# Patient Record
Sex: MALE | Race: WHITE | NOT HISPANIC OR LATINO | Employment: OTHER | URBAN - METROPOLITAN AREA
[De-identification: names, ages, dates, MRNs, and addresses within clinical notes are randomized per-mention and may not be internally consistent; named-entity substitution may affect disease eponyms.]

---

## 2017-01-02 DIAGNOSIS — I10 BENIGN ESSENTIAL HYPERTENSION: ICD-10-CM

## 2017-01-03 RX ORDER — AMLODIPINE BESYLATE 5 MG/1
TABLET ORAL
Qty: 30 TABLET | Refills: 0 | Status: SHIPPED | OUTPATIENT
Start: 2017-01-03 | End: 2017-02-04 | Stop reason: SDUPTHER

## 2017-01-03 RX ORDER — BENAZEPRIL HYDROCHLORIDE 20 MG/1
TABLET ORAL
Qty: 30 TABLET | Refills: 0 | Status: SHIPPED | OUTPATIENT
Start: 2017-01-03 | End: 2017-02-04 | Stop reason: SDUPTHER

## 2017-02-04 DIAGNOSIS — I10 BENIGN ESSENTIAL HYPERTENSION: ICD-10-CM

## 2017-02-06 RX ORDER — BENAZEPRIL HYDROCHLORIDE 20 MG/1
TABLET ORAL
Qty: 30 TABLET | Refills: 0 | Status: SHIPPED | OUTPATIENT
Start: 2017-02-06 | End: 2017-02-08 | Stop reason: SDUPTHER

## 2017-02-06 RX ORDER — AMLODIPINE BESYLATE 5 MG/1
TABLET ORAL
Qty: 30 TABLET | Refills: 0 | Status: SHIPPED | OUTPATIENT
Start: 2017-02-06 | End: 2017-02-08 | Stop reason: SDUPTHER

## 2017-02-08 DIAGNOSIS — I10 BENIGN ESSENTIAL HYPERTENSION: ICD-10-CM

## 2017-02-08 RX ORDER — BENAZEPRIL HYDROCHLORIDE 20 MG/1
20 TABLET ORAL DAILY
Qty: 90 TABLET | Refills: 0 | Status: SHIPPED | OUTPATIENT
Start: 2017-02-08 | End: 2017-10-31 | Stop reason: SDUPTHER

## 2017-02-08 RX ORDER — AMLODIPINE BESYLATE 5 MG/1
5 TABLET ORAL DAILY
Qty: 90 TABLET | Refills: 0 | Status: SHIPPED | OUTPATIENT
Start: 2017-02-08 | End: 2017-12-08 | Stop reason: SDUPTHER

## 2017-03-03 ENCOUNTER — OFFICE VISIT (OUTPATIENT)
Dept: FAMILY MEDICINE CLINIC | Facility: CLINIC | Age: 62
End: 2017-03-03

## 2017-03-03 VITALS
HEIGHT: 71 IN | HEART RATE: 82 BPM | WEIGHT: 169 LBS | BODY MASS INDEX: 23.66 KG/M2 | SYSTOLIC BLOOD PRESSURE: 122 MMHG | DIASTOLIC BLOOD PRESSURE: 68 MMHG | OXYGEN SATURATION: 98 %

## 2017-03-03 DIAGNOSIS — I10 BENIGN ESSENTIAL HYPERTENSION: Primary | ICD-10-CM

## 2017-03-03 PROCEDURE — 99212 OFFICE O/P EST SF 10 MIN: CPT | Performed by: FAMILY MEDICINE

## 2017-03-03 NOTE — PROGRESS NOTES
Lakesha Potts is a 61 y.o. male.     Chief Complaint   Patient presents with   • Hypertension   • Med Refill     Social History   Substance Use Topics   • Smoking status: Never Smoker   • Smokeless tobacco: None   • Alcohol use Yes       History of Present Illness       Patient is here for follow-up of hypertension. He is exercising at work and is adherent to a low-salt diet. Patient does not check  his blood pressure.  Patient denies chest pain and dyspnea. Cardiovascular risk factors: hypertension and male gender. Use of agents associated with hypertension: none. History of target organ damage: none. He is compliant with meds.     The following portions of the patient's history were reviewed and updated as appropriate: allergies, current medications, past social history and problem list.     Review of Systems   Constitutional: Negative for activity change, appetite change, chills, fatigue, fever and unexpected weight change.   HENT: Negative for congestion, ear pain, hearing loss, mouth sores, nosebleeds, rhinorrhea and sore throat.    Eyes: Negative for pain and visual disturbance.   Respiratory: Positive for cough. Negative for shortness of breath and wheezing.    Cardiovascular: Negative for chest pain, palpitations and leg swelling.   Gastrointestinal: Negative for abdominal distention, abdominal pain, blood in stool, constipation, diarrhea, nausea and vomiting.   Endocrine: Negative for cold intolerance and heat intolerance.   Genitourinary: Negative for difficulty urinating, discharge, dysuria, frequency, hematuria and urgency.   Musculoskeletal: Negative for back pain and joint swelling.   Skin: Negative for rash and wound.   Neurological: Negative for dizziness, weakness, numbness and headaches.   Hematological: Does not bruise/bleed easily.   Psychiatric/Behavioral: Negative for confusion, dysphoric mood, sleep disturbance and suicidal ideas. The patient is not nervous/anxious.   "      Objective   Vitals:    03/03/17 0856   BP: 122/68   Pulse: 82   SpO2: 98%   Weight: 169 lb (76.7 kg)   Height: 71\" (180.3 cm)     Body mass index is 23.57 kg/(m^2).    Physical Exam   Constitutional: He is oriented to person, place, and time. Vital signs are normal. He appears well-developed and well-nourished. No distress.   HENT:   Head: Normocephalic.   Cardiovascular: Normal rate, regular rhythm and normal heart sounds.    Neurological: He is alert and oriented to person, place, and time. Gait normal.   Psychiatric: He has a normal mood and affect. His behavior is normal. Judgment and thought content normal.   Vitals reviewed.      Assessment/Plan   Problem List Items Addressed This Visit        Cardiovascular and Mediastinum    Benign essential hypertension - Primary    Overview     3/3/2017  BP is controlled well. He will recheck in six months. He will continue present meds.                    "

## 2017-03-11 DIAGNOSIS — I10 BENIGN ESSENTIAL HYPERTENSION: ICD-10-CM

## 2017-03-13 RX ORDER — AMLODIPINE BESYLATE 5 MG/1
TABLET ORAL
Qty: 90 TABLET | Refills: 1 | Status: SHIPPED | OUTPATIENT
Start: 2017-03-13 | End: 2017-09-19 | Stop reason: SDUPTHER

## 2017-03-13 RX ORDER — BENAZEPRIL HYDROCHLORIDE 20 MG/1
TABLET ORAL
Qty: 90 TABLET | Refills: 1 | Status: SHIPPED | OUTPATIENT
Start: 2017-03-13 | End: 2017-09-19 | Stop reason: SDUPTHER

## 2017-09-19 DIAGNOSIS — I10 BENIGN ESSENTIAL HYPERTENSION: ICD-10-CM

## 2017-09-19 RX ORDER — AMLODIPINE BESYLATE 5 MG/1
TABLET ORAL
Qty: 30 TABLET | Refills: 0 | Status: SHIPPED | OUTPATIENT
Start: 2017-09-19 | End: 2017-10-31 | Stop reason: SDUPTHER

## 2017-09-19 RX ORDER — BENAZEPRIL HYDROCHLORIDE 20 MG/1
TABLET ORAL
Qty: 30 TABLET | Refills: 0 | Status: SHIPPED | OUTPATIENT
Start: 2017-09-19 | End: 2017-12-08 | Stop reason: SDUPTHER

## 2017-10-31 DIAGNOSIS — I10 BENIGN ESSENTIAL HYPERTENSION: ICD-10-CM

## 2017-10-31 RX ORDER — BENAZEPRIL HYDROCHLORIDE 20 MG/1
20 TABLET ORAL DAILY
Qty: 30 TABLET | Refills: 0 | Status: SHIPPED | OUTPATIENT
Start: 2017-10-31 | End: 2017-12-08 | Stop reason: SDUPTHER

## 2017-10-31 RX ORDER — AMLODIPINE BESYLATE 5 MG/1
5 TABLET ORAL DAILY
Qty: 30 TABLET | Refills: 0 | Status: SHIPPED | OUTPATIENT
Start: 2017-10-31 | End: 2017-12-08 | Stop reason: SDUPTHER

## 2017-12-08 ENCOUNTER — OFFICE VISIT (OUTPATIENT)
Dept: FAMILY MEDICINE CLINIC | Facility: CLINIC | Age: 62
End: 2017-12-08

## 2017-12-08 VITALS
WEIGHT: 164 LBS | HEIGHT: 71 IN | HEART RATE: 85 BPM | OXYGEN SATURATION: 100 % | SYSTOLIC BLOOD PRESSURE: 132 MMHG | DIASTOLIC BLOOD PRESSURE: 68 MMHG | BODY MASS INDEX: 22.96 KG/M2

## 2017-12-08 DIAGNOSIS — I10 BENIGN ESSENTIAL HYPERTENSION: ICD-10-CM

## 2017-12-08 PROCEDURE — 99212 OFFICE O/P EST SF 10 MIN: CPT | Performed by: FAMILY MEDICINE

## 2017-12-08 RX ORDER — AMLODIPINE BESYLATE 5 MG/1
5 TABLET ORAL DAILY
Qty: 90 TABLET | Refills: 1 | Status: SHIPPED | OUTPATIENT
Start: 2017-12-08 | End: 2018-06-07 | Stop reason: SDUPTHER

## 2017-12-08 RX ORDER — BENAZEPRIL HYDROCHLORIDE 20 MG/1
20 TABLET ORAL DAILY
Qty: 90 TABLET | Refills: 1 | Status: SHIPPED | OUTPATIENT
Start: 2017-12-08 | End: 2018-06-07 | Stop reason: SDUPTHER

## 2017-12-08 NOTE — PATIENT INSTRUCTIONS
Snores enough to get his wife's attention. Mainly on his back. Rec. Putting tennis balls in a night shirt.

## 2017-12-08 NOTE — PROGRESS NOTES
Wei Potts is a 62 y.o. male.      Assessment/Plan   Problem List Items Addressed This Visit     None             No Follow-up on file.  There are no Patient Instructions on file for this visit.    Chief Complaint   Patient presents with   • Hypertension   • Med Refill     Social History   Substance Use Topics   • Smoking status: Never Smoker   • Smokeless tobacco: Never Used   • Alcohol use Yes       History of Present Illness     Patient is here for follow-up of hypertension. 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 Patient does not check BP at home.. Patient denies chest pain and dyspnea. Cardiovascular risk factors: advanced age (older than 55 for men, 65 for women), hypertension and male gender. Use of agents associated with hypertension: none. History of target organ damage: none. He is compliant with meds.     Snores enough to get his wife's attention. Mainly on his back. Rec. Putting tennis balls in a night shirt.     The following portions of the patient's history were reviewed and updated as appropriate:PMHroutine: Social history , Allergies, Current Medications, Active Problem List and Health Maintenance    Review of Systems   Constitutional: Negative for activity change, appetite change, chills, fatigue, fever and unexpected weight change.   HENT: Negative for congestion, ear pain, hearing loss, mouth sores, nosebleeds, rhinorrhea and sore throat.    Eyes: Negative for pain and visual disturbance.   Respiratory: Negative for cough, shortness of breath and wheezing.    Cardiovascular: Negative for chest pain, palpitations and leg swelling.   Gastrointestinal: Negative for abdominal distention, abdominal pain, blood in stool, constipation, diarrhea, nausea and vomiting.   Endocrine: Negative for cold intolerance and heat intolerance.   Genitourinary: Negative for difficulty urinating, discharge, dysuria, frequency, hematuria and urgency.   Musculoskeletal:  "Negative for back pain and joint swelling.   Skin: Negative for rash and wound.   Neurological: Negative for dizziness, weakness, numbness and headaches.   Hematological: Does not bruise/bleed easily.   Psychiatric/Behavioral: Negative for confusion, dysphoric mood, sleep disturbance and suicidal ideas. The patient is not nervous/anxious.        Objective   Vitals:    12/08/17 1033   BP: 132/68   Pulse: 85   SpO2: 100%   Weight: 74.4 kg (164 lb)   Height: 180.3 cm (70.98\")     Body mass index is 22.88 kg/(m^2).  Physical Exam   Constitutional: He is oriented to person, place, and time. Vital signs are normal. He appears well-developed and well-nourished. No distress.   HENT:   Head: Normocephalic.   Cardiovascular: Normal rate, regular rhythm and normal heart sounds.    Pulmonary/Chest: Effort normal and breath sounds normal.   Neurological: He is alert and oriented to person, place, and time. Gait normal.   Psychiatric: He has a normal mood and affect. His behavior is normal. Judgment and thought content normal.   Vitals reviewed.    Reviewed Data:        "

## 2018-06-07 DIAGNOSIS — I10 BENIGN ESSENTIAL HYPERTENSION: ICD-10-CM

## 2018-06-07 RX ORDER — AMLODIPINE BESYLATE 5 MG/1
TABLET ORAL
Qty: 15 TABLET | Refills: 0 | Status: SHIPPED | OUTPATIENT
Start: 2018-06-07 | End: 2018-06-27 | Stop reason: SDUPTHER

## 2018-06-07 RX ORDER — BENAZEPRIL HYDROCHLORIDE 20 MG/1
TABLET ORAL
Qty: 15 TABLET | Refills: 0 | Status: SHIPPED | OUTPATIENT
Start: 2018-06-07 | End: 2018-06-27 | Stop reason: SDUPTHER

## 2018-06-27 DIAGNOSIS — I10 BENIGN ESSENTIAL HYPERTENSION: ICD-10-CM

## 2018-06-27 RX ORDER — AMLODIPINE BESYLATE 5 MG/1
5 TABLET ORAL DAILY
Qty: 30 TABLET | Refills: 0 | Status: SHIPPED | OUTPATIENT
Start: 2018-06-27 | End: 2018-07-02 | Stop reason: SDUPTHER

## 2018-06-27 RX ORDER — BENAZEPRIL HYDROCHLORIDE 20 MG/1
20 TABLET ORAL DAILY
Qty: 30 TABLET | Refills: 0 | Status: SHIPPED | OUTPATIENT
Start: 2018-06-27 | End: 2018-07-02 | Stop reason: SDUPTHER

## 2018-07-02 ENCOUNTER — OFFICE VISIT (OUTPATIENT)
Dept: FAMILY MEDICINE CLINIC | Facility: CLINIC | Age: 63
End: 2018-07-02

## 2018-07-02 VITALS
OXYGEN SATURATION: 99 % | WEIGHT: 156 LBS | DIASTOLIC BLOOD PRESSURE: 74 MMHG | HEIGHT: 71 IN | RESPIRATION RATE: 16 BRPM | SYSTOLIC BLOOD PRESSURE: 122 MMHG | BODY MASS INDEX: 21.84 KG/M2 | HEART RATE: 71 BPM

## 2018-07-02 DIAGNOSIS — R79.89 ELEVATED LFTS: ICD-10-CM

## 2018-07-02 DIAGNOSIS — D69.9 BLEEDING DISORDER (HCC): Primary | ICD-10-CM

## 2018-07-02 DIAGNOSIS — I10 BENIGN ESSENTIAL HYPERTENSION: ICD-10-CM

## 2018-07-02 LAB
ALBUMIN SERPL-MCNC: 4 G/DL (ref 3.5–5.2)
ALBUMIN/GLOB SERPL: 1 G/DL
ALP SERPL-CCNC: 134 U/L (ref 39–117)
ALT SERPL-CCNC: 32 U/L (ref 1–41)
APTT PPP: 42.6 SECONDS (ref 22.7–35.4)
AST SERPL-CCNC: 66 U/L (ref 1–40)
BASOPHILS # BLD AUTO: 0.03 10*3/MM3 (ref 0–0.2)
BASOPHILS NFR BLD AUTO: 0.4 % (ref 0–1.5)
BILIRUB SERPL-MCNC: 0.8 MG/DL (ref 0.1–1.2)
BUN SERPL-MCNC: 7 MG/DL (ref 8–23)
BUN/CREAT SERPL: 9.6 (ref 7–25)
CALCIUM SERPL-MCNC: 9.5 MG/DL (ref 8.6–10.5)
CHLORIDE SERPL-SCNC: 105 MMOL/L (ref 98–107)
CO2 SERPL-SCNC: 25.6 MMOL/L (ref 22–29)
CREAT SERPL-MCNC: 0.73 MG/DL (ref 0.76–1.27)
EOSINOPHIL # BLD AUTO: 0.51 10*3/MM3 (ref 0–0.7)
EOSINOPHIL NFR BLD AUTO: 7.6 % (ref 0.3–6.2)
ERYTHROCYTE [DISTWIDTH] IN BLOOD BY AUTOMATED COUNT: 14.2 % (ref 11.5–14.5)
GLOBULIN SER CALC-MCNC: 4.1 GM/DL
GLUCOSE SERPL-MCNC: 95 MG/DL (ref 65–99)
HCT VFR BLD AUTO: 38 % (ref 40.4–52.2)
HGB BLD-MCNC: 11.9 G/DL (ref 13.7–17.6)
IMM GRANULOCYTES # BLD: 0 10*3/MM3 (ref 0–0.03)
IMM GRANULOCYTES NFR BLD: 0 % (ref 0–0.5)
INR PPP: 1.39 (ref 0.9–1.1)
LYMPHOCYTES # BLD AUTO: 1.22 10*3/MM3 (ref 0.9–4.8)
LYMPHOCYTES NFR BLD AUTO: 18.3 % (ref 19.6–45.3)
MCH RBC QN AUTO: 30.8 PG (ref 27–32.7)
MCHC RBC AUTO-ENTMCNC: 31.3 G/DL (ref 32.6–36.4)
MCV RBC AUTO: 98.4 FL (ref 79.8–96.2)
MONOCYTES # BLD AUTO: 0.72 10*3/MM3 (ref 0.2–1.2)
MONOCYTES NFR BLD AUTO: 10.8 % (ref 5–12)
NEUTROPHILS # BLD AUTO: 4.2 10*3/MM3 (ref 1.9–8.1)
NEUTROPHILS NFR BLD AUTO: 62.9 % (ref 42.7–76)
PLATELET # BLD AUTO: 121 10*3/MM3 (ref 140–500)
POTASSIUM SERPL-SCNC: 3.6 MMOL/L (ref 3.5–5.2)
PROT SERPL-MCNC: 8.1 G/DL (ref 6–8.5)
PROTHROMBIN TIME: 16.8 SECONDS (ref 11.7–14.2)
RBC # BLD AUTO: 3.86 10*6/MM3 (ref 4.6–6)
SODIUM SERPL-SCNC: 141 MMOL/L (ref 136–145)
WBC # BLD AUTO: 6.68 10*3/MM3 (ref 4.5–10.7)

## 2018-07-02 PROCEDURE — 99212 OFFICE O/P EST SF 10 MIN: CPT | Performed by: FAMILY MEDICINE

## 2018-07-02 RX ORDER — BENAZEPRIL HYDROCHLORIDE 20 MG/1
20 TABLET ORAL DAILY
Qty: 30 TABLET | Refills: 5 | Status: SHIPPED | OUTPATIENT
Start: 2018-07-02 | End: 2019-02-07 | Stop reason: SDUPTHER

## 2018-07-02 RX ORDER — AMLODIPINE BESYLATE 5 MG/1
5 TABLET ORAL DAILY
Qty: 30 TABLET | Refills: 5 | Status: SHIPPED | OUTPATIENT
Start: 2018-07-02 | End: 2019-02-07 | Stop reason: SDUPTHER

## 2019-02-07 DIAGNOSIS — I10 BENIGN ESSENTIAL HYPERTENSION: ICD-10-CM

## 2019-02-07 RX ORDER — BENAZEPRIL HYDROCHLORIDE 20 MG/1
TABLET ORAL
Qty: 30 TABLET | Refills: 0 | Status: SHIPPED | OUTPATIENT
Start: 2019-02-07 | End: 2019-02-25 | Stop reason: SDUPTHER

## 2019-02-07 RX ORDER — AMLODIPINE BESYLATE 5 MG/1
TABLET ORAL
Qty: 30 TABLET | Refills: 0 | Status: SHIPPED | OUTPATIENT
Start: 2019-02-07 | End: 2019-02-25 | Stop reason: SDUPTHER

## 2019-02-25 DIAGNOSIS — I10 BENIGN ESSENTIAL HYPERTENSION: ICD-10-CM

## 2019-02-25 RX ORDER — BENAZEPRIL HYDROCHLORIDE 20 MG/1
TABLET ORAL
Qty: 15 TABLET | Refills: 0 | Status: SHIPPED | OUTPATIENT
Start: 2019-02-25 | End: 2019-03-18 | Stop reason: SDUPTHER

## 2019-02-25 RX ORDER — AMLODIPINE BESYLATE 5 MG/1
TABLET ORAL
Qty: 15 TABLET | Refills: 0 | Status: SHIPPED | OUTPATIENT
Start: 2019-02-25 | End: 2019-03-18 | Stop reason: SDUPTHER

## 2019-03-18 ENCOUNTER — OFFICE VISIT (OUTPATIENT)
Dept: FAMILY MEDICINE CLINIC | Facility: CLINIC | Age: 64
End: 2019-03-18

## 2019-03-18 VITALS
HEIGHT: 71 IN | RESPIRATION RATE: 16 BRPM | BODY MASS INDEX: 23.8 KG/M2 | OXYGEN SATURATION: 99 % | WEIGHT: 170 LBS | SYSTOLIC BLOOD PRESSURE: 138 MMHG | HEART RATE: 83 BPM | DIASTOLIC BLOOD PRESSURE: 76 MMHG

## 2019-03-18 DIAGNOSIS — Z01.89 ROUTINE LAB DRAW: Primary | ICD-10-CM

## 2019-03-18 DIAGNOSIS — I10 BENIGN ESSENTIAL HYPERTENSION: ICD-10-CM

## 2019-03-18 PROCEDURE — 99212 OFFICE O/P EST SF 10 MIN: CPT | Performed by: FAMILY MEDICINE

## 2019-03-18 RX ORDER — BENAZEPRIL HYDROCHLORIDE 20 MG/1
20 TABLET ORAL DAILY
Qty: 90 TABLET | Refills: 1 | Status: SHIPPED | OUTPATIENT
Start: 2019-03-18 | End: 2019-09-17 | Stop reason: SDUPTHER

## 2019-03-18 RX ORDER — AMLODIPINE BESYLATE 5 MG/1
5 TABLET ORAL DAILY
Qty: 90 TABLET | Refills: 1 | Status: SHIPPED | OUTPATIENT
Start: 2019-03-18 | End: 2019-09-17 | Stop reason: SDUPTHER

## 2019-03-18 RX ORDER — SILDENAFIL 50 MG/1
50 TABLET, FILM COATED ORAL DAILY
Qty: 30 TABLET | Refills: 34 | Status: SHIPPED | OUTPATIENT
Start: 2019-03-18 | End: 2020-07-06

## 2019-03-18 NOTE — PROGRESS NOTES
Problem List Items Addressed This Visit        Cardiovascular and Mediastinum    Benign essential hypertension    Overview     7/2/2018  BP is controlled well. He will recheck in six months. He will continue present meds.           Relevant Medications    benazepril (LOTENSIN) 20 MG tablet    amLODIPine (NORVASC) 5 MG tablet      Other Visit Diagnoses     Routine lab draw    -  Primary         Return in about 6 months (around 9/18/2019).  Patient Instructions   Dr. Abe Rolon  [address] [facility], [address]  [place] [address]  [phone]      Wei Potts is a 64 y.o. male being seen in our office today for Hypertension and Shoulder Pain (roseann. )                 He  reports that  has never smoked. he has never used smokeless tobacco. He reports that he drinks alcohol. He reports that he does not use drugs.             HPI Patient is here for follow-up of hypertension. He is exercising with his job and is adherent to a low-salt diet. Patient does check BP occasionally.. Patient denies chest pain and dyspnea. Cardiovascular risk factors: advanced age (older than 55 for men, 65 for women), hypertension and male gender. Use of agents associated with hypertension: none. History of target organ damage: none. He is compliant with meds.              The following portions of the patient's history were reviewed and updated as appropriate:PMHroutine: Social history , Allergies, Current Medications, Active Problem List and Health Maintenance            Review of Systems   Constitutional: Negative for activity change, appetite change, chills, fatigue, fever and unexpected weight change.   HENT: Negative for congestion, ear pain, hearing loss, mouth sores, nosebleeds, rhinorrhea and sore throat.    Eyes: Negative for pain and visual disturbance.   Respiratory: Negative for cough, shortness of breath and wheezing.    Cardiovascular: Negative for chest pain, palpitations and leg swelling.   Gastrointestinal:  Negative for abdominal distention, abdominal pain, blood in stool, constipation, diarrhea, nausea and vomiting.   Endocrine: Negative for cold intolerance and heat intolerance.   Genitourinary: Negative for difficulty urinating, discharge, dysuria, frequency, hematuria and urgency.   Musculoskeletal: Positive for arthralgias (He is having some pain in the shoulders, right is worse than the left. ). Negative for back pain and joint swelling.   Skin: Negative for rash and wound.   Neurological: Negative for dizziness, weakness, numbness and headaches.   Hematological: Does not bruise/bleed easily.   Psychiatric/Behavioral: Negative for confusion, dysphoric mood, sleep disturbance and suicidal ideas. The patient is not nervous/anxious.                 BP Readings from Last 1 Encounters:   03/18/19 138/76     Wt Readings from Last 3 Encounters:   03/18/19 77.1 kg (170 lb)   07/02/18 70.8 kg (156 lb)   12/08/17 74.4 kg (164 lb)   Body mass index is 23.71 kg/m².             Physical Exam   Constitutional: He is oriented to person, place, and time. Vital signs are normal. He appears well-developed and well-nourished. No distress.   HENT:   Head: Normocephalic.   Cardiovascular: Normal rate, regular rhythm and normal heart sounds.   Pulmonary/Chest: Effort normal and breath sounds normal.   Neurological: He is alert and oriented to person, place, and time. Gait normal.   Psychiatric: He has a normal mood and affect. His behavior is normal. Judgment and thought content normal.   Vitals reviewed.        No visits with results within 1 Month(s) from this visit.   Latest known visit with results is:   Office Visit on 07/02/2018   Component Date Value Ref Range Status   • WBC 07/02/2018 6.68  4.50 - 10.70 10*3/mm3 Final   • RBC 07/02/2018 3.86* 4.60 - 6.00 10*6/mm3 Final   • Hemoglobin 07/02/2018 11.9* 13.7 - 17.6 g/dL Final   • Hematocrit 07/02/2018 38.0* 40.4 - 52.2 % Final   • MCV 07/02/2018 98.4* 79.8 - 96.2 fL Final   • MCH  07/02/2018 30.8  27.0 - 32.7 pg Final   • MCHC 07/02/2018 31.3* 32.6 - 36.4 g/dL Final   • RDW 07/02/2018 14.2  11.5 - 14.5 % Final   • Platelets 07/02/2018 121* 140 - 500 10*3/mm3 Final   • Neutrophil Rel % 07/02/2018 62.9  42.7 - 76.0 % Final   • Lymphocyte Rel % 07/02/2018 18.3* 19.6 - 45.3 % Final   • Monocyte Rel % 07/02/2018 10.8  5.0 - 12.0 % Final   • Eosinophil Rel % 07/02/2018 7.6* 0.3 - 6.2 % Final   • Basophil Rel % 07/02/2018 0.4  0.0 - 1.5 % Final   • Neutrophils Absolute 07/02/2018 4.20  1.90 - 8.10 10*3/mm3 Final   • Lymphocytes Absolute 07/02/2018 1.22  0.90 - 4.80 10*3/mm3 Final   • Monocytes Absolute 07/02/2018 0.72  0.20 - 1.20 10*3/mm3 Final   • Eosinophils Absolute 07/02/2018 0.51  0.00 - 0.70 10*3/mm3 Final   • Basophils Absolute 07/02/2018 0.03  0.00 - 0.20 10*3/mm3 Final   • Immature Granulocyte Rel % 07/02/2018 0.0  0.0 - 0.5 % Final   • Immature Grans Absolute 07/02/2018 0.00  0.00 - 0.03 10*3/mm3 Final   • Glucose 07/02/2018 95  65 - 99 mg/dL Final   • BUN 07/02/2018 7* 8 - 23 mg/dL Final   • Creatinine 07/02/2018 0.73* 0.76 - 1.27 mg/dL Final   • eGFR Non  Am 07/02/2018 109  >60 mL/min/1.73 Final   • eGFR African Am 07/02/2018 132  >60 mL/min/1.73 Final   • BUN/Creatinine Ratio 07/02/2018 9.6  7.0 - 25.0 Final   • Sodium 07/02/2018 141  136 - 145 mmol/L Final   • Potassium 07/02/2018 3.6  3.5 - 5.2 mmol/L Final   • Chloride 07/02/2018 105  98 - 107 mmol/L Final   • Total CO2 07/02/2018 25.6  22.0 - 29.0 mmol/L Final   • Calcium 07/02/2018 9.5  8.6 - 10.5 mg/dL Final   • Total Protein 07/02/2018 8.1  6.0 - 8.5 g/dL Final   • Albumin 07/02/2018 4.00  3.50 - 5.20 g/dL Final   • Globulin 07/02/2018 4.1  gm/dL Final   • A/G Ratio 07/02/2018 1.0  g/dL Final   • Total Bilirubin 07/02/2018 0.8  0.1 - 1.2 mg/dL Final   • Alkaline Phosphatase 07/02/2018 134* 39 - 117 U/L Final   • AST (SGOT) 07/02/2018 66* 1 - 40 U/L Final   • ALT (SGPT) 07/02/2018 32  1 - 41 U/L Final   • INR 07/02/2018  1.39* 0.90 - 1.10 Final   • Protime 07/02/2018 16.8* 11.7 - 14.2 Seconds Final   • aPTT 07/02/2018 42.6* 22.7 - 35.4 seconds Final

## 2019-09-17 DIAGNOSIS — I10 BENIGN ESSENTIAL HYPERTENSION: ICD-10-CM

## 2019-09-17 RX ORDER — AMLODIPINE BESYLATE 5 MG/1
TABLET ORAL
Qty: 90 TABLET | Refills: 1 | Status: SHIPPED | OUTPATIENT
Start: 2019-09-17 | End: 2020-05-05

## 2019-09-17 RX ORDER — BENAZEPRIL HYDROCHLORIDE 20 MG/1
TABLET ORAL
Qty: 90 TABLET | Refills: 1 | Status: SHIPPED | OUTPATIENT
Start: 2019-09-17 | End: 2020-05-05

## 2020-05-05 DIAGNOSIS — I10 BENIGN ESSENTIAL HYPERTENSION: ICD-10-CM

## 2020-05-05 RX ORDER — BENAZEPRIL HYDROCHLORIDE 20 MG/1
TABLET ORAL
Qty: 30 TABLET | Refills: 0 | Status: SHIPPED | OUTPATIENT
Start: 2020-05-05 | End: 2020-05-11 | Stop reason: ALTCHOICE

## 2020-05-05 RX ORDER — AMLODIPINE BESYLATE 5 MG/1
TABLET ORAL
Qty: 30 TABLET | Refills: 0 | Status: SHIPPED | OUTPATIENT
Start: 2020-05-05 | End: 2020-07-01

## 2020-05-05 NOTE — TELEPHONE ENCOUNTER
Left  for a return call.    He needs to be schedule for a video visit and signed up for [facility]

## 2020-05-11 ENCOUNTER — TELEMEDICINE (OUTPATIENT)
Dept: FAMILY MEDICINE CLINIC | Facility: CLINIC | Age: 65
End: 2020-05-11

## 2020-05-11 VITALS — HEART RATE: 99 BPM | SYSTOLIC BLOOD PRESSURE: 142 MMHG | DIASTOLIC BLOOD PRESSURE: 70 MMHG

## 2020-05-11 DIAGNOSIS — I10 BENIGN ESSENTIAL HYPERTENSION: Primary | ICD-10-CM

## 2020-05-11 PROCEDURE — 99212 OFFICE O/P EST SF 10 MIN: CPT | Performed by: FAMILY MEDICINE

## 2020-05-11 RX ORDER — BENAZEPRIL HYDROCHLORIDE AND HYDROCHLOROTHIAZIDE 20; 12.5 MG/1; MG/1
1 TABLET ORAL DAILY
Qty: 90 TABLET | Refills: 1 | Status: SHIPPED | OUTPATIENT
Start: 2020-05-11 | End: 2020-07-01 | Stop reason: ALTCHOICE

## 2020-05-11 NOTE — PROGRESS NOTES
Patient chose to receive care through a telehealth visit.  He consents to use a video/audio connection for his medical care today.     ASSESSMENT AND PLAN     Problem List Items Addressed This Visit        Cardiovascular and Mediastinum    Benign essential hypertension - Primary    Overview     5/11/2020  BP is not controlled well. SBP just a little high - elevated in the 140s.          Relevant Medications    benazepril-hydrochlorthiazide (Lotensin HCT) 20-12.5 MG per tablet        Return in about 6 months (around 11/11/2020) for lab with next visit.  Patient was given instructions and counseling regarding his condition or for health maintenance advice. Please see specific information pulled into the AVS if appropriate.        JOSE Potts is a 65 y.o. male being seen on video today via Video Options: Doximity for Hypertension               Social History  He  reports that he has never smoked. He has never used smokeless tobacco. He reports that he drinks alcohol. He reports that he does not use drugs.    History of the Present Illness   HPI He checks it every two -three days 135/145/70-85. He has not checked his weigh lately but he thinks his weight is about the same. Hair cut is getting long. Needs some lab work.   Significant Past History  The following portions of the patient's history were reviewed and updated as appropriate:PMHroutine: Social history , Allergies, Current Medications, Active Problem List and Health Maintenance    Review of Systems   Constitutional: Negative for activity change, fatigue and fever.   Respiratory: Negative for cough and shortness of breath.    Cardiovascular: Negative for chest pain.   Psychiatric/Behavioral: Negative for dysphoric mood and sleep disturbance. The patient is not nervous/anxious.        OBJECTIVE  Additional parties in room with patient during tele consult: none  Physical Exam   Constitutional: He appears well-developed and well-nourished.    Psychiatric: He has a normal mood and affect. His behavior is normal. Judgment and thought content normal.   Vitals reviewed.        I spent 10 minutes in total including but not limited to the 5 minutes spent in direct conversation with this patient.

## 2020-06-27 DIAGNOSIS — I10 BENIGN ESSENTIAL HYPERTENSION: ICD-10-CM

## 2020-07-01 RX ORDER — AMLODIPINE BESYLATE 5 MG/1
TABLET ORAL
Qty: 90 TABLET | Refills: 1 | Status: SHIPPED | OUTPATIENT
Start: 2020-07-01 | End: 2020-07-06

## 2020-07-01 RX ORDER — BENAZEPRIL HYDROCHLORIDE 20 MG/1
TABLET ORAL
Qty: 90 TABLET | Refills: 1 | Status: SHIPPED | OUTPATIENT
Start: 2020-07-01 | End: 2020-07-06

## 2020-07-06 ENCOUNTER — APPOINTMENT (OUTPATIENT)
Dept: CT IMAGING | Facility: HOSPITAL | Age: 65
End: 2020-07-06

## 2020-07-06 ENCOUNTER — HOSPITAL ENCOUNTER (INPATIENT)
Facility: HOSPITAL | Age: 65
LOS: 3 days | Discharge: HOME | End: 2020-07-09
Attending: EMERGENCY MEDICINE | Admitting: INTERNAL MEDICINE

## 2020-07-06 DIAGNOSIS — K70.31 ALCOHOLIC CIRRHOSIS OF LIVER WITH ASCITES (HCC): ICD-10-CM

## 2020-07-06 DIAGNOSIS — K70.9 ALCOHOLIC LIVER DISEASE (HCC): Primary | ICD-10-CM

## 2020-07-06 PROBLEM — D64.9 ANEMIA: Status: ACTIVE | Noted: 2020-07-06

## 2020-07-06 PROBLEM — R18.8 ASCITES: Status: ACTIVE | Noted: 2020-07-06

## 2020-07-06 PROBLEM — R17 JAUNDICE: Status: ACTIVE | Noted: 2020-07-06

## 2020-07-06 PROBLEM — E87.1 HYPONATREMIA: Status: ACTIVE | Noted: 2020-07-06

## 2020-07-06 LAB
ALBUMIN SERPL-MCNC: 2.6 G/DL (ref 3.5–5.2)
ALBUMIN/GLOB SERPL: 0.5 G/DL
ALP SERPL-CCNC: 145 U/L (ref 39–117)
ALT SERPL W P-5'-P-CCNC: 43 U/L (ref 1–41)
AMMONIA BLD-SCNC: 42 UMOL/L (ref 16–60)
ANION GAP SERPL CALCULATED.3IONS-SCNC: 7.4 MMOL/L (ref 5–15)
APTT PPP: 43.3 SECONDS (ref 22.7–35.4)
AST SERPL-CCNC: 169 U/L (ref 1–40)
BACTERIA UR QL AUTO: ABNORMAL /HPF
BILIRUB SERPL-MCNC: 6 MG/DL (ref 0–1.2)
BILIRUB UR QL STRIP: ABNORMAL
BUN SERPL-MCNC: 8 MG/DL (ref 8–23)
BUN/CREAT SERPL: 12.5 (ref 7–25)
CALCIUM SPEC-SCNC: 8.2 MG/DL (ref 8.6–10.5)
CHLORIDE SERPL-SCNC: 103 MMOL/L (ref 98–107)
CLARITY UR: CLEAR
CO2 SERPL-SCNC: 22.6 MMOL/L (ref 22–29)
COLOR UR: ABNORMAL
CREAT SERPL-MCNC: 0.64 MG/DL (ref 0.76–1.27)
DEPRECATED RDW RBC AUTO: 60.1 FL (ref 37–54)
ERYTHROCYTE [DISTWIDTH] IN BLOOD BY AUTOMATED COUNT: 22.1 % (ref 12.3–15.4)
GFR SERPL CREATININE-BSD FRML MDRD: 126 ML/MIN/1.73
GLOBULIN UR ELPH-MCNC: 5.2 GM/DL
GLUCOSE SERPL-MCNC: 102 MG/DL (ref 65–99)
GLUCOSE UR STRIP-MCNC: NEGATIVE MG/DL
HAV IGM SERPL QL IA: NORMAL
HBV CORE IGM SERPL QL IA: NORMAL
HBV SURFACE AG SERPL QL IA: NORMAL
HCT VFR BLD AUTO: 28.7 % (ref 37.5–51)
HCV AB SER DONR QL: NORMAL
HGB BLD-MCNC: 9.7 G/DL (ref 13–17.7)
HGB UR QL STRIP.AUTO: NEGATIVE
HYALINE CASTS UR QL AUTO: ABNORMAL /LPF
INR PPP: 2.36 (ref 0.9–1.1)
KETONES UR QL STRIP: ABNORMAL
LEUKOCYTE ESTERASE UR QL STRIP.AUTO: ABNORMAL
LIPASE SERPL-CCNC: 65 U/L (ref 13–60)
LYMPHOCYTES # BLD MANUAL: 0.47 10*3/MM3 (ref 0.7–3.1)
LYMPHOCYTES NFR BLD MANUAL: 10 % (ref 5–12)
LYMPHOCYTES NFR BLD MANUAL: 11 % (ref 19.6–45.3)
MCH RBC QN AUTO: 25.4 PG (ref 26.6–33)
MCHC RBC AUTO-ENTMCNC: 33.8 G/DL (ref 31.5–35.7)
MCV RBC AUTO: 75.1 FL (ref 79–97)
MONOCYTES # BLD AUTO: 0.43 10*3/MM3 (ref 0.1–0.9)
NEUTROPHILS # BLD AUTO: 3.36 10*3/MM3 (ref 1.7–7)
NEUTROPHILS NFR BLD MANUAL: 79 % (ref 42.7–76)
NITRITE UR QL STRIP: POSITIVE
PH UR STRIP.AUTO: 5.5 [PH] (ref 5–8)
PLAT MORPH BLD: NORMAL
PLATELET # BLD AUTO: 88 10*3/MM3 (ref 140–450)
PMV BLD AUTO: 9.4 FL (ref 6–12)
POTASSIUM SERPL-SCNC: 3.5 MMOL/L (ref 3.5–5.2)
PROT SERPL-MCNC: 7.8 G/DL (ref 6–8.5)
PROT UR QL STRIP: NEGATIVE
PROTHROMBIN TIME: 25.1 SECONDS (ref 11.7–14.2)
RBC # BLD AUTO: 3.82 10*6/MM3 (ref 4.14–5.8)
RBC # UR: ABNORMAL /HPF
RBC MORPH BLD: NORMAL
REF LAB TEST METHOD: ABNORMAL
RETICS # AUTO: 0.06 10*6/MM3 (ref 0.02–0.13)
RETICS/RBC NFR AUTO: 1.67 % (ref 0.7–1.9)
SODIUM SERPL-SCNC: 133 MMOL/L (ref 136–145)
SP GR UR STRIP: 1.03 (ref 1–1.03)
SQUAMOUS #/AREA URNS HPF: ABNORMAL /HPF
UROBILINOGEN UR QL STRIP: ABNORMAL
WBC # BLD AUTO: 4.25 10*3/MM3 (ref 3.4–10.8)
WBC MORPH BLD: NORMAL
WBC UR QL AUTO: ABNORMAL /HPF

## 2020-07-06 PROCEDURE — 74177 CT ABD & PELVIS W/CONTRAST: CPT

## 2020-07-06 PROCEDURE — 99284 EMERGENCY DEPT VISIT MOD MDM: CPT

## 2020-07-06 PROCEDURE — 83516 IMMUNOASSAY NONANTIBODY: CPT | Performed by: INTERNAL MEDICINE

## 2020-07-06 PROCEDURE — 25010000002 IOPAMIDOL 61 % SOLUTION: Performed by: EMERGENCY MEDICINE

## 2020-07-06 PROCEDURE — 86235 NUCLEAR ANTIGEN ANTIBODY: CPT | Performed by: INTERNAL MEDICINE

## 2020-07-06 PROCEDURE — 85045 AUTOMATED RETICULOCYTE COUNT: CPT | Performed by: INTERNAL MEDICINE

## 2020-07-06 PROCEDURE — 83690 ASSAY OF LIPASE: CPT | Performed by: EMERGENCY MEDICINE

## 2020-07-06 PROCEDURE — 25010000002 FUROSEMIDE PER 20 MG: Performed by: INTERNAL MEDICINE

## 2020-07-06 PROCEDURE — 85730 THROMBOPLASTIN TIME PARTIAL: CPT | Performed by: EMERGENCY MEDICINE

## 2020-07-06 PROCEDURE — 85025 COMPLETE CBC W/AUTO DIFF WBC: CPT | Performed by: EMERGENCY MEDICINE

## 2020-07-06 PROCEDURE — 36415 COLL VENOUS BLD VENIPUNCTURE: CPT | Performed by: NURSE PRACTITIONER

## 2020-07-06 PROCEDURE — 86038 ANTINUCLEAR ANTIBODIES: CPT | Performed by: INTERNAL MEDICINE

## 2020-07-06 PROCEDURE — 80053 COMPREHEN METABOLIC PANEL: CPT | Performed by: EMERGENCY MEDICINE

## 2020-07-06 PROCEDURE — 85610 PROTHROMBIN TIME: CPT | Performed by: EMERGENCY MEDICINE

## 2020-07-06 PROCEDURE — 81001 URINALYSIS AUTO W/SCOPE: CPT | Performed by: EMERGENCY MEDICINE

## 2020-07-06 PROCEDURE — 82140 ASSAY OF AMMONIA: CPT | Performed by: INTERNAL MEDICINE

## 2020-07-06 PROCEDURE — 85007 BL SMEAR W/DIFF WBC COUNT: CPT | Performed by: EMERGENCY MEDICINE

## 2020-07-06 PROCEDURE — 86225 DNA ANTIBODY NATIVE: CPT | Performed by: INTERNAL MEDICINE

## 2020-07-06 PROCEDURE — 82105 ALPHA-FETOPROTEIN SERUM: CPT | Performed by: INTERNAL MEDICINE

## 2020-07-06 PROCEDURE — 80074 ACUTE HEPATITIS PANEL: CPT | Performed by: EMERGENCY MEDICINE

## 2020-07-06 PROCEDURE — 82525 ASSAY OF COPPER: CPT | Performed by: INTERNAL MEDICINE

## 2020-07-06 RX ORDER — LORAZEPAM 1 MG/1
1 TABLET ORAL EVERY 6 HOURS
Status: DISCONTINUED | OUTPATIENT
Start: 2020-07-06 | End: 2020-07-06

## 2020-07-06 RX ORDER — MAGNESIUM SULFATE HEPTAHYDRATE 40 MG/ML
4 INJECTION, SOLUTION INTRAVENOUS AS NEEDED
Status: DISCONTINUED | OUTPATIENT
Start: 2020-07-06 | End: 2020-07-09 | Stop reason: HOSPADM

## 2020-07-06 RX ORDER — POTASSIUM CHLORIDE 750 MG/1
20 CAPSULE, EXTENDED RELEASE ORAL 2 TIMES DAILY WITH MEALS
Status: DISCONTINUED | OUTPATIENT
Start: 2020-07-06 | End: 2020-07-07

## 2020-07-06 RX ORDER — FOLIC ACID 1 MG/1
1 TABLET ORAL DAILY
Status: DISCONTINUED | OUTPATIENT
Start: 2020-07-07 | End: 2020-07-09 | Stop reason: HOSPADM

## 2020-07-06 RX ORDER — DIPHENOXYLATE HYDROCHLORIDE AND ATROPINE SULFATE 2.5; .025 MG/1; MG/1
1 TABLET ORAL DAILY
Status: DISCONTINUED | OUTPATIENT
Start: 2020-07-07 | End: 2020-07-09 | Stop reason: HOSPADM

## 2020-07-06 RX ORDER — THIAMINE MONONITRATE (VIT B1) 100 MG
100 TABLET ORAL DAILY
Status: DISCONTINUED | OUTPATIENT
Start: 2020-07-07 | End: 2020-07-09 | Stop reason: HOSPADM

## 2020-07-06 RX ORDER — SODIUM CHLORIDE 0.9 % (FLUSH) 0.9 %
10 SYRINGE (ML) INJECTION AS NEEDED
Status: DISCONTINUED | OUTPATIENT
Start: 2020-07-06 | End: 2020-07-09 | Stop reason: HOSPADM

## 2020-07-06 RX ORDER — LORAZEPAM 1 MG/1
1 TABLET ORAL EVERY 8 HOURS
Status: DISCONTINUED | OUTPATIENT
Start: 2020-07-07 | End: 2020-07-06

## 2020-07-06 RX ORDER — AMLODIPINE BESYLATE 5 MG/1
5 TABLET ORAL DAILY
Status: ON HOLD | COMMUNITY
End: 2020-07-06 | Stop reason: ALTCHOICE

## 2020-07-06 RX ORDER — POTASSIUM CHLORIDE 1.5 G/1.77G
40 POWDER, FOR SOLUTION ORAL AS NEEDED
Status: DISCONTINUED | OUTPATIENT
Start: 2020-07-06 | End: 2020-07-07 | Stop reason: SDUPTHER

## 2020-07-06 RX ORDER — MULTIVITAMIN
1 TABLET ORAL DAILY
Status: DISCONTINUED | OUTPATIENT
Start: 2020-07-07 | End: 2020-07-09 | Stop reason: HOSPADM

## 2020-07-06 RX ORDER — BENAZEPRIL HYDROCHLORIDE AND HYDROCHLOROTHIAZIDE 20; 12.5 MG/1; MG/1
0.5 TABLET ORAL NIGHTLY
COMMUNITY
End: 2020-07-09 | Stop reason: HOSPADM

## 2020-07-06 RX ORDER — SODIUM CHLORIDE 0.9 % (FLUSH) 0.9 %
10 SYRINGE (ML) INJECTION EVERY 12 HOURS SCHEDULED
Status: DISCONTINUED | OUTPATIENT
Start: 2020-07-06 | End: 2020-07-09 | Stop reason: HOSPADM

## 2020-07-06 RX ORDER — POTASSIUM CHLORIDE 750 MG/1
40 CAPSULE, EXTENDED RELEASE ORAL AS NEEDED
Status: DISCONTINUED | OUTPATIENT
Start: 2020-07-06 | End: 2020-07-07 | Stop reason: SDUPTHER

## 2020-07-06 RX ORDER — FUROSEMIDE 10 MG/ML
40 INJECTION INTRAMUSCULAR; INTRAVENOUS EVERY 12 HOURS
Status: DISCONTINUED | OUTPATIENT
Start: 2020-07-06 | End: 2020-07-07

## 2020-07-06 RX ORDER — MAGNESIUM SULFATE HEPTAHYDRATE 40 MG/ML
2 INJECTION, SOLUTION INTRAVENOUS AS NEEDED
Status: DISCONTINUED | OUTPATIENT
Start: 2020-07-06 | End: 2020-07-09 | Stop reason: HOSPADM

## 2020-07-06 RX ORDER — ONDANSETRON 2 MG/ML
4 INJECTION INTRAMUSCULAR; INTRAVENOUS EVERY 6 HOURS PRN
Status: DISCONTINUED | OUTPATIENT
Start: 2020-07-06 | End: 2020-07-09 | Stop reason: HOSPADM

## 2020-07-06 RX ORDER — PANTOPRAZOLE SODIUM 40 MG/10ML
40 INJECTION, POWDER, LYOPHILIZED, FOR SOLUTION INTRAVENOUS
Status: DISCONTINUED | OUTPATIENT
Start: 2020-07-06 | End: 2020-07-07

## 2020-07-06 RX ORDER — AMLODIPINE BESYLATE 5 MG/1
5 TABLET ORAL DAILY
Status: DISCONTINUED | OUTPATIENT
Start: 2020-07-07 | End: 2020-07-06

## 2020-07-06 RX ORDER — LORAZEPAM 1 MG/1
1 TABLET ORAL EVERY 6 HOURS PRN
Status: DISCONTINUED | OUTPATIENT
Start: 2020-07-06 | End: 2020-07-09 | Stop reason: HOSPADM

## 2020-07-06 RX ADMIN — POTASSIUM CHLORIDE 20 MEQ: 10 CAPSULE, COATED, EXTENDED RELEASE ORAL at 23:45

## 2020-07-06 RX ADMIN — IOPAMIDOL 85 ML: 612 INJECTION, SOLUTION INTRAVENOUS at 17:22

## 2020-07-06 RX ADMIN — METOPROLOL TARTRATE 25 MG: 25 TABLET, FILM COATED ORAL at 23:45

## 2020-07-06 RX ADMIN — SODIUM CHLORIDE, PRESERVATIVE FREE 10 ML: 5 INJECTION INTRAVENOUS at 23:47

## 2020-07-06 RX ADMIN — PANTOPRAZOLE SODIUM 40 MG: 40 INJECTION, POWDER, FOR SOLUTION INTRAVENOUS at 23:47

## 2020-07-06 RX ADMIN — FUROSEMIDE 40 MG: 10 INJECTION, SOLUTION INTRAMUSCULAR; INTRAVENOUS at 23:47

## 2020-07-06 NOTE — PROGRESS NOTES
Clinical Pharmacy Services: Medication History    Wei Potts is a 65 y.o. male presenting to [facility] for   Chief Complaint   Patient presents with   • Abdominal Pain   • Leg Swelling       He  has a past medical history of Hypertension.    Allergies as of 07/06/2020   • (No Known Allergies)       Medication information was obtained from: patient  Pharmacy and Phone Number:     Prior to Admission Medications     Prescriptions Last Dose Informant Patient Reported? Taking?    amLODIPine (NORVASC) 5 MG tablet 7/5/2020 Self Yes Yes    Take 5 mg by mouth Daily.    benazepril-hydrochlorthiazide (LOTENSIN HCT) 20-12.5 MG per tablet 7/5/2020 Self Yes Yes    Take 0.5 tablets by mouth Daily.    Multiple Vitamin (MULTIVITAMIN) tablet 7/5/2020 Self Yes Yes    Take 1 tablet by mouth daily.            Medication notes:     This medication list is complete to the best of my knowledge as of 7/6/2020    Please call if questions.    Anaid Lakhani [facility] [place]  Medication History Technician  [phone]    7/6/2020 19:36

## 2020-07-06 NOTE — H&P
Patient Name:  Wie Potts  YOB: 1955  MRN:  [id number]  Admit Date:  7/6/2020  Patient Care Team:  Bella Villalta MD as PCP - General  Lizbet Carroll MA (Inactive) as Medical Assistant      Subjective   History Present Illness     Chief Complaint   Patient presents with   • Abdominal Pain   • Leg Swelling     History of Present Illness   Mr. Potts is a very pleasant 65-year-old male with history of hypertension and alcohol use who presents to the emergency room with abdominal swelling and leg swelling.  States he is noticed some abdominal swelling over the past 5 to 6 weeks, that has gradually worsened, he has no associated nausea or vomiting.  He reports normal bowel movements at home, no blood in his stool.  This week he started to notice some bilateral leg swelling as well.  Patient does have a history of drinking hard liquor about 20 years ago for several years, then drink occasionally, recently he has been drinking wine instead of hard liquor, but he does admit to drinking 3 to 4 glasses daily, however he did stop about 2 weeks ago when he started having abdominal swelling.  He denies having any withdrawal type symptoms.  He is very calm and cooperative at this time, no symptoms of withdrawal.  He states he is never been told he has had liver problems in the past.  Is only on blood pressure medications at home, his blood pressure has been well controlled.  He is going to see his primary care doctor today, who sent him to urgent care, who eventually sent him here to the emergency room.  In the emergency room patient does appear to be jaundiced with scleral icterus.  Glucose 102, sodium 133, creatinine 0.64, BUN 8, alkaline phosphatase 145, ALT 43, , total bilirubin 6.0, albumin 2.6, lipase 65, INR 2.36, patient is not on any anticoagulation.  PTT is 43.3.  Hemoglobin is 9.7, he denies any blood in the stool or any recent blood loss, hematocrit 28.7.  Hepatitis panel done  emergency room was nonreactive for hepatitis A, hep B, and hep C.  Emergency room did talk to gastroenterology who will consult in the a.m.  On CT scan patient does show liver has cirrhotic morphology, with enlargement of spleen and splenic varices and associated with a large amount of ascites, no evidence of portal vein thrombus.  Very tiny low-density lesions in liver consistent with tiny cysts.  There are small bilateral pleural effusions, right greater than left with some adjacent dense pneumonia at the right base and some borderline atelectasis and pneumonia at the left base.  Patient has no shortness of breath, no cough, no fever, lung sounds are clear, will monitor respiratory status.    Review of Systems   Constitutional: Negative for appetite change and fever.   HENT: Negative for nosebleeds and trouble swallowing.    Eyes: Negative for photophobia, redness and visual disturbance.   Respiratory: Negative for cough, chest tightness, shortness of breath and wheezing.    Cardiovascular: Positive for leg swelling. Negative for chest pain and palpitations.   Gastrointestinal: Positive for abdominal distention. Negative for abdominal pain, blood in stool, constipation, nausea and vomiting.   Endocrine: Negative.    Genitourinary: Negative.    Musculoskeletal: Negative for gait problem and joint swelling.   Skin: Negative.    Neurological: Negative for dizziness, seizures, speech difficulty, light-headedness and headaches.   Hematological: Negative.    Psychiatric/Behavioral: Negative for behavioral problems and confusion.        Personal History     Past Medical History:   Diagnosis Date   • Hypertension      History reviewed. No pertinent surgical history.  Family History   Problem Relation Age of Onset   • Diabetes Mother    • Hypertension Father      Social History     Tobacco Use   • Smoking status: Never Smoker   • Smokeless tobacco: Never Used   Substance Use Topics   • Alcohol use: Yes     Comment: 1  bottle of wine per day, stopped drinking ETOH 2 weeks ago   • Drug use: No     No current facility-administered medications on file prior to encounter.      Current Outpatient Medications on File Prior to Encounter   Medication Sig Dispense Refill   • amLODIPine (NORVASC) 5 MG tablet Take 1 tablet by mouth once daily 90 tablet 1   • aspirin 325 MG tablet Take 325 mg by mouth daily.     • benazepril (LOTENSIN) 20 MG tablet Take 1 tablet by mouth once daily 90 tablet 1   • Multiple Vitamin (MULTIVITAMIN) tablet Take 1 tablet by mouth daily.     • sildenafil (VIAGRA) 50 MG tablet Take 1 tablet by mouth Daily. Take 1 tablet daily 1 hour before needed 30 tablet 34     No Known Allergies    Objective    Objective     Vital Signs  Temp:  [97.8 °F (36.6 °C)-98.9 °F (37.2 °C)] 97.8 °F (36.6 °C)  Heart Rate:  [] 96  Resp:  [16-18] 16  BP: (121-128)/(71-82) 128/79  SpO2:  [95 %-98 %] 95 %  on   ;      Body mass index is 23.01 kg/m².    Physical Exam   Constitutional: He is oriented to person, place, and time. He appears well-developed and well-nourished. No distress.   HENT:   Head: Normocephalic.   Eyes: Pupils are equal, round, and reactive to light. EOM are normal.   Scleral icterus   Neck: Normal range of motion. No JVD present.   Cardiovascular: Normal rate and regular rhythm.   Sinus rhythm on monitor with heart rate 72, no acute distress.   Pulmonary/Chest: Effort normal and breath sounds normal.   Lung sounds clear, sats 98% on room air, no shortness of breath or cough.   Abdominal: Soft. Bowel sounds are normal. He exhibits distension and ascites. There is no tenderness.   Patient has moderate amount of ascites, abdomen tympanic, however no tenderness.   Musculoskeletal: Normal range of motion.   Mild bilateral lower extremity edema, nonpitting.   Neurological: He is alert and oriented to person, place, and time. He has normal strength.   No focal neuro deficits   Skin: Skin is warm and dry. Capillary refill  takes less than 2 seconds.   Patient is slightly jaundiced, with scleral icterus, no rashes noted.   Psychiatric: His behavior is normal.   Nursing note and vitals reviewed.      Results Review:  I reviewed the patient's new clinical results.  I reviewed the patient's new imaging results and agree with the interpretation.  I reviewed the patient's other test results and agree with the interpretation  I personally viewed and interpreted the patient's EKG/Telemetry data  Discussed with ED provider.    Lab Results (last 24 hours)     Procedure Component Value Units Date/Time    CBC & Differential [[id number]] Collected:  07/06/20 1630    Specimen:  Blood Updated:  07/06/20 1727    Narrative:       The following orders were created for panel order CBC & Differential.  Procedure                               Abnormality         Status                     ---------                               -----------         ------                     CBC Auto Differential[[id number]]        Abnormal            Edited Result - FINAL        Please view results for these tests on the individual orders.    Comprehensive Metabolic Panel [[id number]]  (Abnormal) Collected:  07/06/20 1630    Specimen:  Blood Updated:  07/06/20 1710     Glucose 102 mg/dL      BUN 8 mg/dL      Creatinine 0.64 mg/dL      Sodium 133 mmol/L      Potassium 3.5 mmol/L      Chloride 103 mmol/L      CO2 22.6 mmol/L      Calcium 8.2 mg/dL      Total Protein 7.8 g/dL      Albumin 2.60 g/dL      ALT (SGPT) 43 U/L      AST (SGOT) 169 U/L      Alkaline Phosphatase 145 U/L      Total Bilirubin 6.0 mg/dL      eGFR Non African Amer 126 mL/min/1.73      Globulin 5.2 gm/dL      A/G Ratio 0.5 g/dL      BUN/Creatinine Ratio 12.5     Anion Gap 7.4 mmol/L     Narrative:       GFR Normal >60  Chronic Kidney Disease <60  Kidney Failure <15      Protime-INR [[id number]]  (Abnormal) Collected:  07/06/20 1630    Specimen:  Blood Updated:  07/06/20 1657     Protime 25.1 Seconds      INR  2.36    aPTT [[id number]]  (Abnormal) Collected:  07/06/20 1630    Specimen:  Blood Updated:  07/06/20 1657     PTT 43.3 seconds     Lipase [[id number]]  (Abnormal) Collected:  07/06/20 1630    Specimen:  Blood Updated:  07/06/20 1709     Lipase 65 U/L     Hepatitis Panel, Acute [[id number]] Collected:  07/06/20 1630    Specimen:  Blood Updated:  07/06/20 1719    Narrative:       The following orders were created for panel order Hepatitis Panel, Acute.  Procedure                               Abnormality         Status                     ---------                               -----------         ------                     Hepatitis Panel, Acute[[id number]]       Normal              Final result               Hep B Confirmation Tube[[id number]]                          In process                   Please view results for these tests on the individual orders.    CBC Auto Differential [[id number]]  (Abnormal) Collected:  07/06/20 1630    Specimen:  Blood Updated:  07/06/20 1727     WBC 4.25 10*3/mm3      RBC 3.82 10*6/mm3      Hemoglobin 9.7 g/dL      Hematocrit 28.7 %      MCV 75.1 fL      MCH 25.4 pg      MCHC 33.8 g/dL      RDW 22.1 %      RDW-SD 60.1 fl      MPV 9.4 fL      Platelets 88 10*3/mm3     Hepatitis Panel, Acute [[id number]]  (Normal) Collected:  07/06/20 1630    Specimen:  Blood Updated:  07/06/20 1719     Hepatitis B Surface Ag Non-Reactive     Hep A IgM Non-Reactive     Hep B C IgM Non-Reactive     Hepatitis C Ab Non-Reactive    Narrative:       Results may be falsely decreased if patient taking Biotin.     Hep B Confirmation Tube [[id number]] Collected:  07/06/20 1630    Specimen:  Blood Updated:  07/06/20 1642    Manual Differential [[id number]]  (Abnormal) Collected:  07/06/20 1630    Specimen:  Blood Updated:  07/06/20 1727     Neutrophil % 79.0 %      Lymphocyte % 11.0 %      Monocyte % 10.0 %      Neutrophils Absolute 3.36 10*3/mm3      Lymphocytes Absolute 0.47 10*3/mm3      Monocytes Absolute 0.43  10*3/mm3      RBC Morphology Normal     WBC Morphology Normal     Platelet Morphology Normal    Urinalysis With Microscopic If Indicated (No Culture) - Urine, Clean Catch [[id number]]  (Abnormal) Collected:  20    Specimen:  Urine, Clean Catch Updated:  20 170     Color, UA Dark Yellow     Appearance, UA Clear     pH, UA 5.5     Specific Gravity, UA 1.027     Glucose, UA Negative     Ketones, UA Trace     Bilirubin, UA Small (1+)     Blood, UA Negative     Protein, UA Negative     Leuk Esterase, UA Trace     Nitrite, UA Positive     Urobilinogen, UA 1.0 E.U./dL    Urinalysis, Microscopic Only - Urine, Clean Catch [[id number]]  (Abnormal) Collected:  20    Specimen:  Urine, Clean Catch Updated:  20 171     RBC, UA 3-5 /HPF      WBC, UA 0-2 /HPF      Bacteria, UA None Seen /HPF      Squamous Epithelial Cells, UA 0-2 /HPF      Hyaline Casts, UA 3-6 /LPF      Methodology Automated Microscopy          Imaging Results (Last 24 Hours)     Procedure Component Value Units Date/Time    CT Abdomen Pelvis With Contrast [[id number]] Collected:  20     Updated:  20 183    Narrative:       CT ABDOMEN AND PELVIS WITH INTRAVENOUS CONTRAST     HISTORY: Cirrhosis. Ascites.     TECHNIQUE/FINDINGS: The CT scan was performed as an emergency procedure  through the abdomen and pelvis with intravenous contrast and  demonstrates the followin. There are small bilateral pleural effusions, right larger than left,  with some adjacent dense pneumonia at the right base and some borderline  atelectasis and pneumonia at the left base posteriorly.  2. The liver has a cirrhotic morphology with nodular contour combined  with enlargement of the spleen and splenic varices and associated with a  large amount of ascites. There is no evidence of portal vein thrombosis.  3. There are 2 very tiny low-density lesions in the liver most  consistent with tiny cysts. The spleen measures 19.5 cm in length.  The  pancreas and both adrenal glands and both kidneys are unremarkable. The  gallbladder shows no gallstones. There is a slightly thickened  appearance of the gallbladder wall which is accentuated by the adjacent  ascites.  4. There is no aortic aneurysm or retroperitoneal lymphadenopathy. The  large and small bowel loops are normal in caliber. There is slight  enlargement of the prostate measuring 5.4 cm. The remainder of the  pelvis is unremarkable.                 Radiation dose reduction techniques were utilized, including automated  exposure control and exposure modulation based on body size.     This report was finalized on 7/6/2020 6:29 PM by Dr. Javier Eid M.D.                  No orders to display        Assessment/Plan     Active Hospital Problems    Diagnosis POA   • **Ascites [R18.8] Unknown   • Alcoholic liver disease (CMS/HCC) [K70.9] Yes   • Hyponatremia [E87.1] Unknown   • Anemia [D64.9] Unknown   • Jaundice [R17] Unknown   • Benign essential hypertension [I10] Yes     5/11/2020  BP is not controlled well. SBP just a little high - elevated in the 140s.        Mr. Potts is a very pleasant 65-year-old male with history of hypertension and alcohol use who presents to the emergency room with abdominal swelling and leg swelling.      Ascites/alcoholic liver disease/jaundice  -Consult gastroenterology, likely needs ascites drained  -Clear liquids until midnight then n.p.o.  -Zofran for nausea  -Monitor INR and liver function  -Check CMP in a.m.    Hyponatremia  -Recheck BMP in the a.m.  -We will hold off on IV fluids, given patient's significant ascites and bilateral lower extremity edema, the hyponatremia is likely chronic, and is mildly below normal. Will continue to monitor    Anemia  -Check iron profile in a.m.  -CBC in a.m.  -Stool for occult blood    Hypertension  -Continue home meds, blood pressure well controlled at this time continue to monitor    · I discussed the patient's findings and  my recommendations with patient and ED provider.    VTE Prophylaxis - SCDs.  Code Status - Full code.       MILAGROS Madden  [facility]  07/06/20  19:24

## 2020-07-06 NOTE — ED PROVIDER NOTES
EMERGENCY DEPARTMENT ENCOUNTER    Room Number:  24/24  Date of encounter:  7/6/2020  PCP: Bella Villalta MD  Historian: Patient      HPI:  Chief Complaint: Abdominal distention  A complete HPI/ROS/PMH/PSH/SH/FH are unobtainable due to: N/A    Context: Wei Potts is a 65 y.o. male who presents to the ED c/o abdominal distention and lower extremity edema.  He first noticed abdominal extension approximately 5 to 6 weeks ago, with lower extremity edema starting a few weeks ago.  He has no pain.  He has no nausea, vomiting or diarrhea.  He has had neither fevers nor chills.  He used to drink heavily approximately 20 years ago, but lately he states he drinks 3 to 4 glasses of wine a day (approximately 1 bottle).  He states he stopped drinking several weeks ago when he noticed abdominal swelling.  He did not suffer any withdrawal symptoms.  No recent ill contacts-no cough, congestion, trouble breathing.  No prior abdominal surgeries.  He was seen at an urgent care earlier today and advised to come to the emergency department.      The patient was placed in a mask in triage, hand hygiene was performed before and after my interaction with the patient.  I wore a mask and gloves during my entire interaction with the patient.    PAST MEDICAL HISTORY  Active Ambulatory Problems     Diagnosis Date Noted   • Benign essential hypertension 10/29/2015   • ED (erectile dysfunction) of organic origin 10/29/2015     Resolved Ambulatory Problems     Diagnosis Date Noted   • No Resolved Ambulatory Problems     Past Medical History:   Diagnosis Date   • Hypertension          PAST SURGICAL HISTORY  History reviewed. No pertinent surgical history.      FAMILY HISTORY  Family History   Problem Relation Age of Onset   • Diabetes Mother    • Hypertension Father          SOCIAL HISTORY  Social History     Socioeconomic History   • Marital status:      Spouse name: Not on file   • Number of children: Not on file   • Years of  education: Not on file   • Highest education level: Not on file   Tobacco Use   • Smoking status: Never Smoker   • Smokeless tobacco: Never Used   Substance and Sexual Activity   • Alcohol use: Yes     Comment: 1 bottle of wine per day, stopped drinking ETOH 2 weeks ago   • Drug use: No   • Sexual activity: Defer         ALLERGIES  Patient has no known allergies.        REVIEW OF SYSTEMS  Review of Systems   Constitutional: Negative for activity change, appetite change and fever.   HENT: Negative for congestion and sore throat.    Eyes: Negative.    Respiratory: Negative for cough and shortness of breath.    Cardiovascular: Negative for chest pain and leg swelling.   Gastrointestinal: Positive for abdominal distention. Negative for abdominal pain, diarrhea and vomiting.   Endocrine: Negative.    Genitourinary: Negative for decreased urine volume and dysuria.   Musculoskeletal: Negative for neck pain.   Skin: Negative for rash and wound.   Allergic/Immunologic: Negative.    Neurological: Negative for weakness, numbness and headaches.   Hematological: Negative.    Psychiatric/Behavioral: Negative.    All other systems reviewed and are negative.       All systems reviewed and negative except for those discussed in HPI.       PHYSICAL EXAM    I have reviewed the triage vital signs and nursing notes.    ED Triage Vitals   Temp Heart Rate Resp BP SpO2   07/06/20 1517 07/06/20 1517 07/06/20 1517 07/06/20 1617 07/06/20 1517   97.8 °F (36.6 °C) 100 16 126/75 96 %      Temp src Heart Rate Source Patient Position BP Location FiO2 (%)   07/06/20 1517 07/06/20 1517 -- -- --   Tympanic Monitor          Physical Exam   Constitutional: Pt. is oriented to person, place, and time and well-developed, well-nourished, and in no distress. No distress.   HENT: Normocephalic and atraumatic,  EOM are normal. Pupils are equal, round, and reactive to light. Oropharynx moist/nonerythematous.  Minimal scleral icterus  Neck: Normal range of  motion. Neck supple. No JVD present. No tracheal deviation present. No thyromegaly present.   Cardiovascular: Normal rate, regular rhythm and normal heart sounds. Exam reveals no gallop and no friction rub.   No murmur heard.  Pulmonary/Chest: Effort normal and breath sounds normal. No stridor. No respiratory distress. No wheezes, no rales.   Abdominal: Soft, ascites is present.  The abdomen is nontender.  There is no guarding or rebound.  No hepatosplenomegaly noted.  Musculoskeletal: Normal range of motion. No tenderness or deformity.  Moderate pedal edema to his tib-fib's bilaterally.  Neurological: Pt. is alert and oriented to person, place, and time. Pt. has normal sensation and normal strength. No cranial nerve deficit. GCS score is 15.   Skin: Skin is warm and dry. No rash noted. Pt. is not diaphoretic. No erythema.  He has a few spider angiomata on his chest.  Psychiatric: Mood, affect and judgment normal.   Nursing note and vitals reviewed.        LAB RESULTS  Recent Results (from the past 24 hour(s))   Comprehensive Metabolic Panel    Collection Time: 07/06/20  4:30 PM   Result Value Ref Range    Glucose 102 (H) 65 - 99 mg/dL    BUN 8 8 - 23 mg/dL    Creatinine 0.64 (L) 0.76 - 1.27 mg/dL    Sodium 133 (L) 136 - 145 mmol/L    Potassium 3.5 3.5 - 5.2 mmol/L    Chloride 103 98 - 107 mmol/L    CO2 22.6 22.0 - 29.0 mmol/L    Calcium 8.2 (L) 8.6 - 10.5 mg/dL    Total Protein 7.8 6.0 - 8.5 g/dL    Albumin 2.60 (L) 3.50 - 5.20 g/dL    ALT (SGPT) 43 (H) 1 - 41 U/L    AST (SGOT) 169 (H) 1 - 40 U/L    Alkaline Phosphatase 145 (H) 39 - 117 U/L    Total Bilirubin 6.0 (H) 0.0 - 1.2 mg/dL    eGFR Non African Amer 126 >60 mL/min/1.73    Globulin 5.2 gm/dL    A/G Ratio 0.5 g/dL    BUN/Creatinine Ratio 12.5 7.0 - 25.0    Anion Gap 7.4 5.0 - 15.0 mmol/L   Protime-INR    Collection Time: 07/06/20  4:30 PM   Result Value Ref Range    Protime 25.1 (H) 11.7 - 14.2 Seconds    INR 2.36 (H) 0.90 - 1.10   aPTT    Collection Time:  07/06/20  4:30 PM   Result Value Ref Range    PTT 43.3 (H) 22.7 - 35.4 seconds   Lipase    Collection Time: 07/06/20  4:30 PM   Result Value Ref Range    Lipase 65 (H) 13 - 60 U/L   CBC Auto Differential    Collection Time: 07/06/20  4:30 PM   Result Value Ref Range    WBC 4.25 3.40 - 10.80 10*3/mm3    RBC 3.82 (L) 4.14 - 5.80 10*6/mm3    Hemoglobin 9.7 (L) 13.0 - 17.7 g/dL    Hematocrit 28.7 (L) 37.5 - 51.0 %    MCV 75.1 (L) 79.0 - 97.0 fL    MCH 25.4 (L) 26.6 - 33.0 pg    MCHC 33.8 31.5 - 35.7 g/dL    RDW 22.1 (H) 12.3 - 15.4 %    RDW-SD 60.1 (H) 37.0 - 54.0 fl    MPV 9.4 6.0 - 12.0 fL    Platelets 88 (L) 140 - 450 10*3/mm3   Hepatitis Panel, Acute    Collection Time: 07/06/20  4:30 PM   Result Value Ref Range    Hepatitis B Surface Ag Non-Reactive Non-Reactive    Hep A IgM Non-Reactive Non-Reactive    Hep B C IgM Non-Reactive Non-Reactive    Hepatitis C Ab Non-Reactive Non-Reactive   Manual Differential    Collection Time: 07/06/20  4:30 PM   Result Value Ref Range    Neutrophil % 79.0 (H) 42.7 - 76.0 %    Lymphocyte % 11.0 (L) 19.6 - 45.3 %    Monocyte % 10.0 5.0 - 12.0 %    Neutrophils Absolute 3.36 1.70 - 7.00 10*3/mm3    Lymphocytes Absolute 0.47 (L) 0.70 - 3.10 10*3/mm3    Monocytes Absolute 0.43 0.10 - 0.90 10*3/mm3    RBC Morphology Normal Normal    WBC Morphology Normal Normal    Platelet Morphology Normal Normal   Urinalysis With Microscopic If Indicated (No Culture) - Urine, Clean Catch    Collection Time: 07/06/20  4:37 PM   Result Value Ref Range    Color, UA Dark Yellow (A) Yellow, Straw    Appearance, UA Clear Clear    pH, UA 5.5 5.0 - 8.0    Specific Gravity, UA 1.027 1.005 - 1.030    Glucose, UA Negative Negative    Ketones, UA Trace (A) Negative    Bilirubin, UA Small (1+) (A) Negative    Blood, UA Negative Negative    Protein, UA Negative Negative    Leuk Esterase, UA Trace (A) Negative    Nitrite, UA Positive (A) Negative    Urobilinogen, UA 1.0 E.U./dL 0.2 - 1.0 E.U./dL   Urinalysis,  Microscopic Only - Urine, Clean Catch    Collection Time: 07/06/20  4:37 PM   Result Value Ref Range    RBC, UA 3-5 (A) None Seen, 0-2 /HPF    WBC, UA 0-2 None Seen, 0-2 /HPF    Bacteria, UA None Seen None Seen /HPF    Squamous Epithelial Cells, UA 0-2 None Seen, 0-2 /HPF    Hyaline Casts, UA 3-6 None Seen /LPF    Methodology Automated Microscopy        Ordered the above labs and independently reviewed the results.        RADIOLOGY  No Radiology Exams Resulted Within Past 24 Hours    I ordered the above noted radiological studies. Reviewed by me and discussed with radiologist.  See dictation for official radiology interpretation.      PROCEDURES    Procedures      MEDICATIONS GIVEN IN ER    Medications   sodium chloride 0.9 % flush 10 mL (has no administration in time range)   iopamidol (ISOVUE-300) 61 % injection 100 mL (85 mL Intravenous Given by Other 7/6/20 1722)         PROGRESS, DATA ANALYSIS, CONSULTS, AND MEDICAL DECISION MAKING    Any/all labs have been independently reviewed by me.  Any/all radiology studies have been reviewed by me and discussed with radiologist dictating the report.   EKG's independently viewed and interpreted by me.  Discussion below represents my analysis of pertinent findings related to patient's condition, differential diagnosis, treatment plan and final disposition.      ED Course as of Jul 06 1752   Mon Jul 06, 2020 1731 Case discussed with Dr. Lynch (gastroenterology)-he recommends admission and will see the patient in consultation.    [WC]   1750 Case discussed with Dr. Sanchez ([facility])-accepts patient for admission.    [WC]   1750 CT of the abdomen and pelvis was independently viewed by me and discussed with Dr. Eid (radiology)-there is marked ascites with cirrhosis.  There is dense atelectasis versus pneumonia in the right lung base with an effusion as well.    [WC]      ED Course User Index  [WC] Beto Harper MD       AS OF 17:52 VITALS:    BP - 126/75  HR -  100  TEMP - 97.8 °F (36.6 °C) (Tympanic)  02 SATS - 96%        DIAGNOSIS  Final diagnoses:   Alcoholic liver disease (CMS/HCC)   Alcoholic cirrhosis of liver with ascites (CMS/HCC)         DISPOSITION  Admitted           Beto Harper MD  07/06/20 1679

## 2020-07-07 ENCOUNTER — APPOINTMENT (OUTPATIENT)
Dept: CARDIOLOGY | Facility: HOSPITAL | Age: 65
End: 2020-07-07

## 2020-07-07 ENCOUNTER — APPOINTMENT (OUTPATIENT)
Dept: ULTRASOUND IMAGING | Facility: HOSPITAL | Age: 65
End: 2020-07-07

## 2020-07-07 LAB
ALPHA-FETOPROTEIN: 3.05 NG/ML (ref 0–8.3)
ANION GAP SERPL CALCULATED.3IONS-SCNC: 5.7 MMOL/L (ref 5–15)
APTT PPP: 45.5 SECONDS (ref 22.7–35.4)
BASOPHILS # BLD AUTO: 0.04 10*3/MM3 (ref 0–0.2)
BASOPHILS NFR BLD AUTO: 0.9 % (ref 0–1.5)
BH CV XLRA MEAS LEFT DIST CCA EDV: -22.9 CM/SEC
BH CV XLRA MEAS LEFT DIST CCA PSV: -87.9 CM/SEC
BH CV XLRA MEAS LEFT DIST ICA EDV: -22.9 CM/SEC
BH CV XLRA MEAS LEFT DIST ICA PSV: -68.6 CM/SEC
BH CV XLRA MEAS LEFT ICA/CCA RATIO: 0.99
BH CV XLRA MEAS LEFT MID ICA EDV: -23.2 CM/SEC
BH CV XLRA MEAS LEFT MID ICA PSV: -65.2 CM/SEC
BH CV XLRA MEAS LEFT PROX CCA EDV: 16.5 CM/SEC
BH CV XLRA MEAS LEFT PROX CCA PSV: 116 CM/SEC
BH CV XLRA MEAS LEFT PROX ECA EDV: -14.7 CM/SEC
BH CV XLRA MEAS LEFT PROX ECA PSV: -104 CM/SEC
BH CV XLRA MEAS LEFT PROX ICA EDV: -13.5 CM/SEC
BH CV XLRA MEAS LEFT PROX ICA PSV: -87.4 CM/SEC
BH CV XLRA MEAS LEFT PROX SCLA PSV: 126 CM/SEC
BH CV XLRA MEAS LEFT VERTEBRAL A EDV: -17.3 CM/SEC
BH CV XLRA MEAS LEFT VERTEBRAL A PSV: -46.8 CM/SEC
BH CV XLRA MEAS RIGHT DIST CCA EDV: 18.2 CM/SEC
BH CV XLRA MEAS RIGHT DIST CCA PSV: 104 CM/SEC
BH CV XLRA MEAS RIGHT DIST ICA EDV: -23.2 CM/SEC
BH CV XLRA MEAS RIGHT DIST ICA PSV: -72.7 CM/SEC
BH CV XLRA MEAS RIGHT ICA/CCA RATIO: 1.05
BH CV XLRA MEAS RIGHT MID ICA EDV: -26.4 CM/SEC
BH CV XLRA MEAS RIGHT MID ICA PSV: -83.3 CM/SEC
BH CV XLRA MEAS RIGHT PROX CCA EDV: 13.5 CM/SEC
BH CV XLRA MEAS RIGHT PROX CCA PSV: 95.6 CM/SEC
BH CV XLRA MEAS RIGHT PROX ECA EDV: -16.5 CM/SEC
BH CV XLRA MEAS RIGHT PROX ECA PSV: -124 CM/SEC
BH CV XLRA MEAS RIGHT PROX ICA EDV: -14.9 CM/SEC
BH CV XLRA MEAS RIGHT PROX ICA PSV: -109 CM/SEC
BH CV XLRA MEAS RIGHT PROX SCLA PSV: 92.7 CM/SEC
BH CV XLRA MEAS RIGHT VERTEBRAL A EDV: 11 CM/SEC
BH CV XLRA MEAS RIGHT VERTEBRAL A PSV: 33.3 CM/SEC
BUN SERPL-MCNC: 7 MG/DL (ref 8–23)
BUN/CREAT SERPL: 14 (ref 7–25)
CALCIUM SPEC-SCNC: 7.7 MG/DL (ref 8.6–10.5)
CHLORIDE SERPL-SCNC: 105 MMOL/L (ref 98–107)
CO2 SERPL-SCNC: 23.3 MMOL/L (ref 22–29)
CREAT SERPL-MCNC: 0.5 MG/DL (ref 0.76–1.27)
DEPRECATED RDW RBC AUTO: 60.5 FL (ref 37–54)
EOSINOPHIL # BLD AUTO: 0.19 10*3/MM3 (ref 0–0.4)
EOSINOPHIL NFR BLD AUTO: 4.5 % (ref 0.3–6.2)
ERYTHROCYTE [DISTWIDTH] IN BLOOD BY AUTOMATED COUNT: 21.8 % (ref 12.3–15.4)
FERRITIN SERPL-MCNC: 56.7 NG/ML (ref 30–400)
FOLATE SERPL-MCNC: 11.5 NG/ML (ref 4.78–24.2)
GFR SERPL CREATININE-BSD FRML MDRD: >150 ML/MIN/1.73
GLUCOSE SERPL-MCNC: 89 MG/DL (ref 65–99)
HBV SURFACE AB SER RIA-ACNC: NORMAL
HCT VFR BLD AUTO: 26.3 % (ref 37.5–51)
HGB BLD-MCNC: 8.8 G/DL (ref 13–17.7)
HOLD SPECIMEN: NORMAL
HOLD SPECIMEN: NORMAL
HYPOCHROMIA BLD QL: NORMAL
INR PPP: 2.58 (ref 0.9–1.1)
IRON 24H UR-MRATE: 39 MCG/DL (ref 59–158)
IRON SATN MFR SERPL: 21 % (ref 20–50)
LDH SERPL-CCNC: 242 U/L (ref 135–225)
LEFT ARM BP: NORMAL MMHG
LIPASE SERPL-CCNC: 59 U/L (ref 13–60)
LYMPHOCYTES # BLD AUTO: 0.68 10*3/MM3 (ref 0.7–3.1)
LYMPHOCYTES NFR BLD AUTO: 16.1 % (ref 19.6–45.3)
MCH RBC QN AUTO: 25.4 PG (ref 26.6–33)
MCHC RBC AUTO-ENTMCNC: 33.5 G/DL (ref 31.5–35.7)
MCV RBC AUTO: 76 FL (ref 79–97)
MONOCYTES # BLD AUTO: 0.74 10*3/MM3 (ref 0.1–0.9)
MONOCYTES NFR BLD AUTO: 17.5 % (ref 5–12)
NEUTROPHILS NFR BLD AUTO: 2.56 10*3/MM3 (ref 1.7–7)
NEUTROPHILS NFR BLD AUTO: 60.8 % (ref 42.7–76)
PLAT MORPH BLD: NORMAL
PLATELET # BLD AUTO: 75 10*3/MM3 (ref 140–450)
PMV BLD AUTO: 9.9 FL (ref 6–12)
POTASSIUM SERPL-SCNC: 3.2 MMOL/L (ref 3.5–5.2)
POTASSIUM SERPL-SCNC: 3.6 MMOL/L (ref 3.5–5.2)
PROTHROMBIN TIME: 26.8 SECONDS (ref 11.7–14.2)
RBC # BLD AUTO: 3.46 10*6/MM3 (ref 4.14–5.8)
RIGHT ARM BP: NORMAL MMHG
SODIUM SERPL-SCNC: 134 MMOL/L (ref 136–145)
TARGETS BLD QL SMEAR: NORMAL
TIBC SERPL-MCNC: 185 MCG/DL (ref 298–536)
TRANSFERRIN SERPL-MCNC: 124 MG/DL (ref 200–360)
TSH SERPL DL<=0.05 MIU/L-ACNC: 3.94 UIU/ML (ref 0.27–4.2)
VIT B12 BLD-MCNC: 1790 PG/ML (ref 211–946)
WBC # BLD AUTO: 4.22 10*3/MM3 (ref 3.4–10.8)
WBC MORPH BLD: NORMAL

## 2020-07-07 PROCEDURE — 84443 ASSAY THYROID STIM HORMONE: CPT | Performed by: NURSE PRACTITIONER

## 2020-07-07 PROCEDURE — 83690 ASSAY OF LIPASE: CPT | Performed by: INTERNAL MEDICINE

## 2020-07-07 PROCEDURE — 76700 US EXAM ABDOM COMPLETE: CPT

## 2020-07-07 PROCEDURE — 86708 HEPATITIS A ANTIBODY: CPT | Performed by: INTERNAL MEDICINE

## 2020-07-07 PROCEDURE — 90791 PSYCH DIAGNOSTIC EVALUATION: CPT

## 2020-07-07 PROCEDURE — 93880 EXTRACRANIAL BILAT STUDY: CPT

## 2020-07-07 PROCEDURE — 85730 THROMBOPLASTIN TIME PARTIAL: CPT | Performed by: INTERNAL MEDICINE

## 2020-07-07 PROCEDURE — 99253 IP/OBS CNSLTJ NEW/EST LOW 45: CPT | Performed by: INTERNAL MEDICINE

## 2020-07-07 PROCEDURE — 85007 BL SMEAR W/DIFF WBC COUNT: CPT | Performed by: NURSE PRACTITIONER

## 2020-07-07 PROCEDURE — 84132 ASSAY OF SERUM POTASSIUM: CPT | Performed by: INTERNAL MEDICINE

## 2020-07-07 PROCEDURE — 83540 ASSAY OF IRON: CPT | Performed by: NURSE PRACTITIONER

## 2020-07-07 PROCEDURE — 82746 ASSAY OF FOLIC ACID SERUM: CPT | Performed by: INTERNAL MEDICINE

## 2020-07-07 PROCEDURE — 80048 BASIC METABOLIC PNL TOTAL CA: CPT | Performed by: NURSE PRACTITIONER

## 2020-07-07 PROCEDURE — 85610 PROTHROMBIN TIME: CPT | Performed by: NURSE PRACTITIONER

## 2020-07-07 PROCEDURE — 86706 HEP B SURFACE ANTIBODY: CPT | Performed by: INTERNAL MEDICINE

## 2020-07-07 PROCEDURE — 82607 VITAMIN B-12: CPT | Performed by: INTERNAL MEDICINE

## 2020-07-07 PROCEDURE — 25010000002 FUROSEMIDE PER 20 MG: Performed by: INTERNAL MEDICINE

## 2020-07-07 PROCEDURE — 85025 COMPLETE CBC W/AUTO DIFF WBC: CPT | Performed by: NURSE PRACTITIONER

## 2020-07-07 PROCEDURE — 84466 ASSAY OF TRANSFERRIN: CPT | Performed by: NURSE PRACTITIONER

## 2020-07-07 PROCEDURE — 83615 LACTATE (LD) (LDH) ENZYME: CPT | Performed by: INTERNAL MEDICINE

## 2020-07-07 PROCEDURE — 82728 ASSAY OF FERRITIN: CPT | Performed by: INTERNAL MEDICINE

## 2020-07-07 RX ORDER — PANTOPRAZOLE SODIUM 40 MG/1
40 TABLET, DELAYED RELEASE ORAL
Status: DISCONTINUED | OUTPATIENT
Start: 2020-07-08 | End: 2020-07-09 | Stop reason: HOSPADM

## 2020-07-07 RX ORDER — POTASSIUM CHLORIDE 750 MG/1
40 CAPSULE, EXTENDED RELEASE ORAL AS NEEDED
Status: DISCONTINUED | OUTPATIENT
Start: 2020-07-07 | End: 2020-07-09 | Stop reason: HOSPADM

## 2020-07-07 RX ORDER — POTASSIUM CHLORIDE 7.45 MG/ML
10 INJECTION INTRAVENOUS
Status: DISCONTINUED | OUTPATIENT
Start: 2020-07-07 | End: 2020-07-09 | Stop reason: HOSPADM

## 2020-07-07 RX ORDER — POTASSIUM CHLORIDE 1.5 G/1.77G
40 POWDER, FOR SOLUTION ORAL AS NEEDED
Status: DISCONTINUED | OUTPATIENT
Start: 2020-07-07 | End: 2020-07-09 | Stop reason: HOSPADM

## 2020-07-07 RX ORDER — PHYTONADIONE 5 MG/1
5 TABLET ORAL ONCE
Status: COMPLETED | OUTPATIENT
Start: 2020-07-07 | End: 2020-07-07

## 2020-07-07 RX ADMIN — FOLIC ACID 1 MG: 1 TABLET ORAL at 10:50

## 2020-07-07 RX ADMIN — METOPROLOL TARTRATE 25 MG: 25 TABLET, FILM COATED ORAL at 22:57

## 2020-07-07 RX ADMIN — POTASSIUM CHLORIDE 40 MEQ: 10 CAPSULE, COATED, EXTENDED RELEASE ORAL at 10:49

## 2020-07-07 RX ADMIN — PANTOPRAZOLE SODIUM 40 MG: 40 INJECTION, POWDER, FOR SOLUTION INTRAVENOUS at 06:13

## 2020-07-07 RX ADMIN — METOPROLOL TARTRATE 25 MG: 25 TABLET, FILM COATED ORAL at 10:49

## 2020-07-07 RX ADMIN — FUROSEMIDE 40 MG: 10 INJECTION, SOLUTION INTRAMUSCULAR; INTRAVENOUS at 10:49

## 2020-07-07 RX ADMIN — Medication 1 TABLET: at 10:49

## 2020-07-07 RX ADMIN — PHYTONADIONE 5 MG: 5 TABLET ORAL at 16:17

## 2020-07-07 RX ADMIN — POTASSIUM CHLORIDE 40 MEQ: 10 CAPSULE, COATED, EXTENDED RELEASE ORAL at 16:16

## 2020-07-07 RX ADMIN — Medication 100 MG: at 10:49

## 2020-07-07 RX ADMIN — SODIUM CHLORIDE, PRESERVATIVE FREE 10 ML: 5 INJECTION INTRAVENOUS at 10:50

## 2020-07-07 RX ADMIN — SODIUM CHLORIDE, PRESERVATIVE FREE 10 ML: 5 INJECTION INTRAVENOUS at 22:04

## 2020-07-07 NOTE — H&P (VIEW-ONLY)
" LOS: 1 day     Name: Wei Potts  Age: 65 y.o.  Sex: male  :  1955  MRN: [id number]         Primary Care Physician: Bella Villalta MD    Subjective   Subjective  Reports some softening of his abdomen but still distended.  Not quite as hard and firm as it was yesterday.  Denies any abdominal pain.  No nausea or vomiting.  Could not have paracentesis today due to elevated INR.  States that his last alcoholic drink was about 2 weeks ago and has done pretty well since that time but no real withdrawal.    Objective   Vital Signs  Temp:  [97.1 °F (36.2 °C)-98.1 °F (36.7 °C)] 97.2 °F (36.2 °C)  Heart Rate:  [] 72  Resp:  [16-18] 18  BP: ()/(52-82) 98/52  Body mass index is 24.24 kg/m².    Objective:  General Appearance:  Comfortable and in no acute distress.    Vital signs: (most recent): Blood pressure 98/52, pulse 72, temperature 97.2 °F (36.2 °C), temperature source Oral, resp. rate 18, height 180.3 cm (71\"), weight 78.8 kg (173 lb 12.8 oz), SpO2 92 %.    Lungs:  Normal effort and normal respiratory rate.    Heart: Normal rate.  Regular rhythm.    Abdomen: Abdomen is soft and distended.  There are signs of ascites. Bowel sounds are normal.   There is no abdominal tenderness.     Extremities: There is no dependent edema or local swelling.    Neurological: Patient is alert and oriented to person, place and time.    Skin:  Warm and dry.              Results Review:       I reviewed the patient's new clinical results.    Results from last 7 days   Lab Units 20  0633 20  1630   WBC 10*3/mm3 4.22 4.25   HEMOGLOBIN g/dL 8.8* 9.7*   PLATELETS 10*3/mm3 75* 88*     Results from last 7 days   Lab Units 20  0633 20  1630   SODIUM mmol/L 134* 133*   POTASSIUM mmol/L 3.2* 3.5   CHLORIDE mmol/L 105 103   CO2 mmol/L 23.3 22.6   BUN mg/dL 7* 8   CREATININE mg/dL 0.50* 0.64*   CALCIUM mg/dL 7.7* 8.2*   GLUCOSE mg/dL 89 102*     Results from last 7 days   Lab Units 20  0633 " 07/06/20  1630   INR  2.58* 2.36*       Scheduled Meds:     folic acid 1 mg Oral Daily   metoprolol tartrate 25 mg Oral Q12H   multivitamin 1 tablet Oral Daily   multivitamin 1 tablet Oral Daily   [START ON 7/8/2020] pantoprazole 40 mg Oral Q AM   phytonadione 5 mg Oral Once   potassium chloride 20 mEq Oral BID With Meals   sodium chloride 10 mL Intravenous Q12H   thiamine 100 mg Oral Daily     PRN Meds:   LORazepam  •  magnesium sulfate **OR** magnesium sulfate **OR** magnesium sulfate  •  ondansetron  •  potassium chloride **OR** potassium chloride **OR** potassium chloride  •  [COMPLETED] Insert peripheral IV **AND** sodium chloride  •  sodium chloride  Continuous Infusions:       Assessment/Plan   Active Hospital Problems    Diagnosis  POA   • **Ascites [R18.8]  Unknown   • Alcoholic liver disease (CMS/HCC) [K70.9]  Yes   • Hyponatremia [E87.1]  Unknown   • Anemia [D64.9]  Unknown   • Jaundice [R17]  Unknown   • Benign essential hypertension [I10]  Yes      Resolved Hospital Problems   No resolved problems to display.       Assessment & Plan    -Patient reports some improvement of his abdominal distention with IV diuretics given overnight last night.  Abdominal paracentesis has been ordered but could not be performed due to elevated INR this morning.  We will give him vitamin K and repeat INR in the morning.  Gastroenterology has been consulted and will await their evaluation  -Anemia, thrombocytopenia and hyponatremia all noted.  Secondary to his liver disease.  Relatively stable and will continue to trend labs.  -Replace potassium today and check magnesium  -Blood pressure is now on the low side currently.  We will hold off on any additional diuretics for the moment and see where this goes.  Ultimately will need to get on a regimen of Lasix and spironolactone  -He reports that he is 2 weeks out from his last alcoholic drink.  No signs of withdrawal at this time.  -Trend LFTs and INR  -Low-sodium diet    Abe  Zeeshan Alejandro MD  [facility]  07/07/20  2:47 PM

## 2020-07-07 NOTE — PROGRESS NOTES
Discharge Planning Assessment  [facility]     Patient Name: Wei Potts  MRN: [id number]  Today's Date: 7/7/2020    Admit Date: 7/6/2020    Discharge Needs Assessment     Row Name 07/07/20 1558       Living Environment    Lives With  spouse    Name(s) of Who Lives With Patient  Milagro Potts [phone]    Current Living Arrangements  home/apartment/condo    Primary Care Provided by  self    Provides Primary Care For  no one    Family Caregiver if Needed  spouse    Family Caregiver Names  Milagro Potts [phone]    Quality of Family Relationships  supportive;involved;helpful    Able to Return to Prior Arrangements  yes       Resource/Environmental Concerns    Resource/Environmental Concerns  none    Transportation Concerns  car, none       Transition Planning    Patient/Family Anticipates Transition to  home with family    Patient/Family Anticipated Services at Transition  none    Transportation Anticipated  car, drives self       Discharge Needs Assessment    Concerns to be Addressed  no discharge needs identified;denies needs/concerns at this time    Equipment Currently Used at Home  none        Discharge Plan     Row Name 07/07/20 1559       Plan    Plan  Home    Plan Comments  Spoke with pt for screening of dCP/needs.  Pt stated that he plans to return home upon D/C where he does live with his wife.  Pt stated that PTA he was totally indpendent and able to care for himself.   Pt reports being up independently in his room.  Pt stated that he mojica shave his car here at [facility] because he did  not  except to be admitted.  Pt confirmed facesheet information as correct and plans to use [facility] pharmacy upon D/C.  Pt voiced no other DC concerns.   Did offer [facility] treatment/group resources which pt did decline.                 Destination      Coordination has not been started for this encounter.      Durable Medical Equipment      Coordination has not been started for this encounter.      Dialysis/Infusion       Coordination has not been started for this encounter.      Home Medical Care      Coordination has not been started for this encounter.      Therapy      Coordination has not been started for this encounter.      Community Resources      Coordination has not been started for this encounter.          Demographic Summary     Row Name 07/07/20 1557       General Information    Admission Type  inpatient    Arrived From  home    Referral Source  admission list    Reason for Consult  discharge planning    Preferred Language  English     Used During This Interaction  no        Functional Status     Row Name 07/07/20 1558       Functional Status    Usual Activity Tolerance  good    Current Activity Tolerance  moderate       Functional Status, IADL    Medications  independent    Meal Preparation  independent    Housekeeping  independent    Laundry  independent    Shopping  independent       Mental Status    General Appearance WDL  WDL       Mental Status Summary    Recent Changes in Mental Status/Cognitive Functioning  no changes       Employment/    Current or Previous Occupation  construction        Psychosocial    No documentation.       Abuse/Neglect    No documentation.       Legal    No documentation.       Substance Abuse    No documentation.       Patient Forms    No documentation.           MELO Negron

## 2020-07-07 NOTE — CONSULTS
"[facility]  Initial Inpatient Consult Note    Referring Provider: KLEBER    Reason for Consultation: Cirrhosis, ascites    Subjective     History of present illness:    Thank you for asking my opinion regarding this patient    65 y.o. male, not previously known to our service, that we are asked to see for newly diagnosed cirrhosis with ascites.  Patient presented with abdominal distention which is symptomatic causing early satiety and shortness of breath.  He reports that this is developed over the past several weeks.  He is a fairly healthy gentleman and is never been previously hospitalized.  He does note that he is used alcohol regularly for approximately 20 years.  About 2 years ago he was told that his liver enzymes were high and he switched from hard liquor to wine.  He never drinks until he is drunk and he never passes out but he drinks most days 2-4 drinks.  He reports that he was told that there was an issue with his liver when he was on his honeymoon and had labs drawn to travel to [place].  He had never had an issue thereafter until his labs 2 years ago.    Patient also noted to have iron deficiency anemia.  He denies any blood in his stool or melena.  He has never had a screening colonoscopy.  No family history of colorectal cancer or liver disease.    Patient has been sober for 2 weeks.  He stopped drinking cold turkey.  He reports he had about 3 nights of difficulty sleeping but no tremors or other issues with alcohol withdrawal.  He intends to stay sober and reports that this is a \"wake-up call\".    Past Medical History:  Past Medical History:   Diagnosis Date   • Hypertension      Past Surgical History:  History reviewed. No pertinent surgical history.   Social History:   Social History     Tobacco Use   • Smoking status: Never Smoker   • Smokeless tobacco: Never Used   Substance Use Topics   • Alcohol use: Yes     Comment: 1 bottle of wine per day, stopped drinking ETOH 2 weeks " ago      Family History:  Family History   Problem Relation Age of Onset   • Diabetes Mother    • Hypertension Father        Home Meds:  Medications Prior to Admission   Medication Sig Dispense Refill Last Dose   • benazepril-hydrochlorthiazide (LOTENSIN HCT) 20-12.5 MG per tablet Take 0.5 tablets by mouth Every Night.   7/5/2020 at 2300   • Multiple Vitamin (MULTIVITAMIN) tablet Take 1 tablet by mouth daily.   7/5/2020 at 2300     Current Meds:     folic acid 1 mg Oral Daily   furosemide 40 mg Intravenous Q12H   metoprolol tartrate 25 mg Oral Q12H   multivitamin 1 tablet Oral Daily   multivitamin 1 tablet Oral Daily   pantoprazole 40 mg Intravenous Q AM   potassium chloride 20 mEq Oral BID With Meals   sodium chloride 10 mL Intravenous Q12H   thiamine 100 mg Oral Daily     Allergies:  No Known Allergies  Review of Systems  Pertinent items are noted in HPI, all other systems reviewed and negative     Objective     Vital Signs  Temp:  [97.1 °F (36.2 °C)-98.1 °F (36.7 °C)] 97.2 °F (36.2 °C)  Heart Rate:  [] 72  Resp:  [16-18] 18  BP: ()/(52-82) 98/52  Physical Exam:  General Appearance:    Alert, cooperative, in no acute distress   Head:    Normocephalic, without obvious abnormality, atraumatic   Eyes:          conjunctivae and sclerae normal, +   icterus   Throat:   no thrush, oral mucosa moist   Neck:   Supple, no adenopathy   Lungs:     Clear to auscultation bilaterally    Heart:    Regular rhythm and normal rate    Chest Wall:    No abnormalities observed   Abdomen:     Soft, distended, nontender; normal bowel sounds   Extremities:   no edema, no redness   Skin:   No bruising or rash   Psychiatric:  normal mood and insight     Results Review:   I reviewed the patient's new clinical results.    Results from last 7 days   Lab Units 07/07/20  0633 07/06/20  1630   WBC 10*3/mm3 4.22 4.25   HEMOGLOBIN g/dL 8.8* 9.7*   HEMATOCRIT % 26.3* 28.7*   PLATELETS 10*3/mm3 75* 88*     Results from last 7 days   Lab  Units 07/07/20  0633 07/06/20  1630   SODIUM mmol/L 134* 133*   POTASSIUM mmol/L 3.2* 3.5   CHLORIDE mmol/L 105 103   CO2 mmol/L 23.3 22.6   BUN mg/dL 7* 8   CREATININE mg/dL 0.50* 0.64*   CALCIUM mg/dL 7.7* 8.2*   BILIRUBIN mg/dL  --  6.0*   ALK PHOS U/L  --  145*   ALT (SGPT) U/L  --  43*   AST (SGOT) U/L  --  169*   GLUCOSE mg/dL 89 102*     Results from last 7 days   Lab Units 07/07/20  0633 07/06/20  1630   INR  2.58* 2.36*     Lab Results   Lab Value Date/Time    LIPASE 59 07/07/2020 0633    LIPASE 65 (H) 07/06/2020 1630       Radiology:  US Abdomen Complete   Final Result      CT Abdomen Pelvis With Contrast   Final Result      US Paracentesis    (Results Pending)       Assessment/Plan   Patient Active Problem List   Diagnosis   • Benign essential hypertension   • ED (erectile dysfunction) of organic origin   • Alcoholic liver disease (CMS/HCC)   • Hyponatremia   • Anemia   • Ascites   • Jaundice       Assessment:  1. 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 Ascites  3. Alcoholic hepatitis    Plan:  · Will try for paracentesis in am after vitamin K today.  Patient is a Judaism and does not accept blood products so FFP is not an option  · We will also need endoscopic evaluation prior to discharge once his INR has improved given his anemia and iron deficiency with no prior evaluation  · Continue diuresis  · Discussed low-sodium diet with patient  · Discussed need for continued sobriety and patient is on board with this.  He certainly has some component of alcoholic hepatitis so there is some hope that he will have some improvement with alcohol cessation however he seems to already have cirrhosis which is concerning.  · Agree with serologic work-up to look for concomitant causes of cirrhosis  · Will need to check hepatitis A and B status to see if he needs these vaccines      I discussed the patients findings and my recommendations with patient.    Ruby Mcgee MD

## 2020-07-07 NOTE — PLAN OF CARE
Problem: Patient Care Overview  Goal: Plan of Care Review  Outcome: Ongoing (interventions implemented as appropriate)  Flowsheets (Taken 7/7/2020 1619)  Progress: improving  Outcome Summary: VSS. Bl lower ex edema improving. INR up to 2.58 today. Vit K given PO x1. K replaced per protocol. Awaiting stool cx. Possible paracentesis tomorrow. Pt currently on reg low sodium diet. continue to monitor.

## 2020-07-07 NOTE — CONSULTS
"Pt is a 64 yo male seen on 3 park for alcohol use.  Pt is  and self employed as a .  He moved here from [place] years ago for work.  He has a strong support system of friends and family.  Upon approach, pt has an appropriate affect and mood.  He is cooperative with interview.   Pt reports he started drinking more that just socially about 19-20 years ago, when he and wife were having trouble with their son.  He drank hard liquor then, mostly bourbon and vodka but states he has never drank enough to pass out or get sick.  Pt reports he has had liver problems since he was a baby, and was told to stop drinking hard liquor so he switched to wine.  Up until 2 weeks ago, he was drinking a bottle of wine a night.  He reports noticing his belly starting to swell and asked a Dr. Friend what he should do.  That friend suggested he quit drinking all alcohol.  Pt quit 2 weeks ago cold turkey.  He states the only problems he had was that he couldn't sleep the first two nights, but since then he has \"been fine\".  Pt reports that the swelling in his abdomen got worse, though, so he came to ED.  Pt reports that he has \"no intentions\" of drinking alcohol again, and that this has been a huge wake up call.  Pt reports that his longest period of sobriety before this was for 3 weeks on a trip to [place].  Pt doesn't feel he needs any outside help, such as rehab or AA.  He feels that he will 'be okay\" and has a strong support system to help him.  He did accept a list of resources for chemical dependency options here in [place].  Pt reports no other needs at this time.  Access center will sign off.  "

## 2020-07-07 NOTE — PROGRESS NOTES
" LOS: 1 day     Name: Wei Potts  Age: 65 y.o.  Sex: male  :  1955  MRN: [id number]         Primary Care Physician: Bella Villalta MD    Subjective   Subjective  Reports some softening of his abdomen but still distended.  Not quite as hard and firm as it was yesterday.  Denies any abdominal pain.  No nausea or vomiting.  Could not have paracentesis today due to elevated INR.  States that his last alcoholic drink was about 2 weeks ago and has done pretty well since that time but no real withdrawal.    Objective   Vital Signs  Temp:  [97.1 °F (36.2 °C)-98.1 °F (36.7 °C)] 97.2 °F (36.2 °C)  Heart Rate:  [] 72  Resp:  [16-18] 18  BP: ()/(52-82) 98/52  Body mass index is 24.24 kg/m².    Objective:  General Appearance:  Comfortable and in no acute distress.    Vital signs: (most recent): Blood pressure 98/52, pulse 72, temperature 97.2 °F (36.2 °C), temperature source Oral, resp. rate 18, height 180.3 cm (71\"), weight 78.8 kg (173 lb 12.8 oz), SpO2 92 %.    Lungs:  Normal effort and normal respiratory rate.    Heart: Normal rate.  Regular rhythm.    Abdomen: Abdomen is soft and distended.  There are signs of ascites. Bowel sounds are normal.   There is no abdominal tenderness.     Extremities: There is no dependent edema or local swelling.    Neurological: Patient is alert and oriented to person, place and time.    Skin:  Warm and dry.              Results Review:       I reviewed the patient's new clinical results.    Results from last 7 days   Lab Units 20  0633 20  1630   WBC 10*3/mm3 4.22 4.25   HEMOGLOBIN g/dL 8.8* 9.7*   PLATELETS 10*3/mm3 75* 88*     Results from last 7 days   Lab Units 20  0633 20  1630   SODIUM mmol/L 134* 133*   POTASSIUM mmol/L 3.2* 3.5   CHLORIDE mmol/L 105 103   CO2 mmol/L 23.3 22.6   BUN mg/dL 7* 8   CREATININE mg/dL 0.50* 0.64*   CALCIUM mg/dL 7.7* 8.2*   GLUCOSE mg/dL 89 102*     Results from last 7 days   Lab Units 20  0633 " 07/06/20  1630   INR  2.58* 2.36*       Scheduled Meds:     folic acid 1 mg Oral Daily   metoprolol tartrate 25 mg Oral Q12H   multivitamin 1 tablet Oral Daily   multivitamin 1 tablet Oral Daily   [START ON 7/8/2020] pantoprazole 40 mg Oral Q AM   phytonadione 5 mg Oral Once   potassium chloride 20 mEq Oral BID With Meals   sodium chloride 10 mL Intravenous Q12H   thiamine 100 mg Oral Daily     PRN Meds:   LORazepam  •  magnesium sulfate **OR** magnesium sulfate **OR** magnesium sulfate  •  ondansetron  •  potassium chloride **OR** potassium chloride **OR** potassium chloride  •  [COMPLETED] Insert peripheral IV **AND** sodium chloride  •  sodium chloride  Continuous Infusions:       Assessment/Plan   Active Hospital Problems    Diagnosis  POA   • **Ascites [R18.8]  Unknown   • Alcoholic liver disease (CMS/HCC) [K70.9]  Yes   • Hyponatremia [E87.1]  Unknown   • Anemia [D64.9]  Unknown   • Jaundice [R17]  Unknown   • Benign essential hypertension [I10]  Yes      Resolved Hospital Problems   No resolved problems to display.       Assessment & Plan    -Patient reports some improvement of his abdominal distention with IV diuretics given overnight last night.  Abdominal paracentesis has been ordered but could not be performed due to elevated INR this morning.  We will give him vitamin K and repeat INR in the morning.  Gastroenterology has been consulted and will await their evaluation  -Anemia, thrombocytopenia and hyponatremia all noted.  Secondary to his liver disease.  Relatively stable and will continue to trend labs.  -Replace potassium today and check magnesium  -Blood pressure is now on the low side currently.  We will hold off on any additional diuretics for the moment and see where this goes.  Ultimately will need to get on a regimen of Lasix and spironolactone  -He reports that he is 2 weeks out from his last alcoholic drink.  No signs of withdrawal at this time.  -Trend LFTs and INR  -Low-sodium diet    Abe  Zeeshan Alejandro MD  [facility]  07/07/20  2:47 PM

## 2020-07-07 NOTE — PLAN OF CARE
Problem: Patient Care Overview  Goal: Plan of Care Review  Outcome: Ongoing (interventions implemented as appropriate)  Flowsheets (Taken 7/7/2020 8644)  Progress: no change  Plan of Care Reviewed With: patient  Outcome Summary: patient admitted with ETOH chirrosis/ascites; CIWA =0; patient is [employer] and requests No blood products; up ad oliver; IV lasix, PO potassium administered; NPO after MN for ultrasound paracentesis

## 2020-07-08 ENCOUNTER — APPOINTMENT (OUTPATIENT)
Dept: ULTRASOUND IMAGING | Facility: HOSPITAL | Age: 65
End: 2020-07-08

## 2020-07-08 ENCOUNTER — APPOINTMENT (OUTPATIENT)
Dept: GENERAL RADIOLOGY | Facility: HOSPITAL | Age: 65
End: 2020-07-08

## 2020-07-08 ENCOUNTER — APPOINTMENT (OUTPATIENT)
Dept: CARDIOLOGY | Facility: HOSPITAL | Age: 65
End: 2020-07-08

## 2020-07-08 PROBLEM — D68.9 COAGULOPATHY (HCC): Status: ACTIVE | Noted: 2020-07-08

## 2020-07-08 PROBLEM — E87.1 HYPONATREMIA: Status: RESOLVED | Noted: 2020-07-06 | Resolved: 2020-07-08

## 2020-07-08 PROBLEM — K70.31 ALCOHOLIC CIRRHOSIS OF LIVER WITH ASCITES: Status: ACTIVE | Noted: 2020-07-06

## 2020-07-08 PROBLEM — D69.59 SECONDARY THROMBOCYTOPENIA: Status: ACTIVE | Noted: 2020-07-08

## 2020-07-08 LAB
ALBUMIN FLD-MCNC: 0.4 G/DL
ALBUMIN SERPL-MCNC: 2 G/DL (ref 3.5–5.2)
ALBUMIN/GLOB SERPL: 0.4 G/DL
ALP SERPL-CCNC: 117 U/L (ref 39–117)
ALPHA1 GLOB MFR UR ELPH: 117 MG/DL (ref 90–200)
ALT SERPL W P-5'-P-CCNC: 38 U/L (ref 1–41)
AMYLASE FLD-CCNC: 11 U/L
ANION GAP SERPL CALCULATED.3IONS-SCNC: 4.4 MMOL/L (ref 5–15)
AORTIC DIMENSIONLESS INDEX: 0.9 (DI)
APPEARANCE FLD: ABNORMAL
AST SERPL-CCNC: 130 U/L (ref 1–40)
BH CV ECHO MEAS - ACS: 1.5 CM
BH CV ECHO MEAS - AO MAX PG: 10 MMHG
BH CV ECHO MEAS - AO MEAN PG (FULL): 3 MMHG
BH CV ECHO MEAS - AO MEAN PG: 6 MMHG
BH CV ECHO MEAS - AO V2 MAX: 160 CM/SEC
BH CV ECHO MEAS - AO V2 MEAN: 112 CM/SEC
BH CV ECHO MEAS - AO V2 VTI: 29 CM
BH CV ECHO MEAS - AVA(I,A): 2.9 CM^2
BH CV ECHO MEAS - AVA(I,D): 2.9 CM^2
BH CV ECHO MEAS - BSA(HAYCOCK): 2 M^2
BH CV ECHO MEAS - BSA: 2 M^2
BH CV ECHO MEAS - BZI_BMI: 24.1 KILOGRAMS/M^2
BH CV ECHO MEAS - BZI_METRIC_HEIGHT: 180.3 CM
BH CV ECHO MEAS - BZI_METRIC_WEIGHT: 78.5 KG
BH CV ECHO MEAS - EDV(CUBED): 117.6 ML
BH CV ECHO MEAS - EDV(MOD-SP2): 119 ML
BH CV ECHO MEAS - EDV(MOD-SP4): 137 ML
BH CV ECHO MEAS - EDV(TEICH): 112.8 ML
BH CV ECHO MEAS - EF(CUBED): 66.6 %
BH CV ECHO MEAS - EF(MOD-BP): 77 %
BH CV ECHO MEAS - EF(MOD-SP2): 78.2 %
BH CV ECHO MEAS - EF(MOD-SP4): 78.1 %
BH CV ECHO MEAS - EF(TEICH): 58 %
BH CV ECHO MEAS - ESV(CUBED): 39.3 ML
BH CV ECHO MEAS - ESV(MOD-SP2): 26 ML
BH CV ECHO MEAS - ESV(MOD-SP4): 30 ML
BH CV ECHO MEAS - ESV(TEICH): 47.4 ML
BH CV ECHO MEAS - FS: 30.6 %
BH CV ECHO MEAS - IVS/LVPW: 0.92
BH CV ECHO MEAS - IVSD: 1.1 CM
BH CV ECHO MEAS - LAT PEAK E' VEL: 11 CM/SEC
BH CV ECHO MEAS - LV DIASTOLIC VOL/BSA (35-75): 69.1 ML/M^2
BH CV ECHO MEAS - LV MASS(C)D: 213.3 GRAMS
BH CV ECHO MEAS - LV MASS(C)DI: 107.6 GRAMS/M^2
BH CV ECHO MEAS - LV MAX PG: 7 MMHG
BH CV ECHO MEAS - LV MEAN PG: 3 MMHG
BH CV ECHO MEAS - LV SYSTOLIC VOL/BSA (12-30): 15.1 ML/M^2
BH CV ECHO MEAS - LV V1 MAX: 130 CM/SEC
BH CV ECHO MEAS - LV V1 MEAN: 84.3 CM/SEC
BH CV ECHO MEAS - LV V1 VTI: 26.5 CM
BH CV ECHO MEAS - LVIDD: 4.9 CM
BH CV ECHO MEAS - LVIDS: 3.4 CM
BH CV ECHO MEAS - LVLD AP2: 7.3 CM
BH CV ECHO MEAS - LVLD AP4: 7.6 CM
BH CV ECHO MEAS - LVLS AP2: 5.3 CM
BH CV ECHO MEAS - LVLS AP4: 6.1 CM
BH CV ECHO MEAS - LVOT AREA (M): 3.1 CM^2
BH CV ECHO MEAS - LVOT AREA: 3.1 CM^2
BH CV ECHO MEAS - LVOT DIAM: 2 CM
BH CV ECHO MEAS - LVPWD: 1.2 CM
BH CV ECHO MEAS - MED PEAK E' VEL: 8 CM/SEC
BH CV ECHO MEAS - MR MAX PG: 57.5 MMHG
BH CV ECHO MEAS - MR MAX VEL: 379 CM/SEC
BH CV ECHO MEAS - MV A DUR: 0.1 SEC
BH CV ECHO MEAS - MV A MAX VEL: 68.6 CM/SEC
BH CV ECHO MEAS - MV DEC SLOPE: 497 CM/SEC^2
BH CV ECHO MEAS - MV DEC TIME: 180 SEC
BH CV ECHO MEAS - MV E MAX VEL: 108 CM/SEC
BH CV ECHO MEAS - MV E/A: 1.6
BH CV ECHO MEAS - MV MEAN PG: 2 MMHG
BH CV ECHO MEAS - MV P1/2T MAX VEL: 122 CM/SEC
BH CV ECHO MEAS - MV P1/2T: 71.9 MSEC
BH CV ECHO MEAS - MV V2 MEAN: 71.9 CM/SEC
BH CV ECHO MEAS - MV V2 VTI: 28.6 CM
BH CV ECHO MEAS - MVA P1/2T LCG: 1.8 CM^2
BH CV ECHO MEAS - MVA(P1/2T): 3.1 CM^2
BH CV ECHO MEAS - MVA(VTI): 2.9 CM^2
BH CV ECHO MEAS - PA ACC SLOPE: 1016 CM/SEC^2
BH CV ECHO MEAS - PA ACC TIME: 0.12 SEC
BH CV ECHO MEAS - PA MAX PG (FULL): 4.3 MMHG
BH CV ECHO MEAS - PA MAX PG: 5.5 MMHG
BH CV ECHO MEAS - PA PR(ACCEL): 26.8 MMHG
BH CV ECHO MEAS - PA V2 MAX: 117 CM/SEC
BH CV ECHO MEAS - PULM A REVS DUR: 0.12 SEC
BH CV ECHO MEAS - PULM A REVS VEL: 43.9 CM/SEC
BH CV ECHO MEAS - PULM DIAS VEL: 61.2 CM/SEC
BH CV ECHO MEAS - PULM S/D: 1
BH CV ECHO MEAS - PULM SYS VEL: 61.7 CM/SEC
BH CV ECHO MEAS - PVA(V,A): 1.9 CM^2
BH CV ECHO MEAS - PVA(V,D): 1.9 CM^2
BH CV ECHO MEAS - QP/QS: 0.39
BH CV ECHO MEAS - RAP SYSTOLE: 3 MMHG
BH CV ECHO MEAS - RV MAX PG: 1.2 MMHG
BH CV ECHO MEAS - RV MEAN PG: 1 MMHG
BH CV ECHO MEAS - RV V1 MAX: 54.9 CM/SEC
BH CV ECHO MEAS - RV V1 MEAN: 36.1 CM/SEC
BH CV ECHO MEAS - RV V1 VTI: 7.8 CM
BH CV ECHO MEAS - RVOT AREA: 4.2 CM^2
BH CV ECHO MEAS - RVOT DIAM: 2.3 CM
BH CV ECHO MEAS - RVSP: 27.2 MMHG
BH CV ECHO MEAS - SI(CUBED): 39.5 ML/M^2
BH CV ECHO MEAS - SI(LVOT): 42 ML/M^2
BH CV ECHO MEAS - SI(MOD-SP2): 46.9 ML/M^2
BH CV ECHO MEAS - SI(MOD-SP4): 54 ML/M^2
BH CV ECHO MEAS - SI(TEICH): 33 ML/M^2
BH CV ECHO MEAS - SV(CUBED): 78.3 ML
BH CV ECHO MEAS - SV(LVOT): 83.3 ML
BH CV ECHO MEAS - SV(MOD-SP2): 93 ML
BH CV ECHO MEAS - SV(MOD-SP4): 107 ML
BH CV ECHO MEAS - SV(RVOT): 32.2 ML
BH CV ECHO MEAS - SV(TEICH): 65.4 ML
BH CV ECHO MEAS - TAPSE (>1.6): 3 CM2
BH CV ECHO MEAS - TR MAX VEL: 246 CM/SEC
BH CV ECHO MEASUREMENTS AVERAGE E/E' RATIO: 11.37
BH CV VAS BP RIGHT ARM: NORMAL MMHG
BH CV XLRA - RV BASE: 3 CM
BH CV XLRA - RV LENGTH: 7.5 CM
BH CV XLRA - RV MID: 2 CM
BH CV XLRA - TDI S': 17 CM/SEC
BILIRUB SERPL-MCNC: 4.5 MG/DL (ref 0–1.2)
BUN SERPL-MCNC: 8 MG/DL (ref 8–23)
BUN/CREAT SERPL: 14 (ref 7–25)
CALCIUM SPEC-SCNC: 7.6 MG/DL (ref 8.6–10.5)
CERULOPLASMIN SERPL-MCNC: 16 MG/DL (ref 16–31)
CHLORIDE SERPL-SCNC: 107 MMOL/L (ref 98–107)
CO2 SERPL-SCNC: 24.6 MMOL/L (ref 22–29)
COLOR FLD: YELLOW
CREAT SERPL-MCNC: 0.57 MG/DL (ref 0.76–1.27)
DEPRECATED MITOCHONDRIA M2 IGG SER-ACNC: <20 UNITS (ref 0–20)
DEPRECATED RDW RBC AUTO: 61.4 FL (ref 37–54)
ERYTHROCYTE [DISTWIDTH] IN BLOOD BY AUTOMATED COUNT: 22.3 % (ref 12.3–15.4)
GFR SERPL CREATININE-BSD FRML MDRD: 144 ML/MIN/1.73
GLOBULIN UR ELPH-MCNC: 4.8 GM/DL
GLUCOSE FLD-MCNC: 125 MG/DL
GLUCOSE SERPL-MCNC: 87 MG/DL (ref 65–99)
HAV AB SER QL IA: POSITIVE
HCT VFR BLD AUTO: 27.2 % (ref 37.5–51)
HEMOCCULT STL QL: NEGATIVE
HGB BLD-MCNC: 9 G/DL (ref 13–17.7)
INR PPP: 2.6 (ref 0.9–1.1)
LDH FLD-CCNC: 47 U/L
LEFT ATRIUM VOLUME INDEX: 13 ML/M2
LYMPHOCYTES NFR FLD MANUAL: 25 %
MAXIMAL PREDICTED HEART RATE: 157 BPM
MCH RBC QN AUTO: 25.2 PG (ref 26.6–33)
MCHC RBC AUTO-ENTMCNC: 33.1 G/DL (ref 31.5–35.7)
MCV RBC AUTO: 76.2 FL (ref 79–97)
METHOD: ABNORMAL
MONOCYTES NFR FLD: 21 %
MONOS+MACROS NFR FLD: 54 %
NUC CELL # FLD: 157 /MM3
PLATELET # BLD AUTO: 86 10*3/MM3 (ref 140–450)
PMV BLD AUTO: 10.2 FL (ref 6–12)
POTASSIUM SERPL-SCNC: 3.6 MMOL/L (ref 3.5–5.2)
PROCALCITONIN SERPL-MCNC: 0.15 NG/ML (ref 0–0.25)
PROT FLD-MCNC: 1.3 G/DL
PROT SERPL-MCNC: 6.8 G/DL (ref 6–8.5)
PROTHROMBIN TIME: 27 SECONDS (ref 11.7–14.2)
RBC # BLD AUTO: 3.57 10*6/MM3 (ref 4.14–5.8)
RBC # FLD AUTO: 552 /MM3
SODIUM SERPL-SCNC: 136 MMOL/L (ref 136–145)
STRESS TARGET HR: 133 BPM
WBC # BLD AUTO: 4.47 10*3/MM3 (ref 3.4–10.8)

## 2020-07-08 PROCEDURE — 87075 CULTR BACTERIA EXCEPT BLOOD: CPT | Performed by: INTERNAL MEDICINE

## 2020-07-08 PROCEDURE — 93306 TTE W/DOPPLER COMPLETE: CPT

## 2020-07-08 PROCEDURE — 87015 SPECIMEN INFECT AGNT CONCNTJ: CPT | Performed by: INTERNAL MEDICINE

## 2020-07-08 PROCEDURE — 25010000003 LIDOCAINE 1 % SOLUTION: Performed by: RADIOLOGY

## 2020-07-08 PROCEDURE — 87205 SMEAR GRAM STAIN: CPT | Performed by: INTERNAL MEDICINE

## 2020-07-08 PROCEDURE — 84145 PROCALCITONIN (PCT): CPT | Performed by: INTERNAL MEDICINE

## 2020-07-08 PROCEDURE — 89051 BODY FLUID CELL COUNT: CPT | Performed by: INTERNAL MEDICINE

## 2020-07-08 PROCEDURE — 94799 UNLISTED PULMONARY SVC/PX: CPT

## 2020-07-08 PROCEDURE — 0W9G3ZZ DRAINAGE OF PERITONEAL CAVITY, PERCUTANEOUS APPROACH: ICD-10-PCS | Performed by: RADIOLOGY

## 2020-07-08 PROCEDURE — 83615 LACTATE (LD) (LDH) ENZYME: CPT | Performed by: INTERNAL MEDICINE

## 2020-07-08 PROCEDURE — 82272 OCCULT BLD FECES 1-3 TESTS: CPT | Performed by: NURSE PRACTITIONER

## 2020-07-08 PROCEDURE — 82103 ALPHA-1-ANTITRYPSIN TOTAL: CPT | Performed by: INTERNAL MEDICINE

## 2020-07-08 PROCEDURE — 88341 IMHCHEM/IMCYTCHM EA ADD ANTB: CPT | Performed by: INTERNAL MEDICINE

## 2020-07-08 PROCEDURE — 82150 ASSAY OF AMYLASE: CPT | Performed by: INTERNAL MEDICINE

## 2020-07-08 PROCEDURE — 82390 ASSAY OF CERULOPLASMIN: CPT | Performed by: INTERNAL MEDICINE

## 2020-07-08 PROCEDURE — 93306 TTE W/DOPPLER COMPLETE: CPT | Performed by: INTERNAL MEDICINE

## 2020-07-08 PROCEDURE — 84157 ASSAY OF PROTEIN OTHER: CPT | Performed by: INTERNAL MEDICINE

## 2020-07-08 PROCEDURE — 85610 PROTHROMBIN TIME: CPT | Performed by: INTERNAL MEDICINE

## 2020-07-08 PROCEDURE — 82945 GLUCOSE OTHER FLUID: CPT | Performed by: INTERNAL MEDICINE

## 2020-07-08 PROCEDURE — 82042 OTHER SOURCE ALBUMIN QUAN EA: CPT | Performed by: INTERNAL MEDICINE

## 2020-07-08 PROCEDURE — 88112 CYTOPATH CELL ENHANCE TECH: CPT | Performed by: INTERNAL MEDICINE

## 2020-07-08 PROCEDURE — 87070 CULTURE OTHR SPECIMN AEROBIC: CPT | Performed by: INTERNAL MEDICINE

## 2020-07-08 PROCEDURE — 94760 N-INVAS EAR/PLS OXIMETRY 1: CPT

## 2020-07-08 PROCEDURE — 88342 IMHCHEM/IMCYTCHM 1ST ANTB: CPT | Performed by: INTERNAL MEDICINE

## 2020-07-08 PROCEDURE — 76942 ECHO GUIDE FOR BIOPSY: CPT

## 2020-07-08 PROCEDURE — 99232 SBSQ HOSP IP/OBS MODERATE 35: CPT | Performed by: INTERNAL MEDICINE

## 2020-07-08 PROCEDURE — 85027 COMPLETE CBC AUTOMATED: CPT | Performed by: INTERNAL MEDICINE

## 2020-07-08 PROCEDURE — 88305 TISSUE EXAM BY PATHOLOGIST: CPT | Performed by: INTERNAL MEDICINE

## 2020-07-08 PROCEDURE — 82247 BILIRUBIN TOTAL: CPT | Performed by: INTERNAL MEDICINE

## 2020-07-08 PROCEDURE — 80053 COMPREHEN METABOLIC PANEL: CPT | Performed by: INTERNAL MEDICINE

## 2020-07-08 PROCEDURE — 71046 X-RAY EXAM CHEST 2 VIEWS: CPT

## 2020-07-08 RX ORDER — PHYTONADIONE 5 MG/1
10 TABLET ORAL ONCE
Status: COMPLETED | OUTPATIENT
Start: 2020-07-08 | End: 2020-07-08

## 2020-07-08 RX ORDER — LIDOCAINE HYDROCHLORIDE 10 MG/ML
20 INJECTION, SOLUTION INFILTRATION; PERINEURAL ONCE
Status: COMPLETED | OUTPATIENT
Start: 2020-07-08 | End: 2020-07-08

## 2020-07-08 RX ADMIN — Medication 1 TABLET: at 08:31

## 2020-07-08 RX ADMIN — FOLIC ACID 1 MG: 1 TABLET ORAL at 08:31

## 2020-07-08 RX ADMIN — PANTOPRAZOLE SODIUM 40 MG: 40 TABLET, DELAYED RELEASE ORAL at 06:23

## 2020-07-08 RX ADMIN — LIDOCAINE HYDROCHLORIDE 10 ML: 10 INJECTION, SOLUTION INFILTRATION; PERINEURAL at 11:45

## 2020-07-08 RX ADMIN — SODIUM CHLORIDE, PRESERVATIVE FREE 10 ML: 5 INJECTION INTRAVENOUS at 20:29

## 2020-07-08 RX ADMIN — SODIUM CHLORIDE, PRESERVATIVE FREE 10 ML: 5 INJECTION INTRAVENOUS at 08:23

## 2020-07-08 RX ADMIN — Medication 100 MG: at 08:31

## 2020-07-08 RX ADMIN — PHYTONADIONE 10 MG: 5 TABLET ORAL at 13:50

## 2020-07-08 NOTE — PLAN OF CARE
Problem: Patient Care Overview  Goal: Plan of Care Review  Outcome: Ongoing (interventions implemented as appropriate)  Flowsheets (Taken 7/8/2020 2340)  Progress: improving  Plan of Care Reviewed With: patient  Outcome Summary: pt had paracentesis today and removed 4.1L, states he feels much better. extra dose of vit k given as well after he returned. complaints of dry cough that he attributes to allergies, c-xray and echo done. started on IS this afternoon. no complaints of pain. VSS will continue to monitor.

## 2020-07-08 NOTE — PLAN OF CARE
Problem: Patient Care Overview  Goal: Plan of Care Review  Outcome: Ongoing (interventions implemented as appropriate)  Flowsheets (Taken 7/8/2020 0597)  Progress: no change  Plan of Care Reviewed With: patient  Outcome Summary: patient received Vit K on 7/7 to reduce INR for paracentsis on 7/8, awaiting lab results; K+protocol; edema in BLE much improved

## 2020-07-08 NOTE — PROGRESS NOTES
[facility]  Inpatient Progress Note    Reason for Follow Up:  Cirrhosis, ascites    Subjective     Interval History:   Had paracentesis today with 4 L removed-he reports feeling much better.  No abdominal tenderness.  Energy level improved.  No nausea    Current Facility-Administered Medications:   •  folic acid (FOLVITE) tablet 1 mg, 1 mg, Oral, Daily, Hardik Swenson MD, 1 mg at 07/08/20 0831  •  LORazepam (ATIVAN) tablet 1 mg, 1 mg, Oral, Q6H PRN, Hardik Swenson MD  •  Magnesium Sulfate 2 gram Bolus, followed by 8 gram infusion (total Mg dose 10 grams)- Mg less than or equal to 1mg/dL, 2 g, Intravenous, PRN **OR** Magnesium Sulfate 2 gram / 50mL Infusion (GIVE X 3 BAGS TO EQUAL 6GM TOTAL DOSE) - Mg 1.1 - 1.5 mg/dl, 2 g, Intravenous, PRN **OR** Magnesium Sulfate 4 gram infusion- Mg 1.6-1.9 mg/dL, 4 g, Intravenous, PRN, Hardik Swenson MD  •  metoprolol tartrate (LOPRESSOR) tablet 25 mg, 25 mg, Oral, Q12H, Hardik Swenson MD, 25 mg at 07/07/20 2257  •  multivitamin (THERAGRAN) tablet 1 tablet, 1 tablet, Oral, Daily, Hardik Swenson MD, 1 tablet at 07/08/20 0831  •  multivitamin tablet 1 tablet, 1 tablet, Oral, Daily, Soniya Cohen APRN  •  ondansetron (ZOFRAN) injection 4 mg, 4 mg, Intravenous, Q6H PRN, Soniya Cohen APRN  •  pantoprazole (PROTONIX) EC tablet 40 mg, 40 mg, Oral, Q AM, Abe Alejandro MD, 40 mg at 07/08/20 0623  •  potassium chloride (MICRO-K) CR capsule 40 mEq, 40 mEq, Oral, PRN, 40 mEq at 07/07/20 1616 **OR** potassium chloride (KLOR-CON) packet 40 mEq, 40 mEq, Oral, PRN **OR** potassium chloride 10 mEq in 100 mL IVPB, 10 mEq, Intravenous, Q1H PRN, Abe Alejandro MD  •  [COMPLETED] Insert peripheral IV, , , Once **AND** sodium chloride 0.9 % flush 10 mL, 10 mL, Intravenous, PRN, Beto Harper MD  •  sodium chloride 0.9 % flush 10 mL, 10 mL, Intravenous, Q12H, Soniya Cohen, APRN, 10 mL at 07/08/20 0823  •  sodium  chloride 0.9 % flush 10 mL, 10 mL, Intravenous, PRN, Soniya Cohen, APRN  •  thiamine (VITAMIN B-1) tablet 100 mg, 100 mg, Oral, Daily, Hardik Swenson MD, 100 mg at 07/08/20 0831  Review of Systems:    Negative for nausea or abdominal pain, negative for fevers or chills    Objective     Vital Signs  Temp:  [97.5 °F (36.4 °C)-98.6 °F (37 °C)] 97.5 °F (36.4 °C)  Heart Rate:  [66-78] 68  Resp:  [16-18] 18  BP: ()/(47-64) 105/54  Body mass index is 24.24 kg/m².    Intake/Output Summary (Last 24 hours) at 7/8/2020 1641  Last data filed at 7/8/2020 1607  Gross per 24 hour   Intake 1060 ml   Output 4100 ml   Net -3040 ml     I/O this shift:  In: 700 [P.O.:700]  Out: 4100 [Other:4100]     Physical Exam:   General: patient awake, alert and cooperative   Eyes: Normal lids and lashes, no scleral icterus   Neck: supple, normal ROM   Skin: warm and dry, +jaundiced   Pulm: regular and unlabored   Abdomen: soft, nontender, distended    Extremities: no rash or edema   Psychiatric: Normal mood and behavior; memory intact     Results Review:     I reviewed the patient's new clinical results.    Results from last 7 days   Lab Units 07/08/20 0733 07/07/20 0633 07/06/20  1630   WBC 10*3/mm3 4.47 4.22 4.25   HEMOGLOBIN g/dL 9.0* 8.8* 9.7*   HEMATOCRIT % 27.2* 26.3* 28.7*   PLATELETS 10*3/mm3 86* 75* 88*     Results from last 7 days   Lab Units 07/08/20 0733 07/07/20 2005 07/07/20 0633 07/06/20  1630   SODIUM mmol/L 136  --  134* 133*   POTASSIUM mmol/L 3.6 3.6 3.2* 3.5   CHLORIDE mmol/L 107  --  105 103   CO2 mmol/L 24.6  --  23.3 22.6   BUN mg/dL 8  --  7* 8   CREATININE mg/dL 0.57*  --  0.50* 0.64*   CALCIUM mg/dL 7.6*  --  7.7* 8.2*   BILIRUBIN mg/dL 4.5*  --   --  6.0*   ALK PHOS U/L 117  --   --  145*   ALT (SGPT) U/L 38  --   --  43*   AST (SGOT) U/L 130*  --   --  169*   GLUCOSE mg/dL 87  --  89 102*     Results from last 7 days   Lab Units 07/08/20  0733 07/07/20  0633 07/06/20  1630   INR  2.60* 2.58* 2.36*      Lab Results   Lab Value Date/Time    LIPASE 59 07/07/2020 0633    LIPASE 65 (H) 07/06/2020 1630       Radiology:  US Paracentesis   Final Result   Successful ultrasound-guided paracentesis.       This report was finalized on 7/8/2020 4:35 PM by Dr. Shivam Juarez M.D.          XR Chest PA & Lateral   Final Result   Increased density in the left lung base likely related to   pleural fluid atelectasis and/or pneumonia.   2. Possible minimal right pleural effusion.   3. Small oval-shaped density in the left apex of uncertain significance.   This could represent small area of scar or atelectasis. It has a   slightly nodular appearance. Short-term follow-up PA and lateral views   of the chest recommended. Alternatively a CT of the chest with contrast   could be obtained for further assessment of these findings.       This report was finalized on 7/8/2020 10:50 AM by Dr. Manas Mancilla M.D.          US Abdomen Complete   Final Result      CT Abdomen Pelvis With Contrast   Final Result          Assessment/Plan     Patient Active Problem List   Diagnosis   • ED (erectile dysfunction) of organic origin   • Alcoholic cirrhosis of liver with ascites (CMS/HCC)   • Anemia   • Jaundice   • Coagulopathy (CMS/HCC)   • Secondary thrombocytopenia       Assessment:  1. 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 Ascites  3. Alcoholic hepatitis      Plan:  · Status post paracentesis today with 4 L removed-no evidence of SBP  · He needs a hepatitis B vaccine at some point-he has immunity to hepatitis A  · Serologic work-up negative to date-anti-smooth muscle antibody pending  · We will need EGD and colonoscopy for evaluation of iron deficiency anemia, for colorectal cancer screening as well as a screen for varices once his INR has declined to below 1.8.  He has received vitamin K x2.  If his INR does not come down in the morning, could consider doing this as an outpatient    I discussed the patients findings and my recommendations  with patient.    Ruby Mcgee MD      > 25 minutes spent participating in patient care with greater than half spent in face-to-face contact with the patient in direct patient care and counseling

## 2020-07-08 NOTE — NURSING NOTE
Returned from ultrasound. Puncture site to right  lower abdomen dry and intact. No complaints of pain. No distress. To Echo. No distress.

## 2020-07-09 ENCOUNTER — APPOINTMENT (OUTPATIENT)
Dept: CT IMAGING | Facility: HOSPITAL | Age: 65
End: 2020-07-09

## 2020-07-09 ENCOUNTER — READMISSION MANAGEMENT (OUTPATIENT)
Dept: CALL CENTER | Facility: HOSPITAL | Age: 65
End: 2020-07-09

## 2020-07-09 VITALS
OXYGEN SATURATION: 92 % | HEART RATE: 85 BPM | HEIGHT: 71 IN | RESPIRATION RATE: 16 BRPM | DIASTOLIC BLOOD PRESSURE: 63 MMHG | WEIGHT: 173.8 LBS | BODY MASS INDEX: 24.33 KG/M2 | TEMPERATURE: 98.4 F | SYSTOLIC BLOOD PRESSURE: 110 MMHG

## 2020-07-09 PROBLEM — J98.4 LUNG ABNORMALITY: Status: ACTIVE | Noted: 2020-07-09

## 2020-07-09 LAB
ANA SER QL IA: POSITIVE
ANA SPECKLED TITR SER: NORMAL {TITER}
CENTROMERE B AB SER-ACNC: <0.2 AI (ref 0–0.9)
CHROMATIN AB SERPL-ACNC: <0.2 AI (ref 0–0.9)
DEPRECATED RDW RBC AUTO: 59.8 FL (ref 37–54)
DSDNA AB SER-ACNC: <1 IU/ML (ref 0–9)
ENA JO1 AB SER-ACNC: <0.2 AI (ref 0–0.9)
ENA RNP AB SER-ACNC: 0.3 AI (ref 0–0.9)
ENA SCL70 AB SER-ACNC: <0.2 AI (ref 0–0.9)
ENA SM AB SER-ACNC: <0.2 AI (ref 0–0.9)
ENA SS-A AB SER-ACNC: <0.2 AI (ref 0–0.9)
ENA SS-B AB SER-ACNC: <0.2 AI (ref 0–0.9)
ERYTHROCYTE [DISTWIDTH] IN BLOOD BY AUTOMATED COUNT: 22.2 % (ref 12.3–15.4)
HCT VFR BLD AUTO: 28.4 % (ref 37.5–51)
HGB BLD-MCNC: 9.4 G/DL (ref 13–17.7)
INR PPP: 2.51 (ref 0.9–1.1)
Lab: ABNORMAL
Lab: NORMAL
Lab: NORMAL
MCH RBC QN AUTO: 25.3 PG (ref 26.6–33)
MCHC RBC AUTO-ENTMCNC: 33.1 G/DL (ref 31.5–35.7)
MCV RBC AUTO: 76.3 FL (ref 79–97)
PLATELET # BLD AUTO: 83 10*3/MM3 (ref 140–450)
PMV BLD AUTO: 10.5 FL (ref 6–12)
PROTHROMBIN TIME: 26.3 SECONDS (ref 11.7–14.2)
RBC # BLD AUTO: 3.72 10*6/MM3 (ref 4.14–5.8)
REF LAB TEST METHOD: NORMAL
WBC # BLD AUTO: 4.65 10*3/MM3 (ref 3.4–10.8)

## 2020-07-09 PROCEDURE — 25010000002 HEPATITIS B VACCINE (RECOMBINANT) 20 MCG/ML SUSPENSION: Performed by: INTERNAL MEDICINE

## 2020-07-09 PROCEDURE — 85610 PROTHROMBIN TIME: CPT | Performed by: INTERNAL MEDICINE

## 2020-07-09 PROCEDURE — 83516 IMMUNOASSAY NONANTIBODY: CPT | Performed by: INTERNAL MEDICINE

## 2020-07-09 PROCEDURE — 90740 HEPB VACC 3 DOSE IMMUNSUP IM: CPT | Performed by: INTERNAL MEDICINE

## 2020-07-09 PROCEDURE — 85027 COMPLETE CBC AUTOMATED: CPT | Performed by: INTERNAL MEDICINE

## 2020-07-09 PROCEDURE — 71250 CT THORAX DX C-: CPT

## 2020-07-09 PROCEDURE — G0010 ADMIN HEPATITIS B VACCINE: HCPCS | Performed by: INTERNAL MEDICINE

## 2020-07-09 RX ORDER — PANTOPRAZOLE SODIUM 40 MG/1
40 TABLET, DELAYED RELEASE ORAL DAILY
Qty: 30 TABLET | Refills: 0 | Status: SHIPPED | OUTPATIENT
Start: 2020-07-09 | End: 2020-08-04 | Stop reason: SDUPTHER

## 2020-07-09 RX ORDER — SPIRONOLACTONE 100 MG/1
100 TABLET, FILM COATED ORAL DAILY
Qty: 30 TABLET | Refills: 0 | Status: SHIPPED | OUTPATIENT
Start: 2020-07-10 | End: 2020-08-04 | Stop reason: SDUPTHER

## 2020-07-09 RX ORDER — PHYTONADIONE 5 MG/1
10 TABLET ORAL ONCE
Status: COMPLETED | OUTPATIENT
Start: 2020-07-09 | End: 2020-07-09

## 2020-07-09 RX ORDER — SPIRONOLACTONE 100 MG/1
100 TABLET, FILM COATED ORAL DAILY
Status: DISCONTINUED | OUTPATIENT
Start: 2020-07-09 | End: 2020-07-09 | Stop reason: HOSPADM

## 2020-07-09 RX ADMIN — PANTOPRAZOLE SODIUM 40 MG: 40 TABLET, DELAYED RELEASE ORAL at 06:23

## 2020-07-09 RX ADMIN — Medication 100 MG: at 08:13

## 2020-07-09 RX ADMIN — SPIRONOLACTONE 100 MG: 100 TABLET, FILM COATED ORAL at 10:15

## 2020-07-09 RX ADMIN — METOPROLOL TARTRATE 25 MG: 25 TABLET, FILM COATED ORAL at 08:13

## 2020-07-09 RX ADMIN — Medication 1 TABLET: at 08:13

## 2020-07-09 RX ADMIN — HEPATITIS B VACCINE (RECOMBINANT) 20 MCG: 20 INJECTION, SUSPENSION INTRAMUSCULAR at 17:12

## 2020-07-09 RX ADMIN — PHYTONADIONE 10 MG: 5 TABLET ORAL at 10:15

## 2020-07-09 RX ADMIN — SODIUM CHLORIDE, PRESERVATIVE FREE 10 ML: 5 INJECTION INTRAVENOUS at 08:14

## 2020-07-09 RX ADMIN — FOLIC ACID 1 MG: 1 TABLET ORAL at 08:13

## 2020-07-09 NOTE — OUTREACH NOTE
Prep Survey      Responses   [facility] facility patient discharged from?  [place]   Is LACE score < 7 ?  No   Eligibility  [facility]   Date of Admission  07/06/20   Date of Discharge  07/09/20   Discharge Disposition  Home or Self Care   Discharge diagnosis  Alcoholic cirrhosis of liver with ascites paracentesis on 7/8/2020    COVID-19 Test Status  Not tested   Does the patient have one of the following disease processes/diagnoses(primary or secondary)?  Other   Does the patient have Home health ordered?  No   Is there a DME ordered?  No   Prep survey completed?  Yes          Christine Mcgarry RN

## 2020-07-09 NOTE — DISCHARGE SUMMARY
Patient Name: Wei Potts  : 1955  MRN: [id number]    Date of Admission: 2020  Date of Discharge:  2020  Primary Care Physician: Bella Villalta MD      Chief Complaint:   Abdominal Pain and Leg Swelling      Discharge Diagnoses     Active Hospital Problems    Diagnosis  POA   • **Alcoholic cirrhosis of liver with ascites (CMS/HCC) [K70.31]  Unknown   • Lung abnormality- left apex [J98.4]  Unknown   • Coagulopathy (CMS/HCC) [D68.9]  Unknown   • Secondary thrombocytopenia [D69.59]  Unknown   • Anemia [D64.9]  Unknown   • Jaundice [R17]  Unknown      Resolved Hospital Problems    Diagnosis Date Resolved POA   • Hyponatremia [E87.1] 2020 Unknown   • Benign essential hypertension [I10] 2020 Yes        Hospital Course     Mr. Potts is a 65 y.o. man admitted for abdominal swelling and leg swelling.  Further evaluation showed cirrhosis with ascites, likely secondary to alcohol.  He underwent paracentesis on 2020 with removal of 4 L.  There was no evidence of SBP.  He had several doses of vitamin K which did not improve INR level substantially.  He was followed by GI.  He will need EGD and colonoscopy for further evaluation of anemia once the INR has improved.  He was started on Aldactone.  Salt and fluid restriction were begun.  He was given the first of the hepatitis B series vaccination.    I saw him for the first time on 2020.  He had crackles at the left base.  CT of the chest was ordered to further evaluate atelectasis and pleural effusions noted on the admission CT abdomen.  He has small to moderate sized bilateral pleural effusions with bilateral lobe atelectasis and/or pneumonia.  He has a small subpleural calcified nodule at the left apex, probably granulomatous and benign but he will need repeat CT in 2 months.  He had no clinical evidence of pneumonia.  Lung findings likely secondary to massive amount of ascites with seepage into the pleural space.   "Echocardiogram was done with results as noted below.    He is feeling much better.  No shortness of breath.  No cough.  He is using incentive spirometry.  He is ready for discharge home with outpatient follow-up.    Stable condition; hopefully good prognosis    Day of Discharge     Feels better: \"Night and day difference\"    Physical Exam:  Temp:  [98.2 °F (36.8 °C)-98.6 °F (37 °C)] 98.4 °F (36.9 °C)  Heart Rate:  [68-85] 85  Resp:  [16-18] 16  BP: (101-110)/(49-63) 110/63  Body mass index is 24.24 kg/m².  Physical Exam   Constitutional: He appears well-developed. No distress.   Cardiovascular: Normal rate and regular rhythm.   No murmur heard.  Pulmonary/Chest: No stridor. No respiratory distress.   A few crackles left base but improved.  Breath sounds diminished   Abdominal: Soft. Bowel sounds are normal. He exhibits distension. There is no tenderness.   Musculoskeletal: He exhibits no edema.   Neurological: He is alert.   Skin: Skin is warm. He is not diaphoretic.   Nursing note and vitals reviewed.      Consultants     Consult Orders (all) (From admission, onward)     Start     Ordered    07/09/20 0913  Inpatient Case Management  Consult  Once     Provider:  (Not yet assigned)    07/09/20 0912    07/06/20 2125  Inpatient Access Center Consult  Once     Provider:  (Not yet assigned)    07/06/20 2137 07/06/20 1920  Inpatient Gastroenterology Consult  Once     Specialty:  Gastroenterology  Provider:  Thomas Lynch MD    07/06/20 1924    07/06/20 1731  [facility] (on-call MD unless specified) Details  Once     Specialty:  Hospitalist  Provider:  (Not yet assigned)    07/06/20 1730 07/06/20 1715  Gastroenterology (on-call MD unless specified)  Once     Specialty:  Gastroenterology  Provider:  (Not yet assigned)    07/06/20 1714              Procedures     * Surgery not found *      Imaging Results (All)     Procedure Component Value Units Date/Time    CT Chest Without Contrast [[id number]] " Collected:  07/09/20 1325     Updated:  07/09/20 1325    Narrative:       CT OF THE CHEST WITHOUT CONTRAST 07/09/2020     HISTORY: Left apical nodular density on chest radiograph of 07/08/2020.  Left lower lobe crackles also heard.     TECHNIQUE: Axial images were obtained from the lung apices to the upper  abdomen. No intravenous contrast was given.     FINDINGS: In the anterior aspect of the left upper lobe is a somewhat  ill-defined but largely calcified nodule which corresponds to the nodule  seen on the chest radiograph. This likely represents a benign  granulomatous disease or other benign calcification.     There are small to moderate-sized bilateral pleural effusions with  bilateral lower lobe atelectasis and/or pneumonia.     There is upper abdominal ascites. The liver appears somewhat small and  may be cirrhotic. Spleen appears mild to moderately enlarged as well.     A tiny 2 mm to 3 mm left upper lobe nodule was seen on image 53, no  other lung nodules are seen.       Impression:       1. The nodular density in the left apex on the chest radiograph of  07/08/2020 represents a small subpleural calcified nodule, possibly  granulomatous disease and probably benign.  2. Tiny 2 mm to 3 mm left upper lobe nodule appears noncalcified.  3. Small to moderate-sized bilateral pleural effusions with bilateral  lower lobe atelectasis and/or pneumonia.  4. Short-term follow-up CT of the chest in approximately 2 months to 3  months suggested.     Radiation dose reduction techniques were utilized, including automated  exposure control and exposure modulation based on body size.          US Paracentesis [[id number]] Collected:  07/08/20 1634    Specimen:  Body Fluid Updated:  07/08/20 1638    Narrative:       INDICATION/HISTORY:  ascites/liver cirrhosis.     TECHNIQUE: Informed consent was recorded and documented in the patient's  chart . The patient was placed in a supine position on the ultrasound  table. After  preparatory ultrasound scan and image recording, the right  upper quadrant was prepped and draped in the usual aseptic manner. 1%  lidocaine was infiltrated at the designated puncture site. A 5 Setswana  paracentesis catheter was then inserted into the ascites using trocar  technique. A total of 4.1 L of straw-colored fluid were drained with  specimen sent to laboratory. The catheter was then removed. Sterile  dressing was applied. The patient was in stable condition after the  procedure. There were no immediate complications. All elements of  maximal sterile technique were followed.       Impression:       Successful ultrasound-guided paracentesis.     This report was finalized on 7/8/2020 4:35 PM by Dr. Shivam Juarez M.D.       XR Chest PA & Lateral [[id number]] Collected:  07/08/20 1046     Updated:  07/08/20 1053    Narrative:       XR CHEST PA AND LATERAL-  07/08/2020     HISTORY: Cough.     Heart size is within normal limits. There is increased density in the  left base consistent with combination of pleural fluid atelectasis  and/or pneumonia. Small area of increased density projects over the  medial left clavicle of uncertain significance but could represent small  area of scar or atelectasis. It does have a slightly nodular appearance.     Right lung appears clear though there may be some minimal pleural fluid  on the right.       Impression:       Increased density in the left lung base likely related to  pleural fluid atelectasis and/or pneumonia.  2. Possible minimal right pleural effusion.  3. Small oval-shaped density in the left apex of uncertain significance.  This could represent small area of scar or atelectasis. It has a  slightly nodular appearance. Short-term follow-up PA and lateral views  of the chest recommended. Alternatively a CT of the chest with contrast  could be obtained for further assessment of these findings.     This report was finalized on 7/8/2020 10:50 AM by Dr. Malagon  LACY Mancilla.       US Abdomen Complete [[id number]] Collected:  20 1338     Updated:  20 1430    Narrative:       Abdominal ultrasound     HISTORY: CIRRHOSIS AND ASCITES.     The liver has a cirrhotic morphology with slightly coarsened echotexture  and there are several tiny hepatic cysts. There is enlargement of the  spleen which measures 17 cm. There is a large amount of associated  ascites as also noted on yesterday's CT scan. There is hepatofugal flow  in the portal vein and no evidence of portal vein thrombosis. The  pancreas and both kidneys are unremarkable.     The gallbladder shows no evidence of gallstones. There is a thickened  appearance of gallbladder wall consistent with the surrounding ascites.  The common bile duct is normal in caliber. The kidneys are unremarkable.     There is no evidence of abdominal aortic aneurysm and the adjacent IVC  is unremarkable.     CONCLUSION: Cirrhosis with associated splenomegaly and hepatofugal flow  with a large amount of ascites as noted on yesterday's CT scan. The exam  is otherwise unremarkable.     This report was finalized on 2020 2:27 PM by Dr. Javier Eid M.D.       CT Abdomen Pelvis With Contrast [[id number]] Collected:  20 1818     Updated:  20 1832    Narrative:       CT ABDOMEN AND PELVIS WITH INTRAVENOUS CONTRAST     HISTORY: Cirrhosis. Ascites.     TECHNIQUE/FINDINGS: The CT scan was performed as an emergency procedure  through the abdomen and pelvis with intravenous contrast and  demonstrates the followin. There are small bilateral pleural effusions, right larger than left,  with some adjacent dense pneumonia at the right base and some borderline  atelectasis and pneumonia at the left base posteriorly.  2. The liver has a cirrhotic morphology with nodular contour combined  with enlargement of the spleen and splenic varices and associated with a  large amount of ascites. There is no evidence of portal vein  thrombosis.  3. There are 2 very tiny low-density lesions in the liver most  consistent with tiny cysts. The spleen measures 19.5 cm in length. The  pancreas and both adrenal glands and both kidneys are unremarkable. The  gallbladder shows no gallstones. There is a slightly thickened  appearance of the gallbladder wall which is accentuated by the adjacent  ascites.  4. There is no aortic aneurysm or retroperitoneal lymphadenopathy. The  large and small bowel loops are normal in caliber. There is slight  enlargement of the prostate measuring 5.4 cm. The remainder of the  pelvis is unremarkable.                 Radiation dose reduction techniques were utilized, including automated  exposure control and exposure modulation based on body size.     This report was finalized on 7/6/2020 6:29 PM by Dr. Javier Eid M.D.           Results for orders placed during the hospital encounter of 07/06/20   Duplex Carotid Ultrasound CAR    Narrative · Proximal right internal carotid artery mild stenosis.  · Proximal left internal carotid artery plaque without significant   stenosis.        Results for orders placed during the hospital encounter of 07/06/20   Adult Transthoracic Echo Complete W/ Cont if Necessary Per Protocol    Narrative · Left ventricular systolic function is hyperdynamic (EF > 70).  · Mild mitral valve regurgitation is present        Pertinent Labs     Results from last 7 days   Lab Units 07/09/20  0601 07/08/20 0733 07/07/20 0633 07/06/20  1630   WBC 10*3/mm3 4.65 4.47 4.22 4.25   HEMOGLOBIN g/dL 9.4* 9.0* 8.8* 9.7*   PLATELETS 10*3/mm3 83* 86* 75* 88*     Results from last 7 days   Lab Units 07/08/20  0733 07/07/20 2005 07/07/20 0633 07/06/20  1630   SODIUM mmol/L 136  --  134* 133*   POTASSIUM mmol/L 3.6 3.6 3.2* 3.5   CHLORIDE mmol/L 107  --  105 103   CO2 mmol/L 24.6  --  23.3 22.6   BUN mg/dL 8  --  7* 8   CREATININE mg/dL 0.57*  --  0.50* 0.64*   GLUCOSE mg/dL 87  --  89 102*   Estimated Creatinine  Clearance: 147.8 mL/min (A) (by C-G formula based on SCr of 0.57 mg/dL (L)).  Results from last 7 days   Lab Units 07/08/20  0733 07/06/20  1630   ALBUMIN g/dL 2.00* 2.60*   BILIRUBIN mg/dL 4.5* 6.0*   ALK PHOS U/L 117 145*   AST (SGOT) U/L 130* 169*   ALT (SGPT) U/L 38 43*     Results from last 7 days   Lab Units 07/08/20  0733 07/07/20  0633 07/06/20  1630   CALCIUM mg/dL 7.6* 7.7* 8.2*   ALBUMIN g/dL 2.00*  --  2.60*     Results from last 7 days   Lab Units 07/07/20 0633 07/06/20 2252 07/06/20  1630   LIPASE U/L 59  --  65*   AMMONIA umol/L  --  42  --              Invalid input(s): LDLCALC     INR 2.5 today  Stool Hemoccult negative  Paracentesis fluid:  albumin 0.4, glucose 125, LDH 47, amylase 11, total protein 1.3 g.  RBCs 552 and WBCs 157 with 25% lymphs  Ceruloplasmin 16 alpha-1 antitrypsin 117   hepatitis A antibody positive  TSH 3.9  Iron low at 39 iron saturation 21 transferrin low at 124 TIBC low at 185  B12 1790  Hepatitis B surface antibody nonreactive   Alpha-fetoprotein 3.1  Mitochondrial antibodies less than 20    Results from last 7 days   Lab Units 07/08/20  1145   BODYFLDCX  No growth       Test Results Pending at Discharge      Order Current Status    Anaerobic Culture - Body Fluid, Peritoneum In process    Anti-Smooth Muscle Antibody Titer In process    Copper, Free In process    Non-gynecologic Cytology In process    Body Fluid Culture - Body Fluid, Peritoneum Preliminary result          Discharge Details        Discharge Medications      New Medications      Instructions Start Date   metoprolol tartrate 25 MG tablet  Commonly known as:  LOPRESSOR   12.5 mg, Oral, Every 12 Hours Scheduled      pantoprazole 40 MG EC tablet  Commonly known as:  PROTONIX   40 mg, Oral, Daily      spironolactone 100 MG tablet  Commonly known as:  ALDACTONE   100 mg, Oral, Daily   Start Date:  July 10, 2020        Continue These Medications      Instructions Start Date   multivitamin tablet   1 tablet, Oral,  Daily         Stop These Medications    benazepril-hydrochlorthiazide 20-12.5 MG per tablet  Commonly known as:  LOTENSIN HCT            No Known Allergies      Discharge Disposition:  Home or Self Care    Discharge Diet:  Diet Order   Procedures   • Diet Regular; Low Sodium; 1,500 mg Na       Discharge Activity:   Activity Instructions     Activity as Tolerated      Continue incentive spirometry          CODE STATUS:    Code Status and Medical Interventions:   Ordered at: 07/06/20 1924     Code Status:    CPR     Medical Interventions (Level of Support Prior to Arrest):    Full       Future Appointments   Date Time Provider Department Center   7/14/2020  9:15 AM Alicia Bolaños APRN [facility] None     Follow-up Information     Bella Villatla MD .    Specialty:  Family Medicine  Contact information:  [address] [facility] [id number] [phone]             Ruby Mcgee MD Follow up in 1 week(s).    Specialty:  Gastroenterology  Why:  cirrhosis and ascites. cmp to be done  Contact information:  [address]  [facility] [address]  [facility] [id number] [phone]             pcp Follow up in 1 month(s).    Why:  ascites; followup left apex abn                 Time Spent on Discharge:  Greater than 35 minutes.  Discussed with Dr. Arely Anderson MD  [facility]  07/09/20  6:01 PM

## 2020-07-09 NOTE — PLAN OF CARE
Problem: Patient Care Overview  Goal: Plan of Care Review  Outcome: Ongoing (interventions implemented as appropriate)  Flowsheets  Taken 7/9/2020 6716  Progress: improving  Outcome Summary: VSS.   A&Ox4.  Ad oliver.  s/p paracentesis.  monitoring INR.  Possible d/c today.  Cont to monitor.  Taken 7/8/2020 2031  Plan of Care Reviewed With: patient

## 2020-07-10 ENCOUNTER — TRANSITIONAL CARE MANAGEMENT TELEPHONE ENCOUNTER (OUTPATIENT)
Dept: CALL CENTER | Facility: HOSPITAL | Age: 65
End: 2020-07-10

## 2020-07-10 LAB
ACTIN IGG SERPL-ACNC: 29 UNITS (ref 0–19)
CYTO UR: NORMAL
LAB AP CASE REPORT: NORMAL
LAB AP DIAGNOSIS COMMENT: NORMAL
LAB AP SPECIAL STAINS: NORMAL
PATH REPORT.FINAL DX SPEC: NORMAL
PATH REPORT.GROSS SPEC: NORMAL

## 2020-07-10 NOTE — OUTREACH NOTE
Call Center TCM Note      Responses   [facility] patient discharged from?  [place]   COVID-19 Test Status  Not tested   Does the patient have one of the following disease processes/diagnoses(primary or secondary)?  Other   TCM attempt successful?  Yes   Call start time  1219   Call end time  1235   Discharge diagnosis  Alcoholic cirrhosis of liver with ascites paracentesis on 7/8/2020    Is patient permission given to speak with other caregiver?  No   Meds reviewed with patient/caregiver?  Yes   Is the patient having any side effects they believe may be caused by any medication additions or changes?  No   Does the patient have all medications ordered at discharge?  Yes   Is the patient taking all medications as directed (includes completed medication regime)?  Yes   Does the patient have a primary care provider?   Yes   Does the patient have an appointment with their PCP within 7 days of discharge?  Yes   Comments regarding PCP  New patient PCP appt scheduled for 7/14/20  with Alicia LINARES. Patient unable to be seen by previous PCP Dr Villalta due to Medicaid insurance patient.    Has the patient kept scheduled appointments due by today?  N/A   Has home health visited the patient within 72 hours of discharge?  N/A   Pulse Ox monitoring  None   Psychosocial issues?  No   Did the patient receive a copy of their discharge instructions?  Yes   Nursing interventions  Reviewed instructions with patient   What is the patient's perception of their health status since discharge?  Improving   Is the patient/caregiver able to teach back signs and symptoms related to disease process for when to call PCP?  Yes   Is the patient/caregiver able to teach back signs and symptoms related to disease process for when to call 911?  Yes   Is the patient/caregiver able to teach back the hierarchy of who to call/visit for symptoms/problems? PCP, Specialist, Home health nurse, Urgent Care, ED, 911  Yes   TCM call completed?   Yes          Christine Abreu RN    7/10/2020, 12:35

## 2020-07-13 LAB
BACTERIA FLD CULT: NORMAL
BACTERIA SPEC ANAEROBE CULT: NORMAL
GRAM STN SPEC: NORMAL

## 2020-07-16 ENCOUNTER — TELEPHONE (OUTPATIENT)
Dept: GASTROENTEROLOGY | Facility: CLINIC | Age: 65
End: 2020-07-16

## 2020-07-16 NOTE — TELEPHONE ENCOUNTER
I have tentatively scheduled him for a follow-up on August 13 at 1145-please let me know if he cannot make this

## 2020-07-17 LAB — COPPER, FREE: 280 MCG/L

## 2020-07-22 ENCOUNTER — TELEPHONE (OUTPATIENT)
Dept: GASTROENTEROLOGY | Facility: CLINIC | Age: 65
End: 2020-07-22

## 2020-07-22 ENCOUNTER — HOSPITAL ENCOUNTER (EMERGENCY)
Facility: HOSPITAL | Age: 65
Discharge: HOME | End: 2020-07-22
Attending: EMERGENCY MEDICINE | Admitting: EMERGENCY MEDICINE

## 2020-07-22 VITALS
WEIGHT: 146 LBS | HEIGHT: 71 IN | RESPIRATION RATE: 17 BRPM | DIASTOLIC BLOOD PRESSURE: 81 MMHG | HEART RATE: 62 BPM | SYSTOLIC BLOOD PRESSURE: 128 MMHG | OXYGEN SATURATION: 100 % | TEMPERATURE: 98.8 F | BODY MASS INDEX: 20.44 KG/M2

## 2020-07-22 DIAGNOSIS — K76.82 HEPATIC ENCEPHALOPATHY (HCC): Primary | ICD-10-CM

## 2020-07-22 LAB
ALBUMIN SERPL-MCNC: 2.8 G/DL (ref 3.5–5.2)
ALBUMIN/GLOB SERPL: 0.5 G/DL
ALP SERPL-CCNC: 163 U/L (ref 39–117)
ALT SERPL W P-5'-P-CCNC: 36 U/L (ref 1–41)
AMMONIA BLD-SCNC: 63 UMOL/L (ref 16–60)
ANION GAP SERPL CALCULATED.3IONS-SCNC: 6.8 MMOL/L (ref 5–15)
AST SERPL-CCNC: 81 U/L (ref 1–40)
BASOPHILS # BLD AUTO: 0.03 10*3/MM3 (ref 0–0.2)
BASOPHILS NFR BLD AUTO: 0.4 % (ref 0–1.5)
BILIRUB SERPL-MCNC: 4.2 MG/DL (ref 0–1.2)
BILIRUB UR QL STRIP: NEGATIVE
BUN SERPL-MCNC: 11 MG/DL (ref 8–23)
BUN/CREAT SERPL: 12.6 (ref 7–25)
CALCIUM SPEC-SCNC: 9 MG/DL (ref 8.6–10.5)
CHLORIDE SERPL-SCNC: 100 MMOL/L (ref 98–107)
CLARITY UR: ABNORMAL
CO2 SERPL-SCNC: 22.2 MMOL/L (ref 22–29)
COLOR UR: YELLOW
CREAT SERPL-MCNC: 0.87 MG/DL (ref 0.76–1.27)
DEPRECATED RDW RBC AUTO: 67 FL (ref 37–54)
EOSINOPHIL # BLD AUTO: 0.28 10*3/MM3 (ref 0–0.4)
EOSINOPHIL NFR BLD AUTO: 3.7 % (ref 0.3–6.2)
ERYTHROCYTE [DISTWIDTH] IN BLOOD BY AUTOMATED COUNT: 22.9 % (ref 12.3–15.4)
GFR SERPL CREATININE-BSD FRML MDRD: 89 ML/MIN/1.73
GLOBULIN UR ELPH-MCNC: 6 GM/DL
GLUCOSE SERPL-MCNC: 109 MG/DL (ref 65–99)
GLUCOSE UR STRIP-MCNC: NEGATIVE MG/DL
HCT VFR BLD AUTO: 33.7 % (ref 37.5–51)
HGB BLD-MCNC: 11.1 G/DL (ref 13–17.7)
HGB UR QL STRIP.AUTO: NEGATIVE
HOLD SPECIMEN: NORMAL
HOLD SPECIMEN: NORMAL
IMM GRANULOCYTES # BLD AUTO: 0.03 10*3/MM3 (ref 0–0.05)
IMM GRANULOCYTES NFR BLD AUTO: 0.4 % (ref 0–0.5)
INR PPP: 2.17 (ref 0.9–1.1)
KETONES UR QL STRIP: NEGATIVE
LEUKOCYTE ESTERASE UR QL STRIP.AUTO: NEGATIVE
LIPASE SERPL-CCNC: 133 U/L (ref 13–60)
LYMPHOCYTES # BLD AUTO: 1.28 10*3/MM3 (ref 0.7–3.1)
LYMPHOCYTES NFR BLD AUTO: 17.1 % (ref 19.6–45.3)
MCH RBC QN AUTO: 26.2 PG (ref 26.6–33)
MCHC RBC AUTO-ENTMCNC: 32.9 G/DL (ref 31.5–35.7)
MCV RBC AUTO: 79.5 FL (ref 79–97)
MONOCYTES # BLD AUTO: 0.84 10*3/MM3 (ref 0.1–0.9)
MONOCYTES NFR BLD AUTO: 11.2 % (ref 5–12)
NEUTROPHILS NFR BLD AUTO: 5.04 10*3/MM3 (ref 1.7–7)
NEUTROPHILS NFR BLD AUTO: 67.2 % (ref 42.7–76)
NITRITE UR QL STRIP: NEGATIVE
NRBC BLD AUTO-RTO: 0 /100 WBC (ref 0–0.2)
PH UR STRIP.AUTO: 7 [PH] (ref 5–8)
PLATELET # BLD AUTO: 101 10*3/MM3 (ref 140–450)
PMV BLD AUTO: 10 FL (ref 6–12)
POTASSIUM SERPL-SCNC: 4.3 MMOL/L (ref 3.5–5.2)
PROT SERPL-MCNC: 8.8 G/DL (ref 6–8.5)
PROT UR QL STRIP: NEGATIVE
PROTHROMBIN TIME: 23.5 SECONDS (ref 11.7–14.2)
RBC # BLD AUTO: 4.24 10*6/MM3 (ref 4.14–5.8)
SODIUM SERPL-SCNC: 129 MMOL/L (ref 136–145)
SP GR UR STRIP: 1.01 (ref 1–1.03)
UROBILINOGEN UR QL STRIP: ABNORMAL
WBC # BLD AUTO: 7.5 10*3/MM3 (ref 3.4–10.8)
WHOLE BLOOD HOLD SPECIMEN: NORMAL
WHOLE BLOOD HOLD SPECIMEN: NORMAL

## 2020-07-22 PROCEDURE — 99284 EMERGENCY DEPT VISIT MOD MDM: CPT

## 2020-07-22 PROCEDURE — 83690 ASSAY OF LIPASE: CPT | Performed by: EMERGENCY MEDICINE

## 2020-07-22 PROCEDURE — 85025 COMPLETE CBC W/AUTO DIFF WBC: CPT | Performed by: EMERGENCY MEDICINE

## 2020-07-22 PROCEDURE — 81003 URINALYSIS AUTO W/O SCOPE: CPT | Performed by: EMERGENCY MEDICINE

## 2020-07-22 PROCEDURE — 80053 COMPREHEN METABOLIC PANEL: CPT | Performed by: EMERGENCY MEDICINE

## 2020-07-22 PROCEDURE — 85610 PROTHROMBIN TIME: CPT | Performed by: EMERGENCY MEDICINE

## 2020-07-22 PROCEDURE — 82140 ASSAY OF AMMONIA: CPT | Performed by: EMERGENCY MEDICINE

## 2020-07-22 RX ORDER — LACTULOSE 10 G/15ML
30 SOLUTION ORAL ONCE
Status: COMPLETED | OUTPATIENT
Start: 2020-07-22 | End: 2020-07-22

## 2020-07-22 RX ORDER — SODIUM CHLORIDE 0.9 % (FLUSH) 0.9 %
10 SYRINGE (ML) INJECTION AS NEEDED
Status: DISCONTINUED | OUTPATIENT
Start: 2020-07-22 | End: 2020-07-22 | Stop reason: HOSPADM

## 2020-07-22 RX ORDER — LACTULOSE 10 G/15ML
20 SOLUTION ORAL 2 TIMES DAILY
Qty: 946 ML | Refills: 1 | Status: SHIPPED | OUTPATIENT
Start: 2020-07-22 | End: 2020-08-03 | Stop reason: SDUPTHER

## 2020-07-22 RX ORDER — LACTULOSE 10 G/15ML
20 SOLUTION ORAL 2 TIMES DAILY
Qty: 946 ML | Refills: 0 | Status: SHIPPED | OUTPATIENT
Start: 2020-07-22 | End: 2020-07-22 | Stop reason: SDUPTHER

## 2020-07-22 RX ADMIN — SODIUM CHLORIDE 500 ML: 9 INJECTION, SOLUTION INTRAVENOUS at 18:55

## 2020-07-22 RX ADMIN — LACTULOSE 30 G: 10 SOLUTION ORAL at 19:24

## 2020-07-22 NOTE — ED TRIAGE NOTES
Pt states he was recently hospitalized for liver issues.  Pt was discharged home.  Pt states he comes to ER today due to fatigue, weakness, dizziness at times and states his wife told him he has an odd metal smell about him.  Mask placed on patient in triage.  Triage RN wearing mask throughout encounter.

## 2020-07-22 NOTE — ED PROVIDER NOTES
EMERGENCY DEPARTMENT ENCOUNTER    CHIEF COMPLAINT  Chief Complaint: Weakness  History given by: Patient  History limited by: None  Room Number: 29/29  PMD: Provider, No Known      HPI:  Pt is a 65 y.o. male who presents complaining of increasing weakness, fatigue and dizziness over the past several days.  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  He has been on diuretics since being discharged from the hospital beginning of July.  His wife has noted that he has had a metallic smell about him for the past several days.  He is also noted that he is been mildly confused when his dizziness started.  He contacted a friend of his in [place] who is a physician and she suggested that patient may have hepatic encephalopathy.  Patient denies fever, chills, cough or shortness of breath.  He states that the diuretics he is taking has greatly improve his abdominal distention.  He states he has not had anything to drink that his alcohol for several months that he has been compliant with his medications.    Patient was placed in face mask in first look. Patient was wearing facemask when I entered the room and throughout our encounter. I wore full protective equipment throughout this patient encounter including a face mask, eye shield and gloves. Hand hygiene was performed before donning protective equipment and after removal when leaving the room.        PAST MEDICAL HISTORY  Active Ambulatory Problems     Diagnosis Date Noted   • ED (erectile dysfunction) of organic origin 10/29/2015   • Alcoholic cirrhosis of liver with ascites (CMS/HCC) 07/06/2020   • Anemia 07/06/2020   • Jaundice 07/06/2020   • Coagulopathy (CMS/HCC) 07/08/2020   • Secondary thrombocytopenia 07/08/2020   • Lung abnormality- left apex 07/09/2020     Resolved Ambulatory Problems     Diagnosis Date Noted   • Benign essential hypertension 10/29/2015   • Hyponatremia 07/06/2020     Past Medical History:    Diagnosis Date   • Hypertension    • Liver disease        PAST SURGICAL HISTORY  History reviewed. No pertinent surgical history.    FAMILY HISTORY  Family History   Problem Relation Age of Onset   • Diabetes Mother    • Hypertension Father        SOCIAL HISTORY  Social History     Socioeconomic History   • Marital status:      Spouse name: Not on file   • Number of children: Not on file   • Years of education: Not on file   • Highest education level: Not on file   Tobacco Use   • Smoking status: Never Smoker   • Smokeless tobacco: Never Used   Substance and Sexual Activity   • Alcohol use: Yes     Comment: 1 bottle of wine per day, stopped drinking ETOH 2 weeks ago   • Drug use: No   • Sexual activity: Defer       ALLERGIES  Patient has no known allergies.    REVIEW OF SYSTEMS  Review of Systems   Constitutional: Negative for activity change, appetite change and fever.   HENT: Negative for congestion and sore throat.    Eyes: Negative.    Respiratory: Negative for cough and shortness of breath.    Cardiovascular: Negative for chest pain and leg swelling.   Gastrointestinal: Positive for abdominal distention. Negative for abdominal pain, diarrhea and vomiting.   Endocrine: Negative.    Genitourinary: Negative for decreased urine volume and dysuria.   Musculoskeletal: Negative for neck pain.   Skin: Negative for rash and wound.   Allergic/Immunologic: Negative.    Neurological: Negative for weakness, numbness and headaches.   Hematological: Negative.    Psychiatric/Behavioral: Positive for confusion.   All other systems reviewed and are negative.      PHYSICAL EXAM  ED Triage Vitals   Temp Heart Rate Resp BP SpO2   07/22/20 1612 07/22/20 1612 07/22/20 1612 07/22/20 1718 07/22/20 1612   98.8 °F (37.1 °C) 83 16 103/58 98 %      Temp src Heart Rate Source Patient Position BP Location FiO2 (%)   -- -- -- -- --              Physical Exam   Constitutional: He is oriented to person, place, and time. He appears  distressed (mild).   HENT:   Head: Normocephalic and atraumatic.   Eyes: Scleral icterus is present.   Neck: Normal range of motion. Neck supple. No JVD present. No tracheal deviation present. No thyromegaly present.   Cardiovascular: Normal rate, regular rhythm and intact distal pulses. Exam reveals no gallop.   No murmur heard.  Pulmonary/Chest: Effort normal and breath sounds normal. No respiratory distress. He has no wheezes. He has no rales. He exhibits no tenderness.   Abdominal: Bowel sounds are normal. He exhibits distension (mild). There is no tenderness. There is no rebound and no guarding.   Musculoskeletal: He exhibits no edema or tenderness.   Neurological: He is alert and oriented to person, place, and time. He exhibits normal muscle tone.   No asterixis   Skin: Skin is warm and dry. No rash noted. No erythema.   Nursing note and vitals reviewed.      LAB RESULTS  Lab Results (last 24 hours)     Procedure Component Value Units Date/Time    Urinalysis With Microscopic If Indicated (No Culture) - Urine, Clean Catch [[id number]]  (Abnormal) Collected:  07/22/20 1754    Specimen:  Urine, Clean Catch Updated:  07/22/20 1827     Color, UA Yellow     Appearance, UA Hazy     pH, UA 7.0     Specific Gravity, UA 1.015     Glucose, UA Negative     Ketones, UA Negative     Bilirubin, UA Negative     Blood, UA Negative     Protein, UA Negative     Leuk Esterase, UA Negative     Nitrite, UA Negative     Urobilinogen, UA 2.0 E.U./dL    Narrative:       Urine microscopic not indicated.    CBC & Differential [[id number]] Collected:  07/22/20 1759    Specimen:  Blood Updated:  07/22/20 1841    Narrative:       The following orders were created for panel order CBC & Differential.  Procedure                               Abnormality         Status                     ---------                               -----------         ------                     CBC Auto Differential[[id number]]        Abnormal            Final result                  Please view results for these tests on the individual orders.    Comprehensive Metabolic Panel [[id number]]  (Abnormal) Collected:  07/22/20 1759    Specimen:  Blood Updated:  07/22/20 1835     Glucose 109 mg/dL      BUN 11 mg/dL      Creatinine 0.87 mg/dL      Sodium 129 mmol/L      Potassium 4.3 mmol/L      Chloride 100 mmol/L      CO2 22.2 mmol/L      Calcium 9.0 mg/dL      Total Protein 8.8 g/dL      Albumin 2.80 g/dL      ALT (SGPT) 36 U/L      AST (SGOT) 81 U/L      Alkaline Phosphatase 163 U/L      Total Bilirubin 4.2 mg/dL      eGFR Non African Amer 89 mL/min/1.73      Globulin 6.0 gm/dL      A/G Ratio 0.5 g/dL      BUN/Creatinine Ratio 12.6     Anion Gap 6.8 mmol/L     Narrative:       GFR Normal >60  Chronic Kidney Disease <60  Kidney Failure <15      Lipase [[id number]]  (Abnormal) Collected:  07/22/20 1759    Specimen:  Blood Updated:  07/22/20 1835     Lipase 133 U/L     CBC Auto Differential [[id number]]  (Abnormal) Collected:  07/22/20 1759    Specimen:  Blood Updated:  07/22/20 1841     WBC 7.50 10*3/mm3      RBC 4.24 10*6/mm3      Hemoglobin 11.1 g/dL      Hematocrit 33.7 %      MCV 79.5 fL      MCH 26.2 pg      MCHC 32.9 g/dL      RDW 22.9 %      RDW-SD 67.0 fl      MPV 10.0 fL      Platelets 101 10*3/mm3      Neutrophil % 67.2 %      Lymphocyte % 17.1 %      Monocyte % 11.2 %      Eosinophil % 3.7 %      Basophil % 0.4 %      Immature Grans % 0.4 %      Neutrophils, Absolute 5.04 10*3/mm3      Lymphocytes, Absolute 1.28 10*3/mm3      Monocytes, Absolute 0.84 10*3/mm3      Eosinophils, Absolute 0.28 10*3/mm3      Basophils, Absolute 0.03 10*3/mm3      Immature Grans, Absolute 0.03 10*3/mm3      nRBC 0.0 /100 WBC     Ammonia [[id number]]  (Abnormal) Collected:  07/22/20 1759    Specimen:  Blood Updated:  07/22/20 1832     Ammonia 63 umol/L     Protime-INR [[id number]]  (Abnormal) Collected:  07/22/20 1759    Specimen:  Blood Updated:  07/22/20 1823     Protime 23.5 Seconds      INR 2.17           I ordered the above labs and reviewed the results    RADIOLOGY  No orders to display        I ordered the above noted radiological studies. Interpreted by radiologist. Reviewed by me in PACS.       PROCEDURES  Procedures      PROGRESS AND CONSULTS  ED Course as of Jul 22 2050 Wed Jul 22, 2020 1917 I discussed the case with Dr. Jose with A.  We gave patient the option whether to stay overnight for observation or to go home on lactulose.  Patient would prefer to go home on lactulose.  We will give him a dose in the department and then discharge him home.    [DE]      ED Course User Index  [DE] Mati Steward MD         MEDICAL DECISION MAKING  Results were reviewed/discussed with the patient and they were also made aware of online access. Pt also made aware that some labs, such as cultures, will not be resulted during ER visit and follow up with PMD is necessary.     MDM       DIAGNOSIS  Final diagnoses:   Hepatic encephalopathy (CMS/HCC)       DISPOSITION  DISCHARGE    Patient discharged in stable condition.    Reviewed implications of results, diagnosis, meds, responsibility to follow up, warning signs and symptoms of possible worsening, potential complications and reasons to return to ER, including new or worsening symptoms.    Patient/Family voiced understanding of above instructions.    Discussed plan for discharge, as there is no emergent indication for admission. Patient referred to primary care provider for BP management due to today's BP. Pt/family is agreeable and understands need for follow up and repeat testing.  Pt is aware that discharge does not mean that nothing is wrong but it indicates no emergency is present that requires admission and they must continue care with follow-up as given below or physician of their choice.     FOLLOW-UP  Ruby Mcgee MD  [address] [address]  [phone]    Schedule an appointment as soon as possible for a visit             Medication List      New Prescriptions    lactulose 10 GM/15ML solution  Commonly known as:  CHRONULAC  Take 30 mL by mouth 2 (Two) Times a Day. Adjust dose until you have 3-4   bowel movements a day.              Latest Documented Vital Signs:  As of 20:50  BP- 128/81 HR- 62 Temp- 98.8 °F (37.1 °C) O2 sat- 100%    --  Dragon disclaimer:   Much of this encounter note is an electronic transcription/translation of spoken language to printed text.  The electronic translation of spoken language may permit erroneous, or at times, nonsensical words or phrases to be inadvertently transcribed.  Although I have reviewed the note for such areas, some may still exist.       Mati Steward MD  07/22/20 2051

## 2020-07-22 NOTE — TELEPHONE ENCOUNTER
Pt has memory and dizzyness and everyday and its gettign worse . Pt also stated that he has had fatigue and also is having a body ordor starting to come as well .     The pt was wondering if the provider would be will to  prescribe Lactulose for ammouia issues going on with his body .     Pt [phone]

## 2020-07-22 NOTE — DISCHARGE INSTRUCTIONS
Take the lactulose as directed, twice a day.  You may have to adjust the amount you take twice a day until you have 3-4 bowel movements a day.  Please return to the emergency department symptoms worsen with increased confusion or trouble waking up.  Do follow-up with your GI doctor and stick to a low-salt diet.

## 2020-07-22 NOTE — ED NOTES
Pt reporting mild abd distension, constipation, had pericentesis 2 weeks ago, pt has yellow sclera. Pt had put himself on low sodium diet and is on diuretics. Pt reporting increasing fatigue, lightheadedness, urinary frequency      Anaid Villafana RN  07/22/20 1724       Anaid Villafana RN  07/22/20 1729

## 2020-07-24 NOTE — TELEPHONE ENCOUNTER
Er visit noted - pls advise pt to titrate frequecy of lactulose to achieve 3-4 soft stools daily-0f/u as scheduled

## 2020-07-27 ENCOUNTER — READMISSION MANAGEMENT (OUTPATIENT)
Dept: CALL CENTER | Facility: HOSPITAL | Age: 65
End: 2020-07-27

## 2020-07-27 NOTE — OUTREACH NOTE
Medical Week 3 Survey      Responses   [facility] patient discharged from?  [place]   COVID-19 Test Status  Not tested   Does the patient have one of the following disease processes/diagnoses(primary or secondary)?  Other   Week 3 attempt successful?  No   Unsuccessful attempts  Attempt 1          Anaid Story RN

## 2020-07-28 NOTE — TELEPHONE ENCOUNTER
VM leena Humphrey - advise per DR Mcgee note.  Contact office if any questions.     Upcoming appt 7/30 @ 9:30 am with  SADIE Draper.

## 2020-07-29 ENCOUNTER — READMISSION MANAGEMENT (OUTPATIENT)
Dept: CALL CENTER | Facility: HOSPITAL | Age: 65
End: 2020-07-29

## 2020-07-29 NOTE — OUTREACH NOTE
Medical Week 3 Survey      Responses   [facility] patient discharged from?  [place]   COVID-19 Test Status  Not tested   Does the patient have one of the following disease processes/diagnoses(primary or secondary)?  Other   Week 3 attempt successful?  No   Unsuccessful attempts  Attempt 2          Fabiola Wyatt RN

## 2020-07-30 ENCOUNTER — OFFICE VISIT (OUTPATIENT)
Dept: GASTROENTEROLOGY | Facility: CLINIC | Age: 65
End: 2020-07-30

## 2020-07-30 VITALS — BODY MASS INDEX: 19.85 KG/M2 | WEIGHT: 141.8 LBS | TEMPERATURE: 98.4 F | HEIGHT: 71 IN

## 2020-07-30 DIAGNOSIS — K70.30 ALCOHOLIC CIRRHOSIS, UNSPECIFIED WHETHER ASCITES PRESENT (HCC): Primary | ICD-10-CM

## 2020-07-30 LAB
ALBUMIN SERPL-MCNC: 3.1 G/DL (ref 3.5–5.2)
ALBUMIN/GLOB SERPL: 0.5 G/DL
ALP SERPL-CCNC: 176 U/L (ref 39–117)
ALT SERPL-CCNC: 42 U/L (ref 1–41)
AMMONIA PLAS-MCNC: 39 UMOL/L (ref 16–60)
AST SERPL-CCNC: 90 U/L (ref 1–40)
BASOPHILS # BLD AUTO: ABNORMAL 10*3/UL
BASOPHILS # BLD MANUAL: 0.08 10*3/MM3 (ref 0–0.2)
BASOPHILS NFR BLD MANUAL: 1 % (ref 0–1.5)
BILIRUB SERPL-MCNC: 5.4 MG/DL (ref 0–1.2)
BUN SERPL-MCNC: 15 MG/DL (ref 8–23)
BUN/CREAT SERPL: 18.3 (ref 7–25)
CALCIUM SERPL-MCNC: 9.4 MG/DL (ref 8.6–10.5)
CHLORIDE SERPL-SCNC: 91 MMOL/L (ref 98–107)
CO2 SERPL-SCNC: 22.2 MMOL/L (ref 22–29)
CREAT SERPL-MCNC: 0.82 MG/DL (ref 0.76–1.27)
DIFFERENTIAL COMMENT: ABNORMAL
EOSINOPHIL # BLD AUTO: ABNORMAL 10*3/UL
EOSINOPHIL # BLD MANUAL: 0.46 10*3/MM3 (ref 0–0.4)
EOSINOPHIL NFR BLD AUTO: ABNORMAL %
EOSINOPHIL NFR BLD MANUAL: 6.1 % (ref 0.3–6.2)
ERYTHROCYTE [DISTWIDTH] IN BLOOD BY AUTOMATED COUNT: 23.5 % (ref 12.3–15.4)
GLOBULIN SER CALC-MCNC: 6 GM/DL
GLUCOSE SERPL-MCNC: 101 MG/DL (ref 65–99)
HCT VFR BLD AUTO: 34.1 % (ref 37.5–51)
HGB BLD-MCNC: 12.3 G/DL (ref 13–17.7)
INR PPP: 2.09 (ref 0.9–1.1)
LYMPHOCYTES # BLD AUTO: ABNORMAL 10*3/UL
LYMPHOCYTES # BLD MANUAL: 0.77 10*3/MM3 (ref 0.7–3.1)
LYMPHOCYTES NFR BLD AUTO: ABNORMAL %
LYMPHOCYTES NFR BLD MANUAL: 10.2 % (ref 19.6–45.3)
MCH RBC QN AUTO: 28.6 PG (ref 26.6–33)
MCHC RBC AUTO-ENTMCNC: 36.1 G/DL (ref 31.5–35.7)
MCV RBC AUTO: 79.3 FL (ref 79–97)
MONOCYTES # BLD MANUAL: 0.69 10*3/MM3 (ref 0.1–0.9)
MONOCYTES NFR BLD AUTO: ABNORMAL %
MONOCYTES NFR BLD MANUAL: 9.2 % (ref 5–12)
NEUTROPHILS # BLD MANUAL: 5.52 10*3/MM3 (ref 1.7–7)
NEUTROPHILS NFR BLD AUTO: ABNORMAL %
NEUTROPHILS NFR BLD MANUAL: 73.5 % (ref 42.7–76)
PLATELET # BLD AUTO: 98 10*3/MM3 (ref 140–450)
PLATELET BLD QL SMEAR: ABNORMAL
POTASSIUM SERPL-SCNC: 5.2 MMOL/L (ref 3.5–5.2)
PROT SERPL-MCNC: 9.1 G/DL (ref 6–8.5)
PROTHROMBIN TIME: 22.9 SECONDS (ref 11.7–14.2)
RBC # BLD AUTO: 4.3 10*6/MM3 (ref 4.14–5.8)
RBC MORPH BLD: ABNORMAL
SODIUM SERPL-SCNC: 121 MMOL/L (ref 136–145)
WBC # BLD AUTO: 7.51 10*3/MM3 (ref 3.4–10.8)

## 2020-07-30 PROCEDURE — 99214 OFFICE O/P EST MOD 30 MIN: CPT | Performed by: NURSE PRACTITIONER

## 2020-07-30 NOTE — PROGRESS NOTES
Chief Complaint   Patient presents with   • alcoholic cirrhosis   • Follow-up     HPI    Wei Potts is a  65 y.o. male here for a follow up visit for alcoholic cirrhosis.  This patient follows with Dr. Mcgee, new to me.  Patient seen in the emergency room last week for complaints of increased weakness, fatigue, and dizziness.  Patient has been on diuretics since hospital discharge in July.  Patient was given the option to stay in the hospital overnight for observation but preferred to go home on lactulose.    Reviewed labs dated 7/22/2020: PT/INR 23.5/2.1.  Ammonia 63.  Lipase 133.  All of a liver function test with minimal improvement.  H/H 11.1/33.7, platelet count 101.    Reviewed hospital discharge summary dated 7/6/2020:    Mr. Potts is a 65 y.o. man admitted for abdominal swelling and leg swelling.  Further evaluation showed cirrhosis with ascites, likely secondary to alcohol.  He underwent paracentesis on 7/8/2020 with removal of 4 L.  There was no evidence of SBP.  He had several doses of vitamin K which did not improve INR level substantially.  He was followed by GI.  He will need EGD and colonoscopy for further evaluation of anemia once the INR has improved.  He was started on Aldactone.  Salt and fluid restriction were begun.  He was given the first of the hepatitis B series vaccination.     I saw him for the first time on 7/8/2020.  He had crackles at the left base.  CT of the chest was ordered to further evaluate atelectasis and pleural effusions noted on the admission CT abdomen.  He has small to moderate sized bilateral pleural effusions with bilateral lobe atelectasis and/or pneumonia.  He has a small subpleural calcified nodule at the left apex, probably granulomatous and benign but he will need repeat CT in 2 months.  He had no clinical evidence of pneumonia.  Lung findings likely secondary to massive amount of ascites with seepage into the pleural space.  Echocardiogram was done with  results as noted below.     He is feeling much better.  No shortness of breath.  No cough.  He is using incentive spirometry.  He is ready for discharge home with outpatient follow-up.     Stable condition; hopefully good progn    Reviewed Dr. Mcgee's notes prior to hospital discharge as follows:  Plan:  · Status post paracentesis today with 4 L removed-no evidence of SBP  · He needs a hepatitis B vaccine at some point-he has immunity to hepatitis A  · Serologic work-up negative to date-anti-smooth muscle antibody pending  · We will need EGD and colonoscopy for evaluation of iron deficiency anemia, for colorectal cancer screening as well as a screen for varices once his INR has declined to below 1.8.  He has received vitamin K x2.  If his INR does not come down in the morning, could consider doing this as an outpatient  ----------------------------------------------------------------------------------------------------------------------------------------------  Today he reports resolution of dizziness still with mild fatigue but improving after starting lactulose. He is currently taking lactulose 30 mL twice daily.  Bowels are moving 6-8 times a day loose.  No rectal bleeding.  Still with mild right upper quadrant abdominal pain but improving.  No nausea, vomiting, or dysphagia.  He is working on low-salt diet.  He denies ascites, jaundice, icterus, or lower extremity edema.  He has been able to maintain sobriety for 4 months.  Denies confusion or memory loss.  He was able to drive to his visit today.    He continues on Protonix 40 mg once daily.  He is on Lopressor 25 mg once daily.  Aldactone 100 mg once daily.    Past Medical History:   Diagnosis Date   • Alcoholism (CMS/HCC)    • Cirrhosis (CMS/HCC)    • Encephalopathy    • Hypertension    • Liver disease        History reviewed. No pertinent surgical history.    Scheduled Meds:  Outpatient Encounter Medications as of 7/30/2020   Medication Sig Dispense Refill    • lactulose (CHRONULAC) 10 GM/15ML solution Take 30 mL by mouth 2 (Two) Times a Day. Adjust dose until you have 3-4 bowel movements a day. 946 mL 1   • metoprolol tartrate (LOPRESSOR) 25 MG tablet Take 0.5 tablets by mouth Every 12 (Twelve) Hours. 30 tablet 0   • Multiple Vitamin (MULTIVITAMIN) tablet Take 1 tablet by mouth daily.     • pantoprazole (PROTONIX) 40 MG EC tablet Take 1 tablet by mouth Daily. 30 tablet 0   • spironolactone (ALDACTONE) 100 MG tablet Take 1 tablet by mouth Daily. 30 tablet 0     No facility-administered encounter medications on file as of 7/30/2020.        Continuous Infusions:  No current facility-administered medications for this visit.     PRN Meds:.    No Known Allergies    Social History     Socioeconomic History   • Marital status:      Spouse name: Not on file   • Number of children: Not on file   • Years of education: Not on file   • Highest education level: Not on file   Tobacco Use   • Smoking status: Never Smoker   • Smokeless tobacco: Never Used   Substance and Sexual Activity   • Alcohol use: Not Currently     Comment: 1 bottle of wine per day, stopped drinking ETOH 2 weeks ago   • Drug use: No   • Sexual activity: Defer       Family History   Problem Relation Age of Onset   • Diabetes Mother    • Hypertension Father        Review of Systems   Constitutional: Negative for activity change, appetite change, fatigue, fever and unexpected weight change.   HENT: Negative for trouble swallowing.    Respiratory: Negative for apnea, cough, choking, chest tightness, shortness of breath and wheezing.    Cardiovascular: Negative for chest pain, palpitations and leg swelling.   Gastrointestinal: Positive for diarrhea. Negative for abdominal distention, abdominal pain, anal bleeding, blood in stool, constipation, nausea, rectal pain and vomiting.       Vitals:    07/30/20 0918   Temp: 98.4 °F (36.9 °C)       Physical Exam   Constitutional: He is oriented to person, place, and  time. He appears well-developed and well-nourished.   Eyes: Pupils are equal, round, and reactive to light.   Cardiovascular: Normal rate, regular rhythm and normal heart sounds.   Pulmonary/Chest: Effort normal and breath sounds normal. No respiratory distress. He has no wheezes.   Abdominal: Soft. Bowel sounds are normal. He exhibits no distension and no mass. There is no tenderness. There is no guarding. No hernia.   Musculoskeletal: Normal range of motion.   Neurological: He is alert and oriented to person, place, and time.   Skin: Skin is warm and dry. Capillary refill takes less than 2 seconds.   Psychiatric: He has a normal mood and affect. His behavior is normal.       No radiology results for the last 7 days    Wei was seen today for alcoholic cirrhosis and follow-up.    Diagnoses and all orders for this visit:    Alcoholic cirrhosis, unspecified whether ascites present (CMS/HCC)  -     CBC & Differential  -     Comprehensive Metabolic Panel  -     Protime-INR  -     Ammonia    Pleasant 65-year-old male seen today in follow-up for recent hospital stay and recent ER evaluation for alcoholic cirrhosis.  Dizziness has resolved.  Still with mild fatigue but improving.  Responding nicely to lactulose however having >6  liquid stools a day.  Plans are to complete outpatient bidirectional endoscopic evaluation to evaluate iron deficiency anemia, for colon cancer screening, and screen for esophageal varices once his INR has declined below 1.8.    Recommend labs today with CBC, CMP, ammonia level, and PT/INR.  Calculate meld score based on labs.  Continue diuretics.  Continue current dosage of lactulose.  Based on ammonia level consider decreasing dosage to 15 mL twice daily.    Return to clinic 2 weeks.

## 2020-07-31 ENCOUNTER — TELEPHONE (OUTPATIENT)
Dept: GASTROENTEROLOGY | Facility: CLINIC | Age: 65
End: 2020-07-31

## 2020-07-31 NOTE — PROGRESS NOTES
"Please notify the patient that lab work shows improvement in anemia with a hemoglobin of 12.3.  His sodium level is 121 with low end of normal being 136.  He can consume some salt as long as he does not exceed 2 g diet.  Can you review low-salt diet.  He told me he might be going \"overboard\" with eliminating sodium from his diet.  Liver function tests elevated but stable."

## 2020-07-31 NOTE — TELEPHONE ENCOUNTER
"----- Message from Katie Santana RN sent at 7/31/2020 11:58 AM EDT -----  Arely pt   ----- Message -----  From: Candy Draper APRN  Sent: 7/31/2020   8:47 AM EDT  To: [facility] Pool    Please notify the patient that lab work shows improvement in anemia with a hemoglobin of 12.3.  His sodium level is 121 with low end of normal being 136.  He can consume some salt as long as he does not exceed 2 g diet.  Can you review low-salt diet.      He told me he might be going \"overboard\" with eliminating sodium from his diet.  Liver function tests elevated but stable.    "

## 2020-07-31 NOTE — PROGRESS NOTES
Alcoholic cirrhosis patient seen in follow-up.  Recent ER evaluation as well for dizziness with elevated ammonia level feeling better on lactulose 30 mL twice daily.  He is having quite a bit of diarrhea with lactulose however.  Greater than 6 liquid stools a day.  Are you okay with decreasing to 15 mL twice daily?  Do you want him on Xifaxan? Thanks BG

## 2020-07-31 NOTE — TELEPHONE ENCOUNTER
Called mobile number and left vm for pt to call back. Also called home number and number rings a few times and then quits.

## 2020-08-03 ENCOUNTER — TELEPHONE (OUTPATIENT)
Dept: GASTROENTEROLOGY | Facility: CLINIC | Age: 65
End: 2020-08-03

## 2020-08-03 RX ORDER — LACTULOSE 10 G/15ML
10 SOLUTION ORAL 2 TIMES DAILY
Qty: 946 ML | Refills: 1 | Status: SHIPPED | OUTPATIENT
Start: 2020-08-03 | End: 2020-08-04 | Stop reason: SDUPTHER

## 2020-08-03 NOTE — TELEPHONE ENCOUNTER
rec changing lactulose to 15 ml bid - goal is to have 3-4 soft BMs daily - may take a third dose prn if that has not been achieved by dinnertime with the first 2 doses    Also recommend starting xifaxan which will help with confusion also and does not cause diarrhea - works with lactulose to decrease symptoms [facility]    Labs reviewed and are stable except sodium is lower.  He needs to limit his total fluid consumption to 2 L daily-repeat BMP in 1 week

## 2020-08-03 NOTE — TELEPHONE ENCOUNTER
----- Message from MILAGROS Salas sent at 7/31/2020  8:48 AM EDT -----  Alcoholic cirrhosis patient seen in follow-up.  Recent ER evaluation as well for dizziness with elevated ammonia level feeling better on lactulose 30 mL twice daily.  He is having quite a bit of diarrhea with lactulose however.  Greater than 6 liquid   stools a day.  Are you okay with decreasing to 15 mL twice daily?  Do you want him on Xifaxan? Thanks BG

## 2020-08-04 RX ORDER — SPIRONOLACTONE 100 MG/1
100 TABLET, FILM COATED ORAL DAILY
Qty: 30 TABLET | Refills: 0 | Status: SHIPPED | OUTPATIENT
Start: 2020-08-04 | End: 2020-08-13 | Stop reason: SDUPTHER

## 2020-08-04 RX ORDER — LACTULOSE 10 G/15ML
10 SOLUTION ORAL 2 TIMES DAILY
Qty: 946 ML | Refills: 1 | Status: SHIPPED | OUTPATIENT
Start: 2020-08-04 | End: 2020-08-13 | Stop reason: SDUPTHER

## 2020-08-04 RX ORDER — PANTOPRAZOLE SODIUM 40 MG/1
40 TABLET, DELAYED RELEASE ORAL DAILY
Qty: 30 TABLET | Refills: 0 | Status: SHIPPED | OUTPATIENT
Start: 2020-08-04 | End: 2020-08-13 | Stop reason: SDUPTHER

## 2020-08-04 NOTE — TELEPHONE ENCOUNTER
"Call to pt.  Advise per Dr Mcgee note.  Verb understanding.      Has appt with LOR Carbajal 8/13.     States needs refill of spironalactone, metoprolol, and pantoprazole - does not have enough to last until 8/13.     Escribe completed for pantoprazole 40 mg 1 tab po daily, #30, R0.   Message to DR Mcgee re: spironalactone and metoprolol refill requests.     It is noted that lactulose rx does not show \"receipt confirmed\".  Re-escribed - receipt confirmed.       "

## 2020-08-07 ENCOUNTER — TELEPHONE (OUTPATIENT)
Dept: GASTROENTEROLOGY | Facility: CLINIC | Age: 65
End: 2020-08-07

## 2020-08-07 NOTE — TELEPHONE ENCOUNTER
----- Message from Jess Phelps sent at 8/7/2020 12:41 PM EDT -----  Contact: [phone]  Patient has a questions regarding the latest medication prescribed to him  Please call.

## 2020-08-13 ENCOUNTER — TELEPHONE (OUTPATIENT)
Dept: GASTROENTEROLOGY | Facility: CLINIC | Age: 65
End: 2020-08-13

## 2020-08-13 ENCOUNTER — OFFICE VISIT (OUTPATIENT)
Dept: GASTROENTEROLOGY | Facility: CLINIC | Age: 65
End: 2020-08-13

## 2020-08-13 VITALS — WEIGHT: 140 LBS | BODY MASS INDEX: 19.6 KG/M2 | HEIGHT: 71 IN

## 2020-08-13 DIAGNOSIS — D69.59 SECONDARY THROMBOCYTOPENIA: ICD-10-CM

## 2020-08-13 DIAGNOSIS — D50.9 IRON DEFICIENCY ANEMIA, UNSPECIFIED IRON DEFICIENCY ANEMIA TYPE: ICD-10-CM

## 2020-08-13 DIAGNOSIS — K70.31 ALCOHOLIC CIRRHOSIS OF LIVER WITH ASCITES (HCC): Primary | ICD-10-CM

## 2020-08-13 PROCEDURE — 99443 PR PHYS/QHP TELEPHONE EVALUATION 21-30 MIN: CPT | Performed by: NURSE PRACTITIONER

## 2020-08-13 RX ORDER — LACTULOSE 10 G/15ML
10 SOLUTION ORAL 2 TIMES DAILY
Qty: 946 ML | Refills: 5 | Status: SHIPPED | OUTPATIENT
Start: 2020-08-13 | End: 2020-12-03 | Stop reason: ALTCHOICE

## 2020-08-13 RX ORDER — PANTOPRAZOLE SODIUM 40 MG/1
40 TABLET, DELAYED RELEASE ORAL DAILY
Qty: 30 TABLET | Refills: 5 | Status: SHIPPED | OUTPATIENT
Start: 2020-08-13 | End: 2021-05-11

## 2020-08-13 RX ORDER — SPIRONOLACTONE 100 MG/1
100 TABLET, FILM COATED ORAL DAILY
Qty: 30 TABLET | Refills: 5 | Status: SHIPPED | OUTPATIENT
Start: 2020-08-13 | End: 2021-09-22 | Stop reason: SDUPTHER

## 2020-08-13 NOTE — TELEPHONE ENCOUNTER
----- Message from Wei Potts sent at 8/13/2020 12:36 PM EDT -----  Regarding: Non-Urgent Medical Question  Contact: [phone]  Dear Estelita,  I was a pleasure talking to you over the phone today. Thank you very for your kindness and professionalism.  You were right with the fact of another question, and I hope not to bother you too much.  Since you've told me my conditions are fairly new, does it have a name, yet?   I might help me to adjust my diet, exercise, or whatever else I need to do to keep staying on the way of recovery.  If you could, please let me know. I would greatly appreciate it.  Have a nice day and I certainly hope you will not tested positive with COVID-19.  Take care and stay safe.  Wei Potts

## 2020-08-14 NOTE — TELEPHONE ENCOUNTER
Yes he has cirrhosis. This is a liver disease. He can google this. Thanks. Let him know I was negative. Thanks for asking about me.

## 2020-08-18 ENCOUNTER — TELEPHONE (OUTPATIENT)
Dept: GASTROENTEROLOGY | Facility: CLINIC | Age: 65
End: 2020-08-18

## 2020-08-18 DIAGNOSIS — K70.30 ALCOHOLIC CIRRHOSIS, UNSPECIFIED WHETHER ASCITES PRESENT (HCC): Primary | ICD-10-CM

## 2020-08-18 NOTE — TELEPHONE ENCOUNTER
Called [facility] pt asst at [phone] and spoke with Ge who advised the only way pt would qualify for pt asst with Medicaid is if the medication was denied. Otherwise he is not eliglible.     Called pt's [facility] pharmacy and spoke with Brielle who advised that the $1400 is the cash price. She reports that they do not show pt having [place] medicaid.  She states they ran this thru the medicaid and they will need the numbers off of his card.      Called pt and he states he did not give the medicaid card because his coverage runs out end of Aug. Advised pt to contact his pharmacy and give the number.  This may allow him to get this month covered and then we can apply for pt asst.  Also advised pt we will have 5 boxes of samples at the  for him. ADvised pt let us know if he is able to get the xifaxan or not.  Pt verb understanding.     Message sent to Estelita CASTELLANO regarding what labs does pt need to have on Thurs.

## 2020-08-18 NOTE — TELEPHONE ENCOUNTER
----- Message from Milind Auguste Rep sent at 8/18/2020 10:02 AM EDT -----  Regarding: meds and labs  Contact: [phone]  Patient  Is calling reagarding prescription for riFAXIMin (Xifaxan) 550 MG tablet [[id number]]. Patients insurance does cover some cost of this medication however he is still paying $1400 out of pocket. Is there a cheaper alternative or could we provide him with samples.    [facility] [address] [place] [address], [place] - [address] - [phone] [facility] [phone] FX    Patient has labs scheduled for Thursday and he is unsure if those are fasting labs [phone]

## 2020-08-19 ENCOUNTER — TELEPHONE (OUTPATIENT)
Dept: GASTROENTEROLOGY | Facility: CLINIC | Age: 65
End: 2020-08-19

## 2020-08-19 ENCOUNTER — PRIOR AUTHORIZATION (OUTPATIENT)
Dept: GASTROENTEROLOGY | Facility: CLINIC | Age: 65
End: 2020-08-19

## 2020-08-19 NOTE — TELEPHONE ENCOUNTER
----- Message from Milind Auguste sent at 8/19/2020  3:46 PM EDT -----  Contact: [phone]  Brielle with [facility] called regarding patients prescription for  riFAXIMin (Xifaxan) 550 MG tablet [[id number]] .     The Pharmacy discovered that the patient also has Medicaid but Medicaid needs a pre authorization.    Brielle [phone]

## 2020-08-19 NOTE — PROGRESS NOTES
Chief Complaint   Patient presents with   • Cirrhosis       Wei Potts is a  65 y.o. male here for a telephone follow up visit for Cirrhosis.     HPI  66 yo m presents today for telephone follow up visit for ETOH induced Cirrhosis with ascites. He is a patient of Dr. Mcgee. He was last seen in the office on 7/31/2020. You have chosen to receive care through a telephone visit. Do you consent to use a telephone visit for your medical care today? YES.      He has been newly diagnosed with ETOH induced Liver Cirrhosis with ascites. He is happy to report he has been sober for while now. He has recently lost 20 lbs and denies any abdominal swelling or pedal edema. He tells me he has been working hard to eat healthier and is following a low sodium diet. He is taking the lactulose 15 ml BID and reports that is giving him about 1-2 loose stools a day. He is not taking any xifaxan. He cannot remember if that was the one that was too expensive or not. He does realize he is due for EGD and colonoscopy. He needs the EGD to assess for varices and he has never had screening colonoscopy. He denies any jaundice, confusion or itching. He tells me he is tired all the time but that is somewhat improved this week. He denies any dysphagia, reflux, abd pain, N&V, constipation, rectal bleeding or melena. He admits his appetite is ok.       Past Medical History:   Diagnosis Date   • Alcoholism (CMS/HCC)    • Cirrhosis (CMS/HCC)    • Encephalopathy    • Hypertension    • Liver disease        History reviewed. No pertinent surgical history.    Scheduled Meds:    Continuous Infusions:  No current facility-administered medications for this visit.     PRN Meds:.    No Known Allergies    Social History     Socioeconomic History   • Marital status:      Spouse name: Not on file   • Number of children: Not on file   • Years of education: Not on file   • Highest education level: Not on file   Tobacco Use   • Smoking status: Never  Smoker   • Smokeless tobacco: Never Used   Substance and Sexual Activity   • Alcohol use: Not Currently     Comment: 1 bottle of wine per day, stopped drinking ETOH 2 weeks ago   • Drug use: No   • Sexual activity: Defer       Family History   Problem Relation Age of Onset   • Diabetes Mother    • Hypertension Father        Review of Systems   Constitutional: Positive for fatigue. Negative for appetite change, chills, diaphoresis, fever and unexpected weight change.   HENT: Negative for nosebleeds, postnasal drip, sore throat, trouble swallowing and voice change.    Respiratory: Negative for apnea, cough, choking, chest tightness, shortness of breath, wheezing and stridor.    Cardiovascular: Negative for chest pain, palpitations and leg swelling.   Gastrointestinal: Negative for abdominal distention, abdominal pain, anal bleeding, blood in stool, constipation, diarrhea, nausea, rectal pain and vomiting.   Endocrine: Negative for polydipsia, polyphagia and polyuria.   Musculoskeletal: Negative for gait problem.   Skin: Negative for rash and wound.   Allergic/Immunologic: Negative for food allergies.   Neurological: Negative for dizziness, speech difficulty and light-headedness.   Psychiatric/Behavioral: Negative for confusion, self-injury, sleep disturbance and suicidal ideas.       There were no vitals filed for this visit.    Physical Exam   Constitutional: He is oriented to person, place, and time. No distress.   Neurological: He is alert and oriented to person, place, and time.   Psychiatric: He has a normal mood and affect. His behavior is normal. Judgment and thought content normal.       No radiology results for the last 7 days     Assessment & Plan    1. Alcoholic cirrhosis of liver with ascites (CMS/HCC)    2. Iron deficiency anemia, unspecified iron deficiency anemia type    3. Secondary thrombocytopenia    Today's visit was done over the telephone. Total time over the phone was 30 minutes. I reviewed  hospital records with him today. Hgb good at 12.3. Ammonia level has improved. LFTs are stable. Bowels moving well with lactulose. Fatigued but denies any ascites, jaundice, pedal edema or confusion. Continue 2 gram sodium diet. Continue daily weights. Will refill xifaxan. Continue lactulose, protonix, lopressor and aldactone. He will need EGD and colonoscopy once he is more stable. Will have him come to the office and check labs. Follow up with me in 2-3 weeks. GO ahead and make f/u with Dr. Mcgee in 3 months. Patient to continue to abstain from all ETOH and NSAIDs. Call office with any issues.

## 2020-08-20 ENCOUNTER — TELEPHONE (OUTPATIENT)
Dept: GASTROENTEROLOGY | Facility: CLINIC | Age: 65
End: 2020-08-20

## 2020-08-21 ENCOUNTER — TELEPHONE (OUTPATIENT)
Dept: GASTROENTEROLOGY | Facility: CLINIC | Age: 65
End: 2020-08-21

## 2020-08-21 DIAGNOSIS — K70.30 ALCOHOLIC CIRRHOSIS, UNSPECIFIED WHETHER ASCITES PRESENT (HCC): Primary | ICD-10-CM

## 2020-08-21 LAB
ALBUMIN SERPL-MCNC: 2.7 G/DL (ref 3.5–5.2)
ALBUMIN/GLOB SERPL: 0.7 G/DL
ALP SERPL-CCNC: 166 U/L (ref 39–117)
ALT SERPL-CCNC: 23 U/L (ref 1–41)
AMMONIA PLAS-MCNC: 134 UMOL/L (ref 16–60)
AST SERPL-CCNC: 49 U/L (ref 1–40)
BASOPHILS # BLD AUTO: ABNORMAL 10*3/UL
BASOPHILS # BLD MANUAL: 0.04 10*3/MM3 (ref 0–0.2)
BASOPHILS NFR BLD MANUAL: 1 % (ref 0–1.5)
BILIRUB SERPL-MCNC: 3.6 MG/DL (ref 0–1.2)
BUN SERPL-MCNC: 8 MG/DL (ref 8–23)
BUN/CREAT SERPL: 10.7 (ref 7–25)
CALCIUM SERPL-MCNC: 8.4 MG/DL (ref 8.6–10.5)
CHLORIDE SERPL-SCNC: 104 MMOL/L (ref 98–107)
CO2 SERPL-SCNC: 21 MMOL/L (ref 22–29)
CREAT SERPL-MCNC: 0.75 MG/DL (ref 0.76–1.27)
DIFFERENTIAL COMMENT: NORMAL
EOSINOPHIL # BLD AUTO: ABNORMAL 10*3/UL
EOSINOPHIL # BLD MANUAL: 0.09 10*3/MM3 (ref 0–0.4)
EOSINOPHIL NFR BLD AUTO: ABNORMAL %
EOSINOPHIL NFR BLD MANUAL: 2 % (ref 0.3–6.2)
ERYTHROCYTE [DISTWIDTH] IN BLOOD BY AUTOMATED COUNT: 21.4 % (ref 12.3–15.4)
GLOBULIN SER CALC-MCNC: 3.9 GM/DL
GLUCOSE SERPL-MCNC: 109 MG/DL (ref 65–99)
HCT VFR BLD AUTO: 26.3 % (ref 37.5–51)
HGB BLD-MCNC: 9.5 G/DL (ref 13–17.7)
INR PPP: 2.18 (ref 0.9–1.1)
LYMPHOCYTES # BLD AUTO: ABNORMAL 10*3/UL
LYMPHOCYTES # BLD MANUAL: 0.9 10*3/MM3 (ref 0.7–3.1)
LYMPHOCYTES NFR BLD AUTO: ABNORMAL %
LYMPHOCYTES NFR BLD MANUAL: 20 % (ref 19.6–45.3)
MCH RBC QN AUTO: 31.6 PG (ref 26.6–33)
MCHC RBC AUTO-ENTMCNC: 36.1 G/DL (ref 31.5–35.7)
MCV RBC AUTO: 87.4 FL (ref 79–97)
MONOCYTES # BLD MANUAL: 0.45 10*3/MM3 (ref 0.1–0.9)
MONOCYTES NFR BLD AUTO: ABNORMAL %
MONOCYTES NFR BLD MANUAL: 10 % (ref 5–12)
NEUTROPHILS # BLD MANUAL: 3 10*3/MM3 (ref 1.7–7)
NEUTROPHILS NFR BLD AUTO: ABNORMAL %
NEUTROPHILS NFR BLD MANUAL: 67 % (ref 42.7–76)
PLATELET # BLD AUTO: 53 10*3/MM3 (ref 140–450)
PLATELET BLD QL SMEAR: NORMAL
POTASSIUM SERPL-SCNC: 4.3 MMOL/L (ref 3.5–5.2)
PROT SERPL-MCNC: 6.6 G/DL (ref 6–8.5)
PROTHROMBIN TIME: 23.7 SECONDS (ref 11.7–14.2)
RBC # BLD AUTO: 3.01 10*6/MM3 (ref 4.14–5.8)
RBC MORPH BLD: NORMAL
SODIUM SERPL-SCNC: 132 MMOL/L (ref 136–145)
WBC # BLD AUTO: 4.48 10*3/MM3 (ref 3.4–10.8)

## 2020-08-21 NOTE — TELEPHONE ENCOUNTER
Called pt at both numbers and left vm for pt to call back.     Also pt's xifaxan has been approved and pt can get from his pharmacy.  Pt also has xifaxan samples at . .

## 2020-08-21 NOTE — TELEPHONE ENCOUNTER
----- Message from MILAGROS Breen sent at 8/21/2020 10:14 AM EDT -----  Please call the patient and see if he is having any issues with rectal bleeding or melena. Is he taking any Iron supplementation? We need to repeat a CBC next week. Can he come in on Monday and have it checked? Thanks.

## 2020-08-24 ENCOUNTER — LAB (OUTPATIENT)
Dept: GASTROENTEROLOGY | Facility: CLINIC | Age: 65
End: 2020-08-24

## 2020-08-24 NOTE — TELEPHONE ENCOUNTER
It is noted that pt has lab appt today.     Call to pt.  Advise per M Solomon note.  Verb understanding. Denies any s&s bleeding.  Taking mvi only.  States does have some fatigue.      Advise that xifaxan samples at .  Verb understanding.  [facility] pharmacy also notified him that medicaid approved xifaxan.      Taking lactulose 2x/day every other day (thought he understood this was how to take).  Having 1-2 BM's/day.     Confer with M Solomon.  Order placed for cbc, ammonia -  Message to M Solomon.

## 2020-08-25 LAB
AMMONIA PLAS-MCNC: 119 UG/DL (ref 40–200)
BASOPHILS # BLD AUTO: 0 X10E3/UL (ref 0–0.2)
BASOPHILS NFR BLD AUTO: 1 %
EOSINOPHIL # BLD AUTO: 0.1 X10E3/UL (ref 0–0.4)
EOSINOPHIL NFR BLD AUTO: 2 %
ERYTHROCYTE [DISTWIDTH] IN BLOOD BY AUTOMATED COUNT: 21 % (ref 11.6–15.4)
HCT VFR BLD AUTO: 27 % (ref 37.5–51)
HGB BLD-MCNC: 9.4 G/DL (ref 13–17.7)
IMM GRANULOCYTES # BLD AUTO: 0 X10E3/UL (ref 0–0.1)
IMM GRANULOCYTES NFR BLD AUTO: 0 %
LYMPHOCYTES # BLD AUTO: 1 X10E3/UL (ref 0.7–3.1)
LYMPHOCYTES NFR BLD AUTO: 21 %
MCH RBC QN AUTO: 30.8 PG (ref 26.6–33)
MCHC RBC AUTO-ENTMCNC: 34.8 G/DL (ref 31.5–35.7)
MCV RBC AUTO: 89 FL (ref 79–97)
MONOCYTES # BLD AUTO: 0.6 X10E3/UL (ref 0.1–0.9)
MONOCYTES NFR BLD AUTO: 12 %
MORPHOLOGY BLD-IMP: ABNORMAL
NEUTROPHILS # BLD AUTO: 3 X10E3/UL (ref 1.4–7)
NEUTROPHILS NFR BLD AUTO: 64 %
PLATELET # BLD AUTO: 60 X10E3/UL (ref 150–450)
RBC # BLD AUTO: 3.05 X10E6/UL (ref 4.14–5.8)
WBC # BLD AUTO: 4.7 X10E3/UL (ref 3.4–10.8)

## 2020-08-26 ENCOUNTER — TELEPHONE (OUTPATIENT)
Dept: GASTROENTEROLOGY | Facility: CLINIC | Age: 65
End: 2020-08-26

## 2020-08-26 NOTE — TELEPHONE ENCOUNTER
Called pt and advised of Dr Mcgee's note.   Pt verb understanding and made lab appt for 09/28 at 3p.  Attempted to find 6-8 wk appt with Dr Mcgee but was unsuccessful. Advised will send message to Dr Mcgee for appt.   Cbc ordered.     Pt verb understanding. Pt also reports that he spoke with his insurance and he was able to continue his coverage.

## 2020-08-26 NOTE — TELEPHONE ENCOUNTER
----- Message from MILAGROS Breen sent at 8/26/2020 12:53 PM EDT -----  Regarding: FW: recs  Please call the patient with results of his labs today.  Please give him the recommendations below from Dr. Mcgee as to why his hemoglobin and platelets are lower.  Thanks  ----- Message -----  From: Ruby Mcgee MD  Sent: 8/26/2020  12:45 PM EDT  To: MILAGROS Breen  Subject: recs                                             May be due to alcohol related bone marrow suppression.  Please have him follow-up with me in 6 to 8 weeks-would repeat CBC in 4 weeks to ensure that he has not continuing to drop off\  lb      ----- Message -----  From: Estelita Carbajal APRN  Sent: 8/26/2020  12:37 PM EDT  To: Ruby Mcgee MD    What he thinks causing his hemoglobin to dip back down his platelets?  He is not having any active signs of bleeding.  He think it is his liver disease?  He is on a multivitamin.  He is not on any iron.  I have got him started on Xifaxan.  Otherwise he has been doing really well.  Your thoughts?

## 2020-09-16 ENCOUNTER — TELEPHONE (OUTPATIENT)
Dept: GASTROENTEROLOGY | Facility: CLINIC | Age: 65
End: 2020-09-16

## 2020-09-16 DIAGNOSIS — K70.31 ALCOHOLIC CIRRHOSIS OF LIVER WITH ASCITES (HCC): Primary | ICD-10-CM

## 2020-09-16 DIAGNOSIS — D64.9 ANEMIA, UNSPECIFIED TYPE: Primary | ICD-10-CM

## 2020-09-16 DIAGNOSIS — K70.31 ALCOHOLIC CIRRHOSIS OF LIVER WITH ASCITES (HCC): ICD-10-CM

## 2020-09-16 RX ORDER — ALBUMIN (HUMAN) 12.5 G/50ML
25 SOLUTION INTRAVENOUS ONCE
Status: CANCELLED | OUTPATIENT
Start: 2020-09-22 | End: 2020-09-16

## 2020-09-16 NOTE — TELEPHONE ENCOUNTER
Call to pt.  Advise per Dr Mcgee note.  Verb understanding.     Advise may go to [facility] at his convenience Mon-Fri 7a - 5p for lab work.      Paracentesis appt 9/22 - arrive at 12:30.  Will need .  Verb understanding.

## 2020-09-16 NOTE — TELEPHONE ENCOUNTER
----- Message from Milind Owens Rep sent at 9/16/2020 10:33 AM EDT -----  Regarding: call back  Contact: [phone]  Pt cancelled appt. In Oct. W MD And moved to next week w/ NP  Pt feels like fluid is building up in stomach and would like a call back

## 2020-09-16 NOTE — TELEPHONE ENCOUNTER
"Called pt and pt reports that his abd is getting bigger.   He states his weight was 159 .   When dc'd from [facility] weight was 140lbs.  Pt states he is \"awfully tired and he does not have any appetite\". He is asking what should he do.  Advised will send message to Dr Mcgee and in the meantime if symptoms worsen to go to ER.  Pt verb understanding.   "

## 2020-09-16 NOTE — TELEPHONE ENCOUNTER
Call to pt.  Advise per Dr Mcgee note - will call [facility] to arrange.  Verb understanding.     Call to [facility] and spoke with Cassandra.  Originally scheduled for 9/23 - advise need appt in next 1-2 days.   First available 9/22.  Transferred to [facility].   left requesting sooner appt and to ensure that ordered labs are attached to para.   Awaiting reply.      Message to Dr Mcgee.

## 2020-09-16 NOTE — TELEPHONE ENCOUNTER
Have him go ahead and get the labs so that we can see if anything has changed.  I think he can try to wait if he is able but if he gets too uncomfortable he needs to go to the ER.  I did order the paracentesis as well as the fluid studies and albumin

## 2020-09-17 ENCOUNTER — TELEPHONE (OUTPATIENT)
Dept: GASTROENTEROLOGY | Facility: CLINIC | Age: 65
End: 2020-09-17

## 2020-09-17 ENCOUNTER — LAB (OUTPATIENT)
Dept: LAB | Facility: HOSPITAL | Age: 65
End: 2020-09-17

## 2020-09-17 DIAGNOSIS — D64.9 ANEMIA, UNSPECIFIED TYPE: ICD-10-CM

## 2020-09-17 DIAGNOSIS — K70.31 ALCOHOLIC CIRRHOSIS OF LIVER WITH ASCITES (HCC): ICD-10-CM

## 2020-09-17 LAB
ALBUMIN SERPL-MCNC: 2.6 G/DL (ref 3.5–5.2)
ALBUMIN/GLOB SERPL: 0.6 G/DL
ALP SERPL-CCNC: 150 U/L (ref 39–117)
ALT SERPL W P-5'-P-CCNC: 18 U/L (ref 1–41)
ANION GAP SERPL CALCULATED.3IONS-SCNC: 7.7 MMOL/L (ref 5–15)
AST SERPL-CCNC: 47 U/L (ref 1–40)
BASOPHILS # BLD AUTO: 0.05 10*3/MM3 (ref 0–0.2)
BASOPHILS NFR BLD AUTO: 0.5 % (ref 0–1.5)
BILIRUB SERPL-MCNC: 8.5 MG/DL (ref 0–1.2)
BUN SERPL-MCNC: 5 MG/DL (ref 8–23)
BUN/CREAT SERPL: 7.4 (ref 7–25)
CALCIUM SPEC-SCNC: 8.2 MG/DL (ref 8.6–10.5)
CHLORIDE SERPL-SCNC: 105 MMOL/L (ref 98–107)
CO2 SERPL-SCNC: 23.3 MMOL/L (ref 22–29)
CREAT SERPL-MCNC: 0.68 MG/DL (ref 0.76–1.27)
DEPRECATED RDW RBC AUTO: 46.6 FL (ref 37–54)
EOSINOPHIL # BLD AUTO: 0.23 10*3/MM3 (ref 0–0.4)
EOSINOPHIL NFR BLD AUTO: 2.4 % (ref 0.3–6.2)
ERYTHROCYTE [DISTWIDTH] IN BLOOD BY AUTOMATED COUNT: 13.9 % (ref 12.3–15.4)
FERRITIN SERPL-MCNC: 87.6 NG/ML (ref 30–400)
GFR SERPL CREATININE-BSD FRML MDRD: 118 ML/MIN/1.73
GLOBULIN UR ELPH-MCNC: 4.5 GM/DL
GLUCOSE SERPL-MCNC: 109 MG/DL (ref 65–99)
HCT VFR BLD AUTO: 26.6 % (ref 37.5–51)
HGB BLD-MCNC: 10 G/DL (ref 13–17.7)
IMM GRANULOCYTES # BLD AUTO: 0.08 10*3/MM3 (ref 0–0.05)
IMM GRANULOCYTES NFR BLD AUTO: 0.8 % (ref 0–0.5)
INR PPP: 2.97 (ref 0.9–1.1)
LYMPHOCYTES # BLD AUTO: 1.11 10*3/MM3 (ref 0.7–3.1)
LYMPHOCYTES NFR BLD AUTO: 11.7 % (ref 19.6–45.3)
MCH RBC QN AUTO: 35.1 PG (ref 26.6–33)
MCHC RBC AUTO-ENTMCNC: 37.6 G/DL (ref 31.5–35.7)
MCV RBC AUTO: 93.3 FL (ref 79–97)
MONOCYTES # BLD AUTO: 1.32 10*3/MM3 (ref 0.1–0.9)
MONOCYTES NFR BLD AUTO: 13.9 % (ref 5–12)
NEUTROPHILS NFR BLD AUTO: 6.69 10*3/MM3 (ref 1.7–7)
NEUTROPHILS NFR BLD AUTO: 70.7 % (ref 42.7–76)
NRBC BLD AUTO-RTO: 0 /100 WBC (ref 0–0.2)
PLATELET # BLD AUTO: 55 10*3/MM3 (ref 140–450)
PMV BLD AUTO: 9.6 FL (ref 6–12)
POTASSIUM SERPL-SCNC: 4.4 MMOL/L (ref 3.5–5.2)
PROT SERPL-MCNC: 7.1 G/DL (ref 6–8.5)
PROTHROMBIN TIME: 29.9 SECONDS (ref 11.7–14.2)
RBC # BLD AUTO: 2.85 10*6/MM3 (ref 4.14–5.8)
SODIUM SERPL-SCNC: 136 MMOL/L (ref 136–145)
WBC # BLD AUTO: 9.48 10*3/MM3 (ref 3.4–10.8)

## 2020-09-17 PROCEDURE — 36415 COLL VENOUS BLD VENIPUNCTURE: CPT

## 2020-09-17 PROCEDURE — 85025 COMPLETE CBC W/AUTO DIFF WBC: CPT

## 2020-09-17 PROCEDURE — 85610 PROTHROMBIN TIME: CPT

## 2020-09-17 PROCEDURE — 80053 COMPREHEN METABOLIC PANEL: CPT

## 2020-09-17 PROCEDURE — 82728 ASSAY OF FERRITIN: CPT

## 2020-09-17 NOTE — TELEPHONE ENCOUNTER
Fatigue not uncommon given liver disease but labs will help us understand if there is anything else contributing.  Early satiety and feeling full is absolutely related to abdominal distention.  He would benefit from trying to eat multiple little meals and snacks throughout the day instead of trying to eat traditional 3 meals.  It is important to continue to eat because his nutrition is critical to improvement in his liver.  Liquid nutritional supplements are also helpful.

## 2020-09-17 NOTE — TELEPHONE ENCOUNTER
Pt stopped in office after getting labs.  He reports that since starting the xifaxan he does not feels as good.  He also reports that his family in [place] is suggesting he take;    Reliev- vit supp  NKO Krill oil  Bryonia D6  Silymarin-loges  Chelidonimum D6  L -ornithine    Pt is asking if he can take any of these supplements and will they help.  Also pt asking how long will he be on xifaxan and lactulose.  Pt states the taste of the lactulose makes him sick to his stomach.  Advised will send message to Dr Mcgee. Pt verb understanding.

## 2020-09-17 NOTE — TELEPHONE ENCOUNTER
Called pt and pt reports that he has been feeling more fatigued.  He is asking if this is normal for his disease.      Also pt is asking if the increase in his abd fluid could be effecting his appetite. Pt reports that he can only take a few bites and gets full.  Advised will send questions to Dr Mcgee.

## 2020-09-17 NOTE — TELEPHONE ENCOUNTER
I do not have any good literature to support the supplements and they have not been studied in the setting of end-stage liver disease.  If he feels like he feels worse since the Xifaxan has been started he can stop this temporarily and see if his symptoms improve.  Continue lactulose for now.  He can mix this with something to dull the taste

## 2020-09-17 NOTE — TELEPHONE ENCOUNTER
----- Message from Milind Kelley Rep sent at 9/17/2020 11:06 AM EDT -----  Regarding: another question  Contact: [phone]  Pt says he talked to Kyra earlier and forgot to ask her something would like to talk to her again.

## 2020-09-18 ENCOUNTER — TELEPHONE (OUTPATIENT)
Dept: GASTROENTEROLOGY | Facility: CLINIC | Age: 65
End: 2020-09-18

## 2020-09-18 NOTE — TELEPHONE ENCOUNTER
PA for Xifaxan 550mg BID faxed to [facility] [phone] with confirmation received.

## 2020-09-22 ENCOUNTER — TELEPHONE (OUTPATIENT)
Dept: GASTROENTEROLOGY | Facility: CLINIC | Age: 65
End: 2020-09-22

## 2020-09-22 ENCOUNTER — HOSPITAL ENCOUNTER (OUTPATIENT)
Dept: ULTRASOUND IMAGING | Facility: HOSPITAL | Age: 65
Discharge: HOME | End: 2020-09-22
Admitting: INTERNAL MEDICINE

## 2020-09-22 VITALS
BODY MASS INDEX: 22.26 KG/M2 | RESPIRATION RATE: 16 BRPM | DIASTOLIC BLOOD PRESSURE: 61 MMHG | HEART RATE: 65 BPM | SYSTOLIC BLOOD PRESSURE: 121 MMHG | HEIGHT: 71 IN | OXYGEN SATURATION: 100 % | TEMPERATURE: 98 F | WEIGHT: 159 LBS

## 2020-09-22 DIAGNOSIS — K70.31 ALCOHOLIC CIRRHOSIS OF LIVER WITH ASCITES (HCC): ICD-10-CM

## 2020-09-22 LAB
APPEARANCE FLD: CLEAR
COLOR FLD: YELLOW
LYMPHOCYTES NFR FLD MANUAL: 52 %
METHOD: NORMAL
MONOCYTES NFR FLD: 29 %
MONOS+MACROS NFR FLD: 16 %
NEUTROPHILS NFR FLD MANUAL: 3 %
NUC CELL # FLD: 203 /MM3
RBC # FLD AUTO: 335 /MM3

## 2020-09-22 PROCEDURE — 96365 THER/PROPH/DIAG IV INF INIT: CPT

## 2020-09-22 PROCEDURE — 87205 SMEAR GRAM STAIN: CPT | Performed by: INTERNAL MEDICINE

## 2020-09-22 PROCEDURE — 85610 PROTHROMBIN TIME: CPT

## 2020-09-22 PROCEDURE — 89051 BODY FLUID CELL COUNT: CPT | Performed by: INTERNAL MEDICINE

## 2020-09-22 PROCEDURE — P9047 ALBUMIN (HUMAN), 25%, 50ML: HCPCS | Performed by: INTERNAL MEDICINE

## 2020-09-22 PROCEDURE — 87070 CULTURE OTHR SPECIMN AEROBIC: CPT | Performed by: INTERNAL MEDICINE

## 2020-09-22 PROCEDURE — 25010000003 LIDOCAINE 1 % SOLUTION: Performed by: RADIOLOGY

## 2020-09-22 PROCEDURE — 76942 ECHO GUIDE FOR BIOPSY: CPT

## 2020-09-22 PROCEDURE — 25010000002 ALBUMIN HUMAN 25% PER 50 ML: Performed by: INTERNAL MEDICINE

## 2020-09-22 PROCEDURE — 87015 SPECIMEN INFECT AGNT CONCNTJ: CPT | Performed by: INTERNAL MEDICINE

## 2020-09-22 RX ORDER — LIDOCAINE HYDROCHLORIDE 10 MG/ML
10 INJECTION, SOLUTION INFILTRATION; PERINEURAL ONCE
Status: COMPLETED | OUTPATIENT
Start: 2020-09-22 | End: 2020-09-22

## 2020-09-22 RX ORDER — ALBUMIN (HUMAN) 12.5 G/50ML
25 SOLUTION INTRAVENOUS ONCE
Status: COMPLETED | OUTPATIENT
Start: 2020-09-22 | End: 2020-09-22

## 2020-09-22 RX ADMIN — ALBUMIN (HUMAN) 25 G: 12.5 SOLUTION INTRAVENOUS at 14:30

## 2020-09-22 RX ADMIN — LIDOCAINE HYDROCHLORIDE 6 ML: 10 INJECTION, SOLUTION INFILTRATION; PERINEURAL at 13:43

## 2020-09-22 NOTE — H&P
Name: Wei Potts ADMIT: 2020   : 1955  PCP: Alicia Bolaños APRN    MRN: [id number] LOS: 0 days   AGE/SEX: 65 y.o. male  ROOM: Room/bed info not found       Chief complaint   Patient is a 65 y.o. male presents with ascites.     Past Surgical History:  History reviewed. No pertinent surgical history.    Past Medical History:  Past Medical History:   Diagnosis Date   • Alcoholism (CMS/HCC)    • Cirrhosis (CMS/HCC)    • Encephalopathy    • Hypertension    • Liver disease        Home Medications:  (Not in a hospital admission)      Allergies:  Patient has no known allergies.    Family History:  Family History   Problem Relation Age of Onset   • Diabetes Mother    • Hypertension Father        Social History:  Social History     Tobacco Use   • Smoking status: Never Smoker   • Smokeless tobacco: Never Used   Substance Use Topics   • Alcohol use: Not Currently     Comment: 1 bottle of wine per day, stopped drinking ETOH 2 weeks ago   • Drug use: No        Objective     Physical Exam:   Alert, oriented, abd distention    Vital Signs  Temp:  [98 °F (36.7 °C)] 98 °F (36.7 °C)  Heart Rate:  [63] 63  Resp:  [16] 16  BP: (120)/(64) 120/64    Anticipated Surgical Procedure:  paracentesis    The risks, benefits and alternatives of this procedure have been discussed with the patient or responsible party: Yes        Tejinder Carroll MD  20  13:20 EDT

## 2020-09-22 NOTE — TELEPHONE ENCOUNTER
pls let him know that his h/h stable, liver tests show some worsening.  Fluid from paracentesis with no infection.  I really would like for him to see me in clinic due to his medical complexity- can he come 10/1 at 1145?

## 2020-09-22 NOTE — DISCHARGE INSTRUCTIONS
EDUCATION /DISCHARGE INSTRUCTIONS  Paracentesis:  A needle is inserted into the space between your abdominal organs and the membrane that surrounds them (peritoneal space).  It is done for the diagnosis and treatment of fluid that is resistant to other therapies.  It helps determine the cause of the fluid and at the relieves pressure created by the fluid.  A sample is obtained and sent to the laboratory for study.  During the procedure:  You will lie on a bed on your back with your legs drawn up.  Your abdomen will be exposed from the chest to the pelvis.  You will otherwise be covered to maintain comfort.  A physician will clean your abdomen with antiseptic soap, place a sterile towel around the site and administer a local anesthetic to numb the area.  The physician will insert a needle into your abdominal wall.  There may be a popping sound which signifies the needle has pierced the abdominal wall. Next, the physician will attach tubing to transfer a sample into a collection bottle.  After the fluid is obtained the needle will be removed.  A pressure dressing is applied to the site.  Risks of the procedure include but are not limited to:   *  Bleeding    *  Wound infection   *  Low blood pressure   *  Decreased urination   *  Low sodium if a large amount of fluid is removed   *  Puncture of abdominal organs by the needle    Benefits of the procedure:  Benefits include the removal of fluid from the abdomen, relief of abdominal pressure and facilitation of a diagnosis.  Alternatives to the procedure:  Possible alternatives are diuretic drug therapy or surgery to place a shunt to drain fluid.  Risks of diuretic drug therapy include possible dehydration and renal failure.  The benefit of drug therapy is that it can be done at home under physician supervision.  Risks of shunt placement include exposure to anesthesia, infection, excessive bleeding and injury to abdominal organs.  The benefit of a shunt is that it can be  used to drain fluid over a longer period of time.  THIS EDUCATION INFORMATION WAS REVIEWED PRIOR TO THE PROCEDURE AND CONSENT. Patient initials__________________Time_________________    Post procedure: (Follow all the instructions below carefully)   *  Weigh yourself daily.   *  Follow your doctors dietary instructions.   *  Rest today (no pushing pulling, straining or heavy lifting).   *  Slowly increase activity tomorow.   *  If you received sedation do not drive for 24 hours.              * Skin affix  applied to puncture site. Do not try to remove, scratch or apply lotion   * Skin affix will fall off on it's own   *  You may shower tomorrow  Call your doctor if experiencing:   *  Signs of infection such as redness, swelling, excessive pain and / or foul       smelling drainage from the puncture site.   *  Chills or fever over 101 degrees (by mouth).   *  Fainting or any noted Rapid weight gain/loss   *  Unrelieved pain or any new or severe symptoms  Following the procedure:      Follow-up with the ordering physician as directed.   Continue to take other medications as directed by your physician unless    otherwise instructed.   If applicable, resume taking your blood thinners or Aspirin in 24 hours.              If you have any concerns please call the Radiology Nurses Desk at [phone]

## 2020-09-23 ENCOUNTER — TELEPHONE (OUTPATIENT)
Dept: INTERVENTIONAL RADIOLOGY/VASCULAR | Facility: HOSPITAL | Age: 65
End: 2020-09-23

## 2020-09-23 LAB
INR PPP: 1.4 (ref 0.8–1.2)
PROTHROMBIN TIME: 16.3 SECONDS (ref 12.8–15.2)

## 2020-09-23 NOTE — TELEPHONE ENCOUNTER
Called pt and advised of Dr Mcgee's note. Pt verb understanding and can make appt on 10/01 at 1145a with Dr Mcgee.     Also pt reports that his insurance will no longer cover for xifaxan.  He does not know why.  Advised will call them and see what is going on. Pt is also asking if he is to be continuing to take aldactone.        Called [facility] pharmacy and spoke with pharmacist Megan who advised that the pa for xifaxan was denied.   She also advised that he does have refills on aldactone and they will refill this.      Message sent to Cathy regarding denial of xifaxan.      Called pt back and advised that we will will have 3 boxes of xifaxan at the  for him.  Also advised that they will refill his aldactone.        Update sent to Dr Mcgee.  Also pt asking if he is to continue the aldactone.

## 2020-09-24 NOTE — TELEPHONE ENCOUNTER
Called [facility] pharmacy services [phone] and spoke with Georges regarding Xifaxan denial. He states additional information was needed for approval. Faxed clinicals to [phone] with confirmation received. Turnaround time for decision is 24 hours.

## 2020-09-27 LAB
BACTERIA FLD CULT: NORMAL
GRAM STN SPEC: NORMAL

## 2020-10-01 ENCOUNTER — OFFICE VISIT (OUTPATIENT)
Dept: GASTROENTEROLOGY | Facility: CLINIC | Age: 65
End: 2020-10-01

## 2020-10-01 VITALS — TEMPERATURE: 97.5 F | WEIGHT: 153.8 LBS | BODY MASS INDEX: 21.53 KG/M2 | HEIGHT: 71 IN

## 2020-10-01 DIAGNOSIS — K70.31 ALCOHOLIC CIRRHOSIS OF LIVER WITH ASCITES (HCC): Primary | ICD-10-CM

## 2020-10-01 DIAGNOSIS — K76.82 HEPATIC ENCEPHALOPATHY (HCC): ICD-10-CM

## 2020-10-01 DIAGNOSIS — K70.31 ASCITES DUE TO ALCOHOLIC CIRRHOSIS (HCC): ICD-10-CM

## 2020-10-01 DIAGNOSIS — Z12.11 ENCOUNTER FOR SCREENING COLONOSCOPY: ICD-10-CM

## 2020-10-01 PROCEDURE — 99214 OFFICE O/P EST MOD 30 MIN: CPT | Performed by: INTERNAL MEDICINE

## 2020-10-01 NOTE — PROGRESS NOTES
Subjective   Chief Complaint   Patient presents with   • Cirrhosis       Wei Potts is a  65 y.o. male here for a follow up visit for cirrhosis.     Patient presented initially in July 2020 with new onset ascites.  Cirrhosis is thought to be related to alcohol abuse.  Serologic work-up was negative.      Since his hospital discharge she has had some issues with mild encephalopathy as well as recurrent accumulation of ascites.  He has had no blood in his stool.  He was on Xifaxan but he reports that it change the way his food taste and actually cause a burning sensation in his mouth.  He did discontinue it for several days with resolution of the symptoms.  When he reinitiated the medication they recurred.  When he was off the Xifaxan and on lactulose only his encephalopathy was controlled.    He has required 1 additional paracentesis since his hospitalization but fewer liters were removed.  He is on Aldactone only at this point and adherent to a 2 g sodium diet.    He had stopped drinking several months prior to his hospitalization due to concern that it was contributing to his issues after discussion with the doctor friend.  He has been sober for about 5 months at this point.    HPI  Past Medical History:   Diagnosis Date   • Alcoholism (CMS/HCC)    • Cirrhosis (CMS/HCC)    • Encephalopathy    • Hypertension    • Liver disease      History reviewed. No pertinent surgical history.    Current Outpatient Medications:   •  lactulose (CHRONULAC) 10 GM/15ML solution, Take 15 mL by mouth 2 (Two) Times a Day. Adjust dose until you have 3-4 bowel movements a day., Disp: 946 mL, Rfl: 5  •  metoprolol tartrate (LOPRESSOR) 25 MG tablet, Take 0.5 tablets by mouth Every 12 (Twelve) Hours., Disp: 30 tablet, Rfl: 5  •  riFAXIMin (Xifaxan) 550 MG tablet, Take 1 tablet by mouth Every 12 (Twelve) Hours., Disp: 60 tablet, Rfl: 5  •  spironolactone (ALDACTONE) 100 MG tablet, Take 1 tablet by mouth Daily., Disp: 30 tablet, Rfl:  5  •  pantoprazole (PROTONIX) 40 MG EC tablet, Take 1 tablet by mouth Daily., Disp: 30 tablet, Rfl: 5  PRN Meds:.  No Known Allergies  Social History     Socioeconomic History   • Marital status:      Spouse name: Not on file   • Number of children: Not on file   • Years of education: Not on file   • Highest education level: Not on file   Tobacco Use   • Smoking status: Never Smoker   • Smokeless tobacco: Never Used   Substance and Sexual Activity   • Alcohol use: Not Currently     Comment: 1 bottle of wine per day, stopped drinking ETOH 2 weeks ago   • Drug use: No   • Sexual activity: Defer     Family History   Problem Relation Age of Onset   • Diabetes Mother    • Hypertension Father      Review of Systems   Constitutional: Positive for fatigue. Negative for appetite change and unexpected weight change.   Gastrointestinal: Positive for abdominal distention. Negative for blood in stool and constipation.   Psychiatric/Behavioral: Positive for confusion.   All other systems reviewed and are negative.    Vitals:    10/01/20 1140   Temp: 97.5 °F (36.4 °C)         10/01/20  1140   Weight: 69.8 kg (153 lb 12.8 oz)       Objective   Physical Exam  Constitutional:       Appearance: Normal appearance. He is well-developed.   HENT:      Head: Normocephalic and atraumatic.   Eyes:      General: Scleral icterus present.      Conjunctiva/sclera: Conjunctivae normal.   Neck:      Musculoskeletal: Normal range of motion and neck supple.   Pulmonary:      Effort: Pulmonary effort is normal.      Breath sounds: Normal breath sounds.   Abdominal:      General: There is no distension.      Palpations: Abdomen is soft.      Tenderness: There is no abdominal tenderness.   Musculoskeletal: Normal range of motion.         General: No swelling.   Skin:     General: Skin is warm and dry.   Neurological:      Mental Status: He is alert.   Psychiatric:         Mood and Affect: Mood normal.         Behavior: Behavior normal.       No  radiology results for the last 7 days    Assessment/Plan   Diagnoses and all orders for this visit:    Alcoholic cirrhosis of liver with ascites (CMS/HCC)  -     Case Request; Standing  -     Case Request    Encounter for screening colonoscopy  -     Case Request; Standing  -     Case Request    Ascites due to alcoholic cirrhosis (CMS/HCC)    Hepatic encephalopathy (CMS/HCC)    Other orders  -     Follow Anesthesia Guidelines / Standing Orders; Future  -     Obtain Informed Consent; Future  -     Implement Anesthesia orders day of procedure.; Standing  -     Obtain informed consent; Standing  -     Verify bowel prep was successful; Standing  -     Give tap water enema if bowel prep was insufficient; Standing      Plan:  · Needs hepatitis B vaccination series,, he has [place] to hepatitis A  · Stop xifaxan - causing adverse effects - continue lactulose only  · Continue spironolactone - add lasix   · Continue 2 g sodium diet  · Labs in 2 weeks  · Schedule EGD and colonoscopy for variceal surveillance and colorectal cancer screening at his convenience  · We discussed the natural progression of these issues.  There is some uncertainty about whether he will see improvement as the inflammation in his liver improves.  He did have a slight recent increase in his total bilirubin which I attribute to poor nutrition related to a broken tooth possibly.  We will follow-up on this with repeat labs in 2 weeks.  Discussed the importance of continued nutrition.  We also discussed possible early transplant program referral to get established he will discuss this with his wife and let me know whether he wishes to proceed.        > 25 minutes spent participating in patient care with greater than half spent in face-to-face contact with the patient in direct patient care and counseling

## 2020-10-01 NOTE — PATIENT INSTRUCTIONS
· complete hepatitis B vaccination series  · Stop xifaxan  - continue lactulose only  · Continue spironolactone - add lasix   · Continue 2000 mg sodium diet, add greek yogurt snack in the evening, use nutritional supplements (boost/ensure) when unable to eat meals

## 2020-10-06 ENCOUNTER — RESULTS ENCOUNTER (OUTPATIENT)
Dept: GASTROENTEROLOGY | Facility: CLINIC | Age: 65
End: 2020-10-06

## 2020-10-06 DIAGNOSIS — K70.31 ALCOHOLIC CIRRHOSIS OF LIVER WITH ASCITES (HCC): ICD-10-CM

## 2020-10-08 ENCOUNTER — TELEPHONE (OUTPATIENT)
Dept: GASTROENTEROLOGY | Facility: CLINIC | Age: 65
End: 2020-10-08

## 2020-10-08 DIAGNOSIS — K70.31 ALCOHOLIC CIRRHOSIS OF LIVER WITH ASCITES (HCC): Primary | ICD-10-CM

## 2020-10-08 NOTE — TELEPHONE ENCOUNTER
----- Message from Milind Spencer sent at 10/8/2020  1:19 PM EDT -----  Regarding: Pt calling for prescription for Lasix  Pt calling about a prescription he thought he got for lasix. [phone]

## 2020-10-09 RX ORDER — FUROSEMIDE 40 MG/1
40 TABLET ORAL DAILY
Qty: 30 TABLET | Refills: 5 | Status: SHIPPED | OUTPATIENT
Start: 2020-10-09 | End: 2021-05-11 | Stop reason: SDUPTHER

## 2020-10-09 NOTE — TELEPHONE ENCOUNTER
Called pt and on vm advised pt that we will send a message to Dr Mcgee regarding the lasix prescription.

## 2020-10-09 NOTE — TELEPHONE ENCOUNTER
Called pt and pt states he does not have a script for lasix and he states at his office visit  Dr Mcgee wanted him to add lasix.  ADvised will send message to Dr Mcgee advising that you do need a script for lasix. Pt verb understanding.

## 2020-10-14 ENCOUNTER — RESULTS ENCOUNTER (OUTPATIENT)
Dept: GASTROENTEROLOGY | Facility: CLINIC | Age: 65
End: 2020-10-14

## 2020-10-14 DIAGNOSIS — K70.31 ALCOHOLIC CIRRHOSIS OF LIVER WITH ASCITES (HCC): ICD-10-CM

## 2020-10-15 ENCOUNTER — TELEPHONE (OUTPATIENT)
Dept: GASTROENTEROLOGY | Facility: CLINIC | Age: 65
End: 2020-10-15

## 2020-10-15 DIAGNOSIS — K70.31 ALCOHOLIC CIRRHOSIS OF LIVER WITH ASCITES (HCC): Primary | ICD-10-CM

## 2020-10-15 DIAGNOSIS — K76.82 HEPATIC ENCEPHALOPATHY (HCC): ICD-10-CM

## 2020-10-15 NOTE — TELEPHONE ENCOUNTER
Pt called and reports that Dr Mcgee had him stop the xifaxan about 3 -4 wks ago. He is now reporting feeling dizzy and is staring. Pt is reporting some drowsiness. He reports that physically he is fine,  No abs swelling or ankle swelling. He states that this started about 2 wks ago. Dr Mcgee wanted him to call her if he had any changes. Pt is scheduled to have bmp on Monday 10/26.    Advised Dr Mcgee is out of the office but will send message to Dr Morel.   ADvised if symptoms worsen to go to ER.  Pt verb understanding.

## 2020-10-19 NOTE — TELEPHONE ENCOUNTER
Pt stopped in office for labs and with a notebook of questions. Pt reports that when he increases the lactulose he gets diarrhea .  Pt states he is on a water pill .  Pt state he is dizzy frequently.  He is afraid he is getting  Dehydrated.   Also pt reports that his tasted is back after stopping the xifaxan.  Ptis watching is diet closely.  Pt is also fighting fatigue and has to rest frequently.    Pt's wife had taken pictures of pt looking sleepy.  Pt alert while inoffice.  He is asking if these symptoms can worsen??   Could the water pills make his liver worse? Pt report that some days he is having 1-2 bms per day  Occasionally he has  3 bm's per day. Pt also reporting leg twiching at night. He is asking if he needs to continue the lasix. Advised will send message to Dr Mcgee    Also pt asking about disabilty papers. ADvised to reach out to pt's pcp for this.     Pt also very worried about his ammonia level.  Advised pt we can order this and ro can draw while he is here.  Pt verb understanding.     Ammonia ordered.

## 2020-10-19 NOTE — TELEPHONE ENCOUNTER
Okay to stay off the Xifaxan given side effects    Lactulose will cause loose stools.  Goal is for about 3 stools a day.  Increase fluid consumption to offset losses.    Given underlying liver issues, fatigue is not uncommon    We will see what his upcoming labs look like

## 2020-10-20 ENCOUNTER — TELEPHONE (OUTPATIENT)
Dept: GASTROENTEROLOGY | Facility: CLINIC | Age: 65
End: 2020-10-20

## 2020-10-20 LAB
AMMONIA PLAS-MCNC: 78 UG/DL (ref 40–200)
BUN SERPL-MCNC: 12 MG/DL (ref 8–23)
BUN/CREAT SERPL: 15.2 (ref 7–25)
CALCIUM SERPL-MCNC: 8.9 MG/DL (ref 8.6–10.5)
CHLORIDE SERPL-SCNC: 97 MMOL/L (ref 98–107)
CO2 SERPL-SCNC: 20.1 MMOL/L (ref 22–29)
CREAT SERPL-MCNC: 0.79 MG/DL (ref 0.76–1.27)
GLUCOSE SERPL-MCNC: 99 MG/DL (ref 65–99)
POTASSIUM SERPL-SCNC: 4.4 MMOL/L (ref 3.5–5.2)
SODIUM SERPL-SCNC: 125 MMOL/L (ref 136–145)

## 2020-10-20 NOTE — TELEPHONE ENCOUNTER
Call to pt.  Advise per DR Mcgee note.  Verb understanding    Lab appt scheduled for 10/23 @ 10am - order placed for bmp.     States uses salt substitute and spouse adds absolutely no salt to cooking.  Asking if should increase salt intake as well.      Asking if ok to drink gatorade.     Questions to DR Mcgee

## 2020-10-20 NOTE — TELEPHONE ENCOUNTER
Do not increase salt intake and do not use salt substitutes as he is often have high amounts of other minerals such as potassium.  Use only spices and herbs to flavor food.  Okay to drink Gatorade but this must be factored into his daily salt and fluid consumption.  2000 mg of sodium daily and 2 L of fluid total

## 2020-10-20 NOTE — TELEPHONE ENCOUNTER
Please let the patient know that his ammonia level is normal    His sodium level is low and this may be contributing to his symptoms.  He needs to initiate fluid restriction.  2 L total daily.  He will need to have a repeat BMP on Friday.

## 2020-10-23 ENCOUNTER — LAB (OUTPATIENT)
Dept: GASTROENTEROLOGY | Facility: CLINIC | Age: 65
End: 2020-10-23

## 2020-10-23 LAB
ALBUMIN SERPL-MCNC: 2.9 G/DL (ref 3.5–5.2)
ALBUMIN/GLOB SERPL: 0.6 G/DL
ALP SERPL-CCNC: 249 U/L (ref 39–117)
ALT SERPL-CCNC: 37 U/L (ref 1–41)
AST SERPL-CCNC: 77 U/L (ref 1–40)
BASOPHILS # BLD AUTO: ABNORMAL 10*3/UL
BILIRUB SERPL-MCNC: 3.4 MG/DL (ref 0–1.2)
BUN SERPL-MCNC: 15 MG/DL (ref 8–23)
BUN/CREAT SERPL: 16.1 (ref 7–25)
CALCIUM SERPL-MCNC: 9.1 MG/DL (ref 8.6–10.5)
CHLORIDE SERPL-SCNC: 95 MMOL/L (ref 98–107)
CO2 SERPL-SCNC: 22.6 MMOL/L (ref 22–29)
CREAT SERPL-MCNC: 0.93 MG/DL (ref 0.76–1.27)
DIFFERENTIAL COMMENT: ABNORMAL
EOSINOPHIL # BLD AUTO: ABNORMAL 10*3/UL
EOSINOPHIL # BLD MANUAL: 0.22 10*3/MM3 (ref 0–0.4)
EOSINOPHIL NFR BLD AUTO: ABNORMAL %
EOSINOPHIL NFR BLD MANUAL: 3 % (ref 0.3–6.2)
ERYTHROCYTE [DISTWIDTH] IN BLOOD BY AUTOMATED COUNT: 13 % (ref 12.3–15.4)
GLOBULIN SER CALC-MCNC: 4.5 GM/DL
GLUCOSE SERPL-MCNC: 106 MG/DL (ref 65–99)
HCT VFR BLD AUTO: 28.2 % (ref 37.5–51)
HGB BLD-MCNC: 10.5 G/DL (ref 13–17.7)
INR PPP: 2.06 (ref 0.9–1.1)
LYMPHOCYTES # BLD AUTO: ABNORMAL 10*3/UL
LYMPHOCYTES # BLD MANUAL: 1.03 10*3/MM3 (ref 0.7–3.1)
LYMPHOCYTES NFR BLD AUTO: ABNORMAL %
LYMPHOCYTES NFR BLD MANUAL: 14.1 % (ref 19.6–45.3)
MCH RBC QN AUTO: 34.5 PG (ref 26.6–33)
MCHC RBC AUTO-ENTMCNC: 37.2 G/DL (ref 31.5–35.7)
MCV RBC AUTO: 92.8 FL (ref 79–97)
MONOCYTES # BLD MANUAL: 0.74 10*3/MM3 (ref 0.1–0.9)
MONOCYTES NFR BLD AUTO: ABNORMAL %
MONOCYTES NFR BLD MANUAL: 10.1 % (ref 5–12)
NEUTROPHILS # BLD MANUAL: 5.32 10*3/MM3 (ref 1.7–7)
NEUTROPHILS NFR BLD AUTO: ABNORMAL %
NEUTROPHILS NFR BLD MANUAL: 72.7 % (ref 42.7–76)
PLATELET # BLD AUTO: 76 10*3/MM3 (ref 140–450)
PLATELET BLD QL SMEAR: ABNORMAL
POTASSIUM SERPL-SCNC: 4.8 MMOL/L (ref 3.5–5.2)
PROT SERPL-MCNC: 7.4 G/DL (ref 6–8.5)
PROTHROMBIN TIME: 22.6 SECONDS (ref 11.7–14.2)
RBC # BLD AUTO: 3.04 10*6/MM3 (ref 4.14–5.8)
RBC MORPH BLD: ABNORMAL
SODIUM SERPL-SCNC: 126 MMOL/L (ref 136–145)
WBC # BLD AUTO: 7.32 10*3/MM3 (ref 3.4–10.8)

## 2020-10-26 ENCOUNTER — TELEPHONE (OUTPATIENT)
Dept: GASTROENTEROLOGY | Facility: CLINIC | Age: 65
End: 2020-10-26

## 2020-10-26 NOTE — TELEPHONE ENCOUNTER
Sodium is slightly improved.  Continue fluid restriction.  His bilirubin is better.  Otherwise labs fairly stable.  I would like to see him back in the office in about 4 weeks.  See if he can follow-up with me on December 3 at 130.

## 2020-10-27 NOTE — TELEPHONE ENCOUNTER
Call to pt.  Advise per Dr Mcgee note.  Verb understanding.     Accept 12/3 @ 130 pt - message to Dr Mcgee.

## 2020-11-04 ENCOUNTER — TELEPHONE (OUTPATIENT)
Dept: FAMILY MEDICINE CLINIC | Facility: CLINIC | Age: 65
End: 2020-11-04

## 2020-11-05 ENCOUNTER — OFFICE VISIT (OUTPATIENT)
Dept: FAMILY MEDICINE CLINIC | Facility: CLINIC | Age: 65
End: 2020-11-05

## 2020-11-05 VITALS
DIASTOLIC BLOOD PRESSURE: 66 MMHG | OXYGEN SATURATION: 100 % | SYSTOLIC BLOOD PRESSURE: 122 MMHG | BODY MASS INDEX: 21.7 KG/M2 | RESPIRATION RATE: 16 BRPM | WEIGHT: 155 LBS | HEIGHT: 71 IN | HEART RATE: 68 BPM

## 2020-11-05 DIAGNOSIS — R91.1 LUNG NODULE: ICD-10-CM

## 2020-11-05 DIAGNOSIS — R53.82 CHRONIC FATIGUE: ICD-10-CM

## 2020-11-05 DIAGNOSIS — K70.31 ALCOHOLIC CIRRHOSIS OF LIVER WITH ASCITES (HCC): Primary | ICD-10-CM

## 2020-11-05 DIAGNOSIS — D50.9 IRON DEFICIENCY ANEMIA, UNSPECIFIED IRON DEFICIENCY ANEMIA TYPE: ICD-10-CM

## 2020-11-05 LAB
AMMONIA PLAS-MCNC: 101 UMOL/L (ref 16–60)
BASOPHILS # BLD AUTO: 0.06 10*3/MM3 (ref 0–0.2)
BASOPHILS NFR BLD AUTO: 1 % (ref 0–1.5)
EOSINOPHIL # BLD AUTO: 0.23 10*3/MM3 (ref 0–0.4)
EOSINOPHIL NFR BLD AUTO: 3.8 % (ref 0.3–6.2)
ERYTHROCYTE [DISTWIDTH] IN BLOOD BY AUTOMATED COUNT: 13.4 % (ref 12.3–15.4)
HCT VFR BLD AUTO: 29 % (ref 37.5–51)
HGB BLD-MCNC: 10.3 G/DL (ref 13–17.7)
IMM GRANULOCYTES # BLD AUTO: 0.06 10*3/MM3 (ref 0–0.05)
IMM GRANULOCYTES NFR BLD AUTO: 1 % (ref 0–0.5)
LYMPHOCYTES # BLD AUTO: 1.15 10*3/MM3 (ref 0.7–3.1)
LYMPHOCYTES NFR BLD AUTO: 19 % (ref 19.6–45.3)
MCH RBC QN AUTO: 34.3 PG (ref 26.6–33)
MCHC RBC AUTO-ENTMCNC: 35.5 G/DL (ref 31.5–35.7)
MCV RBC AUTO: 96.7 FL (ref 79–97)
MONOCYTES # BLD AUTO: 0.88 10*3/MM3 (ref 0.1–0.9)
MONOCYTES NFR BLD AUTO: 14.5 % (ref 5–12)
NEUTROPHILS # BLD AUTO: 3.67 10*3/MM3 (ref 1.7–7)
NEUTROPHILS NFR BLD AUTO: 60.7 % (ref 42.7–76)
NRBC BLD AUTO-RTO: 0 /100 WBC (ref 0–0.2)
PLATELET # BLD AUTO: 82 10*3/MM3 (ref 140–450)
RBC # BLD AUTO: 3 10*6/MM3 (ref 4.14–5.8)
WBC # BLD AUTO: 6.05 10*3/MM3 (ref 3.4–10.8)

## 2020-11-05 PROCEDURE — 99214 OFFICE O/P EST MOD 30 MIN: CPT | Performed by: NURSE PRACTITIONER

## 2020-11-05 NOTE — PROGRESS NOTES
"Lakesha Potts is a 65 y.o. male. He is new to me, he was previously seen by Dr. Villalta.     History of Present Illness   Patient presents with c/o of low blood pressure. He reports that his blood pressure was very low at home. It was found that he has been reversing the diastolic and systolic numbers. Blood pressure is stable at visit.     Patient reports \"severe fatigue\" and states that he feels like he \"hits a wall\" in the afternoons.  Patient is anemic and has liver disease for which he is being followed by Dr. Mcgee for.  His last CBC was abnormal on 10/23/2020.  Patient denies any abnormal bleeding or bruising. Patient is not taking his lasix due to it \"making him feel dehydrated\".  Patient is still on 2L fluid restriction daily.     Patient reports changes to his \"breast tissue\". He reports that he noticed right nipple changes and it was painful. He reports that his left nipple also did this, however, that it resolved. Patient states that it started right after his recent hospitalization. He denies any nipple discharge or lumps in chest. Patient is concerned that he might have cancer. I reviewed CT of chest from July 2020     Patient is also being seen for follow-up for lung nodule, ongoing.  I reviewed CT of chest from July 2020, he was found to have small 2mm-3 mm left upper lobe nodule on CT during hospital stay. Recommendation was made to repeat CT in 3 months. Patient denies any shortness of breath or cough.     The following portions of the patient's history were reviewed and updated as appropriate: allergies, current medications, past family history, past medical history, past social history, past surgical history and problem list.    Review of Systems   Constitutional: Positive for fatigue. Negative for chills and fever.   Eyes: Negative for blurred vision and double vision.   Respiratory: Negative for cough, chest tightness and shortness of breath.    Cardiovascular: Negative for " chest pain, palpitations and leg swelling.   Gastrointestinal: Positive for diarrhea. Negative for abdominal pain, blood in stool, constipation, nausea and vomiting.   Genitourinary: Negative for breast discharge.        Change in breast tissue.   Musculoskeletal:        Leg twitching at night.   Neurological: Negative for dizziness, weakness, headache and memory problem.       Objective   Physical Exam  Vitals signs and nursing note reviewed.   Constitutional:       Appearance: He is well-developed.   HENT:      Head: Normocephalic and atraumatic.   Eyes:      Conjunctiva/sclera: Conjunctivae normal.      Pupils: Pupils are equal, round, and reactive to light.   Neck:      Musculoskeletal: Normal range of motion and neck supple.      Thyroid: No thyromegaly.   Cardiovascular:      Rate and Rhythm: Normal rate and regular rhythm.      Pulses: Normal pulses.      Heart sounds: Normal heart sounds. No murmur. No friction rub. No gallop.    Pulmonary:      Effort: Pulmonary effort is normal.      Breath sounds: Normal breath sounds.   Chest:      Chest wall: No mass, lacerations, deformity, swelling, tenderness, crepitus or edema. There is no dullness to percussion.      Breasts: Breasts are symmetrical.         Right: Normal.         Left: Normal.   Lymphadenopathy:      Cervical: No cervical adenopathy.   Skin:     General: Skin is warm and dry.      Capillary Refill: Capillary refill takes 2 to 3 seconds.   Neurological:      Mental Status: He is alert and oriented to person, place, and time.      Cranial Nerves: No cranial nerve deficit.   Psychiatric:         Behavior: Behavior normal.         Thought Content: Thought content normal.         Judgment: Judgment normal.         Vitals:    11/05/20 0950   BP: 122/66   Pulse: 68   Resp: 16   SpO2: 100%     Body mass index is 21.62 kg/m².    Procedures    Assessment/Plan   Problems Addressed this Visit        Digestive    Alcoholic cirrhosis of liver with ascites  (CMS/HCC) - Primary    Relevant Orders    Ammonia       Hematopoietic and Hemostatic    Anemia    Relevant Orders    CBC & Differential      Other Visit Diagnoses     Chronic fatigue        Relevant Orders    Comprehensive Metabolic Panel    CBC & Differential    Lung nodule        Relevant Orders    CT Chest Without Contrast      Diagnoses       Codes Comments    Alcoholic cirrhosis of liver with ascites (CMS/HCC)    -  Primary ICD-10-CM: K70.31  ICD-9-CM: 571.2     Iron deficiency anemia, unspecified iron deficiency anemia type     ICD-10-CM: D50.9  ICD-9-CM: 280.9     Chronic fatigue     ICD-10-CM: R53.82  ICD-9-CM: 780.79     Lung nodule     ICD-10-CM: R91.1  ICD-9-CM: 793.11         Will continue metoprolol at once daily.   Continue spironolactone as prescribed.  Follow-up with Dr. Mcgee as scheduled  Follow-up as needed.         Patient Instructions   Monitor your blood pressure at home.   Follow-up with Dr. Mcgee as scheduled.  Call with any questions or concerns.  I will call you with your lab results.  Follow-up as needed.

## 2020-11-05 NOTE — PATIENT INSTRUCTIONS
Monitor your blood pressure at home.   Follow-up with Dr. Mcgee as scheduled.  Call with any questions or concerns.  I will call you with your lab results.  Follow-up as needed.

## 2020-11-06 LAB
ALBUMIN SERPL-MCNC: 2.9 G/DL (ref 3.5–5.2)
ALBUMIN/GLOB SERPL: 0.7 G/DL
ALP SERPL-CCNC: 228 U/L (ref 39–117)
ALT SERPL-CCNC: 33 U/L (ref 1–41)
AST SERPL-CCNC: 66 U/L (ref 1–40)
BILIRUB SERPL-MCNC: 3.3 MG/DL (ref 0–1.2)
BUN SERPL-MCNC: 16 MG/DL (ref 8–23)
BUN/CREAT SERPL: 19.8 (ref 7–25)
CALCIUM SERPL-MCNC: 8.9 MG/DL (ref 8.6–10.5)
CHLORIDE SERPL-SCNC: 96 MMOL/L (ref 98–107)
CO2 SERPL-SCNC: 22.2 MMOL/L (ref 22–29)
CREAT SERPL-MCNC: 0.81 MG/DL (ref 0.76–1.27)
GLOBULIN SER CALC-MCNC: 4.2 GM/DL
GLUCOSE SERPL-MCNC: 112 MG/DL (ref 65–99)
POTASSIUM SERPL-SCNC: 4.7 MMOL/L (ref 3.5–5.2)
PROT SERPL-MCNC: 7.1 G/DL (ref 6–8.5)
SODIUM SERPL-SCNC: 123 MMOL/L (ref 136–145)

## 2020-11-30 ENCOUNTER — TELEPHONE (OUTPATIENT)
Dept: GASTROENTEROLOGY | Facility: CLINIC | Age: 65
End: 2020-11-30

## 2020-11-30 NOTE — TELEPHONE ENCOUNTER
Have him hold the medication until he sees me in clinic and we can reassess his symptoms.  His blood pressure is not excessively low so I am not sure that the only thing contributing to his fatigue

## 2020-11-30 NOTE — TELEPHONE ENCOUNTER
----- Message from Milind Kelley sent at 11/30/2020 10:02 AM EST -----  Regarding: metoprolol tartrate (LOPRESSOR) 25 MG tablet  Contact: [phone]  Pt says he thinks the medication is making his blood pressure low.

## 2020-11-30 NOTE — TELEPHONE ENCOUNTER
Called pt and pt reports that his blood pressure has been running low.  Systolic has been 100-110 and diastolic has been running 40-50.    Pt reports feeling very fatigued.  He is currently taking metoprolol 25mg one tab daily.   He is asking if this could be causing his fatigue. Advised will send message to Dr Mcgee.

## 2020-12-03 ENCOUNTER — OFFICE VISIT (OUTPATIENT)
Dept: GASTROENTEROLOGY | Facility: CLINIC | Age: 65
End: 2020-12-03

## 2020-12-03 VITALS — BODY MASS INDEX: 21.56 KG/M2 | HEIGHT: 71 IN | TEMPERATURE: 97.5 F | WEIGHT: 154 LBS

## 2020-12-03 DIAGNOSIS — K70.31 ALCOHOLIC CIRRHOSIS OF LIVER WITH ASCITES (HCC): Primary | ICD-10-CM

## 2020-12-03 DIAGNOSIS — D64.9 ANEMIA, UNSPECIFIED TYPE: ICD-10-CM

## 2020-12-03 PROCEDURE — 99214 OFFICE O/P EST MOD 30 MIN: CPT | Performed by: INTERNAL MEDICINE

## 2020-12-03 RX ORDER — LACTULOSE 10 G/15ML
10 SOLUTION ORAL 2 TIMES DAILY
Qty: 946 ML | Refills: 5 | Status: SHIPPED | OUTPATIENT
Start: 2020-12-03 | End: 2021-02-25 | Stop reason: SDUPTHER

## 2020-12-03 NOTE — PROGRESS NOTES
Subjective   Chief Complaint   Patient presents with   • Cirrhosis       Wei Potts is a  65 y.o. male here for a follow up visit for cirrhosis.     Patient has a history of alcoholic cirrhosis complicated by ascites.  Since his last visit he has been struggling with fatigue.  He reports that he will do well but then becomes suddenly very tired and required 20 to 30-minute nap.  He will then awake feeling refreshed and be able to carry on.  He has had no blood in his stool.  His abdomen has not reaccumulated fluid.  He has no lower extremity edema.  He does sometimes have trouble keeping his train of thought.  He said no recent fevers chills or infections.  He completed his flu vaccination and his first hepatitis B vaccination.  HPI  Past Medical History:   Diagnosis Date   • Alcoholism (CMS/HCC)    • Cirrhosis (CMS/HCC)    • Encephalopathy    • Hypertension    • Liver disease      History reviewed. No pertinent surgical history.    Current Outpatient Medications:   •  furosemide (LASIX) 40 MG tablet, Take 1 tablet by mouth Daily., Disp: 30 tablet, Rfl: 5  •  spironolactone (ALDACTONE) 100 MG tablet, Take 1 tablet by mouth Daily., Disp: 30 tablet, Rfl: 5  •  lactulose (CHRONULAC) 10 GM/15ML solution, Take 15 mL by mouth 2 (Two) Times a Day. Adjust dose until you have 3-4 bowel movements a day., Disp: 946 mL, Rfl: 5  •  metoprolol tartrate (LOPRESSOR) 25 MG tablet, Take 0.5 tablets by mouth Daily., Disp: 15 tablet, Rfl: 5  •  pantoprazole (PROTONIX) 40 MG EC tablet, Take 1 tablet by mouth Daily., Disp: 30 tablet, Rfl: 5  PRN Meds:.  No Known Allergies  Social History     Socioeconomic History   • Marital status:      Spouse name: Not on file   • Number of children: Not on file   • Years of education: Not on file   • Highest education level: Not on file   Tobacco Use   • Smoking status: Never Smoker   • Smokeless tobacco: Never Used   Substance and Sexual Activity   • Alcohol use: Not Currently      Comment: 1 bottle of wine per day, stopped drinking ETOH 2 weeks ago   • Drug use: No   • Sexual activity: Defer     Family History   Problem Relation Age of Onset   • Diabetes Mother    • Hypertension Father      Review of Systems   Constitutional: Positive for fatigue. Negative for appetite change, fever and unexpected weight change.   Gastrointestinal: Negative for abdominal pain, blood in stool, constipation and diarrhea.   Psychiatric/Behavioral: Positive for sleep disturbance.   All other systems reviewed and are negative.    Vitals:    12/03/20 1338   Temp: 97.5 °F (36.4 °C)         12/03/20  1338   Weight: 69.9 kg (154 lb)       Objective   Physical Exam  Constitutional:       Appearance: He is well-developed.   HENT:      Head: Normocephalic and atraumatic.   Eyes:      General: No scleral icterus.  Pulmonary:      Effort: Pulmonary effort is normal. No respiratory distress.   Abdominal:      General: There is no distension.      Palpations: Abdomen is soft.   Musculoskeletal:         General: No swelling or tenderness.   Skin:     General: Skin is warm and dry.   Neurological:      Mental Status: He is alert.       No radiology results for the last 7 days    Assessment/Plan   Diagnoses and all orders for this visit:    Alcoholic cirrhosis of liver with ascites (CMS/HCC)  -     US Liver; Future  -     CBC & Differential  -     Comprehensive Metabolic Panel  -     Protime-INR  -     AFP Tumor Marker    Anemia, unspecified type  -     Ferritin  -     Iron Profile    Other orders  -     metoprolol tartrate (LOPRESSOR) 25 MG tablet; Take 0.5 tablets by mouth Daily.  -     lactulose (CHRONULAC) 10 GM/15ML solution; Take 15 mL by mouth 2 (Two) Times a Day. Adjust dose until you have 3-4 bowel movements a day.      Plan:  · He complains of persistent fatigue-we will recheck his CBC today as well as iron studies  · He needs EGD and colonoscopy for evaluation for varices, colorectal cancer screening evaluation for  anemia  · Continue 2 g sodium diet  · Decrease metoprolol dose to 12.5 mg daily given low BPs  · Continue current dose diuretics  · Due for HCC surveillance ultrasound in January  · Discussed transplant evaluation-he like to hold off on this at this time until he is able to discuss this with his wife.  She is having difficulty with his diagnosis    > 25 minutes spent participating in patient care with greater than half spent in face-to-face contact with the patient in direct patient care and counseling

## 2020-12-04 ENCOUNTER — TELEPHONE (OUTPATIENT)
Dept: GASTROENTEROLOGY | Facility: CLINIC | Age: 65
End: 2020-12-04

## 2020-12-04 DIAGNOSIS — D64.9 ANEMIA, UNSPECIFIED TYPE: Primary | ICD-10-CM

## 2020-12-04 LAB
AFP-TM SERPL-MCNC: 3.8 NG/ML (ref 0–8.3)
ALBUMIN SERPL-MCNC: 2.7 G/DL (ref 3.5–5.2)
ALBUMIN/GLOB SERPL: 0.7 G/DL
ALP SERPL-CCNC: 164 U/L (ref 39–117)
ALT SERPL-CCNC: 35 U/L (ref 1–41)
AST SERPL-CCNC: 56 U/L (ref 1–40)
BASOPHILS # BLD AUTO: 0.04 10*3/MM3 (ref 0–0.2)
BASOPHILS NFR BLD AUTO: 0.6 % (ref 0–1.5)
BILIRUB SERPL-MCNC: 2.9 MG/DL (ref 0–1.2)
BUN SERPL-MCNC: 18 MG/DL (ref 8–23)
BUN/CREAT SERPL: 20.7 (ref 7–25)
CALCIUM SERPL-MCNC: 8.8 MG/DL (ref 8.6–10.5)
CHLORIDE SERPL-SCNC: 97 MMOL/L (ref 98–107)
CO2 SERPL-SCNC: 17.8 MMOL/L (ref 22–29)
CREAT SERPL-MCNC: 0.87 MG/DL (ref 0.76–1.27)
EOSINOPHIL # BLD AUTO: 0.12 10*3/MM3 (ref 0–0.4)
EOSINOPHIL NFR BLD AUTO: 1.8 % (ref 0.3–6.2)
ERYTHROCYTE [DISTWIDTH] IN BLOOD BY AUTOMATED COUNT: 13.3 % (ref 12.3–15.4)
FERRITIN SERPL-MCNC: 33.3 NG/ML (ref 30–400)
GLOBULIN SER CALC-MCNC: 4.1 GM/DL
GLUCOSE SERPL-MCNC: 145 MG/DL (ref 65–99)
HCT VFR BLD AUTO: 25 % (ref 37.5–51)
HGB BLD-MCNC: 8.6 G/DL (ref 13–17.7)
IMM GRANULOCYTES # BLD AUTO: 0.03 10*3/MM3 (ref 0–0.05)
IMM GRANULOCYTES NFR BLD AUTO: 0.5 % (ref 0–0.5)
INR PPP: 2 (ref 0.9–1.1)
IRON SATN MFR SERPL: 29 % (ref 20–50)
IRON SERPL-MCNC: 101 MCG/DL (ref 59–158)
LYMPHOCYTES # BLD AUTO: 0.6 10*3/MM3 (ref 0.7–3.1)
LYMPHOCYTES NFR BLD AUTO: 9.2 % (ref 19.6–45.3)
MCH RBC QN AUTO: 32.8 PG (ref 26.6–33)
MCHC RBC AUTO-ENTMCNC: 34.4 G/DL (ref 31.5–35.7)
MCV RBC AUTO: 95.4 FL (ref 79–97)
MONOCYTES # BLD AUTO: 0.85 10*3/MM3 (ref 0.1–0.9)
MONOCYTES NFR BLD AUTO: 13.1 % (ref 5–12)
NEUTROPHILS # BLD AUTO: 4.87 10*3/MM3 (ref 1.7–7)
NEUTROPHILS NFR BLD AUTO: 74.8 % (ref 42.7–76)
NRBC BLD AUTO-RTO: 0 /100 WBC (ref 0–0.2)
PLATELET # BLD AUTO: 82 10*3/MM3 (ref 140–450)
POTASSIUM SERPL-SCNC: 5.2 MMOL/L (ref 3.5–5.2)
PROT SERPL-MCNC: 6.8 G/DL (ref 6–8.5)
PROTHROMBIN TIME: 22.4 SECONDS (ref 11.7–14.2)
RBC # BLD AUTO: 2.62 10*6/MM3 (ref 4.14–5.8)
SODIUM SERPL-SCNC: 125 MMOL/L (ref 136–145)
TIBC SERPL-MCNC: 354 MCG/DL
UIBC SERPL-MCNC: 253 MCG/DL (ref 112–346)
WBC # BLD AUTO: 6.51 10*3/MM3 (ref 3.4–10.8)

## 2020-12-04 RX ORDER — FERROUS SULFATE 325(65) MG
325 TABLET ORAL 2 TIMES DAILY WITH MEALS
Qty: 60 TABLET | Refills: 4 | Status: SHIPPED | OUTPATIENT
Start: 2020-12-04 | End: 2021-02-04

## 2020-12-04 NOTE — TELEPHONE ENCOUNTER
Called pt and and advised of Dr Mcgee's note. Pt verbalized understanding. Lab appt made for 12/16 @10am.     Msg given to Rody in scheduling to schedule  egd/colonoscopy.

## 2020-12-04 NOTE — TELEPHONE ENCOUNTER
Liver test look a little better however anemia has worsened.  Start iron supp bid with meals - needs to schedule egd/c/s as we discussed for further evaluation at [facility] in early jan - repeat cbc 2 weeks

## 2020-12-08 ENCOUNTER — TELEPHONE (OUTPATIENT)
Dept: GASTROENTEROLOGY | Facility: CLINIC | Age: 65
End: 2020-12-08

## 2020-12-09 ENCOUNTER — RESULTS ENCOUNTER (OUTPATIENT)
Dept: GASTROENTEROLOGY | Facility: CLINIC | Age: 65
End: 2020-12-09

## 2020-12-09 DIAGNOSIS — D64.9 ANEMIA, UNSPECIFIED TYPE: ICD-10-CM

## 2020-12-16 ENCOUNTER — TELEPHONE (OUTPATIENT)
Dept: GASTROENTEROLOGY | Facility: CLINIC | Age: 65
End: 2020-12-16

## 2020-12-16 ENCOUNTER — LAB (OUTPATIENT)
Dept: GASTROENTEROLOGY | Facility: CLINIC | Age: 65
End: 2020-12-16

## 2020-12-16 LAB
BASOPHILS # BLD AUTO: 0.06 10*3/MM3 (ref 0–0.2)
BASOPHILS NFR BLD AUTO: 0.8 % (ref 0–1.5)
EOSINOPHIL # BLD AUTO: 0.36 10*3/MM3 (ref 0–0.4)
EOSINOPHIL NFR BLD AUTO: 5 % (ref 0.3–6.2)
ERYTHROCYTE [DISTWIDTH] IN BLOOD BY AUTOMATED COUNT: 13.8 % (ref 12.3–15.4)
HCT VFR BLD AUTO: 26.2 % (ref 37.5–51)
HGB BLD-MCNC: 9 G/DL (ref 13–17.7)
IMM GRANULOCYTES # BLD AUTO: 0.1 10*3/MM3 (ref 0–0.05)
IMM GRANULOCYTES NFR BLD AUTO: 1.4 % (ref 0–0.5)
LYMPHOCYTES # BLD AUTO: 1.04 10*3/MM3 (ref 0.7–3.1)
LYMPHOCYTES NFR BLD AUTO: 14.6 % (ref 19.6–45.3)
MCH RBC QN AUTO: 33.1 PG (ref 26.6–33)
MCHC RBC AUTO-ENTMCNC: 34.4 G/DL (ref 31.5–35.7)
MCV RBC AUTO: 96.3 FL (ref 79–97)
MONOCYTES # BLD AUTO: 1.26 10*3/MM3 (ref 0.1–0.9)
MONOCYTES NFR BLD AUTO: 17.6 % (ref 5–12)
NEUTROPHILS # BLD AUTO: 4.32 10*3/MM3 (ref 1.7–7)
NEUTROPHILS NFR BLD AUTO: 60.6 % (ref 42.7–76)
NRBC BLD AUTO-RTO: 0 /100 WBC (ref 0–0.2)
PLATELET # BLD AUTO: 100 10*3/MM3 (ref 140–450)
RBC # BLD AUTO: 2.72 10*6/MM3 (ref 4.14–5.8)
WBC # BLD AUTO: 7.14 10*3/MM3 (ref 3.4–10.8)

## 2020-12-16 NOTE — TELEPHONE ENCOUNTER
"Pt here today for labs.  Pt reports that he began taking the prescription iron. He reports that on the third day the iron made him feel \"really bad like a heart attack\".  He states he felt dizzy ,nauseated, jittery, and stomach cramps.   Pt states he stopped taking it.  Pt reports he went back to his otc iron and feels fine and is tolerating it well.  Pt also reports that he feels like his abd is bigger but his weight is the same.  Pt does report shortness of breath with exertion. Advised will send message to Dr Mcgee.  "

## 2020-12-17 ENCOUNTER — TELEPHONE (OUTPATIENT)
Dept: GASTROENTEROLOGY | Facility: CLINIC | Age: 65
End: 2020-12-17

## 2020-12-17 NOTE — TELEPHONE ENCOUNTER
Please let him know that his anemia is stable, slightly improved.  Continue his over-the-counter iron, preferably twice daily

## 2020-12-25 ENCOUNTER — HOSPITAL ENCOUNTER (OUTPATIENT)
Facility: HOSPITAL | Age: 65
Setting detail: OBSERVATION
Discharge: HOME | End: 2020-12-26
Attending: EMERGENCY MEDICINE | Admitting: INTERNAL MEDICINE

## 2020-12-25 ENCOUNTER — APPOINTMENT (OUTPATIENT)
Dept: CT IMAGING | Facility: HOSPITAL | Age: 65
End: 2020-12-25

## 2020-12-25 DIAGNOSIS — R79.89 ELEVATED LACTIC ACID LEVEL: ICD-10-CM

## 2020-12-25 DIAGNOSIS — K76.82 HEPATIC ENCEPHALOPATHY (HCC): Primary | ICD-10-CM

## 2020-12-25 LAB
ALBUMIN SERPL-MCNC: 2.8 G/DL (ref 3.5–5.2)
ALBUMIN/GLOB SERPL: 0.7 G/DL
ALP SERPL-CCNC: 159 U/L (ref 39–117)
ALT SERPL W P-5'-P-CCNC: 29 U/L (ref 1–41)
AMMONIA BLD-SCNC: 123 UMOL/L (ref 16–60)
AMMONIA BLD-SCNC: 99 UMOL/L (ref 16–60)
ANION GAP SERPL CALCULATED.3IONS-SCNC: 10.1 MMOL/L (ref 5–15)
ANION GAP SERPL CALCULATED.3IONS-SCNC: 8.1 MMOL/L (ref 5–15)
AST SERPL-CCNC: 46 U/L (ref 1–40)
BASOPHILS # BLD AUTO: 0.07 10*3/MM3 (ref 0–0.2)
BASOPHILS NFR BLD AUTO: 0.8 % (ref 0–1.5)
BILIRUB SERPL-MCNC: 2.3 MG/DL (ref 0–1.2)
BUN SERPL-MCNC: 16 MG/DL (ref 8–23)
BUN SERPL-MCNC: 17 MG/DL (ref 8–23)
BUN/CREAT SERPL: 13.1 (ref 7–25)
BUN/CREAT SERPL: 15.4 (ref 7–25)
CALCIUM SPEC-SCNC: 8.6 MG/DL (ref 8.6–10.5)
CALCIUM SPEC-SCNC: 8.6 MG/DL (ref 8.6–10.5)
CHLORIDE SERPL-SCNC: 105 MMOL/L (ref 98–107)
CHLORIDE SERPL-SCNC: 110 MMOL/L (ref 98–107)
CO2 SERPL-SCNC: 15.9 MMOL/L (ref 22–29)
CO2 SERPL-SCNC: 17.9 MMOL/L (ref 22–29)
CREAT SERPL-MCNC: 1.04 MG/DL (ref 0.76–1.27)
CREAT SERPL-MCNC: 1.3 MG/DL (ref 0.76–1.27)
D-LACTATE SERPL-SCNC: 2.7 MMOL/L (ref 0.5–2)
D-LACTATE SERPL-SCNC: 4.3 MMOL/L (ref 0.5–2)
DEPRECATED RDW RBC AUTO: 51.8 FL (ref 37–54)
DEPRECATED RDW RBC AUTO: 52.2 FL (ref 37–54)
EOSINOPHIL # BLD AUTO: 0.31 10*3/MM3 (ref 0–0.4)
EOSINOPHIL NFR BLD AUTO: 3.7 % (ref 0.3–6.2)
ERYTHROCYTE [DISTWIDTH] IN BLOOD BY AUTOMATED COUNT: 14.7 % (ref 12.3–15.4)
ERYTHROCYTE [DISTWIDTH] IN BLOOD BY AUTOMATED COUNT: 14.8 % (ref 12.3–15.4)
ETHANOL BLD-MCNC: <10 MG/DL (ref 0–10)
ETHANOL UR QL: <0.01 %
GFR SERPL CREATININE-BSD FRML MDRD: 56 ML/MIN/1.73
GFR SERPL CREATININE-BSD FRML MDRD: 72 ML/MIN/1.73
GLOBULIN UR ELPH-MCNC: 4.2 GM/DL
GLUCOSE SERPL-MCNC: 122 MG/DL (ref 65–99)
GLUCOSE SERPL-MCNC: 160 MG/DL (ref 65–99)
HCT VFR BLD AUTO: 24.1 % (ref 37.5–51)
HCT VFR BLD AUTO: 27.5 % (ref 37.5–51)
HGB BLD-MCNC: 8.2 G/DL (ref 13–17.7)
HGB BLD-MCNC: 9.3 G/DL (ref 13–17.7)
IMM GRANULOCYTES # BLD AUTO: 0.05 10*3/MM3 (ref 0–0.05)
IMM GRANULOCYTES NFR BLD AUTO: 0.6 % (ref 0–0.5)
LACTATE HOLD SPECIMEN: NORMAL
LYMPHOCYTES # BLD AUTO: 0.96 10*3/MM3 (ref 0.7–3.1)
LYMPHOCYTES NFR BLD AUTO: 11.5 % (ref 19.6–45.3)
MCH RBC QN AUTO: 33 PG (ref 26.6–33)
MCH RBC QN AUTO: 33.1 PG (ref 26.6–33)
MCHC RBC AUTO-ENTMCNC: 33.8 G/DL (ref 31.5–35.7)
MCHC RBC AUTO-ENTMCNC: 34 G/DL (ref 31.5–35.7)
MCV RBC AUTO: 97.2 FL (ref 79–97)
MCV RBC AUTO: 97.5 FL (ref 79–97)
MONOCYTES # BLD AUTO: 0.97 10*3/MM3 (ref 0.1–0.9)
MONOCYTES NFR BLD AUTO: 11.6 % (ref 5–12)
NEUTROPHILS NFR BLD AUTO: 6.01 10*3/MM3 (ref 1.7–7)
NEUTROPHILS NFR BLD AUTO: 71.8 % (ref 42.7–76)
NRBC BLD AUTO-RTO: 0 /100 WBC (ref 0–0.2)
PLATELET # BLD AUTO: 60 10*3/MM3 (ref 140–450)
PLATELET # BLD AUTO: 89 10*3/MM3 (ref 140–450)
PMV BLD AUTO: 10.5 FL (ref 6–12)
PMV BLD AUTO: 9.9 FL (ref 6–12)
POTASSIUM SERPL-SCNC: 4.8 MMOL/L (ref 3.5–5.2)
POTASSIUM SERPL-SCNC: 5 MMOL/L (ref 3.5–5.2)
PROCALCITONIN SERPL-MCNC: 0.12 NG/ML (ref 0–0.25)
PROT SERPL-MCNC: 7 G/DL (ref 6–8.5)
RBC # BLD AUTO: 2.48 10*6/MM3 (ref 4.14–5.8)
RBC # BLD AUTO: 2.82 10*6/MM3 (ref 4.14–5.8)
SARS-COV-2 ORF1AB RESP QL NAA+PROBE: NOT DETECTED
SODIUM SERPL-SCNC: 131 MMOL/L (ref 136–145)
SODIUM SERPL-SCNC: 136 MMOL/L (ref 136–145)
TROPONIN T SERPL-MCNC: <0.01 NG/ML (ref 0–0.03)
WBC # BLD AUTO: 6.45 10*3/MM3 (ref 3.4–10.8)
WBC # BLD AUTO: 8.37 10*3/MM3 (ref 3.4–10.8)

## 2020-12-25 PROCEDURE — 99285 EMERGENCY DEPT VISIT HI MDM: CPT

## 2020-12-25 PROCEDURE — U0004 COV-19 TEST NON-CDC HGH THRU: HCPCS | Performed by: EMERGENCY MEDICINE

## 2020-12-25 PROCEDURE — 82140 ASSAY OF AMMONIA: CPT | Performed by: EMERGENCY MEDICINE

## 2020-12-25 PROCEDURE — 84484 ASSAY OF TROPONIN QUANT: CPT | Performed by: EMERGENCY MEDICINE

## 2020-12-25 PROCEDURE — G0378 HOSPITAL OBSERVATION PER HR: HCPCS

## 2020-12-25 PROCEDURE — 83605 ASSAY OF LACTIC ACID: CPT | Performed by: EMERGENCY MEDICINE

## 2020-12-25 PROCEDURE — 80053 COMPREHEN METABOLIC PANEL: CPT | Performed by: EMERGENCY MEDICINE

## 2020-12-25 PROCEDURE — 70450 CT HEAD/BRAIN W/O DYE: CPT

## 2020-12-25 PROCEDURE — 99284 EMERGENCY DEPT VISIT MOD MDM: CPT

## 2020-12-25 PROCEDURE — 82140 ASSAY OF AMMONIA: CPT | Performed by: NURSE PRACTITIONER

## 2020-12-25 PROCEDURE — C9803 HOPD COVID-19 SPEC COLLECT: HCPCS

## 2020-12-25 PROCEDURE — 80307 DRUG TEST PRSMV CHEM ANLYZR: CPT | Performed by: EMERGENCY MEDICINE

## 2020-12-25 PROCEDURE — 36415 COLL VENOUS BLD VENIPUNCTURE: CPT | Performed by: EMERGENCY MEDICINE

## 2020-12-25 PROCEDURE — 84145 PROCALCITONIN (PCT): CPT | Performed by: NURSE PRACTITIONER

## 2020-12-25 PROCEDURE — 85027 COMPLETE CBC AUTOMATED: CPT | Performed by: NURSE PRACTITIONER

## 2020-12-25 PROCEDURE — 36415 COLL VENOUS BLD VENIPUNCTURE: CPT

## 2020-12-25 PROCEDURE — 85025 COMPLETE CBC W/AUTO DIFF WBC: CPT | Performed by: EMERGENCY MEDICINE

## 2020-12-25 PROCEDURE — 87040 BLOOD CULTURE FOR BACTERIA: CPT | Performed by: EMERGENCY MEDICINE

## 2020-12-25 RX ORDER — PANTOPRAZOLE SODIUM 40 MG/1
40 TABLET, DELAYED RELEASE ORAL DAILY
Status: DISCONTINUED | OUTPATIENT
Start: 2020-12-25 | End: 2020-12-26 | Stop reason: HOSPADM

## 2020-12-25 RX ORDER — NITROGLYCERIN 0.4 MG/1
0.4 TABLET SUBLINGUAL
Status: DISCONTINUED | OUTPATIENT
Start: 2020-12-25 | End: 2020-12-26 | Stop reason: HOSPADM

## 2020-12-25 RX ORDER — FERROUS SULFATE 325(65) MG
325 TABLET ORAL 2 TIMES DAILY WITH MEALS
Status: DISCONTINUED | OUTPATIENT
Start: 2020-12-25 | End: 2020-12-26 | Stop reason: HOSPADM

## 2020-12-25 RX ORDER — CALCIUM CARBONATE 200(500)MG
2 TABLET,CHEWABLE ORAL 2 TIMES DAILY PRN
Status: DISCONTINUED | OUTPATIENT
Start: 2020-12-25 | End: 2020-12-26 | Stop reason: HOSPADM

## 2020-12-25 RX ORDER — ACETAMINOPHEN 650 MG/1
650 SUPPOSITORY RECTAL EVERY 4 HOURS PRN
Status: DISCONTINUED | OUTPATIENT
Start: 2020-12-25 | End: 2020-12-26 | Stop reason: HOSPADM

## 2020-12-25 RX ORDER — SODIUM CHLORIDE 0.9 % (FLUSH) 0.9 %
10 SYRINGE (ML) INJECTION AS NEEDED
Status: DISCONTINUED | OUTPATIENT
Start: 2020-12-25 | End: 2020-12-26 | Stop reason: HOSPADM

## 2020-12-25 RX ORDER — ACETAMINOPHEN 160 MG/5ML
650 SOLUTION ORAL EVERY 4 HOURS PRN
Status: DISCONTINUED | OUTPATIENT
Start: 2020-12-25 | End: 2020-12-26 | Stop reason: HOSPADM

## 2020-12-25 RX ORDER — ACETAMINOPHEN 325 MG/1
650 TABLET ORAL EVERY 4 HOURS PRN
Status: DISCONTINUED | OUTPATIENT
Start: 2020-12-25 | End: 2020-12-26 | Stop reason: HOSPADM

## 2020-12-25 RX ORDER — LACTULOSE 10 G/15ML
10 SOLUTION ORAL 2 TIMES DAILY
Status: DISCONTINUED | OUTPATIENT
Start: 2020-12-25 | End: 2020-12-26 | Stop reason: HOSPADM

## 2020-12-25 RX ORDER — BISACODYL 5 MG/1
5 TABLET, DELAYED RELEASE ORAL DAILY PRN
Status: DISCONTINUED | OUTPATIENT
Start: 2020-12-25 | End: 2020-12-26 | Stop reason: HOSPADM

## 2020-12-25 RX ORDER — ONDANSETRON 2 MG/ML
4 INJECTION INTRAMUSCULAR; INTRAVENOUS EVERY 6 HOURS PRN
Status: DISCONTINUED | OUTPATIENT
Start: 2020-12-25 | End: 2020-12-26 | Stop reason: HOSPADM

## 2020-12-25 RX ORDER — SPIRONOLACTONE 100 MG/1
100 TABLET, FILM COATED ORAL DAILY
Status: DISCONTINUED | OUTPATIENT
Start: 2020-12-25 | End: 2020-12-26 | Stop reason: HOSPADM

## 2020-12-25 RX ORDER — FUROSEMIDE 40 MG/1
40 TABLET ORAL DAILY
Status: DISCONTINUED | OUTPATIENT
Start: 2020-12-25 | End: 2020-12-26 | Stop reason: HOSPADM

## 2020-12-25 RX ORDER — SODIUM CHLORIDE 0.9 % (FLUSH) 0.9 %
10 SYRINGE (ML) INJECTION EVERY 12 HOURS SCHEDULED
Status: DISCONTINUED | OUTPATIENT
Start: 2020-12-25 | End: 2020-12-26 | Stop reason: HOSPADM

## 2020-12-25 RX ORDER — ONDANSETRON 4 MG/1
4 TABLET, FILM COATED ORAL EVERY 6 HOURS PRN
Status: DISCONTINUED | OUTPATIENT
Start: 2020-12-25 | End: 2020-12-26 | Stop reason: HOSPADM

## 2020-12-25 RX ORDER — SODIUM CHLORIDE 9 MG/ML
100 INJECTION, SOLUTION INTRAVENOUS CONTINUOUS
Status: DISCONTINUED | OUTPATIENT
Start: 2020-12-25 | End: 2020-12-25

## 2020-12-25 RX ADMIN — SPIRONOLACTONE 100 MG: 100 TABLET, FILM COATED ORAL at 19:09

## 2020-12-25 RX ADMIN — SODIUM CHLORIDE 100 ML/HR: 9 INJECTION, SOLUTION INTRAVENOUS at 06:31

## 2020-12-25 RX ADMIN — SODIUM CHLORIDE 1000 ML: 9 INJECTION, SOLUTION INTRAVENOUS at 01:50

## 2020-12-25 RX ADMIN — LACTULOSE 10 G: 10 SOLUTION ORAL at 21:26

## 2020-12-25 RX ADMIN — SODIUM CHLORIDE, PRESERVATIVE FREE 10 ML: 5 INJECTION INTRAVENOUS at 09:19

## 2020-12-25 RX ADMIN — METOPROLOL TARTRATE 12.5 MG: 25 TABLET, FILM COATED ORAL at 16:37

## 2020-12-25 NOTE — ED TRIAGE NOTES
Pt to ER via POV from home, placed in mask, staff in PPE. Pt reports new onset confusion, hx of hepatitis. Denies UTI sx's, denies pain. Pt is COA x4 but has difficulty answering other questions.

## 2020-12-25 NOTE — PLAN OF CARE
Problem: Fall Injury Risk  Goal: Absence of Fall and Fall-Related Injury  Outcome: Ongoing, Progressing   Goal Outcome Evaluation:  Plan of Care Reviewed With: patient  Progress: improving  Outcome Summary: Fall precautions in place.

## 2020-12-25 NOTE — ED NOTES
Pt in mask, nurse in PPE. Pt is aware of need for urine sample, states he will attempt to urinate, but doesn't feel he needs to go right now.     Lynn Villa, RN  12/25/20 5199

## 2020-12-25 NOTE — PLAN OF CARE
Goal Outcome Evaluation:  Plan of Care Reviewed With: patient  Progress: improving  Outcome Summary: Patient has been a&ox4 this shift. no complaints of anything. GI has been consulted. will CTM the rest of my shift.

## 2020-12-25 NOTE — ED PROVIDER NOTES
EMERGENCY DEPARTMENT ENCOUNTER    CHIEF COMPLAINT  Chief Complaint: Confusion/mental status changes  History given by: Patient  History limited by: None  Room Number: N538/1  PMD: Anaid Kilgore APRN      HPI:  Pt is a 65 y.o. male who presents complaining of gradual onset and intermittent episodes of confusion that have been occurring over the past 2 weeks per the patient.  The patient states that there are no exacerbating factors to this and no reasons for this to be occurring when it occurs.  He does have a history of hepatitis as well as cirrhosis and has had confusion episodes in the past secondary to hepatic encephalopathy.  Symptoms really did not worsen today but his wife wanted him evaluated secondary to the bouts of confusion.  The patient is on lactulose and states that he has been compliant with this medication.  The patient denies any associated fever/chills, nausea/vomiting, headache, dizziness, vertigo, chest pain, abdominal pain, or any known sick contacts.    Intensity/Severity: moderate  Aggravating Factors: none  Alleviating Factors: none  Previous Episodes: yes  Treatment before arrival: none      PAST MEDICAL HISTORY  Active Ambulatory Problems     Diagnosis Date Noted   • ED (erectile dysfunction) of organic origin 10/29/2015   • Alcoholic cirrhosis of liver with ascites (CMS/HCC) 07/06/2020   • Anemia 07/06/2020   • Jaundice 07/06/2020   • Coagulopathy (CMS/HCC) 07/08/2020   • Secondary thrombocytopenia 07/08/2020   • Lung abnormality- left apex 07/09/2020     Resolved Ambulatory Problems     Diagnosis Date Noted   • Benign essential hypertension 10/29/2015   • Hyponatremia 07/06/2020     Past Medical History:   Diagnosis Date   • Alcoholism (CMS/HCC)    • Cirrhosis (CMS/HCC)    • Encephalopathy    • Hypertension    • Liver disease        PAST SURGICAL HISTORY  History reviewed. No pertinent surgical history.    FAMILY HISTORY  Family History   Problem Relation Age of Onset   • Diabetes  Mother    • Hypertension Father        SOCIAL HISTORY  Social History     Socioeconomic History   • Marital status:      Spouse name: Not on file   • Number of children: Not on file   • Years of education: Not on file   • Highest education level: Not on file   Tobacco Use   • Smoking status: Never Smoker   • Smokeless tobacco: Never Used   Substance and Sexual Activity   • Alcohol use: Not Currently     Comment: 1 bottle of wine per day, stopped drinking ETOH 2 weeks ago   • Drug use: No   • Sexual activity: Defer       ALLERGIES  Patient has no known allergies.    REVIEW OF SYSTEMS  Review of Systems   Constitutional: Negative for activity change, appetite change and fever.   HENT: Negative for congestion and sore throat.    Eyes: Negative.    Respiratory: Negative for cough and shortness of breath.    Cardiovascular: Negative for chest pain and leg swelling.   Gastrointestinal: Negative for abdominal pain, diarrhea and vomiting.   Endocrine: Negative.    Genitourinary: Negative for decreased urine volume and dysuria.   Musculoskeletal: Negative for neck pain.   Skin: Negative for rash and wound.   Allergic/Immunologic: Negative.    Neurological: Negative for weakness, numbness and headaches.   Hematological: Negative.    Psychiatric/Behavioral: Positive for confusion.   All other systems reviewed and are negative.      PHYSICAL EXAM  ED Triage Vitals   Temp Heart Rate Resp BP SpO2   12/25/20 0103 12/25/20 0103 12/25/20 0103 12/25/20 0108 12/25/20 0103   98.5 °F (36.9 °C) 107 16 140/89 100 %      Temp src Heart Rate Source Patient Position BP Location FiO2 (%)   12/25/20 0103 12/25/20 0103 -- -- --   Tympanic Monitor          Physical Exam   Constitutional: He is oriented to person, place, and time. No distress.   HENT:   Head: Normocephalic and atraumatic.   Eyes: Pupils are equal, round, and reactive to light. EOM are normal.   Neck: Normal range of motion. Neck supple.   Cardiovascular: Regular rhythm and  normal heart sounds. Tachycardia present.   Pulmonary/Chest: Effort normal and breath sounds normal. No respiratory distress.   Abdominal: Soft. There is no abdominal tenderness. There is no rebound and no guarding.   Musculoskeletal: Normal range of motion.         General: No edema.   Neurological: He is alert and oriented to person, place, and time. He has normal sensation and normal strength.   Skin: Skin is warm and dry.   Psychiatric: Mood and affect normal.   Nursing note and vitals reviewed.      LAB RESULTS  Lab Results (last 24 hours)     Procedure Component Value Units Date/Time    CBC & Differential [[id number]]  (Abnormal) Collected: 12/25/20 0122    Specimen: Blood Updated: 12/25/20 0134    Narrative:      The following orders were created for panel order CBC & Differential.  Procedure                               Abnormality         Status                     ---------                               -----------         ------                     CBC Auto Differential[[id number]]        Abnormal            Final result                 Please view results for these tests on the individual orders.    Comprehensive Metabolic Panel [[id number]]  (Abnormal) Collected: 12/25/20 0122    Specimen: Blood Updated: 12/25/20 0415     Glucose 160 mg/dL      BUN 17 mg/dL      Creatinine 1.30 mg/dL      Sodium 131 mmol/L      Potassium 5.0 mmol/L      Chloride 105 mmol/L      CO2 15.9 mmol/L      Calcium 8.6 mg/dL      Total Protein 7.0 g/dL      Albumin 2.80 g/dL      ALT (SGPT) 29 U/L      AST (SGOT) 46 U/L      Alkaline Phosphatase 159 U/L      Total Bilirubin 2.3 mg/dL      eGFR Non African Amer 56 mL/min/1.73      Globulin 4.2 gm/dL      A/G Ratio 0.7 g/dL      BUN/Creatinine Ratio 13.1     Anion Gap 10.1 mmol/L     Narrative:      GFR Normal >60  Chronic Kidney Disease <60  Kidney Failure <15      Troponin [[id number]]  (Normal) Collected: 12/25/20 0122    Specimen: Blood Updated: 12/25/20 0155     Troponin T  <0.010 ng/mL     Narrative:      Troponin T Reference Range:  <= 0.03 ng/mL-   Negative for AMI  >0.03 ng/mL-     Abnormal for myocardial necrosis.  Clinicians would have to utilize clinical acumen, EKG, Troponin and serial changes to determine if it is an Acute Myocardial Infarction or myocardial injury due to an underlying chronic condition.       Results may be falsely decreased if patient taking Biotin.      Ethanol [[id number]] Collected: 12/25/20 0122    Specimen: Blood Updated: 12/25/20 0155     Ethanol <10 mg/dL      Ethanol % <0.010 %     Lactic Acid, Plasma [[id number]]  (Abnormal) Collected: 12/25/20 0122    Specimen: Blood Updated: 12/25/20 0156     Lactate 4.3 mmol/L     Ammonia [[id number]]  (Abnormal) Collected: 12/25/20 0122    Specimen: Blood Updated: 12/25/20 0156     Ammonia 123 umol/L     CBC Auto Differential [[id number]]  (Abnormal) Collected: 12/25/20 0122    Specimen: Blood Updated: 12/25/20 0134     WBC 8.37 10*3/mm3      RBC 2.82 10*6/mm3      Hemoglobin 9.3 g/dL      Hematocrit 27.5 %      MCV 97.5 fL      MCH 33.0 pg      MCHC 33.8 g/dL      RDW 14.8 %      RDW-SD 52.2 fl      MPV 10.5 fL      Platelets 89 10*3/mm3      Neutrophil % 71.8 %      Lymphocyte % 11.5 %      Monocyte % 11.6 %      Eosinophil % 3.7 %      Basophil % 0.8 %      Immature Grans % 0.6 %      Neutrophils, Absolute 6.01 10*3/mm3      Lymphocytes, Absolute 0.96 10*3/mm3      Monocytes, Absolute 0.97 10*3/mm3      Eosinophils, Absolute 0.31 10*3/mm3      Basophils, Absolute 0.07 10*3/mm3      Immature Grans, Absolute 0.05 10*3/mm3      nRBC 0.0 /100 WBC     Lactic Acid, Reflex Timer (This will reflex a repeat order 3-3:15 hours after ordered.) [[id number]] Collected: 12/25/20 0122    Specimen: Blood Updated: 12/25/20 0500     Hold Tube Hold for add-ons.     Comment: Auto resulted.       Blood Culture - Blood, Arm, Left [[id number]] Collected: 12/25/20 0227    Specimen: Blood from Arm, Left Updated: 12/25/20 0231    Blood  Culture - Blood, Arm, Right [[id number]] Collected: 12/25/20 0227    Specimen: Blood from Arm, Right Updated: 12/25/20 0231    COVID PRE-OP / PRE-PROCEDURE SCREENING ORDER (NO ISOLATION) - Swab, Nasopharynx [[id number]] Collected: 12/25/20 0319    Specimen: Swab from Nasopharynx Updated: 12/25/20 0327    Narrative:      The following orders were created for panel order COVID PRE-OP / PRE-PROCEDURE SCREENING ORDER (NO ISOLATION) - Swab, Nasopharynx.  Procedure                               Abnormality         Status                     ---------                               -----------         ------                     COVID-19,APTIMA PANTHER,...[[id number]]                      In process                   Please view results for these tests on the individual orders.    COVID-19,APTIMA PANTHER,[facility] IN-HOUSE, NP/OP SWAB IN UTM/VTM/[facility] TRANSPORT MEDIA,24 HR TAT - Swab, Nasopharynx [[id number]] Collected: 12/25/20 0319    Specimen: Swab from Nasopharynx Updated: 12/25/20 0327    Basic Metabolic Panel [[id number]]  (Abnormal) Collected: 12/25/20 0526    Specimen: Blood Updated: 12/25/20 0621     Glucose 122 mg/dL      BUN 16 mg/dL      Creatinine 1.04 mg/dL      Sodium 136 mmol/L      Potassium 4.8 mmol/L      Chloride 110 mmol/L      CO2 17.9 mmol/L      Calcium 8.6 mg/dL      eGFR Non African Amer 72 mL/min/1.73      BUN/Creatinine Ratio 15.4     Anion Gap 8.1 mmol/L     Narrative:      GFR Normal >60  Chronic Kidney Disease <60  Kidney Failure <15      Ammonia [[id number]]  (Abnormal) Collected: 12/25/20 0526    Specimen: Blood Updated: 12/25/20 0555     Ammonia 99 umol/L     Procalcitonin [[id number]] Collected: 12/25/20 0526    Specimen: Blood Updated: 12/25/20 0535    Lactic Acid, Reflex [[id number]]  (Abnormal) Collected: 12/25/20 0526    Specimen: Blood Updated: 12/25/20 0623     Lactate 2.7 mmol/L     CBC (No Diff) [[id number]]  (Abnormal) Collected: 12/25/20 0536    Specimen: Blood Updated: 12/25/20 0551     WBC  6.45 10*3/mm3      RBC 2.48 10*6/mm3      Hemoglobin 8.2 g/dL      Hematocrit 24.1 %      MCV 97.2 fL      MCH 33.1 pg      MCHC 34.0 g/dL      RDW 14.7 %      RDW-SD 51.8 fl      MPV 9.9 fL      Platelets 60 10*3/mm3           I ordered the above labs and reviewed the results    RADIOLOGY  CT Head Without Contrast   Final Result   1.   No acute intracranial abnormality.                This report was finalized on 12/25/2020 1:55 AM by Dr. Clinton Yost M.D.               I ordered the above noted radiological studies. Interpreted by radiologist.  Reviewed by me in PACS.       PROCEDURES  Procedures      PROGRESS AND CONSULTS     The patient was wearing a facemask upon entrance into the room and remained in such throughout their visit.  I was wearing PPE including a facemask, eye protection, as well as gloves at any point entering the room and throughout the visit    0200  Upon reevaluation, the patient is resting quite comfortably in the room without any discomfort and without any acute complaints and is oriented x3.  I did inform the patient that his lactate is markedly elevated at 4.3.  Given these findings and the normal vital signs I do not think the patient is septic but I will order blood cultures for any further assessments.  He will be admitted to the hospital secondary to hepatic encephalopathy.  He also has no signs or symptoms of SBP as he has a very benign abdomen with little to no ascites present.  I will discuss the case with [facility] for further evaluation and admission.    0340  Case discussed with MILAGROS Albert for [facility], who agrees to admit the patient to the hospital for Dr. Pop      MEDICAL DECISION MAKING  Results were reviewed/discussed with the patient and they were also made aware of online access. Pt also made aware that some labs, such as cultures, will not be resulted during ER visit and follow up with PMD is necessary.     MDM  Number of Diagnoses or Management Options  Elevated  lactic acid level:   Hepatic encephalopathy (CMS/HCC):      Amount and/or Complexity of Data Reviewed  Clinical lab tests: ordered and reviewed  Tests in the radiology section of CPT®: ordered and reviewed  Tests in the medicine section of CPT®: ordered and reviewed  Review and summarize past medical records: yes (Upon medical records review, the patient was last seen and evaluated on 7/22/2020 secondary to hepatic encephalopathy)  Discuss the patient with other providers: yes (MILAGROS Albert for [facility], who will admit the patient for Dr. Pop)  Independent visualization of images, tracings, or specimens: yes (Unremarkable head CT)           DIAGNOSIS  Final diagnoses:   Hepatic encephalopathy (CMS/HCC)   Elevated lactic acid level       DISPOSITION  ADMISSION    Discussed treatment plan and reason for admission with pt/family and admitting physician.  Pt/family voiced understanding of the plan for admission for further testing/treatment as needed.         Latest Documented Vital Signs:  As of 06:38 EST  BP- 140/82 HR- 93 Temp- 98.4 °F (36.9 °C) (Oral) O2 sat- 99%         Bjorn Chew MD  12/25/20 0670

## 2020-12-25 NOTE — H&P
Patient Name:  Wei Potts  YOB: 1955  MRN:  [id number]  Admit Date:  12/25/2020  Patient Care Team:  Anaid Kilgore APRN as PCP - General (Nurse Practitioner)      Subjective   History Present Illness     Chief Complaint   Patient presents with   • Altered Mental Status       Patient is 65-year-old male with known history of liver cirrhosis, hypertension reportedly was more confused at home and he was forgetful according to the wife therefore she has sent him to the emergency room.  In the ER routine workup was done and ammonia level was greater than 90 therefore he is being hospitalized.  There were no reports of any numbness, tingling, one-sided weakness, fall, head injury.  Patient cannot tell me if he is was compliant with lactulose at home.  He states he occasionally did drink alcohol in the past but never has a history of heavy use.  He was tested for hepatitis-B and C in the past and did come back negative.  At the time of my evaluation patient appears to be answering questions fairly appropriately.  There were no reports of any abdominal pain, fevers, chills and there is no evidence of any active melena or GI bleed.    History of Present Illness  Review of Systems   Constitutional: Negative for chills, diaphoresis and fever.   HENT: Negative for facial swelling, hearing loss, sneezing and trouble swallowing.    Eyes: Negative for photophobia, discharge and redness.   Respiratory: Negative for apnea, cough, shortness of breath and wheezing.    Cardiovascular: Negative for chest pain and palpitations.   Gastrointestinal: Negative for abdominal pain, blood in stool, constipation and diarrhea.   Endocrine: Negative for cold intolerance, heat intolerance, polydipsia and polyphagia.   Genitourinary: Negative for dysuria, flank pain, hematuria and urgency.   Musculoskeletal: Negative for arthralgias, back pain, joint swelling and myalgias.   Skin: Negative for color change, rash and  wound.   Neurological: Negative for dizziness, seizures, light-headedness, numbness and headaches.   Psychiatric/Behavioral: Positive for confusion. Negative for agitation, behavioral problems and self-injury.            Personal History     Past Medical History:   Diagnosis Date   • Alcoholism (CMS/HCC)    • Cirrhosis (CMS/HCC)    • Encephalopathy    • Hypertension    • Liver disease      History reviewed. No pertinent surgical history.  Family History   Problem Relation Age of Onset   • Diabetes Mother    • Hypertension Father      Social History     Tobacco Use   • Smoking status: Never Smoker   • Smokeless tobacco: Never Used   Substance Use Topics   • Alcohol use: Not Currently     Comment: 1 bottle of wine per day, stopped drinking ETOH 2 weeks ago   • Drug use: No     No current facility-administered medications on file prior to encounter.      Current Outpatient Medications on File Prior to Encounter   Medication Sig Dispense Refill   • ferrous sulfate 325 (65 FE) MG tablet Take 1 tablet by mouth 2 (Two) Times a Day With Meals. 60 tablet 4   • furosemide (LASIX) 40 MG tablet Take 1 tablet by mouth Daily. 30 tablet 5   • lactulose (CHRONULAC) 10 GM/15ML solution Take 15 mL by mouth 2 (Two) Times a Day. Adjust dose until you have 3-4 bowel movements a day. 946 mL 5   • metoprolol tartrate (LOPRESSOR) 25 MG tablet Take 0.5 tablets by mouth Daily. 15 tablet 5   • pantoprazole (PROTONIX) 40 MG EC tablet Take 1 tablet by mouth Daily. 30 tablet 5   • spironolactone (ALDACTONE) 100 MG tablet Take 1 tablet by mouth Daily. 30 tablet 5     No Known Allergies    Objective    Objective     Vital Signs  Temp:  [97.8 °F (36.6 °C)-98.5 °F (36.9 °C)] 97.9 °F (36.6 °C)  Heart Rate:  [] 80  Resp:  [16-20] 18  BP: (121-142)/(63-89) 132/63  SpO2:  [99 %-100 %] 100 %  on   ;   Device (Oxygen Therapy): room air  Body mass index is 21.22 kg/m².    Physical Exam    HEENT:  Atraumatic, normocephalic.  PERRLA.  Extraocular  movements intact.  Conjunctivae pink.  Sclerae, no icterus.  Mucous membranes dry.   NECK:  Supple.  No JVD.  HEART:  Regular rate and rhythm.  Normal S1, S2.  LUNGS:  Fairly clear to auscultation anteriorly.  No wheezes.  No crackles.  ABDOMEN:   Soft, nontender.  Bowel sounds present.  No rebound.  No  guarding.  EXTREMITIES:  No cyanosis, clubbing, or edema.  Palpable pedal pulses.  NEURO:  Grossly nonfocal.  No facial asymmetry.  Good strength in all 4  extremities.  SKIN:  Warm and dry.  No evidence of rashes.  LYMPH NODES:  No palpable cervical or supraclavicular lymphadenopathy.      Results Review:  I reviewed the patient's new clinical results.  I reviewed the patient's new imaging results and agree with the interpretation.  I reviewed the patient's other test results and agree with the interpretation  I personally viewed and interpreted the patient's EKG/Telemetry data  Discussed with ED provider.    Lab Results (last 24 hours)     Procedure Component Value Units Date/Time    CBC & Differential [[id number]]  (Abnormal) Collected: 12/25/20 0122    Specimen: Blood Updated: 12/25/20 0134    Narrative:      The following orders were created for panel order CBC & Differential.  Procedure                               Abnormality         Status                     ---------                               -----------         ------                     CBC Auto Differential[[id number]]        Abnormal            Final result                 Please view results for these tests on the individual orders.    Comprehensive Metabolic Panel [[id number]]  (Abnormal) Collected: 12/25/20 0122    Specimen: Blood Updated: 12/25/20 0415     Glucose 160 mg/dL      BUN 17 mg/dL      Creatinine 1.30 mg/dL      Sodium 131 mmol/L      Potassium 5.0 mmol/L      Chloride 105 mmol/L      CO2 15.9 mmol/L      Calcium 8.6 mg/dL      Total Protein 7.0 g/dL      Albumin 2.80 g/dL      ALT (SGPT) 29 U/L      AST (SGOT) 46 U/L      Alkaline  Phosphatase 159 U/L      Total Bilirubin 2.3 mg/dL      eGFR Non African Amer 56 mL/min/1.73      Globulin 4.2 gm/dL      A/G Ratio 0.7 g/dL      BUN/Creatinine Ratio 13.1     Anion Gap 10.1 mmol/L     Narrative:      GFR Normal >60  Chronic Kidney Disease <60  Kidney Failure <15      Troponin [[id number]]  (Normal) Collected: 12/25/20 0122    Specimen: Blood Updated: 12/25/20 0155     Troponin T <0.010 ng/mL     Narrative:      Troponin T Reference Range:  <= 0.03 ng/mL-   Negative for AMI  >0.03 ng/mL-     Abnormal for myocardial necrosis.  Clinicians would have to utilize clinical acumen, EKG, Troponin and serial changes to determine if it is an Acute Myocardial Infarction or myocardial injury due to an underlying chronic condition.       Results may be falsely decreased if patient taking Biotin.      Ethanol [[id number]] Collected: 12/25/20 0122    Specimen: Blood Updated: 12/25/20 0155     Ethanol <10 mg/dL      Ethanol % <0.010 %     Lactic Acid, Plasma [[id number]]  (Abnormal) Collected: 12/25/20 0122    Specimen: Blood Updated: 12/25/20 0156     Lactate 4.3 mmol/L     Ammonia [[id number]]  (Abnormal) Collected: 12/25/20 0122    Specimen: Blood Updated: 12/25/20 0156     Ammonia 123 umol/L     CBC Auto Differential [[id number]]  (Abnormal) Collected: 12/25/20 0122    Specimen: Blood Updated: 12/25/20 0134     WBC 8.37 10*3/mm3      RBC 2.82 10*6/mm3      Hemoglobin 9.3 g/dL      Hematocrit 27.5 %      MCV 97.5 fL      MCH 33.0 pg      MCHC 33.8 g/dL      RDW 14.8 %      RDW-SD 52.2 fl      MPV 10.5 fL      Platelets 89 10*3/mm3      Neutrophil % 71.8 %      Lymphocyte % 11.5 %      Monocyte % 11.6 %      Eosinophil % 3.7 %      Basophil % 0.8 %      Immature Grans % 0.6 %      Neutrophils, Absolute 6.01 10*3/mm3      Lymphocytes, Absolute 0.96 10*3/mm3      Monocytes, Absolute 0.97 10*3/mm3      Eosinophils, Absolute 0.31 10*3/mm3      Basophils, Absolute 0.07 10*3/mm3      Immature Grans, Absolute 0.05 10*3/mm3       nRBC 0.0 /100 WBC     Lactic Acid, Reflex Timer (This will reflex a repeat order 3-3:15 hours after ordered.) [[id number]] Collected: 12/25/20 0122    Specimen: Blood Updated: 12/25/20 0500     Hold Tube Hold for add-ons.     Comment: Auto resulted.       Blood Culture - Blood, Arm, Left [[id number]] Collected: 12/25/20 0227    Specimen: Blood from Arm, Left Updated: 12/25/20 0231    Blood Culture - Blood, Arm, Right [[id number]] Collected: 12/25/20 0227    Specimen: Blood from Arm, Right Updated: 12/25/20 0231    COVID PRE-OP / PRE-PROCEDURE SCREENING ORDER (NO ISOLATION) - Swab, Nasopharynx [[id number]]  (Normal) Collected: 12/25/20 0319    Specimen: Swab from Nasopharynx Updated: 12/25/20 0942    Narrative:      The following orders were created for panel order COVID PRE-OP / PRE-PROCEDURE SCREENING ORDER (NO ISOLATION) - Swab, Nasopharynx.  Procedure                               Abnormality         Status                     ---------                               -----------         ------                     COVID-19,APTIMA PANTHER,...[[id number]]  Normal              Final result                 Please view results for these tests on the individual orders.    COVID-19,APTIMA PANTHER,[facility] IN-HOUSE, NP/OP SWAB IN UTM/VTM/SALINE TRANSPORT MEDIA,24 HR TAT - Swab, Nasopharynx [[id number]]  (Normal) Collected: 12/25/20 0319    Specimen: Swab from Nasopharynx Updated: 12/25/20 0942     COVID19 Not Detected    Narrative:      Fact sheet for providers: https://www.fda.gov/media/818153/download     Fact sheet for patients: https://www.fda.gov/media/821013/download    Test performed by PCR.    Basic Metabolic Panel [[id number]]  (Abnormal) Collected: 12/25/20 0526    Specimen: Blood Updated: 12/25/20 0621     Glucose 122 mg/dL      BUN 16 mg/dL      Creatinine 1.04 mg/dL      Sodium 136 mmol/L      Potassium 4.8 mmol/L      Chloride 110 mmol/L      CO2 17.9 mmol/L      Calcium 8.6 mg/dL      eGFR Non African Amer 72  "mL/min/1.73      BUN/Creatinine Ratio 15.4     Anion Gap 8.1 mmol/L     Narrative:      GFR Normal >60  Chronic Kidney Disease <60  Kidney Failure <15      Ammonia [[id number]]  (Abnormal) Collected: 12/25/20 0526    Specimen: Blood Updated: 12/25/20 0555     Ammonia 99 umol/L     Procalcitonin [[id number]]  (Normal) Collected: 12/25/20 0526    Specimen: Blood Updated: 12/25/20 0723     Procalcitonin 0.12 ng/mL     Narrative:      As a Marker for Sepsis (Non-Neonates):   1. <0.5 ng/mL represents a low risk of severe sepsis and/or septic shock.  1. >2 ng/mL represents a high risk of severe sepsis and/or septic shock.    As a Marker for Lower Respiratory Tract Infections that require antibiotic therapy:  PCT on Admission     Antibiotic Therapy             6-12 Hrs later  > 0.5                Strongly Recommended            >0.25 - <0.5         Recommended  0.1 - 0.25           Discouraged                   Remeasure/reassess PCT  <0.1                 Strongly Discouraged          Remeasure/reassess PCT      As 28 day mortality risk marker: \"Change in Procalcitonin Result\" (> 80 % or <=80 %) if Day 0 (or Day 1) and Day 4 values are available. Refer to http://www.[employer]pct-calculator.com/   Change in PCT <=80 %   A decrease of PCT levels below or equal to 80 % defines a positive change in PCT test result representing a higher risk for 28-day all-cause mortality of patients diagnosed with severe sepsis or septic shock.  Change in PCT > 80 %   A decrease of PCT levels of more than 80 % defines a negative change in PCT result representing a lower risk for 28-day all-cause mortality of patients diagnosed with severe sepsis or septic shock.                Results may be falsely decreased if patient taking Biotin.     Lactic Acid, Reflex [[id number]]  (Abnormal) Collected: 12/25/20 0526    Specimen: Blood Updated: 12/25/20 0623     Lactate 2.7 mmol/L     CBC (No Diff) [[id number]]  (Abnormal) Collected: 12/25/20 0536    " Specimen: Blood Updated: 12/25/20 0551     WBC 6.45 10*3/mm3      RBC 2.48 10*6/mm3      Hemoglobin 8.2 g/dL      Hematocrit 24.1 %      MCV 97.2 fL      MCH 33.1 pg      MCHC 34.0 g/dL      RDW 14.7 %      RDW-SD 51.8 fl      MPV 9.9 fL      Platelets 60 10*3/mm3           Imaging Results (Last 24 Hours)     Procedure Component Value Units Date/Time    CT Head Without Contrast [[id number]] Collected: 12/25/20 0155     Updated: 12/25/20 0158    Narrative:      CT HEAD WITHOUT CONTRAST:      HISTORY:  Delirium.     TECHNIQUE:  Axial images were obtained through the brain without  contrast administration. Multiplanar reformatted images were  reconstructed from the helical source data. Radiation dose reduction  techniques were utilized, including automated exposure control and  exposure modulation based on body size.        COMPARISON: None.     FINDINGS:   The ventricles and sulci are normal in size and configuration.  No  hydrocephalus or midline shift.       Gray-white matter differentiation is preserved.  There is no evidence  of a large territorial infarction. No hemorrhage or extra-axial fluid  collection.      The orbits are unremarkable. The visualized paranasal sinuses and  mastoid air cells are clear.        The calvarium is intact. The scalp soft tissues are unremarkable.          Impression:      1.   No acute intracranial abnormality.            This report was finalized on 12/25/2020 1:55 AM by Dr. Clinton Yost M.D.             Results for orders placed during the hospital encounter of 07/06/20   Adult Transthoracic Echo Complete W/ Cont if Necessary Per Protocol    Narrative · Left ventricular systolic function is hyperdynamic (EF > 70).  · Mild mitral valve regurgitation is present          No orders to display        Assessment/Plan     Active Hospital Problems    Diagnosis  POA   • Hepatic encephalopathy (CMS/HCC) [K72.90]  Yes      Resolved Hospital Problems   No resolved problems to display.        1. Hepatic encephalopathy, patient is on lactulose at home which will be continued.  He will also be placed on Xifaxan and GI consultation will be obtained.  Repeat ammonia level will be checked in a.m..  There is no evidence of any SBP or active GI bleed.  2. Anemia, continue with ferrous sulfate and his H and H is stable.  3. History of liver cirrhosis most likely secondary to HUNT, hepatitis-B and C panel have been negative and he denies any alcohol use that is heavy.  He is on Lasix and Aldactone which will be continued.  4. Thrombocytopenia, most likely secondary to splenomegaly and liver cirrhosis.  5. Code status is full code.    6. On SCDs for DVT prophylaxis.         Liborio Galvez MD  [facility]  12/25/20  15:36 EST

## 2020-12-26 ENCOUNTER — READMISSION MANAGEMENT (OUTPATIENT)
Dept: CALL CENTER | Facility: HOSPITAL | Age: 65
End: 2020-12-26

## 2020-12-26 VITALS
BODY MASS INDEX: 21.3 KG/M2 | OXYGEN SATURATION: 100 % | DIASTOLIC BLOOD PRESSURE: 69 MMHG | SYSTOLIC BLOOD PRESSURE: 128 MMHG | HEART RATE: 80 BPM | RESPIRATION RATE: 18 BRPM | HEIGHT: 71 IN | WEIGHT: 152.12 LBS | TEMPERATURE: 98.3 F

## 2020-12-26 LAB
ALBUMIN SERPL-MCNC: 2.6 G/DL (ref 3.5–5.2)
ALBUMIN/GLOB SERPL: 0.7 G/DL
ALP SERPL-CCNC: 125 U/L (ref 39–117)
ALT SERPL W P-5'-P-CCNC: 26 U/L (ref 1–41)
AMMONIA BLD-SCNC: 68 UMOL/L (ref 16–60)
ANION GAP SERPL CALCULATED.3IONS-SCNC: 7.8 MMOL/L (ref 5–15)
AST SERPL-CCNC: 44 U/L (ref 1–40)
BASOPHILS # BLD AUTO: 0.05 10*3/MM3 (ref 0–0.2)
BASOPHILS NFR BLD AUTO: 0.9 % (ref 0–1.5)
BILIRUB SERPL-MCNC: 3 MG/DL (ref 0–1.2)
BILIRUB UR QL STRIP: NEGATIVE
BUN SERPL-MCNC: 13 MG/DL (ref 8–23)
BUN/CREAT SERPL: 16.9 (ref 7–25)
CALCIUM SPEC-SCNC: 8.3 MG/DL (ref 8.6–10.5)
CHLORIDE SERPL-SCNC: 109 MMOL/L (ref 98–107)
CLARITY UR: CLEAR
CO2 SERPL-SCNC: 17.2 MMOL/L (ref 22–29)
COLOR UR: YELLOW
CREAT SERPL-MCNC: 0.77 MG/DL (ref 0.76–1.27)
DEPRECATED RDW RBC AUTO: 54.3 FL (ref 37–54)
EOSINOPHIL # BLD AUTO: 0.36 10*3/MM3 (ref 0–0.4)
EOSINOPHIL NFR BLD AUTO: 6.7 % (ref 0.3–6.2)
ERYTHROCYTE [DISTWIDTH] IN BLOOD BY AUTOMATED COUNT: 14.8 % (ref 12.3–15.4)
GFR SERPL CREATININE-BSD FRML MDRD: 102 ML/MIN/1.73
GLOBULIN UR ELPH-MCNC: 3.9 GM/DL
GLUCOSE SERPL-MCNC: 101 MG/DL (ref 65–99)
GLUCOSE UR STRIP-MCNC: NEGATIVE MG/DL
HCT VFR BLD AUTO: 26.6 % (ref 37.5–51)
HGB BLD-MCNC: 9 G/DL (ref 13–17.7)
HGB UR QL STRIP.AUTO: NEGATIVE
IMM GRANULOCYTES # BLD AUTO: 0.02 10*3/MM3 (ref 0–0.05)
IMM GRANULOCYTES NFR BLD AUTO: 0.4 % (ref 0–0.5)
KETONES UR QL STRIP: NEGATIVE
LEUKOCYTE ESTERASE UR QL STRIP.AUTO: NEGATIVE
LYMPHOCYTES # BLD AUTO: 1.2 10*3/MM3 (ref 0.7–3.1)
LYMPHOCYTES NFR BLD AUTO: 22.4 % (ref 19.6–45.3)
MCH RBC QN AUTO: 33.7 PG (ref 26.6–33)
MCHC RBC AUTO-ENTMCNC: 33.8 G/DL (ref 31.5–35.7)
MCV RBC AUTO: 99.6 FL (ref 79–97)
MONOCYTES # BLD AUTO: 0.87 10*3/MM3 (ref 0.1–0.9)
MONOCYTES NFR BLD AUTO: 16.2 % (ref 5–12)
NEUTROPHILS NFR BLD AUTO: 2.86 10*3/MM3 (ref 1.7–7)
NEUTROPHILS NFR BLD AUTO: 53.4 % (ref 42.7–76)
NITRITE UR QL STRIP: NEGATIVE
NRBC BLD AUTO-RTO: 0 /100 WBC (ref 0–0.2)
PH UR STRIP.AUTO: 6 [PH] (ref 5–8)
PLATELET # BLD AUTO: 65 10*3/MM3 (ref 140–450)
PMV BLD AUTO: 10.1 FL (ref 6–12)
POTASSIUM SERPL-SCNC: 4.1 MMOL/L (ref 3.5–5.2)
PROT SERPL-MCNC: 6.5 G/DL (ref 6–8.5)
PROT UR QL STRIP: NEGATIVE
RBC # BLD AUTO: 2.67 10*6/MM3 (ref 4.14–5.8)
SODIUM SERPL-SCNC: 134 MMOL/L (ref 136–145)
SP GR UR STRIP: 1.01 (ref 1–1.03)
UROBILINOGEN UR QL STRIP: NORMAL
WBC # BLD AUTO: 5.36 10*3/MM3 (ref 3.4–10.8)

## 2020-12-26 PROCEDURE — 85025 COMPLETE CBC W/AUTO DIFF WBC: CPT | Performed by: INTERNAL MEDICINE

## 2020-12-26 PROCEDURE — 80053 COMPREHEN METABOLIC PANEL: CPT | Performed by: INTERNAL MEDICINE

## 2020-12-26 PROCEDURE — 99244 OFF/OP CNSLTJ NEW/EST MOD 40: CPT | Performed by: INTERNAL MEDICINE

## 2020-12-26 PROCEDURE — G0378 HOSPITAL OBSERVATION PER HR: HCPCS

## 2020-12-26 PROCEDURE — 81003 URINALYSIS AUTO W/O SCOPE: CPT | Performed by: EMERGENCY MEDICINE

## 2020-12-26 PROCEDURE — 82140 ASSAY OF AMMONIA: CPT | Performed by: INTERNAL MEDICINE

## 2020-12-26 RX ADMIN — PANTOPRAZOLE SODIUM 40 MG: 40 TABLET, DELAYED RELEASE ORAL at 08:44

## 2020-12-26 RX ADMIN — METOPROLOL TARTRATE 12.5 MG: 25 TABLET, FILM COATED ORAL at 08:44

## 2020-12-26 RX ADMIN — LACTULOSE 10 G: 10 SOLUTION ORAL at 08:44

## 2020-12-26 RX ADMIN — SODIUM CHLORIDE, PRESERVATIVE FREE 10 ML: 5 INJECTION INTRAVENOUS at 08:46

## 2020-12-26 RX ADMIN — FUROSEMIDE 40 MG: 40 TABLET ORAL at 08:45

## 2020-12-26 RX ADMIN — SPIRONOLACTONE 100 MG: 100 TABLET, FILM COATED ORAL at 08:44

## 2020-12-26 RX ADMIN — FERROUS SULFATE TAB 325 MG (65 MG ELEMENTAL FE) 325 MG: 325 (65 FE) TAB at 08:45

## 2020-12-26 NOTE — OUTREACH NOTE
Prep Survey      Responses   [facility] patient discharged from?  [place]   Is LACE score < 7 ?  No   Emergency Room discharge w/ pulse ox?  No   Eligibility  [facility]   Date of Admission  12/25/20   Date of Discharge  12/26/20   Discharge Disposition  Home or Self Care   Discharge diagnosis  Hepatic encephalopathy    Does the patient have one of the following disease processes/diagnoses(primary or secondary)?  Other   Does the patient have Home health ordered?  No   Is there a DME ordered?  No   Prep survey completed?  Yes          Roxanne Tomlinson RN

## 2020-12-26 NOTE — CONSULTS
Gastroenterology   Initial Inpatient Consult Note    Referring Provider: Liborio Galvez MD    Reason for Consultation: Hepatic encephalopathy    Subjective     History of present illness:    65 y.o. male with a history of hepatic encephalopathy who we are asked to see for acute change in mental status.  Patient has been intolerant of Xifaxan and has tried it several times and could not tolerate it due to various symptoms and complaints.  He was brought in on the recommendation of his family due to change in mental status with increasing confusion and forgetfulness.  He was found to have an ammonia level of 123 which is now down to 68 this morning.  Patient denies any jaundice or pruritus or melena.  He feels as though this morning his mental status is much improved and he seems very appropriate and alert.  He denies any diarrhea.  He describes having solid bowel movements as an outpatient but uncertain how many per day.  He was taking lactulose apparently as an outpatient.    Past Medical History:  Past Medical History:   Diagnosis Date   • Alcoholism (CMS/HCC)    • Cirrhosis (CMS/HCC)    • Encephalopathy    • Hypertension    • Liver disease      Past Surgical History:  History reviewed. No pertinent surgical history.   Social History:   Social History     Tobacco Use   • Smoking status: Never Smoker   • Smokeless tobacco: Never Used   Substance Use Topics   • Alcohol use: Not Currently     Comment: 1 bottle of wine per day, stopped drinking ETOH 2 weeks ago      Family History:  Family History   Problem Relation Age of Onset   • Diabetes Mother    • Hypertension Father        Home Meds:  Medications Prior to Admission   Medication Sig Dispense Refill Last Dose   • ferrous sulfate 325 (65 FE) MG tablet Take 1 tablet by mouth 2 (Two) Times a Day With Meals. 60 tablet 4 12/24/2020 at 0800   • furosemide (LASIX) 40 MG tablet Take 1 tablet by mouth Daily. 30 tablet 5 12/24/2020 at 0800   • lactulose (CHRONULAC) 10 GM/15ML  solution Take 15 mL by mouth 2 (Two) Times a Day. Adjust dose until you have 3-4 bowel movements a day. 946 mL 5 12/24/2020 at 0800   • metoprolol tartrate (LOPRESSOR) 25 MG tablet Take 0.5 tablets by mouth Daily. 15 tablet 5 12/24/2020 at 0800   • pantoprazole (PROTONIX) 40 MG EC tablet Take 1 tablet by mouth Daily. 30 tablet 5 12/24/2020 at 0800   • spironolactone (ALDACTONE) 100 MG tablet Take 1 tablet by mouth Daily. 30 tablet 5 12/24/2020 at 0800     Current Meds:   ferrous sulfate, 325 mg, Oral, BID With Meals  furosemide, 40 mg, Oral, Daily  lactulose, 10 g, Oral, BID  metoprolol tartrate, 12.5 mg, Oral, Daily  pantoprazole, 40 mg, Oral, Daily  sodium chloride, 10 mL, Intravenous, Q12H  spironolactone, 100 mg, Oral, Daily      Allergies:  No Known Allergies  Review of Systems  Pertinent items are noted in HPI, all other systems reviewed and negative    Objective     Vital Signs  Temp:  [97.8 °F (36.6 °C)-98.3 °F (36.8 °C)] 98.3 °F (36.8 °C)  Heart Rate:  [69-82] 80  Resp:  [18-20] 18  BP: (111-132)/(58-69) 128/69    Physical Exam:  CONSTITUTIONAL:  today's vital signs reviewed  EARS NOSE THROAT: trachea midline and no deformity of the nares  EYES: Mild scleral icterus  GASTROINTESTINAL: abdomen is soft, nontender, nondistended with normal active bowel sounds, no masses are appreciated  PSYCHIATRIC: appropriate mood and affect  RESPIRATORY: normal inspiratory effort with no increased work of breathing  NEUROLOGIC: patient is awake and alert  DERMATOLOGIC: skin is warm with no cyanosis  LYMPHATIC: no periumbilical lymphadenopathy     Results Review:   I reviewed the patient's new clinical results.    Results from last 7 days   Lab Units 12/26/20  0741 12/25/20  0536 12/25/20  0122   WBC 10*3/mm3 5.36 6.45 8.37   HEMOGLOBIN g/dL 9.0* 8.2* 9.3*   HEMATOCRIT % 26.6* 24.1* 27.5*   PLATELETS 10*3/mm3 65* 60* 89*     Results from last 7 days   Lab Units 12/26/20  0741 12/25/20  0526 12/25/20  0122   SODIUM mmol/L  134* 136 131*   POTASSIUM mmol/L 4.1 4.8 5.0   CHLORIDE mmol/L 109* 110* 105   CO2 mmol/L 17.2* 17.9* 15.9*   BUN mg/dL 13 16 17   CREATININE mg/dL 0.77 1.04 1.30*   CALCIUM mg/dL 8.3* 8.6 8.6   BILIRUBIN mg/dL 3.0*  --  2.3*   ALK PHOS U/L 125*  --  159*   ALT (SGPT) U/L 26  --  29   AST (SGOT) U/L 44*  --  46*   GLUCOSE mg/dL 101* 122* 160*         Lab Results   Lab Value Date/Time    LIPASE 133 (H) 07/22/2020 1759    LIPASE 59 07/07/2020 0633    LIPASE 65 (H) 07/06/2020 1630       Radiology:  CT Head Without Contrast   Final Result   1.   No acute intracranial abnormality.                This report was finalized on 12/25/2020 1:55 AM by Dr. Clinton Yost M.D.              Assessment/Plan   Patient Active Problem List   Diagnosis   • ED (erectile dysfunction) of organic origin   • Alcoholic cirrhosis of liver with ascites (CMS/HCC)   • Anemia   • Jaundice   • Coagulopathy (CMS/HCC)   • Secondary thrombocytopenia   • Lung abnormality- left apex   • Hepatic encephalopathy (CMS/HCC)       Assessment:  1. Hepatic encephalopathy.  Patient's ammonia has improved greatly this morning.  He is awake and alert and seems very appropriate.  2. Cirrhosis.  Stable.  3. Elevated liver enzymes.  Stable ratio of AST to ALT not surprising given his cirrhosis history.  No recent significant change in these numbers.  4. Jaundice.  Stable.  5. Anemia.  Stable.  No overt bleeding.  6. Thrombocytopenia secondary to splenomegaly and cirrhosis.  Stable.  No overt bleeding.    Plan:  · Diet as tolerated.  · Continue lactulose to obtain 3-4 soft or loose bowel movements daily.  · Follow-up with Dr. Mcgee as an outpatient to continue medical management of his chronic liver disease.  · Patient is stable for discharge from our standpoint.      I discussed the patients findings and my recommendations with patient and nursing staff.    MD Jamal Martinez M.D.  [facility]  [place]  [address] [id number]  Office: [phone]

## 2020-12-26 NOTE — DISCHARGE SUMMARY
Patient Name: Wei Potts  : 1955  MRN: [id number]    Date of Admission: 2020  Date of Discharge:  2020  Primary Care Physician: Anaid Kilgore APRN      Chief Complaint:   Altered Mental Status      Discharge Diagnoses     Active Hospital Problems    Diagnosis  POA   • Hepatic encephalopathy (CMS/HCC) [K72.90]  Yes      Resolved Hospital Problems   No resolved problems to display.        Hospital Course   Patient is 65-year-old male with known history of liver cirrhosis, hypertension reportedly was more confused at home and he was forgetful according to the wife therefore she has sent him to the emergency room.  In the ER routine workup was done and ammonia level was greater than 90 therefore he is being hospitalized.  There were no reports of any numbness, tingling, one-sided weakness, fall, head injury.  Patient cannot tell me if he is was compliant with lactulose at home.  He states he occasionally did drink alcohol in the past but never has a history of heavy use.  He was tested for hepatitis-B and C in the past and did come back negative.  At the time of my evaluation patient appears to be answering questions fairly appropriately.  There were no reports of any abdominal pain, fevers, chills and there is no evidence of any active melena or GI bleed.      1. Hepatic encephalopathy, patient was continued on lactulose as well as rifaximin with which he is mental status has returned back to baseline.  He was minimally confused upon admission.  His ammonia level was 19 and has improved to 60s.  Patient was advised to ensure that he has 3-4 bowel movements a day with lactulose.  No further work-up at this point per GI recommendation and was cleared to be discharged home.  2. Anemia, continue with ferrous sulfate and his H and H is stable.  3. History of liver cirrhosis most likely secondary to HUNT, hepatitis-B and C panel have been negative and he denies any alcohol use that is  heavy.  He is on Lasix and Aldactone which will be continued.  4. Thrombocytopenia, most likely secondary to splenomegaly and liver cirrhosis.    Please note patient was seen and examined today on day of discharge.    Day of Discharge         Physical Exam:  Temp:  [97.8 °F (36.6 °C)-98.3 °F (36.8 °C)] 98.3 °F (36.8 °C)  Heart Rate:  [69-82] 80  Resp:  [18-20] 18  BP: (111-128)/(58-69) 128/69  Body mass index is 21.22 kg/m².  Physical Exam      HEENT:  Atraumatic, normocephalic.  PERRLA.  Extraocular movements intact.  Conjunctivae pink.  Sclerae, no icterus.  Mucous membranes dry.   NECK:  Supple.  No JVD.  HEART:  Regular rate and rhythm.  Normal S1, S2.  LUNGS:  Fairly clear to auscultation anteriorly.  No wheezes.  No crackles.  ABDOMEN:   Soft, nontender.  Bowel sounds present.  No rebound.  No  guarding.  EXTREMITIES:  No cyanosis, clubbing, or edema.  Palpable pedal pulses.  NEURO:  Grossly nonfocal.  No facial asymmetry.  Good strength in all 4  extremities.  SKIN:  Warm and dry.  No evidence of rashes.  LYMPH NODES:  No palpable cervical or supraclavicular lymphadenopathy.    Consultants     Consult Orders (all) (From admission, onward)     Start     Ordered    12/25/20 1536  Inpatient Gastroenterology Consult  Once     Specialty:  Gastroenterology  Provider:  Ruby Mcgee MD    12/25/20 1535    12/25/20 0309  [facility] (on-call MD unless specified) Details  Once     Specialty:  Hospitalist  Provider:  (Not yet assigned)    12/25/20 0308              Procedures     Imaging Results (All)     Procedure Component Value Units Date/Time    CT Head Without Contrast [[id number]] Collected: 12/25/20 0155     Updated: 12/25/20 0158    Narrative:      CT HEAD WITHOUT CONTRAST:      HISTORY:  Delirium.     TECHNIQUE:  Axial images were obtained through the brain without  contrast administration. Multiplanar reformatted images were  reconstructed from the helical source data. Radiation dose reduction  techniques were  utilized, including automated exposure control and  exposure modulation based on body size.        COMPARISON: None.     FINDINGS:   The ventricles and sulci are normal in size and configuration.  No  hydrocephalus or midline shift.       Gray-white matter differentiation is preserved.  There is no evidence  of a large territorial infarction. No hemorrhage or extra-axial fluid  collection.      The orbits are unremarkable. The visualized paranasal sinuses and  mastoid air cells are clear.        The calvarium is intact. The scalp soft tissues are unremarkable.          Impression:      1.   No acute intracranial abnormality.            This report was finalized on 12/25/2020 1:55 AM by Dr. Clinton Yost M.D.             Pertinent Labs     Results from last 7 days   Lab Units 12/26/20  0741 12/25/20  0536 12/25/20  0122   WBC 10*3/mm3 5.36 6.45 8.37   HEMOGLOBIN g/dL 9.0* 8.2* 9.3*   PLATELETS 10*3/mm3 65* 60* 89*     Results from last 7 days   Lab Units 12/26/20  0741 12/25/20  0526 12/25/20  0122   SODIUM mmol/L 134* 136 131*   POTASSIUM mmol/L 4.1 4.8 5.0   CHLORIDE mmol/L 109* 110* 105   CO2 mmol/L 17.2* 17.9* 15.9*   BUN mg/dL 13 16 17   CREATININE mg/dL 0.77 1.04 1.30*   GLUCOSE mg/dL 101* 122* 160*   Estimated Creatinine Clearance: 95.8 mL/min (by C-G formula based on SCr of 0.77 mg/dL).  Results from last 7 days   Lab Units 12/26/20  0741 12/25/20  0122   ALBUMIN g/dL 2.60* 2.80*   BILIRUBIN mg/dL 3.0* 2.3*   ALK PHOS U/L 125* 159*   AST (SGOT) U/L 44* 46*   ALT (SGPT) U/L 26 29     Results from last 7 days   Lab Units 12/26/20  0741 12/25/20  0526 12/25/20  0122   CALCIUM mg/dL 8.3* 8.6 8.6   ALBUMIN g/dL 2.60*  --  2.80*     Results from last 7 days   Lab Units 12/26/20  0741 12/25/20  0526 12/25/20  0122   AMMONIA umol/L 68* 99* 123*     Results from last 7 days   Lab Units 12/25/20  0122   TROPONIN T ng/mL <0.010           Invalid input(s): LDLCALC  Results from last 7 days   Lab Units 12/25/20  0225    BLOODCX  No growth at 24 hours  No growth at 24 hours       Test Results Pending at Discharge     Pending Labs     Order Current Status    Blood Culture - Blood, Arm, Left Preliminary result    Blood Culture - Blood, Arm, Right Preliminary result          Discharge Details        Discharge Medications      Continue These Medications      Instructions Start Date   ferrous sulfate 325 (65 FE) MG tablet   325 mg, Oral, 2 Times Daily With Meals      furosemide 40 MG tablet  Commonly known as: LASIX   40 mg, Oral, Daily      lactulose 10 GM/15ML solution  Commonly known as: CHRONULAC   10 g, Oral, 2 Times Daily, Adjust dose until you have 3-4 bowel movements a day.      metoprolol tartrate 25 MG tablet  Commonly known as: LOPRESSOR   12.5 mg, Oral, Daily      pantoprazole 40 MG EC tablet  Commonly known as: PROTONIX   40 mg, Oral, Daily      spironolactone 100 MG tablet  Commonly known as: ALDACTONE   100 mg, Oral, Daily             No Known Allergies      Discharge Disposition:  Home or Self Care    Discharge Diet:  Diet Order   Procedures   • Diet Regular; Low Sodium; 2,000 mg Na       Discharge Activity:   Activity Instructions     Activity as Tolerated            CODE STATUS:    Code Status and Medical Interventions:   Ordered at: 12/25/20 0341     Code Status:    CPR     Medical Interventions (Level of Support Prior to Arrest):    Full       Future Appointments   Date Time Provider Department Center   2/4/2021 11:45 AM Ruby Mcgee MD [facility] None     Additional Instructions for the Follow-ups that You Need to Schedule     Discharge Follow-up with PCP   As directed       Currently Documented PCP:    Anaid Kilgore APRN    PCP Phone Number:    [phone]     Follow Up Details: 2 weeks         Discharge Follow-up with Specified Provider: ; 2 Weeks   As directed      To:     Follow Up: 2 Weeks           Follow-up Information     Anaid Kilgore APRN .    Specialty: Nurse  Practitioner  Why: 2 weeks  Contact information:  [address] [facility] [id number] [phone]                   Additional Instructions for the Follow-ups that You Need to Schedule     Discharge Follow-up with PCP   As directed       Currently Documented PCP:    Anaid Kilgore APRN    PCP Phone Number:    [phone]     Follow Up Details: 2 weeks         Discharge Follow-up with Specified Provider: ; 2 Weeks   As directed      To:     Follow Up: 2 Weeks           Time Spent on Discharge:  Greater than 30 minutes      Liborio Galvez MD  [facility]  12/26/20  14:42 EST

## 2020-12-28 ENCOUNTER — TRANSITIONAL CARE MANAGEMENT TELEPHONE ENCOUNTER (OUTPATIENT)
Dept: CALL CENTER | Facility: HOSPITAL | Age: 65
End: 2020-12-28

## 2020-12-28 NOTE — OUTREACH NOTE
Call Center TCM Note      Responses   [facility] patient discharged from?  [place]   Does the patient have one of the following disease processes/diagnoses(primary or secondary)?  Other   TCM attempt successful?  Yes   Call start time  1453   Call end time  1509   Discharge diagnosis  Hepatic encephalopathy    Is patient permission given to speak with other caregiver?  Yes   List who call center can speak with  Milagro Potts, spouse   Person spoke with today (if not patient) and relationship  Patient and spouse   Meds reviewed with patient/caregiver?  Yes   Is the patient having any side effects they believe may be caused by any medication additions or changes?  No   Does the patient have all medications ordered at discharge?  Yes   Is the patient taking all medications as directed (includes completed medication regime)?  Yes   Does the patient have a primary care provider?   Yes   Does the patient have an appointment with their PCP within 7 days of discharge?  Greater than 7 days   Comments regarding PCP  PCP Anaid LINARES. Hospital follow up scheduled for 1/7/21  1115am   Nursing Interventions  -- [Scheduled needed TCM appt. ]   Has the patient kept scheduled appointments due by today?  N/A   Comments  Follow Up with Ruby Mcgee MD GASTROENTEROLOGY  Thursday Feb 4, 2021 11:45 AM   Has home health visited the patient within 72 hours of discharge?  N/A   Psychosocial issues?  No   Did the patient receive a copy of their discharge instructions?  Yes   Nursing interventions  Reviewed instructions with patient   What is the patient's perception of their health status since discharge?  Improving   Is the patient/caregiver able to teach back signs and symptoms related to disease process for when to call PCP?  Yes   Is the patient/caregiver able to teach back signs and symptoms related to disease process for when to call 911?  Yes   Is the patient/caregiver able to teach back the hierarchy of who to  call/visit for symptoms/problems? PCP, Specialist, Home health nurse, Urgent Care, ED, 911  Yes   If the patient is a current smoker, are they able to teach back resources for cessation?  Not a smoker   TCM call completed?  Yes          Christine Abreu RN    12/28/2020, 15:09 EST

## 2020-12-30 LAB
BACTERIA SPEC AEROBE CULT: NORMAL
BACTERIA SPEC AEROBE CULT: NORMAL

## 2021-01-01 ENCOUNTER — APPOINTMENT (OUTPATIENT)
Dept: GENERAL RADIOLOGY | Facility: HOSPITAL | Age: 66
End: 2021-01-01

## 2021-01-01 ENCOUNTER — APPOINTMENT (OUTPATIENT)
Dept: CT IMAGING | Facility: HOSPITAL | Age: 66
End: 2021-01-01

## 2021-01-01 ENCOUNTER — HOSPITAL ENCOUNTER (INPATIENT)
Facility: HOSPITAL | Age: 66
LOS: 5 days | Discharge: HOME | End: 2021-01-06
Attending: EMERGENCY MEDICINE | Admitting: HOSPITALIST

## 2021-01-01 DIAGNOSIS — D69.6 THROMBOCYTOPENIA (HCC): ICD-10-CM

## 2021-01-01 DIAGNOSIS — A41.9 SEPSIS, DUE TO UNSPECIFIED ORGANISM, UNSPECIFIED WHETHER ACUTE ORGAN DYSFUNCTION PRESENT (HCC): Primary | ICD-10-CM

## 2021-01-01 DIAGNOSIS — R50.9 FEVER IN ADULT: ICD-10-CM

## 2021-01-01 DIAGNOSIS — Z87.19 HISTORY OF CIRRHOSIS: ICD-10-CM

## 2021-01-01 LAB
ALBUMIN SERPL-MCNC: 2.6 G/DL (ref 3.5–5.2)
ALBUMIN/GLOB SERPL: 0.6 G/DL
ALP SERPL-CCNC: 140 U/L (ref 39–117)
ALT SERPL W P-5'-P-CCNC: 27 U/L (ref 1–41)
ANION GAP SERPL CALCULATED.3IONS-SCNC: 10 MMOL/L (ref 5–15)
AST SERPL-CCNC: 50 U/L (ref 1–40)
B PARAPERT DNA SPEC QL NAA+PROBE: NOT DETECTED
B PERT DNA SPEC QL NAA+PROBE: NOT DETECTED
BASOPHILS # BLD AUTO: 0.06 10*3/MM3 (ref 0–0.2)
BASOPHILS NFR BLD AUTO: 0.3 % (ref 0–1.5)
BILIRUB SERPL-MCNC: 2.7 MG/DL (ref 0–1.2)
BILIRUB UR QL STRIP: NEGATIVE
BUN SERPL-MCNC: 14 MG/DL (ref 8–23)
BUN/CREAT SERPL: 14.7 (ref 7–25)
C PNEUM DNA NPH QL NAA+NON-PROBE: NOT DETECTED
CALCIUM SPEC-SCNC: 8.3 MG/DL (ref 8.6–10.5)
CHLORIDE SERPL-SCNC: 101 MMOL/L (ref 98–107)
CLARITY UR: CLEAR
CO2 SERPL-SCNC: 18 MMOL/L (ref 22–29)
COLOR UR: ABNORMAL
CREAT SERPL-MCNC: 0.95 MG/DL (ref 0.76–1.27)
D-LACTATE SERPL-SCNC: 4.7 MMOL/L (ref 0.5–2)
DEPRECATED RDW RBC AUTO: 51.1 FL (ref 37–54)
EOSINOPHIL # BLD AUTO: 0 10*3/MM3 (ref 0–0.4)
EOSINOPHIL NFR BLD AUTO: 0 % (ref 0.3–6.2)
ERYTHROCYTE [DISTWIDTH] IN BLOOD BY AUTOMATED COUNT: 14.5 % (ref 12.3–15.4)
FLUAV SUBTYP SPEC NAA+PROBE: NOT DETECTED
FLUBV RNA ISLT QL NAA+PROBE: NOT DETECTED
GFR SERPL CREATININE-BSD FRML MDRD: 80 ML/MIN/1.73
GLOBULIN UR ELPH-MCNC: 4.2 GM/DL
GLUCOSE SERPL-MCNC: 147 MG/DL (ref 65–99)
GLUCOSE UR STRIP-MCNC: NEGATIVE MG/DL
HADV DNA SPEC NAA+PROBE: NOT DETECTED
HCOV 229E RNA SPEC QL NAA+PROBE: NOT DETECTED
HCOV HKU1 RNA SPEC QL NAA+PROBE: NOT DETECTED
HCOV NL63 RNA SPEC QL NAA+PROBE: NOT DETECTED
HCOV OC43 RNA SPEC QL NAA+PROBE: NOT DETECTED
HCT VFR BLD AUTO: 27 % (ref 37.5–51)
HGB BLD-MCNC: 9.2 G/DL (ref 13–17.7)
HGB UR QL STRIP.AUTO: NEGATIVE
HMPV RNA NPH QL NAA+NON-PROBE: NOT DETECTED
HPIV1 RNA SPEC QL NAA+PROBE: NOT DETECTED
HPIV2 RNA SPEC QL NAA+PROBE: NOT DETECTED
HPIV3 RNA NPH QL NAA+PROBE: NOT DETECTED
HPIV4 P GENE NPH QL NAA+PROBE: NOT DETECTED
IMM GRANULOCYTES # BLD AUTO: 0.18 10*3/MM3 (ref 0–0.05)
IMM GRANULOCYTES NFR BLD AUTO: 1 % (ref 0–0.5)
KETONES UR QL STRIP: ABNORMAL
LEUKOCYTE ESTERASE UR QL STRIP.AUTO: NEGATIVE
LIPASE SERPL-CCNC: 68 U/L (ref 13–60)
LYMPHOCYTES # BLD AUTO: 0.29 10*3/MM3 (ref 0.7–3.1)
LYMPHOCYTES NFR BLD AUTO: 1.6 % (ref 19.6–45.3)
M PNEUMO IGG SER IA-ACNC: NOT DETECTED
MCH RBC QN AUTO: 32.9 PG (ref 26.6–33)
MCHC RBC AUTO-ENTMCNC: 34.1 G/DL (ref 31.5–35.7)
MCV RBC AUTO: 96.4 FL (ref 79–97)
MONOCYTES # BLD AUTO: 1.77 10*3/MM3 (ref 0.1–0.9)
MONOCYTES NFR BLD AUTO: 9.7 % (ref 5–12)
NEUTROPHILS NFR BLD AUTO: 15.92 10*3/MM3 (ref 1.7–7)
NEUTROPHILS NFR BLD AUTO: 87.4 % (ref 42.7–76)
NITRITE UR QL STRIP: NEGATIVE
NRBC BLD AUTO-RTO: 0 /100 WBC (ref 0–0.2)
PH UR STRIP.AUTO: 5.5 [PH] (ref 5–8)
PLATELET # BLD AUTO: 90 10*3/MM3 (ref 140–450)
PMV BLD AUTO: 10.2 FL (ref 6–12)
POTASSIUM SERPL-SCNC: 5 MMOL/L (ref 3.5–5.2)
PROT SERPL-MCNC: 6.8 G/DL (ref 6–8.5)
PROT UR QL STRIP: NEGATIVE
RBC # BLD AUTO: 2.8 10*6/MM3 (ref 4.14–5.8)
RHINOVIRUS RNA SPEC NAA+PROBE: NOT DETECTED
RSV RNA NPH QL NAA+NON-PROBE: NOT DETECTED
SARS-COV-2 RNA NPH QL NAA+NON-PROBE: NOT DETECTED
SODIUM SERPL-SCNC: 129 MMOL/L (ref 136–145)
SP GR UR STRIP: 1.03 (ref 1–1.03)
UROBILINOGEN UR QL STRIP: ABNORMAL
WBC # BLD AUTO: 18.22 10*3/MM3 (ref 3.4–10.8)

## 2021-01-01 PROCEDURE — 36415 COLL VENOUS BLD VENIPUNCTURE: CPT

## 2021-01-01 PROCEDURE — 83690 ASSAY OF LIPASE: CPT | Performed by: EMERGENCY MEDICINE

## 2021-01-01 PROCEDURE — 80053 COMPREHEN METABOLIC PANEL: CPT | Performed by: EMERGENCY MEDICINE

## 2021-01-01 PROCEDURE — 87040 BLOOD CULTURE FOR BACTERIA: CPT | Performed by: EMERGENCY MEDICINE

## 2021-01-01 PROCEDURE — 71045 X-RAY EXAM CHEST 1 VIEW: CPT

## 2021-01-01 PROCEDURE — 25010000002 PIPERACILLIN SOD-TAZOBACTAM PER 1 G: Performed by: EMERGENCY MEDICINE

## 2021-01-01 PROCEDURE — 83605 ASSAY OF LACTIC ACID: CPT | Performed by: EMERGENCY MEDICINE

## 2021-01-01 PROCEDURE — 99285 EMERGENCY DEPT VISIT HI MDM: CPT

## 2021-01-01 PROCEDURE — 85025 COMPLETE CBC W/AUTO DIFF WBC: CPT | Performed by: EMERGENCY MEDICINE

## 2021-01-01 PROCEDURE — 74177 CT ABD & PELVIS W/CONTRAST: CPT

## 2021-01-01 PROCEDURE — 87186 SC STD MICRODIL/AGAR DIL: CPT | Performed by: EMERGENCY MEDICINE

## 2021-01-01 PROCEDURE — 81003 URINALYSIS AUTO W/O SCOPE: CPT | Performed by: EMERGENCY MEDICINE

## 2021-01-01 PROCEDURE — 0202U NFCT DS 22 TRGT SARS-COV-2: CPT | Performed by: EMERGENCY MEDICINE

## 2021-01-01 PROCEDURE — 25010000002 IOPAMIDOL 61 % SOLUTION: Performed by: EMERGENCY MEDICINE

## 2021-01-01 PROCEDURE — 87150 DNA/RNA AMPLIFIED PROBE: CPT | Performed by: EMERGENCY MEDICINE

## 2021-01-01 PROCEDURE — 87077 CULTURE AEROBIC IDENTIFY: CPT | Performed by: EMERGENCY MEDICINE

## 2021-01-01 RX ORDER — ACETAMINOPHEN 500 MG
1000 TABLET ORAL ONCE
Status: COMPLETED | OUTPATIENT
Start: 2021-01-01 | End: 2021-01-01

## 2021-01-01 RX ORDER — SODIUM CHLORIDE 0.9 % (FLUSH) 0.9 %
10 SYRINGE (ML) INJECTION AS NEEDED
Status: DISCONTINUED | OUTPATIENT
Start: 2021-01-01 | End: 2021-01-06 | Stop reason: HOSPADM

## 2021-01-01 RX ADMIN — TAZOBACTAM SODIUM AND PIPERACILLIN SODIUM 3.38 G: 375; 3 INJECTION, SOLUTION INTRAVENOUS at 23:30

## 2021-01-01 RX ADMIN — SODIUM CHLORIDE 2067 ML: 9 INJECTION, SOLUTION INTRAVENOUS at 23:32

## 2021-01-01 RX ADMIN — SODIUM CHLORIDE 1000 ML: 9 INJECTION, SOLUTION INTRAVENOUS at 21:29

## 2021-01-01 RX ADMIN — ACETAMINOPHEN 1000 MG: 500 TABLET ORAL at 21:18

## 2021-01-01 RX ADMIN — SODIUM CHLORIDE, PRESERVATIVE FREE 10 ML: 5 INJECTION INTRAVENOUS at 21:27

## 2021-01-01 RX ADMIN — IOPAMIDOL 85 ML: 612 INJECTION, SOLUTION INTRAVENOUS at 23:07

## 2021-01-02 ENCOUNTER — APPOINTMENT (OUTPATIENT)
Dept: ULTRASOUND IMAGING | Facility: HOSPITAL | Age: 66
End: 2021-01-02

## 2021-01-02 ENCOUNTER — READMISSION MANAGEMENT (OUTPATIENT)
Dept: CALL CENTER | Facility: HOSPITAL | Age: 66
End: 2021-01-02

## 2021-01-02 PROBLEM — K52.9 COLITIS: Status: ACTIVE | Noted: 2021-01-02

## 2021-01-02 PROBLEM — K21.9 GERD (GASTROESOPHAGEAL REFLUX DISEASE): Status: ACTIVE | Noted: 2021-01-02

## 2021-01-02 PROBLEM — I10 HYPERTENSION: Status: ACTIVE | Noted: 2021-01-02

## 2021-01-02 LAB
ADV 40+41 DNA STL QL NAA+NON-PROBE: NOT DETECTED
ALBUMIN FLD-MCNC: <0.4 G/DL
ALBUMIN SERPL-MCNC: 2.2 G/DL (ref 3.5–5.2)
ALBUMIN/GLOB SERPL: 0.7 G/DL
ALP SERPL-CCNC: 86 U/L (ref 39–117)
ALT SERPL W P-5'-P-CCNC: 24 U/L (ref 1–41)
AMMONIA BLD-SCNC: 61 UMOL/L (ref 16–60)
AMMONIA BLD-SCNC: 88 UMOL/L (ref 16–60)
ANION GAP SERPL CALCULATED.3IONS-SCNC: 9 MMOL/L (ref 5–15)
ANION GAP SERPL CALCULATED.3IONS-SCNC: 9.1 MMOL/L (ref 5–15)
APPEARANCE FLD: ABNORMAL
AST SERPL-CCNC: 51 U/L (ref 1–40)
ASTRO TYP 1-8 RNA STL QL NAA+NON-PROBE: NOT DETECTED
BACTERIA BLD CULT: ABNORMAL
BILIRUB SERPL-MCNC: 3 MG/DL (ref 0–1.2)
BUN SERPL-MCNC: 15 MG/DL (ref 8–23)
BUN SERPL-MCNC: 17 MG/DL (ref 8–23)
BUN/CREAT SERPL: 19 (ref 7–25)
BUN/CREAT SERPL: 19.1 (ref 7–25)
C CAYETANENSIS DNA STL QL NAA+NON-PROBE: NOT DETECTED
C DIFF TOX GENS STL QL NAA+PROBE: NEGATIVE
CALCIUM SPEC-SCNC: 7.2 MG/DL (ref 8.6–10.5)
CALCIUM SPEC-SCNC: 7.4 MG/DL (ref 8.6–10.5)
CAMPY SP DNA.DIARRHEA STL QL NAA+PROBE: NOT DETECTED
CHLORIDE SERPL-SCNC: 104 MMOL/L (ref 98–107)
CHLORIDE SERPL-SCNC: 106 MMOL/L (ref 98–107)
CO2 SERPL-SCNC: 14.9 MMOL/L (ref 22–29)
CO2 SERPL-SCNC: 15 MMOL/L (ref 22–29)
COLOR FLD: YELLOW
CREAT SERPL-MCNC: 0.79 MG/DL (ref 0.76–1.27)
CREAT SERPL-MCNC: 0.89 MG/DL (ref 0.76–1.27)
CRYPTOSP STL CULT: NOT DETECTED
D-LACTATE SERPL-SCNC: 3.9 MMOL/L (ref 0.5–2)
DEPRECATED RDW RBC AUTO: 52.2 FL (ref 37–54)
E HISTOLYT AG STL-ACNC: NOT DETECTED
EAEC PAA PLAS AGGR+AATA ST NAA+NON-PRB: NOT DETECTED
EC STX1 + STX2 GENES STL NAA+PROBE: NOT DETECTED
EPEC EAE GENE STL QL NAA+NON-PROBE: NOT DETECTED
ERYTHROCYTE [DISTWIDTH] IN BLOOD BY AUTOMATED COUNT: 14.6 % (ref 12.3–15.4)
ETEC LTA+ST1A+ST1B TOX ST NAA+NON-PROBE: NOT DETECTED
G LAMBLIA DNA SPEC QL NAA+PROBE: NOT DETECTED
GFR SERPL CREATININE-BSD FRML MDRD: 86 ML/MIN/1.73
GFR SERPL CREATININE-BSD FRML MDRD: 99 ML/MIN/1.73
GLOBULIN UR ELPH-MCNC: 3.3 GM/DL
GLUCOSE SERPL-MCNC: 102 MG/DL (ref 65–99)
GLUCOSE SERPL-MCNC: 131 MG/DL (ref 65–99)
HCT VFR BLD AUTO: 23.2 % (ref 37.5–51)
HGB BLD-MCNC: 7.9 G/DL (ref 13–17.7)
INR PPP: 2 (ref 0.8–1.2)
LACTATE HOLD SPECIMEN: NORMAL
LYMPHOCYTES NFR FLD MANUAL: 2 %
MCH RBC QN AUTO: 33.2 PG (ref 26.6–33)
MCHC RBC AUTO-ENTMCNC: 34.1 G/DL (ref 31.5–35.7)
MCV RBC AUTO: 97.5 FL (ref 79–97)
METHOD: ABNORMAL
MONOCYTES NFR FLD: 18 %
MONOS+MACROS NFR FLD: 1 %
NEUTROPHILS NFR FLD MANUAL: 79 %
NOROVIRUS GI+II RNA STL QL NAA+NON-PROBE: NOT DETECTED
NUC CELL # FLD: 3860 /MM3
P SHIGELLOIDES DNA STL QL NAA+PROBE: NOT DETECTED
PLATELET # BLD AUTO: 59 10*3/MM3 (ref 140–450)
PMV BLD AUTO: 10.5 FL (ref 6–12)
POTASSIUM SERPL-SCNC: 4.6 MMOL/L (ref 3.5–5.2)
POTASSIUM SERPL-SCNC: 5.2 MMOL/L (ref 3.5–5.2)
PROT FLD-MCNC: <1 G/DL
PROT SERPL-MCNC: 5.5 G/DL (ref 6–8.5)
PROTHROMBIN TIME: 23.5 SECONDS (ref 12.8–15.2)
RBC # BLD AUTO: 2.38 10*6/MM3 (ref 4.14–5.8)
RBC # FLD AUTO: 465 /MM3
RV RNA STL NAA+PROBE: NOT DETECTED
SALMONELLA DNA SPEC QL NAA+PROBE: NOT DETECTED
SAPO I+II+IV+V RNA STL QL NAA+NON-PROBE: NOT DETECTED
SHIGELLA SP+EIEC IPAH STL QL NAA+PROBE: NOT DETECTED
SODIUM SERPL-SCNC: 128 MMOL/L (ref 136–145)
SODIUM SERPL-SCNC: 130 MMOL/L (ref 136–145)
V CHOLERAE DNA SPEC QL NAA+PROBE: NOT DETECTED
VIBRIO DNA SPEC NAA+PROBE: NOT DETECTED
WBC # BLD AUTO: 17.96 10*3/MM3 (ref 3.4–10.8)
YERSINIA STL CULT: NOT DETECTED

## 2021-01-02 PROCEDURE — 87493 C DIFF AMPLIFIED PROBE: CPT | Performed by: NURSE PRACTITIONER

## 2021-01-02 PROCEDURE — 25010000003 LIDOCAINE 1 % SOLUTION: Performed by: RADIOLOGY

## 2021-01-02 PROCEDURE — 25010000002 PIPERACILLIN SOD-TAZOBACTAM PER 1 G: Performed by: INTERNAL MEDICINE

## 2021-01-02 PROCEDURE — 82140 ASSAY OF AMMONIA: CPT | Performed by: INTERNAL MEDICINE

## 2021-01-02 PROCEDURE — 99253 IP/OBS CNSLTJ NEW/EST LOW 45: CPT | Performed by: INTERNAL MEDICINE

## 2021-01-02 PROCEDURE — 87205 SMEAR GRAM STAIN: CPT | Performed by: INTERNAL MEDICINE

## 2021-01-02 PROCEDURE — 25010000002 VANCOMYCIN PER 500 MG: Performed by: INTERNAL MEDICINE

## 2021-01-02 PROCEDURE — 85027 COMPLETE CBC AUTOMATED: CPT | Performed by: NURSE PRACTITIONER

## 2021-01-02 PROCEDURE — 0W9G3ZZ DRAINAGE OF PERITONEAL CAVITY, PERCUTANEOUS APPROACH: ICD-10-PCS | Performed by: RADIOLOGY

## 2021-01-02 PROCEDURE — 88112 CYTOPATH CELL ENHANCE TECH: CPT | Performed by: INTERNAL MEDICINE

## 2021-01-02 PROCEDURE — 76700 US EXAM ABDOM COMPLETE: CPT

## 2021-01-02 PROCEDURE — 88305 TISSUE EXAM BY PATHOLOGIST: CPT | Performed by: INTERNAL MEDICINE

## 2021-01-02 PROCEDURE — 83605 ASSAY OF LACTIC ACID: CPT | Performed by: EMERGENCY MEDICINE

## 2021-01-02 PROCEDURE — 82042 OTHER SOURCE ALBUMIN QUAN EA: CPT | Performed by: INTERNAL MEDICINE

## 2021-01-02 PROCEDURE — 0097U HC BIOFIRE FILMARRAY GI PANEL: CPT | Performed by: NURSE PRACTITIONER

## 2021-01-02 PROCEDURE — 25010000002 VANCOMYCIN 10 G RECONSTITUTED SOLUTION: Performed by: EMERGENCY MEDICINE

## 2021-01-02 PROCEDURE — 87070 CULTURE OTHR SPECIMN AEROBIC: CPT | Performed by: INTERNAL MEDICINE

## 2021-01-02 PROCEDURE — 89051 BODY FLUID CELL COUNT: CPT | Performed by: INTERNAL MEDICINE

## 2021-01-02 PROCEDURE — 82140 ASSAY OF AMMONIA: CPT | Performed by: NURSE PRACTITIONER

## 2021-01-02 PROCEDURE — 80053 COMPREHEN METABOLIC PANEL: CPT | Performed by: NURSE PRACTITIONER

## 2021-01-02 PROCEDURE — 76942 ECHO GUIDE FOR BIOPSY: CPT

## 2021-01-02 PROCEDURE — 85610 PROTHROMBIN TIME: CPT

## 2021-01-02 PROCEDURE — 84157 ASSAY OF PROTEIN OTHER: CPT | Performed by: INTERNAL MEDICINE

## 2021-01-02 PROCEDURE — 87015 SPECIMEN INFECT AGNT CONCNTJ: CPT | Performed by: INTERNAL MEDICINE

## 2021-01-02 RX ORDER — ACETAMINOPHEN 650 MG/1
650 SUPPOSITORY RECTAL EVERY 4 HOURS PRN
Status: DISCONTINUED | OUTPATIENT
Start: 2021-01-02 | End: 2021-01-06 | Stop reason: HOSPADM

## 2021-01-02 RX ORDER — FUROSEMIDE 40 MG/1
40 TABLET ORAL DAILY
Status: DISCONTINUED | OUTPATIENT
Start: 2021-01-02 | End: 2021-01-03

## 2021-01-02 RX ORDER — PANTOPRAZOLE SODIUM 40 MG/1
40 TABLET, DELAYED RELEASE ORAL DAILY
Status: DISCONTINUED | OUTPATIENT
Start: 2021-01-02 | End: 2021-01-06 | Stop reason: HOSPADM

## 2021-01-02 RX ORDER — CALCIUM CARBONATE 200(500)MG
2 TABLET,CHEWABLE ORAL 2 TIMES DAILY PRN
Status: DISCONTINUED | OUTPATIENT
Start: 2021-01-02 | End: 2021-01-06 | Stop reason: HOSPADM

## 2021-01-02 RX ORDER — POTASSIUM CHLORIDE 750 MG/1
40 TABLET, FILM COATED, EXTENDED RELEASE ORAL AS NEEDED
Status: DISCONTINUED | OUTPATIENT
Start: 2021-01-02 | End: 2021-01-06 | Stop reason: HOSPADM

## 2021-01-02 RX ORDER — SPIRONOLACTONE 100 MG/1
100 TABLET, FILM COATED ORAL DAILY
Status: DISCONTINUED | OUTPATIENT
Start: 2021-01-02 | End: 2021-01-03

## 2021-01-02 RX ORDER — ONDANSETRON 2 MG/ML
4 INJECTION INTRAMUSCULAR; INTRAVENOUS EVERY 6 HOURS PRN
Status: DISCONTINUED | OUTPATIENT
Start: 2021-01-02 | End: 2021-01-06 | Stop reason: HOSPADM

## 2021-01-02 RX ORDER — POTASSIUM CHLORIDE 1.5 G/1.77G
40 POWDER, FOR SOLUTION ORAL AS NEEDED
Status: DISCONTINUED | OUTPATIENT
Start: 2021-01-02 | End: 2021-01-06 | Stop reason: HOSPADM

## 2021-01-02 RX ORDER — FERROUS SULFATE 325(65) MG
325 TABLET ORAL 2 TIMES DAILY WITH MEALS
Status: DISCONTINUED | OUTPATIENT
Start: 2021-01-02 | End: 2021-01-06 | Stop reason: HOSPADM

## 2021-01-02 RX ORDER — SODIUM CHLORIDE 0.9 % (FLUSH) 0.9 %
10 SYRINGE (ML) INJECTION AS NEEDED
Status: DISCONTINUED | OUTPATIENT
Start: 2021-01-02 | End: 2021-01-06 | Stop reason: HOSPADM

## 2021-01-02 RX ORDER — VANCOMYCIN HYDROCHLORIDE 1 G/200ML
15 INJECTION, SOLUTION INTRAVENOUS EVERY 12 HOURS
Status: DISCONTINUED | OUTPATIENT
Start: 2021-01-02 | End: 2021-01-03

## 2021-01-02 RX ORDER — SODIUM CHLORIDE 9 MG/ML
50 INJECTION, SOLUTION INTRAVENOUS CONTINUOUS
Status: DISCONTINUED | OUTPATIENT
Start: 2021-01-02 | End: 2021-01-06 | Stop reason: HOSPADM

## 2021-01-02 RX ORDER — LACTULOSE 10 G/15ML
10 SOLUTION ORAL 2 TIMES DAILY
Status: DISCONTINUED | OUTPATIENT
Start: 2021-01-02 | End: 2021-01-06 | Stop reason: HOSPADM

## 2021-01-02 RX ORDER — ACETAMINOPHEN 325 MG/1
650 TABLET ORAL EVERY 4 HOURS PRN
Status: DISCONTINUED | OUTPATIENT
Start: 2021-01-02 | End: 2021-01-06 | Stop reason: HOSPADM

## 2021-01-02 RX ORDER — BISACODYL 5 MG/1
5 TABLET, DELAYED RELEASE ORAL DAILY PRN
Status: DISCONTINUED | OUTPATIENT
Start: 2021-01-02 | End: 2021-01-06 | Stop reason: HOSPADM

## 2021-01-02 RX ORDER — SODIUM CHLORIDE 0.9 % (FLUSH) 0.9 %
10 SYRINGE (ML) INJECTION EVERY 12 HOURS SCHEDULED
Status: DISCONTINUED | OUTPATIENT
Start: 2021-01-02 | End: 2021-01-06 | Stop reason: HOSPADM

## 2021-01-02 RX ORDER — MAGNESIUM SULFATE HEPTAHYDRATE 40 MG/ML
2 INJECTION, SOLUTION INTRAVENOUS AS NEEDED
Status: DISCONTINUED | OUTPATIENT
Start: 2021-01-02 | End: 2021-01-06 | Stop reason: HOSPADM

## 2021-01-02 RX ORDER — ONDANSETRON 4 MG/1
4 TABLET, FILM COATED ORAL EVERY 6 HOURS PRN
Status: DISCONTINUED | OUTPATIENT
Start: 2021-01-02 | End: 2021-01-06 | Stop reason: HOSPADM

## 2021-01-02 RX ORDER — ACETAMINOPHEN 160 MG/5ML
650 SOLUTION ORAL EVERY 4 HOURS PRN
Status: DISCONTINUED | OUTPATIENT
Start: 2021-01-02 | End: 2021-01-06 | Stop reason: HOSPADM

## 2021-01-02 RX ORDER — LIDOCAINE HYDROCHLORIDE 10 MG/ML
20 INJECTION, SOLUTION INFILTRATION; PERINEURAL ONCE
Status: COMPLETED | OUTPATIENT
Start: 2021-01-02 | End: 2021-01-02

## 2021-01-02 RX ORDER — NITROGLYCERIN 0.4 MG/1
0.4 TABLET SUBLINGUAL
Status: DISCONTINUED | OUTPATIENT
Start: 2021-01-02 | End: 2021-01-06 | Stop reason: HOSPADM

## 2021-01-02 RX ORDER — MAGNESIUM SULFATE HEPTAHYDRATE 40 MG/ML
4 INJECTION, SOLUTION INTRAVENOUS AS NEEDED
Status: DISCONTINUED | OUTPATIENT
Start: 2021-01-02 | End: 2021-01-06 | Stop reason: HOSPADM

## 2021-01-02 RX ORDER — OMEGA-3S/DHA/EPA/FISH OIL/D3 300MG-1000
800 CAPSULE ORAL DAILY
Status: DISCONTINUED | OUTPATIENT
Start: 2021-01-02 | End: 2021-01-06 | Stop reason: HOSPADM

## 2021-01-02 RX ADMIN — METOPROLOL TARTRATE 12.5 MG: 25 TABLET, FILM COATED ORAL at 12:29

## 2021-01-02 RX ADMIN — LACTULOSE 10 G: 10 SOLUTION ORAL at 13:33

## 2021-01-02 RX ADMIN — TAZOBACTAM SODIUM AND PIPERACILLIN SODIUM 3.38 G: 375; 3 INJECTION, SOLUTION INTRAVENOUS at 13:33

## 2021-01-02 RX ADMIN — CHOLECALCIFEROL TAB 10 MCG (400 UNIT) 800 UNITS: 10 TAB at 12:41

## 2021-01-02 RX ADMIN — FERROUS SULFATE TAB 325 MG (65 MG ELEMENTAL FE) 325 MG: 325 (65 FE) TAB at 18:35

## 2021-01-02 RX ADMIN — SODIUM CHLORIDE, PRESERVATIVE FREE 10 ML: 5 INJECTION INTRAVENOUS at 20:38

## 2021-01-02 RX ADMIN — LACTULOSE 10 G: 10 SOLUTION ORAL at 20:38

## 2021-01-02 RX ADMIN — SODIUM CHLORIDE, PRESERVATIVE FREE 10 ML: 5 INJECTION INTRAVENOUS at 02:10

## 2021-01-02 RX ADMIN — SODIUM CHLORIDE, PRESERVATIVE FREE 10 ML: 5 INJECTION INTRAVENOUS at 08:35

## 2021-01-02 RX ADMIN — VANCOMYCIN HYDROCHLORIDE 1500 MG: 10 INJECTION, POWDER, LYOPHILIZED, FOR SOLUTION INTRAVENOUS at 00:04

## 2021-01-02 RX ADMIN — LIDOCAINE HYDROCHLORIDE 10 ML: 10 INJECTION, SOLUTION INFILTRATION; PERINEURAL at 16:45

## 2021-01-02 RX ADMIN — SODIUM CHLORIDE 50 ML/HR: 9 INJECTION, SOLUTION INTRAVENOUS at 03:59

## 2021-01-02 RX ADMIN — TAZOBACTAM SODIUM AND PIPERACILLIN SODIUM 3.38 G: 375; 3 INJECTION, SOLUTION INTRAVENOUS at 20:38

## 2021-01-02 RX ADMIN — VANCOMYCIN HYDROCHLORIDE 1000 MG: 1 INJECTION, SOLUTION INTRAVENOUS at 12:30

## 2021-01-02 RX ADMIN — PANTOPRAZOLE SODIUM 40 MG: 40 TABLET, DELAYED RELEASE ORAL at 12:30

## 2021-01-02 NOTE — PROGRESS NOTES
[facility]  Clinical Pharmacy Department     Vancomycin Pharmacokinetic Note    Wei Potts is a 65 y.o. male who is on day 2 of pharmacy to dose vancomycin for Intra-Abdominal Infection.    Target level: AUC24 400-600  Consulting Provider:  Anaid Magana  Current Vancomycin Dose:   1,500 mg (21.8 mg/kg) IV every  24  hours  Planned Duration of Therapy: 5 days  Other Antimicrobials: none    Allergies  Allergies as of 01/01/2021    (No Known Allergies)       Microbiology:  Microbiology Results (last 10 days)       Procedure Component Value - Date/Time    Respiratory Panel PCR w/COVID-19(SARS-CoV-2) DAXA/YANELI/MARISABEL/PAD/COR/MAD/AGGIE In-House, NP Swab in UTM/VTM, 3-4 HR TAT - Swab, Nasopharynx [[id number]]  (Normal) Collected: 01/01/21 2126    Lab Status: Final result Specimen: Swab from Nasopharynx Updated: 01/01/21 2233     ADENOVIRUS, PCR Not Detected     Coronavirus 229E Not Detected     Coronavirus HKU1 Not Detected     Coronavirus NL63 Not Detected     Coronavirus OC43 Not Detected     COVID19 Not Detected     Human Metapneumovirus Not Detected     Human Rhinovirus/Enterovirus Not Detected     Influenza A PCR Not Detected     Influenza B PCR Not Detected     Parainfluenza Virus 1 Not Detected     Parainfluenza Virus 2 Not Detected     Parainfluenza Virus 3 Not Detected     Parainfluenza Virus 4 Not Detected     RSV, PCR Not Detected     Bordetella pertussis pcr Not Detected     Bordetella parapertussis PCR Not Detected     Chlamydophila pneumoniae PCR Not Detected     Mycoplasma pneumo by PCR Not Detected    Narrative:      Fact sheet for providers: https://docs.[employer]/wp-content/uploads/WZJ5677-5044-SE2.1-EUA-Provider-Fact-Sheet-3.pdf    Fact sheet for patients: https://docs.[employer]/wp-content/uploads/SYI6057-5060-SH1.1-EUA-Patient-Fact-Sheet-1.pdf    Test performed by PCR.    COVID PRE-OP / PRE-PROCEDURE SCREENING ORDER (NO ISOLATION) - Swab, Nasopharynx [[id number]]  (Normal) Collected:  "12/25/20 0319    Lab Status: Final result Specimen: Swab from Nasopharynx Updated: 12/25/20 0942    Narrative:      The following orders were created for panel order COVID PRE-OP / PRE-PROCEDURE SCREENING ORDER (NO ISOLATION) - Swab, Nasopharynx.  Procedure                               Abnormality         Status                     ---------                               -----------         ------                     COVID-19,APTIMA PANTHER,...[[id number]]  Normal              Final result                 Please view results for these tests on the individual orders.    COVID-19,APTIMA PANTHER,DAXA IN-HOUSE, NP/OP SWAB IN UTM/VTM/SALINE TRANSPORT MEDIA,24 HR TAT - Swab, Nasopharynx [[id number]]  (Normal) Collected: 12/25/20 0319    Lab Status: Final result Specimen: Swab from Nasopharynx Updated: 12/25/20 0942     COVID19 Not Detected    Narrative:      Fact sheet for providers: https://www.fda.gov/media/553088/download     Fact sheet for patients: https://www.fda.gov/media/380036/download    Test performed by PCR.    Blood Culture - Blood, Arm, Left [[id number]] Collected: 12/25/20 0227    Lab Status: Final result Specimen: Blood from Arm, Left Updated: 12/30/20 0245     Blood Culture No growth at 5 days    Blood Culture - Blood, Arm, Right [[id number]] Collected: 12/25/20 0227    Lab Status: Final result Specimen: Blood from Arm, Right Updated: 12/30/20 0245     Blood Culture No growth at 5 days              Vitals/Labs/I&O  180.3 cm (71\")  68.9 kg (152 lb)  Body mass index is 21.2 kg/m².   [unfilled]    Results from last 7 days   Lab Units 01/02/21 0219 01/01/21 2125 12/26/20  0741   WBC 10*3/mm3 17.96* 18.22* 5.36     Results from last 7 days   Lab Units 01/02/21 0219 01/01/21 2218   LACTATE mmol/L 3.9* 4.7*                       Estimated Creatinine Clearance: 93.3 mL/min (by C-G formula based on SCr of 0.79 mg/dL).  Results from last 7 days   Lab Units 01/02/21  0219 01/01/21  2125 12/26/20  0741   BUN mg/dL " "15 14 13   CREATININE mg/dL 0.79 0.95 0.77     Intake & Output (last 3 days)         12/30 0701 - 12/31 0700 12/31 0701 - 01/01 0700 01/01 0701 - 01/02 0700    P.O.   1240    IV Piggyback   250    Total Intake(mL/kg)   1490 (21.6)    Net   +1490                   Vancomycin Dosing and Level History:  Last Dose Received in the ED/Outside Facility: 1,500 mg  Is Patient on Dialysis or Renal Replacement: no  Inpatient Dosing History (date/time): 01-02-21 @ 00:                  Assessment/Plan:    Assessment:  Bayesian analysis of the most recent level(s) using [employer] provides the following patient-specific pharmacokinetic parameters:   CL: 3.65 L/h   Vd: 57.5 L   T1/2: 11.2 h  If the predicted trough on this regimen is not within what was previously considered a \"target trough range\", the AUC24 (a stronger predictor of vancomycin efficacy) is predicted to achieve the accepted target of 400-600 mg*h/L. To best balance efficacy and toxicity, we will be targeting AUC24 in this patient rather than steady-state trough levels.     Initiating the regimen of 1,500 mg (21.8mg/kg) IV every 24 hours gives a predicted steady-state trough of 10.1 mg/L and AUC24 of 413 mg*h/L based upon population pharmacokinetics and this patient's specific parameters.    Recommendations/Plan:  -Initiate vancomycin 1,500 mg (21.8 mg/kg) IV every 24 hours   -Obtain vancomycin level on 01/03/21 prior to the 3rd dose or sooner if acute changes   -Continue to monitor SCr    Thank you for involving pharmacy in this patient's care. Please contact pharmacy with any questions or concerns.                           Charles Ghotra, [facility]  Clinical Pharmacist  01/02/21 04:02 EST    "

## 2021-01-02 NOTE — OUTREACH NOTE
Medical Week 2 Survey      Responses   [facility] [place] patient discharged from?  [place]   Does the patient have one of the following disease processes/diagnoses(primary or secondary)?  Other   Week 2 attempt successful?  No   Revoke  Readmitted          Senait Nair RN

## 2021-01-02 NOTE — ED TRIAGE NOTES
Pt was seen last week for elevated ammonia levels. Pt reports that he is currently having a fever today, with generalized weakness, nausea, vomiting and diarrhea. Pt reports a fever today of 102. Pt denies covid contact with the exception of visiting the ER last week.     Pt placed in mask and staff wearing appropriate ppe at the time of pt arrival.

## 2021-01-02 NOTE — CONSULTS
[facility]  Initial Inpatient Consult Note    Referring Provider: A    Reason for Consultation: cirrhosis    Subjective     History of present illness:    Thank you for asking my opinion regarding this patient    65 y.o. male whom I follow as an outpatient, admitted with fever and nausea.  Patient has a history of cirrhosis.  He has had no alcohol in over a year.   this has been complicated by ascites and encephalopathy.  He had a recent hospitalization for encephalopathy last week.  He improved with titration of his lactulose.  He has been having loose stools but is on lactulose.  CT of the abdomen and pelvis with contrast, reviewed by me, shows wall thickening in the ascending and transverse colon.  Ascites is noted.  Cirrhotic appearing level w/ splenomegaly.  His white blood cell count was elevated at 18,000 on admission and his blood cultures are growing gram-negative cocci in pairs and chains.  He has been started on empiric antibiotics.    He reports that he started to feel poorly suddenly yesterday. He had the sudden onset of shaking chills followed by multiple episodes of nausea with vomiting. He presented to the hospital for further evaluation. He has had no previous similar episodes. He has had no further confusion since he is been home. Vomiting was nonbilious. His stools have been loose but he is on lactulose.    Past Medical History:  Past Medical History:   Diagnosis Date   • Alcoholism (CMS/HCC)    • Cirrhosis (CMS/HCC)    • Encephalopathy    • Hypertension    • Liver disease      Past Surgical History:  History reviewed. No pertinent surgical history.   Social History:   Social History     Tobacco Use   • Smoking status: Never Smoker   • Smokeless tobacco: Never Used   Substance Use Topics   • Alcohol use: Not Currently     Comment: 1 bottle of wine per day, stopped drinking ETOH 2 weeks ago      Family History:  Family History   Problem Relation Age of Onset   • Diabetes  Mother    • Hypertension Father        Home Meds:  Medications Prior to Admission   Medication Sig Dispense Refill Last Dose   • Cholecalciferol (VITAMIN D3 PO) Take 25 mg by mouth Daily.      • ferrous sulfate 325 (65 FE) MG tablet Take 1 tablet by mouth 2 (Two) Times a Day With Meals. 60 tablet 4    • furosemide (LASIX) 40 MG tablet Take 1 tablet by mouth Daily. 30 tablet 5    • lactulose (CHRONULAC) 10 GM/15ML solution Take 15 mL by mouth 2 (Two) Times a Day. Adjust dose until you have 3-4 bowel movements a day. 946 mL 5 1/1/2021 at 1130   • metoprolol tartrate (LOPRESSOR) 25 MG tablet Take 0.5 tablets by mouth Daily. 15 tablet 5 01/1/2021 at 0000   • pantoprazole (PROTONIX) 40 MG EC tablet Take 1 tablet by mouth Daily. 30 tablet 5    • spironolactone (ALDACTONE) 100 MG tablet Take 1 tablet by mouth Daily. 30 tablet 5      Current Meds:   cholecalciferol, 800 Units, Oral, Daily  ferrous sulfate, 325 mg, Oral, BID With Meals  furosemide, 40 mg, Oral, Daily  lactulose, 10 g, Oral, BID  metoprolol tartrate, 12.5 mg, Oral, Daily  pantoprazole, 40 mg, Oral, Daily  piperacillin-tazobactam, 3.375 g, Intravenous, Q8H  sodium chloride, 10 mL, Intravenous, Q12H  spironolactone, 100 mg, Oral, Daily  vancomycin, 15 mg/kg, Intravenous, Q12H      Allergies:  No Known Allergies  Review of Systems  Pertinent items are noted in HPI, all other systems reviewed and negative     Objective     Vital Signs  Temp:  [98.8 °F (37.1 °C)-101.8 °F (38.8 °C)] 98.8 °F (37.1 °C)  Heart Rate:  [] 79  Resp:  [18-20] 18  BP: (103-124)/(53-88) 103/53  Physical Exam:  General Appearance:    Alert, cooperative, in no acute distress   Head:    Normocephalic, without obvious abnormality, atraumatic   Eyes:          conjunctivae and sclerae normal, no   icterus   Throat:   no thrush, oral mucosa moist   Neck:   Supple, no adenopathy   Lungs:     Clear to auscultation bilaterally    Heart:    Regular rhythm and normal rate    Chest Wall:    No  abnormalities observed   Abdomen:     Soft, distended, nontender; normal bowel sounds   Extremities:   no edema, no redness   Skin:   No bruising or rash   Psychiatric:  normal mood and insight     Results Review:   I reviewed the patient's new clinical results.    Results from last 7 days   Lab Units 01/02/21 0219 01/01/21 2125   WBC 10*3/mm3 17.96* 18.22*   HEMOGLOBIN g/dL 7.9* 9.2*   HEMATOCRIT % 23.2* 27.0*   PLATELETS 10*3/mm3 59* 90*     Results from last 7 days   Lab Units 01/02/21  1236 01/02/21 0219 01/01/21 2125   SODIUM mmol/L 128* 130* 129*   POTASSIUM mmol/L 4.6 5.2 5.0   CHLORIDE mmol/L 104 106 101   CO2 mmol/L 14.9* 15.0* 18.0*   BUN mg/dL 17 15 14   CREATININE mg/dL 0.89 0.79 0.95   CALCIUM mg/dL 7.4* 7.2* 8.3*   BILIRUBIN mg/dL 3.0*  --  2.7*   ALK PHOS U/L 86  --  140*   ALT (SGPT) U/L 24  --  27   AST (SGOT) U/L 51*  --  50*   GLUCOSE mg/dL 131* 102* 147*         Lab Results   Lab Value Date/Time    LIPASE 68 (H) 01/01/2021 2125    LIPASE 133 (H) 07/22/2020 1759    LIPASE 59 07/07/2020 0633    LIPASE 65 (H) 07/06/2020 1630       Radiology:  CT Abdomen Pelvis With Contrast   Final Result   1.There is circumferential thickening of the wall of the ascending and   transverse colon that is suggestive of colitis.   2. There is stable evidence of cirrhosis. Improved but persistent   splenomegaly and ascites is noted.       This report was finalized on 1/1/2021 11:46 PM by Dr. Randall Mckeon M.D.          XR Chest 1 View   Preliminary Result   Negative chest radiograph.          US Abdomen Complete    (Results Pending)       Assessment/Plan   Patient Active Problem List   Diagnosis   • ED (erectile dysfunction) of organic origin   • Alcoholic cirrhosis of liver with ascites (CMS/HCC)   • Anemia   • Jaundice   • Coagulopathy (CMS/HCC)   • Secondary thrombocytopenia   • Lung abnormality- left apex   • Hepatic encephalopathy (CMS/HCC)   • Sepsis (CMS/HCC)   • Colitis   • Hypertension   • GERD  (gastroesophageal reflux disease)       Assessment:  1. Cirrhosis    2. Gram-negative sepsis  3. CT suggestive of colitis  4. Hyponatremia  5. Hepatic encephalopathy  6. Anemia-we have discussed outpatient evaluation with EGD and colonoscopy.  This will need to be completed once he is recovered from his acute illness    Plan:  · Continue antibiotics  · Recommend paracentesis to rule out SBP contributing to his gram-negative sepsis  · Stool studies have been ordered, collection pending  · Continue lactulose due to history of hepatic encephalopathy-he has not tolerated Xifaxan      I discussed the patients findings and my recommendations with patient.    Ruby Mcgee MD

## 2021-01-02 NOTE — H&P
Patient Name:  Wei Potts  YOB: 1955  MRN:  [id number]  Admit Date:  1/1/2021  Patient Care Team:  Anaid Kilgore APRN as PCP - General (Nurse Practitioner)      Subjective   History Present Illness     Chief Complaint   Patient presents with   • Fever   • Nausea       Mr. Potts is a 65 y.o. non-smoker with a history of alcoholic cirrhosis of the liver and hypertenison that presents to [facility] complaining of nausea, vomiting, diarrhea, and fever.  He reports rigors that began yesterday afternoon.  He states his temperature at the time was 102.7.  He reports nausea and 2 episodes of non-bloody emesis.  He denies abdominal pain, chest pain, and shortness of breath. He denies headache, dizziness, cough, sore throat, and loss of taste and smell.  He reports chronic diarrhea secondary to his Lactulose use for cirrhosis that has not changed.  He denies melena and hematochezia.  Work up in the ED revealed Na 129, alkaline phosphatase 140, AST 50, albumin 2.60, lactate 4.7, and WBC 18.22.  CT of the abdomen/pelvis showed circumferential thickening of the wall of the ascending and transverse colon that is suggestive of colitis.  There is stable evidence of cirrhosis.  Improved but persistent splenomegaly and ascites is noted.  He was febrile and tachycardic on arrival and received Vancomycin and Zosyn in the ED.       History of Present Illness  Review of Systems   Constitutional: Positive for chills, fatigue and fever.   HENT: Negative for sore throat.    Eyes: Negative for photophobia and visual disturbance.   Respiratory: Negative for cough and shortness of breath.    Cardiovascular: Negative for chest pain, palpitations and leg swelling.   Gastrointestinal: Positive for abdominal distention (mild), diarrhea, nausea and vomiting. Negative for abdominal pain and blood in stool.   Endocrine: Negative for polydipsia, polyphagia and polyuria.   Genitourinary: Negative for  decreased urine volume, dysuria, flank pain, frequency, hematuria and urgency.   Musculoskeletal: Negative for back pain, gait problem and myalgias.   Skin: Negative for rash and wound.   Neurological: Negative for dizziness, speech difficulty, weakness, numbness and headaches.        Personal History     Past Medical History:   Diagnosis Date   • Alcoholism (CMS/HCC)    • Cirrhosis (CMS/HCC)    • Encephalopathy    • Hypertension    • Liver disease      History reviewed. No pertinent surgical history.  Family History   Problem Relation Age of Onset   • Diabetes Mother    • Hypertension Father      Social History     Tobacco Use   • Smoking status: Never Smoker   • Smokeless tobacco: Never Used   Substance Use Topics   • Alcohol use: Not Currently     Comment: 1 bottle of wine per day, stopped drinking ETOH 2 weeks ago   • Drug use: No     Medications Prior to Admission   Medication Sig Dispense Refill Last Dose   • Cholecalciferol (VITAMIN D3 PO) Take 25 mg by mouth Daily.      • ferrous sulfate 325 (65 FE) MG tablet Take 1 tablet by mouth 2 (Two) Times a Day With Meals. 60 tablet 4    • furosemide (LASIX) 40 MG tablet Take 1 tablet by mouth Daily. 30 tablet 5    • lactulose (CHRONULAC) 10 GM/15ML solution Take 15 mL by mouth 2 (Two) Times a Day. Adjust dose until you have 3-4 bowel movements a day. 946 mL 5 1/1/2021 at 1130   • metoprolol tartrate (LOPRESSOR) 25 MG tablet Take 0.5 tablets by mouth Daily. 15 tablet 5 01/1/2021 at 0000   • pantoprazole (PROTONIX) 40 MG EC tablet Take 1 tablet by mouth Daily. 30 tablet 5    • spironolactone (ALDACTONE) 100 MG tablet Take 1 tablet by mouth Daily. 30 tablet 5      Allergies:  No Known Allergies    Objective    Objective     Vital Signs  Temp:  [98.8 °F (37.1 °C)-101.8 °F (38.8 °C)] 98.8 °F (37.1 °C)  Heart Rate:  [] 103  Resp:  [18-20] 18  BP: (104-124)/(58-88) 116/58  SpO2:  [99 %-100 %] 100 %  on   ;   Device (Oxygen Therapy): room air  Body mass index is 21.2  kg/m².    Physical Exam  Vitals signs and nursing note reviewed.   Constitutional:       Appearance: Normal appearance.   HENT:      Head: Normocephalic and atraumatic.      Nose: Nose normal.      Mouth/Throat:      Mouth: Mucous membranes are dry.      Pharynx: Oropharynx is clear.   Eyes:      Extraocular Movements: Extraocular movements intact.      Conjunctiva/sclera: Conjunctivae normal.   Neck:      Musculoskeletal: Normal range of motion and neck supple.   Cardiovascular:      Rate and Rhythm: Regular rhythm. Tachycardia present.      Pulses: Normal pulses.      Heart sounds: Normal heart sounds.   Pulmonary:      Effort: Pulmonary effort is normal.      Breath sounds: Normal breath sounds.   Abdominal:      General: Bowel sounds are normal. There is distension (mild).      Palpations: Abdomen is soft. There is no mass.      Tenderness: There is no abdominal tenderness. There is no guarding or rebound.      Hernia: No hernia is present.   Musculoskeletal: Normal range of motion.         General: No swelling.   Skin:     General: Skin is warm and dry.   Neurological:      General: No focal deficit present.      Mental Status: He is alert and oriented to person, place, and time.   Psychiatric:         Mood and Affect: Mood normal.         Results Review:  I reviewed the patient's new clinical results.  I reviewed the patient's new imaging results and agree with the interpretation.  I reviewed the patient's other test results and agree with the interpretation  I personally viewed and interpreted the patient's EKG/Telemetry data  Discussed with ED provider.    Lab Results (last 24 hours)     Procedure Component Value Units Date/Time    CBC & Differential [[id number]]  (Abnormal) Collected: 01/01/21 2125    Specimen: Blood Updated: 01/01/21 2139    Narrative:      The following orders were created for panel order CBC & Differential.  Procedure                               Abnormality         Status                      ---------                               -----------         ------                     CBC Auto Differential[[id number]]        Abnormal            Final result                 Please view results for these tests on the individual orders.    Comprehensive Metabolic Panel [[id number]]  (Abnormal) Collected: 01/01/21 2125    Specimen: Blood Updated: 01/01/21 2215     Glucose 147 mg/dL      BUN 14 mg/dL      Creatinine 0.95 mg/dL      Sodium 129 mmol/L      Potassium 5.0 mmol/L      Chloride 101 mmol/L      CO2 18.0 mmol/L      Calcium 8.3 mg/dL      Total Protein 6.8 g/dL      Albumin 2.60 g/dL      ALT (SGPT) 27 U/L      AST (SGOT) 50 U/L      Alkaline Phosphatase 140 U/L      Total Bilirubin 2.7 mg/dL      eGFR Non African Amer 80 mL/min/1.73      Globulin 4.2 gm/dL      A/G Ratio 0.6 g/dL      BUN/Creatinine Ratio 14.7     Anion Gap 10.0 mmol/L     Narrative:      GFR Normal >60  Chronic Kidney Disease <60  Kidney Failure <15      CBC Auto Differential [[id number]]  (Abnormal) Collected: 01/01/21 2125    Specimen: Blood Updated: 01/01/21 2139     WBC 18.22 10*3/mm3      RBC 2.80 10*6/mm3      Hemoglobin 9.2 g/dL      Hematocrit 27.0 %      MCV 96.4 fL      MCH 32.9 pg      MCHC 34.1 g/dL      RDW 14.5 %      RDW-SD 51.1 fl      MPV 10.2 fL      Platelets 90 10*3/mm3      Neutrophil % 87.4 %      Lymphocyte % 1.6 %      Monocyte % 9.7 %      Eosinophil % 0.0 %      Basophil % 0.3 %      Immature Grans % 1.0 %      Neutrophils, Absolute 15.92 10*3/mm3      Lymphocytes, Absolute 0.29 10*3/mm3      Monocytes, Absolute 1.77 10*3/mm3      Eosinophils, Absolute 0.00 10*3/mm3      Basophils, Absolute 0.06 10*3/mm3      Immature Grans, Absolute 0.18 10*3/mm3      nRBC 0.0 /100 WBC     Lipase [[id number]]  (Abnormal) Collected: 01/01/21 2125    Specimen: Blood Updated: 01/01/21 2245     Lipase 68 U/L     Respiratory Panel PCR w/COVID-19(SARS-CoV-2) [facility]/YANELI/MARISABEL/PAD/COR/MAD/[facility]GIE In-House, NP Swab in UTM/VTM, 3-4 HR TAT - Swab,  Nasopharynx [[id number]]  (Normal) Collected: 01/01/21 2126    Specimen: Swab from Nasopharynx Updated: 01/01/21 2233     ADENOVIRUS, PCR Not Detected     Coronavirus 229E Not Detected     Coronavirus HKU1 Not Detected     Coronavirus NL63 Not Detected     Coronavirus OC43 Not Detected     COVID19 Not Detected     Human Metapneumovirus Not Detected     Human Rhinovirus/Enterovirus Not Detected     Influenza A PCR Not Detected     Influenza B PCR Not Detected     Parainfluenza Virus 1 Not Detected     Parainfluenza Virus 2 Not Detected     Parainfluenza Virus 3 Not Detected     Parainfluenza Virus 4 Not Detected     RSV, PCR Not Detected     Bordetella pertussis pcr Not Detected     Bordetella parapertussis PCR Not Detected     Chlamydophila pneumoniae PCR Not Detected     Mycoplasma pneumo by PCR Not Detected    Narrative:      Fact sheet for providers: https://docs.[employer]/wp-content/uploads/LBL2175-5938-AY3.1-EUA-Provider-Fact-Sheet-3.pdf    Fact sheet for patients: https://docs.[employer]/wp-content/uploads/QHB3703-4537-VH8.1-EUA-Patient-Fact-Sheet-1.pdf    Test performed by PCR.    Lactic Acid, Plasma [[id number]]  (Abnormal) Collected: 01/01/21 2218    Specimen: Blood Updated: 01/01/21 2251     Lactate 4.7 mmol/L     Lactic Acid, Reflex Timer (This will reflex a repeat order 3-3:15 hours after ordered.) [[id number]] Collected: 01/01/21 2218    Specimen: Blood Updated: 01/02/21 0200     Hold Tube Hold for add-ons.     Comment: Auto resulted.       Urinalysis With Culture If Indicated - Urine, Clean Catch [[id number]]  (Abnormal) Collected: 01/01/21 2222    Specimen: Urine, Clean Catch Updated: 01/01/21 2244     Color, UA Dark Yellow     Appearance, UA Clear     pH, UA 5.5     Specific Gravity, UA 1.029     Glucose, UA Negative     Ketones, UA Trace     Bilirubin, UA Negative     Blood, UA Negative     Protein, UA Negative     Leuk Esterase, UA Negative     Nitrite, UA Negative     Urobilinogen, UA 1.0  E.U./dL    Narrative:      Urine microscopic not indicated.    Blood Culture - Blood, Arm, Left [[id number]] Collected: 01/01/21 2255    Specimen: Blood from Arm, Left Updated: 01/01/21 2301    Blood Culture - Blood, Arm, Left [[id number]] Collected: 01/01/21 2256    Specimen: Blood from Arm, Left Updated: 01/01/21 2301    Basic Metabolic Panel [[id number]]  (Abnormal) Collected: 01/02/21 0219    Specimen: Blood Updated: 01/02/21 0306     Glucose 102 mg/dL      BUN 15 mg/dL      Creatinine 0.79 mg/dL      Sodium 130 mmol/L      Potassium 5.2 mmol/L      Chloride 106 mmol/L      CO2 15.0 mmol/L      Calcium 7.2 mg/dL      eGFR Non African Amer 99 mL/min/1.73      BUN/Creatinine Ratio 19.0     Anion Gap 9.0 mmol/L     Narrative:      GFR Normal >60  Chronic Kidney Disease <60  Kidney Failure <15      CBC (No Diff) [[id number]]  (Abnormal) Collected: 01/02/21 0219    Specimen: Blood Updated: 01/02/21 0229     WBC 17.96 10*3/mm3      RBC 2.38 10*6/mm3      Hemoglobin 7.9 g/dL      Hematocrit 23.2 %      MCV 97.5 fL      MCH 33.2 pg      MCHC 34.1 g/dL      RDW 14.6 %      RDW-SD 52.2 fl      MPV 10.5 fL      Platelets 59 10*3/mm3     Lactic Acid, Reflex [[id number]]  (Abnormal) Collected: 01/02/21 0219    Specimen: Blood Updated: 01/02/21 0246     Lactate 3.9 mmol/L           Imaging Results (Last 24 Hours)     Procedure Component Value Units Date/Time    CT Abdomen Pelvis With Contrast [[id number]] Collected: 01/01/21 2338     Updated: 01/01/21 2349    Narrative:      Examination: CT of the abdomen and pelvis with contrast     HISTORY: 65-year-old male with history of fever, vomiting and diarrhea     TECHNIQUE: Contiguous axial images were obtained through the abdomen and  pelvis following intravenous administration of nonionic contrast. Oral  contrast  was not administered. Radiation dose reduction techniques were  utilized, including automated exposure control and exposure modulation  based on body size.     COMPARISON: CT  of the abdomen pelvis with contrast, 07/06/2020     FINDINGS: The visualized portion of the lung bases6 are clear. The  visualized portion of the heart has a normal appearance . The liver  demonstrates a stable cirrhotic morphology. There are 2 stable focal  hypodensities in the liver that measure on the order of 0.4 cm and 1.1  cm in greatest dimension. The pancreas appears normal. There is moderate  diffuse ascites that appears slightly less than seen on the prior  examination. The kidneys appear normal. The adrenal glands have a normal  appearance. The spleen is enlarged but smaller than seen on the prior  examination. Measures on the order of 16.5 x 6.0 x 14.5 cm compared to  prior measurements of 19.5 x 6.9 x 14.5 cm. There is circumferential  thickening of the wall of the ascending and transverse colon. The wall  of the ascending colon measures up to 1.8 cm in thickness.. The osseous  structures of the lumbar spine and pelvis are stable and demonstrate  mild multilevel osteophytic change of the lumbar spine.       Impression:      1.There is circumferential thickening of the wall of the ascending and  transverse colon that is suggestive of colitis.  2. There is stable evidence of cirrhosis. Improved but persistent  splenomegaly and ascites is noted.     This report was finalized on 1/1/2021 11:46 PM by Dr. Randall Mckeon M.D.       XR Chest 1 View [[id number]] Resulted: 01/01/21 2121     Updated: 01/01/21 2121          Results for orders placed during the hospital encounter of 07/06/20   Adult Transthoracic Echo Complete W/ Cont if Necessary Per Protocol    Narrative · Left ventricular systolic function is hyperdynamic (EF > 70).  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 Lactic acidosis has improved with IVF bolus. Will continue slow IVF overnight  -He reported diarrhea, but is on Lactulose secondary to cirrhosis. Will hold Lactulose tonight and check C diff and GI panel  -He denies abdominal pain, no evidence of encephalopathy. Most likely not SBP  -He received Zosyn and Vancomycin in the ED, will continue Vancomycin for now  -Blood cultures pending    Alcoholic Cirrhosis of Liver with Ascites  -Improved cirrhosis with ascites on CT  -Hold Lactulose for now secondary to colitis  -Check Ammonia level in AM  -Hyponatremia. Hold diuretics tonight  -2 g sodium diet    Hypertension  -Blood pressures stable. Continue home regimen  -Monitor    GERD  -Continue home PPI    Anemia/Thrombocytopenia  -Secondary to cirrhosis/splenomegaly  -Hgb improved from baseline, plt stable  -He takes ferrous sulfate outpatient, can resume on discharge  -Repeat lab work in AM    -I discussed the patients findings and my recommendations with patient.    VTE Prophylaxis - SCDs.  Code Status - Full code.       MILAGROS Rey  [facility]  01/02/21  02:50 EST

## 2021-01-02 NOTE — ED NOTES
.  Nursing report ED to floor  Wei Potts  65 y.o.  male    HPI (triage note):   Chief Complaint   Patient presents with   • Fever   • Nausea       Admitting doctor:   Tito Quezada MD    Admitting diagnosis:   The primary encounter diagnosis was Sepsis, due to unspecified organism, unspecified whether acute organ dysfunction present (CMS/HCC). Diagnoses of Fever in adult, Thrombocytopenia (CMS/HCC), and History of cirrhosis were also pertinent to this visit.    Code status:   Current Code Status     Date Active Code Status Order ID Comments User Context       Prior    Advance Care Planning Activity          Allergies:   Patient has no known allergies.    Weight:       01/01/21 2008   Weight: 68.9 kg (152 lb)       Most recent vitals:   Vitals:    01/01/21 2129 01/01/21 2130 01/01/21 2200 01/01/21 2223   BP:  124/60 118/61    BP Location:       Patient Position:       Pulse: 101 98 93 103   Resp:    20   Temp:    100 °F (37.8 °C)   TempSrc:    Oral   SpO2: 100% 100% 99% 100%   Weight:       Height:           Active LDAs/IV Access:   Lines, Drains & Airways    Active LDAs     Name:   Placement date:   Placement time:   Site:   Days:    Peripheral IV 01/01/21 2124 Right Antecubital   01/01/21 2124    Antecubital   less than 1                Labs (abnormal labs have a star):   Labs Reviewed   COMPREHENSIVE METABOLIC PANEL - Abnormal; Notable for the following components:       Result Value    Glucose 147 (*)     Sodium 129 (*)     CO2 18.0 (*)     Calcium 8.3 (*)     Albumin 2.60 (*)     AST (SGOT) 50 (*)     Alkaline Phosphatase 140 (*)     Total Bilirubin 2.7 (*)     All other components within normal limits    Narrative:     GFR Normal >60  Chronic Kidney Disease <60  Kidney Failure <15     URINALYSIS W/ CULTURE IF INDICATED - Abnormal; Notable for the following components:    Color, UA Dark Yellow (*)     Ketones, UA Trace (*)     All other components within normal limits    Narrative:     Urine  microscopic not indicated.   CBC WITH AUTO DIFFERENTIAL - Abnormal; Notable for the following components:    WBC 18.22 (*)     RBC 2.80 (*)     Hemoglobin 9.2 (*)     Hematocrit 27.0 (*)     Platelets 90 (*)     Neutrophil % 87.4 (*)     Lymphocyte % 1.6 (*)     Eosinophil % 0.0 (*)     Immature Grans % 1.0 (*)     Neutrophils, Absolute 15.92 (*)     Lymphocytes, Absolute 0.29 (*)     Monocytes, Absolute 1.77 (*)     Immature Grans, Absolute 0.18 (*)     All other components within normal limits   LACTIC ACID, PLASMA - Abnormal; Notable for the following components:    Lactate 4.7 (*)     All other components within normal limits   LIPASE - Abnormal; Notable for the following components:    Lipase 68 (*)     All other components within normal limits   RESPIRATORY PANEL PCR W/ COVID-19 (SARS-COV-2) [facility]/YANELI/MARISABEL/PAD/[facility]/MAD/[facility]GIE IN-HOUSE, NP SWAB IN [facility], 3-4 HR TAT - Normal    Narrative:     Fact sheet for providers: https://docs.[employer]/wp-content/uploads/WGN2771-0812-NL9.1-EUA-Provider-Fact-Sheet-3.pdf    Fact sheet for patients: https://docs.[employer]/wp-content/uploads/EZT3643-3374-BU1.1-EUA-Patient-Fact-Sheet-1.pdf    Test performed by PCR.   BLOOD CULTURE   BLOOD CULTURE   LACTIC ACID REFLEX TIMER   CBC AND DIFFERENTIAL    Narrative:     The following orders were created for panel order CBC & Differential.  Procedure                               Abnormality         Status                     ---------                               -----------         ------                     CBC Auto Differential[[id number]]        Abnormal            Final result                 Please view results for these tests on the individual orders.       EKG:   No orders to display       Meds given in ED:   Medications   sodium chloride 0.9 % flush 10 mL (10 mL Intravenous Given 1/1/21 8768)   piperacillin-tazobactam (ZOSYN) 3.375 g in iso-osmotic dextrose 50 ml (premix) (3.375 g Intravenous New Bag 1/1/21 5349)    vancomycin 1500 mg/500 mL 0.9% NS IVPB ([facility]) (has no administration in time range)   sodium chloride 0.9 % bolus 2,067 mL (2,067 mL Intravenous New Bag 1/1/21 2332)   sodium chloride 0.9 % bolus 1,000 mL (1,000 mL Intravenous New Bag 1/1/21 2129)   acetaminophen (TYLENOL) tablet 1,000 mg (1,000 mg Oral Given 1/1/21 2118)   iopamidol (ISOVUE-300) 61 % injection 100 mL (85 mL Intravenous Given by Other 1/1/21 2307)       Imaging results:  Ct Abdomen Pelvis With Contrast    Result Date: 1/1/2021  1.There is circumferential thickening of the wall of the ascending and transverse colon that is suggestive of colitis. 2. There is stable evidence of cirrhosis. Improved but persistent splenomegaly and ascites is noted.  This report was finalized on 1/1/2021 11:46 PM by Dr. Randall Mckeon M.D.        Ambulatory status:   -  Social issues:   Social History     Socioeconomic History   • Marital status:      Spouse name: Not on file   • Number of children: Not on file   • Years of education: Not on file   • Highest education level: Not on file   Tobacco Use   • Smoking status: Never Smoker   • Smokeless tobacco: Never Used   Substance and Sexual Activity   • Alcohol use: Not Currently     Comment: 1 bottle of wine per day, stopped drinking ETOH 2 weeks ago   • Drug use: No   • Sexual activity: Defer    Nursing report ED to floor     Rissa Weeks RN  01/01/21 2458

## 2021-01-02 NOTE — PLAN OF CARE
Problem: Adult Inpatient Plan of Care  Goal: Plan of Care Review  Goal: Absence of Hospital-Acquired Illness or Injury  Intervention: Identify and Manage Fall Risk  Intervention: Prevent Skin Injury  Goal: Optimal Comfort and Wellbeing  Intervention: Provide Person-Centered Care  Goal: Readiness for Transition of Care  Intervention: Mutually Develop Transition Plan   Goal Outcome Evaluation:        Outcome Summary: Came in from the ER with c/o N/V/D and fever. Patient has not had a fever while on the floor nor had ny complaints of nausea. Patient's COVID was negative, isolation dc. Patitent is in Contact Spore Isolation for C.diff rule out. Specimen has not been obtained dt patient not having bm during shift. Lactate 3.9, trending down. Patient recieved IV zosyn and vanc in the ER, will be continued in the future per orders. Patient has IVF going at 50ml/hr currently. Skin has a yellow tone, patient does have cirrhosis. Ad.oliver, RA, AOx4. Regular low sodium diet.

## 2021-01-02 NOTE — ED NOTES
I wore full protective equipment throughout this patient encounter, including face mask, eye shield, gown and gloves.  Hand hygiene/washing of hands was performed before donning protective equipment and after removal when leaving room.        Ellen Turner RN  01/01/21 8515

## 2021-01-02 NOTE — PROGRESS NOTES
[facility]  Clinical Pharmacy Department     Vancomycin Pharmacokinetic Note    Wei Potts is a 65 y.o. male who is on day 1 of pharmacy to dose vancomycin for Intra-abdominal infection/colitis    Target level: AUC24 400-600  Consulting Provider:  MILAGROS Magana  Current Vancomycin Dose:   1500 mg (21.8 mg/kg) IV every  24  hours  Planned Duration of Therapy: 5 days  Other Antimicrobials: none. Received zosyn 3.375 gm dose in ER    Allergies  Allergies as of 01/01/2021   • (No Known Allergies)     Microbiology:  Microbiology Results (last 10 days)     Procedure Component Value - Date/Time    Respiratory Panel PCR w/COVID-19(SARS-CoV-2) DAXA/YANELI/MARISABEL/PAD/COR/MAD/AGGIE In-House, NP Swab in UTM/VTM, 3-4 HR TAT - Swab, Nasopharynx [[id number]]  (Normal) Collected: 01/01/21 2126    Lab Status: Final result Specimen: Swab from Nasopharynx Updated: 01/01/21 2233     ADENOVIRUS, PCR Not Detected     Coronavirus 229E Not Detected     Coronavirus HKU1 Not Detected     Coronavirus NL63 Not Detected     Coronavirus OC43 Not Detected     COVID19 Not Detected     Human Metapneumovirus Not Detected     Human Rhinovirus/Enterovirus Not Detected     Influenza A PCR Not Detected     Influenza B PCR Not Detected     Parainfluenza Virus 1 Not Detected     Parainfluenza Virus 2 Not Detected     Parainfluenza Virus 3 Not Detected     Parainfluenza Virus 4 Not Detected     RSV, PCR Not Detected     Bordetella pertussis pcr Not Detected     Bordetella parapertussis PCR Not Detected     Chlamydophila pneumoniae PCR Not Detected     Mycoplasma pneumo by PCR Not Detected    Narrative:      Fact sheet for providers: https://docs.[employer]/wp-content/uploads/IFQ2021-7379-RO5.1-EUA-Provider-Fact-Sheet-3.pdf    Fact sheet for patients: https://docs.[employer]/wp-content/uploads/XTQ2196-9382-DG5.1-EUA-Patient-Fact-Sheet-1.pdf    Test performed by PCR.    COVID PRE-OP / PRE-PROCEDURE SCREENING ORDER (NO ISOLATION) - Swab,  "Nasopharynx [[id number]]  (Normal) Collected: 12/25/20 0319    Lab Status: Final result Specimen: Swab from Nasopharynx Updated: 12/25/20 0942    Narrative:      The following orders were created for panel order COVID PRE-OP / PRE-PROCEDURE SCREENING ORDER (NO ISOLATION) - Swab, Nasopharynx.  Procedure                               Abnormality         Status                     ---------                               -----------         ------                     COVID-19,APTIMA PANTHER,...[[id number]]  Normal              Final result                 Please view results for these tests on the individual orders.    COVID-19,APTIMA PANTHER,DAXA IN-HOUSE, NP/OP SWAB IN UTM/VTM/SALINE TRANSPORT MEDIA,24 HR TAT - Swab, Nasopharynx [[id number]]  (Normal) Collected: 12/25/20 0319    Lab Status: Final result Specimen: Swab from Nasopharynx Updated: 12/25/20 0942     COVID19 Not Detected    Narrative:      Fact sheet for providers: https://www.fda.gov/media/463706/download     Fact sheet for patients: https://www.fda.gov/media/596104/download    Test performed by PCR.    Blood Culture - Blood, Arm, Left [[id number]] Collected: 12/25/20 0227    Lab Status: Final result Specimen: Blood from Arm, Left Updated: 12/30/20 0245     Blood Culture No growth at 5 days    Blood Culture - Blood, Arm, Right [[id number]] Collected: 12/25/20 0227    Lab Status: Final result Specimen: Blood from Arm, Right Updated: 12/30/20 0245     Blood Culture No growth at 5 days        Vitals/Labs/I&O  180.3 cm (71\")  68.9 kg (152 lb)  Body mass index is 21.2 kg/m².   Temp:  [98.8 °F (37.1 °C)-101.8 °F (38.8 °C)] 98.8 °F (37.1 °C)  Heart Rate:  [] 79  Resp:  [18-20] 18  BP: (103-124)/(53-88) 103/53    Results from last 7 days   Lab Units 01/02/21  0219 01/01/21  2125   WBC 10*3/mm3 17.96* 18.22*     Results from last 7 days   Lab Units 01/02/21  0219 01/01/21  2218   LACTATE mmol/L 3.9* 4.7*     Estimated Creatinine Clearance: 93.3 mL/min (by C-G " "formula based on SCr of 0.79 mg/dL).  Results from last 7 days   Lab Units 01/02/21  0219 01/01/21  2125   BUN mg/dL 15 14   CREATININE mg/dL 0.79 0.95     Intake & Output (last 3 days)       12/30 0701 - 12/31 0700 12/31 0701 - 01/01 0700 01/01 0701 - 01/02 0700 01/02 0701 - 01/03 0700    P.O.   1240     IV Piggyback   250     Total Intake(mL/kg)   1490 (21.6)     Net   +1490                 Vancomycin Dosing and Level History:  01/02 @ 0004 Vancomycin 1500 mg IV x 1 (21.8 mg/kg)    Assessment:  [facility] provides the following population pharmacokinetic parameters in absence of any levels:   CL: 3.65 L/h   Vd: 57.5 L   T1/2: 11.2 h  If the predicted trough on this regimen is not within what was previously considered a \"target trough range\", the AUC24 (a stronger predictor of vancomycin efficacy) is predicted to achieve the accepted target of 400-600 mg*h/L. To best balance efficacy and toxicity, we will be targeting AUC24 in this patient rather than steady-state trough levels.     Initiating the regimen of 1000 mg (14.5mg/kg) IV every 12 hours gives a predicted steady-state trough of 16.7 mg/L and AUC24 of 540 mg*h/L (79% of probability of achieving AUC >400) based upon population pharmacokinetics and this patient's specific parameters.    Continuing the regimen of 1500 mg (21.8 mg/kg) IV every 24 hours gives a predicted steady-state trough of 10 mg/L and AUC24 of 410 mg*h/L (53% of probability of achieving AUC >400) based upon population pharmacokinetics and this patient's specific parameters.    Considering pt's initial presentation of sepsis (febrile, tachycardic, leukocytosis, elevated lactate), will consider aiming for higher AUC goal until vancomycin level is obtained to perform patient-specific pharmacokinetics. Patient renal function remain stable.    Recommendations/Plan:  -Initiate vancomycin 1000 mg (14.5 mg/kg) IV every 12 hours   -Obtain vancomycin level on 1/3 @ 1130 prior to the 4th total dose or " sooner if acute changes   -Continue to monitor SCr    Thank you for involving pharmacy in this patient's care. Please contact pharmacy with any questions or concerns.                           Elif Vance RPH  Clinical Pharmacist  01/02/21 09:06 EST

## 2021-01-02 NOTE — ED NOTES
Pt unable to urinate currently but is aware of need for sample.     Ellen Turner, RN  01/01/21 3434

## 2021-01-02 NOTE — PLAN OF CARE
Goal Outcome Evaluation:  Plan of Care Reviewed With: patient  Progress: improving  Outcome Summary: Pt VSS on room air. Denies N/V. No complains of pain. No fever,no diarrhea since been floor. remains on IVF at 50 cc/hrs.continued antibiotic Vanco and zosyn. GI consult has done. paracentesis has done.1.8L off from paracentisis.resumed home meds today.will continue to monitor.

## 2021-01-02 NOTE — ED PROVIDER NOTES
EMERGENCY DEPARTMENT ENCOUNTER    Room Number:  10/10  Date of encounter:  1/1/2021  PCP: Anaid Kilgore APRN  Historian: Patient    Patient was placed in face mask during triage process. Patient was wearing facemask when I entered the room and throughout our encounter. I wore full protective equipment throughout this patient encounter including a face mask, eye protection, and gloves. Hand hygiene was performed before donning protective equipment and again following doffing of PPE after leaving the room.    HPI:  Chief Complaint: Fever  A complete HPI/ROS/PMH/PSH/SH/FH are unobtainable due to: N/A   Context: Wei Potts is a 65 y.o. male with a known history of liver cirrhosis and hypertension who was recently discharged following an episode of hepatic encephalopathy who presents to the ED c/o fever with increased generalized weakness, nausea, vomiting, and diarrhea that developed today.  Symptoms improved at this time.  Patient notes only rare cough but no dyspnea.  No headache.  No dysuria.  Denies focal pain.    MEDICAL HISTORY REVIEW  Date of Admission: 12/25/2020  Date of Discharge:  12/26/2020  Primary Care Physician: Anaid Kilgore APRN    Chief Complaint:   Altered Mental Status        Active Hospital Problems     Diagnosis   POA   • Hepatic encephalopathy (CMS/HCC) [K72.90]   Yes             PAST MEDICAL HISTORY  Active Ambulatory Problems     Diagnosis Date Noted   • ED (erectile dysfunction) of organic origin 10/29/2015   • Alcoholic cirrhosis of liver with ascites (CMS/HCC) 07/06/2020   • Anemia 07/06/2020   • Jaundice 07/06/2020   • Coagulopathy (CMS/HCC) 07/08/2020   • Secondary thrombocytopenia 07/08/2020   • Lung abnormality- left apex 07/09/2020   • Hepatic encephalopathy (CMS/HCC) 12/25/2020     Resolved Ambulatory Problems     Diagnosis Date Noted   • Benign essential hypertension 10/29/2015   • Hyponatremia 07/06/2020     Past Medical History:   Diagnosis Date   • Alcoholism  (CMS/HCC)    • Cirrhosis (CMS/HCC)    • Encephalopathy    • Hypertension    • Liver disease          PAST SURGICAL HISTORY  History reviewed. No pertinent surgical history.      FAMILY HISTORY  Family History   Problem Relation Age of Onset   • Diabetes Mother    • Hypertension Father          SOCIAL HISTORY  Social History     Socioeconomic History   • Marital status:      Spouse name: Not on file   • Number of children: Not on file   • Years of education: Not on file   • Highest education level: Not on file   Tobacco Use   • Smoking status: Never Smoker   • Smokeless tobacco: Never Used   Substance and Sexual Activity   • Alcohol use: Not Currently     Comment: 1 bottle of wine per day, stopped drinking ETOH 2 weeks ago   • Drug use: No   • Sexual activity: Defer         ALLERGIES  Patient has no known allergies.        REVIEW OF SYSTEMS  Review of Systems     All systems reviewed and negative except for those discussed in HPI.       PHYSICAL EXAM    I have reviewed the triage vital signs and nursing notes.    ED Triage Vitals   Temp Heart Rate Resp BP SpO2   01/01/21 1952 01/01/21 1952 01/01/21 1952 01/01/21 2008 01/01/21 1952   (!) 101.8 °F (38.8 °C) 101 20 104/88 100 %      Temp src Heart Rate Source Patient Position BP Location FiO2 (%)   01/01/21 1952 01/01/21 1952 01/01/21 2008 01/01/21 2008 --   Tympanic Monitor Lying Right arm          Physical Exam    Physical Exam   Constitutional: No distress.  Not overtly toxic appearing  HENT:  Head: Normocephalic and atraumatic.   Oropharynx: Mucous membranes are moist.   Eyes: No scleral icterus. No conjunctival pallor.  Neck: Painless range of motion noted. Neck supple.   Cardiovascular: Normal rate, regular rhythm and intact distal pulses.  Pulmonary/Chest: No respiratory distress. There are no wheezes, no rhonchi, and no rales.   Abdominal: Soft. There is no tenderness. There is no rebound and no guarding.   Musculoskeletal: Moves all extremities equally.  There is no pedal edema or calf tenderness.   Neurological: Alert.  Baseline strength and sensation noted.   Skin: Skin is pink, warm, and dry. No pallor.   Psychiatric: Mood and affect normal.   Nursing note and vitals reviewed.    LAB RESULTS  Recent Results (from the past 24 hour(s))   Comprehensive Metabolic Panel    Collection Time: 01/01/21  9:25 PM    Specimen: Blood   Result Value Ref Range    Glucose 147 (H) 65 - 99 mg/dL    BUN 14 8 - 23 mg/dL    Creatinine 0.95 0.76 - 1.27 mg/dL    Sodium 129 (L) 136 - 145 mmol/L    Potassium 5.0 3.5 - 5.2 mmol/L    Chloride 101 98 - 107 mmol/L    CO2 18.0 (L) 22.0 - 29.0 mmol/L    Calcium 8.3 (L) 8.6 - 10.5 mg/dL    Total Protein 6.8 6.0 - 8.5 g/dL    Albumin 2.60 (L) 3.50 - 5.20 g/dL    ALT (SGPT) 27 1 - 41 U/L    AST (SGOT) 50 (H) 1 - 40 U/L    Alkaline Phosphatase 140 (H) 39 - 117 U/L    Total Bilirubin 2.7 (H) 0.0 - 1.2 mg/dL    eGFR Non African Amer 80 >60 mL/min/1.73    Globulin 4.2 gm/dL    A/G Ratio 0.6 g/dL    BUN/Creatinine Ratio 14.7 7.0 - 25.0    Anion Gap 10.0 5.0 - 15.0 mmol/L   CBC Auto Differential    Collection Time: 01/01/21  9:25 PM    Specimen: Blood   Result Value Ref Range    WBC 18.22 (H) 3.40 - 10.80 10*3/mm3    RBC 2.80 (L) 4.14 - 5.80 10*6/mm3    Hemoglobin 9.2 (L) 13.0 - 17.7 g/dL    Hematocrit 27.0 (L) 37.5 - 51.0 %    MCV 96.4 79.0 - 97.0 fL    MCH 32.9 26.6 - 33.0 pg    MCHC 34.1 31.5 - 35.7 g/dL    RDW 14.5 12.3 - 15.4 %    RDW-SD 51.1 37.0 - 54.0 fl    MPV 10.2 6.0 - 12.0 fL    Platelets 90 (L) 140 - 450 10*3/mm3    Neutrophil % 87.4 (H) 42.7 - 76.0 %    Lymphocyte % 1.6 (L) 19.6 - 45.3 %    Monocyte % 9.7 5.0 - 12.0 %    Eosinophil % 0.0 (L) 0.3 - 6.2 %    Basophil % 0.3 0.0 - 1.5 %    Immature Grans % 1.0 (H) 0.0 - 0.5 %    Neutrophils, Absolute 15.92 (H) 1.70 - 7.00 10*3/mm3    Lymphocytes, Absolute 0.29 (L) 0.70 - 3.10 10*3/mm3    Monocytes, Absolute 1.77 (H) 0.10 - 0.90 10*3/mm3    Eosinophils, Absolute 0.00 0.00 - 0.40 10*3/mm3     Basophils, Absolute 0.06 0.00 - 0.20 10*3/mm3    Immature Grans, Absolute 0.18 (H) 0.00 - 0.05 10*3/mm3    nRBC 0.0 0.0 - 0.2 /100 WBC   Lipase    Collection Time: 01/01/21  9:25 PM    Specimen: Blood   Result Value Ref Range    Lipase 68 (H) 13 - 60 U/L   Respiratory Panel PCR w/COVID-19(SARS-CoV-2) DAXA/YANELI/MARISABEL/PAD/COR/MAD/AGGIE In-House, NP Swab in UTM/VTM, 3-4 HR TAT - Swab, Nasopharynx    Collection Time: 01/01/21  9:26 PM    Specimen: Nasopharynx; Swab   Result Value Ref Range    ADENOVIRUS, PCR Not Detected Not Detected    Coronavirus 229E Not Detected Not Detected    Coronavirus HKU1 Not Detected Not Detected    Coronavirus NL63 Not Detected Not Detected    Coronavirus OC43 Not Detected Not Detected    COVID19 Not Detected Not Detected - Ref. Range    Human Metapneumovirus Not Detected Not Detected    Human Rhinovirus/Enterovirus Not Detected Not Detected    Influenza A PCR Not Detected Not Detected    Influenza B PCR Not Detected Not Detected    Parainfluenza Virus 1 Not Detected Not Detected    Parainfluenza Virus 2 Not Detected Not Detected    Parainfluenza Virus 3 Not Detected Not Detected    Parainfluenza Virus 4 Not Detected Not Detected    RSV, PCR Not Detected Not Detected    Bordetella pertussis pcr Not Detected Not Detected    Bordetella parapertussis PCR Not Detected Not Detected    Chlamydophila pneumoniae PCR Not Detected Not Detected    Mycoplasma pneumo by PCR Not Detected Not Detected   Lactic Acid, Plasma    Collection Time: 01/01/21 10:18 PM    Specimen: Blood   Result Value Ref Range    Lactate 4.7 (C) 0.5 - 2.0 mmol/L   Urinalysis With Culture If Indicated - Urine, Clean Catch    Collection Time: 01/01/21 10:22 PM    Specimen: Urine, Clean Catch   Result Value Ref Range    Color, UA Dark Yellow (A) Yellow, Straw    Appearance, UA Clear Clear    pH, UA 5.5 5.0 - 8.0    Specific Gravity, UA 1.029 1.005 - 1.030    Glucose, UA Negative Negative    Ketones, UA Trace (A) Negative    Bilirubin, UA  Negative Negative    Blood, UA Negative Negative    Protein, UA Negative Negative    Leuk Esterase, UA Negative Negative    Nitrite, UA Negative Negative    Urobilinogen, UA 1.0 E.U./dL 0.2 - 1.0 E.U./dL       Ordered the above labs and independently reviewed the results.        RADIOLOGY  No Radiology Exams Resulted Within Past 24 Hours    I ordered the above noted radiological studies. Reviewed by me and discussed with radiologist.  See dictation for official radiology interpretation.      PROCEDURES    Procedures        MEDICATIONS GIVEN IN ER    Medications   sodium chloride 0.9 % flush 10 mL (10 mL Intravenous Given 1/1/21 2127)   piperacillin-tazobactam (ZOSYN) 3.375 g in iso-osmotic dextrose 50 ml (premix) (has no administration in time range)   vancomycin 1500 mg/500 mL 0.9% NS IVPB ([facility]) (has no administration in time range)   sodium chloride 0.9 % bolus 2,067 mL (has no administration in time range)   sodium chloride 0.9 % bolus 1,000 mL (1,000 mL Intravenous New Bag 1/1/21 2129)   acetaminophen (TYLENOL) tablet 1,000 mg (1,000 mg Oral Given 1/1/21 2118)   iopamidol (ISOVUE-300) 61 % injection 100 mL (85 mL Intravenous Given by Other 1/1/21 2307)         PROGRESS, DATA ANALYSIS, CONSULTS, AND MEDICAL DECISION MAKING    By differential diagnosis for fever includes but is not limited:  To viral infections, bacterial infections, fungal infections, fever of unknown origin, auto regulatory dysfunction, hyperthermia, heat exhaustion, heat stroke      All labs have been independently reviewed by me.  All radiology studies have been reviewed by me and discussed with radiologist dictating the report.   EKG's independently viewed and interpreted by me.  Discussion below represents my analysis of pertinent findings related to patient's condition, differential diagnosis, treatment plan and final disposition.      ED Course as of Jan 01 2327 Fri Jan 01, 2021 2213 Stable chronic anemia   Hemoglobin(!): 9.2 [RS]    2234 COVID19: Not Detected [RS]   2235 Sodium(!): 129 [RS]   2235 Near baseline   Total Bilirubin(!): 2.7 [RS]   2235 Stable thrombocytopenia   Platelets(!): 90 [RS]   2254 Initiate antibiotics and IV fluid bolus.   Lactate(!!): 4.7 [RS]   2255 Stable from prior   Lipase(!): 68 [RS]   2256 RADIOLOGY        Study: Chest x-ray-1 view    Findings: No acute abnormality    Interpreted contemporaneously with treatment by Dr. Mckeon-radiologist, independently viewed by me        [RS]   2257 I have reviewed lab results and chest x-ray result with the patient.  I recommend IV fluids, antibiotics and admission as noted before.  He is agreeable with this plan.  CT abdomen pending.  We will go ahead and consult with the hospitalist.    [RS]   2314 CONSULT        Provider: MILAGROS Gregory - [facility]    Discussion: Reviewed patient history, ED presentation and evaluation as well as pending CT result and antibiotics that have been initiated in the ED.  She is agreeable to accept this patient for full admission with telemetry on behalf of Dr. Quezada.    Agreeable c treatment and planned disposition.            [RS]      ED Course User Index  [RS] Vasquez Carroll MD       AS OF 23:27 EST VITALS:    BP - 118/61  HR - 103  TEMP - 100 °F (37.8 °C) (Oral)  O2 SATS - 100%        DIAGNOSIS  Final diagnoses:   Sepsis, due to unspecified organism, unspecified whether acute organ dysfunction present (CMS/HCC)   Fever in adult   Thrombocytopenia (CMS/HCC)   History of cirrhosis         DISPOSITION  ADMISSION    Discussed treatment plan and reason for admission with pt/family and admitting physician.  Pt/family voiced understanding of the plan for admission for further testing/treatment as needed.          Vasquez Carroll MD  01/01/21 6021

## 2021-01-03 LAB
ALBUMIN SERPL-MCNC: 1.9 G/DL (ref 3.5–5.2)
ALBUMIN/GLOB SERPL: 0.6 G/DL
ALP SERPL-CCNC: 88 U/L (ref 39–117)
ALT SERPL W P-5'-P-CCNC: 21 U/L (ref 1–41)
AMMONIA BLD-SCNC: 81 UMOL/L (ref 16–60)
ANION GAP SERPL CALCULATED.3IONS-SCNC: 6 MMOL/L (ref 5–15)
ANISOCYTOSIS BLD QL: ABNORMAL
AST SERPL-CCNC: 43 U/L (ref 1–40)
BILIRUB SERPL-MCNC: 2.2 MG/DL (ref 0–1.2)
BUN SERPL-MCNC: 14 MG/DL (ref 8–23)
BUN/CREAT SERPL: 17.5 (ref 7–25)
BURR CELLS BLD QL SMEAR: ABNORMAL
CALCIUM SPEC-SCNC: 7.3 MG/DL (ref 8.6–10.5)
CHLORIDE SERPL-SCNC: 108 MMOL/L (ref 98–107)
CO2 SERPL-SCNC: 18 MMOL/L (ref 22–29)
CREAT SERPL-MCNC: 0.8 MG/DL (ref 0.76–1.27)
DEPRECATED RDW RBC AUTO: 46 FL (ref 37–54)
EOSINOPHIL # BLD MANUAL: 0.48 10*3/MM3 (ref 0–0.4)
EOSINOPHIL NFR BLD MANUAL: 4.1 % (ref 0.3–6.2)
ERYTHROCYTE [DISTWIDTH] IN BLOOD BY AUTOMATED COUNT: 13.8 % (ref 12.3–15.4)
GFR SERPL CREATININE-BSD FRML MDRD: 98 ML/MIN/1.73
GLOBULIN UR ELPH-MCNC: 3.2 GM/DL
GLUCOSE SERPL-MCNC: 120 MG/DL (ref 65–99)
HCT VFR BLD AUTO: 20.2 % (ref 37.5–51)
HGB BLD-MCNC: 7.1 G/DL (ref 13–17.7)
INR PPP: 2.91 (ref 0.9–1.1)
LYMPHOCYTES # BLD MANUAL: 0.71 10*3/MM3 (ref 0.7–3.1)
LYMPHOCYTES NFR BLD MANUAL: 4.1 % (ref 5–12)
LYMPHOCYTES NFR BLD MANUAL: 6.1 % (ref 19.6–45.3)
MCH RBC QN AUTO: 32.6 PG (ref 26.6–33)
MCHC RBC AUTO-ENTMCNC: 35.1 G/DL (ref 31.5–35.7)
MCV RBC AUTO: 92.7 FL (ref 79–97)
MONOCYTES # BLD AUTO: 0.48 10*3/MM3 (ref 0.1–0.9)
NEUTROPHILS # BLD AUTO: 9.98 10*3/MM3 (ref 1.7–7)
NEUTROPHILS NFR BLD MANUAL: 85.7 % (ref 42.7–76)
OVALOCYTES BLD QL SMEAR: ABNORMAL
PLAT MORPH BLD: NORMAL
PLATELET # BLD AUTO: 45 10*3/MM3 (ref 140–450)
PMV BLD AUTO: 10.5 FL (ref 6–12)
POLYCHROMASIA BLD QL SMEAR: ABNORMAL
POTASSIUM SERPL-SCNC: 4.4 MMOL/L (ref 3.5–5.2)
PROT SERPL-MCNC: 5.1 G/DL (ref 6–8.5)
PROTHROMBIN TIME: 30.2 SECONDS (ref 11.7–14.2)
RBC # BLD AUTO: 2.18 10*6/MM3 (ref 4.14–5.8)
SODIUM SERPL-SCNC: 132 MMOL/L (ref 136–145)
VANCOMYCIN TROUGH SERPL-MCNC: 12.1 MCG/ML (ref 5–20)
WBC # BLD AUTO: 11.65 10*3/MM3 (ref 3.4–10.8)
WBC MORPH BLD: NORMAL

## 2021-01-03 PROCEDURE — 25010000002 PIPERACILLIN SOD-TAZOBACTAM PER 1 G: Performed by: INTERNAL MEDICINE

## 2021-01-03 PROCEDURE — 99232 SBSQ HOSP IP/OBS MODERATE 35: CPT | Performed by: INTERNAL MEDICINE

## 2021-01-03 PROCEDURE — 25010000002 ALBUMIN HUMAN 25% PER 50 ML: Performed by: INTERNAL MEDICINE

## 2021-01-03 PROCEDURE — 87040 BLOOD CULTURE FOR BACTERIA: CPT | Performed by: INTERNAL MEDICINE

## 2021-01-03 PROCEDURE — 80202 ASSAY OF VANCOMYCIN: CPT | Performed by: INTERNAL MEDICINE

## 2021-01-03 PROCEDURE — 85007 BL SMEAR W/DIFF WBC COUNT: CPT | Performed by: INTERNAL MEDICINE

## 2021-01-03 PROCEDURE — P9047 ALBUMIN (HUMAN), 25%, 50ML: HCPCS | Performed by: INTERNAL MEDICINE

## 2021-01-03 PROCEDURE — 25010000002 VANCOMYCIN PER 500 MG: Performed by: INTERNAL MEDICINE

## 2021-01-03 PROCEDURE — 80053 COMPREHEN METABOLIC PANEL: CPT | Performed by: INTERNAL MEDICINE

## 2021-01-03 PROCEDURE — 25010000003 CEFTRIAXONE PER 250 MG: Performed by: INTERNAL MEDICINE

## 2021-01-03 PROCEDURE — 82140 ASSAY OF AMMONIA: CPT | Performed by: INTERNAL MEDICINE

## 2021-01-03 PROCEDURE — 85025 COMPLETE CBC W/AUTO DIFF WBC: CPT | Performed by: INTERNAL MEDICINE

## 2021-01-03 PROCEDURE — 99254 IP/OBS CNSLTJ NEW/EST MOD 60: CPT | Performed by: INTERNAL MEDICINE

## 2021-01-03 PROCEDURE — 85610 PROTHROMBIN TIME: CPT | Performed by: INTERNAL MEDICINE

## 2021-01-03 RX ORDER — CEFTRIAXONE SODIUM 2 G/50ML
2 INJECTION, SOLUTION INTRAVENOUS EVERY 24 HOURS
Status: DISCONTINUED | OUTPATIENT
Start: 2021-01-03 | End: 2021-01-06 | Stop reason: HOSPADM

## 2021-01-03 RX ORDER — ALBUMIN (HUMAN) 12.5 G/50ML
75 SOLUTION INTRAVENOUS ONCE
Status: COMPLETED | OUTPATIENT
Start: 2021-01-05 | End: 2021-01-05

## 2021-01-03 RX ORDER — ALBUMIN (HUMAN) 12.5 G/50ML
100 SOLUTION INTRAVENOUS ONCE
Status: COMPLETED | OUTPATIENT
Start: 2021-01-03 | End: 2021-01-03

## 2021-01-03 RX ADMIN — SODIUM CHLORIDE 50 ML/HR: 9 INJECTION, SOLUTION INTRAVENOUS at 20:50

## 2021-01-03 RX ADMIN — VANCOMYCIN HYDROCHLORIDE 1000 MG: 1 INJECTION, SOLUTION INTRAVENOUS at 11:37

## 2021-01-03 RX ADMIN — SODIUM CHLORIDE, PRESERVATIVE FREE 10 ML: 5 INJECTION INTRAVENOUS at 20:50

## 2021-01-03 RX ADMIN — LACTULOSE 10 G: 10 SOLUTION ORAL at 20:50

## 2021-01-03 RX ADMIN — VANCOMYCIN HYDROCHLORIDE 1000 MG: 1 INJECTION, SOLUTION INTRAVENOUS at 00:14

## 2021-01-03 RX ADMIN — ALBUMIN HUMAN 100 G: 0.25 SOLUTION INTRAVENOUS at 12:49

## 2021-01-03 RX ADMIN — METOPROLOL TARTRATE 12.5 MG: 25 TABLET, FILM COATED ORAL at 08:52

## 2021-01-03 RX ADMIN — PANTOPRAZOLE SODIUM 40 MG: 40 TABLET, DELAYED RELEASE ORAL at 08:52

## 2021-01-03 RX ADMIN — SODIUM CHLORIDE, PRESERVATIVE FREE 10 ML: 5 INJECTION INTRAVENOUS at 08:54

## 2021-01-03 RX ADMIN — TAZOBACTAM SODIUM AND PIPERACILLIN SODIUM 3.38 G: 375; 3 INJECTION, SOLUTION INTRAVENOUS at 06:33

## 2021-01-03 RX ADMIN — CHOLECALCIFEROL TAB 10 MCG (400 UNIT) 800 UNITS: 10 TAB at 08:52

## 2021-01-03 RX ADMIN — CEFTRIAXONE SODIUM 2 G: 2 INJECTION, SOLUTION INTRAVENOUS at 15:27

## 2021-01-03 RX ADMIN — LACTULOSE 10 G: 10 SOLUTION ORAL at 08:53

## 2021-01-03 NOTE — PLAN OF CARE
Goal Outcome Evaluation:  Plan of Care Reviewed With: patient  Progress: improving  Outcome Summary: Pt very pleasant.No complains. Denies N/V. C.diff result was negative. ID consulted due to sepsis.100g IV Albumin has been given per GI . Hgb was 7.1 this am. GI and LHA .will continue to monitor.

## 2021-01-03 NOTE — PROGRESS NOTES
Name: Wei Potts ADMIT: 2021   : 1955  PCP: Anaid Kilgore APRN    MRN: [id number] LOS: 2 days   AGE/SEX: 65 y.o. male  ROOM: [facility]     Subjective   Subjective   No new problems noted this morning.  Patient without complaint    Review of Systems   Constitutional: Negative.    Respiratory: Negative.    Cardiovascular: Negative.    Gastrointestinal: Negative.         Objective   Objective   Vital Signs  Temp:  [98 °F (36.7 °C)-98.6 °F (37 °C)] 98 °F (36.7 °C)  Heart Rate:  [] 101  Resp:  [18] 18  BP: ()/(49-60) 112/60  SpO2:  [96 %-98 %] 98 %  on   ;   Device (Oxygen Therapy): room air  Body mass index is 21.2 kg/m².  Physical Exam  Vitals signs and nursing note reviewed.   Constitutional:       General: He is not in acute distress.     Appearance: He is normal weight.   HENT:      Head: Normocephalic and atraumatic.      Nose: Nose normal.      Mouth/Throat:      Pharynx: Oropharynx is clear.   Eyes:      General: No scleral icterus.     Extraocular Movements: Extraocular movements intact.   Neck:      Musculoskeletal: Neck supple.   Cardiovascular:      Rate and Rhythm: Normal rate and regular rhythm.      Pulses: Normal pulses.      Heart sounds: Normal heart sounds.   Pulmonary:      Effort: Pulmonary effort is normal.      Breath sounds: Normal breath sounds.   Abdominal:      General: Abdomen is flat. Bowel sounds are normal. There is no distension.      Palpations: Abdomen is soft.   Musculoskeletal: Normal range of motion.   Skin:     General: Skin is warm and dry.      Capillary Refill: Capillary refill takes less than 2 seconds.   Neurological:      General: No focal deficit present.      Mental Status: He is alert and oriented to person, place, and time.   Psychiatric:         Mood and Affect: Mood normal.         Results Review     I reviewed the patient's new clinical results.  Results from last 7 days   Lab Units 21  0400 21  0219 215   WBC  10*3/mm3 11.65* 17.96* 18.22*   HEMOGLOBIN g/dL 7.1* 7.9* 9.2*   PLATELETS 10*3/mm3 45* 59* 90*     Results from last 7 days   Lab Units 01/03/21  0400 01/02/21  1236 01/02/21 0219 01/01/21  2125   SODIUM mmol/L 132* 128* 130* 129*   POTASSIUM mmol/L 4.4 4.6 5.2 5.0   CHLORIDE mmol/L 108* 104 106 101   CO2 mmol/L 18.0* 14.9* 15.0* 18.0*   BUN mg/dL 14 17 15 14   CREATININE mg/dL 0.80 0.89 0.79 0.95   GLUCOSE mg/dL 120* 131* 102* 147*   Estimated Creatinine Clearance: 92.1 mL/min (by C-G formula based on SCr of 0.8 mg/dL).  Results from last 7 days   Lab Units 01/03/21  0400 01/02/21  1236 01/01/21  2125   ALBUMIN g/dL 1.90* 2.20* 2.60*   BILIRUBIN mg/dL 2.2* 3.0* 2.7*   ALK PHOS U/L 88 86 140*   AST (SGOT) U/L 43* 51* 50*   ALT (SGPT) U/L 21 24 27     Results from last 7 days   Lab Units 01/03/21  0400 01/02/21  1236 01/02/21 0219 01/01/21  2125   CALCIUM mg/dL 7.3* 7.4* 7.2* 8.3*   ALBUMIN g/dL 1.90* 2.20*  --  2.60*     Results from last 7 days   Lab Units 01/02/21  0219 01/01/21  2218   LACTATE mmol/L 3.9* 4.7*     COVID19   Date Value Ref Range Status   01/01/2021 Not Detected Not Detected - Ref. Range Final   12/25/2020 Not Detected Not Detected - Ref. Range Final     No results found for: HGBA1C, POCGLU    US Abdomen Complete  US ABDOMEN COMPLETE-     Clinical: Ascites, alcoholic liver disease/cirrhosis, sepsis     COMPARISON CT of the abdomen and pelvis 1/1/2021     FINDINGS: Visualized portions of the pancreas have a satisfactory  appearance. Visualized portions of the abdominal aorta are normal in  diameter, no aneurysm.     There is free intraperitoneal fluid. The liver is small in size and  there is superficial irregularity, the overall appearance is compatible  with cirrhosis. There is a small liver cyst located within the left  lobe. There is gallbladder wall thickening, no gallstones seen. This  could be secondary to acalculus cholecystitis or hypoalbuminemia.     The right kidney is normal measuring  11.7 cm in length. No obstructive  uropathy. The left kidney is normal measuring 13.1 cm in length. Spleen  is enlarged, 16.9 cm in maximum length. No biliary duct dilatation CBD  is 5 mm.     Visualized inferior vena cava within normal limits.     CONCLUSION:  1. Morphologic appearance of the liver is consistent with cirrhosis.  2. Splenomegaly.  3. Free intraperitoneal fluid.     This report was finalized on 1/2/2021 7:29 PM by Dr. Felix Banda M.D.     US Paracentesis  Narrative: US PARACENTESIS-     HISTORY: Ascites     PROCEDURE: Ultrasound guided paracentesis.     FINDINGS: Informed consent was obtained. Preliminary sonography of the  abdomen was performed. Fluid localized. The overlying skin was prepped  in the usual sterile fashion. Local anesthesia was achieved with 1%  lidocaine. A small nick was made in the skin with a scalpel. The needle  catheter device was inserted through the nick under sonographic  guidance. The needle was removed and the catheter remained and was  connected to suction. 1.8 liters of yellow fluid was withdrawn. The  patient tolerated procedure without evidence of complication and left  the procedure room in stable condition.  .  Impression: 1. Technically successful ultrasound-guided paracentesis.     This report was finalized on 1/2/2021 7:09 PM by Dr. Felix Banda M.D.     XR Chest 1 View  Narrative: SINGLE VIEW CHEST RADIOGRAPH     HISTORY: 65-year-old male with a history of fever, concern for COVID-19.     FINDINGS: A semierect AP portable chest radiograph was obtained.  Comparison is made to a chest CT dated 07/09/2020. The lungs are clear  and are normal in volume. The cardiomediastinal silhouette and pulmonary  vasculature appear normal. No bony abnormality is appreciated.     Impression: Negative chest radiograph.     This report was finalized on 1/2/2021 6:48 PM by Dr. Randall Mckeon M.D.       Scheduled Medications  cholecalciferol, 800 Units, Oral, Daily  ferrous  sulfate, 325 mg, Oral, BID With Meals  furosemide, 40 mg, Oral, Daily  lactulose, 10 g, Oral, BID  metoprolol tartrate, 12.5 mg, Oral, Daily  pantoprazole, 40 mg, Oral, Daily  piperacillin-tazobactam, 3.375 g, Intravenous, Q8H  sodium chloride, 10 mL, Intravenous, Q12H  spironolactone, 100 mg, Oral, Daily  vancomycin, 15 mg/kg, Intravenous, Q12H    Infusions  Pharmacy to dose vancomycin,   sodium chloride, 50 mL/hr, Last Rate: 50 mL/hr (01/02/21 0359)    Diet  Diet Regular; Low Sodium; 2,000 mg Na       Assessment/Plan     Active Hospital Problems    Diagnosis  POA   • **Colitis [K52.9]  Unknown   • Hypertension [I10]  Unknown   • GERD (gastroesophageal reflux disease) [K21.9]  Unknown   • Sepsis (CMS/HCC) [A41.9]  Yes   • 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       65 y.o. male admitted with Colitis.    · Sepsis/SBP/strep bacteremia: Blood cultures with alpha hemolytic strep.  Was empirically placed on vancomycin and Zosyn.  Paracentesis performed 1/2/2021 consistent with SBP.  Have discussed with GI.  Will consult infectious disease.  Clinically patient is much improved from admission will defer antibiotics to ID suspect they can be deescalated to cephalosporin.  · Chronic liver disease with cirrhosis hepatic encephalopathy thrombocytopenia anemia: CT scan with possible early colitis noted on admission.  Ammonia level remained stable patient is awake and alert not lethargic, continue lactulose he has not tolerated Xifaxan in the past.  Hemoglobin and platelets have  decreased some from yesterday both with GI he is going to need EGD and colonoscopy at some point.  · Hypertension: Blood pressures are well controlled  · GERD: Continue his PPI  · Hyponatremia: Improved, continue to follow his BMP  · SCDs for DVT prophylaxis.  ·   · Full code.  · Discussed  with patient, nursing staff and consulting provider.  · Anticipate discharge home later this week.      Juan David Coronado MD  [facility]  01/03/21  10:50 EST    Patient was wearing facemask when I entered the room and throughout our encounter.  I wore protective equipment throughout this patient encounter including a face mask, gloves and protective eyewear.  Hand hygiene was performed before donning protective equipment and after removal when leaving the room.

## 2021-01-03 NOTE — CONSULTS
Referring Provider: Tito Quezada MD  [address]  [place] [address]  Reason for Consultation: SBP    Subjective   History of present illness: This is a 65-year-old male with alcoholic liver cirrhosis and hypertension who was admitted on January 1 with fever  Patient was last admitted to [facility] from December 25-26 with acute mental status.  He was diagnosed with hepatic encephalopathy and treated with lactulose.  He presented back to the emergency room on January 1 with complaints of fever generalized weakness nausea vomiting and diarrhea.  Admission blood work revealed a white blood cell count of 18,000.  CAT scan of the abdomen pelvis showed liver cirrhosis splenomegaly colitis and ascites.  [place] blood cultures are growing alpha hemolytic strep x2.  He was empirically started on vancomycin and Zosyn.  He underwent paracentesis on January 2 with findings of 3860 nucleated cells with 79% neutrophils.  Cultures are negative to date.  Currently the patient denies any abdominal pain he reports some discomfort at the site of paracentesis.  He denies any more episodes of nausea vomiting or diarrhea.  No shortness of breath cough rhinorrhea or sore throat.  Tolerating antibiotics without a rash    Past Medical History:   Diagnosis Date   • Alcoholism (CMS/HCC)    • Cirrhosis (CMS/HCC)    • Encephalopathy    • Hypertension    • Liver disease         reports that he has never smoked. He has never used smokeless tobacco. He reports previous alcohol use. He reports that he does not use drugs.    family history includes Diabetes in his mother; Hypertension in his father.    No Known Allergies    Medication:  Antibiotics:  Zosyn 3.375 g IV every 8 hours  Vancomycin dosing per pharmacy    Please refer to the medical record for a full medication list    Review of Systems  Pertinent items are noted in HPI, all other systems reviewed and negative    Objective   Vital Signs   Temp:  [97.4  °F (36.3 °C)-98.6 °F (37 °C)] 97.4 °F (36.3 °C)  Heart Rate:  [] 74  Resp:  [18] 18  BP: ()/(49-64) 102/64    Physical Exam:   General: In no acute distress  HEENT: Normocephalic, atraumatic, PERRL,  no scleral icterus. Oropharynx is clear and moist  Neck: Supple, trachea is midline  Cardiovascular: Normal rate, regular rhythm, normal S1 and S2, no murmurs, rubs, or gallops   Respiratory: Lungs are clear to auscultation bilaterally, no wheezing  GI: Abdomen is soft, nontender, distended, positive bowel sounds  Musculoskeletal: no edema, tenderness or deformity  Skin: No rashes  Extremities: No E/C/C  Neurological: Alert and oriented, moving all four extremities  Psychiatric: Normal mood and affect     Results Review:   I reviewed the patient's new clinical results.  I reviewed the patient's new imaging results and agree with the interpretation.    Lab Results   Component Value Date    WBC 11.65 (H) 01/03/2021    HGB 7.1 (L) 01/03/2021    HCT 20.2 (C) 01/03/2021    MCV 92.7 01/03/2021    PLT 45 (C) 01/03/2021       Lab Results   Component Value Date    GLUCOSE 120 (H) 01/03/2021    BUN 14 01/03/2021    CREATININE 0.80 01/03/2021    EGFRIFNONA 98 01/03/2021    EGFRIFAFRI 107 12/03/2020    BCR 17.5 01/03/2021    CO2 18.0 (L) 01/03/2021    CALCIUM 7.3 (L) 01/03/2021    PROTENTOTREF 6.8 12/03/2020    ALBUMIN 1.90 (L) 01/03/2021    LABIL2 0.7 12/03/2020    AST 43 (H) 01/03/2021    ALT 21 01/03/2021     Lipase 68  Lactate is 4.7 -> 3.9    Paracentesis with 3860 nucleated cells and 79% neutrophils red blood cells 465    Microbiology:  1/2 C diff neg  1/2 GI PCR neg  1/2 paracentesis culture NGTD  1/1 BCx alphahemolytic strep x 2  1/1 RVP/COVID neg    Radiology:  Admission chest x-ray personally reviewed by me shows no infiltrates or pleural effusion    CT of the abdomen pelvis shows circumferential thickening of the ascending and transverse colon.  Cirrhosis.  Splenomegaly and ascites    Ultrasound of the  abdomen shows liver findings consistent with cirrhosis splenomegaly.    Assessment/Plan   Streptococcus septicemia  SBP based on cell count  Alcoholic liver cirrhosis  Fever  Leukocytosis    Based on cell counts ascitic fluid consistent with SBP.  Follow-up cultures.  Blood cultures with alpha hemolytic strep.  Repeat blood cultures x2.  Discontinue vancomycin and Zosyn and start ceftriaxone 2 g IV every 24 hours.  Anticipate a 10-day course of antibiotics    I discussed the patient's findings and my recommendations with patient and nursing staff

## 2021-01-03 NOTE — PROGRESS NOTES
[facility]  Inpatient Progress Note    Reason for Follow Up: SBP, gram-negative sepsis    Subjective     Interval History:   Paracentesis yesterday with removal of 1.8 L.  Fluid consistent with SBP by neutrophil count.  He feels better overall.  No abdominal pain today with the exception of some discomfort where he had a paracentesis.  Appetite is okay.  No nausea or vomiting    Current Facility-Administered Medications:   •  acetaminophen (TYLENOL) tablet 650 mg, 650 mg, Oral, Q4H PRN **OR** acetaminophen (TYLENOL) 160 MG/5ML solution 650 mg, 650 mg, Oral, Q4H PRN **OR** acetaminophen (TYLENOL) suppository 650 mg, 650 mg, Rectal, Q4H PRN, Anaid Magana APRN  •  bisacodyl (DULCOLAX) EC tablet 5 mg, 5 mg, Oral, Daily PRN, Anaid Magana APRN  •  calcium carbonate (TUMS) chewable tablet 500 mg (200 mg elemental), 2 tablet, Oral, BID PRN, Anaid Magana APRN  •  cholecalciferol (VITAMIN D3) tablet 800 Units, 800 Units, Oral, Daily, Juan David Coronado MD, 800 Units at 01/03/21 0852  •  ferrous sulfate tablet 325 mg, 325 mg, Oral, BID With Meals, Juan David Coronado MD, 325 mg at 01/02/21 1835  •  furosemide (LASIX) tablet 40 mg, 40 mg, Oral, Daily, Juan David Coronado MD  •  lactulose (CHRONULAC) 10 GM/15ML solution 10 g, 10 g, Oral, BID, Juan David Coronado MD, 10 g at 01/03/21 0853  •  Magnesium Sulfate 2 gram Bolus, followed by 8 gram infusion (total Mg dose 10 grams)- Mg less than or equal to 1mg/dL, 2 g, Intravenous, PRN **OR** Magnesium Sulfate 2 gram / 50mL Infusion (GIVE X 3 BAGS TO EQUAL 6GM TOTAL DOSE) - Mg 1.1 - 1.5 mg/dl, 2 g, Intravenous, PRN **OR** Magnesium Sulfate 4 gram infusion- Mg 1.6-1.9 mg/dL, 4 g, Intravenous, PRN, Jaun David Coronado MD  •  metoprolol tartrate (LOPRESSOR) tablet 12.5 mg, 12.5 mg, Oral, Daily, Juan David Coronado MD, 12.5 mg at 01/03/21 6782  •  nitroglycerin (NITROSTAT) SL tablet 0.4 mg, 0.4 mg, Sublingual, Q5 Min PRN, Anaid Magana APRN  •   ondansetron (ZOFRAN) tablet 4 mg, 4 mg, Oral, Q6H PRN **OR** ondansetron (ZOFRAN) injection 4 mg, 4 mg, Intravenous, Q6H PRN, Anaid Magana APRN  •  pantoprazole (PROTONIX) EC tablet 40 mg, 40 mg, Oral, Daily, Juan David Coronado MD, 40 mg at 01/03/21 0852  •  Pharmacy to dose vancomycin, , Does not apply, Continuous PRN, Anaid Magana APRN  •  piperacillin-tazobactam (ZOSYN) 3.375 g in iso-osmotic dextrose 50 ml (premix), 3.375 g, Intravenous, Q8H, Juan David Coronado MD, 3.375 g at 01/03/21 0633  •  potassium chloride (K-DUR,KLOR-CON) ER tablet 40 mEq, 40 mEq, Oral, PRN, Juan David Coronado MD  •  potassium chloride (KLOR-CON) packet 40 mEq, 40 mEq, Oral, PRN, Juan David Coronado MD  •  [COMPLETED] Insert peripheral IV, , , Once **AND** sodium chloride 0.9 % flush 10 mL, 10 mL, Intravenous, PRN, Vasquez Carroll MD, 10 mL at 01/01/21 2127  •  sodium chloride 0.9 % flush 10 mL, 10 mL, Intravenous, Q12H, Anaid Magana APRN, 10 mL at 01/03/21 0854  •  sodium chloride 0.9 % flush 10 mL, 10 mL, Intravenous, PRN, Anaid Magana APRN  •  sodium chloride 0.9 % infusion, 50 mL/hr, Intravenous, Continuous, Anaid Magana APRN, Last Rate: 50 mL/hr at 01/02/21 0359, 50 mL/hr at 01/02/21 0359  •  spironolactone (ALDACTONE) tablet 100 mg, 100 mg, Oral, Daily, Juan David Coronado MD  •  vancomycin (VANCOCIN) in iso-osmotic dextrose IVPB 1 g (premix) 200 mL, 15 mg/kg, Intravenous, Q12H, Juan David Coronado MD, 1,000 mg at 01/03/21 0014  Review of Systems:    Negative for fevers or chills, negative for abdominal pain or vomiting    Objective     Vital Signs  Temp:  [98 °F (36.7 °C)-98.6 °F (37 °C)] 98 °F (36.7 °C)  Heart Rate:  [] 101  Resp:  [18] 18  BP: ()/(49-60) 112/60  Body mass index is 21.2 kg/m².    Intake/Output Summary (Last 24 hours) at 1/3/2021 1018  Last data filed at 1/2/2021 1807  Gross per 24 hour   Intake --   Output 1800 ml   Net -1800 ml     No intake/output data recorded.     Physical  Exam:   General: patient awake, alert and cooperative   Eyes: Normal lids and lashes, no scleral icterus   Neck: supple, normal ROM   Skin: warm and dry, not jaundiced   Pulm:  regular and unlabored   Abdomen: soft, nontender, distended; normal bowel sounds   Extremities: no rash or edema   Psychiatric: Normal mood and behavior; memory intact     Results Review:     I reviewed the patient's new clinical results.    Results from last 7 days   Lab Units 01/03/21  0400 01/02/21  0219 01/01/21 2125   WBC 10*3/mm3 11.65* 17.96* 18.22*   HEMOGLOBIN g/dL 7.1* 7.9* 9.2*   HEMATOCRIT % 20.2* 23.2* 27.0*   PLATELETS 10*3/mm3 45* 59* 90*     Results from last 7 days   Lab Units 01/03/21  0400 01/02/21  1236 01/02/21  0219 01/01/21 2125   SODIUM mmol/L 132* 128* 130* 129*   POTASSIUM mmol/L 4.4 4.6 5.2 5.0   CHLORIDE mmol/L 108* 104 106 101   CO2 mmol/L 18.0* 14.9* 15.0* 18.0*   BUN mg/dL 14 17 15 14   CREATININE mg/dL 0.80 0.89 0.79 0.95   CALCIUM mg/dL 7.3* 7.4* 7.2* 8.3*   BILIRUBIN mg/dL 2.2* 3.0*  --  2.7*   ALK PHOS U/L 88 86  --  140*   ALT (SGPT) U/L 21 24  --  27   AST (SGOT) U/L 43* 51*  --  50*   GLUCOSE mg/dL 120* 131* 102* 147*     Results from last 7 days   Lab Units 01/03/21  0400 01/02/21  1616   INR  2.91* 2.0*     Lab Results   Lab Value Date/Time    LIPASE 68 (H) 01/01/2021 2125    LIPASE 133 (H) 07/22/2020 1759    LIPASE 59 07/07/2020 0633    LIPASE 65 (H) 07/06/2020 1630       Radiology:  US Abdomen Complete   Final Result      US Paracentesis   Final Result   1. Technically successful ultrasound-guided paracentesis.       This report was finalized on 1/2/2021 7:09 PM by Dr. Felix Banda M.D.          CT Abdomen Pelvis With Contrast   Final Result   1.There is circumferential thickening of the wall of the ascending and   transverse colon that is suggestive of colitis.   2. There is stable evidence of cirrhosis. Improved but persistent   splenomegaly and ascites is noted.       This report was finalized  on 1/1/2021 11:46 PM by Dr. Randall Mckeon M.D.          XR Chest 1 View   Final Result   Negative chest radiograph.       This report was finalized on 1/2/2021 6:48 PM by Dr. Randall Mckeon M.D.              Assessment/Plan     Patient Active Problem List   Diagnosis   • ED (erectile dysfunction) of organic origin   • Alcoholic cirrhosis of liver with ascites (CMS/HCC)   • Anemia   • Jaundice   • Coagulopathy (CMS/HCC)   • Secondary thrombocytopenia   • Lung abnormality- left apex   • Hepatic encephalopathy (CMS/HCC)   • Sepsis (CMS/HCC)   • Colitis   • Hypertension   • GERD (gastroesophageal reflux disease)       Assessment:  1. SBP   2. Gram-negative sepsis secondary to #1, stool studies negative-await sensitivities from culture  3. Abnormal CT with thickening of the colon-no diarrhea except for that which is consistent with his lactulose.  Likely portal colopathy secondary to liver disease  4. Ascites  5. Cirrhosis  6. History of encephalopathy  7. Anemia-no gross bleeding  8.  Taoist - pt's Jehovah's witness beliefs prevent blood products      Plan:  · Continue antibiotics for SBP and gram-negative sepsis-await sensitivities.  Leukocytosis improving.  · We will give IV albumin today given SBP, repeat on 1/5  · Will need SBP prophylaxis upon discharge-this is his first episode of SBP  · Continue to follow H&H-no gross bleeding.  He has not yet had an EGD and colonoscopy although this has been encouraged multiple times as an outpatient.    · Consider hematology consult given declining blood counts given Jehovah's witness believes preclude transfusion.  May need IV iron-on po currently  · 2 g sodium diet  · Continue lactulose for encephalopathy-he has not tolerated Xifaxan    I discussed the patients findings and my recommendations with patient and Dr Cornell.    Ruby Mcgee MD

## 2021-01-03 NOTE — PROGRESS NOTES
"Pharmacokinetic Evaluation - Vancomycin    Wei Potts is a 65 y.o. male on vancomycin pharmacy to dose.  MRN: [id number]  : 1955    Day of vancomycin therapy:   Indication: intra-abdominal infection  Target level: AUC24 400-600  Consulting Provider:  Anaid LINARES  Current Vancomycin Dose: 1000 mg (14.5 mg/kg) IV every 12 hours     Other Antimicrobials: pip-tazo 3.375g iv q8 E.I.     Blood pressure 112/60, pulse 101, temperature 98 °F (36.7 °C), temperature source Oral, resp. rate 18, height 180.3 cm (71\"), weight 68.9 kg (152 lb), SpO2 98 %.  Results from last 7 days   Lab Units 21  0400 21  1236 21  0219   CREATININE mg/dL 0.80 0.89 0.79     Estimated Creatinine Clearance: 92.1 mL/min (by C-G formula based on SCr of 0.8 mg/dL).  Results from last 7 days   Lab Units 21  0400 21  0219 21  2218 21  2125   WBC 10*3/mm3 11.65* 17.96*  --  18.22*   HEMOGLOBIN g/dL 7.1* 7.9*  --  9.2*   HEMATOCRIT % 20.2* 23.2*  --  27.0*   PLATELETS 10*3/mm3 45* 59*  --  90*   LACTATE mmol/L  --  3.9* 4.7*  --            Cultures:      Microbiology Results (last 10 days)     Procedure Component Value - Date/Time    Clostridium Difficile Toxin - Stool, Per Rectum [[id number]]  (Normal) Collected: 21 185    Lab Status: Final result Specimen: Stool from Per Rectum Updated: 21    Narrative:      The following orders were created for panel order Clostridium Difficile Toxin - Stool, Per Rectum.  Procedure                               Abnormality         Status                     ---------                               -----------         ------                     Clostridium Difficile To...[[id number]]  Normal              Final result                 Please view results for these tests on the individual orders.    Gastrointestinal Panel, PCR - Stool, Per Rectum [[id number]]  (Normal) Collected: 21 5572    Lab Status: Final result Specimen: Stool from " Per Rectum Updated: 01/02/21 2027     Campylobacter Not Detected     Plesiomonas shigelloides Not Detected     Salmonella Not Detected     Vibrio Not Detected     Vibrio cholerae Not Detected     Yersinia enterocolitica Not Detected     Enteroaggregative E. coli (EAEC) Not Detected     Enteropathogenic E. coli (EPEC) Not Detected     Enterotoxigenic E. coli (ETEC) lt/st Not Detected     Shiga-like toxin-producing E. coli (STEC) stx1/stx2 Not Detected     Shigella/Enteroinvasive E. coli (EIEC) Not Detected     Cryptosporidium Not Detected     Cyclospora cayetanensis Not Detected     Entamoeba histolytica Not Detected     Giardia lamblia Not Detected     Adenovirus F40/41 Not Detected     Astrovirus Not Detected     Norovirus GI/GII Not Detected     Rotavirus A Not Detected     Sapovirus (I, II, IV or V) Not Detected    Narrative:      If Aeromonas, Staphylococcus aureus or Bacillus cereus are suspected, please order [id number]: Stool Culture, Aeromonas, S aureus, B Cereus.    Clostridium Difficile Toxin, PCR - Stool, Per Rectum [[id number]]  (Normal) Collected: 01/02/21 1855    Lab Status: Final result Specimen: Stool from Per Rectum Updated: 01/02/21 2001     C. Difficile Toxins by PCR Negative    Body Fluid Culture - Body Fluid, Peritoneum [[id number]] Collected: 01/02/21 1648    Lab Status: Preliminary result Specimen: Body Fluid from Peritoneum Updated: 01/02/21 2039     Gram Stain Rare (1+) WBCs seen      No organisms seen    Blood Culture - Blood, Arm, Left [[id number]]  (Abnormal) Collected: 01/01/21 2256    Lab Status: Preliminary result Specimen: Blood from Arm, Left Updated: 01/03/21 0718     Blood Culture Streptococcus, Alpha Hemolytic     Isolated from Aerobic and Anaerobic Bottles     Gram Stain Anaerobic Bottle Gram positive cocci in pairs and chains     Comment: Modified report. Previous result was Gram positive cocci in pairs and chains on 1/2/2021 at 1122 EST.         Aerobic Bottle Gram positive cocci in  pairs and chains    Blood Culture - Blood, Arm, Left [[id number]]  (Abnormal) Collected: 01/01/21 2255    Lab Status: Preliminary result Specimen: Blood from Arm, Left Updated: 01/03/21 0718     Blood Culture Streptococcus, Alpha Hemolytic     Isolated from Anaerobic Bottle     Gram Stain Anaerobic Bottle Gram positive cocci in pairs    Blood Culture ID, PCR - Blood, Arm, Left [[id number]]  (Abnormal) Collected: 01/01/21 2255    Lab Status: Final result Specimen: Blood from Arm, Left Updated: 01/02/21 1214     BCID, PCR Streptococcus spp, not A, B, or pneumoniae. Identification by BCID PCR.    Respiratory Panel PCR w/COVID-19(SARS-CoV-2) DAXA/YANELI/MARISABEL/PAD/COR/MAD/AGGIE In-House, NP Swab in UTM/VTM, 3-4 HR TAT - Swab, Nasopharynx [[id number]]  (Normal) Collected: 01/01/21 2126    Lab Status: Final result Specimen: Swab from Nasopharynx Updated: 01/01/21 2233     ADENOVIRUS, PCR Not Detected     Coronavirus 229E Not Detected     Coronavirus HKU1 Not Detected     Coronavirus NL63 Not Detected     Coronavirus OC43 Not Detected     COVID19 Not Detected     Human Metapneumovirus Not Detected     Human Rhinovirus/Enterovirus Not Detected     Influenza A PCR Not Detected     Influenza B PCR Not Detected     Parainfluenza Virus 1 Not Detected     Parainfluenza Virus 2 Not Detected     Parainfluenza Virus 3 Not Detected     Parainfluenza Virus 4 Not Detected     RSV, PCR Not Detected     Bordetella pertussis pcr Not Detected     Bordetella parapertussis PCR Not Detected     Chlamydophila pneumoniae PCR Not Detected     Mycoplasma pneumo by PCR Not Detected    Narrative:      Fact sheet for providers: https://docs.[employer]/wp-content/uploads/PDZ8745-9660-BE8.1-EUA-Provider-Fact-Sheet-3.pdf    Fact sheet for patients: https://docs.[employer]/wp-content/uploads/VUL5644-1497-RP8.1-EUA-Patient-Fact-Sheet-1.pdf    Test performed by PCR.    COVID PRE-OP / PRE-PROCEDURE SCREENING ORDER (NO ISOLATION) - Swab, Nasopharynx [[id number]]   "(Normal) Collected: 12/25/20 0319    Lab Status: Final result Specimen: Swab from Nasopharynx Updated: 12/25/20 0942    Narrative:      The following orders were created for panel order COVID PRE-OP / PRE-PROCEDURE SCREENING ORDER (NO ISOLATION) - Swab, Nasopharynx.  Procedure                               Abnormality         Status                     ---------                               -----------         ------                     COVID-19,APTIMA PANTHER,...[[id number]]  Normal              Final result                 Please view results for these tests on the individual orders.    COVID-19,APTIMA PANTHER,[facility] IN-HOUSE, NP/OP SWAB IN UTM/VTM/[facility]ALINE TRANSPORT MEDIA,24 HR TAT - Swab, Nasopharynx [[id number]]  (Normal) Collected: 12/25/20 0319    Lab Status: Final result Specimen: Swab from Nasopharynx Updated: 12/25/20 0942     COVID19 Not Detected    Narrative:      Fact sheet for providers: https://www.fda.gov/media/190512/download     Fact sheet for patients: https://www.fda.gov/media/186177/download    Test performed by PCR.    Blood Culture - Blood, Arm, Left [[id number]] Collected: 12/25/20 0227    Lab Status: Final result Specimen: Blood from Arm, Left Updated: 12/30/20 0245     Blood Culture No growth at 5 days    Blood Culture - Blood, Arm, Right [[id number]] Collected: 12/25/20 0227    Lab Status: Final result Specimen: Blood from Arm, Right Updated: 12/30/20 0245     Blood Culture No growth at 5 days            Dosing hx (include troughs if drawn):  1/2 0004 1500 mg x 1. 1g  Q12:  1230  1/3 1g q12:  0014,  1237.   1116 trough=12.1 mcg/ml,          Assessment:  Bayesian analysis of the most recent level(s) using [employer] provides the following patient-specific pharmacokinetic parameters:   CL: 4.11 L/h   Vd: 56.1 L   T1/2: 10.4 h  If the predicted trough on this regimen is not within what was previously considered a \"target trough range\", the AUC24 (a stronger predictor of vancomycin efficacy) is " predicted to achieve the accepted target of 400-600 mg*h/L. To best balance efficacy and toxicity, we will be targeting AUC24 in this patient rather than steady-state trough levels.     Continuing the regimen of 1000 mg (14.5 mg/kg) IV every 12 hours  gives a predicted steady-state trough of 12.6 mg/L and AUC24 of 428 mg*h/L.    Infectious disease has been consulted. Will continue current dose and follow until dc.    Plan:  1) Continue vancomycin 1000 mg (14.5 mg/kg) IV every 12 hours    2) determine timing of next trough tomorrow based on renal function and intended duration of therapy as ID has been consulted and may change therapy.  3) Encourage adequate hydration if appropriate. Monitor for decreased UOP, rash or other signs of vancomycin intolerance.    Thanks for this consult, will follow until dc,  Lei Hubbard.D, BCCCP

## 2021-01-04 ENCOUNTER — APPOINTMENT (OUTPATIENT)
Dept: ULTRASOUND IMAGING | Facility: HOSPITAL | Age: 66
End: 2021-01-04

## 2021-01-04 LAB
ALBUMIN SERPL-MCNC: 2.7 G/DL (ref 3.5–5.2)
ALBUMIN/GLOB SERPL: 1.1 G/DL
ALP SERPL-CCNC: 78 U/L (ref 39–117)
ALT SERPL W P-5'-P-CCNC: 19 U/L (ref 1–41)
ANION GAP SERPL CALCULATED.3IONS-SCNC: 6.9 MMOL/L (ref 5–15)
AST SERPL-CCNC: 33 U/L (ref 1–40)
BACTERIA SPEC AEROBE CULT: ABNORMAL
BACTERIA SPEC AEROBE CULT: ABNORMAL
BASOPHILS # BLD AUTO: 0.04 10*3/MM3 (ref 0–0.2)
BASOPHILS NFR BLD AUTO: 0.9 % (ref 0–1.5)
BILIRUB SERPL-MCNC: 1.7 MG/DL (ref 0–1.2)
BUN SERPL-MCNC: 11 MG/DL (ref 8–23)
BUN/CREAT SERPL: 18.3 (ref 7–25)
CALCIUM SPEC-SCNC: 7.7 MG/DL (ref 8.6–10.5)
CHLORIDE SERPL-SCNC: 109 MMOL/L (ref 98–107)
CO2 SERPL-SCNC: 18.1 MMOL/L (ref 22–29)
CREAT SERPL-MCNC: 0.6 MG/DL (ref 0.76–1.27)
DEPRECATED RDW RBC AUTO: 46.6 FL (ref 37–54)
EOSINOPHIL # BLD AUTO: 0.4 10*3/MM3 (ref 0–0.4)
EOSINOPHIL NFR BLD AUTO: 8.5 % (ref 0.3–6.2)
ERYTHROCYTE [DISTWIDTH] IN BLOOD BY AUTOMATED COUNT: 13.9 % (ref 12.3–15.4)
GFR SERPL CREATININE-BSD FRML MDRD: 136 ML/MIN/1.73
GLOBULIN UR ELPH-MCNC: 2.4 GM/DL
GLUCOSE SERPL-MCNC: 92 MG/DL (ref 65–99)
GRAM STN SPEC: ABNORMAL
HCT VFR BLD AUTO: 19.4 % (ref 37.5–51)
HGB BLD-MCNC: 6.7 G/DL (ref 13–17.7)
ISOLATED FROM: ABNORMAL
ISOLATED FROM: ABNORMAL
LYMPHOCYTES # BLD AUTO: 0.72 10*3/MM3 (ref 0.7–3.1)
LYMPHOCYTES NFR BLD AUTO: 15.4 % (ref 19.6–45.3)
MCH RBC QN AUTO: 31.8 PG (ref 26.6–33)
MCHC RBC AUTO-ENTMCNC: 34.5 G/DL (ref 31.5–35.7)
MCV RBC AUTO: 91.9 FL (ref 79–97)
MONOCYTES # BLD AUTO: 0.68 10*3/MM3 (ref 0.1–0.9)
MONOCYTES NFR BLD AUTO: 14.5 % (ref 5–12)
NEUTROPHILS NFR BLD AUTO: 2.83 10*3/MM3 (ref 1.7–7)
NEUTROPHILS NFR BLD AUTO: 60.3 % (ref 42.7–76)
PLATELET # BLD AUTO: 51 10*3/MM3 (ref 140–450)
PMV BLD AUTO: 10.2 FL (ref 6–12)
POTASSIUM SERPL-SCNC: 3.7 MMOL/L (ref 3.5–5.2)
PROT SERPL-MCNC: 5.1 G/DL (ref 6–8.5)
RBC # BLD AUTO: 2.11 10*6/MM3 (ref 4.14–5.8)
SODIUM SERPL-SCNC: 134 MMOL/L (ref 136–145)
WBC # BLD AUTO: 4.69 10*3/MM3 (ref 3.4–10.8)

## 2021-01-04 PROCEDURE — 85025 COMPLETE CBC W/AUTO DIFF WBC: CPT | Performed by: INTERNAL MEDICINE

## 2021-01-04 PROCEDURE — 76705 ECHO EXAM OF ABDOMEN: CPT

## 2021-01-04 PROCEDURE — 99232 SBSQ HOSP IP/OBS MODERATE 35: CPT | Performed by: INTERNAL MEDICINE

## 2021-01-04 PROCEDURE — 80053 COMPREHEN METABOLIC PANEL: CPT | Performed by: INTERNAL MEDICINE

## 2021-01-04 PROCEDURE — 25010000003 CEFTRIAXONE PER 250 MG: Performed by: INTERNAL MEDICINE

## 2021-01-04 RX ADMIN — CHOLECALCIFEROL TAB 10 MCG (400 UNIT) 800 UNITS: 10 TAB at 08:13

## 2021-01-04 RX ADMIN — SODIUM CHLORIDE, PRESERVATIVE FREE 10 ML: 5 INJECTION INTRAVENOUS at 20:46

## 2021-01-04 RX ADMIN — SODIUM CHLORIDE 50 ML/HR: 9 INJECTION, SOLUTION INTRAVENOUS at 16:43

## 2021-01-04 RX ADMIN — LACTULOSE 10 G: 10 SOLUTION ORAL at 08:13

## 2021-01-04 RX ADMIN — METOPROLOL TARTRATE 12.5 MG: 25 TABLET, FILM COATED ORAL at 08:13

## 2021-01-04 RX ADMIN — LACTULOSE 10 G: 10 SOLUTION ORAL at 20:42

## 2021-01-04 RX ADMIN — SODIUM CHLORIDE, PRESERVATIVE FREE 10 ML: 5 INJECTION INTRAVENOUS at 08:15

## 2021-01-04 RX ADMIN — PANTOPRAZOLE SODIUM 40 MG: 40 TABLET, DELAYED RELEASE ORAL at 08:13

## 2021-01-04 RX ADMIN — CEFTRIAXONE SODIUM 2 G: 2 INJECTION, SOLUTION INTRAVENOUS at 14:20

## 2021-01-04 NOTE — PROGRESS NOTES
Discharge Planning Assessment  [facility]     Patient Name: Wei Potts  MRN: [id number]  Today's Date: 1/4/2021    Admit Date: 1/1/2021    Discharge Needs Assessment     Row Name 01/04/21 1707       Living Environment    Lives With  spouse    Name(s) of Who Lives With Patient  spouse, Milagro Potts [phone]    Current Living Arrangements  home/apartment/condo    Primary Care Provided by  self    Provides Primary Care For  no one    Family Caregiver if Needed  spouse    Family Caregiver Names  spouse, Milagro Potts [phone]    Able to Return to Prior Arrangements  yes       Resource/Environmental Concerns    Resource/Environmental Concerns  none    Transportation Concerns  car, none       Transition Planning    Patient/Family Anticipates Transition to  home with family    Patient/Family Anticipated Services at Transition  none    Transportation Anticipated  family or friend will provide       Discharge Needs Assessment    Readmission Within the Last 30 Days  no previous admission in last 30 days    Equipment Currently Used at Home  none    Concerns to be Addressed  denies needs/concerns at this time;no discharge needs identified    Provided Post Acute Provider List?  N/A    Provided Post Acute Provider Quality & Resource List?  N/A    Patient's Choice of Community Agency(s)  patient states he has not rehab needs declines rehab information        Discharge Plan     Row Name 01/04/21 1700       Plan    Plan  Home with spouse    Plan Comments  Facesheet information was verified with patient at bedside.  He lives in a house with his spouse, Milagro Potts [phone].  He states that he is IADLs.  He declines needs at [place].  His PCP and pharmacy are verified.  He states his spouse will transport at [place].  ......................Amina Boykin RN        Continued Care and Services - Admitted Since 1/1/2021    Coordination has not been started for this encounter.         Demographic Summary     Row Name 01/04/21 1707        General Information    Admission Type  inpatient    Arrived From  home    Referral Source  admission list    Preferred Language  English       Contact Information    Permission Granted to Share Info With  ;family/designee    Contact Information Comments  spouse, Milagro Potts [phone]        Functional Status     Row Name 01/04/21 1701       Functional Status    Usual Activity Tolerance  good    Current Activity Tolerance  moderate       Functional Status, IADL    Medications  independent    Meal Preparation  independent    Housekeeping  independent    Laundry  independent    Shopping  independent       Mental Status    General Appearance WDL  WDL       Mental Status Summary    Recent Changes in Mental Status/Cognitive Functioning  no changes       Employment/    Employment Status  self-employed;employed full-time        Psychosocial    No documentation.       Abuse/Neglect    No documentation.       Legal    No documentation.       Substance Abuse    No documentation.       Patient Forms    No documentation.           Amina Boykin RN

## 2021-01-04 NOTE — PLAN OF CARE
Problem: Adult Inpatient Plan of Care  Goal: Plan of Care Review  Outcome: Ongoing, Progressing  Flowsheets (Taken 1/4/2021 1843)  Outcome Summary: Pt pleasant, no complaints, US clear today so awaiting repeat paracentesis in am. D/C on levaquin.  Goal: Patient-Specific Goal (Individualized)  Outcome: Ongoing, Progressing  Goal: Absence of Hospital-Acquired Illness or Injury  Outcome: Ongoing, Progressing  Intervention: Identify and Manage Fall Risk  Recent Flowsheet Documentation  Taken 1/4/2021 1806 by Mckinley Pozo, RN  Safety Promotion/Fall Prevention:   activity supervised   assistive device/personal items within reach   clutter free environment maintained   fall prevention program maintained   nonskid shoes/slippers when out of bed   room organization consistent   safety round/check completed  Taken 1/4/2021 1605 by Mckinley Pozo, RN  Safety Promotion/Fall Prevention:   activity supervised   assistive device/personal items within reach   clutter free environment maintained   fall prevention program maintained   nonskid shoes/slippers when out of bed   room organization consistent   safety round/check completed  Taken 1/4/2021 1406 by Mckinley Pozo, RN  Safety Promotion/Fall Prevention:   activity supervised   assistive device/personal items within reach   clutter free environment maintained   fall prevention program maintained   nonskid shoes/slippers when out of bed   room organization consistent   safety round/check completed  Taken 1/4/2021 1200 by Mckinley Pozo, RN  Safety Promotion/Fall Prevention:   activity supervised   assistive device/personal items within reach   clutter free environment maintained   fall prevention program maintained   nonskid shoes/slippers when out of bed   room organization consistent   safety round/check completed  Taken 1/4/2021 0947 by Mckinley Pozo, RN  Safety Promotion/Fall Prevention:   activity supervised   assistive device/personal items within reach   clutter  free environment maintained   fall prevention program maintained   nonskid shoes/slippers when out of bed   room organization consistent   safety round/check completed  Taken 1/4/2021 0805 by Mckinley Pozo RN  Safety Promotion/Fall Prevention:   activity supervised   assistive device/personal items within reach   clutter free environment maintained   fall prevention program maintained   nonskid shoes/slippers when out of bed   room organization consistent   safety round/check completed  Intervention: Prevent Skin Injury  Recent Flowsheet Documentation  Taken 1/4/2021 1806 by Mckinley Pozo RN  Body Position: position changed independently  Taken 1/4/2021 1605 by Mckinley Pzoo RN  Body Position: position changed independently  Taken 1/4/2021 1406 by Mckinley Pozo RN  Body Position: position changed independently  Taken 1/4/2021 1200 by Mckinley Pozo RN  Body Position: position changed independently  Taken 1/4/2021 0947 by Mckinley Pozo RN  Body Position: position changed independently  Taken 1/4/2021 0805 by Mckinley Pozo RN  Body Position: position changed independently  Intervention: Prevent Infection  Recent Flowsheet Documentation  Taken 1/4/2021 1806 by Mckinley Pozo RN  Infection Prevention: environmental surveillance performed  Taken 1/4/2021 1605 by Mckinley Pozo RN  Infection Prevention: environmental surveillance performed  Taken 1/4/2021 1406 by Mckinley Pozo RN  Infection Prevention: environmental surveillance performed  Taken 1/4/2021 1200 by Mckinley Pozo RN  Infection Prevention: environmental surveillance performed  Taken 1/4/2021 0947 by Mckinley Pozo RN  Infection Prevention: environmental surveillance performed  Taken 1/4/2021 0805 by Mckinley Pozo RN  Infection Prevention: environmental surveillance performed  Goal: Optimal Comfort and Wellbeing  Outcome: Ongoing, Progressing  Goal: Readiness for Transition of Care  Outcome: Ongoing, Progressing     Problem:  Adjustment to Illness (Sepsis/Septic Shock)  Goal: Optimal Coping  Outcome: Ongoing, Progressing     Problem: Bleeding (Sepsis/Septic Shock)  Goal: Absence of Bleeding  Outcome: Ongoing, Progressing     Problem: Glycemic Control Impaired (Sepsis/Septic Shock)  Goal: Blood Glucose Level Within Desired Range  Outcome: Ongoing, Progressing     Problem: Hemodynamic Instability (Sepsis/Septic Shock)  Goal: Effective Tissue Perfusion  Outcome: Ongoing, Progressing     Problem: Infection (Sepsis/Septic Shock)  Goal: Absence of Infection Signs and Symptoms  Outcome: Ongoing, Progressing  Intervention: Prevent or Manage Infection Progression  Recent Flowsheet Documentation  Taken 1/4/2021 1806 by Mckinley Pozo RN  Infection Prevention: environmental surveillance performed  Taken 1/4/2021 1605 by Mckinley Pozo RN  Infection Prevention: environmental surveillance performed  Taken 1/4/2021 1406 by Mckinley Pozo RN  Infection Prevention: environmental surveillance performed  Taken 1/4/2021 1200 by Mckinley Pozo RN  Infection Prevention: environmental surveillance performed  Taken 1/4/2021 0947 by Mckinley Pozo RN  Infection Prevention: environmental surveillance performed  Taken 1/4/2021 0805 by Mckinley Pozo RN  Infection Prevention: environmental surveillance performed     Problem: Nutrition Impaired (Sepsis/Septic Shock)  Goal: Optimal Nutrition Intake  Outcome: Ongoing, Progressing     Problem: Respiratory Compromise (Sepsis/Septic Shock)  Goal: Effective Oxygenation and Ventilation  Outcome: Ongoing, Progressing  Intervention: Promote Airway Secretion Clearance  Recent Flowsheet Documentation  Taken 1/4/2021 1806 by Mckinley Pozo RN  Activity Management: up ad oliver  Taken 1/4/2021 1605 by Mckinley Pozo RN  Activity Management: up ad loiver  Taken 1/4/2021 1406 by Mckilney Pozo RN  Activity Management: up ad oliver  Taken 1/4/2021 1200 by Mckinley Pozo RN  Activity Management: up ad oliver  Taken 1/4/2021  0947 by Mckinley Pozo, RN  Activity Management: up ad oliver  Taken 1/4/2021 0805 by Mckinley Pozo, RN  Activity Management: up ad oliver  Intervention: Optimize Oxygenation and Ventilation  Recent Flowsheet Documentation  Taken 1/4/2021 1806 by Mckinley Pozo, RN  Head of Bed (HOB): [facility] elevated   Goal Outcome Evaluation:  Plan of Care Reviewed With: patient  Progress: improving  Outcome Summary: Pt pleasant, no complaints, US clear today so awaiting repeat paracentesis in am. D/C on levaquin.

## 2021-01-04 NOTE — PROGRESS NOTES
LOS: 3 days     Chief Complaint: Streptococcal bacteremia, SBP    Interval History: Afebrile, no new complaints or events. Tolerating antibiotics without abdominal pain nausea or vomiting.  No rashes    Vital Signs  Temp:  [97.4 °F (36.3 °C)-98.2 °F (36.8 °C)] 98.2 °F (36.8 °C)  Heart Rate:  [74-81] 81  Resp:  [18] 18  BP: (102-123)/(63-66) 123/63    Physical Exam:  General: In no acute distress  Cardiovascular: RRR, no LE edema   Respiratory: Breathing comfortably on room air  GI: Soft, nontender, distended, positive bowel sounds bilaterally  Skin: No rashes     Antibiotics:  Ceftriaxone 2 g IV every 24 hours     Results Review:    Lab Results   Component Value Date    WBC 4.69 01/04/2021    HGB 6.7 (C) 01/04/2021    HCT 19.4 (C) 01/04/2021    MCV 91.9 01/04/2021    PLT 51 (L) 01/04/2021     Lab Results   Component Value Date    GLUCOSE 92 01/04/2021    BUN 11 01/04/2021    CREATININE 0.60 (L) 01/04/2021    EGFRIFNONA 136 01/04/2021    EGFRIFAFRI 107 12/03/2020    BCR 18.3 01/04/2021    CO2 18.1 (L) 01/04/2021    CALCIUM 7.7 (L) 01/04/2021    PROTENTOTREF 6.8 12/03/2020    ALBUMIN 2.70 (L) 01/04/2021    LABIL2 0.7 12/03/2020    AST 33 01/04/2021    ALT 19 01/04/2021       Microbiology:  1/3 BCx P x 2  1/2 C diff neg  1/2 GI PCR neg  1/2 paracentesis culture NGTD  1/1 BCx  strep salivarius x2  1/1 RVP/COVID neg    Assessment/Plan   Streptococcus septicemia  SBP based on cell count  Alcoholic liver cirrhosis  Fever  Leukocytosis    Continue ceftriaxone 2 g IV every 24 hours while in the hospital.  At discharge transition to Levaquin 750 mg p.o. daily to complete a 10-day course of antibiotic with an antibiotic stop date of January 11.  We will leave the decision whether to start the patient on prophylactic antibiotic against SBP to his GI doctors.    Patient is okay to discharge from an ID standpoint as long as his repeat blood cultures are negative for 48 hours.  ID will sign off.  Please do not hesitate to call us  with further questions or concerns

## 2021-01-04 NOTE — PROGRESS NOTES
Name: Wei Potts ADMIT: 2021   : 1955  PCP: Anaid Kilgore APRN    MRN: [id number] LOS: 3 days   AGE/SEX: 65 y.o. male  ROOM: [facility]     Subjective   Subjective         Patient is lying in bed in no major distress.  Denies nausea, vomiting, abdominal pain, chest pain.    Objective   Objective   Vital Signs  Temp:  [98 °F (36.7 °C)-98.2 °F (36.8 °C)] 98.1 °F (36.7 °C)  Heart Rate:  [75-81] 75  Resp:  [18] 18  BP: (110-123)/(63-66) 110/66  SpO2:  [97 %-100 %] 100 %  on   ;   Device (Oxygen Therapy): room air  Body mass index is 21.2 kg/m².  Physical Exam  Vitals signs and nursing note reviewed.   Constitutional:       General: He is not in acute distress.     Appearance: He is normal weight.   HENT:      Head: Normocephalic and atraumatic.      Nose: Nose normal.      Mouth/Throat:      Pharynx: Oropharynx is clear.   Eyes:      General: No scleral icterus.     Extraocular Movements: Extraocular movements intact.   Neck:      Musculoskeletal: Neck supple.   Cardiovascular:      Rate and Rhythm: Normal rate and regular rhythm.      Pulses: Normal pulses.      Heart sounds: Normal heart sounds.   Pulmonary:      Effort: Pulmonary effort is normal.      Breath sounds: Normal breath sounds.   Abdominal:      General: Abdomen is flat. Bowel sounds are normal. There is no distension.      Palpations: Abdomen is soft.   Musculoskeletal: Normal range of motion.   Skin:     General: Skin is warm and dry.      Capillary Refill: Capillary refill takes less than 2 seconds.   Neurological:      General: No focal deficit present.      Mental Status: He is alert and oriented to person, place, and time.   Psychiatric:         Mood and Affect: Mood normal.         Results Review     I reviewed the patient's new clinical results.  Results from last 7 days   Lab Units 21  0518 21  0400 219 215   WBC 10*3/mm3 4.69 11.65* 17.96* 18.22*   HEMOGLOBIN g/dL 6.7* 7.1* 7.9* 9.2*    PLATELETS 10*3/mm3 51* 45* 59* 90*     Results from last 7 days   Lab Units 01/04/21 0518 01/03/21  0400 01/02/21 1236 01/02/21  0219   SODIUM mmol/L 134* 132* 128* 130*   POTASSIUM mmol/L 3.7 4.4 4.6 5.2   CHLORIDE mmol/L 109* 108* 104 106   CO2 mmol/L 18.1* 18.0* 14.9* 15.0*   BUN mg/dL 11 14 17 15   CREATININE mg/dL 0.60* 0.80 0.89 0.79   GLUCOSE mg/dL 92 120* 131* 102*   Estimated Creatinine Clearance: 122.8 mL/min (A) (by C-G formula based on SCr of 0.6 mg/dL (L)).  Results from last 7 days   Lab Units 01/04/21 0518 01/03/21 0400 01/02/21 1236 01/01/21  2125   ALBUMIN g/dL 2.70* 1.90* 2.20* 2.60*   BILIRUBIN mg/dL 1.7* 2.2* 3.0* 2.7*   ALK PHOS U/L 78 88 86 140*   AST (SGOT) U/L 33 43* 51* 50*   ALT (SGPT) U/L 19 21 24 27     Results from last 7 days   Lab Units 01/04/21 0518 01/03/21  0400 01/02/21 1236 01/02/21 0219 01/01/21  2125   CALCIUM mg/dL 7.7* 7.3* 7.4* 7.2* 8.3*   ALBUMIN g/dL 2.70* 1.90* 2.20*  --  2.60*     Results from last 7 days   Lab Units 01/02/21 0219 01/01/21  2218   LACTATE mmol/L 3.9* 4.7*     COVID19   Date Value Ref Range Status   01/01/2021 Not Detected Not Detected - Ref. Range Final   12/25/2020 Not Detected Not Detected - Ref. Range Final     No results found for: HGBA1C, POCGLU    US Abdomen Limited  Narrative: PROCEDURE: US ABDOMEN LIMITED-     HISTORY: Post paracentesis, concern for hematoma.     TECHNIQUE: Grayscale and color Doppler ultrasound of the right lower  quadrant abdominal wall and 4 abdominal/pelvic quadrants was performed.     COMPARISON: Abdominal ultrasound 1/2/2021.      Impression: FINDINGS/IMPRESSION: Targeted sonographic evaluation of the anterior  right lower quadrant abdominal wall was performed in the area of concern  at the site of bruising. No discrete or drainable fluid collection is  identified within the abdominal wall. Additional survey images of the  abdomen and pelvis demonstrates small volume ascites.     This report was finalized on  1/4/2021 12:08 PM by Dr. Zahra Guo M.D.       Scheduled Medications  [START ON 1/5/2021] albumin human, 75 g, Intravenous, Once  cefTRIAXone, 2 g, Intravenous, Q24H  cholecalciferol, 800 Units, Oral, Daily  ferrous sulfate, 325 mg, Oral, BID With Meals  lactulose, 10 g, Oral, BID  metoprolol tartrate, 12.5 mg, Oral, Daily  pantoprazole, 40 mg, Oral, Daily  sodium chloride, 10 mL, Intravenous, Q12H    Infusions  sodium chloride, 50 mL/hr, Last Rate: 50 mL/hr (01/03/21 2050)    Diet  Diet Regular; Low Sodium; 2,000 mg Na       Assessment/Plan     Active Hospital Problems    Diagnosis  POA   • **Colitis [K52.9]  Unknown   • Hypertension [I10]  Unknown   • GERD (gastroesophageal reflux disease) [K21.9]  Unknown   • Sepsis (CMS/HCC) [A41.9]  Yes   • 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       65 y.o. male admitted with Colitis.    · Sepsis/SBP/strep bacteremia:   Underwent paracentesis on 01/02/2021 and fluid analysis was consistent with his BP.  Initially was on vancomycin and Zosyn and this was deescalated to Rocephin.  Patient is improving clinically and is afebrile.  Appreciate infectious disease input.  · Chronic liver disease with cirrhosis hepatic encephalopathy thrombocytopenia anemia:   Patient's mental status is at baseline now.  GI is following and there is no need for EGD or colonoscopy at this point.  Plan is to undergo abdominal ultrasound to ensure there is no intra-abdominal hematoma and further paracentesis per GI.  · Hypertension: Blood pressures are well controlled  · GERD: Continue his PPI  · Hyponatremia: Improved, continue to follow his BMP .  Sodium is 134 today.  · SCDs for DVT prophylaxis.    · Full code.        Liborio Galvez MD  [facility]  01/04/21  15:21 EST    Patient was wearing facemask when I  entered the room and throughout our encounter.  I wore protective equipment throughout this patient encounter including a face mask, gloves and protective eyewear.  Hand hygiene was performed before donning protective equipment and after removal when leaving the room.

## 2021-01-04 NOTE — PAYOR COMM NOTE
"Wei Potts (65 y.o. Male)     ATTN: NOTIFICATION OF ADMISSION:  ID # [id number]     DEPT: CARMELINA MUSE RN/CCP; [phone],  [phone]        Date of Birth Social Security Number Address Home Phone MRN    1955  [id number] [id number] [phone] [id number]    Oriental orthodox Marital Status          Patient Refused        Admission Date Admission Type Admitting Provider Attending Provider Department, Room/Bed    1/1/21 Emergency Tito Quezada MD Reddy, Rahul Kandada, MD [address], [address]    Discharge Date Discharge Disposition Discharge Destination                       Attending Provider: Liborio Galvez MD    Allergies: No Known Allergies    Isolation: None   Infection: None   Code Status: CPR    Ht: 180.3 cm (71\")   Wt: 68.9 kg (152 lb)    Admission Cmt: None   Principal Problem: Colitis [K52.9]                 Active Insurance as of 1/1/2021     Primary Coverage     Payor Plan Insurance Group Employer/Plan Group    [employer] [place] [employer] [place] [place]      Payor Plan Address Payor Plan Phone Number Payor Plan Fax Number Effective Dates    [address] [address]   8/1/2020 - None Entered    [place] [address]       Subscriber Name Subscriber Birth Date Member ID       WEI POTTS 1955 [id number]                 Emergency Contacts      (Rel.) Home Phone Work Phone Mobile Phone    Milagro Potts (Spouse) [phone] -- --               History & Physical      Anaid Magana, MILAGROS at 01/02/21 0234     Attestation signed by Juan David Coronado MD at 01/02/21 1153    65-year-old gentleman with a history neurosis of the liver with encephalopathy and hypertension.  Recently had his lactulose dose adjusted.  But presents to the emergency department with fever to 102.7 associated with nausea, he denies vomiting, obviously has frequent stools secondary to his lactulose, denies hemoptysis hematemesis melena or " bright red blood per rectum.  Denies change in sense of taste or smell, denies any sore throat cough sputum production.    General: Alert oriented x3 male no acute distress  HEENT: Normal cephalic atraumatic sclerae clear oropharynx is clear  Neck: Supple without adenopathy JVD or bruits noted  Lungs: Clear to auscultation with good air exchange  Cardiovascular: Regular rate and rhythm with normal S1 and S2 not appreciate murmur gallop rub  Abdomen: Some distention noted, without tenderness guarding or rebound, bowel sounds are present  Extremities: Moves all extremities well, remedies warm and well-perfused, without rash or edema  Neurologic exam: Awake alert oriented x3 moves all extremities well no focal deficits  Psychiatric: Pleasant    Gram-positive sepsis: Patient is currently on vancomycin pharmacy is adjusting dose will continue pending culture results.  Colitis/chronic liver disease/ascites/hepatic encephalopathy: CT scan reports colitis with some ascites we will add Zosyn vancomycin as above.  Continue patient's lactulose follow daily ammonia levels for now the patient awake alert.  Will order abdominal ultrasound consult GI.  Anemia thrombocytopenia: Most likely secondary to chronic liver disease.  Will follow CBC check stool for occult blood hemoglobin stable and at baseline.                            Patient Name:  Wei Potts  YOB: 1955  MRN:  [id number]  Admit Date:  1/1/2021  Patient Care Team:  Anaid Kilgore APRN as PCP - General (Nurse Practitioner)      Subjective   History Present Illness     Chief Complaint   Patient presents with   • Fever   • Nausea       Mr. Potts is a 65 y.o. non-smoker with a history of alcoholic cirrhosis of the liver and hypertenison that presents to [facility] complaining of nausea, vomiting, diarrhea, and fever.  He reports rigors that began yesterday afternoon.  He states his temperature at the time was 102.7.  He reports  nausea and 2 episodes of non-bloody emesis.  He denies abdominal pain, chest pain, and shortness of breath. He denies headache, dizziness, cough, sore throat, and loss of taste and smell.  He reports chronic diarrhea secondary to his Lactulose use for cirrhosis that has not changed.  He denies melena and hematochezia.  Work up in the ED revealed Na 129, alkaline phosphatase 140, AST 50, albumin 2.60, lactate 4.7, and WBC 18.22.  CT of the abdomen/pelvis showed circumferential thickening of the wall of the ascending and transverse colon that is suggestive of colitis.  There is stable evidence of cirrhosis.  Improved but persistent splenomegaly and ascites is noted.  He was febrile and tachycardic on arrival and received Vancomycin and Zosyn in the ED.       History of Present Illness  Review of Systems   Constitutional: Positive for chills, fatigue and fever.   HENT: Negative for sore throat.    Eyes: Negative for photophobia and visual disturbance.   Respiratory: Negative for cough and shortness of breath.    Cardiovascular: Negative for chest pain, palpitations and leg swelling.   Gastrointestinal: Positive for abdominal distention (mild), diarrhea, nausea and vomiting. Negative for abdominal pain and blood in stool.   Endocrine: Negative for polydipsia, polyphagia and polyuria.   Genitourinary: Negative for decreased urine volume, dysuria, flank pain, frequency, hematuria and urgency.   Musculoskeletal: Negative for back pain, gait problem and myalgias.   Skin: Negative for rash and wound.   Neurological: Negative for dizziness, speech difficulty, weakness, numbness and headaches.        Personal History     Past Medical History:   Diagnosis Date   • Alcoholism (CMS/HCC)    • Cirrhosis (CMS/HCC)    • Encephalopathy    • Hypertension    • Liver disease      History reviewed. No pertinent surgical history.  Family History   Problem Relation Age of Onset   • Diabetes Mother    • Hypertension Father      Social  History     Tobacco Use   • Smoking status: Never Smoker   • Smokeless tobacco: Never Used   Substance Use Topics   • Alcohol use: Not Currently     Comment: 1 bottle of wine per day, stopped drinking ETOH 2 weeks ago   • Drug use: No     Medications Prior to Admission   Medication Sig Dispense Refill Last Dose   • Cholecalciferol (VITAMIN D3 PO) Take 25 mg by mouth Daily.      • ferrous sulfate 325 (65 FE) MG tablet Take 1 tablet by mouth 2 (Two) Times a Day With Meals. 60 tablet 4    • furosemide (LASIX) 40 MG tablet Take 1 tablet by mouth Daily. 30 tablet 5    • lactulose (CHRONULAC) 10 GM/15ML solution Take 15 mL by mouth 2 (Two) Times a Day. Adjust dose until you have 3-4 bowel movements a day. 946 mL 5 1/1/2021 at 1130   • metoprolol tartrate (LOPRESSOR) 25 MG tablet Take 0.5 tablets by mouth Daily. 15 tablet 5 01/1/2021 at 0000   • pantoprazole (PROTONIX) 40 MG EC tablet Take 1 tablet by mouth Daily. 30 tablet 5    • spironolactone (ALDACTONE) 100 MG tablet Take 1 tablet by mouth Daily. 30 tablet 5      Allergies:  No Known Allergies    Objective    Objective     Vital Signs  Temp:  [98.8 °F (37.1 °C)-101.8 °F (38.8 °C)] 98.8 °F (37.1 °C)  Heart Rate:  [] 103  Resp:  [18-20] 18  BP: (104-124)/(58-88) 116/58  SpO2:  [99 %-100 %] 100 %  on   ;   Device (Oxygen Therapy): room air  Body mass index is 21.2 kg/m².    Physical Exam  Vitals signs and nursing note reviewed.   Constitutional:       Appearance: Normal appearance.   HENT:      Head: Normocephalic and atraumatic.      Nose: Nose normal.      Mouth/Throat:      Mouth: Mucous membranes are dry.      Pharynx: Oropharynx is clear.   Eyes:      Extraocular Movements: Extraocular movements intact.      Conjunctiva/sclera: Conjunctivae normal.   Neck:      Musculoskeletal: Normal range of motion and neck supple.   Cardiovascular:      Rate and Rhythm: Regular rhythm. Tachycardia present.      Pulses: Normal pulses.      Heart sounds: Normal heart sounds.    Pulmonary:      Effort: Pulmonary effort is normal.      Breath sounds: Normal breath sounds.   Abdominal:      General: Bowel sounds are normal. There is distension (mild).      Palpations: Abdomen is soft. There is no mass.      Tenderness: There is no abdominal tenderness. There is no guarding or rebound.      Hernia: No hernia is present.   Musculoskeletal: Normal range of motion.         General: No swelling.   Skin:     General: Skin is warm and dry.   Neurological:      General: No focal deficit present.      Mental Status: He is alert and oriented to person, place, and time.   Psychiatric:         Mood and Affect: Mood normal.         Results Review:  I reviewed the patient's new clinical results.  I reviewed the patient's new imaging results and agree with the interpretation.  I reviewed the patient's other test results and agree with the interpretation  I personally viewed and interpreted the patient's EKG/Telemetry data  Discussed with ED provider.    Lab Results (last 24 hours)     Procedure Component Value Units Date/Time    CBC & Differential [[id number]]  (Abnormal) Collected: 01/01/21 2125    Specimen: Blood Updated: 01/01/21 2139    Narrative:      The following orders were created for panel order CBC & Differential.  Procedure                               Abnormality         Status                     ---------                               -----------         ------                     CBC Auto Differential[[id number]]        Abnormal            Final result                 Please view results for these tests on the individual orders.    Comprehensive Metabolic Panel [[id number]]  (Abnormal) Collected: 01/01/21 2125    Specimen: Blood Updated: 01/01/21 2215     Glucose 147 mg/dL      BUN 14 mg/dL      Creatinine 0.95 mg/dL      Sodium 129 mmol/L      Potassium 5.0 mmol/L      Chloride 101 mmol/L      CO2 18.0 mmol/L      Calcium 8.3 mg/dL      Total Protein 6.8 g/dL      Albumin 2.60 g/dL      ALT  (SGPT) 27 U/L      AST (SGOT) 50 U/L      Alkaline Phosphatase 140 U/L      Total Bilirubin 2.7 mg/dL      eGFR Non African Amer 80 mL/min/1.73      Globulin 4.2 gm/dL      A/G Ratio 0.6 g/dL      BUN/Creatinine Ratio 14.7     Anion Gap 10.0 mmol/L     Narrative:      GFR Normal >60  Chronic Kidney Disease <60  Kidney Failure <15      CBC Auto Differential [[id number]]  (Abnormal) Collected: 01/01/21 2125    Specimen: Blood Updated: 01/01/21 2139     WBC 18.22 10*3/mm3      RBC 2.80 10*6/mm3      Hemoglobin 9.2 g/dL      Hematocrit 27.0 %      MCV 96.4 fL      MCH 32.9 pg      MCHC 34.1 g/dL      RDW 14.5 %      RDW-SD 51.1 fl      MPV 10.2 fL      Platelets 90 10*3/mm3      Neutrophil % 87.4 %      Lymphocyte % 1.6 %      Monocyte % 9.7 %      Eosinophil % 0.0 %      Basophil % 0.3 %      Immature Grans % 1.0 %      Neutrophils, Absolute 15.92 10*3/mm3      Lymphocytes, Absolute 0.29 10*3/mm3      Monocytes, Absolute 1.77 10*3/mm3      Eosinophils, Absolute 0.00 10*3/mm3      Basophils, Absolute 0.06 10*3/mm3      Immature Grans, Absolute 0.18 10*3/mm3      nRBC 0.0 /100 WBC     Lipase [[id number]]  (Abnormal) Collected: 01/01/21 2125    Specimen: Blood Updated: 01/01/21 2245     Lipase 68 U/L     Respiratory Panel PCR w/COVID-19(SARS-CoV-2) DAXA/YANELI/MARISABEL/PAD/COR/MAD/AGGIE In-House, NP Swab in [facility], 3-4 HR TAT - Swab, Nasopharynx [[id number]]  (Normal) Collected: 01/01/21 2126    Specimen: Swab from Nasopharynx Updated: 01/01/21 2233     ADENOVIRUS, PCR Not Detected     Coronavirus 229E Not Detected     Coronavirus HKU1 Not Detected     Coronavirus NL63 Not Detected     Coronavirus OC43 Not Detected     COVID19 Not Detected     Human Metapneumovirus Not Detected     Human Rhinovirus/Enterovirus Not Detected     Influenza A PCR Not Detected     Influenza B PCR Not Detected     Parainfluenza Virus 1 Not Detected     Parainfluenza Virus 2 Not Detected     Parainfluenza Virus 3 Not Detected     Parainfluenza Virus 4 Not  Detected     RSV, PCR Not Detected     Bordetella pertussis pcr Not Detected     Bordetella parapertussis PCR Not Detected     Chlamydophila pneumoniae PCR Not Detected     Mycoplasma pneumo by PCR Not Detected    Narrative:      Fact sheet for providers: https://docs.[employer]/wp-content/uploads/WNG4969-2543-OQ0.1-EUA-Provider-Fact-Sheet-3.pdf    Fact sheet for patients: https://docs.[employer]/wp-content/uploads/OSA7043-7322-PQ9.1-EUA-Patient-Fact-Sheet-1.pdf    Test performed by PCR.    Lactic Acid, Plasma [[id number]]  (Abnormal) Collected: 01/01/21 2218    Specimen: Blood Updated: 01/01/21 2251     Lactate 4.7 mmol/L     Lactic Acid, Reflex Timer (This will reflex a repeat order 3-3:15 hours after ordered.) [[id number]] Collected: 01/01/21 2218    Specimen: Blood Updated: 01/02/21 0200     Hold Tube Hold for add-ons.     Comment: Auto resulted.       Urinalysis With Culture If Indicated - Urine, Clean Catch [[id number]]  (Abnormal) Collected: 01/01/21 2222    Specimen: Urine, Clean Catch Updated: 01/01/21 2244     Color, UA Dark Yellow     Appearance, UA Clear     pH, UA 5.5     Specific Gravity, UA 1.029     Glucose, UA Negative     Ketones, UA Trace     Bilirubin, UA Negative     Blood, UA Negative     Protein, UA Negative     Leuk Esterase, UA Negative     Nitrite, UA Negative     Urobilinogen, UA 1.0 E.U./dL    Narrative:      Urine microscopic not indicated.    Blood Culture - Blood, Arm, Left [[id number]] Collected: 01/01/21 2255    Specimen: Blood from Arm, Left Updated: 01/01/21 2301    Blood Culture - Blood, Arm, Left [[id number]] Collected: 01/01/21 2256    Specimen: Blood from Arm, Left Updated: 01/01/21 2301    Basic Metabolic Panel [[id number]]  (Abnormal) Collected: 01/02/21 0219    Specimen: Blood Updated: 01/02/21 0306     Glucose 102 mg/dL      BUN 15 mg/dL      Creatinine 0.79 mg/dL      Sodium 130 mmol/L      Potassium 5.2 mmol/L      Chloride 106 mmol/L      CO2 15.0 mmol/L      Calcium 7.2  mg/dL      eGFR Non African Amer 99 mL/min/1.73      BUN/Creatinine Ratio 19.0     Anion Gap 9.0 mmol/L     Narrative:      GFR Normal >60  Chronic Kidney Disease <60  Kidney Failure <15      CBC (No Diff) [[id number]]  (Abnormal) Collected: 01/02/21 0219    Specimen: Blood Updated: 01/02/21 0229     WBC 17.96 10*3/mm3      RBC 2.38 10*6/mm3      Hemoglobin 7.9 g/dL      Hematocrit 23.2 %      MCV 97.5 fL      MCH 33.2 pg      MCHC 34.1 g/dL      RDW 14.6 %      RDW-SD 52.2 fl      MPV 10.5 fL      Platelets 59 10*3/mm3     Lactic Acid, Reflex [[id number]]  (Abnormal) Collected: 01/02/21 0219    Specimen: Blood Updated: 01/02/21 0246     Lactate 3.9 mmol/L           Imaging Results (Last 24 Hours)     Procedure Component Value Units Date/Time    CT Abdomen Pelvis With Contrast [[id number]] Collected: 01/01/21 2338     Updated: 01/01/21 2349    Narrative:      Examination: CT of the abdomen and pelvis with contrast     HISTORY: 65-year-old male with history of fever, vomiting and diarrhea     TECHNIQUE: Contiguous axial images were obtained through the abdomen and  pelvis following intravenous administration of nonionic contrast. Oral  contrast  was not administered. Radiation dose reduction techniques were  utilized, including automated exposure control and exposure modulation  based on body size.     COMPARISON: CT of the abdomen pelvis with contrast, 07/06/2020     FINDINGS: The visualized portion of the lung bases6 are clear. The  visualized portion of the heart has a normal appearance . The liver  demonstrates a stable cirrhotic morphology. There are 2 stable focal  hypodensities in the liver that measure on the order of 0.4 cm and 1.1  cm in greatest dimension. The pancreas appears normal. There is moderate  diffuse ascites that appears slightly less than seen on the prior  examination. The kidneys appear normal. The adrenal glands have a normal  appearance. The spleen is enlarged but smaller than seen on the  prior  examination. Measures on the order of 16.5 x 6.0 x 14.5 cm compared to  prior measurements of 19.5 x 6.9 x 14.5 cm. There is circumferential  thickening of the wall of the ascending and transverse colon. The wall  of the ascending colon measures up to 1.8 cm in thickness.. The osseous  structures of the lumbar spine and pelvis are stable and demonstrate  mild multilevel osteophytic change of the lumbar spine.       Impression:      1.There is circumferential thickening of the wall of the ascending and  transverse colon that is suggestive of colitis.  2. There is stable evidence of cirrhosis. Improved but persistent  splenomegaly and ascites is noted.     This report was finalized on 1/1/2021 11:46 PM by Dr. Randall Mckeon M.D.       XR Chest 1 View [[id number]] Resulted: 01/01/21 2121     Updated: 01/01/21 2121          Results for orders placed during the hospital encounter of 07/06/20   Adult Transthoracic Echo Complete W/ Cont if Necessary Per Protocol    Narrative · Left ventricular systolic function is hyperdynamic (EF > 70).  · Mild mitral valve regurgitation is present          No orders to display        Assessment/Plan     Active Hospital Problems    Diagnosis POA   • **Colitis [K52.9] Unknown   • Hypertension [I10] Unknown   • GERD (gastroesophageal reflux disease) [K21.9] Unknown   • Sepsis (CMS/HCC) [A41.9] Yes   • Secondary thrombocytopenia [D69.59] Yes   • Alcoholic cirrhosis of liver with ascites (CMS/HCC) [K70.31] Yes   • Anemia [D64.9] Yes       Colitis/Sepsis  -CT suggests colitis  -Fever, tachycardia, and leukocytosis meet sepsis criteria. Lactic acidosis has improved with IVF bolus. Will continue slow IVF overnight  -He reported diarrhea, but is on Lactulose secondary to cirrhosis. Will hold Lactulose tonight and check C diff and GI panel  -He denies abdominal pain, no evidence of encephalopathy. Most likely not SBP  -He received Zosyn and Vancomycin in the ED, will continue Vancomycin  for now  -Blood cultures pending    Alcoholic Cirrhosis of Liver with Ascites  -Improved cirrhosis with ascites on CT  -Hold Lactulose for now secondary to colitis  -Check Ammonia level in AM  -Hyponatremia. Hold diuretics tonight  -2 g sodium diet    Hypertension  -Blood pressures stable. Continue home regimen  -Monitor    GERD  -Continue home PPI    Anemia/Thrombocytopenia  -Secondary to cirrhosis/splenomegaly  -Hgb improved from baseline, plt stable  -He takes ferrous sulfate outpatient, can resume on discharge  -Repeat lab work in AM    -I discussed the patients findings and my recommendations with patient.    VTE Prophylaxis - SCDs.  Code Status - Full code.       MILAGROS Rey  [facility]  01/02/21  02:50 EST      Electronically signed by Juan David Coronado MD at 01/02/21 1153          Emergency Department Notes      Lei Escalera RN at 01/01/21 1950        Pt was seen last week for elevated ammonia levels. Pt reports that he is currently having a fever today, with generalized weakness, nausea, vomiting and diarrhea. Pt reports a fever today of 102. Pt denies covid contact with the exception of visiting the ER last week.     Pt placed in mask and staff wearing appropriate ppe at the time of pt arrival.     Electronically signed by Lei Escalera RN at 01/01/21 1952     Vasquez Carroll MD at 01/01/21 2006           EMERGENCY DEPARTMENT ENCOUNTER    Room Number:  10/10  Date of encounter:  1/1/2021  PCP: Anaid Kilgore APRN  Historian: Patient    Patient was placed in face mask during triage process. Patient was wearing facemask when I entered the room and throughout our encounter. I wore full protective equipment throughout this patient encounter including a face mask, eye protection, and gloves. Hand hygiene was performed before donning protective equipment and again following doffing of PPE after leaving the room.    HPI:  Chief Complaint: Fever  A complete  HPI/ROS/PMH/PSH/SH/FH are unobtainable due to: N/A   Context: Wei Potts is a 65 y.o. male with a known history of liver cirrhosis and hypertension who was recently discharged following an episode of hepatic encephalopathy who presents to the ED c/o fever with increased generalized weakness, nausea, vomiting, and diarrhea that developed today.  Symptoms improved at this time.  Patient notes only rare cough but no dyspnea.  No headache.  No dysuria.  Denies focal pain.    MEDICAL HISTORY REVIEW  Date of Admission: 12/25/2020  Date of Discharge:  12/26/2020  Primary Care Physician: Anaid Kilgore APRN    Chief Complaint:   Altered Mental Status        Active Hospital Problems     Diagnosis   POA   • Hepatic encephalopathy (CMS/HCC) [K72.90]   Yes             PAST MEDICAL HISTORY  Active Ambulatory Problems     Diagnosis Date Noted   • ED (erectile dysfunction) of organic origin 10/29/2015   • Alcoholic cirrhosis of liver with ascites (CMS/HCC) 07/06/2020   • Anemia 07/06/2020   • Jaundice 07/06/2020   • Coagulopathy (CMS/HCC) 07/08/2020   • Secondary thrombocytopenia 07/08/2020   • Lung abnormality- left apex 07/09/2020   • Hepatic encephalopathy (CMS/HCC) 12/25/2020     Resolved Ambulatory Problems     Diagnosis Date Noted   • Benign essential hypertension 10/29/2015   • Hyponatremia 07/06/2020     Past Medical History:   Diagnosis Date   • Alcoholism (CMS/HCC)    • Cirrhosis (CMS/HCC)    • Encephalopathy    • Hypertension    • Liver disease          PAST SURGICAL HISTORY  History reviewed. No pertinent surgical history.      FAMILY HISTORY  Family History   Problem Relation Age of Onset   • Diabetes Mother    • Hypertension Father          SOCIAL HISTORY  Social History     Socioeconomic History   • Marital status:      Spouse name: Not on file   • Number of children: Not on file   • Years of education: Not on file   • Highest education level: Not on file   Tobacco Use   • Smoking status: Never  Smoker   • Smokeless tobacco: Never Used   Substance and Sexual Activity   • Alcohol use: Not Currently     Comment: 1 bottle of wine per day, stopped drinking ETOH 2 weeks ago   • Drug use: No   • Sexual activity: Defer         ALLERGIES  Patient has no known allergies.        REVIEW OF SYSTEMS  Review of Systems     All systems reviewed and negative except for those discussed in HPI.       PHYSICAL EXAM    I have reviewed the triage vital signs and nursing notes.    ED Triage Vitals   Temp Heart Rate Resp BP SpO2   01/01/21 1952 01/01/21 1952 01/01/21 1952 01/01/21 2008 01/01/21 1952   (!) 101.8 °F (38.8 °C) 101 20 104/88 100 %      Temp src Heart Rate Source Patient Position BP Location FiO2 (%)   01/01/21 1952 01/01/21 1952 01/01/21 2008 01/01/21 2008 --   Tympanic Monitor Lying Right arm          Physical Exam    Physical Exam   Constitutional: No distress.  Not overtly toxic appearing  HENT:  Head: Normocephalic and atraumatic.   Oropharynx: Mucous membranes are moist.   Eyes: No scleral icterus. No conjunctival pallor.  Neck: Painless range of motion noted. Neck supple.   Cardiovascular: Normal rate, regular rhythm and intact distal pulses.  Pulmonary/Chest: No respiratory distress. There are no wheezes, no rhonchi, and no rales.   Abdominal: Soft. There is no tenderness. There is no rebound and no guarding.   Musculoskeletal: Moves all extremities equally. There is no pedal edema or calf tenderness.   Neurological: Alert.  Baseline strength and sensation noted.   Skin: Skin is pink, warm, and dry. No pallor.   Psychiatric: Mood and affect normal.   Nursing note and vitals reviewed.    LAB RESULTS  Recent Results (from the past 24 hour(s))   Comprehensive Metabolic Panel    Collection Time: 01/01/21  9:25 PM    Specimen: Blood   Result Value Ref Range    Glucose 147 (H) 65 - 99 mg/dL    BUN 14 8 - 23 mg/dL    Creatinine 0.95 0.76 - 1.27 mg/dL    Sodium 129 (L) 136 - 145 mmol/L    Potassium 5.0 3.5 - 5.2  mmol/L    Chloride 101 98 - 107 mmol/L    CO2 18.0 (L) 22.0 - 29.0 mmol/L    Calcium 8.3 (L) 8.6 - 10.5 mg/dL    Total Protein 6.8 6.0 - 8.5 g/dL    Albumin 2.60 (L) 3.50 - 5.20 g/dL    ALT (SGPT) 27 1 - 41 U/L    AST (SGOT) 50 (H) 1 - 40 U/L    Alkaline Phosphatase 140 (H) 39 - 117 U/L    Total Bilirubin 2.7 (H) 0.0 - 1.2 mg/dL    eGFR Non African Amer 80 >60 mL/min/1.73    Globulin 4.2 gm/dL    A/G Ratio 0.6 g/dL    BUN/Creatinine Ratio 14.7 7.0 - 25.0    Anion Gap 10.0 5.0 - 15.0 mmol/L   CBC Auto Differential    Collection Time: 01/01/21  9:25 PM    Specimen: Blood   Result Value Ref Range    WBC 18.22 (H) 3.40 - 10.80 10*3/mm3    RBC 2.80 (L) 4.14 - 5.80 10*6/mm3    Hemoglobin 9.2 (L) 13.0 - 17.7 g/dL    Hematocrit 27.0 (L) 37.5 - 51.0 %    MCV 96.4 79.0 - 97.0 fL    MCH 32.9 26.6 - 33.0 pg    MCHC 34.1 31.5 - 35.7 g/dL    RDW 14.5 12.3 - 15.4 %    RDW-SD 51.1 37.0 - 54.0 fl    MPV 10.2 6.0 - 12.0 fL    Platelets 90 (L) 140 - 450 10*3/mm3    Neutrophil % 87.4 (H) 42.7 - 76.0 %    Lymphocyte % 1.6 (L) 19.6 - 45.3 %    Monocyte % 9.7 5.0 - 12.0 %    Eosinophil % 0.0 (L) 0.3 - 6.2 %    Basophil % 0.3 0.0 - 1.5 %    Immature Grans % 1.0 (H) 0.0 - 0.5 %    Neutrophils, Absolute 15.92 (H) 1.70 - 7.00 10*3/mm3    Lymphocytes, Absolute 0.29 (L) 0.70 - 3.10 10*3/mm3    Monocytes, Absolute 1.77 (H) 0.10 - 0.90 10*3/mm3    Eosinophils, Absolute 0.00 0.00 - 0.40 10*3/mm3    Basophils, Absolute 0.06 0.00 - 0.20 10*3/mm3    Immature Grans, Absolute 0.18 (H) 0.00 - 0.05 10*3/mm3    nRBC 0.0 0.0 - 0.2 /100 WBC   Lipase    Collection Time: 01/01/21  9:25 PM    Specimen: Blood   Result Value Ref Range    Lipase 68 (H) 13 - 60 U/L   Respiratory Panel PCR w/COVID-19(SARS-CoV-2) DAXA/YANELI/MARISABEL/PAD/COR/MAD/AGGIE In-House, NP Swab in [facility], 3-4 HR TAT - Swab, Nasopharynx    Collection Time: 01/01/21  9:26 PM    Specimen: Nasopharynx; Swab   Result Value Ref Range    ADENOVIRUS, PCR Not Detected Not Detected    Coronavirus 229E Not  Detected Not Detected    Coronavirus HKU1 Not Detected Not Detected    Coronavirus NL63 Not Detected Not Detected    Coronavirus OC43 Not Detected Not Detected    COVID19 Not Detected Not Detected - Ref. Range    Human Metapneumovirus Not Detected Not Detected    Human Rhinovirus/Enterovirus Not Detected Not Detected    Influenza A PCR Not Detected Not Detected    Influenza B PCR Not Detected Not Detected    Parainfluenza Virus 1 Not Detected Not Detected    Parainfluenza Virus 2 Not Detected Not Detected    Parainfluenza Virus 3 Not Detected Not Detected    Parainfluenza Virus 4 Not Detected Not Detected    RSV, PCR Not Detected Not Detected    Bordetella pertussis pcr Not Detected Not Detected    Bordetella parapertussis PCR Not Detected Not Detected    Chlamydophila pneumoniae PCR Not Detected Not Detected    Mycoplasma pneumo by PCR Not Detected Not Detected   Lactic Acid, Plasma    Collection Time: 01/01/21 10:18 PM    Specimen: Blood   Result Value Ref Range    Lactate 4.7 (C) 0.5 - 2.0 mmol/L   Urinalysis With Culture If Indicated - Urine, Clean Catch    Collection Time: 01/01/21 10:22 PM    Specimen: Urine, Clean Catch   Result Value Ref Range    Color, UA Dark Yellow (A) Yellow, Straw    Appearance, UA Clear Clear    pH, UA 5.5 5.0 - 8.0    Specific Gravity, UA 1.029 1.005 - 1.030    Glucose, UA Negative Negative    Ketones, UA Trace (A) Negative    Bilirubin, UA Negative Negative    Blood, UA Negative Negative    Protein, UA Negative Negative    Leuk Esterase, UA Negative Negative    Nitrite, UA Negative Negative    Urobilinogen, UA 1.0 E.U./dL 0.2 - 1.0 E.U./dL       Ordered the above labs and independently reviewed the results.        RADIOLOGY  No Radiology Exams Resulted Within Past 24 Hours    I ordered the above noted radiological studies. Reviewed by me and discussed with radiologist.  See dictation for official radiology interpretation.      PROCEDURES    Procedures        MEDICATIONS GIVEN IN  ER    Medications   sodium chloride 0.9 % flush 10 mL (10 mL Intravenous Given 1/1/21 2127)   piperacillin-tazobactam (ZOSYN) 3.375 g in iso-osmotic dextrose 50 ml (premix) (has no administration in time range)   vancomycin 1500 mg/500 mL 0.9% NS IVPB ([facility]) (has no administration in time range)   sodium chloride 0.9 % bolus 2,067 mL (has no administration in time range)   sodium chloride 0.9 % bolus 1,000 mL (1,000 mL Intravenous New Bag 1/1/21 2129)   acetaminophen (TYLENOL) tablet 1,000 mg (1,000 mg Oral Given 1/1/21 2118)   iopamidol (ISOVUE-300) 61 % injection 100 mL (85 mL Intravenous Given by Other 1/1/21 2307)         PROGRESS, DATA ANALYSIS, CONSULTS, AND MEDICAL DECISION MAKING    By differential diagnosis for fever includes but is not limited:  To viral infections, bacterial infections, fungal infections, fever of unknown origin, auto regulatory dysfunction, hyperthermia, heat exhaustion, heat stroke      All labs have been independently reviewed by me.  All radiology studies have been reviewed by me and discussed with radiologist dictating the report.   EKG's independently viewed and interpreted by me.  Discussion below represents my analysis of pertinent findings related to patient's condition, differential diagnosis, treatment plan and final disposition.      ED Course as of Jan 01 2327 Fri Jan 01, 2021 2213 Stable chronic anemia   Hemoglobin(!): 9.2 [RS]   2234 COVID19: Not Detected [RS]   2235 Sodium(!): 129 [RS]   2235 Near baseline   Total Bilirubin(!): 2.7 [RS]   2235 Stable thrombocytopenia   Platelets(!): 90 [RS]   2254 Initiate antibiotics and IV fluid bolus.   Lactate(!!): 4.7 [RS]   2255 Stable from prior   Lipase(!): 68 [RS]   2256 RADIOLOGY        Study: Chest x-ray-1 view    Findings: No acute abnormality    Interpreted contemporaneously with treatment by Dr. Mckeon-radiologist, independently viewed by me        [RS]   2257 I have reviewed lab results and chest x-ray result with the  patient.  I recommend IV fluids, antibiotics and admission as noted before.  He is agreeable with this plan.  CT abdomen pending.  We will go ahead and consult with the hospitalist.    [RS]   2310 CONSULT        Provider: MILAGROS Gregory    Discussion: Reviewed patient history, ED presentation and evaluation as well as pending CT result and antibiotics that have been initiated in the ED.  She is agreeable to accept this patient for full admission with telemetry on behalf of Dr. Quezada.    Agreeable c treatment and planned disposition.            [RS]      ED Course User Index  [RS] Vasquez Carroll MD       AS OF 23:27 EST VITALS:    BP - 118/61  HR - 103  TEMP - 100 °F (37.8 °C) (Oral)  O2 SATS - 100%        DIAGNOSIS  Final diagnoses:   Sepsis, due to unspecified organism, unspecified whether acute organ dysfunction present (CMS/HCC)   Fever in adult   Thrombocytopenia (CMS/HCC)   History of cirrhosis         DISPOSITION  ADMISSION    Discussed treatment plan and reason for admission with pt/family and admitting physician.  Pt/family voiced understanding of the plan for admission for further testing/treatment as needed.          Vasquez Carroll MD  01/01/21 2327      Electronically signed by Vasquez Carroll MD at 01/01/21 2327     Ellen Turner RN at 01/01/21 2132        Pt unable to urinate currently but is aware of need for sample.     Ellen Turner RN  01/01/21 2133      Electronically signed by Ellen Turner RN at 01/01/21 2133     Ellen Turner RN at 01/01/21 2133        I wore full protective equipment throughout this patient encounter, including face mask, eye shield, gown and gloves.  Hand hygiene/washing of hands was performed before donning protective equipment and after removal when leaving room.        Ellen Turner RN  01/01/21 2133      Electronically signed by Ellen Turner RN at 01/01/21 2133     Bladimir Marin at 01/01/21 2220        Pt attempting to provide urine  specimen     Bladimir Marin  01/01/21 2220      Electronically signed by Bladimir Marin at 01/01/21 2220     Rissa Weeks, RN at 01/01/21 2350          .  Nursing report ED to floor  Wei Potts  65 y.o.  male    HPI (triage note):   Chief Complaint   Patient presents with   • Fever   • Nausea       Admitting doctor:   Tito Quezada MD    Admitting diagnosis:   The primary encounter diagnosis was Sepsis, due to unspecified organism, unspecified whether acute organ dysfunction present (CMS/HCC). Diagnoses of Fever in adult, Thrombocytopenia (CMS/HCC), and History of cirrhosis were also pertinent to this visit.    Code status:   Current Code Status     Date Active Code Status Order ID Comments User Context       Prior    Advance Care Planning Activity          Allergies:   Patient has no known allergies.    Weight:       01/01/21 2008   Weight: 68.9 kg (152 lb)       Most recent vitals:   Vitals:    01/01/21 2129 01/01/21 2130 01/01/21 2200 01/01/21 2223   BP:  124/60 118/61    BP Location:       Patient Position:       Pulse: 101 98 93 103   Resp:    20   Temp:    100 °F (37.8 °C)   TempSrc:    Oral   SpO2: 100% 100% 99% 100%   Weight:       Height:           Active LDAs/IV Access:   Lines, Drains & Airways    Active LDAs     Name:   Placement date:   Placement time:   Site:   Days:    Peripheral IV 01/01/21 2124 Right Antecubital   01/01/21 2124    Antecubital   less than 1                Labs (abnormal labs have a star):   Labs Reviewed   COMPREHENSIVE METABOLIC PANEL - Abnormal; Notable for the following components:       Result Value    Glucose 147 (*)     Sodium 129 (*)     CO2 18.0 (*)     Calcium 8.3 (*)     Albumin 2.60 (*)     AST (SGOT) 50 (*)     Alkaline Phosphatase 140 (*)     Total Bilirubin 2.7 (*)     All other components within normal limits    Narrative:     GFR Normal >60  Chronic Kidney Disease <60  Kidney Failure <15     URINALYSIS W/ CULTURE IF INDICATED - Abnormal; Notable  for the following components:    Color, UA Dark Yellow (*)     Ketones, UA Trace (*)     All other components within normal limits    Narrative:     Urine microscopic not indicated.   CBC WITH AUTO DIFFERENTIAL - Abnormal; Notable for the following components:    WBC 18.22 (*)     RBC 2.80 (*)     Hemoglobin 9.2 (*)     Hematocrit 27.0 (*)     Platelets 90 (*)     Neutrophil % 87.4 (*)     Lymphocyte % 1.6 (*)     Eosinophil % 0.0 (*)     Immature Grans % 1.0 (*)     Neutrophils, Absolute 15.92 (*)     Lymphocytes, Absolute 0.29 (*)     Monocytes, Absolute 1.77 (*)     Immature Grans, Absolute 0.18 (*)     All other components within normal limits   LACTIC ACID, PLASMA - Abnormal; Notable for the following components:    Lactate 4.7 (*)     All other components within normal limits   LIPASE - Abnormal; Notable for the following components:    Lipase 68 (*)     All other components within normal limits   RESPIRATORY PANEL PCR W/ COVID-19 (SARS-COV-2) DAXA/YANELI/MARISABEL/PAD/COR/MAD/AGGIE IN-HOUSE, NP SWAB IN [facility], 3-4 HR TAT - Normal    Narrative:     Fact sheet for providers: https://docs.HistoryFile/wp-content/uploads/TJH8511-4937-IY0.1-EUA-Provider-Fact-Sheet-3.pdf    Fact sheet for patients: https://docs.HistoryFile/wp-content/uploads/XNS6835-3044-CS1.1-EUA-Patient-Fact-Sheet-1.pdf    Test performed by PCR.   BLOOD CULTURE   BLOOD CULTURE   LACTIC ACID REFLEX TIMER   CBC AND DIFFERENTIAL    Narrative:     The following orders were created for panel order CBC & Differential.  Procedure                               Abnormality         Status                     ---------                               -----------         ------                     CBC Auto Differential[[id number]]        Abnormal            Final result                 Please view results for these tests on the individual orders.       EKG:   No orders to display       Meds given in ED:   Medications   sodium chloride 0.9 % flush 10 mL (10 mL  Intravenous Given 1/1/21 2127)   piperacillin-tazobactam (ZOSYN) 3.375 g in iso-osmotic dextrose 50 ml (premix) (3.375 g Intravenous New Bag 1/1/21 2330)   vancomycin 1500 mg/500 mL 0.9% NS IVPB ([facility]) (has no administration in time range)   sodium chloride 0.9 % bolus 2,067 mL (2,067 mL Intravenous New Bag 1/1/21 2332)   sodium chloride 0.9 % bolus 1,000 mL (1,000 mL Intravenous New Bag 1/1/21 2129)   acetaminophen (TYLENOL) tablet 1,000 mg (1,000 mg Oral Given 1/1/21 2118)   iopamidol (ISOVUE-300) 61 % injection 100 mL (85 mL Intravenous Given by Other 1/1/21 2307)       Imaging results:  Ct Abdomen Pelvis With Contrast    Result Date: 1/1/2021  1.There is circumferential thickening of the wall of the ascending and transverse colon that is suggestive of colitis. 2. There is stable evidence of cirrhosis. Improved but persistent splenomegaly and ascites is noted.  This report was finalized on 1/1/2021 11:46 PM by Dr. Randall Mckeon M.D.        Ambulatory status:   -  Social issues:   Social History     Socioeconomic History   • Marital status:      Spouse name: Not on file   • Number of children: Not on file   • Years of education: Not on file   • Highest education level: Not on file   Tobacco Use   • Smoking status: Never Smoker   • Smokeless tobacco: Never Used   Substance and Sexual Activity   • Alcohol use: Not Currently     Comment: 1 bottle of wine per day, stopped drinking ETOH 2 weeks ago   • Drug use: No   • Sexual activity: Defer    Nursing report ED to floor     Rissa Weeks, BELINDA  01/01/21 9790      Electronically signed by Rissa Weeks RN at 01/01/21 5005

## 2021-01-04 NOTE — PROGRESS NOTES
Gastroenterology   Inpatient Progress Note    Reason for Follow Up:  Ascites and SBP, gram-negative sepsis    Subjective     Interval History:   Patient comfortable and reports no complaints today although he does report some discomfort down at the site of his paracentesis.  No nausea and vomiting no fever started on antibiotics due to 1.8 L of fluid removed from his abdomen and positive SBP.    Current Facility-Administered Medications:   •  acetaminophen (TYLENOL) tablet 650 mg, 650 mg, Oral, Q4H PRN **OR** acetaminophen (TYLENOL) 160 MG/5ML solution 650 mg, 650 mg, Oral, Q4H PRN **OR** acetaminophen (TYLENOL) suppository 650 mg, 650 mg, Rectal, Q4H PRN, Anaid Magana APRN  •  [START ON 1/5/2021] albumin human 25 % IV SOLN 75 g, 75 g, Intravenous, Once, Ruby Mcgee MD  •  bisacodyl (DULCOLAX) EC tablet 5 mg, 5 mg, Oral, Daily PRN, Anaid Magana APRN  •  calcium carbonate (TUMS) chewable tablet 500 mg (200 mg elemental), 2 tablet, Oral, BID PRN, Anaid Magana APRN  •  cefTRIAXone (ROCEPHIN) IVPB 2 g, 2 g, Intravenous, Q24H, Katie Sevilla MD, Last Rate: 100 mL/hr at 01/03/21 1527, 2 g at 01/03/21 1527  •  cholecalciferol (VITAMIN D3) tablet 800 Units, 800 Units, Oral, Daily, Juan David Coronado MD, 800 Units at 01/04/21 0813  •  ferrous sulfate tablet 325 mg, 325 mg, Oral, BID With Meals, Juan David Coronado MD, 325 mg at 01/02/21 1835  •  lactulose (CHRONULAC) 10 GM/15ML solution 10 g, 10 g, Oral, BID, Juan David Cornoado MD, 10 g at 01/04/21 0813  •  Magnesium Sulfate 2 gram Bolus, followed by 8 gram infusion (total Mg dose 10 grams)- Mg less than or equal to 1mg/dL, 2 g, Intravenous, PRN **OR** Magnesium Sulfate 2 gram / 50mL Infusion (GIVE X 3 BAGS TO EQUAL 6GM TOTAL DOSE) - Mg 1.1 - 1.5 mg/dl, 2 g, Intravenous, PRN **OR** Magnesium Sulfate 4 gram infusion- Mg 1.6-1.9 mg/dL, 4 g, Intravenous, PRN, Juan David Coronado MD  •  metoprolol tartrate (LOPRESSOR) tablet 12.5 mg, 12.5 mg, Oral, Daily,  Juan David Coronado MD, 12.5 mg at 01/04/21 0813  •  nitroglycerin (NITROSTAT) SL tablet 0.4 mg, 0.4 mg, Sublingual, Q5 Min PRN, Anaid Magana APRN  •  ondansetron (ZOFRAN) tablet 4 mg, 4 mg, Oral, Q6H PRN **OR** ondansetron (ZOFRAN) injection 4 mg, 4 mg, Intravenous, Q6H PRN, Anaid Magana APRN  •  pantoprazole (PROTONIX) EC tablet 40 mg, 40 mg, Oral, Daily, Juan David Coronado MD, 40 mg at 01/04/21 0813  •  potassium chloride (K-DUR,KLOR-CON) ER tablet 40 mEq, 40 mEq, Oral, PRN, Juan David Coronado MD  •  potassium chloride (KLOR-CON) packet 40 mEq, 40 mEq, Oral, PRN, Juan David Coronado MD  •  [COMPLETED] Insert peripheral IV, , , Once **AND** sodium chloride 0.9 % flush 10 mL, 10 mL, Intravenous, PRN, Vasquez Carroll MD, 10 mL at 01/01/21 2127  •  sodium chloride 0.9 % flush 10 mL, 10 mL, Intravenous, Q12H, Anaid Magana APRN, 10 mL at 01/04/21 0815  •  sodium chloride 0.9 % flush 10 mL, 10 mL, Intravenous, PRN, Anaid Magana APRN  •  sodium chloride 0.9 % infusion, 50 mL/hr, Intravenous, Continuous, Anaid Magana APRN, Last Rate: 50 mL/hr at 01/03/21 2050, 50 mL/hr at 01/03/21 2050  Review of Systems:               All systems were reviewed and negative except for:  Genitourinary: postivie for  Pain at paracentesis site    Objective     Vital Signs  Temp:  [97.4 °F (36.3 °C)-98.2 °F (36.8 °C)] 98.2 °F (36.8 °C)  Heart Rate:  [74-81] 81  Resp:  [18] 18  BP: (102-123)/(63-66) 123/63  Body mass index is 21.2 kg/m².    Intake/Output Summary (Last 24 hours) at 1/4/2021 1005  Last data filed at 1/4/2021 0745  Gross per 24 hour   Intake 568 ml   Output --   Net 568 ml     I/O this shift:  In: 448 [P.O.:448]  Out: -                 Physical Exam:              General: patient awake, alert and cooperative              Eyes: no scleral icterus              Skin: warm and dry, not jaundiced              Abdomen: soft, nontender, nondistended; normal bowel sounds, no masses palpated, no periumbical  lymphadenopathy positive ecchymosis and discomfort at the paracentesis site in the right lower quadrant              Psychiatric: Appropriate affect and behavior                Results Review:                I reviewed the patient's new clinical results.    Results from last 7 days   Lab Units 01/04/21  0518 01/03/21  0400 01/02/21  0219   WBC 10*3/mm3 4.69 11.65* 17.96*   HEMOGLOBIN g/dL 6.7* 7.1* 7.9*   HEMATOCRIT % 19.4* 20.2* 23.2*   PLATELETS 10*3/mm3 51* 45* 59*     Results from last 7 days   Lab Units 01/04/21  0518 01/03/21  0400 01/02/21  1236   SODIUM mmol/L 134* 132* 128*   POTASSIUM mmol/L 3.7 4.4 4.6   CHLORIDE mmol/L 109* 108* 104   CO2 mmol/L 18.1* 18.0* 14.9*   BUN mg/dL 11 14 17   CREATININE mg/dL 0.60* 0.80 0.89   CALCIUM mg/dL 7.7* 7.3* 7.4*   BILIRUBIN mg/dL 1.7* 2.2* 3.0*   ALK PHOS U/L 78 88 86   ALT (SGPT) U/L 19 21 24   AST (SGOT) U/L 33 43* 51*   GLUCOSE mg/dL 92 120* 131*     Results from last 7 days   Lab Units 01/03/21  0400 01/02/21  1616   INR  2.91* 2.0*     Lab Results   Lab Value Date/Time    LIPASE 68 (H) 01/01/2021 2125    LIPASE 133 (H) 07/22/2020 1759    LIPASE 59 07/07/2020 0633    LIPASE 65 (H) 07/06/2020 1630       Radiology:  US Abdomen Complete   Final Result      US Paracentesis   Final Result   1. Technically successful ultrasound-guided paracentesis.       This report was finalized on 1/2/2021 7:09 PM by Dr. Felix Banda M.D.          CT Abdomen Pelvis With Contrast   Final Result   1.There is circumferential thickening of the wall of the ascending and   transverse colon that is suggestive of colitis.   2. There is stable evidence of cirrhosis. Improved but persistent   splenomegaly and ascites is noted.       This report was finalized on 1/1/2021 11:46 PM by Dr. Randall Mike,   M.D.          XR Chest 1 View   Final Result   Negative chest radiograph.       This report was finalized on 1/2/2021 6:48 PM by Dr. Randall Mckeon M.D.              Assessment/Plan      Patient Active Problem List   Diagnosis   • ED (erectile dysfunction) of organic origin   • Alcoholic cirrhosis of liver with ascites (CMS/HCC)   • Anemia   • Jaundice   • Coagulopathy (CMS/HCC)   • Secondary thrombocytopenia   • Lung abnormality- left apex   • Hepatic encephalopathy (CMS/HCC)   • Sepsis (CMS/HCC)   • Colitis   • Hypertension   • GERD (gastroesophageal reflux disease)       Assessment:  1. Cirrhosis  2. Ascites positive SBP plan for repeat paracentesis after 3 days of antibiotics which would be 1/5/2021  3. Orthodoxy  4. Gram-negative sepsis likely from SBP  5. Abnormal CT with thickening of the colon  6. History of encephalopathy none current  7. Anemia no previous EGD and colonoscopy  8. Decreasing hemoglobin status post paracentesis pain at the site    These problems are new to me.  Plan:  · Continue antibiotics for SBP and gram-negative sepsis.  Plans for repeat paracentesis but the timing will be based on the work-up below  · Received IV albumin  · No previous EGD and colonoscopy this will need to be done at some point plans have been made for this with discussion with Dr. Day as an outpatient question if it will ultimately need to be done while here in the hospital but would want infection to be under control unless emergent  · Given the decreasing hemoglobin and the pain at the site with ecchymosis I would get an ultrasound of the abdomen to ensure there is no significant sized hematoma if this is negative we would proceed then tomorrow with rechecking the SBP status with a repeat paracentesis but if there is an active hematoma may hold off on that while resolving  · Continue 2 g sodium diet and continue lactulose for encephalopathy  · Long-term prevention of SBP with prophylaxis will need to be given once acute SBP has been treated  · ID consult appreciated  · Hematology consult considered  I discussed the patients findings and my recommendations with patient and nursing  staff.             Thomas Lynch M.D.  [facility] [place] [address]  Office: [phone]

## 2021-01-05 ENCOUNTER — APPOINTMENT (OUTPATIENT)
Dept: ULTRASOUND IMAGING | Facility: HOSPITAL | Age: 66
End: 2021-01-05

## 2021-01-05 LAB
ALBUMIN SERPL-MCNC: 2.6 G/DL (ref 3.5–5.2)
ALBUMIN/GLOB SERPL: 0.9 G/DL
ALP SERPL-CCNC: 81 U/L (ref 39–117)
ALT SERPL W P-5'-P-CCNC: 18 U/L (ref 1–41)
ANION GAP SERPL CALCULATED.3IONS-SCNC: 8.9 MMOL/L (ref 5–15)
APPEARANCE FLD: ABNORMAL
AST SERPL-CCNC: 34 U/L (ref 1–40)
BASOPHILS # BLD AUTO: 0.03 10*3/MM3 (ref 0–0.2)
BASOPHILS NFR BLD AUTO: 0.8 % (ref 0–1.5)
BILIRUB SERPL-MCNC: 2 MG/DL (ref 0–1.2)
BUN SERPL-MCNC: 8 MG/DL (ref 8–23)
BUN/CREAT SERPL: 12.5 (ref 7–25)
CALCIUM SPEC-SCNC: 7.9 MG/DL (ref 8.6–10.5)
CHLORIDE SERPL-SCNC: 111 MMOL/L (ref 98–107)
CO2 SERPL-SCNC: 18.1 MMOL/L (ref 22–29)
COLOR FLD: YELLOW
CREAT SERPL-MCNC: 0.64 MG/DL (ref 0.76–1.27)
CYTO UR: NORMAL
DEPRECATED RDW RBC AUTO: 47.7 FL (ref 37–54)
EOSINOPHIL # BLD AUTO: 0.35 10*3/MM3 (ref 0–0.4)
EOSINOPHIL NFR BLD AUTO: 9 % (ref 0.3–6.2)
ERYTHROCYTE [DISTWIDTH] IN BLOOD BY AUTOMATED COUNT: 14.4 % (ref 12.3–15.4)
GFR SERPL CREATININE-BSD FRML MDRD: 126 ML/MIN/1.73
GLOBULIN UR ELPH-MCNC: 2.8 GM/DL
GLUCOSE SERPL-MCNC: 97 MG/DL (ref 65–99)
HCT VFR BLD AUTO: 21.2 % (ref 37.5–51)
HGB BLD-MCNC: 7.3 G/DL (ref 13–17.7)
IMM GRANULOCYTES # BLD AUTO: 0.02 10*3/MM3 (ref 0–0.05)
IMM GRANULOCYTES NFR BLD AUTO: 0.5 % (ref 0–0.5)
INR PPP: 2.39 (ref 0.9–1.1)
LAB AP CASE REPORT: NORMAL
LYMPHOCYTES # BLD AUTO: 0.69 10*3/MM3 (ref 0.7–3.1)
LYMPHOCYTES NFR BLD AUTO: 17.6 % (ref 19.6–45.3)
LYMPHOCYTES NFR FLD MANUAL: 25 %
MCH RBC QN AUTO: 32.3 PG (ref 26.6–33)
MCHC RBC AUTO-ENTMCNC: 34.4 G/DL (ref 31.5–35.7)
MCV RBC AUTO: 93.8 FL (ref 79–97)
METHOD: ABNORMAL
MONOCYTES # BLD AUTO: 0.67 10*3/MM3 (ref 0.1–0.9)
MONOCYTES NFR BLD AUTO: 17.1 % (ref 5–12)
MONOCYTES NFR FLD: 34 %
MONOS+MACROS NFR FLD: 19 %
NEUTROPHILS NFR BLD AUTO: 2.15 10*3/MM3 (ref 1.7–7)
NEUTROPHILS NFR BLD AUTO: 55 % (ref 42.7–76)
NEUTROPHILS NFR FLD MANUAL: 22 %
NRBC BLD AUTO-RTO: 0 /100 WBC (ref 0–0.2)
NUC CELL # FLD: 304 /MM3
PATH REPORT.FINAL DX SPEC: NORMAL
PATH REPORT.GROSS SPEC: NORMAL
PLATELET # BLD AUTO: 56 10*3/MM3 (ref 140–450)
PMV BLD AUTO: 9.7 FL (ref 6–12)
POTASSIUM SERPL-SCNC: 3.7 MMOL/L (ref 3.5–5.2)
PROT SERPL-MCNC: 5.4 G/DL (ref 6–8.5)
PROTHROMBIN TIME: 25.8 SECONDS (ref 11.7–14.2)
RBC # BLD AUTO: 2.26 10*6/MM3 (ref 4.14–5.8)
RBC # FLD AUTO: 363 /MM3
SODIUM SERPL-SCNC: 138 MMOL/L (ref 136–145)
WBC # BLD AUTO: 3.91 10*3/MM3 (ref 3.4–10.8)

## 2021-01-05 PROCEDURE — 89051 BODY FLUID CELL COUNT: CPT | Performed by: NURSE PRACTITIONER

## 2021-01-05 PROCEDURE — 80053 COMPREHEN METABOLIC PANEL: CPT | Performed by: INTERNAL MEDICINE

## 2021-01-05 PROCEDURE — 85610 PROTHROMBIN TIME: CPT | Performed by: INTERNAL MEDICINE

## 2021-01-05 PROCEDURE — 85025 COMPLETE CBC W/AUTO DIFF WBC: CPT | Performed by: INTERNAL MEDICINE

## 2021-01-05 PROCEDURE — P9047 ALBUMIN (HUMAN), 25%, 50ML: HCPCS | Performed by: INTERNAL MEDICINE

## 2021-01-05 PROCEDURE — 99232 SBSQ HOSP IP/OBS MODERATE 35: CPT | Performed by: NURSE PRACTITIONER

## 2021-01-05 PROCEDURE — 25010000002 ALBUMIN HUMAN 25% PER 50 ML: Performed by: INTERNAL MEDICINE

## 2021-01-05 PROCEDURE — 76942 ECHO GUIDE FOR BIOPSY: CPT

## 2021-01-05 PROCEDURE — 25010000003 LIDOCAINE 1 % SOLUTION: Performed by: RADIOLOGY

## 2021-01-05 PROCEDURE — 25010000003 CEFTRIAXONE PER 250 MG: Performed by: INTERNAL MEDICINE

## 2021-01-05 RX ORDER — LIDOCAINE HYDROCHLORIDE 10 MG/ML
10 INJECTION, SOLUTION INFILTRATION; PERINEURAL ONCE
Status: COMPLETED | OUTPATIENT
Start: 2021-01-05 | End: 2021-01-05

## 2021-01-05 RX ADMIN — LACTULOSE 10 G: 10 SOLUTION ORAL at 08:57

## 2021-01-05 RX ADMIN — CEFTRIAXONE SODIUM 2 G: 2 INJECTION, SOLUTION INTRAVENOUS at 15:16

## 2021-01-05 RX ADMIN — SODIUM CHLORIDE 50 ML/HR: 9 INJECTION, SOLUTION INTRAVENOUS at 12:26

## 2021-01-05 RX ADMIN — ALBUMIN HUMAN 75 G: 0.25 SOLUTION INTRAVENOUS at 12:46

## 2021-01-05 RX ADMIN — SODIUM CHLORIDE, PRESERVATIVE FREE 10 ML: 5 INJECTION INTRAVENOUS at 08:58

## 2021-01-05 RX ADMIN — SODIUM CHLORIDE, PRESERVATIVE FREE 10 ML: 5 INJECTION INTRAVENOUS at 21:28

## 2021-01-05 RX ADMIN — LACTULOSE 10 G: 10 SOLUTION ORAL at 21:27

## 2021-01-05 RX ADMIN — LIDOCAINE HYDROCHLORIDE 8 ML: 10 INJECTION, SOLUTION INFILTRATION; PERINEURAL at 11:14

## 2021-01-05 NOTE — PROGRESS NOTES
Name: Wei Potts ADMIT: 2021   : 1955  PCP: Bella Villalta MD    MRN: [id number] LOS: 4 days   AGE/SEX: 65 y.o. male  ROOM: [facility]     Subjective   Subjective           Patient is lying on the bed and has no specific complaints.  Denies nausea, vomiting abdominal pain, chest pain, palpitations, dizziness.    Objective   Objective   Vital Signs  Temp:  [97.7 °F (36.5 °C)-98.4 °F (36.9 °C)] 97.9 °F (36.6 °C)  Heart Rate:  [80-88] 88  Resp:  [16-18] 18  BP: ()/(49-63) 116/52  SpO2:  [99 %-100 %] 100 %  on   ;   Device (Oxygen Therapy): room air  Body mass index is 21.2 kg/m².  Physical Exam  Vitals signs and nursing note reviewed.   Constitutional:       General: He is not in acute distress.     Appearance: He is normal weight.   HENT:      Head: Normocephalic and atraumatic.      Nose: Nose normal.      Mouth/Throat:      Pharynx: Oropharynx is clear.   Eyes:      General: No scleral icterus.     Extraocular Movements: Extraocular movements intact.   Neck:      Musculoskeletal: Neck supple.   Cardiovascular:      Rate and Rhythm: Normal rate and regular rhythm.      Pulses: Normal pulses.      Heart sounds: Normal heart sounds.   Pulmonary:      Effort: Pulmonary effort is normal.      Breath sounds: Normal breath sounds.   Abdominal:      General: Abdomen is flat. Bowel sounds are normal. There is no distension.      Palpations: Abdomen is soft.   Musculoskeletal: Normal range of motion.   Skin:     General: Skin is warm and dry.      Capillary Refill: Capillary refill takes less than 2 seconds.   Neurological:      General: No focal deficit present.      Mental Status: He is alert and oriented to person, place, and time.   Psychiatric:         Mood and Affect: Mood normal.         Results Review     I reviewed the patient's new clinical results.  Results from last 7 days   Lab Units 21  0512 21  0518 21  0400 21  0219   WBC 10*3/mm3 3.91 4.69 11.65* 17.96*    HEMOGLOBIN g/dL 7.3* 6.7* 7.1* 7.9*   PLATELETS 10*3/mm3 56* 51* 45* 59*     Results from last 7 days   Lab Units 01/05/21  0512 01/04/21 0518 01/03/21  0400 01/02/21  1236   SODIUM mmol/L 138 134* 132* 128*   POTASSIUM mmol/L 3.7 3.7 4.4 4.6   CHLORIDE mmol/L 111* 109* 108* 104   CO2 mmol/L 18.1* 18.1* 18.0* 14.9*   BUN mg/dL 8 11 14 17   CREATININE mg/dL 0.64* 0.60* 0.80 0.89   GLUCOSE mg/dL 97 92 120* 131*   Estimated Creatinine Clearance: 115.1 mL/min (A) (by C-G formula based on SCr of 0.64 mg/dL (L)).  Results from last 7 days   Lab Units 01/05/21 0512 01/04/21 0518 01/03/21  0400 01/02/21  1236   ALBUMIN g/dL 2.60* 2.70* 1.90* 2.20*   BILIRUBIN mg/dL 2.0* 1.7* 2.2* 3.0*   ALK PHOS U/L 81 78 88 86   AST (SGOT) U/L 34 33 43* 51*   ALT (SGPT) U/L 18 19 21 24     Results from last 7 days   Lab Units 01/05/21 0512 01/04/21 0518 01/03/21  0400 01/02/21  1236   CALCIUM mg/dL 7.9* 7.7* 7.3* 7.4*   ALBUMIN g/dL 2.60* 2.70* 1.90* 2.20*     Results from last 7 days   Lab Units 01/02/21 0219 01/01/21  2218   LACTATE mmol/L 3.9* 4.7*     COVID19   Date Value Ref Range Status   01/01/2021 Not Detected Not Detected - Ref. Range Final   12/25/2020 Not Detected Not Detected - Ref. Range Final     No results found for: HGBA1C, POCGLU    US Paracentesis  Narrative: ULTRASOUND-GUIDED PARACENTESIS     HISTORY: Ascites     After signed informed consent was obtained, the right lower quadrant was  prepped and draped in the usual sterile fashion. Lidocaine was used for  local anesthesia.     The paracentesis catheter was inserted into the right lower quadrant  using ultrasound guidance. 2400 mL of clear yellow ascites was removed.  Sample was sent to the lab.     Confirmatory images were obtained.     Patient tolerated the procedure well with no complications.     Impression: Ultrasound-guided paracentesis as described.     This report was finalized on 1/5/2021 12:00 PM by Dr. Tejinder Carroll M.D.       Scheduled  Medications  cefTRIAXone, 2 g, Intravenous, Q24H  cholecalciferol, 800 Units, Oral, Daily  ferrous sulfate, 325 mg, Oral, BID With Meals  lactulose, 10 g, Oral, BID  metoprolol tartrate, 12.5 mg, Oral, Daily  pantoprazole, 40 mg, Oral, Daily  sodium chloride, 10 mL, Intravenous, Q12H    Infusions  sodium chloride, 50 mL/hr, Last Rate: 50 mL/hr (01/05/21 1226)    Diet  Diet Regular; Low Sodium       Assessment/Plan     Active Hospital Problems    Diagnosis  POA   • **Colitis [K52.9]  Unknown   • Hypertension [I10]  Unknown   • GERD (gastroesophageal reflux disease) [K21.9]  Unknown   • Sepsis (CMS/HCC) [A41.9]  Yes   • 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       65 y.o. male admitted with Colitis.    · Sepsis/SBP/strep bacteremia:   Underwent paracentesis on 01/02/2021 and fluid analysis was consistent with his BP.  Initially was on vancomycin and Zosyn and this was deescalated to Rocephin.   Plan is for repeat diagnostic paracentesis today and if he continues to do well and if there is improvement in his SBP then plan is to go home on p.o. Levaquin tomorrow.  · Chronic liver disease with cirrhosis hepatic encephalopathy thrombocytopenia anemia:   Patient's mental status is at baseline now.  GI is following and there is no need for EGD or colonoscopy at this point.  Plan is to undergo abdominal ultrasound to ensure there is no intra-abdominal hematoma and further paracentesis per GI.  · Hypertension: Blood pressures are well controlled  · GERD: Continue his PPI  · Hyponatremia: Improved, continue to follow his BMP .  Sodium is 134 today.  · SCDs for DVT prophylaxis.    · Full code.        Liborio Galvez MD  [facility]  01/05/21  16:32 EST    Patient was wearing facemask when I entered the room and throughout our encounter.  I  wore protective equipment throughout this patient encounter including a face mask, gloves and protective eyewear.  Hand hygiene was performed before donning protective equipment and after removal when leaving the room.

## 2021-01-05 NOTE — PROGRESS NOTES
Gastroenterology   Inpatient Progress Note    Reason for Follow Up:  Cirrhosis, ascites, SBP    Subjective  Interval History:   Patient resting comfortably in bed.  Previous paracentesis site with ecchymosis but no longer tender.  Patient denies abdominal pain.  He states that his abdomen is not more distended than normal and this is his normal size.  He denies nausea, vomiting and is tolerating oral intake although he is currently n.p.o. for paracentesis.    No melena or hematochezia.    He continues on lactulose with looser stools.      Current Facility-Administered Medications:   •  acetaminophen (TYLENOL) tablet 650 mg, 650 mg, Oral, Q4H PRN **OR** acetaminophen (TYLENOL) 160 MG/5ML solution 650 mg, 650 mg, Oral, Q4H PRN **OR** acetaminophen (TYLENOL) suppository 650 mg, 650 mg, Rectal, Q4H PRN, Anaid Magana APRN  •  albumin human 25 % IV SOLN 75 g, 75 g, Intravenous, Once, Ruby Mcgee MD  •  bisacodyl (DULCOLAX) EC tablet 5 mg, 5 mg, Oral, Daily PRN, Anaid Magana APRN  •  calcium carbonate (TUMS) chewable tablet 500 mg (200 mg elemental), 2 tablet, Oral, BID PRN, Anaid Magana APRN  •  cefTRIAXone (ROCEPHIN) IVPB 2 g, 2 g, Intravenous, Q24H, Katie Sevilla MD, Last Rate: 100 mL/hr at 01/04/21 1420, 2 g at 01/04/21 1420  •  cholecalciferol (VITAMIN D3) tablet 800 Units, 800 Units, Oral, Daily, Juan David Coronado MD, 800 Units at 01/04/21 0813  •  ferrous sulfate tablet 325 mg, 325 mg, Oral, BID With Meals, Juan David Coronado MD, 325 mg at 01/02/21 1835  •  lactulose (CHRONULAC) 10 GM/15ML solution 10 g, 10 g, Oral, BID, Juan David Coronado MD, 10 g at 01/04/21 2042  •  Magnesium Sulfate 2 gram Bolus, followed by 8 gram infusion (total Mg dose 10 grams)- Mg less than or equal to 1mg/dL, 2 g, Intravenous, PRN **OR** Magnesium Sulfate 2 gram / 50mL Infusion (GIVE X 3 BAGS TO EQUAL 6GM TOTAL DOSE) - Mg 1.1 - 1.5 mg/dl, 2 g, Intravenous, PRN **OR** Magnesium Sulfate 4 gram infusion- Mg 1.6-1.9  mg/dL, 4 g, Intravenous, PRN, Juan David Coronado MD  •  metoprolol tartrate (LOPRESSOR) tablet 12.5 mg, 12.5 mg, Oral, Daily, Juan David Coronado MD, 12.5 mg at 01/04/21 0813  •  nitroglycerin (NITROSTAT) SL tablet 0.4 mg, 0.4 mg, Sublingual, Q5 Min PRN, Anaid Magana APRN  •  ondansetron (ZOFRAN) tablet 4 mg, 4 mg, Oral, Q6H PRN **OR** ondansetron (ZOFRAN) injection 4 mg, 4 mg, Intravenous, Q6H PRN, Anaid Magana APRN  •  pantoprazole (PROTONIX) EC tablet 40 mg, 40 mg, Oral, Daily, Juan David Coronado MD, 40 mg at 01/04/21 0813  •  potassium chloride (K-DUR,KLOR-CON) ER tablet 40 mEq, 40 mEq, Oral, PRN, Juan David Coronado MD  •  potassium chloride (KLOR-CON) packet 40 mEq, 40 mEq, Oral, PRN, Juan David Coronado MD  •  [COMPLETED] Insert peripheral IV, , , Once **AND** sodium chloride 0.9 % flush 10 mL, 10 mL, Intravenous, PRN, Vasquez Carroll MD, 10 mL at 01/01/21 2127  •  sodium chloride 0.9 % flush 10 mL, 10 mL, Intravenous, Q12H, Anaid Magana APRN, 10 mL at 01/04/21 2046  •  sodium chloride 0.9 % flush 10 mL, 10 mL, Intravenous, PRN, Anaid Magana APRN  •  sodium chloride 0.9 % infusion, 50 mL/hr, Intravenous, Continuous, Anaid Magana APRN, Last Rate: 50 mL/hr at 01/04/21 1643, 50 mL/hr at 01/04/21 1643  Review of Systems:               The following systems were reviewed and negative;  constitution and gastrointestinal    Objective     Vital Signs  Temp:  [97.7 °F (36.5 °C)-98.4 °F (36.9 °C)] 97.7 °F (36.5 °C)  Heart Rate:  [75-88] 88  Resp:  [18] 18  BP: ()/(49-66) 101/60  Body mass index is 21.2 kg/m².                  Physical Exam:              General: patient awake, alert and cooperative              Eyes: no scleral icterus              Skin: warm and dry, not jaundiced, right lateral iliac crest ecchymosis noted with no drainage or tenderness.              Abdomen: soft, nontender, nondistended; normal bowel sounds              Psychiatric: Appropriate affect and behavior                 Results Review:                I reviewed the patient's new clinical results.    Results from last 7 days   Lab Units 01/05/21  0512 01/04/21  0518 01/03/21  0400   WBC 10*3/mm3 3.91 4.69 11.65*   HEMOGLOBIN g/dL 7.3* 6.7* 7.1*   HEMATOCRIT % 21.2* 19.4* 20.2*   PLATELETS 10*3/mm3 56* 51* 45*     Results from last 7 days   Lab Units 01/04/21  0518 01/03/21  0400 01/02/21  1236   SODIUM mmol/L 134* 132* 128*   POTASSIUM mmol/L 3.7 4.4 4.6   CHLORIDE mmol/L 109* 108* 104   CO2 mmol/L 18.1* 18.0* 14.9*   BUN mg/dL 11 14 17   CREATININE mg/dL 0.60* 0.80 0.89   CALCIUM mg/dL 7.7* 7.3* 7.4*   BILIRUBIN mg/dL 1.7* 2.2* 3.0*   ALK PHOS U/L 78 88 86   ALT (SGPT) U/L 19 21 24   AST (SGOT) U/L 33 43* 51*   GLUCOSE mg/dL 92 120* 131*     Results from last 7 days   Lab Units 01/03/21  0400 01/02/21  1616   INR  2.91* 2.0*     Lab Results   Lab Value Date/Time    LIPASE 68 (H) 01/01/2021 2125    LIPASE 133 (H) 07/22/2020 1759    LIPASE 59 07/07/2020 0633       Radiology:  US Abdomen Limited   Final Result   FINDINGS/IMPRESSION: Targeted sonographic evaluation of the anterior   right lower quadrant abdominal wall was performed in the area of concern   at the site of bruising. No discrete or drainable fluid collection is   identified within the abdominal wall. Additional survey images of the   abdomen and pelvis demonstrates small volume ascites.       This report was finalized on 1/4/2021 12:08 PM by Dr. Zahra Guo M.D.          US Abdomen Complete   Final Result      US Paracentesis   Final Result   1. Technically successful ultrasound-guided paracentesis.       This report was finalized on 1/2/2021 7:09 PM by Dr. Felix Banda M.D.          CT Abdomen Pelvis With Contrast   Final Result   1.There is circumferential thickening of the wall of the ascending and   transverse colon that is suggestive of colitis.   2. There is stable evidence of cirrhosis. Improved but persistent   splenomegaly and ascites is noted.        This report was finalized on 1/1/2021 11:46 PM by Dr. Randall Mckeon M.D.          XR Chest 1 View   Final Result   Negative chest radiograph.       This report was finalized on 1/2/2021 6:48 PM by Dr. Randall Mckeon M.D.              Assessment/Plan     Patient Active Problem List   Diagnosis   • ED (erectile dysfunction) of organic origin   • Alcoholic cirrhosis of liver with ascites (CMS/HCC)   • Anemia   • Jaundice   • Coagulopathy (CMS/HCC)   • Secondary thrombocytopenia   • Lung abnormality- left apex   • Hepatic encephalopathy (CMS/HCC)   • Sepsis (CMS/HCC)   • Colitis   • Hypertension   • GERD (gastroesophageal reflux disease)       Assessment:  1. Cirrhosis  2. Ascites with SBP  3. Gram-negative sepsis likely from SBP  4. Anemia  5. Ecchymosis at previous paracentesis site  6. History of hepatic encephalopathy  7. Abnormal CT with thickening of the colon      Plan:  Reviewed ultrasound with no hematoma noted at previous paracentesis site, also resolution of pain at previous paracentesis site  Repeat paracentesis, diagnostic to assess for SBP improvement after 4 days of antibiotics  Low-sodium diet  Continue lactulose  For low volume paracentesis (less than 5 L) colloid replacement is not necessary.  · Given abnormal CT scan will need colonoscopy and EGD for cirrhosis and evaluation of esophageal varices however the urgency of this being done inpatient versus outpatient is to be considered given infection unless emergent - with Dr Mcgee outpatient if not needed acutely during this hospital admission  · Follow infectious disease recommendations for transition to oral antibiotics upon discharge unless change in clinical course prior to discharge  · Long-term prevention of SBP with prophylaxis will need to be given once acute SBP has been treated    I discussed the patients findings and my recommendations with patient and nursing staff.           Rosalba LINARES  [facility]  [place] [id number]  Office: [phone]

## 2021-01-05 NOTE — PLAN OF CARE
Goal Outcome Evaluation:  Plan of Care Reviewed With: patient  Progress: improving  Outcome Summary: pleasant pt. no c/o pain or SOB. plan for paracentesis today, 1/5. NS @ 50. VSS. no acute changes. wctm.

## 2021-01-06 ENCOUNTER — READMISSION MANAGEMENT (OUTPATIENT)
Dept: CALL CENTER | Facility: HOSPITAL | Age: 66
End: 2021-01-06

## 2021-01-06 VITALS
SYSTOLIC BLOOD PRESSURE: 106 MMHG | WEIGHT: 152 LBS | HEIGHT: 71 IN | BODY MASS INDEX: 21.28 KG/M2 | DIASTOLIC BLOOD PRESSURE: 64 MMHG | OXYGEN SATURATION: 98 % | TEMPERATURE: 98.3 F | HEART RATE: 91 BPM | RESPIRATION RATE: 16 BRPM

## 2021-01-06 PROBLEM — K65.2 SBP (SPONTANEOUS BACTERIAL PERITONITIS) (HCC): Status: ACTIVE | Noted: 2021-01-06

## 2021-01-06 LAB
BASOPHILS # BLD AUTO: 0.02 10*3/MM3 (ref 0–0.2)
BASOPHILS NFR BLD AUTO: 0.5 % (ref 0–1.5)
DEPRECATED RDW RBC AUTO: 47.4 FL (ref 37–54)
EOSINOPHIL # BLD AUTO: 0.31 10*3/MM3 (ref 0–0.4)
EOSINOPHIL NFR BLD AUTO: 7.4 % (ref 0.3–6.2)
ERYTHROCYTE [DISTWIDTH] IN BLOOD BY AUTOMATED COUNT: 14.3 % (ref 12.3–15.4)
HCT VFR BLD AUTO: 20.1 % (ref 37.5–51)
HGB BLD-MCNC: 6.8 G/DL (ref 13–17.7)
LYMPHOCYTES # BLD AUTO: 0.64 10*3/MM3 (ref 0.7–3.1)
LYMPHOCYTES NFR BLD AUTO: 15.2 % (ref 19.6–45.3)
MAGNESIUM SERPL-MCNC: 1.7 MG/DL (ref 1.6–2.4)
MCH RBC QN AUTO: 31.5 PG (ref 26.6–33)
MCHC RBC AUTO-ENTMCNC: 33.8 G/DL (ref 31.5–35.7)
MCV RBC AUTO: 93.1 FL (ref 79–97)
MONOCYTES # BLD AUTO: 0.88 10*3/MM3 (ref 0.1–0.9)
MONOCYTES NFR BLD AUTO: 20 % (ref 5–12)
NEUTROPHILS NFR BLD AUTO: 2.34 10*3/MM3 (ref 1.7–7)
NEUTROPHILS NFR BLD AUTO: 55.5 % (ref 42.7–76)
PLATELET # BLD AUTO: 57 10*3/MM3 (ref 140–450)
PMV BLD AUTO: 9.7 FL (ref 6–12)
QT INTERVAL: 391 MS
RBC # BLD AUTO: 2.16 10*6/MM3 (ref 4.14–5.8)
WBC # BLD AUTO: 4.21 10*3/MM3 (ref 3.4–10.8)

## 2021-01-06 PROCEDURE — 93010 ELECTROCARDIOGRAM REPORT: CPT | Performed by: INTERNAL MEDICINE

## 2021-01-06 PROCEDURE — 93005 ELECTROCARDIOGRAM TRACING: CPT | Performed by: INTERNAL MEDICINE

## 2021-01-06 PROCEDURE — 83735 ASSAY OF MAGNESIUM: CPT | Performed by: INTERNAL MEDICINE

## 2021-01-06 PROCEDURE — 99254 IP/OBS CNSLTJ NEW/EST MOD 60: CPT | Performed by: INTERNAL MEDICINE

## 2021-01-06 PROCEDURE — 85025 COMPLETE CBC W/AUTO DIFF WBC: CPT | Performed by: INTERNAL MEDICINE

## 2021-01-06 PROCEDURE — 99232 SBSQ HOSP IP/OBS MODERATE 35: CPT | Performed by: INTERNAL MEDICINE

## 2021-01-06 RX ORDER — LEVOFLOXACIN 750 MG/1
750 TABLET ORAL DAILY
Qty: 5 TABLET | Refills: 0 | Status: SHIPPED | OUTPATIENT
Start: 2021-01-06 | End: 2021-01-11

## 2021-01-06 RX ADMIN — PANTOPRAZOLE SODIUM 40 MG: 40 TABLET, DELAYED RELEASE ORAL at 09:04

## 2021-01-06 RX ADMIN — SODIUM CHLORIDE, PRESERVATIVE FREE 10 ML: 5 INJECTION INTRAVENOUS at 09:08

## 2021-01-06 RX ADMIN — CHOLECALCIFEROL TAB 10 MCG (400 UNIT) 800 UNITS: 10 TAB at 09:05

## 2021-01-06 RX ADMIN — METOPROLOL TARTRATE 12.5 MG: 25 TABLET, FILM COATED ORAL at 09:04

## 2021-01-06 RX ADMIN — LACTULOSE 10 G: 10 SOLUTION ORAL at 09:08

## 2021-01-06 NOTE — OUTREACH NOTE
Prep Survey      Responses   [facility] patient discharged from?  [place]   Is LACE score < 7 ?  No   Emergency Room discharge w/ pulse ox?  No   Eligibility  [facility]   Date of Admission  01/01/21   Date of Discharge  01/06/21   Discharge Disposition  Home or Self Care   Discharge diagnosis  Sepsis/SBP/strep bacteremia,     paracentesis x2   Does the patient have one of the following disease processes/diagnoses(primary or secondary)?  Sepsis   Does the patient have Home health ordered?  No   Is there a DME ordered?  No   Prep survey completed?  Yes          Roxnane Tomlinson RN

## 2021-01-06 NOTE — DISCHARGE SUMMARY
Patient Name: Wei Potts  : 1955  MRN: [id number]    Date of Admission: 2021  Date of Discharge:  2021  Primary Care Physician: Bella Villalta MD      Chief Complaint:   Fever and Nausea      Discharge Diagnoses     Active Hospital Problems    Diagnosis  POA   • **Colitis [K52.9]  Unknown   • SBP (spontaneous bacterial peritonitis) (CMS/HCC) [K65.2]  Unknown   • Hypertension [I10]  Unknown   • GERD (gastroesophageal reflux disease) [K21.9]  Unknown   • Sepsis (CMS/HCC) [A41.9]  Yes   • 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        Hospital Course   Mr. Potts is a 65 y.o. non-smoker with a history of alcoholic cirrhosis of the liver and hypertenison that presents to [facility] complaining of nausea, vomiting, diarrhea, and fever.  He reports rigors that began yesterday afternoon.  He states his temperature at the time was 102.7.  He reports nausea and 2 episodes of non-bloody emesis.  He denies abdominal pain, chest pain, and shortness of breath. He denies headache, dizziness, cough, sore throat, and loss of taste and smell.  He reports chronic diarrhea secondary to his Lactulose use for cirrhosis that has not changed.  He denies melena and hematochezia.  Work up in the ED revealed Na 129, alkaline phosphatase 140, AST 50, albumin 2.60, lactate 4.7, and WBC 18.22.  CT of the abdomen/pelvis showed circumferential thickening of the wall of the ascending and transverse colon that is suggestive of colitis.  There is stable evidence of cirrhosis.  Improved but persistent splenomegaly and ascites is noted.  He was febrile and tachycardic on arrival and received Vancomycin and Zosyn in the ED.     · Sepsis/SBP/strep bacteremia:   Underwent paracentesis on 2021 and fluid analysis was consistent with his BP.   Initially was on vancomycin and Zosyn and this was deescalated to Rocephin.  The patient underwent repeat paracentesis again on 01/05/2021 and had removal of approximately 2 L.  Fluid analysis clearly revealed improvement of SBP.  Patient is being switched to p.o. Levaquin for 5 more days upon discharge.  He is on diuretics upon discharge as well.  · Chronic liver disease with cirrhosis hepatic encephalopathy thrombocytopenia anemia:   Patient's mental status is at baseline now.  GI is following and there is no need for EGD or colonoscopy at this point.  Continue with lactulose at home.  · Hypertension: Blood pressures are well controlled  · GERD: Continue his PPI  · Hyponatremia: Improved, continue to follow his BMP .  Sodium is 138 today.        Please note patient was seen examined today on day of discharge.    Time taken in discharge 40 minutes.         Day of Discharge     Physical Exam:  Temp:  [98.2 °F (36.8 °C)-98.7 °F (37.1 °C)] 98.3 °F (36.8 °C)  Heart Rate:  [86-99] 91  Resp:  [16] 16  BP: (106-124)/(61-67) 106/64  Body mass index is 21.2 kg/m².  Physical Exam  Constitutional:       General: He is not in acute distress.     Appearance: He is normal weight.   HENT:      Head: Normocephalic and atraumatic.      Nose: Nose normal.      Mouth/Throat:      Pharynx: Oropharynx is clear.   Eyes:      General: No scleral icterus.     Extraocular Movements: Extraocular movements intact.   Neck:      Musculoskeletal: Neck supple.   Cardiovascular:      Rate and Rhythm: Normal rate and regular rhythm.      Pulses: Normal pulses.      Heart sounds: Normal heart sounds.   Pulmonary:      Effort: Pulmonary effort is normal.      Breath sounds: Normal breath sounds.   Abdominal:      General: Abdomen is flat. Bowel sounds are normal. There is no distension.      Palpations: Abdomen is soft.   Musculoskeletal: Normal range of motion.   Skin:     General: Skin is warm and dry.      Capillary Refill: Capillary refill takes  less than 2 seconds.   Neurological:      General: No focal deficit present.      Mental Status: He is alert and oriented to person, place, and time.   Psychiatric:         Mood and Affect: Mood normal.       Consultants     Consult Orders (all) (From admission, onward)     Start     Ordered    01/05/21 1839  Inpatient Cardiology Consult  Once     Specialty:  Cardiology  Provider:  Janice Patterson MD    01/05/21 1839    01/03/21 1050  Inpatient Infectious Diseases Consult  Once     Specialty:  Infectious Diseases  Provider:  Justyn Santos MD    01/03/21 1049    01/02/21 1142  Inpatient Gastroenterology Consult  Once     Specialty:  Gastroenterology  Provider:  Ruby Mcgee MD    01/02/21 1146    01/01/21 2258  [facility] (on-call MD unless specified) Details  Once     Specialty:  Hospitalist  Provider:  (Not yet assigned)    01/01/21 2257              Procedures     Imaging Results (All)     Procedure Component Value Units Date/Time    US Paracentesis [[id number]] Collected: 01/05/21 1200    Specimen: Body Fluid Updated: 01/05/21 1204    Narrative:      ULTRASOUND-GUIDED PARACENTESIS     HISTORY: Ascites     After signed informed consent was obtained, the right lower quadrant was  prepped and draped in the usual sterile fashion. Lidocaine was used for  local anesthesia.     The paracentesis catheter was inserted into the right lower quadrant  using ultrasound guidance. 2400 mL of clear yellow ascites was removed.  Sample was sent to the lab.     Confirmatory images were obtained.     Patient tolerated the procedure well with no complications.       Impression:      Ultrasound-guided paracentesis as described.     This report was finalized on 1/5/2021 12:00 PM by Dr. Tejinder Carroll M.D.       US Abdomen Limited [[id number]] Collected: 01/04/21 1205     Updated: 01/04/21 1211    Narrative:      PROCEDURE: US ABDOMEN LIMITED-     HISTORY: Post paracentesis, concern for hematoma.     TECHNIQUE: Grayscale and  color Doppler ultrasound of the right lower  quadrant abdominal wall and 4 abdominal/pelvic quadrants was performed.     COMPARISON: Abdominal ultrasound 1/2/2021.        Impression:      FINDINGS/IMPRESSION: Targeted sonographic evaluation of the anterior  right lower quadrant abdominal wall was performed in the area of concern  at the site of bruising. No discrete or drainable fluid collection is  identified within the abdominal wall. Additional survey images of the  abdomen and pelvis demonstrates small volume ascites.     This report was finalized on 1/4/2021 12:08 PM by Dr. Zahra Guo M.D.       US Abdomen Complete [[id number]] Collected: 01/02/21 1915     Updated: 01/02/21 1932    Narrative:      US ABDOMEN COMPLETE-     Clinical: Ascites, alcoholic liver disease/cirrhosis, sepsis     COMPARISON CT of the abdomen and pelvis 1/1/2021     FINDINGS: Visualized portions of the pancreas have a satisfactory  appearance. Visualized portions of the abdominal aorta are normal in  diameter, no aneurysm.     There is free intraperitoneal fluid. The liver is small in size and  there is superficial irregularity, the overall appearance is compatible  with cirrhosis. There is a small liver cyst located within the left  lobe. There is gallbladder wall thickening, no gallstones seen. This  could be secondary to acalculus cholecystitis or hypoalbuminemia.     The right kidney is normal measuring 11.7 cm in length. No obstructive  uropathy. The left kidney is normal measuring 13.1 cm in length. Spleen  is enlarged, 16.9 cm in maximum length. No biliary duct dilatation CBD  is 5 mm.     Visualized inferior vena cava within normal limits.     CONCLUSION:  1. Morphologic appearance of the liver is consistent with cirrhosis.  2. Splenomegaly.  3. Free intraperitoneal fluid.     This report was finalized on 1/2/2021 7:29 PM by Dr. Felix Banda M.D.       US Paracentesis [[id number]] Collected: 01/02/21 1909    Specimen: Body Fluid  Updated: 01/02/21 1912    Narrative:      US PARACENTESIS-     HISTORY: Ascites     PROCEDURE: Ultrasound guided paracentesis.     FINDINGS: Informed consent was obtained. Preliminary sonography of the  abdomen was performed. Fluid localized. The overlying skin was prepped  in the usual sterile fashion. Local anesthesia was achieved with 1%  lidocaine. A small nick was made in the skin with a scalpel. The needle  catheter device was inserted through the nick under sonographic  guidance. The needle was removed and the catheter remained and was  connected to suction. 1.8 liters of yellow fluid was withdrawn. The  patient tolerated procedure without evidence of complication and left  the procedure room in stable condition.  .    Impression:      1. Technically successful ultrasound-guided paracentesis.     This report was finalized on 1/2/2021 7:09 PM by Dr. Felix Banda M.D.       XR Chest 1 View [[id number]] Collected: 01/02/21 0704     Updated: 01/02/21 1851    Narrative:      SINGLE VIEW CHEST RADIOGRAPH     HISTORY: 65-year-old male with a history of fever, concern for COVID-19.     FINDINGS: A semierect AP portable chest radiograph was obtained.  Comparison is made to a chest CT dated 07/09/2020. The lungs are clear  and are normal in volume. The cardiomediastinal silhouette and pulmonary  vasculature appear normal. No bony abnormality is appreciated.       Impression:      Negative chest radiograph.     This report was finalized on 1/2/2021 6:48 PM by Dr. Randall Mckeon M.D.       CT Abdomen Pelvis With Contrast [[id number]] Collected: 01/01/21 2338     Updated: 01/01/21 2349    Narrative:      Examination: CT of the abdomen and pelvis with contrast     HISTORY: 65-year-old male with history of fever, vomiting and diarrhea     TECHNIQUE: Contiguous axial images were obtained through the abdomen and  pelvis following intravenous administration of nonionic contrast. Oral  contrast  was not administered.  Radiation dose reduction techniques were  utilized, including automated exposure control and exposure modulation  based on body size.     COMPARISON: CT of the abdomen pelvis with contrast, 07/06/2020     FINDINGS: The visualized portion of the lung bases6 are clear. The  visualized portion of the heart has a normal appearance . The liver  demonstrates a stable cirrhotic morphology. There are 2 stable focal  hypodensities in the liver that measure on the order of 0.4 cm and 1.1  cm in greatest dimension. The pancreas appears normal. There is moderate  diffuse ascites that appears slightly less than seen on the prior  examination. The kidneys appear normal. The adrenal glands have a normal  appearance. The spleen is enlarged but smaller than seen on the prior  examination. Measures on the order of 16.5 x 6.0 x 14.5 cm compared to  prior measurements of 19.5 x 6.9 x 14.5 cm. There is circumferential  thickening of the wall of the ascending and transverse colon. The wall  of the ascending colon measures up to 1.8 cm in thickness.. The osseous  structures of the lumbar spine and pelvis are stable and demonstrate  mild multilevel osteophytic change of the lumbar spine.       Impression:      1.There is circumferential thickening of the wall of the ascending and  transverse colon that is suggestive of colitis.  2. There is stable evidence of cirrhosis. Improved but persistent  splenomegaly and ascites is noted.     This report was finalized on 1/1/2021 11:46 PM by Dr. Randall Mckeon M.D.             Pertinent Labs     Results from last 7 days   Lab Units 01/06/21  0515 01/05/21  0512 01/04/21  0518 01/03/21  0400   WBC 10*3/mm3 4.21 3.91 4.69 11.65*   HEMOGLOBIN g/dL 6.8* 7.3* 6.7* 7.1*   PLATELETS 10*3/mm3 57* 56* 51* 45*     Results from last 7 days   Lab Units 01/05/21  0512 01/04/21  0518 01/03/21  0400 01/02/21  1236   SODIUM mmol/L 138 134* 132* 128*   POTASSIUM mmol/L 3.7 3.7 4.4 4.6   CHLORIDE mmol/L 111* 109*  108* 104   CO2 mmol/L 18.1* 18.1* 18.0* 14.9*   BUN mg/dL 8 11 14 17   CREATININE mg/dL 0.64* 0.60* 0.80 0.89   GLUCOSE mg/dL 97 92 120* 131*   Estimated Creatinine Clearance: 115.1 mL/min (A) (by C-G formula based on SCr of 0.64 mg/dL (L)).  Results from last 7 days   Lab Units 01/05/21  0512 01/04/21  0518 01/03/21  0400 01/02/21  1236   ALBUMIN g/dL 2.60* 2.70* 1.90* 2.20*   BILIRUBIN mg/dL 2.0* 1.7* 2.2* 3.0*   ALK PHOS U/L 81 78 88 86   AST (SGOT) U/L 34 33 43* 51*   ALT (SGPT) U/L 18 19 21 24     Results from last 7 days   Lab Units 01/06/21  0515 01/05/21  0512 01/04/21  0518 01/03/21  0400 01/02/21  1236   CALCIUM mg/dL  --  7.9* 7.7* 7.3* 7.4*   ALBUMIN g/dL  --  2.60* 2.70* 1.90* 2.20*   MAGNESIUM mg/dL 1.7  --   --   --   --      Results from last 7 days   Lab Units 01/03/21  0400 01/02/21  1236 01/02/21  0728 01/01/21  2125   LIPASE U/L  --   --   --  68*   AMMONIA umol/L 81* 88* 61*  --              Invalid input(s): LDLCALC  Results from last 7 days   Lab Units 01/03/21  2306 01/03/21  2302 01/02/21  1648 01/01/21  2256 01/01/21  2255   BLOODCX  No growth at 2 days No growth at 2 days  --  Streptococcus salivarius* Streptococcus salivarius*   BODYFLDCX   --   --  No growth at 4 days  --   --    BCIDPCR   --   --   --   --  Streptococcus spp, not A, B, or pneumoniae. Identification by BCID PCR.*       Test Results Pending at Discharge     Pending Labs     Order Current Status    Blood Culture - Blood, Arm, Left Preliminary result    Blood Culture - Blood, Arm, Right Preliminary result    Body Fluid Culture - Body Fluid, Peritoneum Preliminary result          Discharge Details        Discharge Medications      New Medications      Instructions Start Date   levoFLOXacin 750 MG tablet  Commonly known as: Levaquin   750 mg, Oral, Daily         Continue These Medications      Instructions Start Date   ferrous sulfate 325 (65 FE) MG tablet   325 mg, Oral, 2 Times Daily With Meals      furosemide 40 MG  tablet  Commonly known as: LASIX   40 mg, Oral, Daily      lactulose 10 GM/15ML solution  Commonly known as: CHRONULAC   10 g, Oral, 2 Times Daily, Adjust dose until you have 3-4 bowel movements a day.      metoprolol tartrate 25 MG tablet  Commonly known as: LOPRESSOR   12.5 mg, Oral, Daily      pantoprazole 40 MG EC tablet  Commonly known as: PROTONIX   40 mg, Oral, Daily      spironolactone 100 MG tablet  Commonly known as: ALDACTONE   100 mg, Oral, Daily      VITAMIN D3 PO   25 mg, Oral, Daily             No Known Allergies      Discharge Disposition:  Home or Self Care    Discharge Diet:  No active diet order      Discharge Activity:       CODE STATUS:    Code Status and Medical Interventions:   Ordered at: 01/02/21 0143     Code Status:    CPR     Medical Interventions (Level of Support Prior to Arrest):    Full       Future Appointments   Date Time Provider Department Center   2/4/2021 11:45 AM Ruby Mcgee MD [facility] None     Additional Instructions for the Follow-ups that You Need to Schedule     Discharge Follow-up with PCP   As directed       Currently Documented PCP:    Bella Villalta MD    PCP Phone Number:    [phone]     Follow Up Details: 2 weeks         Discharge Follow-up with Specified Provider: ; 1 Month   As directed      To:     Follow Up: 1 Month           Follow-up Information     Bella Villalta MD .    Specialty: Family Medicine  Why: 2 weeks  Contact information:  [address] [facility] [address]  [phone]             Austin Barkley MD Follow up.    Specialty: Gastroenterology  Why: Call to schedule appointment  Contact information:  [address]  [address] [id number] [phone]                   Additional Instructions for the Follow-ups that You Need to Schedule     Discharge Follow-up with PCP   As directed       Currently Documented PCP:    Bella Villalta MD    PCP Phone Number:    [phone]     Follow Up  Details: 2 weeks         Discharge Follow-up with Specified Provider: ; 1 Month   As directed      To:     Follow Up: 1 Month           Time Spent on Discharge:  Greater than 30 minutes      Liborio Galvez MD  [facility]  01/06/21  18:36 EST

## 2021-01-06 NOTE — PROGRESS NOTES
[facility]  Inpatient Progress Note    Reason for Follow Up:  SBP, cirrhosis    Subjective     Interval History:   2.4 L off with yesterday's paracentesis.  Still complains of a little bruising at the right lower quadrant site.  Eating well.    Current Facility-Administered Medications:   •  acetaminophen (TYLENOL) tablet 650 mg, 650 mg, Oral, Q4H PRN **OR** acetaminophen (TYLENOL) 160 MG/5ML solution 650 mg, 650 mg, Oral, Q4H PRN **OR** acetaminophen (TYLENOL) suppository 650 mg, 650 mg, Rectal, Q4H PRN, Anaid Magana APRN  •  bisacodyl (DULCOLAX) EC tablet 5 mg, 5 mg, Oral, Daily PRN, Anaid Magana APRN  •  calcium carbonate (TUMS) chewable tablet 500 mg (200 mg elemental), 2 tablet, Oral, BID PRN, Anaid Magana APRN  •  cefTRIAXone (ROCEPHIN) IVPB 2 g, 2 g, Intravenous, Q24H, Katie Sevilla MD, Last Rate: 100 mL/hr at 01/05/21 1516, 2 g at 01/05/21 1516  •  cholecalciferol (VITAMIN D3) tablet 800 Units, 800 Units, Oral, Daily, Juan David Coronado MD, 800 Units at 01/06/21 0905  •  ferrous sulfate tablet 325 mg, 325 mg, Oral, BID With Meals, Juan David Coronado MD, 325 mg at 01/02/21 1835  •  lactulose (CHRONULAC) 10 GM/15ML solution 10 g, 10 g, Oral, BID, Juan David Coronado MD, 10 g at 01/06/21 0908  •  Magnesium Sulfate 2 gram Bolus, followed by 8 gram infusion (total Mg dose 10 grams)- Mg less than or equal to 1mg/dL, 2 g, Intravenous, PRN **OR** Magnesium Sulfate 2 gram / 50mL Infusion (GIVE X 3 BAGS TO EQUAL 6GM TOTAL DOSE) - Mg 1.1 - 1.5 mg/dl, 2 g, Intravenous, PRN **OR** Magnesium Sulfate 4 gram infusion- Mg 1.6-1.9 mg/dL, 4 g, Intravenous, PRN, Juan David Coronado MD  •  metoprolol tartrate (LOPRESSOR) tablet 12.5 mg, 12.5 mg, Oral, Daily, Juan David Coronado MD, 12.5 mg at 01/06/21 0904  •  nitroglycerin (NITROSTAT) SL tablet 0.4 mg, 0.4 mg, Sublingual, Q5 Min PRN, Anaid Magana APRN  •  ondansetron (ZOFRAN) tablet 4 mg, 4 mg, Oral, Q6H PRN **OR** ondansetron (ZOFRAN)  injection 4 mg, 4 mg, Intravenous, Q6H PRN, Anaid Magana APRN  •  pantoprazole (PROTONIX) EC tablet 40 mg, 40 mg, Oral, Daily, Juan David Coronado MD, 40 mg at 01/06/21 0904  •  potassium chloride (K-DUR,KLOR-CON) ER tablet 40 mEq, 40 mEq, Oral, PRN, Juan David Coronado MD  •  potassium chloride (KLOR-CON) packet 40 mEq, 40 mEq, Oral, PRN, Juan David Coronado MD  •  [COMPLETED] Insert peripheral IV, , , Once **AND** sodium chloride 0.9 % flush 10 mL, 10 mL, Intravenous, PRN, Vasquez Carroll MD, 10 mL at 01/01/21 2127  •  sodium chloride 0.9 % flush 10 mL, 10 mL, Intravenous, Q12H, Anaid Magana APRN, 10 mL at 01/06/21 0908  •  sodium chloride 0.9 % flush 10 mL, 10 mL, Intravenous, PRN, Anaid Magana APRN  •  sodium chloride 0.9 % infusion, 50 mL/hr, Intravenous, Continuous, Anaid Magana APRN, Last Rate: 50 mL/hr at 01/05/21 1226, 50 mL/hr at 01/05/21 1226  Review of Systems:   All systems reviewed and negative except for: GI: Abdominal distention, bruising right lower quadrant    Objective     Vital Signs  Temp:  [97.9 °F (36.6 °C)-98.7 °F (37.1 °C)] 98.3 °F (36.8 °C)  Heart Rate:  [80-99] 91  Resp:  [16-18] 16  BP: (108-124)/(52-67) 114/61  Body mass index is 21.2 kg/m².    Intake/Output Summary (Last 24 hours) at 1/6/2021 0936  Last data filed at 1/6/2021 0749  Gross per 24 hour   Intake 1320 ml   Output 2400 ml   Net -1080 ml     I/O this shift:  In: 360 [P.O.:360]  Out: -      Physical Exam:   General: patient awake, alert and cooperative   Eyes: Normal lids and lashes, no scleral icterus   Neck: supple, normal ROM   Skin: warm and dry, not jaundiced   Cardiovascular: regular rhythm and rate, no murmurs auscultated   Pulm: clear to auscultation bilaterally, regular and unlabored   Abdomen: soft, nontender, mildly distended; normal bowel sounds, ecchymosis right lower quadrant   Rectal: deferred   Extremities: no rash or edema   Psychiatric: Normal mood and behavior; memory intact     Results  Review:     I reviewed the patient's new clinical results.    Results from last 7 days   Lab Units 01/06/21  0515 01/05/21  0512 01/04/21  0518   WBC 10*3/mm3 4.21 3.91 4.69   HEMOGLOBIN g/dL 6.8* 7.3* 6.7*   HEMATOCRIT % 20.1* 21.2* 19.4*   PLATELETS 10*3/mm3 57* 56* 51*     Results from last 7 days   Lab Units 01/05/21  0512 01/04/21  0518 01/03/21  0400   SODIUM mmol/L 138 134* 132*   POTASSIUM mmol/L 3.7 3.7 4.4   CHLORIDE mmol/L 111* 109* 108*   CO2 mmol/L 18.1* 18.1* 18.0*   BUN mg/dL 8 11 14   CREATININE mg/dL 0.64* 0.60* 0.80   CALCIUM mg/dL 7.9* 7.7* 7.3*   BILIRUBIN mg/dL 2.0* 1.7* 2.2*   ALK PHOS U/L 81 78 88   ALT (SGPT) U/L 18 19 21   AST (SGOT) U/L 34 33 43*   GLUCOSE mg/dL 97 92 120*     Results from last 7 days   Lab Units 01/05/21  0940 01/03/21  0400 01/02/21  1616   INR  2.39* 2.91* 2.0*     Lab Results   Lab Value Date/Time    LIPASE 68 (H) 01/01/2021 2125    LIPASE 133 (H) 07/22/2020 1759    LIPASE 59 07/07/2020 0633    LIPASE 65 (H) 07/06/2020 1630       Radiology:  US Paracentesis   Final Result   Ultrasound-guided paracentesis as described.       This report was finalized on 1/5/2021 12:00 PM by Dr. Tejinder Carroll M.D.          US Abdomen Limited   Final Result   FINDINGS/IMPRESSION: Targeted sonographic evaluation of the anterior   right lower quadrant abdominal wall was performed in the area of concern   at the site of bruising. No discrete or drainable fluid collection is   identified within the abdominal wall. Additional survey images of the   abdomen and pelvis demonstrates small volume ascites.       This report was finalized on 1/4/2021 12:08 PM by Dr. Zahra Guo M.D.          US Abdomen Complete   Final Result      US Paracentesis   Final Result   1. Technically successful ultrasound-guided paracentesis.       This report was finalized on 1/2/2021 7:09 PM by Dr. Felix Banda M.D.          CT Abdomen Pelvis With Contrast   Final Result   1.There is circumferential thickening of  the wall of the ascending and   transverse colon that is suggestive of colitis.   2. There is stable evidence of cirrhosis. Improved but persistent   splenomegaly and ascites is noted.       This report was finalized on 1/1/2021 11:46 PM by Dr. Randall Mckeon M.D.          XR Chest 1 View   Final Result   Negative chest radiograph.       This report was finalized on 1/2/2021 6:48 PM by Dr. Randall Mckeon M.D.              Assessment/Plan     Patient Active Problem List   Diagnosis   • ED (erectile dysfunction) of organic origin   • Alcoholic cirrhosis of liver with ascites (CMS/HCC)   • Anemia   • Jaundice   • Coagulopathy (CMS/HCC)   • Secondary thrombocytopenia   • Lung abnormality- left apex   • Hepatic encephalopathy (CMS/HCC)   • Sepsis (CMS/HCC)   • Colitis   • Hypertension   • GERD (gastroesophageal reflux disease)       Impression  1.  Ascites with SBP: repeat para yesterday with TNCs significantly declined    2.  Alcoholic cirrhosis    3.  Anemia: Hb declined today, I do wonder if he has had any bleeding from his initial paracentesis site given his thrombocytopenia as well as coagulopathy.  He does not accept blood products/[employer]    4.  Hepatic encephalopathy    5.  Abnormal CT imaging showing thickening of the colon: Ascending and transverse    6.  Coagulopathy    7.  Thrombocytopenia    Plan  Complete 7 days of SBP antibiotics, hopefully can transition to oral soon  Will need ongoing SBP prophylaxis, likely daily Bactrim given low ascites protein  Low-sodium diet  Continue lactulose  Will need to start diuretics once he is recovered from his SBP  Monitor for overt bleeding      I discussed the patients findings and my recommendations with patient.    All necessary PPE, including face mask and eye protection, were worn during this encounter.  Hand sanitization was performed both before and after the patient interaction.    Over 25 minutes was spent reviewing the patient's chart and  records, in discussion with the patient, in examination of the patient, and in discussion with members of the patient's medical team.    Judy Morel MD

## 2021-01-06 NOTE — PROGRESS NOTES
Continued Stay Note  [facility]     Patient Name: Wei Potts  MRN: [id number]  Today's Date: 1/6/2021    Admit Date: 1/1/2021    Discharge Plan     Row Name 01/06/21 1232       Plan    Plan  Home    Final Note  Home with spouse and has transport.  He denies dc needs...................Amina Boykin RN        Discharge Codes    No documentation.       Expected Discharge Date and Time     Expected Discharge Date Expected Discharge Time    Jan 6, 2021             Amina Boykin RN

## 2021-01-06 NOTE — NURSING NOTE
Spoke with Dr. Ross regarding consult for run SVT during dayshift. No new orders received, Cardiology to see patient in AM.

## 2021-01-06 NOTE — CONSULTS
Patient Name: Wei Potts  :1955  65 y.o.    Date of Admission: 2021  Date of Consultation:  21  Encounter Provider: Jess Navarrete MD  Place of Service: [facility] CARDIOLOGY  Referring Provider: Tito Quezada MD  Patient Care Team:  Bella Villalta MD as PCP - General (Family Medicine)      Chief complaint: fever and nausea    History of Present Illness:     This is a 65-year-old patient with a history of alcoholic liver cirrhosis and hypertension.  He presented to [facility] with nausea vomiting diarrhea and fever and was admitted on 2021.  He also had rigors.  He has had a history of chronic diarrhea due to lactulose use for his cirrhosis.  In the emergency department, his albumin was 2.5, lactate 4.7, white count 18.2, hemoglobin 9.2, alkaline phosphatase 140, sodium 129, negative respiratory viral panel, CT abdomen pelvis showed circumferential thickening of the wall of the ascending and transverse colon suggestive of colitis.  His cirrhosis was stable.  Ascites and splenomegaly were noted on the CT.  He was tachycardic when he arrived and did receive IV antibiotics.  ECG was not done.  I have a rhythm strip that shows the SVT followed by sinus rhythm.  Magnesium was 1.7.  His hemoglobin this morning is 6.8.  The day after his admission, his hemoglobin dropped by 1.3 g.  He had an ultrasound-guided paracentesis on 2021 joint of 2400 cc of fluid.  Vital stable currently.    He had normal echocardiogram done 2020 showing ejection fraction greater than 70% with mild mitral insufficiency.  He had carotid duplex study done 2020 showing mild right internal carotid artery stenosis and plaque of left internal carotid artery.  He had a normal TSH done 2020.    He denies chest pain or pressure.  He has had no further tachycardia by symptoms or noted on monitor.  When he had the SVT in the emergency department, he did not know  it.  He has had no orthopnea or PND.  He has no difficulty breathing.  His abdomen is feeling better.              ECHO 7/8/20    · Left ventricular systolic function is hyperdynamic (EF > 70).  · Mild mitral valve regurgitation is present    Past Medical History:   Diagnosis Date   • Alcoholism (CMS/HCC)    • Cirrhosis (CMS/HCC)    • Encephalopathy    • Hypertension    • Liver disease        History reviewed. No pertinent surgical history.      Prior to Admission medications    Medication Sig Start Date End Date Taking? Authorizing Provider   Cholecalciferol (VITAMIN D3 PO) Take 25 mg by mouth Daily.   Yes Provider, MD Carmen   ferrous sulfate 325 (65 FE) MG tablet Take 1 tablet by mouth 2 (Two) Times a Day With Meals. 12/4/20   Ruby Mcgee MD   furosemide (LASIX) 40 MG tablet Take 1 tablet by mouth Daily. 10/9/20   Ruby Mcgee MD   lactulose (CHRONULAC) 10 GM/15ML solution Take 15 mL by mouth 2 (Two) Times a Day. Adjust dose until you have 3-4 bowel movements a day. 12/3/20   Ruby Mcgee MD   metoprolol tartrate (LOPRESSOR) 25 MG tablet Take 0.5 tablets by mouth Daily. 12/3/20   Ruby Mcgee MD   pantoprazole (PROTONIX) 40 MG EC tablet Take 1 tablet by mouth Daily. 8/13/20   Estelita Carbajal APRN   spironolactone (ALDACTONE) 100 MG tablet Take 1 tablet by mouth Daily. 8/13/20   Estelita Carbajal APRN       No Known Allergies    Social History     Socioeconomic History   • Marital status:      Spouse name: Not on file   • Number of children: Not on file   • Years of education: Not on file   • Highest education level: Not on file   Tobacco Use   • Smoking status: Never Smoker   • Smokeless tobacco: Never Used   Substance and Sexual Activity   • Alcohol use: Not Currently     Comment: 1 bottle of wine per day, stopped drinking ETOH 2 weeks ago   • Drug use: No   • Sexual activity: Defer       Family History   Problem Relation Age of Onset   • Diabetes Mother    •  Hypertension Father        REVIEW OF SYSTEMS:   All systems reviewed.  Pertinent positives identified in HPI.  All other systems are negative.      Objective:     Vitals:    01/05/21 1300 01/05/21 2002 01/05/21 2339 01/06/21 0749   BP: 116/52 120/64 124/67 114/61   BP Location: Right arm Right arm Right arm Right arm   Patient Position: Lying Lying Lying Lying   Pulse: 88 86 99 91   Resp: 18 16 16 16   Temp: 97.9 °F (36.6 °C) 98.2 °F (36.8 °C) 98.7 °F (37.1 °C) 98.3 °F (36.8 °C)   TempSrc: Oral Oral Oral Oral   SpO2:  98% 99% 98%   Weight:       Height:         Body mass index is 21.2 kg/m².    General Appearance:    Alert, cooperative, in no acute distress   Head:    Normocephalic, without obvious abnormality, atraumatic   Eyes:            Lids and lashes normal, conjunctivae and sclerae normal, no icterus, no pallor, corneas clear, PERRLA   Ears:    Ears appear intact with no abnormalities noted   Throat:   No oral lesions, no thrush, oral mucosa moist   Neck:   No adenopathy, supple, trachea midline, no thyromegaly, no carotid bruit, no JVD   Back:     No kyphosis present, no scoliosis present, no skin lesions, erythema or scars, no tenderness to percussion or palpation, range of motion normal   Lungs:     Clear to auscultation, respirations regular, even and unlabored    Heart:    Regular rhythm and normal rate, normal S1 and S2, no murmur, no gallop, no rub, no click   Chest Wall:    No abnormalities observed   Abdomen:     Normal bowel sounds, no masses, no organomegaly, soft, nontender, nondistended, no guarding, no rebound  tenderness   Extremities:   Moves all extremities well, no edema, no cyanosis, no redness   Pulses:   Pulses palpable and equal bilaterally. Normal radial, carotid, femoral, dorsalis pedis and posterior tibial pulses bilaterally. Normal abdominal aorta   Skin:  Psychiatric:   No bleeding, bruising or rash    Alert and oriented x 3, normal mood and affect   Lab Review:     Results from  last 7 days   Lab Units 01/05/21  0512   SODIUM mmol/L 138   POTASSIUM mmol/L 3.7   CHLORIDE mmol/L 111*   CO2 mmol/L 18.1*   BUN mg/dL 8   CREATININE mg/dL 0.64*   CALCIUM mg/dL 7.9*   BILIRUBIN mg/dL 2.0*   ALK PHOS U/L 81   ALT (SGPT) U/L 18   AST (SGOT) U/L 34   GLUCOSE mg/dL 97         Results from last 7 days   Lab Units 01/06/21  0515   WBC 10*3/mm3 4.21   HEMOGLOBIN g/dL 6.8*   HEMATOCRIT % 20.1*   PLATELETS 10*3/mm3 57*     Results from last 7 days   Lab Units 01/05/21  0940 01/03/21  0400 01/02/21  1616   INR  2.39* 2.91* 2.0*     Results from last 7 days   Lab Units 01/06/21  0515   MAGNESIUM mg/dL 1.7                                   I personally viewed and interpreted the patient's EKG/Telemetry data.        Assessment and Plan:       1. Sepsis due to spontaneous bacterial peritonitis.  He is status post paracentesis and is on antibiotics.  2. Bacteremia with Streptococcus salivarius cultures drawn 1/1/2021.  Repeat cultures are negative.  Infectious disease following.  3. Chronic liver disease due to alcoholic cirrhosis with history of hepatic encephalopathy, thrombocytopenia and anemia.  He was encephalopathic earlier in the hospitalization but is back to baseline.  4. Hypertension - controlled.   5. SVT, check ECG, normal echocardiogram done July 2020  6. GERD.  7. Anemia - no transfusion due to Alevism.     There is no further cardiac work-up needed at this time.  The SVT he had was likely spurred on by his sepsis and ascites.  He has no active cardiac symptoms.  We will see as needed.  He can come off monitored bed.    Jess Navarrete MD  01/06/21  08:10 EST

## 2021-01-06 NOTE — H&P (VIEW-ONLY)
[facility]  Inpatient Progress Note    Reason for Follow Up:  SBP, cirrhosis    Subjective     Interval History:   2.4 L off with yesterday's paracentesis.  Still complains of a little bruising at the right lower quadrant site.  Eating well.    Current Facility-Administered Medications:   •  acetaminophen (TYLENOL) tablet 650 mg, 650 mg, Oral, Q4H PRN **OR** acetaminophen (TYLENOL) 160 MG/5ML solution 650 mg, 650 mg, Oral, Q4H PRN **OR** acetaminophen (TYLENOL) suppository 650 mg, 650 mg, Rectal, Q4H PRN, Anaid Magana APRN  •  bisacodyl (DULCOLAX) EC tablet 5 mg, 5 mg, Oral, Daily PRN, Anaid Magana APRN  •  calcium carbonate (TUMS) chewable tablet 500 mg (200 mg elemental), 2 tablet, Oral, BID PRN, Anaid Magana APRN  •  cefTRIAXone (ROCEPHIN) IVPB 2 g, 2 g, Intravenous, Q24H, Katie Sevilla MD, Last Rate: 100 mL/hr at 01/05/21 1516, 2 g at 01/05/21 1516  •  cholecalciferol (VITAMIN D3) tablet 800 Units, 800 Units, Oral, Daily, Juan David Coronado MD, 800 Units at 01/06/21 0905  •  ferrous sulfate tablet 325 mg, 325 mg, Oral, BID With Meals, Juan David Coronado MD, 325 mg at 01/02/21 1835  •  lactulose (CHRONULAC) 10 GM/15ML solution 10 g, 10 g, Oral, BID, Juan David Coronado MD, 10 g at 01/06/21 0908  •  Magnesium Sulfate 2 gram Bolus, followed by 8 gram infusion (total Mg dose 10 grams)- Mg less than or equal to 1mg/dL, 2 g, Intravenous, PRN **OR** Magnesium Sulfate 2 gram / 50mL Infusion (GIVE X 3 BAGS TO EQUAL 6GM TOTAL DOSE) - Mg 1.1 - 1.5 mg/dl, 2 g, Intravenous, PRN **OR** Magnesium Sulfate 4 gram infusion- Mg 1.6-1.9 mg/dL, 4 g, Intravenous, PRN, Juan David Coronado MD  •  metoprolol tartrate (LOPRESSOR) tablet 12.5 mg, 12.5 mg, Oral, Daily, Juan David Coronado MD, 12.5 mg at 01/06/21 0904  •  nitroglycerin (NITROSTAT) SL tablet 0.4 mg, 0.4 mg, Sublingual, Q5 Min PRN, Anaid Magana APRN  •  ondansetron (ZOFRAN) tablet 4 mg, 4 mg, Oral, Q6H PRN **OR** ondansetron (ZOFRAN)  injection 4 mg, 4 mg, Intravenous, Q6H PRN, Anaid Magana APRN  •  pantoprazole (PROTONIX) EC tablet 40 mg, 40 mg, Oral, Daily, Juan David Coronado MD, 40 mg at 01/06/21 0904  •  potassium chloride (K-DUR,KLOR-CON) ER tablet 40 mEq, 40 mEq, Oral, PRN, Juan David Coronado MD  •  potassium chloride (KLOR-CON) packet 40 mEq, 40 mEq, Oral, PRN, Juan David Coronado MD  •  [COMPLETED] Insert peripheral IV, , , Once **AND** sodium chloride 0.9 % flush 10 mL, 10 mL, Intravenous, PRN, Vasquez Carroll MD, 10 mL at 01/01/21 2127  •  sodium chloride 0.9 % flush 10 mL, 10 mL, Intravenous, Q12H, Anaid Magana APRN, 10 mL at 01/06/21 0908  •  sodium chloride 0.9 % flush 10 mL, 10 mL, Intravenous, PRN, Anaid Magana APRN  •  sodium chloride 0.9 % infusion, 50 mL/hr, Intravenous, Continuous, Anaid Magana APRN, Last Rate: 50 mL/hr at 01/05/21 1226, 50 mL/hr at 01/05/21 1226  Review of Systems:   All systems reviewed and negative except for: GI: Abdominal distention, bruising right lower quadrant    Objective     Vital Signs  Temp:  [97.9 °F (36.6 °C)-98.7 °F (37.1 °C)] 98.3 °F (36.8 °C)  Heart Rate:  [80-99] 91  Resp:  [16-18] 16  BP: (108-124)/(52-67) 114/61  Body mass index is 21.2 kg/m².    Intake/Output Summary (Last 24 hours) at 1/6/2021 0936  Last data filed at 1/6/2021 0749  Gross per 24 hour   Intake 1320 ml   Output 2400 ml   Net -1080 ml     I/O this shift:  In: 360 [P.O.:360]  Out: -      Physical Exam:   General: patient awake, alert and cooperative   Eyes: Normal lids and lashes, no scleral icterus   Neck: supple, normal ROM   Skin: warm and dry, not jaundiced   Cardiovascular: regular rhythm and rate, no murmurs auscultated   Pulm: clear to auscultation bilaterally, regular and unlabored   Abdomen: soft, nontender, mildly distended; normal bowel sounds, ecchymosis right lower quadrant   Rectal: deferred   Extremities: no rash or edema   Psychiatric: Normal mood and behavior; memory intact     Results  Review:     I reviewed the patient's new clinical results.    Results from last 7 days   Lab Units 01/06/21  0515 01/05/21  0512 01/04/21  0518   WBC 10*3/mm3 4.21 3.91 4.69   HEMOGLOBIN g/dL 6.8* 7.3* 6.7*   HEMATOCRIT % 20.1* 21.2* 19.4*   PLATELETS 10*3/mm3 57* 56* 51*     Results from last 7 days   Lab Units 01/05/21  0512 01/04/21  0518 01/03/21  0400   SODIUM mmol/L 138 134* 132*   POTASSIUM mmol/L 3.7 3.7 4.4   CHLORIDE mmol/L 111* 109* 108*   CO2 mmol/L 18.1* 18.1* 18.0*   BUN mg/dL 8 11 14   CREATININE mg/dL 0.64* 0.60* 0.80   CALCIUM mg/dL 7.9* 7.7* 7.3*   BILIRUBIN mg/dL 2.0* 1.7* 2.2*   ALK PHOS U/L 81 78 88   ALT (SGPT) U/L 18 19 21   AST (SGOT) U/L 34 33 43*   GLUCOSE mg/dL 97 92 120*     Results from last 7 days   Lab Units 01/05/21  0940 01/03/21  0400 01/02/21  1616   INR  2.39* 2.91* 2.0*     Lab Results   Lab Value Date/Time    LIPASE 68 (H) 01/01/2021 2125    LIPASE 133 (H) 07/22/2020 1759    LIPASE 59 07/07/2020 0633    LIPASE 65 (H) 07/06/2020 1630       Radiology:  US Paracentesis   Final Result   Ultrasound-guided paracentesis as described.       This report was finalized on 1/5/2021 12:00 PM by Dr. Tejinder Carroll M.D.          US Abdomen Limited   Final Result   FINDINGS/IMPRESSION: Targeted sonographic evaluation of the anterior   right lower quadrant abdominal wall was performed in the area of concern   at the site of bruising. No discrete or drainable fluid collection is   identified within the abdominal wall. Additional survey images of the   abdomen and pelvis demonstrates small volume ascites.       This report was finalized on 1/4/2021 12:08 PM by Dr. Zahra Guo M.D.          US Abdomen Complete   Final Result      US Paracentesis   Final Result   1. Technically successful ultrasound-guided paracentesis.       This report was finalized on 1/2/2021 7:09 PM by Dr. Felix Banda M.D.          CT Abdomen Pelvis With Contrast   Final Result   1.There is circumferential thickening of  the wall of the ascending and   transverse colon that is suggestive of colitis.   2. There is stable evidence of cirrhosis. Improved but persistent   splenomegaly and ascites is noted.       This report was finalized on 1/1/2021 11:46 PM by Dr. Randall Mckeon M.D.          XR Chest 1 View   Final Result   Negative chest radiograph.       This report was finalized on 1/2/2021 6:48 PM by Dr. Randall Mckeon M.D.              Assessment/Plan     Patient Active Problem List   Diagnosis   • ED (erectile dysfunction) of organic origin   • Alcoholic cirrhosis of liver with ascites (CMS/HCC)   • Anemia   • Jaundice   • Coagulopathy (CMS/HCC)   • Secondary thrombocytopenia   • Lung abnormality- left apex   • Hepatic encephalopathy (CMS/HCC)   • Sepsis (CMS/HCC)   • Colitis   • Hypertension   • GERD (gastroesophageal reflux disease)       Impression  1.  Ascites with SBP: repeat para yesterday with TNCs significantly declined    2.  Alcoholic cirrhosis    3.  Anemia: Hb declined today, I do wonder if he has had any bleeding from his initial paracentesis site given his thrombocytopenia as well as coagulopathy.  He does not accept blood products/Religious    4.  Hepatic encephalopathy    5.  Abnormal CT imaging showing thickening of the colon: Ascending and transverse    6.  Coagulopathy    7.  Thrombocytopenia    Plan  Complete 7 days of SBP antibiotics, hopefully can transition to oral soon  Will need ongoing SBP prophylaxis, likely daily Bactrim given low ascites protein  Low-sodium diet  Continue lactulose  Will need to start diuretics once he is recovered from his SBP  Monitor for overt bleeding      I discussed the patients findings and my recommendations with patient.    All necessary PPE, including face mask and eye protection, were worn during this encounter.  Hand sanitization was performed both before and after the patient interaction.    Over 25 minutes was spent reviewing the patient's chart and  records, in discussion with the patient, in examination of the patient, and in discussion with members of the patient's medical team.    Judy Morel MD

## 2021-01-06 NOTE — PLAN OF CARE
Goal Outcome Evaluation:  Plan of Care Reviewed With: patient  Progress: improving     Vss on RA. Patient up ad oliver. No complaints of pain, n/v/d. Iv fluids, NS going at 50 mL/hr. Will continue to monitor.

## 2021-01-07 ENCOUNTER — TRANSITIONAL CARE MANAGEMENT TELEPHONE ENCOUNTER (OUTPATIENT)
Dept: CALL CENTER | Facility: HOSPITAL | Age: 66
End: 2021-01-07

## 2021-01-07 LAB
BACTERIA FLD CULT: NORMAL
GRAM STN SPEC: NORMAL
GRAM STN SPEC: NORMAL

## 2021-01-07 NOTE — OUTREACH NOTE
Call Center TCM Note      Responses   [facility] patient discharged from?  [place]   Does the patient have one of the following disease processes/diagnoses(primary or secondary)?  Sepsis   TCM attempt successful?  No   Unsuccessful attempts  Attempt 2          Christine Ordoñez MA    1/7/2021, 15:18 EST

## 2021-01-07 NOTE — PAYOR COMM NOTE
"Wei Potts (65 y.o. Male)     ATTN: DISCHARGE SUMMARY FOR REVIEW:  [id number]     DEPT: [phone],  [phone]            Date of Birth Social Security Number Address Home Phone MRN    1955  [id number]  [facility] [address] [phone] [id number]    Judaism Marital Status          Patient Refused        Admission Date Admission Type Admitting Provider Attending Provider Department, Room/Bed    21 Emergency Tito Quezada MD  [address], [address]/    Discharge Date Discharge Disposition Discharge Destination        2021 Home or Self Care              Attending Provider: (none)   Allergies: No Known Allergies    Isolation: None   Infection: None   Code Status: Prior    Ht: 180.3 cm (71\")   Wt: 68.9 kg (152 lb)    Admission Cmt: None   Principal Problem: Colitis [K52.9]                 Active Insurance as of 2021     Primary Coverage     Payor Plan Insurance Group Employer/Plan Group    [facility]      Payor Plan Address Payor Plan Phone Number Payor Plan Fax Number Effective Dates    [address] [address]   2020 - None Entered    [place] [address]       Subscriber Name Subscriber Birth Date Member ID       WEI POTTS 1955 [id number]                 Emergency Contacts      (Rel.) Home Phone Work Phone Mobile Phone    Milagro Potts (Spouse) [phone] -- --               Discharge Summary      Liborio Galvez MD at 21 1448              Patient Name: Wei Potts  : 1955  MRN: [id number]    Date of Admission: 2021  Date of Discharge:  2021  Primary Care Physician: Bella Villalta MD      Chief Complaint:   Fever and Nausea      Discharge Diagnoses     Active Hospital Problems    Diagnosis  POA   • **Colitis [K52.9]  Unknown   • SBP (spontaneous bacterial peritonitis) (CMS/HCC) [K65.2]  Unknown   • Hypertension [I10]  Unknown   • GERD " (gastroesophageal reflux disease) [K21.9]  Unknown   • Sepsis (CMS/HCC) [A41.9]  Yes   • 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        Hospital Course   Mr. Potts is a 65 y.o. non-smoker with a history of alcoholic cirrhosis of the liver and hypertenison that presents to [facility] complaining of nausea, vomiting, diarrhea, and fever.  He reports rigors that began yesterday afternoon.  He states his temperature at the time was 102.7.  He reports nausea and 2 episodes of non-bloody emesis.  He denies abdominal pain, chest pain, and shortness of breath. He denies headache, dizziness, cough, sore throat, and loss of taste and smell.  He reports chronic diarrhea secondary to his Lactulose use for cirrhosis that has not changed.  He denies melena and hematochezia.  Work up in the ED revealed Na 129, alkaline phosphatase 140, AST 50, albumin 2.60, lactate 4.7, and WBC 18.22.  CT of the abdomen/pelvis showed circumferential thickening of the wall of the ascending and transverse colon that is suggestive of colitis.  There is stable evidence of cirrhosis.  Improved but persistent splenomegaly and ascites is noted.  He was febrile and tachycardic on arrival and received Vancomycin and Zosyn in the ED.     · Sepsis/SBP/strep bacteremia:   Underwent paracentesis on 01/02/2021 and fluid analysis was consistent with his BP.  Initially was on vancomycin and Zosyn and this was deescalated to Rocephin.  The patient underwent repeat paracentesis again on 01/05/2021 and had removal of approximately 2 L.  Fluid analysis clearly revealed improvement of SBP.  Patient is being switched to p.o. Levaquin for 5 more days upon discharge.  He is on diuretics upon discharge as well.  · Chronic liver disease with cirrhosis hepatic encephalopathy thrombocytopenia  anemia:   Patient's mental status is at baseline now.  GI is following and there is no need for EGD or colonoscopy at this point.  Continue with lactulose at home.  · Hypertension: Blood pressures are well controlled  · GERD: Continue his PPI  · Hyponatremia: Improved, continue to follow his BMP .  Sodium is 138 today.        Please note patient was seen examined today on day of discharge.    Time taken in discharge 40 minutes.         Day of Discharge     Physical Exam:  Temp:  [98.2 °F (36.8 °C)-98.7 °F (37.1 °C)] 98.3 °F (36.8 °C)  Heart Rate:  [86-99] 91  Resp:  [16] 16  BP: (106-124)/(61-67) 106/64  Body mass index is 21.2 kg/m².  Physical Exam  Constitutional:       General: He is not in acute distress.     Appearance: He is normal weight.   HENT:      Head: Normocephalic and atraumatic.      Nose: Nose normal.      Mouth/Throat:      Pharynx: Oropharynx is clear.   Eyes:      General: No scleral icterus.     Extraocular Movements: Extraocular movements intact.   Neck:      Musculoskeletal: Neck supple.   Cardiovascular:      Rate and Rhythm: Normal rate and regular rhythm.      Pulses: Normal pulses.      Heart sounds: Normal heart sounds.   Pulmonary:      Effort: Pulmonary effort is normal.      Breath sounds: Normal breath sounds.   Abdominal:      General: Abdomen is flat. Bowel sounds are normal. There is no distension.      Palpations: Abdomen is soft.   Musculoskeletal: Normal range of motion.   Skin:     General: Skin is warm and dry.      Capillary Refill: Capillary refill takes less than 2 seconds.   Neurological:      General: No focal deficit present.      Mental Status: He is alert and oriented to person, place, and time.   Psychiatric:         Mood and Affect: Mood normal.       Consultants     Consult Orders (all) (From admission, onward)     Start     Ordered    01/05/21 1839  Inpatient Cardiology Consult  Once     Specialty:  Cardiology  Provider:  Janice Patterson MD    01/05/21 1839 01/03/21  1050  Inpatient Infectious Diseases Consult  Once     Specialty:  Infectious Diseases  Provider:  Justyn Santos MD    01/03/21 1049    01/02/21 1142  Inpatient Gastroenterology Consult  Once     Specialty:  Gastroenterology  Provider:  Ruby Mcgee MD    01/02/21 1146    01/01/21 2258  [facility] (on-call MD unless specified) Details  Once     Specialty:  Hospitalist  Provider:  (Not yet assigned)    01/01/21 2257              Procedures     Imaging Results (All)     Procedure Component Value Units Date/Time    US Paracentesis [[id number]] Collected: 01/05/21 1200    Specimen: Body Fluid Updated: 01/05/21 1204    Narrative:      ULTRASOUND-GUIDED PARACENTESIS     HISTORY: Ascites     After signed informed consent was obtained, the right lower quadrant was  prepped and draped in the usual sterile fashion. Lidocaine was used for  local anesthesia.     The paracentesis catheter was inserted into the right lower quadrant  using ultrasound guidance. 2400 mL of clear yellow ascites was removed.  Sample was sent to the lab.     Confirmatory images were obtained.     Patient tolerated the procedure well with no complications.       Impression:      Ultrasound-guided paracentesis as described.     This report was finalized on 1/5/2021 12:00 PM by Dr. Tejinder Carroll M.D.       US Abdomen Limited [[id number]] Collected: 01/04/21 1205     Updated: 01/04/21 1211    Narrative:      PROCEDURE: US ABDOMEN LIMITED-     HISTORY: Post paracentesis, concern for hematoma.     TECHNIQUE: Grayscale and color Doppler ultrasound of the right lower  quadrant abdominal wall and 4 abdominal/pelvic quadrants was performed.     COMPARISON: Abdominal ultrasound 1/2/2021.        Impression:      FINDINGS/IMPRESSION: Targeted sonographic evaluation of the anterior  right lower quadrant abdominal wall was performed in the area of concern  at the site of bruising. No discrete or drainable fluid collection is  identified within the  abdominal wall. Additional survey images of the  abdomen and pelvis demonstrates small volume ascites.     This report was finalized on 1/4/2021 12:08 PM by Dr. Zahra Guo M.D.       US Abdomen Complete [[id number]] Collected: 01/02/21 1915     Updated: 01/02/21 1932    Narrative:      US ABDOMEN COMPLETE-     Clinical: Ascites, alcoholic liver disease/cirrhosis, sepsis     COMPARISON CT of the abdomen and pelvis 1/1/2021     FINDINGS: Visualized portions of the pancreas have a satisfactory  appearance. Visualized portions of the abdominal aorta are normal in  diameter, no aneurysm.     There is free intraperitoneal fluid. The liver is small in size and  there is superficial irregularity, the overall appearance is compatible  with cirrhosis. There is a small liver cyst located within the left  lobe. There is gallbladder wall thickening, no gallstones seen. This  could be secondary to acalculus cholecystitis or hypoalbuminemia.     The right kidney is normal measuring 11.7 cm in length. No obstructive  uropathy. The left kidney is normal measuring 13.1 cm in length. Spleen  is enlarged, 16.9 cm in maximum length. No biliary duct dilatation CBD  is 5 mm.     Visualized inferior vena cava within normal limits.     CONCLUSION:  1. Morphologic appearance of the liver is consistent with cirrhosis.  2. Splenomegaly.  3. Free intraperitoneal fluid.     This report was finalized on 1/2/2021 7:29 PM by Dr. Felix Banda M.D.       US Paracentesis [[id number]] Collected: 01/02/21 1909    Specimen: Body Fluid Updated: 01/02/21 1912    Narrative:      US PARACENTESIS-     HISTORY: Ascites     PROCEDURE: Ultrasound guided paracentesis.     FINDINGS: Informed consent was obtained. Preliminary sonography of the  abdomen was performed. Fluid localized. The overlying skin was prepped  in the usual sterile fashion. Local anesthesia was achieved with 1%  lidocaine. A small nick was made in the skin with a scalpel. The  needle  catheter device was inserted through the nick under sonographic  guidance. The needle was removed and the catheter remained and was  connected to suction. 1.8 liters of yellow fluid was withdrawn. The  patient tolerated procedure without evidence of complication and left  the procedure room in stable condition.  .    Impression:      1. Technically successful ultrasound-guided paracentesis.     This report was finalized on 1/2/2021 7:09 PM by Dr. Felix Banda M.D.       XR Chest 1 View [[id number]] Collected: 01/02/21 0704     Updated: 01/02/21 1851    Narrative:      SINGLE VIEW CHEST RADIOGRAPH     HISTORY: 65-year-old male with a history of fever, concern for COVID-19.     FINDINGS: A semierect AP portable chest radiograph was obtained.  Comparison is made to a chest CT dated 07/09/2020. The lungs are clear  and are normal in volume. The cardiomediastinal silhouette and pulmonary  vasculature appear normal. No bony abnormality is appreciated.       Impression:      Negative chest radiograph.     This report was finalized on 1/2/2021 6:48 PM by Dr. Randall Mckeon M.D.       CT Abdomen Pelvis With Contrast [[id number]] Collected: 01/01/21 2338     Updated: 01/01/21 2349    Narrative:      Examination: CT of the abdomen and pelvis with contrast     HISTORY: 65-year-old male with history of fever, vomiting and diarrhea     TECHNIQUE: Contiguous axial images were obtained through the abdomen and  pelvis following intravenous administration of nonionic contrast. Oral  contrast  was not administered. Radiation dose reduction techniques were  utilized, including automated exposure control and exposure modulation  based on body size.     COMPARISON: CT of the abdomen pelvis with contrast, 07/06/2020     FINDINGS: The visualized portion of the lung bases6 are clear. The  visualized portion of the heart has a normal appearance . The liver  demonstrates a stable cirrhotic morphology. There are 2 stable  focal  hypodensities in the liver that measure on the order of 0.4 cm and 1.1  cm in greatest dimension. The pancreas appears normal. There is moderate  diffuse ascites that appears slightly less than seen on the prior  examination. The kidneys appear normal. The adrenal glands have a normal  appearance. The spleen is enlarged but smaller than seen on the prior  examination. Measures on the order of 16.5 x 6.0 x 14.5 cm compared to  prior measurements of 19.5 x 6.9 x 14.5 cm. There is circumferential  thickening of the wall of the ascending and transverse colon. The wall  of the ascending colon measures up to 1.8 cm in thickness.. The osseous  structures of the lumbar spine and pelvis are stable and demonstrate  mild multilevel osteophytic change of the lumbar spine.       Impression:      1.There is circumferential thickening of the wall of the ascending and  transverse colon that is suggestive of colitis.  2. There is stable evidence of cirrhosis. Improved but persistent  splenomegaly and ascites is noted.     This report was finalized on 1/1/2021 11:46 PM by Dr. Randall Mckeon M.D.             Pertinent Labs     Results from last 7 days   Lab Units 01/06/21  0515 01/05/21  0512 01/04/21  0518 01/03/21  0400   WBC 10*3/mm3 4.21 3.91 4.69 11.65*   HEMOGLOBIN g/dL 6.8* 7.3* 6.7* 7.1*   PLATELETS 10*3/mm3 57* 56* 51* 45*     Results from last 7 days   Lab Units 01/05/21  0512 01/04/21  0518 01/03/21  0400 01/02/21  1236   SODIUM mmol/L 138 134* 132* 128*   POTASSIUM mmol/L 3.7 3.7 4.4 4.6   CHLORIDE mmol/L 111* 109* 108* 104   CO2 mmol/L 18.1* 18.1* 18.0* 14.9*   BUN mg/dL 8 11 14 17   CREATININE mg/dL 0.64* 0.60* 0.80 0.89   GLUCOSE mg/dL 97 92 120* 131*   Estimated Creatinine Clearance: 115.1 mL/min (A) (by C-G formula based on SCr of 0.64 mg/dL (L)).  Results from last 7 days   Lab Units 01/05/21 0512 01/04/21 0518 01/03/21  0400 01/02/21  1236   ALBUMIN g/dL 2.60* 2.70* 1.90* 2.20*   BILIRUBIN mg/dL 2.0* 1.7*  2.2* 3.0*   ALK PHOS U/L 81 78 88 86   AST (SGOT) U/L 34 33 43* 51*   ALT (SGPT) U/L 18 19 21 24     Results from last 7 days   Lab Units 01/06/21  0515 01/05/21  0512 01/04/21  0518 01/03/21  0400 01/02/21  1236   CALCIUM mg/dL  --  7.9* 7.7* 7.3* 7.4*   ALBUMIN g/dL  --  2.60* 2.70* 1.90* 2.20*   MAGNESIUM mg/dL 1.7  --   --   --   --      Results from last 7 days   Lab Units 01/03/21  0400 01/02/21  1236 01/02/21  0728 01/01/21  2125   LIPASE U/L  --   --   --  68*   AMMONIA umol/L 81* 88* 61*  --              Invalid input(s): LDLCALC  Results from last 7 days   Lab Units 01/03/21  2306 01/03/21  2302 01/02/21  1648 01/01/21  2256 01/01/21  2255   BLOODCX  No growth at 2 days No growth at 2 days  --  Streptococcus salivarius* Streptococcus salivarius*   BODYFLDCX   --   --  No growth at 4 days  --   --    BCIDPCR   --   --   --   --  Streptococcus spp, not A, B, or pneumoniae. Identification by BCID PCR.*       Test Results Pending at Discharge     Pending Labs     Order Current Status    Blood Culture - Blood, Arm, Left Preliminary result    Blood Culture - Blood, Arm, Right Preliminary result    Body Fluid Culture - Body Fluid, Peritoneum Preliminary result          Discharge Details        Discharge Medications      New Medications      Instructions Start Date   levoFLOXacin 750 MG tablet  Commonly known as: Levaquin   750 mg, Oral, Daily         Continue These Medications      Instructions Start Date   ferrous sulfate 325 (65 FE) MG tablet   325 mg, Oral, 2 Times Daily With Meals      furosemide 40 MG tablet  Commonly known as: LASIX   40 mg, Oral, Daily      lactulose 10 GM/15ML solution  Commonly known as: CHRONULAC   10 g, Oral, 2 Times Daily, Adjust dose until you have 3-4 bowel movements a day.      metoprolol tartrate 25 MG tablet  Commonly known as: LOPRESSOR   12.5 mg, Oral, Daily      pantoprazole 40 MG EC tablet  Commonly known as: PROTONIX   40 mg, Oral, Daily      spironolactone 100 MG  tablet  Commonly known as: ALDACTONE   100 mg, Oral, Daily      VITAMIN D3 PO   25 mg, Oral, Daily             No Known Allergies      Discharge Disposition:  Home or Self Care    Discharge Diet:  No active diet order      Discharge Activity:       CODE STATUS:    Code Status and Medical Interventions:   Ordered at: 01/02/21 0143     Code Status:    CPR     Medical Interventions (Level of Support Prior to Arrest):    Full       Future Appointments   Date Time Provider Department Center   2/4/2021 11:45 AM Ruby Mcgee MD [facility] None     Additional Instructions for the Follow-ups that You Need to Schedule     Discharge Follow-up with PCP   As directed       Currently Documented PCP:    Bella Villalta MD    PCP Phone Number:    [phone]     Follow Up Details: 2 weeks         Discharge Follow-up with Specified Provider: ; 1 Month   As directed      To:     Follow Up: 1 Month           Follow-up Information     Bella Villalta MD .    Specialty: Family Medicine  Why: 2 weeks  Contact information:  [address] [facility] [address]  [phone]             Austin Barkley MD Follow up.    Specialty: Gastroenterology  Why: Call to schedule appointment  Contact information:  [address]  [place] [address] [place] [address]  [phone]                   Additional Instructions for the Follow-ups that You Need to Schedule     Discharge Follow-up with PCP   As directed       Currently Documented PCP:    Bella Villalta MD    PCP Phone Number:    [phone]     Follow Up Details: 2 weeks         Discharge Follow-up with Specified Provider: ; 1 Month   As directed      To:     Follow Up: 1 Month           Time Spent on Discharge:  Greater than 30 minutes      Liborio Galvez MD  [facility]  01/06/21  18:36 EST                Electronically signed by Liborio Galvez MD at 01/06/21 1622       Amina Boykin RN   Case  Manager   Case Management   Progress Notes   Signed   Date of Service:  01/06/21 1232   Creation Time:  01/06/21 1232            Signed             Show:Clear all  []Manual[x]Template[]Copied    Added by:  [x]Amina Boykin RN    []Luis for details  Continued Stay Note  [facility]     Patient Name: Wei Potts                 MRN: [id number]  Today's Date: 1/6/2021                       Admit Date: 1/1/2021          Discharge Plan      Row Name 01/06/21 [facility]           Plan     Plan  Home     Final Note  Home with spouse and has transport.  He denies dc needs...................Amina Boykin RN          Discharge Codes    No documentation.              Expected Discharge Date and Time      Expected Discharge Date Expected Discharge Time     Jan 6, 2021                  Amina Boykin RN

## 2021-01-07 NOTE — OUTREACH NOTE
Call Center TCM Note      Responses   [facility] patient discharged from?  [place]   Does the patient have one of the following disease processes/diagnoses(primary or secondary)?  Sepsis   TCM attempt successful?  No   Unsuccessful attempts  Attempt 1          Christine Ordoñez MA    1/7/2021, 10:20 EST

## 2021-01-08 ENCOUNTER — TRANSITIONAL CARE MANAGEMENT TELEPHONE ENCOUNTER (OUTPATIENT)
Dept: CALL CENTER | Facility: HOSPITAL | Age: 66
End: 2021-01-08

## 2021-01-08 LAB
BACTERIA SPEC AEROBE CULT: NORMAL
BACTERIA SPEC AEROBE CULT: NORMAL

## 2021-01-08 NOTE — OUTREACH NOTE
Call Center TCM Note      Responses   [facility] patient discharged from?  [place]   Does the patient have one of the following disease processes/diagnoses(primary or secondary)?  Sepsis   TCM attempt successful?  No   Unsuccessful attempts  Attempt 3          Christine Abreu RN    1/8/2021, 09:17 EST

## 2021-01-10 ENCOUNTER — TELEPHONE (OUTPATIENT)
Dept: GASTROENTEROLOGY | Facility: HOSPITAL | Age: 66
End: 2021-01-10

## 2021-01-10 NOTE — TELEPHONE ENCOUNTER
"When discharged 1/6/21-  Some swelling in the abdomen.  ,  Had a paracentesis.  Recent SBP per chart.  Sore abdomen.  She gotten home.  Feels abdomen is more firm.  Currently taking   Lactulose,  Antibiotic for 5 days, levaquin.  Taking spironolactone, but skipped it a few days,  Restarted yesterday.   States he quit the lasix since cause \"swelling on chest\"  Thinks gained 5-6 lbs .  Consider restarting lasix.   Consider repeat paracentesis. Not short of breath  Feels ok otherwise.  Will call Dr Mcgee office  In the morning to discuss rx modfications in rx, and set up paracentesis.   "

## 2021-01-11 ENCOUNTER — NURSE TRIAGE (OUTPATIENT)
Dept: CALL CENTER | Facility: HOSPITAL | Age: 66
End: 2021-01-11

## 2021-01-11 ENCOUNTER — TELEPHONE (OUTPATIENT)
Dept: GASTROENTEROLOGY | Facility: CLINIC | Age: 66
End: 2021-01-11

## 2021-01-11 NOTE — TELEPHONE ENCOUNTER
"Reviewed guideline with caller, advises he speak with his GI specialist. Caller agrees to contact the office for a plan to deal with his continuing ascites.     Reason for Disposition  • [1] Caller requesting NON-URGENT health information AND [2] PCP's office is the best resource    Additional Information  • Negative: [1] Caller is not with the adult (patient) AND [2] reporting urgent symptoms  • Negative: Lab result questions  • Negative: Medication questions  • Negative: Caller can't be reached by phone  • Negative: Caller has already spoken to PCP or another triager  • Negative: RN needs further essential information from caller in order to complete triage  • Negative: Requesting regular office appointment    Answer Assessment - Initial Assessment Questions  1. REASON FOR CALL or QUESTION: \"What is your reason for calling today?\" or \"How can I best help you?\" or \"What question do you have that I can help answer?\"      What to do about abdominal swelling with shortness of breath.    Protocols used: INFORMATION ONLY CALL-ADULT-      "

## 2021-01-11 NOTE — TELEPHONE ENCOUNTER
----- Message from Milind Owens Rep sent at 1/11/2021  2:34 PM EST -----  Regarding: questions  Contact: [phone]  Pt has questions in regards to his hospital stay. Drainage of fluid from stomach.

## 2021-01-12 ENCOUNTER — TELEPHONE (OUTPATIENT)
Dept: GASTROENTEROLOGY | Facility: CLINIC | Age: 66
End: 2021-01-12

## 2021-01-12 ENCOUNTER — PREP FOR SURGERY (OUTPATIENT)
Dept: OTHER | Facility: HOSPITAL | Age: 66
End: 2021-01-12

## 2021-01-12 DIAGNOSIS — R18.8 OTHER ASCITES: Primary | ICD-10-CM

## 2021-01-12 RX ORDER — ALBUMIN (HUMAN) 12.5 G/50ML
25 SOLUTION INTRAVENOUS ONCE
Status: CANCELLED | OUTPATIENT
Start: 2021-01-12 | End: 2021-01-12

## 2021-01-12 NOTE — TELEPHONE ENCOUNTER
Call from pt.  States abd seems more swollen and taut.  Notes some SOA when walking, or using stairs.  Wt has been stable past 3 days - 169.  Today 168.  Denies abd pain, fever.      States stopped taking spironolactone for 2-3 days because levaquin caused nausea.  Has resumed about 3-4 days ago.      Concerned re: abd swelling and SOA.  Requesting paracentesis.      Message to DR Mcgee.

## 2021-01-12 NOTE — TELEPHONE ENCOUNTER
Call from pt.  States [facility] has advised that can schedule for next wk, or may call @ 8am tomorrow to see if may be squeezed in.  Pt will update this office with status.

## 2021-01-12 NOTE — TELEPHONE ENCOUNTER
"----- Message from Henok Patel MD sent at 1/11/2021  6:31 PM EST -----  Please see note from Dr Manzano from this weekend.  Patient called answering service again after hours seeking advice.  No fevers or barbra pain, just remains distended.  Advised consider ER but he declines. James Manzano MDWhen discharged 1/6/21-  Some swelling in the abdomen.  ,  Had a paracentesis.  Recent SBP per chart.  Sore abdomen.  She gotten home.  Feels abdomen is more firm.  Currently taking   Lactulose,  Antibiotic for 5 days, levaquin.Taking spironolactone, but skipped it a few days,  Restarted yesterday.   States he quit the lasix since cause \"swelling on chest\"Thinks gained 5-6 lbs .Consider restarting lasix.   Consider repeat paracentesis. Not short of breath  Feels ok otherwise.  Will call Dr Mcgee office  In the morning to discuss rx modfications in rx, and set up paracentesis.   "

## 2021-01-12 NOTE — TELEPHONE ENCOUNTER
pls contact pt - sounds as though her needs repeat paracentesis-pls get update since resuming aldactone

## 2021-01-13 ENCOUNTER — TELEPHONE (OUTPATIENT)
Dept: GASTROENTEROLOGY | Facility: CLINIC | Age: 66
End: 2021-01-13

## 2021-01-13 ENCOUNTER — HOSPITAL ENCOUNTER (OUTPATIENT)
Dept: ULTRASOUND IMAGING | Facility: HOSPITAL | Age: 66
Discharge: HOME | End: 2021-01-13
Admitting: INTERNAL MEDICINE

## 2021-01-13 VITALS
SYSTOLIC BLOOD PRESSURE: 130 MMHG | RESPIRATION RATE: 16 BRPM | HEIGHT: 71 IN | BODY MASS INDEX: 23.52 KG/M2 | HEART RATE: 85 BPM | WEIGHT: 168 LBS | TEMPERATURE: 98.4 F | DIASTOLIC BLOOD PRESSURE: 67 MMHG | OXYGEN SATURATION: 100 %

## 2021-01-13 DIAGNOSIS — R18.8 OTHER ASCITES: ICD-10-CM

## 2021-01-13 LAB
APPEARANCE FLD: CLEAR
COLOR FLD: YELLOW
LYMPHOCYTES NFR FLD MANUAL: 39 %
METHOD: NORMAL
MONOCYTES NFR FLD: 29 %
MONOS+MACROS NFR FLD: 29 %
NEUTROPHILS NFR FLD MANUAL: 3 %
NUC CELL # FLD: 148 /MM3
RBC # FLD AUTO: 222 /MM3

## 2021-01-13 PROCEDURE — 25010000003 LIDOCAINE 1 % SOLUTION: Performed by: RADIOLOGY

## 2021-01-13 PROCEDURE — 76942 ECHO GUIDE FOR BIOPSY: CPT

## 2021-01-13 PROCEDURE — 89051 BODY FLUID CELL COUNT: CPT | Performed by: INTERNAL MEDICINE

## 2021-01-13 PROCEDURE — 87205 SMEAR GRAM STAIN: CPT | Performed by: INTERNAL MEDICINE

## 2021-01-13 PROCEDURE — 25010000002 ALBUMIN HUMAN 25% PER 50 ML: Performed by: INTERNAL MEDICINE

## 2021-01-13 PROCEDURE — 87015 SPECIMEN INFECT AGNT CONCNTJ: CPT | Performed by: INTERNAL MEDICINE

## 2021-01-13 PROCEDURE — 87070 CULTURE OTHR SPECIMN AEROBIC: CPT | Performed by: INTERNAL MEDICINE

## 2021-01-13 PROCEDURE — P9047 ALBUMIN (HUMAN), 25%, 50ML: HCPCS | Performed by: INTERNAL MEDICINE

## 2021-01-13 RX ORDER — LIDOCAINE HYDROCHLORIDE 10 MG/ML
10 INJECTION, SOLUTION INFILTRATION; PERINEURAL ONCE
Status: COMPLETED | OUTPATIENT
Start: 2021-01-13 | End: 2021-01-13

## 2021-01-13 RX ORDER — SODIUM CHLORIDE 0.9 % (FLUSH) 0.9 %
10 SYRINGE (ML) INJECTION AS NEEDED
Status: DISCONTINUED | OUTPATIENT
Start: 2021-01-13 | End: 2021-01-14 | Stop reason: HOSPADM

## 2021-01-13 RX ORDER — ALBUMIN (HUMAN) 12.5 G/50ML
25 SOLUTION INTRAVENOUS ONCE
Status: COMPLETED | OUTPATIENT
Start: 2021-01-13 | End: 2021-01-13

## 2021-01-13 RX ORDER — SODIUM CHLORIDE 0.9 % (FLUSH) 0.9 %
3 SYRINGE (ML) INJECTION EVERY 12 HOURS SCHEDULED
Status: DISCONTINUED | OUTPATIENT
Start: 2021-01-13 | End: 2021-01-14 | Stop reason: HOSPADM

## 2021-01-13 RX ADMIN — ALBUMIN HUMAN 25 G: 0.25 SOLUTION INTRAVENOUS at 15:05

## 2021-01-13 RX ADMIN — LIDOCAINE HYDROCHLORIDE 6 ML: 10 INJECTION, SOLUTION INFILTRATION; PERINEURAL at 14:06

## 2021-01-13 NOTE — TELEPHONE ENCOUNTER
Christine with scheduling [phone] called stating patient reached out to her requesting a STAT paracentesis for this morning. His last procedure was 01/05/2021. Patient reports being very uncomfortable and may have to go to ER if it can't be done today. Please advise.

## 2021-01-13 NOTE — NURSING NOTE
Pt D/[place] and pt ambulated self out to car.  This RN wore gloves, mask and goggles while interacting with pt. Pt wore a mask.

## 2021-01-13 NOTE — TELEPHONE ENCOUNTER
Call to pt - advise trying to get scheduled for today.  Verb understanding.  States on his way to [facility] - will await return call.    Call to [facility] and spoke with Kelly.  Pt to arrive today at 12:45 for para.  Call to pt.  Advise of same.  Verb understanding - will wait at [facility].

## 2021-01-13 NOTE — NURSING NOTE
Pt in xray triage for Paracentesis  Pt wearing mask; RN wearing mask and goggles for all interactions

## 2021-01-13 NOTE — DISCHARGE INSTRUCTIONS
EDUCATION /DISCHARGE INSTRUCTIONS  Paracentesis:  A needle is inserted into the space between your abdominal organs and the membrane that surrounds them (peritoneal space).  It is done for the diagnosis and treatment of fluid that is resistant to other therapies.  It helps determine the cause of the fluid and at the relieves pressure created by the fluid.  A sample is obtained and sent to the laboratory for study.  During the procedure:  You will lie on a bed on your back with your legs drawn up.  Your abdomen will be exposed from the chest to the pelvis.  You will otherwise be covered to maintain comfort.  A physician will clean your abdomen with antiseptic soap, place a sterile towel around the site and administer a local anesthetic to numb the area.  The physician will insert a needle into your abdominal wall.  There may be a popping sound which signifies the needle has pierced the abdominal wall. Next, the physician will attach tubing to transfer a sample into a collection bottle.  After the fluid is obtained the needle will be removed.  A pressure dressing is applied to the site.  Risks of the procedure include but are not limited to:   *  Bleeding    *  Wound infection   *  Low blood pressure   *  Decreased urination   *  Low sodium if a large amount of fluid is removed   *  Puncture of abdominal organs by the needle    Benefits of the procedure:  Benefits include the removal of fluid from the abdomen, relief of abdominal pressure and facilitation of a diagnosis.  Alternatives to the procedure:  Possible alternatives are diuretic drug therapy or surgery to place a shunt to drain fluid.  Risks of diuretic drug therapy include possible dehydration and renal failure.  The benefit of drug therapy is that it can be done at home under physician supervision.  Risks of shunt placement include exposure to anesthesia, infection, excessive bleeding and injury to abdominal organs.  The benefit of a shunt is that it can be  used to drain fluid over a longer period of time.  THIS EDUCATION INFORMATION WAS REVIEWED PRIOR TO THE PROCEDURE AND CONSENT. Patient initials__________________Time___1330______________    Post procedure: (Follow all the instructions below carefully)   *  Weigh yourself daily.   *  Follow your doctors dietary instructions.   *  Rest today (no pushing pulling, straining or heavy lifting).   *  Slowly increase activity tomorow.   *  If you received sedation do not drive for 24 hours.              * Skin affix  applied to puncture site. Do not try to remove, scratch or apply lotion   * Skin affix will fall off on it's own   *  You may shower tomorrow  Call your doctor if experiencing:   *  Signs of infection such as redness, swelling, excessive pain and / or foul       smelling drainage from the puncture site.   *  Chills or fever over 101 degrees (by mouth).   *  Fainting or any noted Rapid weight gain/loss   *  Unrelieved pain or any new or severe symptoms  Following the procedure:      Follow-up with the ordering physician as directed.   Continue to take other medications as directed by your physician unless    otherwise instructed.   If applicable, resume taking your blood thinners or Aspirin in 24 hours.              If you have any concerns please call the Radiology Nurses Desk at [phone]

## 2021-01-14 ENCOUNTER — TELEPHONE (OUTPATIENT)
Dept: INTERVENTIONAL RADIOLOGY/VASCULAR | Facility: HOSPITAL | Age: 66
End: 2021-01-14

## 2021-01-14 ENCOUNTER — READMISSION MANAGEMENT (OUTPATIENT)
Dept: CALL CENTER | Facility: HOSPITAL | Age: 66
End: 2021-01-14

## 2021-01-14 NOTE — OUTREACH NOTE
Sepsis Week 2 Survey      Responses   [facility] patient discharged from?  [place]   Does the patient have one of the following disease processes/diagnoses(primary or secondary)?  Sepsis   Week 2 attempt successful?  No   Unsuccessful attempts  Attempt 1          Gerber Liriano RN

## 2021-01-15 ENCOUNTER — TELEPHONE (OUTPATIENT)
Dept: GASTROENTEROLOGY | Facility: CLINIC | Age: 66
End: 2021-01-15

## 2021-01-15 ENCOUNTER — READMISSION MANAGEMENT (OUTPATIENT)
Dept: CALL CENTER | Facility: HOSPITAL | Age: 66
End: 2021-01-15

## 2021-01-15 NOTE — OUTREACH NOTE
Sepsis Week 2 Survey      Responses   [facility] patient discharged from?  [place]   Does the patient have one of the following disease processes/diagnoses(primary or secondary)?  Sepsis   Week 2 attempt successful?  No   Unsuccessful attempts  Attempt 2          Mary Story RN

## 2021-01-18 LAB
BACTERIA FLD CULT: NORMAL
GRAM STN SPEC: NORMAL
GRAM STN SPEC: NORMAL

## 2021-01-18 NOTE — TELEPHONE ENCOUNTER
Called pt and advised of Dr Mcgee's note.  Verb understanding. Pt reports that he is feeling better and his cough is gone.Update sent to Dr Mcgee.

## 2021-01-21 ENCOUNTER — READMISSION MANAGEMENT (OUTPATIENT)
Dept: CALL CENTER | Facility: HOSPITAL | Age: 66
End: 2021-01-21

## 2021-01-21 NOTE — OUTREACH NOTE
Sepsis Week 3 Survey      Responses   [facility] patient discharged from?  [place]   Does the patient have one of the following disease processes/diagnoses(primary or secondary)?  Sepsis   Week 3 attempt successful?  No   Unsuccessful attempts  Attempt 1          Gerber Liriano RN

## 2021-01-22 ENCOUNTER — READMISSION MANAGEMENT (OUTPATIENT)
Dept: CALL CENTER | Facility: HOSPITAL | Age: 66
End: 2021-01-22

## 2021-02-04 ENCOUNTER — OFFICE VISIT (OUTPATIENT)
Dept: GASTROENTEROLOGY | Facility: CLINIC | Age: 66
End: 2021-02-04

## 2021-02-04 VITALS — WEIGHT: 151.6 LBS | HEIGHT: 71 IN | BODY MASS INDEX: 21.22 KG/M2 | TEMPERATURE: 96 F

## 2021-02-04 DIAGNOSIS — R18.8 CIRRHOSIS OF LIVER WITH ASCITES, UNSPECIFIED HEPATIC CIRRHOSIS TYPE (HCC): Primary | ICD-10-CM

## 2021-02-04 DIAGNOSIS — K74.60 CIRRHOSIS OF LIVER WITH ASCITES, UNSPECIFIED HEPATIC CIRRHOSIS TYPE (HCC): Primary | ICD-10-CM

## 2021-02-04 DIAGNOSIS — Z86.19 HISTORY OF SPONTANEOUS BACTERIAL PERITONITIS: ICD-10-CM

## 2021-02-04 DIAGNOSIS — D50.0 IRON DEFICIENCY ANEMIA DUE TO CHRONIC BLOOD LOSS: ICD-10-CM

## 2021-02-04 PROCEDURE — 99214 OFFICE O/P EST MOD 30 MIN: CPT | Performed by: INTERNAL MEDICINE

## 2021-02-04 RX ORDER — SULFAMETHOXAZOLE AND TRIMETHOPRIM 800; 160 MG/1; MG/1
1 TABLET ORAL DAILY
Qty: 30 TABLET | Refills: 5 | Status: SHIPPED | OUTPATIENT
Start: 2021-02-04 | End: 2021-05-20

## 2021-02-04 RX ORDER — MULTIPLE VITAMINS W/ MINERALS TAB 9MG-400MCG
1 TAB ORAL DAILY
COMMUNITY
End: 2021-07-23 | Stop reason: SDUPTHER

## 2021-02-04 NOTE — PROGRESS NOTES
Subjective   Chief Complaint   Patient presents with   • Cirrhosis       Wei Potts is a  65 y.o. male here for a follow up visit for cirrhosis.     He was hospitalized for SBP Jan 2021.  Completed antibiotics.    He reports breast tenderness and enlargement since hospital d/c - he is on aldactone 100 mg/d.      He denies reaccumulation of fluid - no le edema.  Fatigue is some better and weakness has improved since decreasing the dose of his metoprolol.  No blood in stool.    Watches diet carefully - no etoh x 1.5 years.    He has an appt to see transplant at  3/1.    He has recently found out that his mother had liver disease.  HPI  Past Medical History:   Diagnosis Date   • Alcoholism (CMS/HCC)    • Anemia    • Cirrhosis (CMS/HCC)    • Encephalopathy    • Hypertension    • Liver disease      No past surgical history on file.    Current Outpatient Medications:   •  Cholecalciferol (VITAMIN D3 PO), Take 25 mg by mouth Daily., Disp: , Rfl:   •  lactulose (CHRONULAC) 10 GM/15ML solution, Take 15 mL by mouth 2 (Two) Times a Day. Adjust dose until you have 3-4 bowel movements a day., Disp: 946 mL, Rfl: 5  •  metoprolol tartrate (LOPRESSOR) 25 MG tablet, Take 0.5 tablets by mouth Daily., Disp: 15 tablet, Rfl: 5  •  multivitamin with minerals tablet tablet, Take 1 tablet by mouth Daily., Disp: , Rfl:   •  spironolactone (ALDACTONE) 100 MG tablet, Take 1 tablet by mouth Daily., Disp: 30 tablet, Rfl: 5  •  VITAMIN E PO, Take  by mouth., Disp: , Rfl:   •  furosemide (LASIX) 40 MG tablet, Take 1 tablet by mouth Daily., Disp: 30 tablet, Rfl: 5  •  pantoprazole (PROTONIX) 40 MG EC tablet, Take 1 tablet by mouth Daily., Disp: 30 tablet, Rfl: 5  •  sulfamethoxazole-trimethoprim (Bactrim DS) 800-160 MG per tablet, Take 1 tablet by mouth Daily., Disp: 30 tablet, Rfl: 5  PRN Meds:.  Allergies   Allergen Reactions   • Iron GI Intolerance     Social History     Socioeconomic History   • Marital status:      Spouse name:  Not on file   • Number of children: Not on file   • Years of education: Not on file   • Highest education level: Not on file   Tobacco Use   • Smoking status: Never Smoker   • Smokeless tobacco: Never Used   Substance and Sexual Activity   • Alcohol use: Not Currently     Comment: 1 bottle of wine per day, stopped drinking ETOH 2 weeks ago   • Drug use: No   • Sexual activity: Defer     Family History   Problem Relation Age of Onset   • Diabetes Mother    • Hypertension Father      Review of Systems   Constitutional: Positive for fatigue. Negative for appetite change, fever and unexpected weight change.   Cardiovascular: Negative for leg swelling.   Gastrointestinal: Negative for abdominal distention and blood in stool.   Psychiatric/Behavioral: Negative for confusion.     Vitals:    02/04/21 1155   Temp: 96 °F (35.6 °C)         02/04/21  1155   Weight: 68.8 kg (151 lb 9.6 oz)       Objective   Physical Exam  Constitutional:       Appearance: Normal appearance. He is well-developed.   HENT:      Head: Normocephalic and atraumatic.   Eyes:      General: No scleral icterus.     Conjunctiva/sclera: Conjunctivae normal.   Neck:      Musculoskeletal: Normal range of motion and neck supple.   Pulmonary:      Effort: Pulmonary effort is normal.      Breath sounds: Normal breath sounds.   Abdominal:      General: There is no distension.      Palpations: Abdomen is soft.      Tenderness: There is no abdominal tenderness.   Musculoskeletal:      Right lower leg: No edema.      Left lower leg: No edema.   Skin:     General: Skin is warm and dry.   Neurological:      Mental Status: He is alert.   Psychiatric:         Mood and Affect: Mood normal.         Behavior: Behavior normal.       No radiology results for the last 7 days    Assessment/Plan   Diagnoses and all orders for this visit:    Cirrhosis of liver with ascites, unspecified hepatic cirrhosis type (CMS/HCC)  -     Comprehensive Metabolic Panel  -      Protime-INR    Iron deficiency anemia due to chronic blood loss  -     CBC & Differential    History of spontaneous bacterial peritonitis    Other orders  -     multivitamin with minerals tablet tablet; Take 1 tablet by mouth Daily.  -     VITAMIN E PO; Take  by mouth.  -     sulfamethoxazole-trimethoprim (Bactrim DS) 800-160 MG per tablet; Take 1 tablet by mouth Daily.      Plan:  · Decrease aldactone to 50 mg daily due to gynecomastia  · Continue current dose lasix  · Check labs today - did not tolerate oral iron - may need IV iron.  His Restorationist beliefs preclude transfusion  · Needs egd/c/s when anemia has improved  · Scheduled for transplant eval 3/1  · Start SBP prophylaxis with daily bactrim

## 2021-02-04 NOTE — PATIENT INSTRUCTIONS
· Decrease aldactone to 50 mg daily - if breast pain does not improve over next 2-3 weeks call me  · Goal weight is 150-153   · Start bactrim daily (to prevent reinfection of fluid)  · Continue low salt diet

## 2021-02-05 LAB
ALBUMIN SERPL-MCNC: 3.1 G/DL (ref 3.5–5.2)
ALBUMIN/GLOB SERPL: 0.7 G/DL
ALP SERPL-CCNC: 186 U/L (ref 39–117)
ALT SERPL-CCNC: 25 U/L (ref 1–41)
AST SERPL-CCNC: 53 U/L (ref 1–40)
BASOPHILS # BLD AUTO: ABNORMAL 10*3/UL
BILIRUB SERPL-MCNC: 3.3 MG/DL (ref 0–1.2)
BUN SERPL-MCNC: 8 MG/DL (ref 8–23)
BUN/CREAT SERPL: 10.4 (ref 7–25)
CALCIUM SERPL-MCNC: 9 MG/DL (ref 8.6–10.5)
CHLORIDE SERPL-SCNC: 104 MMOL/L (ref 98–107)
CO2 SERPL-SCNC: 21.3 MMOL/L (ref 22–29)
CREAT SERPL-MCNC: 0.77 MG/DL (ref 0.76–1.27)
DIFFERENTIAL COMMENT: ABNORMAL
EOSINOPHIL # BLD AUTO: ABNORMAL 10*3/UL
EOSINOPHIL # BLD MANUAL: 0.12 10*3/MM3 (ref 0–0.4)
EOSINOPHIL NFR BLD AUTO: ABNORMAL %
EOSINOPHIL NFR BLD MANUAL: 2 % (ref 0.3–6.2)
ERYTHROCYTE [DISTWIDTH] IN BLOOD BY AUTOMATED COUNT: 14.4 % (ref 12.3–15.4)
GLOBULIN SER CALC-MCNC: 4.7 GM/DL
GLUCOSE SERPL-MCNC: 126 MG/DL (ref 65–99)
HCT VFR BLD AUTO: 29.9 % (ref 37.5–51)
HGB BLD-MCNC: 9.6 G/DL (ref 13–17.7)
INR PPP: 2.3 (ref 0.9–1.1)
LYMPHOCYTES # BLD AUTO: ABNORMAL 10*3/UL
LYMPHOCYTES # BLD MANUAL: 0.53 10*3/MM3 (ref 0.7–3.1)
LYMPHOCYTES NFR BLD AUTO: ABNORMAL %
LYMPHOCYTES NFR BLD MANUAL: 9.1 % (ref 19.6–45.3)
MCH RBC QN AUTO: 28.2 PG (ref 26.6–33)
MCHC RBC AUTO-ENTMCNC: 32.1 G/DL (ref 31.5–35.7)
MCV RBC AUTO: 87.7 FL (ref 79–97)
MONOCYTES # BLD MANUAL: 0.76 10*3/MM3 (ref 0.1–0.9)
MONOCYTES NFR BLD AUTO: ABNORMAL %
MONOCYTES NFR BLD MANUAL: 13.1 % (ref 5–12)
NEUTROPHILS # BLD MANUAL: 4.4 10*3/MM3 (ref 1.7–7)
NEUTROPHILS NFR BLD AUTO: ABNORMAL %
NEUTROPHILS NFR BLD MANUAL: 75.8 % (ref 42.7–76)
PLATELET # BLD AUTO: 78 10*3/MM3 (ref 140–450)
PLATELET BLD QL SMEAR: ABNORMAL
POTASSIUM SERPL-SCNC: 4.7 MMOL/L (ref 3.5–5.2)
PROT SERPL-MCNC: 7.8 G/DL (ref 6–8.5)
PROTHROMBIN TIME: 25 SECONDS (ref 11.7–14.2)
RBC # BLD AUTO: 3.41 10*6/MM3 (ref 4.14–5.8)
RBC MORPH BLD: ABNORMAL
SODIUM SERPL-SCNC: 133 MMOL/L (ref 136–145)
WBC # BLD AUTO: 5.8 10*3/MM3 (ref 3.4–10.8)

## 2021-02-10 ENCOUNTER — TELEPHONE (OUTPATIENT)
Dept: GASTROENTEROLOGY | Facility: CLINIC | Age: 66
End: 2021-02-10

## 2021-02-10 NOTE — TELEPHONE ENCOUNTER
Hemoglobin shows improvement since his discharge from the hospital.  We will continue to follow closely.  Other labs relatively stable.  No medication changes.  I would hold off on hematology evaluation for now-we had discussed this for IV iron.   I expect that he will have labs when he has his evaluation at [place].  We will see what these labs show and may need evaluation thereafter if his hemoglobin does not remain up

## 2021-02-25 ENCOUNTER — TELEPHONE (OUTPATIENT)
Dept: GASTROENTEROLOGY | Facility: CLINIC | Age: 66
End: 2021-02-25

## 2021-02-25 RX ORDER — LACTULOSE 10 G/15ML
10 SOLUTION ORAL 2 TIMES DAILY
Qty: 946 ML | Refills: 5 | Status: SHIPPED | OUTPATIENT
Start: 2021-02-25 | End: 2021-02-25 | Stop reason: SDUPTHER

## 2021-02-25 RX ORDER — LACTULOSE 10 G/15ML
10 SOLUTION ORAL 2 TIMES DAILY
Qty: 946 ML | Refills: 5 | Status: SHIPPED | OUTPATIENT
Start: 2021-02-25 | End: 2021-06-21

## 2021-02-25 NOTE — TELEPHONE ENCOUNTER
Call to pt.  States needs lactulose rx changed to [facility].   Lengthy discussion re: lactulose dosing/bowel pattern.  Taking 15 ml 2x/day with good results.     Pharmacy info updated.   Request to DR Mcgee.

## 2021-02-25 NOTE — TELEPHONE ENCOUNTER
----- Message from Milind Owens Rep sent at 2/25/2021  2:02 PM EST -----  Regarding: med  Contact: [phone]  Pt has med lactulose, states bottles he is getting from [facility] is smaller amount then what he was getting. Would like to speak to someone about getting another script

## 2021-02-26 ENCOUNTER — TELEPHONE (OUTPATIENT)
Dept: GASTROENTEROLOGY | Facility: CLINIC | Age: 66
End: 2021-02-26

## 2021-02-26 NOTE — TELEPHONE ENCOUNTER
----- Message from Milind Alatorre sent at 2/26/2021  1:53 PM EST -----  Regarding: lactulose  Contact: [phone]  PT states [facility] doesn't have any liquid form of lactulose , please call pt

## 2021-02-26 NOTE — TELEPHONE ENCOUNTER
Call to pt.  States Roy says cannot  lactulose - too soon.  States rx he received was 473 ml's not 946 mls as ordered.    Call to [facility] @ [phone] and spoke with Pharmacist, Sabina.  States cannot fill current rx for another 2 wks, because picked up rx 2 wks ago.  States would need new rx with directions for higher amount in order for them to be able to fill sooner.     Request to DR Mcgee.

## 2021-03-16 ENCOUNTER — BULK ORDERING (OUTPATIENT)
Dept: CASE MANAGEMENT | Facility: OTHER | Age: 66
End: 2021-03-16

## 2021-03-16 DIAGNOSIS — Z23 IMMUNIZATION DUE: ICD-10-CM

## 2021-04-06 ENCOUNTER — TELEPHONE (OUTPATIENT)
Dept: GASTROENTEROLOGY | Facility: CLINIC | Age: 66
End: 2021-04-06

## 2021-04-06 NOTE — TELEPHONE ENCOUNTER
Call to pt.  States over past 2-3 days has gained 3-4 lbs. Wife tells him belly looks a little swollen.  Denies soa, leg swelling.  States feels fine, but concerned about this relatively rapid wt gain.  Asking if should go to ER, or what should do.    Update/concern to Dr Mcgee.

## 2021-04-06 NOTE — TELEPHONE ENCOUNTER
----- Message from Jess Phelps sent at 4/6/2021 12:28 PM EDT -----  Contact: [phone]  Patient is requesting a call back from Kyra Presley or Rody Pérez.

## 2021-04-07 ENCOUNTER — PREP FOR SURGERY (OUTPATIENT)
Dept: OTHER | Facility: HOSPITAL | Age: 66
End: 2021-04-07

## 2021-04-07 DIAGNOSIS — R18.8 CIRRHOSIS OF LIVER WITH ASCITES, UNSPECIFIED HEPATIC CIRRHOSIS TYPE (HCC): Primary | ICD-10-CM

## 2021-04-07 DIAGNOSIS — K74.60 CIRRHOSIS OF LIVER WITH ASCITES, UNSPECIFIED HEPATIC CIRRHOSIS TYPE (HCC): Primary | ICD-10-CM

## 2021-04-07 RX ORDER — ALBUMIN (HUMAN) 12.5 G/50ML
25 SOLUTION INTRAVENOUS ONCE
Status: CANCELLED | OUTPATIENT
Start: 2021-04-12 | End: 2021-04-07

## 2021-04-07 NOTE — TELEPHONE ENCOUNTER
If he becomes uncomfortable, recommend repeating paracentesis - continue to watch weight and if symptoms continue will place referral for procedure

## 2021-04-07 NOTE — TELEPHONE ENCOUNTER
"Called pt and pt reports that he weighed again today and his weight increased by 1.5 lbs. Pt would like to get paracentesis ordered \"since it usually takes a few days to get in. \"  Advised will send message to Dr Mcgee.   "

## 2021-04-07 NOTE — TELEPHONE ENCOUNTER
That will not affect his scopes-   Lb       Called pt and on vm advised of the above. Advised to call with questions.

## 2021-04-07 NOTE — TELEPHONE ENCOUNTER
Called pt and advised of Dr Mcgee's note. Verb understanding and pt states he currently feels fine.  Pt to call us if he gains more weight.      Pt states he is having egd and c/s at  on 04/26.  Pt is asking he takes his first covid vaccine on 04/15 would that effect his endoscopies.   Advised would send message to Dr Mcgee.

## 2021-04-12 ENCOUNTER — HOSPITAL ENCOUNTER (OUTPATIENT)
Dept: ULTRASOUND IMAGING | Facility: HOSPITAL | Age: 66
Discharge: HOME | End: 2021-04-12
Admitting: RADIOLOGY

## 2021-04-12 VITALS
BODY MASS INDEX: 22.4 KG/M2 | OXYGEN SATURATION: 100 % | RESPIRATION RATE: 16 BRPM | HEIGHT: 71 IN | TEMPERATURE: 97.8 F | HEART RATE: 70 BPM | DIASTOLIC BLOOD PRESSURE: 62 MMHG | SYSTOLIC BLOOD PRESSURE: 129 MMHG | WEIGHT: 160 LBS

## 2021-04-12 DIAGNOSIS — R18.8 CIRRHOSIS OF LIVER WITH ASCITES, UNSPECIFIED HEPATIC CIRRHOSIS TYPE (HCC): ICD-10-CM

## 2021-04-12 DIAGNOSIS — K74.60 CIRRHOSIS OF LIVER WITH ASCITES, UNSPECIFIED HEPATIC CIRRHOSIS TYPE (HCC): ICD-10-CM

## 2021-04-12 LAB
APPEARANCE FLD: ABNORMAL
COLOR FLD: YELLOW
INR PPP: 1.4 (ref 0.8–1.2)
LYMPHOCYTES NFR FLD MANUAL: 33 %
METHOD: ABNORMAL
MONOS+MACROS NFR FLD: 62 %
NEUTROPHILS NFR FLD MANUAL: 5 %
NUC CELL # FLD: 158 /MM3
PROT FLD-MCNC: <1 G/DL
PROTHROMBIN TIME: 17 SECONDS (ref 12.8–15.2)
RBC # FLD AUTO: 224 /MM3

## 2021-04-12 PROCEDURE — 87205 SMEAR GRAM STAIN: CPT | Performed by: INTERNAL MEDICINE

## 2021-04-12 PROCEDURE — 96365 THER/PROPH/DIAG IV INF INIT: CPT

## 2021-04-12 PROCEDURE — 89051 BODY FLUID CELL COUNT: CPT | Performed by: INTERNAL MEDICINE

## 2021-04-12 PROCEDURE — 76942 ECHO GUIDE FOR BIOPSY: CPT

## 2021-04-12 PROCEDURE — 87070 CULTURE OTHR SPECIMN AEROBIC: CPT | Performed by: INTERNAL MEDICINE

## 2021-04-12 PROCEDURE — 25010000003 LIDOCAINE 1 % SOLUTION: Performed by: RADIOLOGY

## 2021-04-12 PROCEDURE — 85610 PROTHROMBIN TIME: CPT

## 2021-04-12 PROCEDURE — 25010000002 ALBUMIN HUMAN 25% PER 50 ML: Performed by: INTERNAL MEDICINE

## 2021-04-12 PROCEDURE — 87015 SPECIMEN INFECT AGNT CONCNTJ: CPT | Performed by: INTERNAL MEDICINE

## 2021-04-12 PROCEDURE — 84157 ASSAY OF PROTEIN OTHER: CPT | Performed by: INTERNAL MEDICINE

## 2021-04-12 PROCEDURE — P9047 ALBUMIN (HUMAN), 25%, 50ML: HCPCS | Performed by: INTERNAL MEDICINE

## 2021-04-12 RX ORDER — SODIUM CHLORIDE 0.9 % (FLUSH) 0.9 %
3 SYRINGE (ML) INJECTION EVERY 12 HOURS SCHEDULED
Status: DISCONTINUED | OUTPATIENT
Start: 2021-04-12 | End: 2021-04-13 | Stop reason: HOSPADM

## 2021-04-12 RX ORDER — LIDOCAINE HYDROCHLORIDE 10 MG/ML
10 INJECTION, SOLUTION INFILTRATION; PERINEURAL ONCE
Status: COMPLETED | OUTPATIENT
Start: 2021-04-12 | End: 2021-04-12

## 2021-04-12 RX ORDER — ALBUMIN (HUMAN) 12.5 G/50ML
25 SOLUTION INTRAVENOUS ONCE
Status: COMPLETED | OUTPATIENT
Start: 2021-04-12 | End: 2021-04-12

## 2021-04-12 RX ORDER — SODIUM CHLORIDE 0.9 % (FLUSH) 0.9 %
10 SYRINGE (ML) INJECTION AS NEEDED
Status: DISCONTINUED | OUTPATIENT
Start: 2021-04-12 | End: 2021-04-13 | Stop reason: HOSPADM

## 2021-04-12 RX ADMIN — ALBUMIN HUMAN 25 G: 0.25 SOLUTION INTRAVENOUS at 13:50

## 2021-04-12 RX ADMIN — LIDOCAINE HYDROCHLORIDE 6 ML: 10 INJECTION, SOLUTION INFILTRATION; PERINEURAL at 12:49

## 2021-04-12 NOTE — NURSING NOTE
Pt albumin administered; IV removed; pt directed to the main lobby for exit under his own power; no s/s of distress noted

## 2021-04-12 NOTE — H&P
Name: Wei Potts ADMIT: 2021   : 1955  PCP: Bella Villalta MD    MRN: [id number] LOS: 0 days   AGE/SEX: 66 y.o. male  ROOM: Room/bed info not found       Chief complaint   Patient is a 66 y.o. male presents with ascites.     Past Surgical History:  History reviewed. No pertinent surgical history.    Past Medical History:  Past Medical History:   Diagnosis Date   • Alcoholism (CMS/HCC)    • Anemia    • Cirrhosis (CMS/HCC)    • Encephalopathy    • Hypertension    • Liver disease        Home Medications:  (Not in a hospital admission)      Allergies:  Iron    Family History:  Family History   Problem Relation Age of Onset   • Diabetes Mother    • Hypertension Father        Social History:  Social History     Tobacco Use   • Smoking status: Never Smoker   • Smokeless tobacco: Never Used   Substance Use Topics   • Alcohol use: Not Currently     Comment: 1 bottle of wine per day, stopped drinking ETOH 2 weeks ago   • Drug use: No        Objective     Physical Exam:   Unremarkable for intended procedure    Vital Signs  Temp:  [97.8 °F (36.6 °C)] 97.8 °F (36.6 °C)  Heart Rate:  [69] 69  Resp:  [16] 16  BP: (119)/(65) 119/65    Anticipated Surgical Procedure:  paracentesis    The risks, benefits and alternatives of this procedure have been discussed with the patient or responsible party: Yes        Shivam Juarez MD  21  11:41 EDT

## 2021-04-12 NOTE — NURSING NOTE
Pt in xray triage for paracentesis  Pt wearing mask; RN wearing mask and eye protection for all pt interactions

## 2021-04-12 NOTE — DISCHARGE INSTRUCTIONS
EDUCATION /DISCHARGE INSTRUCTIONS  Paracentesis:  A needle is inserted into the space between your abdominal organs and the membrane that surrounds them (peritoneal space).  It is done for the diagnosis and treatment of fluid that is resistant to other therapies.  It helps determine the cause of the fluid and at the relieves pressure created by the fluid.  A sample is obtained and sent to the laboratory for study.  During the procedure:  You will lie on a bed on your back with your legs drawn up.  Your abdomen will be exposed from the chest to the pelvis.  You will otherwise be covered to maintain comfort.  A physician will clean your abdomen with antiseptic soap, place a sterile towel around the site and administer a local anesthetic to numb the area.  The physician will insert a needle into your abdominal wall.  There may be a popping sound which signifies the needle has pierced the abdominal wall. Next, the physician will attach tubing to transfer a sample into a collection bottle.  After the fluid is obtained the needle will be removed.  A pressure dressing is applied to the site.  Risks of the procedure include but are not limited to:   *  Bleeding    *  Wound infection   *  Low blood pressure   *  Decreased urination   *  Low sodium if a large amount of fluid is removed   *  Puncture of abdominal organs by the needle    Benefits of the procedure:  Benefits include the removal of fluid from the abdomen, relief of abdominal pressure and facilitation of a diagnosis.  Alternatives to the procedure:  Possible alternatives are diuretic drug therapy or surgery to place a shunt to drain fluid.  Risks of diuretic drug therapy include possible dehydration and renal failure.  The benefit of drug therapy is that it can be done at home under physician supervision.  Risks of shunt placement include exposure to anesthesia, infection, excessive bleeding and injury to abdominal organs.  The benefit of a shunt is that it can be  used to drain fluid over a longer period of time.  THIS EDUCATION INFORMATION WAS REVIEWED PRIOR TO THE PROCEDURE AND CONSENT. Patient initials__________________Time___1130______________    Post procedure: (Follow all the instructions below carefully)   *  Weigh yourself daily.   *  Follow your doctors dietary instructions.   *  Rest today (no pushing pulling, straining or heavy lifting).   *  Slowly increase activity tomorow.   *  If you received sedation do not drive for 24 hours.              * Skin affix  applied to puncture site. Do not try to remove, scratch or apply lotion   * Skin affix will fall off on it's own   *  You may shower tomorrow  Call your doctor if experiencing:   *  Signs of infection such as redness, swelling, excessive pain and / or foul       smelling drainage from the puncture site.   *  Chills or fever over 101 degrees (by mouth).   *  Fainting or any noted Rapid weight gain/loss   *  Unrelieved pain or any new or severe symptoms  Following the procedure:      Follow-up with the ordering physician as directed.   Continue to take other medications as directed by your physician unless    otherwise instructed.   If applicable, resume taking your blood thinners or Aspirin in 24 hours.              If you have any concerns please call the Radiology Nurses Desk at [phone]

## 2021-04-14 ENCOUNTER — TELEPHONE (OUTPATIENT)
Dept: GASTROENTEROLOGY | Facility: CLINIC | Age: 66
End: 2021-04-14

## 2021-04-14 NOTE — TELEPHONE ENCOUNTER
Recent paracentesis fluid showed no evidence of infection.  Continue daily Bactrim to prevent recurrent infection of the fluid.  Follow-up as scheduled

## 2021-04-17 LAB
BACTERIA FLD CULT: NORMAL
GRAM STN SPEC: NORMAL
GRAM STN SPEC: NORMAL

## 2021-04-29 NOTE — TELEPHONE ENCOUNTER
"Called pt and advised of Dr Mcgee's note. Verb understanding. Pt reports that he had egd and c/s on Monday 04/26.  He states that they put \"bands in his throat\" and now he is having a hard time swallowing.  ADvised pt will update Dr Mcgee and in the meantime advised pt to call the MD that did his procedure to let them know what he has going on.  Pt verb understanding and states he has called them and is waiting to hear back from them.   "

## 2021-05-04 ENCOUNTER — TELEPHONE (OUTPATIENT)
Dept: GASTROENTEROLOGY | Facility: CLINIC | Age: 66
End: 2021-05-04

## 2021-05-04 ENCOUNTER — PREP FOR SURGERY (OUTPATIENT)
Dept: OTHER | Facility: HOSPITAL | Age: 66
End: 2021-05-04

## 2021-05-04 DIAGNOSIS — R18.8 OTHER ASCITES: Primary | ICD-10-CM

## 2021-05-04 RX ORDER — ALBUMIN (HUMAN) 12.5 G/50ML
25 SOLUTION INTRAVENOUS ONCE
Status: CANCELLED | OUTPATIENT
Start: 2021-05-04 | End: 2021-05-04

## 2021-05-04 NOTE — TELEPHONE ENCOUNTER
Call to pt.  States after large volume prep for egd/c/s 4/26, noted belly swelling.  Has not dissipated.  Now feeling pressure from this and hard to breath.  Wt 162 lbs.     Requesting para -if possible with Dr Shivam Bucio.     Message to DR Mcgee.

## 2021-05-04 NOTE — TELEPHONE ENCOUNTER
----- Message from Milind Mooney sent at 5/4/2021  3:23 PM EDT -----  Regarding: paracentesis  Contact: [phone]  please call pt regarding questions about his paracentesis.

## 2021-05-04 NOTE — TELEPHONE ENCOUNTER
Paracentesis ordered - I do not know if possible to request radiologist - he will have to discuss with hospital scheduling when they contact him

## 2021-05-05 NOTE — TELEPHONE ENCOUNTER
VM to pt #'s - VM left advising per DR Mcgee note.  Advise contact [facility] to arrange and to notify this office that has received message.

## 2021-05-07 ENCOUNTER — HOSPITAL ENCOUNTER (EMERGENCY)
Facility: HOSPITAL | Age: 66
Discharge: HOME | End: 2021-05-07
Attending: EMERGENCY MEDICINE | Admitting: EMERGENCY MEDICINE

## 2021-05-07 ENCOUNTER — APPOINTMENT (OUTPATIENT)
Dept: GENERAL RADIOLOGY | Facility: HOSPITAL | Age: 66
End: 2021-05-07

## 2021-05-07 ENCOUNTER — APPOINTMENT (OUTPATIENT)
Dept: ULTRASOUND IMAGING | Facility: HOSPITAL | Age: 66
End: 2021-05-07

## 2021-05-07 VITALS
HEART RATE: 83 BPM | SYSTOLIC BLOOD PRESSURE: 134 MMHG | TEMPERATURE: 97.4 F | OXYGEN SATURATION: 99 % | DIASTOLIC BLOOD PRESSURE: 77 MMHG | RESPIRATION RATE: 18 BRPM | BODY MASS INDEX: 24.27 KG/M2 | WEIGHT: 174 LBS

## 2021-05-07 DIAGNOSIS — R18.8 OTHER ASCITES: Primary | ICD-10-CM

## 2021-05-07 LAB
ALBUMIN SERPL-MCNC: 2.8 G/DL (ref 3.5–5.2)
ALBUMIN/GLOB SERPL: 0.6 G/DL
ALP SERPL-CCNC: 173 U/L (ref 39–117)
ALT SERPL W P-5'-P-CCNC: 23 U/L (ref 1–41)
ANION GAP SERPL CALCULATED.3IONS-SCNC: 11.7 MMOL/L (ref 5–15)
APPEARANCE FLD: CLEAR
AST SERPL-CCNC: 58 U/L (ref 1–40)
BASOPHILS # BLD AUTO: 0.03 10*3/MM3 (ref 0–0.2)
BASOPHILS NFR BLD AUTO: 0.7 % (ref 0–1.5)
BILIRUB SERPL-MCNC: 4.3 MG/DL (ref 0–1.2)
BUN SERPL-MCNC: 6 MG/DL (ref 8–23)
BUN/CREAT SERPL: 7 (ref 7–25)
CALCIUM SPEC-SCNC: 8.3 MG/DL (ref 8.6–10.5)
CHLORIDE SERPL-SCNC: 104 MMOL/L (ref 98–107)
CO2 SERPL-SCNC: 18.3 MMOL/L (ref 22–29)
COLOR FLD: YELLOW
CREAT SERPL-MCNC: 0.86 MG/DL (ref 0.76–1.27)
DEPRECATED RDW RBC AUTO: 58.5 FL (ref 37–54)
EOSINOPHIL # BLD AUTO: 0.16 10*3/MM3 (ref 0–0.4)
EOSINOPHIL NFR BLD AUTO: 3.5 % (ref 0.3–6.2)
ERYTHROCYTE [DISTWIDTH] IN BLOOD BY AUTOMATED COUNT: 18.4 % (ref 12.3–15.4)
GFR SERPL CREATININE-BSD FRML MDRD: 90 ML/MIN/1.73
GLOBULIN UR ELPH-MCNC: 4.4 GM/DL
GLUCOSE SERPL-MCNC: 84 MG/DL (ref 65–99)
HCT VFR BLD AUTO: 29.9 % (ref 37.5–51)
HGB BLD-MCNC: 9.8 G/DL (ref 13–17.7)
HOLD SPECIMEN: NORMAL
IMM GRANULOCYTES # BLD AUTO: 0.01 10*3/MM3 (ref 0–0.05)
IMM GRANULOCYTES NFR BLD AUTO: 0.2 % (ref 0–0.5)
INR PPP: 2.3 (ref 0.9–1.1)
LIPASE SERPL-CCNC: 36 U/L (ref 13–60)
LYMPHOCYTES # BLD AUTO: 0.63 10*3/MM3 (ref 0.7–3.1)
LYMPHOCYTES NFR BLD AUTO: 13.9 % (ref 19.6–45.3)
LYMPHOCYTES NFR FLD MANUAL: 35 %
MCH RBC QN AUTO: 28.4 PG (ref 26.6–33)
MCHC RBC AUTO-ENTMCNC: 32.8 G/DL (ref 31.5–35.7)
MCV RBC AUTO: 86.7 FL (ref 79–97)
METHOD: NORMAL
MONOCYTES # BLD AUTO: 0.69 10*3/MM3 (ref 0.1–0.9)
MONOCYTES NFR BLD AUTO: 15.3 % (ref 5–12)
MONOS+MACROS NFR FLD: 60 %
NEUTROPHILS NFR BLD AUTO: 3 10*3/MM3 (ref 1.7–7)
NEUTROPHILS NFR BLD AUTO: 66.4 % (ref 42.7–76)
NEUTROPHILS NFR FLD MANUAL: 5 %
NRBC BLD AUTO-RTO: 0 /100 WBC (ref 0–0.2)
NT-PROBNP SERPL-MCNC: 191.9 PG/ML (ref 0–900)
NUC CELL # FLD: 122 /MM3
PLATELET # BLD AUTO: 69 10*3/MM3 (ref 140–450)
PMV BLD AUTO: 9.7 FL (ref 6–12)
POTASSIUM SERPL-SCNC: 4 MMOL/L (ref 3.5–5.2)
PROT SERPL-MCNC: 7.2 G/DL (ref 6–8.5)
PROTHROMBIN TIME: 25 SECONDS (ref 11.7–14.2)
RBC # BLD AUTO: 3.45 10*6/MM3 (ref 4.14–5.8)
RBC # FLD AUTO: 203 /MM3
SODIUM SERPL-SCNC: 134 MMOL/L (ref 136–145)
TROPONIN T SERPL-MCNC: <0.01 NG/ML (ref 0–0.03)
WBC # BLD AUTO: 4.52 10*3/MM3 (ref 3.4–10.8)
WHOLE BLOOD HOLD SPECIMEN: NORMAL
WHOLE BLOOD HOLD SPECIMEN: NORMAL

## 2021-05-07 PROCEDURE — 89051 BODY FLUID CELL COUNT: CPT | Performed by: EMERGENCY MEDICINE

## 2021-05-07 PROCEDURE — 85610 PROTHROMBIN TIME: CPT | Performed by: EMERGENCY MEDICINE

## 2021-05-07 PROCEDURE — 76942 ECHO GUIDE FOR BIOPSY: CPT

## 2021-05-07 PROCEDURE — 87070 CULTURE OTHR SPECIMN AEROBIC: CPT | Performed by: EMERGENCY MEDICINE

## 2021-05-07 PROCEDURE — 84484 ASSAY OF TROPONIN QUANT: CPT | Performed by: EMERGENCY MEDICINE

## 2021-05-07 PROCEDURE — 93010 ELECTROCARDIOGRAM REPORT: CPT | Performed by: INTERNAL MEDICINE

## 2021-05-07 PROCEDURE — 93005 ELECTROCARDIOGRAM TRACING: CPT

## 2021-05-07 PROCEDURE — 83880 ASSAY OF NATRIURETIC PEPTIDE: CPT | Performed by: EMERGENCY MEDICINE

## 2021-05-07 PROCEDURE — 99284 EMERGENCY DEPT VISIT MOD MDM: CPT

## 2021-05-07 PROCEDURE — 87205 SMEAR GRAM STAIN: CPT | Performed by: EMERGENCY MEDICINE

## 2021-05-07 PROCEDURE — 71046 X-RAY EXAM CHEST 2 VIEWS: CPT

## 2021-05-07 PROCEDURE — 85025 COMPLETE CBC W/AUTO DIFF WBC: CPT | Performed by: EMERGENCY MEDICINE

## 2021-05-07 PROCEDURE — 96372 THER/PROPH/DIAG INJ SC/IM: CPT

## 2021-05-07 PROCEDURE — 83690 ASSAY OF LIPASE: CPT | Performed by: EMERGENCY MEDICINE

## 2021-05-07 PROCEDURE — 25010000003 LIDOCAINE 1 % SOLUTION: Performed by: RADIOLOGY

## 2021-05-07 PROCEDURE — 80053 COMPREHEN METABOLIC PANEL: CPT | Performed by: EMERGENCY MEDICINE

## 2021-05-07 PROCEDURE — 87015 SPECIMEN INFECT AGNT CONCNTJ: CPT | Performed by: EMERGENCY MEDICINE

## 2021-05-07 RX ORDER — LIDOCAINE HYDROCHLORIDE 10 MG/ML
10 INJECTION, SOLUTION INFILTRATION; PERINEURAL ONCE
Status: COMPLETED | OUTPATIENT
Start: 2021-05-07 | End: 2021-05-07

## 2021-05-07 RX ORDER — SODIUM CHLORIDE 0.9 % (FLUSH) 0.9 %
10 SYRINGE (ML) INJECTION AS NEEDED
Status: DISCONTINUED | OUTPATIENT
Start: 2021-05-07 | End: 2021-05-07 | Stop reason: HOSPADM

## 2021-05-07 RX ADMIN — LIDOCAINE HYDROCHLORIDE 10 ML: 10 INJECTION, SOLUTION INFILTRATION; PERINEURAL at 15:03

## 2021-05-09 LAB — QT INTERVAL: 423 MS

## 2021-05-10 ENCOUNTER — APPOINTMENT (OUTPATIENT)
Dept: ULTRASOUND IMAGING | Facility: HOSPITAL | Age: 66
End: 2021-05-10

## 2021-05-10 LAB
BACTERIA FLD CULT: NORMAL
GRAM STN SPEC: NORMAL
GRAM STN SPEC: NORMAL

## 2021-05-11 ENCOUNTER — HOSPITAL ENCOUNTER (OUTPATIENT)
Dept: ULTRASOUND IMAGING | Facility: HOSPITAL | Age: 66
End: 2021-05-11

## 2021-05-11 ENCOUNTER — OFFICE VISIT (OUTPATIENT)
Dept: GASTROENTEROLOGY | Facility: CLINIC | Age: 66
End: 2021-05-11

## 2021-05-11 VITALS — BODY MASS INDEX: 22.93 KG/M2 | TEMPERATURE: 98.4 F | WEIGHT: 163.8 LBS | HEIGHT: 71 IN

## 2021-05-11 DIAGNOSIS — Z86.19 HISTORY OF SPONTANEOUS BACTERIAL PERITONITIS: ICD-10-CM

## 2021-05-11 DIAGNOSIS — D50.0 IRON DEFICIENCY ANEMIA DUE TO CHRONIC BLOOD LOSS: ICD-10-CM

## 2021-05-11 DIAGNOSIS — R18.8 OTHER ASCITES: ICD-10-CM

## 2021-05-11 DIAGNOSIS — I85.10 SECONDARY ESOPHAGEAL VARICES WITHOUT BLEEDING (HCC): ICD-10-CM

## 2021-05-11 DIAGNOSIS — K74.60 CIRRHOSIS OF LIVER WITH ASCITES, UNSPECIFIED HEPATIC CIRRHOSIS TYPE (HCC): Primary | ICD-10-CM

## 2021-05-11 DIAGNOSIS — R18.8 CIRRHOSIS OF LIVER WITH ASCITES, UNSPECIFIED HEPATIC CIRRHOSIS TYPE (HCC): Primary | ICD-10-CM

## 2021-05-11 PROCEDURE — 99214 OFFICE O/P EST MOD 30 MIN: CPT | Performed by: INTERNAL MEDICINE

## 2021-05-11 RX ORDER — OMEPRAZOLE 40 MG/1
40 CAPSULE, DELAYED RELEASE ORAL AS NEEDED
COMMUNITY
End: 2022-02-04

## 2021-05-11 RX ORDER — FUROSEMIDE 40 MG/1
40 TABLET ORAL DAILY
Qty: 30 TABLET | Refills: 5 | Status: SHIPPED | OUTPATIENT
Start: 2021-05-11 | End: 2021-11-01

## 2021-05-11 NOTE — PROGRESS NOTES
Subjective   Chief Complaint   Patient presents with   • Hepatic Disease     cirrhosis   • Difficulty Swallowing   • Heartburn       Wei Potts is a  66 y.o. male here for a follow up visit for cirrhosis, heartburn, recent esophageal banding.     Since his last visit with me he has been to see the [facility].  He had an(April 2021) EGD with banding of esophageal varices x 5, colonoscopy with suboptimal prep-1 polyp removed.  Records reviewed.    He reports that he has been more distended since the colonoscopy - he used the golytely prep and he hs been more distended since.  He had significant chest pain and odynophagia after the egd.  He has to eat very small portions.  He has been on omeprazole.  This is improving.    Had paracentesis 5/6 with removal of 3.6L.  He has only been taking spironolactone-he is taking one half of the pill daily because he had started to feel of breast tissue recently and we have cut back the dose with improvement in the symptom.  He is not taking the furosemide-it appears he had some confusion regarding his medicines.  HPI  Past Medical History:   Diagnosis Date   • Alcoholism (CMS/HCC)    • Anemia    • Cirrhosis (CMS/HCC)    • Encephalopathy    • Hypertension    • Liver disease      History reviewed. No pertinent surgical history.    Current Outpatient Medications:   •  Cholecalciferol (VITAMIN D3 PO), Take 25 mg by mouth Daily., Disp: , Rfl:   •  lactulose (CHRONULAC) 10 GM/15ML solution, Take 15 mL by mouth 2 (Two) Times a Day. Adjust dose until you have 3-4 bowel movements a day., Disp: 946 mL, Rfl: 5  •  metoprolol tartrate (LOPRESSOR) 25 MG tablet, Take 0.5 tablets by mouth Daily., Disp: 15 tablet, Rfl: 5  •  multivitamin with minerals tablet tablet, Take 1 tablet by mouth Daily., Disp: , Rfl:   •  omeprazole (priLOSEC) 40 MG capsule, Take 40 mg by mouth Daily., Disp: , Rfl:   •  spironolactone (ALDACTONE) 100 MG tablet, Take 1 tablet by mouth Daily., Disp: 30  tablet, Rfl: 5  •  VITAMIN E PO, Take  by mouth., Disp: , Rfl:   •  furosemide (LASIX) 40 MG tablet, Take 1 tablet by mouth Daily., Disp: 30 tablet, Rfl: 5  •  sulfamethoxazole-trimethoprim (Bactrim DS) 800-160 MG per tablet, Take 1 tablet by mouth Daily., Disp: 30 tablet, Rfl: 5  PRN Meds:.  Allergies   Allergen Reactions   • Iron GI Intolerance     Social History     Socioeconomic History   • Marital status:      Spouse name: Not on file   • Number of children: Not on file   • Years of education: Not on file   • Highest education level: Not on file   Tobacco Use   • Smoking status: Never Smoker   • Smokeless tobacco: Never Used   Vaping Use   • Vaping Use: Never used   Substance and Sexual Activity   • Alcohol use: Not Currently     Comment: 1 bottle of wine per day, stopped drinking ETOH 2 weeks ago   • Drug use: No   • Sexual activity: Defer     Family History   Problem Relation Age of Onset   • Diabetes Mother    • Hypertension Father      Review of Systems   Constitutional: Positive for unexpected weight change. Negative for appetite change.   HENT: Positive for trouble swallowing.    Gastrointestinal: Positive for abdominal distention. Negative for blood in stool, constipation and diarrhea.     Vitals:    05/11/21 1505   Temp: 98.4 °F (36.9 °C)         05/11/21  1505   Weight: 74.3 kg (163 lb 12.8 oz)       Objective   Physical Exam  Constitutional:       Appearance: Normal appearance.   Abdominal:      General: There is distension.      Tenderness: There is no abdominal tenderness.   Neurological:      Mental Status: He is alert.       XR Chest 2 View    Result Date: 5/7/2021  No evidence for acute pulmonary process. Follow-up as clinical indications persist.  This report was finalized on 5/7/2021 1:08 PM by Dr. Heber Eagle M.D.      US Paracentesis    Result Date: 5/7/2021  Successful ultrasound-guided paracentesis.  This report was finalized on 5/7/2021 4:41 PM by Dr. Shivam Juarez M.D.         Assessment/Plan   Diagnoses and all orders for this visit:    Cirrhosis of liver with ascites, unspecified hepatic cirrhosis type (CMS/HCC)    Iron deficiency anemia due to chronic blood loss    History of spontaneous bacterial peritonitis  Comments:  On chronic suppressive antibiotics    Secondary esophageal varices without bleeding (CMS/HCC)    Other ascites    Other orders  -     omeprazole (priLOSEC) 40 MG capsule; Take 40 mg by mouth Daily.  -     furosemide (LASIX) 40 MG tablet; Take 1 tablet by mouth Daily.  -     Obtain Informed Consent; Standing  -     Body Fluid Cell Count With Differential - Body Fluid, Peritoneum; Standing  -     Body Fluid Culture - Body Fluid, Peritoneum; Standing      Plan:  · Continue omeprazole daily  · Continue 50 mg spironolactone daily-resume Lasix-we reviewed his medication list together.  He has not been taking his Lasix and this may have contributed to the recurrent accumulation of fluid.  He continues to follow 2 g sodium diet.  · Will schedule paracentesis for relief of his symptoms  · We will follow-up on symptoms after paracentesis and resumption of Lasix-May need further titration of his Lasix if symptoms persist  · He is scheduled for follow-up EGD at [facility] for repeat banding if indicated  · Recent labs show increasing total bilirubin-we will need to keep a close eye on this.  We will repeat labs at his follow-up.

## 2021-05-12 ENCOUNTER — TELEPHONE (OUTPATIENT)
Dept: GASTROENTEROLOGY | Facility: CLINIC | Age: 66
End: 2021-05-12

## 2021-05-12 NOTE — TELEPHONE ENCOUNTER
----- Message from Milind Alatorre Rep sent at 5/11/2021  4:14 PM EDT -----  Regarding: Covid Vaccine  Contact: [phone]  PT would like to discuss if he should have the COVID vaccine, please call

## 2021-05-19 ENCOUNTER — PREP FOR SURGERY (OUTPATIENT)
Dept: OTHER | Facility: HOSPITAL | Age: 66
End: 2021-05-19

## 2021-05-19 ENCOUNTER — TELEPHONE (OUTPATIENT)
Dept: GASTROENTEROLOGY | Facility: CLINIC | Age: 66
End: 2021-05-19

## 2021-05-19 DIAGNOSIS — K74.60 CIRRHOSIS OF LIVER WITH ASCITES, UNSPECIFIED HEPATIC CIRRHOSIS TYPE (HCC): Primary | ICD-10-CM

## 2021-05-19 DIAGNOSIS — R18.8 CIRRHOSIS OF LIVER WITH ASCITES, UNSPECIFIED HEPATIC CIRRHOSIS TYPE (HCC): Primary | ICD-10-CM

## 2021-05-19 RX ORDER — ALBUMIN (HUMAN) 12.5 G/50ML
50 SOLUTION INTRAVENOUS ONCE
Status: CANCELLED | OUTPATIENT
Start: 2021-05-20 | End: 2021-05-19

## 2021-05-19 NOTE — TELEPHONE ENCOUNTER
Call returned to Rosibel in [facility].  States sees one time order for tomorrow, but no standing orders.     Message to Dr Mcgee.

## 2021-05-19 NOTE — TELEPHONE ENCOUNTER
Call to [facility] and spoke with Rosibel.   Pt scheduled for para tomorrow, but no active order.  Asking, if possible, for standing order.     Request to DR Mcgee.

## 2021-05-19 NOTE — TELEPHONE ENCOUNTER
----- Message from Milind Alatorre sent at 5/18/2021  4:11 PM EDT -----  Regarding: Para Order  [facility] is calling to advise they need another order submitted for paracentesis, they are unable to open order. Please submit

## 2021-05-19 NOTE — TELEPHONE ENCOUNTER
----- Message from Milind Alatorre Rep sent at 5/19/2021  2:06 PM EDT -----  Regarding: Paracentesis questions  Contact: [phone]  [facility] Charge Nurse Rosibel is calling regarding Pts Paracentesis order, please call

## 2021-05-20 ENCOUNTER — PREP FOR SURGERY (OUTPATIENT)
Dept: OTHER | Facility: HOSPITAL | Age: 66
End: 2021-05-20

## 2021-05-20 ENCOUNTER — HOSPITAL ENCOUNTER (OUTPATIENT)
Dept: ULTRASOUND IMAGING | Facility: HOSPITAL | Age: 66
Discharge: HOME | End: 2021-05-20

## 2021-05-20 ENCOUNTER — IMMUNIZATION (OUTPATIENT)
Dept: VACCINE CLINIC | Facility: HOSPITAL | Age: 66
End: 2021-05-20

## 2021-05-20 VITALS
HEART RATE: 90 BPM | SYSTOLIC BLOOD PRESSURE: 132 MMHG | DIASTOLIC BLOOD PRESSURE: 73 MMHG | OXYGEN SATURATION: 99 % | TEMPERATURE: 97.2 F | RESPIRATION RATE: 18 BRPM

## 2021-05-20 DIAGNOSIS — R18.8 CIRRHOSIS OF LIVER WITH ASCITES, UNSPECIFIED HEPATIC CIRRHOSIS TYPE (HCC): Primary | ICD-10-CM

## 2021-05-20 DIAGNOSIS — R18.8 OTHER ASCITES: ICD-10-CM

## 2021-05-20 DIAGNOSIS — R18.8 CIRRHOSIS OF LIVER WITH ASCITES, UNSPECIFIED HEPATIC CIRRHOSIS TYPE (HCC): ICD-10-CM

## 2021-05-20 DIAGNOSIS — K74.60 CIRRHOSIS OF LIVER WITH ASCITES, UNSPECIFIED HEPATIC CIRRHOSIS TYPE (HCC): ICD-10-CM

## 2021-05-20 DIAGNOSIS — K74.60 CIRRHOSIS OF LIVER WITH ASCITES, UNSPECIFIED HEPATIC CIRRHOSIS TYPE (HCC): Primary | ICD-10-CM

## 2021-05-20 LAB
APPEARANCE FLD: CLEAR
COLOR FLD: YELLOW
INR PPP: 1.3 (ref 0.8–1.2)
LYMPHOCYTES NFR FLD MANUAL: 41 %
METHOD: NORMAL
MONOCYTES NFR FLD: 14 %
MONOS+MACROS NFR FLD: 40 %
NEUTROPHILS NFR FLD MANUAL: 5 %
NUC CELL # FLD: 167 /MM3
PROTHROMBIN TIME: 15.2 SECONDS (ref 12.8–15.2)
RBC # FLD AUTO: 464 /MM3

## 2021-05-20 PROCEDURE — 89051 BODY FLUID CELL COUNT: CPT | Performed by: INTERNAL MEDICINE

## 2021-05-20 PROCEDURE — P9047 ALBUMIN (HUMAN), 25%, 50ML: HCPCS | Performed by: INTERNAL MEDICINE

## 2021-05-20 PROCEDURE — 25010000002 ALBUMIN HUMAN 25% PER 50 ML: Performed by: INTERNAL MEDICINE

## 2021-05-20 PROCEDURE — 25010000003 LIDOCAINE 1 % SOLUTION: Performed by: RADIOLOGY

## 2021-05-20 PROCEDURE — 87205 SMEAR GRAM STAIN: CPT | Performed by: INTERNAL MEDICINE

## 2021-05-20 PROCEDURE — 0001A: CPT | Performed by: INTERNAL MEDICINE

## 2021-05-20 PROCEDURE — 96365 THER/PROPH/DIAG IV INF INIT: CPT

## 2021-05-20 PROCEDURE — 85610 PROTHROMBIN TIME: CPT

## 2021-05-20 PROCEDURE — 91300 HC SARSCOV02 VAC 30MCG/0.3ML IM: CPT | Performed by: INTERNAL MEDICINE

## 2021-05-20 PROCEDURE — 76942 ECHO GUIDE FOR BIOPSY: CPT

## 2021-05-20 PROCEDURE — 87070 CULTURE OTHR SPECIMN AEROBIC: CPT | Performed by: INTERNAL MEDICINE

## 2021-05-20 PROCEDURE — 96366 THER/PROPH/DIAG IV INF ADDON: CPT

## 2021-05-20 PROCEDURE — 87015 SPECIMEN INFECT AGNT CONCNTJ: CPT | Performed by: INTERNAL MEDICINE

## 2021-05-20 RX ORDER — LACTULOSE 10 G/15ML
SOLUTION ORAL; RECTAL
COMMUNITY
Start: 2021-05-08 | End: 2021-07-07

## 2021-05-20 RX ORDER — SODIUM CHLORIDE 0.9 % (FLUSH) 0.9 %
3 SYRINGE (ML) INJECTION EVERY 12 HOURS SCHEDULED
Status: CANCELLED | OUTPATIENT
Start: 2021-05-20

## 2021-05-20 RX ORDER — ALBUMIN (HUMAN) 12.5 G/50ML
50 SOLUTION INTRAVENOUS ONCE
Status: COMPLETED | OUTPATIENT
Start: 2021-05-20 | End: 2021-05-20

## 2021-05-20 RX ORDER — ALBUMIN (HUMAN) 12.5 G/50ML
50 SOLUTION INTRAVENOUS ONCE
Status: CANCELLED | OUTPATIENT
Start: 2021-05-20 | End: 2021-05-20

## 2021-05-20 RX ORDER — SODIUM CHLORIDE 0.9 % (FLUSH) 0.9 %
10 SYRINGE (ML) INJECTION AS NEEDED
Status: DISCONTINUED | OUTPATIENT
Start: 2021-05-20 | End: 2021-05-21 | Stop reason: HOSPADM

## 2021-05-20 RX ORDER — LIDOCAINE HYDROCHLORIDE 10 MG/ML
10 INJECTION, SOLUTION INFILTRATION; PERINEURAL ONCE
Status: COMPLETED | OUTPATIENT
Start: 2021-05-20 | End: 2021-05-20

## 2021-05-20 RX ADMIN — LIDOCAINE HYDROCHLORIDE 5 ML: 10 INJECTION, SOLUTION INFILTRATION; PERINEURAL at 11:44

## 2021-05-20 RX ADMIN — ALBUMIN HUMAN 50 G: 0.25 SOLUTION INTRAVENOUS at 12:42

## 2021-05-20 NOTE — TELEPHONE ENCOUNTER
Sorry-I do not know how to place a standing order in epic.  I would like cell count with differential as well as body fluid culture ordered for each specimen with 50 g of IV albumin to be administered thereafter

## 2021-05-21 ENCOUNTER — TELEPHONE (OUTPATIENT)
Dept: GASTROENTEROLOGY | Facility: CLINIC | Age: 66
End: 2021-05-21

## 2021-05-21 NOTE — TELEPHONE ENCOUNTER
----- Message from Ruby Mcgee MD sent at 5/20/2021  3:54 PM EDT -----  Fluid from paracentesis does not show signs of infection

## 2021-05-25 LAB
BACTERIA FLD CULT: NORMAL
GRAM STN SPEC: NORMAL

## 2021-06-10 ENCOUNTER — IMMUNIZATION (OUTPATIENT)
Dept: VACCINE CLINIC | Facility: HOSPITAL | Age: 66
End: 2021-06-10

## 2021-06-10 PROCEDURE — 0002A: CPT | Performed by: INTERNAL MEDICINE

## 2021-06-10 PROCEDURE — 91300 HC SARSCOV02 VAC 30MCG/0.3ML IM: CPT | Performed by: INTERNAL MEDICINE

## 2021-06-17 ENCOUNTER — OFFICE VISIT (OUTPATIENT)
Dept: GASTROENTEROLOGY | Facility: CLINIC | Age: 66
End: 2021-06-17

## 2021-06-17 VITALS — WEIGHT: 156 LBS | TEMPERATURE: 98.2 F | HEIGHT: 71 IN | BODY MASS INDEX: 21.84 KG/M2

## 2021-06-17 DIAGNOSIS — K65.2 SBP (SPONTANEOUS BACTERIAL PERITONITIS) (HCC): ICD-10-CM

## 2021-06-17 DIAGNOSIS — R18.8 CIRRHOSIS OF LIVER WITH ASCITES, UNSPECIFIED HEPATIC CIRRHOSIS TYPE (HCC): Primary | ICD-10-CM

## 2021-06-17 DIAGNOSIS — K74.60 CIRRHOSIS OF LIVER WITH ASCITES, UNSPECIFIED HEPATIC CIRRHOSIS TYPE (HCC): Primary | ICD-10-CM

## 2021-06-17 DIAGNOSIS — I85.10 SECONDARY ESOPHAGEAL VARICES WITHOUT BLEEDING (HCC): ICD-10-CM

## 2021-06-17 PROCEDURE — 99214 OFFICE O/P EST MOD 30 MIN: CPT | Performed by: INTERNAL MEDICINE

## 2021-06-17 RX ORDER — SULFAMETHOXAZOLE AND TRIMETHOPRIM 800; 160 MG/1; MG/1
1 TABLET ORAL DAILY
Qty: 30 TABLET | Refills: 4 | Status: SHIPPED | OUTPATIENT
Start: 2021-06-17 | End: 2021-08-18

## 2021-06-17 NOTE — PROGRESS NOTES
Subjective   Chief Complaint   Patient presents with   • Heartburn   • Cirrhosis   • Anemia       Wei Potts is a  66 y.o. male here for a follow up visit for cirrhosis, heartburn, anemia.  Patient is a history of cirrhosis complicated by esophageal varices which have never bled, ascites with a history of SBP.    He had an(April 2021) EGD with banding of esophageal varices x 5, colonoscopy with suboptimal prep-1 polyp removed. 6/3/21 with 4 additional bands placed.    He was last seen in the office on 5/11/2021.  He had a paracentesis thereafter with no evidence of SBP.    No confusion - taking lactulose bid.  No blood in stool.    Good diuresis with current dose lasix (40mg) and 50 mg aldactone - he does have some gynecomastia and minimal soreness.  HPI  Past Medical History:   Diagnosis Date   • Alcoholism (CMS/HCC)    • Anemia    • Cirrhosis (CMS/HCC)    • Encephalopathy    • Hypertension    • Liver disease      Past Surgical History:   Procedure Laterality Date   • COLONOSCOPY     • ENDOSCOPY         Current Outpatient Medications:   •  furosemide (LASIX) 40 MG tablet, Take 1 tablet by mouth Daily., Disp: 30 tablet, Rfl: 5  •  Generlac 10 GM/15ML solution solution (encephalopathy), , Disp: , Rfl:   •  lactulose (CHRONULAC) 10 GM/15ML solution, Take 15 mL by mouth 2 (Two) Times a Day. Adjust dose until you have 3-4 bowel movements a day., Disp: 946 mL, Rfl: 5  •  metoprolol tartrate (LOPRESSOR) 25 MG tablet, TAKE 1/2 TABLET BY MOUTH EVERY 12 HOURS, Disp: 90 tablet, Rfl: 1  •  multivitamin with minerals tablet tablet, Take 1 tablet by mouth Daily., Disp: , Rfl:   •  omeprazole (priLOSEC) 40 MG capsule, Take 40 mg by mouth Daily., Disp: , Rfl:   •  spironolactone (ALDACTONE) 100 MG tablet, Take 1 tablet by mouth Daily. (Patient taking differently: Take 100 mg by mouth Daily. Patient reports now taking one half tablet daily as of 5/20/2021), Disp: 30 tablet, Rfl: 5  •  Cholecalciferol (VITAMIN D3 PO), Take 25  mg by mouth Daily., Disp: , Rfl:   •  sulfamethoxazole-trimethoprim (Bactrim DS) 800-160 MG per tablet, Take 1 tablet by mouth Daily., Disp: 30 tablet, Rfl: 4  •  VITAMIN E PO, Take  by mouth., Disp: , Rfl:   PRN Meds:.  Allergies   Allergen Reactions   • Iron GI Intolerance     Social History     Socioeconomic History   • Marital status:      Spouse name: Not on file   • Number of children: Not on file   • Years of education: Not on file   • Highest education level: Not on file   Tobacco Use   • Smoking status: Never Smoker   • Smokeless tobacco: Never Used   Vaping Use   • Vaping Use: Never used   Substance and Sexual Activity   • Alcohol use: Not Currently     Comment: 1 bottle of wine per day, stopped drinking ETOH 2 weeks ago   • Drug use: No   • Sexual activity: Defer     Family History   Problem Relation Age of Onset   • Diabetes Mother    • Hypertension Father      Review of Systems   Constitutional: Positive for activity change. Negative for unexpected weight change.   Gastrointestinal: Negative for abdominal distention and abdominal pain.     Vitals:    06/17/21 1316   Temp: 98.2 °F (36.8 °C)         06/17/21  1316   Weight: 70.8 kg (156 lb)       Objective   Physical Exam  Constitutional:       Appearance: Normal appearance.   Abdominal:      General: There is no distension.      Palpations: Abdomen is soft.   Neurological:      Mental Status: He is alert.       No radiology results for the last 7 days    Assessment/Plan   Diagnoses and all orders for this visit:    Cirrhosis of liver with ascites, unspecified hepatic cirrhosis type (CMS/HCC)  -     US Liver; Future  -     CBC & Differential  -     Comprehensive Metabolic Panel  -     Protime-INR  -     AFP Tumor Marker    SBP (spontaneous bacterial peritonitis) (CMS/HCC)  Comments:  Previous SBP    Secondary esophageal varices without bleeding (CMS/HCC)    Other orders  -     sulfamethoxazole-trimethoprim (Bactrim DS) 800-160 MG per tablet; Take 1  tablet by mouth Daily.      Plan:  · He had stopped his previous antibiotics-explained the need for suppressive antibiotics prevent SBP-we will send daily Bactrim DS to pharmacy.  · Due for repeat AFP, routine labs to recheck renal function, anemia, mild  · Due for HCC surveillance ultrasound in July-we will order  · Has follow-up with Dr. Huizar later this month  · Continue lactulose to prevent hepatic encephalopathy  · 2 g sodium diet-appetite much improved

## 2021-06-18 LAB
AFP-TM SERPL-MCNC: 3.2 NG/ML (ref 0–8.3)
ALBUMIN SERPL-MCNC: 3.2 G/DL (ref 3.5–5.2)
ALBUMIN/GLOB SERPL: 0.8 G/DL
ALP SERPL-CCNC: 231 U/L (ref 39–117)
ALT SERPL-CCNC: 35 U/L (ref 1–41)
AST SERPL-CCNC: 72 U/L (ref 1–40)
BASOPHILS # BLD AUTO: 0.05 10*3/MM3 (ref 0–0.2)
BASOPHILS NFR BLD AUTO: 0.9 % (ref 0–1.5)
BILIRUB SERPL-MCNC: 2.6 MG/DL (ref 0–1.2)
BUN SERPL-MCNC: 11 MG/DL (ref 8–23)
BUN/CREAT SERPL: 11.5 (ref 7–25)
CALCIUM SERPL-MCNC: 9 MG/DL (ref 8.6–10.5)
CHLORIDE SERPL-SCNC: 105 MMOL/L (ref 98–107)
CO2 SERPL-SCNC: 19.6 MMOL/L (ref 22–29)
CREAT SERPL-MCNC: 0.96 MG/DL (ref 0.76–1.27)
EOSINOPHIL # BLD AUTO: 0.21 10*3/MM3 (ref 0–0.4)
EOSINOPHIL NFR BLD AUTO: 3.8 % (ref 0.3–6.2)
ERYTHROCYTE [DISTWIDTH] IN BLOOD BY AUTOMATED COUNT: 16.4 % (ref 12.3–15.4)
GLOBULIN SER CALC-MCNC: 4.2 GM/DL
GLUCOSE SERPL-MCNC: 99 MG/DL (ref 65–99)
HCT VFR BLD AUTO: 28.2 % (ref 37.5–51)
HGB BLD-MCNC: 9.8 G/DL (ref 13–17.7)
IMM GRANULOCYTES # BLD AUTO: 0.02 10*3/MM3 (ref 0–0.05)
IMM GRANULOCYTES NFR BLD AUTO: 0.4 % (ref 0–0.5)
INR PPP: 2 (ref 0.9–1.1)
LYMPHOCYTES # BLD AUTO: 0.75 10*3/MM3 (ref 0.7–3.1)
LYMPHOCYTES NFR BLD AUTO: 13.6 % (ref 19.6–45.3)
MCH RBC QN AUTO: 29.9 PG (ref 26.6–33)
MCHC RBC AUTO-ENTMCNC: 34.8 G/DL (ref 31.5–35.7)
MCV RBC AUTO: 86 FL (ref 79–97)
MONOCYTES # BLD AUTO: 0.7 10*3/MM3 (ref 0.1–0.9)
MONOCYTES NFR BLD AUTO: 12.7 % (ref 5–12)
NEUTROPHILS # BLD AUTO: 3.79 10*3/MM3 (ref 1.7–7)
NEUTROPHILS NFR BLD AUTO: 68.6 % (ref 42.7–76)
NRBC BLD AUTO-RTO: 0 /100 WBC (ref 0–0.2)
PLATELET # BLD AUTO: 73 10*3/MM3 (ref 140–450)
POTASSIUM SERPL-SCNC: 4.1 MMOL/L (ref 3.5–5.2)
PROT SERPL-MCNC: 7.4 G/DL (ref 6–8.5)
PROTHROMBIN TIME: 22.4 SECONDS (ref 11.7–14.2)
RBC # BLD AUTO: 3.28 10*6/MM3 (ref 4.14–5.8)
SODIUM SERPL-SCNC: 135 MMOL/L (ref 136–145)
WBC # BLD AUTO: 5.52 10*3/MM3 (ref 3.4–10.8)

## 2021-06-21 RX ORDER — LACTULOSE 10 G/15ML
SOLUTION ORAL; RECTAL
Qty: 946 ML | Refills: 2 | Status: SHIPPED | OUTPATIENT
Start: 2021-06-21 | End: 2021-07-07 | Stop reason: SDUPTHER

## 2021-07-01 ENCOUNTER — TELEPHONE (OUTPATIENT)
Dept: GASTROENTEROLOGY | Facility: CLINIC | Age: 66
End: 2021-07-01

## 2021-07-01 NOTE — TELEPHONE ENCOUNTER
----- Message from Ruby Mcgee MD sent at 7/1/2021  3:44 PM EDT -----  All labs stable (some improved).  AFP tumor marker nml

## 2021-07-06 NOTE — TELEPHONE ENCOUNTER
Call to pt.  Advise per Dr Mcgee note.  Verb understanding.     States will have US at [facility] in July.

## 2021-07-07 ENCOUNTER — TELEPHONE (OUTPATIENT)
Dept: GASTROENTEROLOGY | Facility: CLINIC | Age: 66
End: 2021-07-07

## 2021-07-07 RX ORDER — LACTULOSE 10 G/15ML
20 SOLUTION ORAL; RECTAL 2 TIMES DAILY
Qty: 1800 ML | Refills: 5 | Status: SHIPPED | OUTPATIENT
Start: 2021-07-07 | End: 2021-07-23 | Stop reason: SDUPTHER

## 2021-07-07 NOTE — TELEPHONE ENCOUNTER
----- Message from Milind Owens sent at 7/7/2021 12:05 PM EDT -----  Regarding: Call back  Contact: [phone]  Pt is returning your call

## 2021-07-07 NOTE — TELEPHONE ENCOUNTER
Called pt and pt reports that he is having difficulty getting his lactulose filled.  He reports that he is averaging approx 50cc a day.  He reports that his pharmacy will not fill the script.   ADvised pt we will call his pharmacy and see what is going on. Verb understanding.     Called pt's [facility] pharmacy and spoke with pharmacist who advised that the script is for 15cc bid and pt is taking 50cc a day so it is too soon for the refill.      Called pt and advised of the above. Advised will send message to Dr Mcgee to send in new script.  Verb understanding.

## 2021-07-15 ENCOUNTER — TELEPHONE (OUTPATIENT)
Dept: GASTROENTEROLOGY | Facility: CLINIC | Age: 66
End: 2021-07-15

## 2021-07-15 NOTE — TELEPHONE ENCOUNTER
Returned PT VM, PT wanted to speak with  about scheduling his procedure. Transferred over to [facility].

## 2021-07-23 ENCOUNTER — HOSPITAL ENCOUNTER (OUTPATIENT)
Dept: ULTRASOUND IMAGING | Facility: HOSPITAL | Age: 66
Discharge: HOME | End: 2021-07-23

## 2021-07-23 VITALS
HEART RATE: 80 BPM | OXYGEN SATURATION: 99 % | HEIGHT: 71 IN | DIASTOLIC BLOOD PRESSURE: 65 MMHG | TEMPERATURE: 97.3 F | RESPIRATION RATE: 16 BRPM | SYSTOLIC BLOOD PRESSURE: 136 MMHG | BODY MASS INDEX: 21.98 KG/M2 | WEIGHT: 157 LBS

## 2021-07-23 DIAGNOSIS — K74.60 CIRRHOSIS OF LIVER WITH ASCITES, UNSPECIFIED HEPATIC CIRRHOSIS TYPE (HCC): ICD-10-CM

## 2021-07-23 DIAGNOSIS — R18.8 CIRRHOSIS OF LIVER WITH ASCITES, UNSPECIFIED HEPATIC CIRRHOSIS TYPE (HCC): ICD-10-CM

## 2021-07-23 LAB
APPEARANCE FLD: ABNORMAL
COLOR FLD: YELLOW
INR PPP: 1.3 (ref 0.8–1.2)
LYMPHOCYTES NFR FLD MANUAL: 79 %
METHOD: ABNORMAL
MONOCYTES NFR FLD: 7 %
MONOS+MACROS NFR FLD: 11 %
NEUTROPHILS NFR FLD MANUAL: 3 %
NUC CELL # FLD: 100 /MM3
PROTHROMBIN TIME: 15.8 SECONDS (ref 12.8–15.2)
RBC # FLD AUTO: 954 /MM3

## 2021-07-23 PROCEDURE — 87015 SPECIMEN INFECT AGNT CONCNTJ: CPT | Performed by: INTERNAL MEDICINE

## 2021-07-23 PROCEDURE — 76705 ECHO EXAM OF ABDOMEN: CPT

## 2021-07-23 PROCEDURE — 85610 PROTHROMBIN TIME: CPT

## 2021-07-23 PROCEDURE — 76942 ECHO GUIDE FOR BIOPSY: CPT

## 2021-07-23 PROCEDURE — 89051 BODY FLUID CELL COUNT: CPT | Performed by: INTERNAL MEDICINE

## 2021-07-23 PROCEDURE — 25010000003 LIDOCAINE 1 % SOLUTION: Performed by: RADIOLOGY

## 2021-07-23 PROCEDURE — 87070 CULTURE OTHR SPECIMN AEROBIC: CPT | Performed by: INTERNAL MEDICINE

## 2021-07-23 PROCEDURE — 87205 SMEAR GRAM STAIN: CPT | Performed by: INTERNAL MEDICINE

## 2021-07-23 RX ORDER — LACTULOSE 10 G/15ML
SOLUTION ORAL
COMMUNITY
Start: 2021-02-19 | End: 2021-12-28

## 2021-07-23 RX ORDER — SODIUM CHLORIDE 0.9 % (FLUSH) 0.9 %
3 SYRINGE (ML) INJECTION EVERY 12 HOURS SCHEDULED
Status: DISCONTINUED | OUTPATIENT
Start: 2021-07-23 | End: 2021-07-24 | Stop reason: HOSPADM

## 2021-07-23 RX ORDER — LIDOCAINE HYDROCHLORIDE 10 MG/ML
10 INJECTION, SOLUTION INFILTRATION; PERINEURAL ONCE
Status: COMPLETED | OUTPATIENT
Start: 2021-07-23 | End: 2021-07-23

## 2021-07-23 RX ORDER — ZINC GLUCONATE 50 MG
50 TABLET ORAL DAILY
COMMUNITY
Start: 2021-06-28 | End: 2022-02-04

## 2021-07-23 RX ORDER — ALBUMIN (HUMAN) 12.5 G/50ML
50 SOLUTION INTRAVENOUS ONCE
Status: DISCONTINUED | OUTPATIENT
Start: 2021-07-23 | End: 2021-07-24 | Stop reason: HOSPADM

## 2021-07-23 RX ORDER — POLYETHYLENE GLYCOL 3350, SODIUM SULFATE ANHYDROUS, SODIUM BICARBONATE, SODIUM CHLORIDE, POTASSIUM CHLORIDE 236; 22.74; 6.74; 5.86; 2.97 G/4L; G/4L; G/4L; G/4L; G/4L
POWDER, FOR SOLUTION ORAL
COMMUNITY
Start: 2021-04-21 | End: 2022-01-26

## 2021-07-23 RX ORDER — SODIUM CHLORIDE 0.9 % (FLUSH) 0.9 %
10 SYRINGE (ML) INJECTION AS NEEDED
Status: DISCONTINUED | OUTPATIENT
Start: 2021-07-23 | End: 2021-07-24 | Stop reason: HOSPADM

## 2021-07-23 RX ADMIN — LIDOCAINE HYDROCHLORIDE 10 ML: 10 INJECTION, SOLUTION INFILTRATION; PERINEURAL at 13:21

## 2021-07-23 NOTE — DISCHARGE INSTRUCTIONS
EDUCATION /DISCHARGE INSTRUCTIONS  Paracentesis:  A needle is inserted into the space between your abdominal organs and the membrane that surrounds them (peritoneal space).  It is done for the diagnosis and treatment of fluid that is resistant to other therapies.  It helps determine the cause of the fluid and at the relieves pressure created by the fluid.  A sample is obtained and sent to the laboratory for study.  During the procedure:  You will lie on a bed on your back with your legs drawn up.  Your abdomen will be exposed from the chest to the pelvis.  You will otherwise be covered to maintain comfort.  A physician will clean your abdomen with antiseptic soap, place a sterile towel around the site and administer a local anesthetic to numb the area.  The physician will insert a needle into your abdominal wall.  There may be a popping sound which signifies the needle has pierced the abdominal wall. Next, the physician will attach tubing to transfer a sample into a collection bottle.  After the fluid is obtained the needle will be removed.  A pressure dressing is applied to the site.  Risks of the procedure include but are not limited to:   *  Bleeding    *  Wound infection   *  Low blood pressure   *  Decreased urination   *  Low sodium if a large amount of fluid is removed   *  Puncture of abdominal organs by the needle    Benefits of the procedure:  Benefits include the removal of fluid from the abdomen, relief of abdominal pressure and facilitation of a diagnosis.  Alternatives to the procedure:  Possible alternatives are diuretic drug therapy or surgery to place a shunt to drain fluid.  Risks of diuretic drug therapy include possible dehydration and renal failure.  The benefit of drug therapy is that it can be done at home under physician supervision.  Risks of shunt placement include exposure to anesthesia, infection, excessive bleeding and injury to abdominal organs.  The benefit of a shunt is that it can be  used to drain fluid over a longer period of time.  THIS EDUCATION INFORMATION WAS REVIEWED PRIOR TO THE PROCEDURE AND CONSENT. Patient initials__________________Time_________________    Post procedure: (Follow all the instructions below carefully)   *  Weigh yourself daily.   *  Follow your doctors dietary instructions.   *  Rest today (no pushing pulling, straining or heavy lifting).   *  Slowly increase activity tomorow.   *  If you received sedation do not drive for 24 hours.              * Skin affix  applied to puncture site. Do not try to remove, scratch or apply lotion   * Skin affix will fall off on it's own   *  You may shower tomorrow  Call your doctor if experiencing:   *  Signs of infection such as redness, swelling, excessive pain and / or foul       smelling drainage from the puncture site.   *  Chills or fever over 101 degrees (by mouth).   *  Fainting or any noted Rapid weight gain/loss   *  Unrelieved pain or any new or severe symptoms  Following the procedure:      Follow-up with the ordering physician as directed.   Continue to take other medications as directed by your physician unless    otherwise instructed.   If applicable, resume taking your blood thinners or Aspirin in 24 hours.              If you have any concerns please call the Radiology Nurses Desk at [phone]

## 2021-07-23 NOTE — H&P
Name: Wei Potts ADMIT: 2021   : 1955  PCP: Bella Villalta MD    MRN: [id number] LOS: 0 days   AGE/SEX: 66 y.o. male  ROOM: Room/bed info not found       Chief complaint   Patient is a 66 y.o. male presents with ascites.     Past Surgical History:  Past Surgical History:   Procedure Laterality Date   • COLONOSCOPY     • ENDOSCOPY         Past Medical History:  Past Medical History:   Diagnosis Date   • Alcoholism (CMS/HCC)    • Anemia    • Cirrhosis (CMS/HCC)    • Encephalopathy    • Hypertension    • Liver disease        Home Medications:  (Not in a hospital admission)      Allergies:  Iron    Family History:  Family History   Problem Relation Age of Onset   • Diabetes Mother    • Hypertension Father        Social History:  Social History     Tobacco Use   • Smoking status: Never Smoker   • Smokeless tobacco: Never Used   Vaping Use   • Vaping Use: Never used   Substance Use Topics   • Alcohol use: Not Currently     Comment: 1 bottle of wine per day, stopped drinking ETOH 2 weeks ago   • Drug use: No        Objective     Physical Exam:   R/r/r, ctab    Vital Signs       Anticipated Surgical Procedure:  paracentesis    The risks, benefits and alternatives of this procedure have been discussed with the patient or responsible party: Yes        Tejinder Carroll MD  21  10:57 EDT

## 2021-07-26 LAB
BACTERIA FLD CULT: NORMAL
GRAM STN SPEC: NORMAL

## 2021-07-27 ENCOUNTER — TELEPHONE (OUTPATIENT)
Dept: GASTROENTEROLOGY | Facility: CLINIC | Age: 66
End: 2021-07-27

## 2021-07-27 NOTE — TELEPHONE ENCOUNTER
----- Message from Ruby Mcgee MD sent at 7/26/2021  5:03 PM EDT -----  Fluid from paracentesis shows no evidence of infection

## 2021-08-10 ENCOUNTER — TELEPHONE (OUTPATIENT)
Dept: GASTROENTEROLOGY | Facility: CLINIC | Age: 66
End: 2021-08-10

## 2021-08-10 NOTE — TELEPHONE ENCOUNTER
See standing para order of 5/20.     Call to pt.  States gaining wt, belly swollen.  Requesting para.  Advise standing para order in place - call [facility] to arrange.  Verb understanding.

## 2021-08-10 NOTE — TELEPHONE ENCOUNTER
----- Message from Milind Calero sent at 8/10/2021 10:44 AM EDT -----  Regarding: Paracentesis  Contact: [phone]  Pt calling regarding Paracentesis being done again.  Pt needing order in system for this.  Please contact in regards to this.  Thank You

## 2021-08-18 ENCOUNTER — HOSPITAL ENCOUNTER (OUTPATIENT)
Dept: ULTRASOUND IMAGING | Facility: HOSPITAL | Age: 66
Discharge: HOME | End: 2021-08-18
Admitting: RADIOLOGY

## 2021-08-18 VITALS
SYSTOLIC BLOOD PRESSURE: 100 MMHG | HEART RATE: 61 BPM | TEMPERATURE: 98.4 F | HEIGHT: 71 IN | BODY MASS INDEX: 22.68 KG/M2 | DIASTOLIC BLOOD PRESSURE: 58 MMHG | OXYGEN SATURATION: 100 % | WEIGHT: 162 LBS | RESPIRATION RATE: 18 BRPM

## 2021-08-18 LAB
APPEARANCE FLD: ABNORMAL
COLOR FLD: YELLOW
INR PPP: 1.4 (ref 0.8–1.2)
LYMPHOCYTES NFR FLD MANUAL: 51 %
METHOD: ABNORMAL
MONOCYTES NFR FLD: 7 %
MONOS+MACROS NFR FLD: 38 %
NEUTROPHILS NFR FLD MANUAL: 4 %
NUC CELL # FLD: 160 /MM3
PROTHROMBIN TIME: 16.1 SECONDS (ref 12.8–15.2)
RBC # FLD AUTO: 267 /MM3

## 2021-08-18 PROCEDURE — 25010000002 ALBUMIN HUMAN 25% PER 50 ML: Performed by: INTERNAL MEDICINE

## 2021-08-18 PROCEDURE — 87205 SMEAR GRAM STAIN: CPT | Performed by: INTERNAL MEDICINE

## 2021-08-18 PROCEDURE — 87070 CULTURE OTHR SPECIMN AEROBIC: CPT | Performed by: INTERNAL MEDICINE

## 2021-08-18 PROCEDURE — 89051 BODY FLUID CELL COUNT: CPT | Performed by: INTERNAL MEDICINE

## 2021-08-18 PROCEDURE — P9047 ALBUMIN (HUMAN), 25%, 50ML: HCPCS | Performed by: INTERNAL MEDICINE

## 2021-08-18 PROCEDURE — 25010000003 LIDOCAINE 1 % SOLUTION: Performed by: RADIOLOGY

## 2021-08-18 PROCEDURE — 85610 PROTHROMBIN TIME: CPT

## 2021-08-18 PROCEDURE — 76942 ECHO GUIDE FOR BIOPSY: CPT

## 2021-08-18 RX ORDER — LIDOCAINE HYDROCHLORIDE 10 MG/ML
10 INJECTION, SOLUTION INFILTRATION; PERINEURAL ONCE
Status: COMPLETED | OUTPATIENT
Start: 2021-08-18 | End: 2021-08-18

## 2021-08-18 RX ORDER — LACTULOSE 10 G/15ML
SOLUTION ORAL; RECTAL
COMMUNITY
Start: 2021-08-02 | End: 2021-08-18 | Stop reason: SDUPTHER

## 2021-08-18 RX ORDER — ALBUMIN (HUMAN) 12.5 G/50ML
50 SOLUTION INTRAVENOUS ONCE
Status: COMPLETED | OUTPATIENT
Start: 2021-08-18 | End: 2021-08-18

## 2021-08-18 RX ORDER — SODIUM CHLORIDE 0.9 % (FLUSH) 0.9 %
10 SYRINGE (ML) INJECTION AS NEEDED
Status: DISCONTINUED | OUTPATIENT
Start: 2021-08-18 | End: 2021-08-19 | Stop reason: HOSPADM

## 2021-08-18 RX ORDER — SODIUM CHLORIDE 0.9 % (FLUSH) 0.9 %
3 SYRINGE (ML) INJECTION EVERY 12 HOURS SCHEDULED
Status: DISCONTINUED | OUTPATIENT
Start: 2021-08-18 | End: 2021-08-19 | Stop reason: HOSPADM

## 2021-08-18 RX ADMIN — LIDOCAINE HYDROCHLORIDE 10 ML: 10 INJECTION, SOLUTION INFILTRATION; PERINEURAL at 12:45

## 2021-08-18 RX ADMIN — ALBUMIN HUMAN 50 G: 0.25 SOLUTION INTRAVENOUS at 13:30

## 2021-08-18 NOTE — DISCHARGE INSTRUCTIONS
EDUCATION /DISCHARGE INSTRUCTIONS  Paracentesis:  A needle is inserted into the space between your abdominal organs and the membrane that surrounds them (peritoneal space).  It is done for the diagnosis and treatment of fluid that is resistant to other therapies.  It helps determine the cause of the fluid and at the relieves pressure created by the fluid.  A sample is obtained and sent to the laboratory for study.  During the procedure:  You will lie on a bed on your back with your legs drawn up.  Your abdomen will be exposed from the chest to the pelvis.  You will otherwise be covered to maintain comfort.  A physician will clean your abdomen with antiseptic soap, place a sterile towel around the site and administer a local anesthetic to numb the area.  The physician will insert a needle into your abdominal wall.  There may be a popping sound which signifies the needle has pierced the abdominal wall. Next, the physician will attach tubing to transfer a sample into a collection bottle.  After the fluid is obtained the needle will be removed.  A pressure dressing is applied to the site.  Risks of the procedure include but are not limited to:   *  Bleeding    *  Wound infection   *  Low blood pressure   *  Decreased urination   *  Low sodium if a large amount of fluid is removed   *  Puncture of abdominal organs by the needle    Benefits of the procedure:  Benefits include the removal of fluid from the abdomen, relief of abdominal pressure and facilitation of a diagnosis.  Alternatives to the procedure:  Possible alternatives are diuretic drug therapy or surgery to place a shunt to drain fluid.  Risks of diuretic drug therapy include possible dehydration and renal failure.  The benefit of drug therapy is that it can be done at home under physician supervision.  Risks of shunt placement include exposure to anesthesia, infection, excessive bleeding and injury to abdominal organs.  The benefit of a shunt is that it can be  used to drain fluid over a longer period of time.  THIS EDUCATION INFORMATION WAS REVIEWED PRIOR TO THE PROCEDURE AND CONSENT. Patient initials__________________Time___1153______________    Post procedure: (Follow all the instructions below carefully)   *  Weigh yourself daily.   *  Follow your doctors dietary instructions.   *  Rest today (no pushing pulling, straining or heavy lifting).   *  Slowly increase activity tomorow.   *  If you received sedation do not drive for 24 hours.              * Skin affix  applied to puncture site. Do not try to remove, scratch or apply lotion   * Skin affix will fall off on it's own   *  You may shower tomorrow  Call your doctor if experiencing:   *  Signs of infection such as redness, swelling, excessive pain and / or foul       smelling drainage from the puncture site.   *  Chills or fever over 101 degrees (by mouth).   *  Fainting or any noted Rapid weight gain/loss   *  Unrelieved pain or any new or severe symptoms  Following the procedure:      Follow-up with the ordering physician as directed.   Continue to take other medications as directed by your physician unless    otherwise instructed.   If applicable, resume taking your blood thinners or Aspirin in 24 hours.              If you have any concerns please call the Radiology Nurses Desk at [phone]

## 2021-08-18 NOTE — NURSING NOTE
Patient arrived to radiology triage bay 7.  Patient is mask compliant.  I am wearing a mask and eye protection to care for patient.

## 2021-08-19 ENCOUNTER — TELEPHONE (OUTPATIENT)
Dept: GASTROENTEROLOGY | Facility: CLINIC | Age: 66
End: 2021-08-19

## 2021-08-19 NOTE — TELEPHONE ENCOUNTER
----- Message from Ruby Mcgee MD sent at 8/4/2021 10:40 AM EDT -----  Ultrasound shows stable appearance of the liver-no new or concerning findings.

## 2021-08-20 ENCOUNTER — TELEPHONE (OUTPATIENT)
Dept: GASTROENTEROLOGY | Facility: CLINIC | Age: 66
End: 2021-08-20

## 2021-08-20 NOTE — TELEPHONE ENCOUNTER
----- Message from Ruby Mcgee MD sent at 8/19/2021  4:09 PM EDT -----  Fluid from paracentesis shows no evidence of infection

## 2021-08-23 LAB
BACTERIA FLD CULT: NORMAL
GRAM STN SPEC: NORMAL

## 2021-08-30 ENCOUNTER — HOSPITAL ENCOUNTER (OUTPATIENT)
Dept: ULTRASOUND IMAGING | Facility: HOSPITAL | Age: 66
Discharge: HOME | End: 2021-08-30
Admitting: RADIOLOGY

## 2021-08-30 ENCOUNTER — TELEPHONE (OUTPATIENT)
Dept: GASTROENTEROLOGY | Facility: CLINIC | Age: 66
End: 2021-08-30

## 2021-08-30 VITALS
RESPIRATION RATE: 18 BRPM | HEIGHT: 71 IN | HEART RATE: 62 BPM | SYSTOLIC BLOOD PRESSURE: 101 MMHG | OXYGEN SATURATION: 99 % | DIASTOLIC BLOOD PRESSURE: 57 MMHG | WEIGHT: 170 LBS | BODY MASS INDEX: 23.8 KG/M2 | TEMPERATURE: 98.6 F

## 2021-08-30 LAB
APPEARANCE FLD: CLEAR
COLOR FLD: YELLOW
INR PPP: 1.4 (ref 0.8–1.2)
LYMPHOCYTES NFR FLD MANUAL: 39 %
METHOD: NORMAL
MONOCYTES NFR FLD: 11 %
MONOS+MACROS NFR FLD: 45 %
NEUTROPHILS NFR FLD MANUAL: 5 %
NRBC FLD-RTO: 2 /100 WBCS
NUC CELL # FLD: 146 /MM3
PROTHROMBIN TIME: 16.9 SECONDS (ref 12.8–15.2)
RBC # FLD AUTO: 265 /MM3

## 2021-08-30 PROCEDURE — 25010000002 ALBUMIN HUMAN 25% PER 50 ML: Performed by: INTERNAL MEDICINE

## 2021-08-30 PROCEDURE — 89051 BODY FLUID CELL COUNT: CPT | Performed by: INTERNAL MEDICINE

## 2021-08-30 PROCEDURE — 87015 SPECIMEN INFECT AGNT CONCNTJ: CPT | Performed by: INTERNAL MEDICINE

## 2021-08-30 PROCEDURE — 96365 THER/PROPH/DIAG IV INF INIT: CPT

## 2021-08-30 PROCEDURE — 25010000003 LIDOCAINE 1 % SOLUTION: Performed by: RADIOLOGY

## 2021-08-30 PROCEDURE — 85610 PROTHROMBIN TIME: CPT

## 2021-08-30 PROCEDURE — 87205 SMEAR GRAM STAIN: CPT | Performed by: INTERNAL MEDICINE

## 2021-08-30 PROCEDURE — 87070 CULTURE OTHR SPECIMN AEROBIC: CPT | Performed by: INTERNAL MEDICINE

## 2021-08-30 PROCEDURE — 76942 ECHO GUIDE FOR BIOPSY: CPT

## 2021-08-30 PROCEDURE — P9047 ALBUMIN (HUMAN), 25%, 50ML: HCPCS | Performed by: INTERNAL MEDICINE

## 2021-08-30 RX ORDER — ALBUMIN (HUMAN) 12.5 G/50ML
50 SOLUTION INTRAVENOUS ONCE
Status: COMPLETED | OUTPATIENT
Start: 2021-08-30 | End: 2021-08-30

## 2021-08-30 RX ORDER — SODIUM CHLORIDE 0.9 % (FLUSH) 0.9 %
3 SYRINGE (ML) INJECTION EVERY 12 HOURS SCHEDULED
Status: DISCONTINUED | OUTPATIENT
Start: 2021-08-30 | End: 2021-08-31 | Stop reason: HOSPADM

## 2021-08-30 RX ORDER — SODIUM CHLORIDE 0.9 % (FLUSH) 0.9 %
10 SYRINGE (ML) INJECTION AS NEEDED
Status: DISCONTINUED | OUTPATIENT
Start: 2021-08-30 | End: 2021-08-31 | Stop reason: HOSPADM

## 2021-08-30 RX ORDER — LIDOCAINE HYDROCHLORIDE 10 MG/ML
10 INJECTION, SOLUTION INFILTRATION; PERINEURAL ONCE
Status: COMPLETED | OUTPATIENT
Start: 2021-08-30 | End: 2021-08-30

## 2021-08-30 RX ADMIN — Medication 3 ML: at 09:00

## 2021-08-30 RX ADMIN — ALBUMIN HUMAN 50 G: 0.25 SOLUTION INTRAVENOUS at 09:11

## 2021-08-30 RX ADMIN — LIDOCAINE HYDROCHLORIDE 6 ML: 10 INJECTION, SOLUTION INFILTRATION; PERINEURAL at 07:51

## 2021-08-30 NOTE — TELEPHONE ENCOUNTER
----- Message from Ruby Mcgee MD sent at 8/30/2021  2:14 PM EDT -----  Fluid from paracentesis does not show any sign of infection

## 2021-09-04 LAB
BACTERIA FLD CULT: NORMAL
GRAM STN SPEC: NORMAL

## 2021-09-15 ENCOUNTER — HOSPITAL ENCOUNTER (OUTPATIENT)
Dept: ULTRASOUND IMAGING | Facility: HOSPITAL | Age: 66
Discharge: HOME | End: 2021-09-15
Admitting: RADIOLOGY

## 2021-09-15 VITALS
OXYGEN SATURATION: 100 % | WEIGHT: 169 LBS | BODY MASS INDEX: 23.66 KG/M2 | HEIGHT: 71 IN | HEART RATE: 64 BPM | DIASTOLIC BLOOD PRESSURE: 68 MMHG | TEMPERATURE: 98.6 F | SYSTOLIC BLOOD PRESSURE: 124 MMHG | RESPIRATION RATE: 16 BRPM

## 2021-09-15 LAB
APPEARANCE FLD: CLEAR
COLOR FLD: YELLOW
INR PPP: 1.4 (ref 0.8–1.2)
LYMPHOCYTES NFR FLD MANUAL: 38 %
METHOD: NORMAL
MONOCYTES NFR FLD: 5 %
MONOS+MACROS NFR FLD: 54 %
NEUTROPHILS NFR FLD MANUAL: 3 %
NUC CELL # FLD: 152 /MM3
PROTHROMBIN TIME: 16.5 SECONDS (ref 12.8–15.2)
RBC # FLD AUTO: 319 /MM3

## 2021-09-15 PROCEDURE — P9047 ALBUMIN (HUMAN), 25%, 50ML: HCPCS | Performed by: INTERNAL MEDICINE

## 2021-09-15 PROCEDURE — 76942 ECHO GUIDE FOR BIOPSY: CPT

## 2021-09-15 PROCEDURE — 85610 PROTHROMBIN TIME: CPT

## 2021-09-15 PROCEDURE — 87070 CULTURE OTHR SPECIMN AEROBIC: CPT | Performed by: INTERNAL MEDICINE

## 2021-09-15 PROCEDURE — 87205 SMEAR GRAM STAIN: CPT | Performed by: INTERNAL MEDICINE

## 2021-09-15 PROCEDURE — 87015 SPECIMEN INFECT AGNT CONCNTJ: CPT | Performed by: INTERNAL MEDICINE

## 2021-09-15 PROCEDURE — 96365 THER/PROPH/DIAG IV INF INIT: CPT

## 2021-09-15 PROCEDURE — 89051 BODY FLUID CELL COUNT: CPT | Performed by: INTERNAL MEDICINE

## 2021-09-15 PROCEDURE — 25010000002 ALBUMIN HUMAN 25% PER 50 ML: Performed by: INTERNAL MEDICINE

## 2021-09-15 RX ORDER — SODIUM CHLORIDE 0.9 % (FLUSH) 0.9 %
3 SYRINGE (ML) INJECTION EVERY 12 HOURS SCHEDULED
Status: DISCONTINUED | OUTPATIENT
Start: 2021-09-15 | End: 2021-09-16 | Stop reason: HOSPADM

## 2021-09-15 RX ORDER — SODIUM CHLORIDE 0.9 % (FLUSH) 0.9 %
10 SYRINGE (ML) INJECTION AS NEEDED
Status: DISCONTINUED | OUTPATIENT
Start: 2021-09-15 | End: 2021-09-16 | Stop reason: HOSPADM

## 2021-09-15 RX ORDER — ALBUMIN (HUMAN) 12.5 G/50ML
50 SOLUTION INTRAVENOUS ONCE
Status: COMPLETED | OUTPATIENT
Start: 2021-09-15 | End: 2021-09-15

## 2021-09-15 RX ORDER — LIDOCAINE HYDROCHLORIDE 10 MG/ML
10 INJECTION, SOLUTION INFILTRATION; PERINEURAL ONCE
Status: DISCONTINUED | OUTPATIENT
Start: 2021-09-15 | End: 2021-09-16 | Stop reason: HOSPADM

## 2021-09-15 RX ADMIN — ALBUMIN HUMAN 50 G: 0.25 SOLUTION INTRAVENOUS at 13:21

## 2021-09-15 RX ADMIN — Medication 3 ML: at 13:21

## 2021-09-15 NOTE — NURSING NOTE
Patient arrived in xray triage for US Paracentesis.       Protective goggles and mask in place with all patient interactions today

## 2021-09-15 NOTE — NURSING NOTE
Pt D/[place] and ambulated self out to go home.    Protective goggles and mask in place with all patient interactions today

## 2021-09-15 NOTE — DISCHARGE INSTRUCTIONS
EDUCATION /DISCHARGE INSTRUCTIONS  Paracentesis:  A needle is inserted into the space between your abdominal organs and the membrane that surrounds them (peritoneal space).  It is done for the diagnosis and treatment of fluid that is resistant to other therapies.  It helps determine the cause of the fluid and at the relieves pressure created by the fluid.  A sample is obtained and sent to the laboratory for study.  During the procedure:  You will lie on a bed on your back with your legs drawn up.  Your abdomen will be exposed from the chest to the pelvis.  You will otherwise be covered to maintain comfort.  A physician will clean your abdomen with antiseptic soap, place a sterile towel around the site and administer a local anesthetic to numb the area.  The physician will insert a needle into your abdominal wall.  There may be a popping sound which signifies the needle has pierced the abdominal wall. Next, the physician will attach tubing to transfer a sample into a collection bottle.  After the fluid is obtained the needle will be removed.  A pressure dressing is applied to the site.  Risks of the procedure include but are not limited to:   *  Bleeding    *  Wound infection   *  Low blood pressure   *  Decreased urination   *  Low sodium if a large amount of fluid is removed   *  Puncture of abdominal organs by the needle    Benefits of the procedure:  Benefits include the removal of fluid from the abdomen, relief of abdominal pressure and facilitation of a diagnosis.  Alternatives to the procedure:  Possible alternatives are diuretic drug therapy or surgery to place a shunt to drain fluid.  Risks of diuretic drug therapy include possible dehydration and renal failure.  The benefit of drug therapy is that it can be done at home under physician supervision.  Risks of shunt placement include exposure to anesthesia, infection, excessive bleeding and injury to abdominal organs.  The benefit of a shunt is that it can be  used to drain fluid over a longer period of time.  THIS EDUCATION INFORMATION WAS REVIEWED PRIOR TO THE PROCEDURE AND CONSENT. Patient initials__________________Time_________________    Post procedure: (Follow all the instructions below carefully)   *  Weigh yourself daily.   *  Follow your doctors dietary instructions.   *  Rest today (no pushing pulling, straining or heavy lifting).   *  Slowly increase activity tomorow.   *  If you received sedation do not drive for 24 hours.              * Skin affix  applied to puncture site. Do not try to remove, scratch or apply lotion   * Skin affix will fall off on it's own   *  You may shower tomorrow  Call your doctor if experiencing:   *  Signs of infection such as redness, swelling, excessive pain and / or foul       smelling drainage from the puncture site.   *  Chills or fever over 101 degrees (by mouth).   *  Fainting or any noted Rapid weight gain/loss   *  Unrelieved pain or any new or severe symptoms  Following the procedure:      Follow-up with the ordering physician as directed.   Continue to take other medications as directed by your physician unless    otherwise instructed.   If applicable, resume taking your blood thinners or Aspirin in 24 hours.              If you have any concerns please call the Radiology Nurses Desk at [phone]

## 2021-09-16 ENCOUNTER — TELEPHONE (OUTPATIENT)
Dept: GASTROENTEROLOGY | Facility: CLINIC | Age: 66
End: 2021-09-16

## 2021-09-16 NOTE — TELEPHONE ENCOUNTER
----- Message from Ruby Mcgee MD sent at 9/16/2021  9:28 AM EDT -----  Ascites fluid studies negative for infection

## 2021-09-17 ENCOUNTER — TELEPHONE (OUTPATIENT)
Dept: GASTROENTEROLOGY | Facility: CLINIC | Age: 66
End: 2021-09-17

## 2021-09-20 LAB
BACTERIA FLD CULT: NORMAL
GRAM STN SPEC: NORMAL
GRAM STN SPEC: NORMAL

## 2021-09-21 NOTE — TELEPHONE ENCOUNTER
Call returned to pt.  Advise per DR Mcgee note.  Verb understanding.     States 2.7 L removed with para last wk.  Noting that belly is swelling again, but not gaining wt.  Asking if Dr Mcgee should address this, or if he should call DR Jeronimo.      Currently on lasix 40 mg/day.  Has been off spironolactone for 3-4 wks.  Asking if he should be on this again.     Questions to DR Mcgee

## 2021-09-22 DIAGNOSIS — Z79.899 LONG TERM CURRENT USE OF DIURETIC: Primary | ICD-10-CM

## 2021-09-22 RX ORDER — SPIRONOLACTONE 50 MG/1
50 TABLET, FILM COATED ORAL DAILY
Qty: 30 TABLET | Refills: 3 | Status: SHIPPED | OUTPATIENT
Start: 2021-09-22 | End: 2022-03-03 | Stop reason: SDUPTHER

## 2021-09-22 NOTE — TELEPHONE ENCOUNTER
Let's add back a low dose of aldactone - will need repeat labs one week after starting back on aldactone

## 2021-09-29 ENCOUNTER — LAB (OUTPATIENT)
Dept: GASTROENTEROLOGY | Facility: CLINIC | Age: 66
End: 2021-09-29

## 2021-09-29 ENCOUNTER — TELEPHONE (OUTPATIENT)
Dept: GASTROENTEROLOGY | Facility: CLINIC | Age: 66
End: 2021-09-29

## 2021-09-29 NOTE — TELEPHONE ENCOUNTER
----- Message from Milind Kelley Rep sent at 9/29/2021 10:33 AM EDT -----  Regarding: meds  Contact: [phone]  Pt  is calling he is wanting to start taking some medications but wanted to talk to nurse first before taking it.

## 2021-10-04 ENCOUNTER — TELEPHONE (OUTPATIENT)
Dept: GASTROENTEROLOGY | Facility: CLINIC | Age: 66
End: 2021-10-04

## 2021-10-04 NOTE — TELEPHONE ENCOUNTER
----- Message from Ruby Mcgee MD sent at 10/1/2021  4:33 PM EDT -----  Labs are stable - continue current medications

## 2021-10-11 ENCOUNTER — TELEPHONE (OUTPATIENT)
Dept: GASTROENTEROLOGY | Facility: CLINIC | Age: 66
End: 2021-10-11

## 2021-10-11 NOTE — TELEPHONE ENCOUNTER
Pt called and he reports that his wife is friend with someone is into homeopathic meds.  This friend sent him a vial of Derivatio to inject 5mg and also sent Hepaloges 2ml to inject .  They wanted him to inject this IV but did say he could have this IM. He is asking if it is ok for him to take .  Advised pt will send message to Dr Mcgee and in the meantime to hold off on taking these meds. He verb understanding.

## 2021-10-11 NOTE — TELEPHONE ENCOUNTER
These have not been studied in the setting of cirrhosis with the complications he has and therefore I recommend he avoid these medications

## 2021-10-12 NOTE — TELEPHONE ENCOUNTER
Returned call to pt and advised of Dr Arely barajas. Verb understanding Per pt's request Dr Glez note sent to pt thru mychart so he can show his wife.

## 2021-11-01 RX ORDER — FUROSEMIDE 40 MG/1
40 TABLET ORAL DAILY
Qty: 30 TABLET | Refills: 2 | Status: ON HOLD | OUTPATIENT
Start: 2021-11-01 | End: 2022-02-11

## 2021-11-08 ENCOUNTER — TELEPHONE (OUTPATIENT)
Dept: GASTROENTEROLOGY | Facility: CLINIC | Age: 66
End: 2021-11-08

## 2021-11-08 DIAGNOSIS — R18.8 CIRRHOSIS OF LIVER WITH ASCITES, UNSPECIFIED HEPATIC CIRRHOSIS TYPE (HCC): Primary | ICD-10-CM

## 2021-11-08 DIAGNOSIS — K74.60 CIRRHOSIS OF LIVER WITH ASCITES, UNSPECIFIED HEPATIC CIRRHOSIS TYPE (HCC): Primary | ICD-10-CM

## 2021-11-08 NOTE — TELEPHONE ENCOUNTER
Call to pt.  Advise per DR Mcgee note.  Verb understanding.     States would like to proceed with lab.  Scheduled for 11/1 @ 9am.

## 2021-11-08 NOTE — TELEPHONE ENCOUNTER
----- [facility] sent at 11/8/2021 10:03 AM EST -----  Regarding: blood work  Contact: [phone]  Patient is wanting to know if he can get blood work done to test for anemia. Please give patient a call back.

## 2021-11-08 NOTE — TELEPHONE ENCOUNTER
I am happy to draw the lab if he wishes but it will not change our management if he is mentally clear.  His mental status is far more important than the lab value.

## 2021-11-08 NOTE — TELEPHONE ENCOUNTER
Call to pt.  States feeling perfectly fine, wt stable.     States wife concerned because he has a metallic smell, and last year when ammonia level elevated, he smelled similarly.      Asking if may have ammonia level checked.     Request to DR Mcgee.

## 2021-11-11 ENCOUNTER — LAB (OUTPATIENT)
Dept: GASTROENTEROLOGY | Facility: CLINIC | Age: 66
End: 2021-11-11

## 2021-11-12 ENCOUNTER — TELEPHONE (OUTPATIENT)
Dept: GASTROENTEROLOGY | Facility: CLINIC | Age: 66
End: 2021-11-12

## 2021-12-14 ENCOUNTER — TELEPHONE (OUTPATIENT)
Dept: GASTROENTEROLOGY | Facility: CLINIC | Age: 66
End: 2021-12-14

## 2021-12-17 RX ORDER — SULFAMETHOXAZOLE AND TRIMETHOPRIM 800; 160 MG/1; MG/1
1 TABLET ORAL DAILY
Qty: 30 TABLET | Refills: 4 | Status: SHIPPED | OUTPATIENT
Start: 2021-12-17 | End: 2022-01-26 | Stop reason: SDDI

## 2021-12-17 NOTE — TELEPHONE ENCOUNTER
"Viviane request received for Bactrim DS.     See o/v note of 6/17/21: \"He had stopped his previous antibiotics-explained the need for suppressive antibiotics prevent SBP-we will send daily Bactrim DS to pharmacy.\"    Request to DR Mcgee.   "

## 2021-12-28 RX ORDER — LACTULOSE 10 G/15ML
SOLUTION ORAL
Qty: 512 ML | Refills: 0 | Status: SHIPPED | OUTPATIENT
Start: 2021-12-28 | End: 2022-02-14 | Stop reason: HOSPADM

## 2022-01-04 ENCOUNTER — TELEPHONE (OUTPATIENT)
Dept: INTERVENTIONAL RADIOLOGY/VASCULAR | Facility: HOSPITAL | Age: 67
End: 2022-01-04

## 2022-01-04 NOTE — TELEPHONE ENCOUNTER
Left message with patient to notify no visitors allowed in procedure waiting area per new radiology guidelines. Visitors are able to wait in the main radiology waiting room. Patient verbalized understanding.

## 2022-01-05 ENCOUNTER — HOSPITAL ENCOUNTER (OUTPATIENT)
Dept: ULTRASOUND IMAGING | Facility: HOSPITAL | Age: 67
Discharge: HOME | End: 2022-01-05
Admitting: INTERNAL MEDICINE

## 2022-01-05 VITALS
DIASTOLIC BLOOD PRESSURE: 56 MMHG | HEART RATE: 67 BPM | TEMPERATURE: 97.8 F | RESPIRATION RATE: 16 BRPM | WEIGHT: 160 LBS | HEIGHT: 71 IN | OXYGEN SATURATION: 96 % | BODY MASS INDEX: 22.4 KG/M2 | SYSTOLIC BLOOD PRESSURE: 101 MMHG

## 2022-01-05 LAB
APPEARANCE FLD: CLEAR
COLOR FLD: YELLOW
INR PPP: 1.4 (ref 0.8–1.2)
LYMPHOCYTES NFR FLD MANUAL: 58 %
METHOD: NORMAL
MONOCYTES NFR FLD: 13 %
MONOS+MACROS NFR FLD: 23 %
NEUTROPHILS NFR FLD MANUAL: 6 %
NUC CELL # FLD: 130 /MM3
PROTHROMBIN TIME: 16.6 SECONDS (ref 12.8–15.2)
RBC # FLD AUTO: 391 /MM3

## 2022-01-05 PROCEDURE — 96365 THER/PROPH/DIAG IV INF INIT: CPT

## 2022-01-05 PROCEDURE — 87015 SPECIMEN INFECT AGNT CONCNTJ: CPT | Performed by: INTERNAL MEDICINE

## 2022-01-05 PROCEDURE — 89051 BODY FLUID CELL COUNT: CPT | Performed by: INTERNAL MEDICINE

## 2022-01-05 PROCEDURE — 0 LIDOCAINE 1 % SOLUTION: Performed by: RADIOLOGY

## 2022-01-05 PROCEDURE — 25010000002 ALBUMIN HUMAN 25% PER 50 ML: Performed by: INTERNAL MEDICINE

## 2022-01-05 PROCEDURE — 76942 ECHO GUIDE FOR BIOPSY: CPT

## 2022-01-05 PROCEDURE — 87205 SMEAR GRAM STAIN: CPT | Performed by: INTERNAL MEDICINE

## 2022-01-05 PROCEDURE — 87070 CULTURE OTHR SPECIMN AEROBIC: CPT | Performed by: INTERNAL MEDICINE

## 2022-01-05 PROCEDURE — 85610 PROTHROMBIN TIME: CPT

## 2022-01-05 PROCEDURE — P9047 ALBUMIN (HUMAN), 25%, 50ML: HCPCS | Performed by: INTERNAL MEDICINE

## 2022-01-05 RX ORDER — ALBUMIN (HUMAN) 12.5 G/50ML
50 SOLUTION INTRAVENOUS ONCE
Status: COMPLETED | OUTPATIENT
Start: 2022-01-05 | End: 2022-01-05

## 2022-01-05 RX ORDER — LIDOCAINE HYDROCHLORIDE 10 MG/ML
10 INJECTION, SOLUTION INFILTRATION; PERINEURAL ONCE
Status: COMPLETED | OUTPATIENT
Start: 2022-01-05 | End: 2022-01-05

## 2022-01-05 RX ORDER — SODIUM CHLORIDE 0.9 % (FLUSH) 0.9 %
10 SYRINGE (ML) INJECTION AS NEEDED
Status: DISCONTINUED | OUTPATIENT
Start: 2022-01-05 | End: 2022-01-06 | Stop reason: HOSPADM

## 2022-01-05 RX ORDER — SODIUM CHLORIDE 0.9 % (FLUSH) 0.9 %
3 SYRINGE (ML) INJECTION EVERY 12 HOURS SCHEDULED
Status: DISCONTINUED | OUTPATIENT
Start: 2022-01-05 | End: 2022-01-06 | Stop reason: HOSPADM

## 2022-01-05 RX ADMIN — LIDOCAINE HYDROCHLORIDE 6 ML: 10 INJECTION, SOLUTION INFILTRATION; PERINEURAL at 13:30

## 2022-01-05 RX ADMIN — ALBUMIN HUMAN 50 G: 0.25 SOLUTION INTRAVENOUS at 14:32

## 2022-01-05 NOTE — DISCHARGE INSTRUCTIONS
EDUCATION /DISCHARGE INSTRUCTIONS  Paracentesis:  A needle is inserted into the space between your abdominal organs and the membrane that surrounds them (peritoneal space).  It is done for the diagnosis and treatment of fluid that is resistant to other therapies.  It helps determine the cause of the fluid and at the relieves pressure created by the fluid.  A sample is obtained and sent to the laboratory for study.  During the procedure:  You will lie on a bed on your back with your legs drawn up.  Your abdomen will be exposed from the chest to the pelvis.  You will otherwise be covered to maintain comfort.  A physician will clean your abdomen with antiseptic soap, place a sterile towel around the site and administer a local anesthetic to numb the area.  The physician will insert a needle into your abdominal wall.  There may be a popping sound which signifies the needle has pierced the abdominal wall. Next, the physician will attach tubing to transfer a sample into a collection bottle.  After the fluid is obtained the needle will be removed.  A pressure dressing is applied to the site.  Risks of the procedure include but are not limited to:   *  Bleeding    *  Wound infection   *  Low blood pressure   *  Decreased urination   *  Low sodium if a large amount of fluid is removed   *  Puncture of abdominal organs by the needle    Benefits of the procedure:  Benefits include the removal of fluid from the abdomen, relief of abdominal pressure and facilitation of a diagnosis.  Alternatives to the procedure:  Possible alternatives are diuretic drug therapy or surgery to place a shunt to drain fluid.  Risks of diuretic drug therapy include possible dehydration and renal failure.  The benefit of drug therapy is that it can be done at home under physician supervision.  Risks of shunt placement include exposure to anesthesia, infection, excessive bleeding and injury to abdominal organs.  The benefit of a shunt is that it can be  used to drain fluid over a longer period of time.  THIS EDUCATION INFORMATION WAS REVIEWED PRIOR TO THE PROCEDURE AND CONSENT. Patient initials__________________Time_________________    Post procedure: (Follow all the instructions below carefully)   *  Weigh yourself daily.   *  Follow your doctors dietary instructions.   *  Rest today (no pushing pulling, straining or heavy lifting).   *  Slowly increase activity tomorow.   *  If you received sedation do not drive for 24 hours.              * Skin affix  applied to puncture site. Do not try to remove, scratch or apply lotion   * Skin affix will fall off on it's own   *  You may shower tomorrow  Call your doctor if experiencing:   *  Signs of infection such as redness, swelling, excessive pain and / or foul       smelling drainage from the puncture site.   *  Chills or fever over 101 degrees (by mouth).   *  Fainting or any noted Rapid weight gain/loss   *  Unrelieved pain or any new or severe symptoms  Following the procedure:      Follow-up with the ordering physician as directed.   Continue to take other medications as directed by your physician unless    otherwise instructed.   If applicable, resume taking your blood thinners or Aspirin in 24 hours.              If you have any concerns please call the Radiology Nurses Desk at [phone]

## 2022-01-05 NOTE — H&P
Name: Wei Potts ADMIT: 2022   : 1955  PCP: Bella Villalta MD    MRN: [id number] LOS: 0 days   AGE/SEX: 66 y.o. male  ROOM: Room/bed info not found       Chief complaint   Patient is a 66 y.o. male presents with ascites.     Past Surgical History:  Past Surgical History:   Procedure Laterality Date   • COLONOSCOPY     • ENDOSCOPY         Past Medical History:  Past Medical History:   Diagnosis Date   • Alcoholism (CMS/HCC)    • Anemia    • Cirrhosis (CMS/HCC)    • Encephalopathy    • Hypertension    • Liver disease        Home Medications:  (Not in a hospital admission)      Allergies:  Iron    Family History:  Family History   Problem Relation Age of Onset   • Diabetes Mother    • Hypertension Father        Social History:  Social History     Tobacco Use   • Smoking status: Never Smoker   • Smokeless tobacco: Never Used   Vaping Use   • Vaping Use: Never used   Substance Use Topics   • Alcohol use: Not Currently     Comment: 1 bottle of wine per day, stopped drinking ETOH 2 weeks ago   • Drug use: No        Objective     Physical Exam:   R/r/r, ctab    Vital Signs       Anticipated Surgical Procedure:  paracentesis    The risks, benefits and alternatives of this procedure have been discussed with the patient or responsible party: Yes        Tejinder Carroll MD  22  11:46 EST

## 2022-01-06 ENCOUNTER — TELEPHONE (OUTPATIENT)
Dept: GASTROENTEROLOGY | Facility: CLINIC | Age: 67
End: 2022-01-06

## 2022-01-06 NOTE — TELEPHONE ENCOUNTER
----- Message from Ruby Mcgee MD sent at 1/6/2022  1:50 PM EST -----  Ascites fluid with no signs of infection

## 2022-01-10 LAB
BACTERIA FLD CULT: NORMAL
GRAM STN SPEC: NORMAL

## 2022-01-10 NOTE — TELEPHONE ENCOUNTER
Attempt call to pt - mail box full.     It is noted that pt has appt with PRESTON Krueger on 1/12.

## 2022-01-11 RX ORDER — LACTULOSE 10 G/15ML
SOLUTION ORAL; RECTAL
Qty: 1800 ML | Refills: 2 | Status: SHIPPED | OUTPATIENT
Start: 2022-01-11 | End: 2022-02-14 | Stop reason: HOSPADM

## 2022-01-12 ENCOUNTER — OFFICE VISIT (OUTPATIENT)
Dept: GASTROENTEROLOGY | Facility: CLINIC | Age: 67
End: 2022-01-12

## 2022-01-12 ENCOUNTER — TELEPHONE (OUTPATIENT)
Dept: GASTROENTEROLOGY | Facility: CLINIC | Age: 67
End: 2022-01-12

## 2022-01-12 VITALS
HEIGHT: 71 IN | TEMPERATURE: 98 F | WEIGHT: 169.4 LBS | BODY MASS INDEX: 23.72 KG/M2 | HEART RATE: 69 BPM | DIASTOLIC BLOOD PRESSURE: 67 MMHG | SYSTOLIC BLOOD PRESSURE: 106 MMHG

## 2022-01-12 DIAGNOSIS — K65.2 SBP (SPONTANEOUS BACTERIAL PERITONITIS): ICD-10-CM

## 2022-01-12 DIAGNOSIS — D69.59 SECONDARY THROMBOCYTOPENIA: ICD-10-CM

## 2022-01-12 DIAGNOSIS — K76.82 HEPATIC ENCEPHALOPATHY: ICD-10-CM

## 2022-01-12 DIAGNOSIS — I85.10 SECONDARY ESOPHAGEAL VARICES WITHOUT BLEEDING: ICD-10-CM

## 2022-01-12 DIAGNOSIS — K74.60 CIRRHOSIS OF LIVER WITH ASCITES, UNSPECIFIED HEPATIC CIRRHOSIS TYPE: Primary | ICD-10-CM

## 2022-01-12 DIAGNOSIS — R18.8 CIRRHOSIS OF LIVER WITH ASCITES, UNSPECIFIED HEPATIC CIRRHOSIS TYPE: Primary | ICD-10-CM

## 2022-01-12 DIAGNOSIS — K42.9 UMBILICAL HERNIA WITHOUT OBSTRUCTION AND WITHOUT GANGRENE: ICD-10-CM

## 2022-01-12 PROCEDURE — 99214 OFFICE O/P EST MOD 30 MIN: CPT | Performed by: PHYSICIAN ASSISTANT

## 2022-01-12 RX ORDER — AMITRIPTYLINE HYDROCHLORIDE 10 MG/1
10 TABLET, FILM COATED ORAL NIGHTLY
Qty: 30 TABLET | Refills: 1 | Status: SHIPPED | OUTPATIENT
Start: 2022-01-12 | End: 2022-04-01 | Stop reason: SDUPTHER

## 2022-01-12 NOTE — PROGRESS NOTES
"Chief Complaint  Cirrhosis    Subjective          History of Present Illness    Wei Potts is a  66 y.o. male presents for follow-up for cirrhosis, heartburn, and anemia. Patient is a history of cirrhosis complicated by esophageal varices which have never bled, ascites with a history of SBP.  He is a patient of Dr. Mcgee last seen on 6/17/2021.  He also follows with Dr. Jeronimo at [facility].  He is new to me.     He takes lactulose twice daily and zinc for encephalopathy prevention.  He had 1 episode of encephalopathy about a year ago.  He reports BM 1-3 times/day varying amounts.     He is on Lasix 40 mg and Aldactone 50 mg for ascites.  He does have some gynecomastia from the Aldactone.  He just had paracentesis on 1/5/2022 with 2700 mL of ascites removed.  Fluid analysis unremarkable with negative fluid culture. Prior one to this was September.  Low sodium diet compliant. He is watching is fluid intake and knows he cannot take more than 2L. He admits to eating more soup lately where he wasn't including this in his fluid intake. He reports persistent abdominal distention with umbilical hernia popping out. No pain. It does get better with bowel movements.     He does have a history of SBP and is on Bactrim DS for suppressive therapy. He also takes omeprazole 40 mg daily for GERD and this works well.    He has had some trouble sleeping--he can fall asleep fine but trouble staying asleep. He states he thinks a lot at night which keeps him awake.  He will \"catnap\" during the day but avoids sitting for long periods of time.  He denies confusion/memory loss or tremors. He denies falls.      MRI of the liver on 1/7/2022 showed hepatic cirrhosis with moderate splenomegaly and moderate to large ascites, peripheral chronic nonocclusive thrombus of the main portal vein, sympathetic states withliver lesion.    Liver ultrasound on 7/27/2021 showed cirrhosis, tiny subcentimeter hepatic cysts reidentified from previous exam. " " No ascites fluid at that time.  No focal liver lesion identified    Last AFP tumor marker on 6/17/2021 was normal.    April 2021 EGD with [facility] with banding of esophageal varices x 5, colonoscopy with suboptimal prep-1 polyp removed. 6/3/21 with 4 additional bands placed.  Follow-up colonoscopy recommended in 1 year.    Objective   Vital Signs:   /67   Pulse 69   Temp 98 °F (36.7 °C)   Ht 180.3 cm (71\")   Wt 76.8 kg (169 lb 6.4 oz)   BMI 23.63 kg/m²       Physical Exam  Vitals reviewed.   Constitutional:       General: He is awake. He is not in acute distress.     Appearance: Normal appearance. He is well-developed and well-groomed.   HENT:      Head: Normocephalic and atraumatic.   Pulmonary:      Effort: Pulmonary effort is normal. No respiratory distress.   Abdominal:      General: Abdomen is protuberant. Bowel sounds are normal. There is distension.      Palpations: Abdomen is soft. There is hepatomegaly. There is no mass.      Tenderness: There is no abdominal tenderness. There is no guarding.      Hernia: A hernia is present. Hernia is present in the umbilical area.      Comments: Soft umbilical hernia--no reduction needed.    Skin:     Coloration: Skin is not pale.   Neurological:      Mental Status: He is alert and oriented to person, place, and time.      Gait: Gait normal.   Psychiatric:         Mood and Affect: Mood and affect normal.         Speech: Speech normal.         Behavior: Behavior is cooperative.         Judgment: Judgment normal.          Result Review :   The following data was reviewed by: Soniya Krueger PA-C on 01/12/2022:  Common labs    Common Labsle 6/17/21 6/17/21 9/29/21 1/12/22 1/12/22    1413 1413  1048 1048   Glucose  99 85 85    BUN  11 7 (A) 8    Creatinine  0.96 0.91 0.89    eGFR Non  Am  79 84 90    eGFR  Am  96 102 104    Sodium  135 (A) 139 137    Potassium  4.1 4.7 3.9    Chloride  105 107 107 (A)    Calcium  9.0 8.8 8.3 (A)  "   Total Protein  7.4  7.0    Albumin  3.20 (A)  2.9 (A)    Total Bilirubin  2.6 (A)  2.5 (A)    Alkaline Phosphatase  231 (A)  177 (A)    AST (SGOT)  72 (A)  52 (A)    ALT (SGPT)  35  21    WBC 5.52    3.2 (A)   Hemoglobin 9.8 (A)    9.0 (A)   Hematocrit 28.2 (A)    26.3 (A)   Platelets 73 (A)    58 (A)   (A) Abnormal value       Comments are available for some flowsheets but are not being displayed.                 Assessment and Plan    Diagnoses and all orders for this visit:    1. Cirrhosis of liver with ascites, unspecified hepatic cirrhosis type (HCC) (Primary)  -     AFP Tumor Marker  -     Protime-INR  -     Comprehensive Metabolic Panel  -     CBC & Differential    2. SBP (spontaneous bacterial peritonitis) (HCC)  -     AFP Tumor Marker  -     Protime-INR  -     Comprehensive Metabolic Panel  -     CBC & Differential    3. Secondary esophageal varices without bleeding (HCC)  -     AFP Tumor Marker  -     Protime-INR  -     Comprehensive Metabolic Panel  -     CBC & Differential    4. Hepatic encephalopathy (HCC)  -     AFP Tumor Marker  -     Protime-INR  -     Comprehensive Metabolic Panel  -     CBC & Differential    5. Secondary thrombocytopenia  -     AFP Tumor Marker  -     Protime-INR  -     Comprehensive Metabolic Panel  -     CBC & Differential    6. Umbilical hernia without obstruction and without gangrene    Other orders  -     amitriptyline (ELAVIL) 10 MG tablet; Take 1 tablet by mouth Every Night.  Dispense: 30 tablet; Refill: 1    Overall he seems to be doing well.  He does have some mild abdominal distention which we will watch.  Discussed that he should count soups into his daily fluid intake.  Continue low-sodium diet of 2 g.    Requested and reviewed last EGD from June 2021 with [facility].  He is recommended to have repeat banding in 4 weeks.  Patient was advised to call them for follow-up on this.    We discussed hepatic encephalopathy and signs and symptoms of this  including change in his sleep habits.  I do not suspect his trouble staying asleep is having to do with the malaise at this time.  I think it is more related to anxiety at night.  We will try Elavil 10 mg nightly to see this helps.    Discussed umbilical hernia which is soft without any evidence of obstruction or necrosis.  This is likely related to his recurrent abdominal ascites.  Recommend holding off on surgical correction given this is cosmetic at this point and he would be high risk for surgery given the cirrhosis.    Continue other medications.  We will check routine labs including AFP.  He is up-to-date on his liver imaging.    Follow Up   Return in about 6 months (around 7/12/2022) for Dr. Mcgee.    Fransico dictation used throughout this note.     Soniya Krueger PA-C

## 2022-01-12 NOTE — TELEPHONE ENCOUNTER
Called [facility] at [phone] and spoke with Cathy and requested pt's egd and path from 06/2021 be faxed to us at [phone].

## 2022-01-12 NOTE — TELEPHONE ENCOUNTER
----- Message from Soniya Krueger PA-C sent at 1/12/2022 10:15 AM EST -----  Request records from Dr. Jeronimo--particularly EGD after April (if done). I have one in April but I think he had 2.

## 2022-01-13 ENCOUNTER — TELEPHONE (OUTPATIENT)
Dept: GASTROENTEROLOGY | Facility: CLINIC | Age: 67
End: 2022-01-13

## 2022-01-13 LAB
AFP-TM SERPL-MCNC: 2.3 NG/ML (ref 0–8.3)
ALBUMIN SERPL-MCNC: 2.9 G/DL (ref 3.8–4.8)
ALBUMIN/GLOB SERPL: 0.7 {RATIO} (ref 1.2–2.2)
ALP SERPL-CCNC: 177 IU/L (ref 44–121)
ALT SERPL-CCNC: 21 IU/L (ref 0–44)
AST SERPL-CCNC: 52 IU/L (ref 0–40)
BASOPHILS # BLD AUTO: 0 X10E3/UL (ref 0–0.2)
BASOPHILS NFR BLD AUTO: 1 %
BILIRUB SERPL-MCNC: 2.5 MG/DL (ref 0–1.2)
BUN SERPL-MCNC: 8 MG/DL (ref 8–27)
BUN/CREAT SERPL: 9 (ref 10–24)
CALCIUM SERPL-MCNC: 8.3 MG/DL (ref 8.6–10.2)
CHLORIDE SERPL-SCNC: 107 MMOL/L (ref 96–106)
CO2 SERPL-SCNC: 18 MMOL/L (ref 20–29)
CREAT SERPL-MCNC: 0.89 MG/DL (ref 0.76–1.27)
EOSINOPHIL # BLD AUTO: 0.2 X10E3/UL (ref 0–0.4)
EOSINOPHIL NFR BLD AUTO: 5 %
ERYTHROCYTE [DISTWIDTH] IN BLOOD BY AUTOMATED COUNT: 16.3 % (ref 11.6–15.4)
GLOBULIN SER CALC-MCNC: 4.1 G/DL (ref 1.5–4.5)
GLUCOSE SERPL-MCNC: 85 MG/DL (ref 65–99)
HCT VFR BLD AUTO: 26.3 % (ref 37.5–51)
HGB BLD-MCNC: 9 G/DL (ref 13–17.7)
IMM GRANULOCYTES # BLD AUTO: 0 X10E3/UL (ref 0–0.1)
IMM GRANULOCYTES NFR BLD AUTO: 0 %
INR PPP: 1.5 (ref 0.9–1.2)
LYMPHOCYTES # BLD AUTO: 0.5 X10E3/UL (ref 0.7–3.1)
LYMPHOCYTES NFR BLD AUTO: 14 %
MCH RBC QN AUTO: 27.7 PG (ref 26.6–33)
MCHC RBC AUTO-ENTMCNC: 34.2 G/DL (ref 31.5–35.7)
MCV RBC AUTO: 81 FL (ref 79–97)
MONOCYTES # BLD AUTO: 0.5 X10E3/UL (ref 0.1–0.9)
MONOCYTES NFR BLD AUTO: 15 %
MORPHOLOGY BLD-IMP: ABNORMAL
NEUTROPHILS # BLD AUTO: 2.1 X10E3/UL (ref 1.4–7)
NEUTROPHILS NFR BLD AUTO: 65 %
PLATELET # BLD AUTO: 58 X10E3/UL (ref 150–450)
POTASSIUM SERPL-SCNC: 3.9 MMOL/L (ref 3.5–5.2)
PROT SERPL-MCNC: 7 G/DL (ref 6–8.5)
PROTHROMBIN TIME: 15.6 SEC (ref 9.1–12)
RBC # BLD AUTO: 3.25 X10E6/UL (ref 4.14–5.8)
SODIUM SERPL-SCNC: 137 MMOL/L (ref 134–144)
WBC # BLD AUTO: 3.2 X10E3/UL (ref 3.4–10.8)

## 2022-01-13 NOTE — TELEPHONE ENCOUNTER
Called pt and advised of Dr Mcgee's note. Verb understanding.  Pt reports that he had mri on  on 01/07 that was ok.  He is ok with proceeding with EGD as long as Dr Mcgee thinks this is necessary.   Pt would like to have procedure at [facility].   Update sent to Dr Mcgee. .

## 2022-01-13 NOTE — TELEPHONE ENCOUNTER
Called pt and advised of Soniya's note. Verb understanding.     Pt states he is not seeing the [facility] transplant. Labs sent to Dr Jeronimo.

## 2022-01-13 NOTE — TELEPHONE ENCOUNTER
----- Message from Soniya Krueger PA-C sent at 1/13/2022  3:43 PM EST -----  Let patient know that overall his labs are stable.  His meld NA score is 16.  As Dr. Mcgee recommended, please follow-up with [facility] transplant clinic to see about EGD follow-up.  Please fax these labs to [facility] transplant.

## 2022-01-13 NOTE — TELEPHONE ENCOUNTER
pls advise pt that records from [facility] were reviewed from his last egd in June of 2021 - he had variceal banding and 4 weeks repeat egd was recommended to [facility] if needed.  pls have him contact Dr Baker to schedule this or if he wants me to do it at [facility] let me know

## 2022-01-14 ENCOUNTER — PREP FOR SURGERY (OUTPATIENT)
Dept: OTHER | Facility: HOSPITAL | Age: 67
End: 2022-01-14

## 2022-01-14 DIAGNOSIS — I85.10 SECONDARY ESOPHAGEAL VARICES WITHOUT BLEEDING: Primary | ICD-10-CM

## 2022-01-24 ENCOUNTER — TELEPHONE (OUTPATIENT)
Dept: GASTROENTEROLOGY | Facility: CLINIC | Age: 67
End: 2022-01-24

## 2022-01-24 NOTE — TELEPHONE ENCOUNTER
"Call from pt.  Asking if should get covid booster shot.   States last shot 4-5 months ago, and understands that directions are to obtain booster 6 months after last shot, but wonders if should go ahead and and proceed with booster.     States diuretics managing ascites.  Wt stable - has actually lost 3 lbs.  However, \"belly button popping out\".  Does not hurt, but thinks looks ugly and asking if anything to do for this.      Questions to DR Mcgee.   "

## 2022-01-24 NOTE — TELEPHONE ENCOUNTER
Booster dose is recommended by current CDC guidelines at least 5 months after the last dose.    He likely has an umbilical hernia.  This is mostly cosmetic and I would not recommend repair.  It is likely related to the pressure in his abdominal wall from the ascites at the time.  If he starts to have pain or discomfort or it pops out and will not go back and he needs to be evaluated

## 2022-01-26 NOTE — TELEPHONE ENCOUNTER
Called pt and advised of Dr Mcgee's note. Verb understanding.      Pt is asking about getting his egd scheduled.  Advised will send message to Tiara in scheduling.   Verb understanding.

## 2022-02-04 ENCOUNTER — HOSPITAL ENCOUNTER (INPATIENT)
Facility: HOSPITAL | Age: 67
LOS: 7 days | Discharge: HOME | End: 2022-02-14
Attending: EMERGENCY MEDICINE | Admitting: HOSPITALIST

## 2022-02-04 DIAGNOSIS — D64.9 CHRONIC ANEMIA: ICD-10-CM

## 2022-02-04 DIAGNOSIS — G93.41 METABOLIC ENCEPHALOPATHY: ICD-10-CM

## 2022-02-04 DIAGNOSIS — K74.60 HEPATIC CIRRHOSIS, UNSPECIFIED HEPATIC CIRRHOSIS TYPE, UNSPECIFIED WHETHER ASCITES PRESENT: ICD-10-CM

## 2022-02-04 DIAGNOSIS — D69.6 THROMBOCYTOPENIA: ICD-10-CM

## 2022-02-04 DIAGNOSIS — E72.20 HYPERAMMONEMIA: Primary | ICD-10-CM

## 2022-02-04 DIAGNOSIS — Z86.79 HISTORY OF HYPERTENSION: ICD-10-CM

## 2022-02-04 LAB
ALBUMIN SERPL-MCNC: 2.7 G/DL (ref 3.5–5.2)
ALBUMIN/GLOB SERPL: 0.7 G/DL
ALP SERPL-CCNC: 139 U/L (ref 39–117)
ALT SERPL W P-5'-P-CCNC: 26 U/L (ref 1–41)
AMMONIA BLD-SCNC: 106 UMOL/L (ref 16–60)
AMPHET+METHAMPHET UR QL: NEGATIVE
ANION GAP SERPL CALCULATED.3IONS-SCNC: 9.4 MMOL/L (ref 5–15)
AST SERPL-CCNC: 59 U/L (ref 1–40)
BACTERIA UR QL AUTO: NORMAL /HPF
BARBITURATES UR QL SCN: NEGATIVE
BENZODIAZ UR QL SCN: NEGATIVE
BILIRUB SERPL-MCNC: 3.1 MG/DL (ref 0–1.2)
BILIRUB UR QL STRIP: ABNORMAL
BUN SERPL-MCNC: 13 MG/DL (ref 8–23)
BUN/CREAT SERPL: 11.8 (ref 7–25)
CALCIUM SPEC-SCNC: 8.5 MG/DL (ref 8.6–10.5)
CANNABINOIDS SERPL QL: NEGATIVE
CHLORIDE SERPL-SCNC: 106 MMOL/L (ref 98–107)
CLARITY UR: CLEAR
CO2 SERPL-SCNC: 18.6 MMOL/L (ref 22–29)
COCAINE UR QL: NEGATIVE
COLOR UR: ABNORMAL
CREAT SERPL-MCNC: 1.1 MG/DL (ref 0.76–1.27)
D-LACTATE SERPL-SCNC: 1.8 MMOL/L (ref 0.5–2)
D-LACTATE SERPL-SCNC: 3.4 MMOL/L (ref 0.5–2)
DEPRECATED RDW RBC AUTO: 48.7 FL (ref 37–54)
ERYTHROCYTE [DISTWIDTH] IN BLOOD BY AUTOMATED COUNT: 16.2 % (ref 12.3–15.4)
ETHANOL BLD-MCNC: <10 MG/DL (ref 0–10)
ETHANOL UR QL: <0.01 %
GFR SERPL CREATININE-BSD FRML MDRD: 67 ML/MIN/1.73
GLOBULIN UR ELPH-MCNC: 4.1 GM/DL
GLUCOSE BLDC GLUCOMTR-MCNC: 142 MG/DL (ref 70–130)
GLUCOSE SERPL-MCNC: 125 MG/DL (ref 65–99)
GLUCOSE UR STRIP-MCNC: NEGATIVE MG/DL
HCT VFR BLD AUTO: 25.9 % (ref 37.5–51)
HGB BLD-MCNC: 8.5 G/DL (ref 13–17.7)
HGB UR QL STRIP.AUTO: NEGATIVE
HYALINE CASTS UR QL AUTO: NORMAL /LPF
INR PPP: 2.14 (ref 0.9–1.1)
KETONES UR QL STRIP: ABNORMAL
LEUKOCYTE ESTERASE UR QL STRIP.AUTO: ABNORMAL
LIPASE SERPL-CCNC: 79 U/L (ref 13–60)
LYMPHOCYTES # BLD MANUAL: 0.19 10*3/MM3 (ref 0.7–3.1)
LYMPHOCYTES NFR BLD MANUAL: 25 % (ref 5–12)
MAGNESIUM SERPL-MCNC: 2.1 MG/DL (ref 1.6–2.4)
MCH RBC QN AUTO: 27.4 PG (ref 26.6–33)
MCHC RBC AUTO-ENTMCNC: 32.8 G/DL (ref 31.5–35.7)
MCV RBC AUTO: 83.5 FL (ref 79–97)
METHADONE UR QL SCN: NEGATIVE
MONOCYTES # BLD: 0.65 10*3/MM3 (ref 0.1–0.9)
NEUTROPHILS # BLD AUTO: 1.75 10*3/MM3 (ref 1.7–7)
NEUTROPHILS NFR BLD MANUAL: 67.7 % (ref 42.7–76)
NITRITE UR QL STRIP: NEGATIVE
NT-PROBNP SERPL-MCNC: 339 PG/ML (ref 0–900)
OPIATES UR QL: NEGATIVE
OVALOCYTES BLD QL SMEAR: ABNORMAL
OXYCODONE UR QL SCN: NEGATIVE
PH UR STRIP.AUTO: 7 [PH] (ref 5–8)
PLAT MORPH BLD: NORMAL
PLATELET # BLD AUTO: 48 10*3/MM3 (ref 140–450)
PMV BLD AUTO: 10.1 FL (ref 6–12)
POIKILOCYTOSIS BLD QL SMEAR: ABNORMAL
POLYCHROMASIA BLD QL SMEAR: ABNORMAL
POTASSIUM SERPL-SCNC: 4.3 MMOL/L (ref 3.5–5.2)
PROCALCITONIN SERPL-MCNC: 0.13 NG/ML (ref 0–0.25)
PROT SERPL-MCNC: 6.8 G/DL (ref 6–8.5)
PROT UR QL STRIP: NEGATIVE
PROTHROMBIN TIME: 23.9 SECONDS (ref 11.7–14.2)
RBC # BLD AUTO: 3.1 10*6/MM3 (ref 4.14–5.8)
RBC # UR STRIP: NORMAL /HPF
REF LAB TEST METHOD: NORMAL
SARS-COV-2 RNA PNL SPEC NAA+PROBE: DETECTED
SODIUM SERPL-SCNC: 134 MMOL/L (ref 136–145)
SP GR UR STRIP: 1.03 (ref 1–1.03)
SQUAMOUS #/AREA URNS HPF: NORMAL /HPF
TROPONIN T SERPL-MCNC: <0.01 NG/ML (ref 0–0.03)
UROBILINOGEN UR QL STRIP: ABNORMAL
VARIANT LYMPHS NFR BLD MANUAL: 7.3 % (ref 19.6–45.3)
WBC # UR STRIP: NORMAL /HPF
WBC MORPH BLD: NORMAL
WBC NRBC COR # BLD: 2.58 10*3/MM3 (ref 3.4–10.8)

## 2022-02-04 PROCEDURE — 83690 ASSAY OF LIPASE: CPT | Performed by: EMERGENCY MEDICINE

## 2022-02-04 PROCEDURE — 25010000002 ONDANSETRON PER 1 MG: Performed by: EMERGENCY MEDICINE

## 2022-02-04 PROCEDURE — 84484 ASSAY OF TROPONIN QUANT: CPT | Performed by: EMERGENCY MEDICINE

## 2022-02-04 PROCEDURE — 80307 DRUG TEST PRSMV CHEM ANLYZR: CPT | Performed by: EMERGENCY MEDICINE

## 2022-02-04 PROCEDURE — 82077 ASSAY SPEC XCP UR&BREATH IA: CPT | Performed by: EMERGENCY MEDICINE

## 2022-02-04 PROCEDURE — 84145 PROCALCITONIN (PCT): CPT | Performed by: EMERGENCY MEDICINE

## 2022-02-04 PROCEDURE — 85610 PROTHROMBIN TIME: CPT | Performed by: EMERGENCY MEDICINE

## 2022-02-04 PROCEDURE — 87635 SARS-COV-2 COVID-19 AMP PRB: CPT | Performed by: EMERGENCY MEDICINE

## 2022-02-04 PROCEDURE — 85025 COMPLETE CBC W/AUTO DIFF WBC: CPT | Performed by: EMERGENCY MEDICINE

## 2022-02-04 PROCEDURE — 81001 URINALYSIS AUTO W/SCOPE: CPT | Performed by: EMERGENCY MEDICINE

## 2022-02-04 PROCEDURE — 99284 EMERGENCY DEPT VISIT MOD MDM: CPT

## 2022-02-04 PROCEDURE — 83880 ASSAY OF NATRIURETIC PEPTIDE: CPT | Performed by: EMERGENCY MEDICINE

## 2022-02-04 PROCEDURE — 82140 ASSAY OF AMMONIA: CPT | Performed by: EMERGENCY MEDICINE

## 2022-02-04 PROCEDURE — 82962 GLUCOSE BLOOD TEST: CPT

## 2022-02-04 PROCEDURE — 83605 ASSAY OF LACTIC ACID: CPT | Performed by: EMERGENCY MEDICINE

## 2022-02-04 PROCEDURE — 25010000002 ONDANSETRON PER 1 MG: Performed by: HOSPITALIST

## 2022-02-04 PROCEDURE — 85007 BL SMEAR W/DIFF WBC COUNT: CPT | Performed by: EMERGENCY MEDICINE

## 2022-02-04 PROCEDURE — G0378 HOSPITAL OBSERVATION PER HR: HCPCS

## 2022-02-04 PROCEDURE — 80053 COMPREHEN METABOLIC PANEL: CPT | Performed by: EMERGENCY MEDICINE

## 2022-02-04 PROCEDURE — 83735 ASSAY OF MAGNESIUM: CPT | Performed by: EMERGENCY MEDICINE

## 2022-02-04 RX ORDER — ONDANSETRON 4 MG/1
4 TABLET, FILM COATED ORAL EVERY 6 HOURS PRN
Status: DISCONTINUED | OUTPATIENT
Start: 2022-02-04 | End: 2022-02-14 | Stop reason: HOSPADM

## 2022-02-04 RX ORDER — ONDANSETRON 2 MG/ML
4 INJECTION INTRAMUSCULAR; INTRAVENOUS ONCE
Status: COMPLETED | OUTPATIENT
Start: 2022-02-04 | End: 2022-02-04

## 2022-02-04 RX ORDER — FUROSEMIDE 40 MG/1
40 TABLET ORAL DAILY
Status: DISCONTINUED | OUTPATIENT
Start: 2022-02-04 | End: 2022-02-14 | Stop reason: HOSPADM

## 2022-02-04 RX ORDER — ACETAMINOPHEN 325 MG/1
650 TABLET ORAL ONCE
Status: DISCONTINUED | OUTPATIENT
Start: 2022-02-04 | End: 2022-02-14 | Stop reason: HOSPADM

## 2022-02-04 RX ORDER — ONDANSETRON 2 MG/ML
4 INJECTION INTRAMUSCULAR; INTRAVENOUS EVERY 6 HOURS PRN
Status: DISCONTINUED | OUTPATIENT
Start: 2022-02-04 | End: 2022-02-14 | Stop reason: HOSPADM

## 2022-02-04 RX ORDER — SPIRONOLACTONE 50 MG/1
50 TABLET, FILM COATED ORAL DAILY
Status: DISCONTINUED | OUTPATIENT
Start: 2022-02-04 | End: 2022-02-14 | Stop reason: HOSPADM

## 2022-02-04 RX ORDER — PANTOPRAZOLE SODIUM 40 MG/1
40 TABLET, DELAYED RELEASE ORAL EVERY MORNING
Status: DISCONTINUED | OUTPATIENT
Start: 2022-02-05 | End: 2022-02-14 | Stop reason: HOSPADM

## 2022-02-04 RX ORDER — ZINC SULFATE 50(220)MG
220 CAPSULE ORAL DAILY
Status: DISCONTINUED | OUTPATIENT
Start: 2022-02-04 | End: 2022-02-14 | Stop reason: HOSPADM

## 2022-02-04 RX ORDER — LACTULOSE 10 G/15ML
20 SOLUTION ORAL ONCE
Status: COMPLETED | OUTPATIENT
Start: 2022-02-04 | End: 2022-02-04

## 2022-02-04 RX ORDER — SODIUM CHLORIDE 0.9 % (FLUSH) 0.9 %
10 SYRINGE (ML) INJECTION EVERY 12 HOURS SCHEDULED
Status: DISCONTINUED | OUTPATIENT
Start: 2022-02-04 | End: 2022-02-14 | Stop reason: HOSPADM

## 2022-02-04 RX ORDER — SODIUM CHLORIDE 0.9 % (FLUSH) 0.9 %
10 SYRINGE (ML) INJECTION AS NEEDED
Status: DISCONTINUED | OUTPATIENT
Start: 2022-02-04 | End: 2022-02-14 | Stop reason: HOSPADM

## 2022-02-04 RX ORDER — LACTULOSE 10 G/15ML
10 SOLUTION ORAL 3 TIMES DAILY
Status: DISCONTINUED | OUTPATIENT
Start: 2022-02-04 | End: 2022-02-12

## 2022-02-04 RX ADMIN — METOPROLOL TARTRATE 12.5 MG: 25 TABLET, FILM COATED ORAL at 20:16

## 2022-02-04 RX ADMIN — RIFAXIMIN 550 MG: 550 TABLET ORAL at 20:15

## 2022-02-04 RX ADMIN — ONDANSETRON 4 MG: 2 INJECTION INTRAMUSCULAR; INTRAVENOUS at 21:59

## 2022-02-04 RX ADMIN — SPIRONOLACTONE 50 MG: 50 TABLET, FILM COATED ORAL at 20:14

## 2022-02-04 RX ADMIN — FUROSEMIDE 40 MG: 40 TABLET ORAL at 20:14

## 2022-02-04 RX ADMIN — SODIUM CHLORIDE 500 ML: 9 INJECTION, SOLUTION INTRAVENOUS at 09:48

## 2022-02-04 RX ADMIN — ONDANSETRON 4 MG: 2 INJECTION INTRAMUSCULAR; INTRAVENOUS at 09:11

## 2022-02-04 RX ADMIN — LACTULOSE 10 G: 10 SOLUTION ORAL at 20:14

## 2022-02-04 RX ADMIN — Medication 220 MG: at 20:14

## 2022-02-04 RX ADMIN — SODIUM CHLORIDE, PRESERVATIVE FREE 10 ML: 5 INJECTION INTRAVENOUS at 20:21

## 2022-02-04 RX ADMIN — LACTULOSE 20 G: 20 SOLUTION ORAL at 09:57

## 2022-02-04 NOTE — PROGRESS NOTES
Clinical Pharmacy Services: Medication History    Wei Potts is a 66 y.o. male presenting to [facility] for Hyperammonemia (HCC) [E72.20]    He  has a past medical history of Alcoholism (HCC), Anemia, Cirrhosis (HCC), Encephalopathy, Hypertension, and Liver disease.    Allergies as of 02/04/2022 - Reviewed 02/04/2022   Allergen Reaction Noted   • Iron GI Intolerance 02/04/2021       Medication information was obtained from: pharmacy  Pharmacy and Phone Number: [facility]     Prior to Admission Medications     Prescriptions Last Dose Informant Patient Reported? Taking?    amitriptyline (ELAVIL) 10 MG tablet   No Yes    Take 1 tablet by mouth Every Night.    furosemide (LASIX) 40 MG tablet   No Yes    TAKE 1 TABLET BY MOUTH DAILY    Generlac 10 GM/15ML solution solution (encephalopathy)   No Yes    TAKE 30 ML BY MOUTH ONCE TO TWICE DAILY UNTIL 3-4 BOWEL MOVEMENTS ARE MADE    lactulose (CHRONULAC) 10 GM/15ML solution   No Yes    TAKE 15 ML BY MOUTH TWICE DAILY, ADJUST DOSE UNTIL YOU HAVE 3-4 BOWEL MOVEMENTS A DAY    MAGnesium-Oxide 400 (241.3 Mg) MG tablet tablet  Pharmacy Yes Yes    400 mg 2 (Two) Times a Day.    metoprolol tartrate (LOPRESSOR) 25 MG tablet   No Yes    TAKE 1/2 TABLET BY MOUTH EVERY 12 HOURS    spironolactone (ALDACTONE) 50 MG tablet   No Yes    Take 1 tablet by mouth Daily.            Medication notes: Clarified prescription medications with filling pharmacy     This medication list is complete to the best of my knowledge as of 2/4/2022    Please call if questions.    Serafin Palma, PharmJOHAN   2/4/2022 17:20 EST

## 2022-02-04 NOTE — ED NOTES
"Nursing report ED to floor  Wei Potts  66 y.o.  male    HPI :   Chief Complaint   Patient presents with   • Altered Mental Status   • Nausea       Admitting doctor:   Jer Serrano MD    Admitting diagnosis:   The primary encounter diagnosis was Hyperammonemia (HCC). Diagnoses of Metabolic encephalopathy, Hepatic cirrhosis, unspecified hepatic cirrhosis type, unspecified whether ascites present (HCC), History of hypertension, Thrombocytopenia (HCC), and Chronic anemia were also pertinent to this visit.    Code status:   Current Code Status     Date Active Code Status Order ID Comments User Context       Prior    Advance Care Planning Activity          Allergies:   Iron    Intake and Output    Intake/Output Summary (Last 24 hours) at 2/4/2022 1511  Last data filed at 2/4/2022 1253  Gross per 24 hour   Intake 500 ml   Output --   Net 500 ml       Weight:       02/04/22  0910   Weight: 72.8 kg (160 lb 8 oz)       Most recent vitals:   Vitals:    02/04/22 0900 02/04/22 0910 02/04/22 1241   BP:  133/66 136/77   Pulse: 93  90   Resp: 18     Temp: 98 °F (36.7 °C)     TempSrc: Tympanic     SpO2: 100%  99%   Weight:  72.8 kg (160 lb 8 oz)    Height:  180.3 cm (71\")        Active LDAs/IV Access:   Lines, Drains & Airways     Active LDAs     Name Placement date Placement time Site Days    Peripheral IV 02/04/22 0911 Left Forearm 02/04/22  0911  Forearm  less than 1                Labs (abnormal labs have a star):   Labs Reviewed   COVID-19,[facility] IN-HOU[facility] CEPHEID/HAY, NP SWAB IN TRANSPORT MEDIA 8-12 HR TAT - Abnormal; Notable for the following components:       Result Value    COVID19 Detected (*)     All other components within normal limits    Narrative:     Fact sheet for providers: https://www.fda.gov/media/367617/download    Fact sheet for patients: https://www.fda.gov/media/841335/download    Test performed by PCR.   COMPREHENSIVE METABOLIC PANEL - Abnormal; Notable for the following components:    Glucose 125 " (*)     Sodium 134 (*)     CO2 18.6 (*)     Calcium 8.5 (*)     Albumin 2.70 (*)     AST (SGOT) 59 (*)     Alkaline Phosphatase 139 (*)     Total Bilirubin 3.1 (*)     All other components within normal limits    Narrative:     GFR Normal >60  Chronic Kidney Disease <60  Kidney Failure <15     PROTIME-INR - Abnormal; Notable for the following components:    Protime 23.9 (*)     INR 2.14 (*)     All other components within normal limits   LIPASE - Abnormal; Notable for the following components:    Lipase 79 (*)     All other components within normal limits   URINALYSIS W/ MICROSCOPIC IF INDICATED (NO CULTURE) - Abnormal; Notable for the following components:    Color, UA Dark Yellow (*)     Ketones, UA Trace (*)     Bilirubin, UA Small (1+) (*)     Leuk Esterase, UA Trace (*)     All other components within normal limits   LACTIC ACID, PLASMA - Abnormal; Notable for the following components:    Lactate 3.4 (*)     All other components within normal limits   AMMONIA - Abnormal; Notable for the following components:    Ammonia 106 (*)     All other components within normal limits   CBC WITH AUTO DIFFERENTIAL - Abnormal; Notable for the following components:    WBC 2.58 (*)     RBC 3.10 (*)     Hemoglobin 8.5 (*)     Hematocrit 25.9 (*)     RDW 16.2 (*)     Platelets 48 (*)     All other components within normal limits   MANUAL DIFFERENTIAL - Abnormal; Notable for the following components:    Lymphocyte % 7.3 (*)     Monocyte % 25.0 (*)     Lymphocytes Absolute 0.19 (*)     All other components within normal limits   POCT GLUCOSE FINGERSTICK - Abnormal; Notable for the following components:    Glucose 142 (*)     All other components within normal limits   TROPONIN (IN-HOUSE) - Normal    Narrative:     Troponin T Reference Range:  <= 0.03 ng/mL-   Negative for AMI  >0.03 ng/mL-     Abnormal for myocardial necrosis.  Clinicians would have to utilize clinical acumen, EKG, Troponin and serial changes to determine if it is  "an Acute Myocardial Infarction or myocardial injury due to an underlying chronic condition.       Results may be falsely decreased if patient taking Biotin.     BNP (IN-HOUSE) - Normal    Narrative:     Among patients with dyspnea, NT-proBNP is highly sensitive for the detection of acute congestive heart failure. In addition NT-proBNP of <300 pg/ml effectively rules out acute congestive heart failure with 99% negative predictive value.    Results may be falsely decreased if patient taking Biotin.     PROCALCITONIN - Normal    Narrative:     As a Marker for Sepsis (Non-Neonates):     1. <0.5 ng/mL represents a low risk of severe sepsis and/or septic shock.  2. >2 ng/mL represents a high risk of severe sepsis and/or septic shock.    As a Marker for Lower Respiratory Tract Infections that require antibiotic therapy:  PCT on Admission     Antibiotic Therapy             6-12 Hrs later  >0.5                          Strongly Recommended            >0.25 - <0.5             Recommended  0.1 - 0.25                  Discouraged                       Remeasure/reassess PCT  <0.1                         Strongly Discouraged         Remeasure/reassess PCT      As 28 day mortality risk marker: \"Change in Procalcitonin Result\" (>80% or <=80%) if Day 0 (or Day 1) and Day 4 values are available. Refer to http://[email or web address]-pct-calculator.com/    Change in PCT <=80 %   A decrease of PCT levels below or equal to 80% defines a positive change in PCT test result representing a higher risk for 28-day all-cause mortality of patients diagnosed with severe sepsis or septic shock.    Change in PCT >80 %   A decrease of PCT levels of more than 80% defines a negative change in PCT result representing a lower risk for 28-day all-cause mortality of patients diagnosed with severe sepsis or septic shock.               MAGNESIUM - Normal   URINE DRUG SCREEN - Normal    Narrative:     Negative Thresholds Per Drugs Screened:    Amphetamines            "      500 ng/ml  Barbiturates                 200 ng/ml  Benzodiazepines              100 ng/ml  Cocaine                      300 ng/ml  Methadone                    300 ng/ml  Opiates                      300 ng/ml  Oxycodone                    100 ng/ml  THC                           50 ng/ml    The Normal Value for all drugs tested is negative. This report includes final unconfirmed screening results to be used for medical treatment purposes only. Unconfirmed results must not be used for non-medical purposes such as employment or legal testing. Clinical consideration should be applied to any drug of abuse test, particularly when unconfirmed results are used.           LACTIC ACID, REFLEX - Normal   COVID PRE-OP / PRE-PROCEDURE SCREENING ORDER (NO ISOLATION)    Narrative:     The following orders were created for panel order COVID PRE-OP / PRE-PROCEDURE SCREENING ORDER (NO ISOLATION) - Swab, Nasopharynx.  Procedure                               Abnormality         Status                     ---------                               -----------         ------                     COVID-19,[facility]...[[id number]]  Abnormal            Final result                 Please view results for these tests on the individual orders.   ETHANOL   URINALYSIS, MICROSCOPIC ONLY   CBC AND DIFFERENTIAL    Narrative:     The following orders were created for panel order CBC & Differential.  Procedure                               Abnormality         Status                     ---------                               -----------         ------                     CBC Auto Differential[[id number]]        Abnormal            Final result                 Please view results for these tests on the individual orders.       EKG:   No orders to display       Meds given in ED:   Medications   ondansetron (ZOFRAN) injection 4 mg (4 mg Intravenous Given 2/4/22 1401)   sodium chloride 0.9 % bolus 500 mL (0 mL Intravenous Stopped 2/4/22 1253)    lactulose (CHRONULAC) 10 GM/15ML solution 20 g (20 g Oral Given 2/4/22 0957)       Imaging results:  No radiology results for the last day    Ambulatory status:   Self     Social issues:   Social History     Socioeconomic History   • Marital status:    Tobacco Use   • Smoking status: Never Smoker   • Smokeless tobacco: Never Used   Vaping Use   • Vaping Use: Never used   Substance and Sexual Activity   • Alcohol use: Not Currently   • Drug use: No   • Sexual activity: Defer       NIH Stroke Scale:        Nursing report ED to floor:     Marley Phelps RN  02/04/22 5736

## 2022-02-04 NOTE — H&P
HISTORY AND PHYSICAL   [facility]        Patient Identification:  Name: Wei Potts  Age: 66 y.o.  Sex: male  :  1955  MRN: [id number]                     Primary Care Physician: Bella Villalta MD    Chief Complaint: Disorientation    History of Present Illness:       The patient is a 66 y.o. male with history of alcoholism, cirrhosis, anemia, hypertension and liver disease who presents to the hospital for evaluation for increased nausea and disorientation since last night.  Patient has a history of liver cirrhosis, reports having a paracentesis done 2 weeks ago.  Denies any vomiting, regular bowel movement.  Taking lactulose daily.  No fevers, chills, chest pain, short of breath.  Has had a mild cough.  Reports eating and drinking well.  Denies any abdominal pain or significant distention.  The patient was evaluated in the ER and noted to have very elevated ammonia level and felt to have hepatic encephalopathy.  The patient was admitted for further evaluation treatment.      Past Medical History:  Past Medical History:   Diagnosis Date   • Alcoholism (HCC)    • Anemia    • Cirrhosis (HCC)    • Encephalopathy    • Hypertension    • Liver disease      Past Surgical History:  Past Surgical History:   Procedure Laterality Date   • COLONOSCOPY     • ENDOSCOPY        Home Meds:  (Not in a hospital admission)    Current meds  No current facility-administered medications for this encounter.    Current Outpatient Medications:   •  amitriptyline (ELAVIL) 10 MG tablet, Take 1 tablet by mouth Every Night., Disp: 30 tablet, Rfl: 1  •  Cholecalciferol (VITAMIN D3 PO), Take 25 mg by mouth Daily., Disp: , Rfl:   •  furosemide (LASIX) 40 MG tablet, TAKE 1 TABLET BY MOUTH DAILY, Disp: 30 tablet, Rfl: 2  •  Generlac 10 GM/15ML solution solution (encephalopathy), TAKE 30 ML BY MOUTH ONCE TO TWICE DAILY UNTIL 3-4 BOWEL MOVEMENTS ARE MADE, Disp: 1800 mL, Rfl: 2  •  lactulose (CHRONULAC) 10 GM/15ML solution,  TAKE 15 ML BY MOUTH TWICE DAILY, ADJUST DOSE UNTIL YOU HAVE 3-4 BOWEL MOVEMENTS A DAY, Disp: 512 mL, Rfl: 0  •  MAGnesium-Oxide 400 (241.3 Mg) MG tablet tablet, , Disp: , Rfl:   •  metoprolol tartrate (LOPRESSOR) 25 MG tablet, TAKE 1/2 TABLET BY MOUTH EVERY 12 HOURS, Disp: 90 tablet, Rfl: 0  •  Multiple Vitamins-Minerals (MULTIVITAMIN ADULT EXTRA C PO), , Disp: , Rfl:   •  omeprazole (priLOSEC) 40 MG capsule, Take 40 mg by mouth As Needed., Disp: , Rfl:   •  spironolactone (ALDACTONE) 50 MG tablet, Take 1 tablet by mouth Daily., Disp: 30 tablet, Rfl: 3  •  VITAMIN E PO, Take  by mouth., Disp: , Rfl:   •  zinc gluconate 50 MG tablet, Take 50 mg by mouth Daily., Disp: , Rfl:   Allergies:  Allergies   Allergen Reactions   • Iron GI Intolerance     Immunizations:  Immunization History   Administered Date(s) Administered   • COVID-19 (PFIZER) PURPLE CAP 05/20/2021, 06/10/2021   • Hep B, Adolescent or Pediatric 07/09/2020   • Hepatitis B Vaccine Adult IM 10/12/2020   • MMR 11/16/1999   • Td 11/16/1999, 01/01/2008   • flucelvax quad pfs =>4 YRS 10/12/2020     Social History:   Social History     Social History Narrative   • Not on file     Social History     Socioeconomic History   • Marital status:    Tobacco Use   • Smoking status: Never Smoker   • Smokeless tobacco: Never Used   Vaping Use   • Vaping Use: Never used   Substance and Sexual Activity   • Alcohol use: Not Currently   • Drug use: No   • Sexual activity: Defer       Family History:  Family History   Problem Relation Age of Onset   • Diabetes Mother    • Hypertension Father         Review of Systems  See history of present illness and past medical history.  Patient denies headache, dizziness, syncope, falls, trauma, change in vision, change in hearing, change in taste, changes in weight, changes in appetite, focal weakness, numbness, or paresthesia.  Patient denies chest pain, palpitations, dyspnea, orthopnea, PND, cough, sinus pressure, rhinorrhea,  "epistaxis, hemoptysis, nausea, vomiting, hematemesis, diarrhea, constipation or hematchezia.  Denies cold or heat intolerance, polydipsia, polyuria, polyphagia. Denies hematuria, pyuria, dysuria, hesitancy, frequency or urgency. Denies consumption of raw and under cooked meats foods or change in water source.  Denies fever, chills, sweats, night sweats.  Denies missing any routine medications. Remainder of ROS is negative.    Objective:  tMax 24 hrs: Temp (24hrs), Av °F (36.7 °C), Min:98 °F (36.7 °C), Max:98 °F (36.7 °C)    Vitals Ranges:   Temp:  [98 °F (36.7 °C)] 98 °F (36.7 °C)  Heart Rate:  [90-93] 90  Resp:  [18] 18  BP: (133-136)/(66-77) 136/77      Exam:  /77   Pulse 90   Temp 98 °F (36.7 °C) (Tympanic)   Resp 18   Ht 180.3 cm (71\")   Wt 72.8 kg (160 lb 8 oz)   SpO2 99%   BMI 22.39 kg/m²     General Appearance:    Alert, cooperative, no distress, appears stated age   Head:    Normocephalic, without obvious abnormality, atraumatic   Eyes:    PERRL, conjunctivae/corneas clear, EOM's intact, both eyes   Ears:    Normal external ear canals, both ears   Nose:   Nares normal, septum midline, mucosa normal, no drainage    or sinus tenderness   Throat:   Lips, mucosa, and tongue normal   Neck:   Supple, symmetrical, trachea midline, no adenopathy;     thyroid:  no enlargement/tenderness/nodules; no carotid    bruit or JVD   Back:     Symmetric, no curvature, ROM normal, no CVA tenderness   Lungs:     Clear to auscultation bilaterally, respirations unlabored   Chest Wall:    No tenderness or deformity    Heart:    Regular rate and rhythm, S1 and S2 normal, no murmur, rub   or gallop   Abdomen:     Soft, nontender, bowel sounds active all four quadrants,     no masses, no hepatomegaly, no splenomegaly   Extremities:   Extremities normal, atraumatic, no cyanosis or edema   Pulses:   2+ and symmetric all extremities   Skin:   Skin color, texture, turgor normal, no rashes or lesions   Lymph nodes:   " Cervical, supraclavicular, and axillary nodes normal   Neurologic:   CNII-XII intact, normal strength, sensation intact throughout      .    Data Review:  Lab Results (last 72 hours)     Procedure Component Value Units Date/Time    COVID PRE-OP / PRE-PROCEDURE SCREENING ORDER (NO ISOLATION) - Swab, Nasopharynx [[id number]]  (Abnormal) Collected: 02/04/22 1253    Specimen: Swab from Nasopharynx Updated: 02/04/22 1333    Narrative:      The following orders were created for panel order COVID PRE-OP / PRE-PROCEDURE SCREENING ORDER (NO ISOLATION) - Swab, Nasopharynx.  Procedure                               Abnormality         Status                     ---------                               -----------         ------                     COVID-19,[facility]-HOUSE...[[id number]]  Abnormal            Final result                 Please view results for these tests on the individual orders.    COVID-19,[facility]-HOU[facility] CEPHEID/HAY NP SWAB IN TRANSPORT MEDIA 8-12 HR TAT - Swab, Nasopharynx [[id number]]  (Abnormal) Collected: 02/04/22 1253    Specimen: Swab from Nasopharynx Updated: 02/04/22 1333     COVID19 Detected    Narrative:      Fact sheet for providers: https://www.fda.gov/media/483042/download    Fact sheet for patients: https://www.fda.gov/media/971892/download    Test performed by PCR.    STAT Lactic Acid, Reflex [[id number]]  (Normal) Collected: 02/04/22 1302    Specimen: Blood Updated: 02/04/22 1327     Lactate 1.8 mmol/L     Urinalysis With Microscopic If Indicated (No Culture) - Urine, Clean Catch [[id number]]  (Abnormal) Collected: 02/04/22 0922    Specimen: Urine, Clean Catch Updated: 02/04/22 1008     Color, UA Dark Yellow     Appearance, UA Clear     pH, UA 7.0     Specific Gravity, UA 1.026     Glucose, UA Negative     Ketones, UA Trace     Bilirubin, UA Small (1+)     Blood, UA Negative     Protein, UA Negative     Leuk Esterase, UA Trace     Nitrite, UA Negative     Urobilinogen, UA 1.0 E.U./dL     Urinalysis, Microscopic Only - Urine, Clean Catch [[id number]] Collected: 02/04/22 0922    Specimen: Urine, Clean Catch Updated: 02/04/22 1008     RBC, UA 0-2 /HPF      WBC, UA 0-2 /HPF      Bacteria, UA None Seen /HPF      Squamous Epithelial Cells, UA 0-2 /HPF      Hyaline Casts, UA 0-2 /LPF      Methodology Automated Microscopy    Manual Differential [[id number]]  (Abnormal) Collected: 02/04/22 0906    Specimen: Blood Updated: 02/04/22 1002     Neutrophil % 67.7 %      Lymphocyte % 7.3 %      Monocyte % 25.0 %      Neutrophils Absolute 1.75 10*3/mm3      Lymphocytes Absolute 0.19 10*3/mm3      Monocytes Absolute 0.65 10*3/mm3      Ovalocytes Mod/2+     Poikilocytes Mod/2+     Polychromasia Slight/1+     WBC Morphology Normal     Platelet Morphology Normal    CBC & Differential [[id number]]  (Abnormal) Collected: 02/04/22 0906    Specimen: Blood Updated: 02/04/22 1002    Narrative:      The following orders were created for panel order CBC & Differential.  Procedure                               Abnormality         Status                     ---------                               -----------         ------                     CBC Auto Differential[[id number]]        Abnormal            Final result                 Please view results for these tests on the individual orders.    CBC Auto Differential [[id number]]  (Abnormal) Collected: 02/04/22 0906    Specimen: Blood Updated: 02/04/22 1002     WBC 2.58 10*3/mm3      RBC 3.10 10*6/mm3      Hemoglobin 8.5 g/dL      Hematocrit 25.9 %      MCV 83.5 fL      MCH 27.4 pg      MCHC 32.8 g/dL      RDW 16.2 %      RDW-SD 48.7 fl      MPV 10.1 fL      Platelets 48 10*3/mm3     Urine Drug Screen - Urine, Clean Catch [[id number]]  (Normal) Collected: 02/04/22 0922    Specimen: Urine, Clean Catch Updated: 02/04/22 0949     Amphet/Methamphet, Screen Negative     Barbiturates Screen, Urine Negative     Benzodiazepine Screen, Urine Negative     Cocaine Screen, Urine Negative     Opiate  "Screen Negative     THC, Screen, Urine Negative     Methadone Screen, Urine Negative     Oxycodone Screen, Urine Negative    Narrative:      Negative Thresholds Per Drugs Screened:    Amphetamines                 500 ng/ml  Barbiturates                 200 ng/ml  Benzodiazepines              100 ng/ml  Cocaine                      300 ng/ml  Methadone                    300 ng/ml  Opiates                      300 ng/ml  Oxycodone                    100 ng/ml  THC                           50 ng/ml    The Normal Value for all drugs tested is negative. This report includes final unconfirmed screening results to be used for medical treatment purposes only. Unconfirmed results must not be used for non-medical purposes such as employment or legal testing. Clinical consideration should be applied to any drug of abuse test, particularly when unconfirmed results are used.            Protime-INR [[id number]]  (Abnormal) Collected: 02/04/22 0906    Specimen: Blood Updated: 02/04/22 0941     Protime 23.9 Seconds      INR 2.14    Procalcitonin [[id number]]  (Normal) Collected: 02/04/22 0906    Specimen: Blood Updated: 02/04/22 0941     Procalcitonin 0.13 ng/mL     Narrative:      As a Marker for Sepsis (Non-Neonates):     1. <0.5 ng/mL represents a low risk of severe sepsis and/or septic shock.  2. >2 ng/mL represents a high risk of severe sepsis and/or septic shock.    As a Marker for Lower Respiratory Tract Infections that require antibiotic therapy:  PCT on Admission     Antibiotic Therapy             6-12 Hrs later  >0.5                          Strongly Recommended            >0.25 - <0.5             Recommended  0.1 - 0.25                  Discouraged                       Remeasure/reassess PCT  <0.1                         Strongly Discouraged         Remeasure/reassess PCT      As 28 day mortality risk marker: \"Change in Procalcitonin Result\" (>80% or <=80%) if Day 0 (or Day 1) and Day 4 values are available. Refer to " http://www.[facility]-pct-calculator.com/    Change in PCT <=80 %   A decrease of PCT levels below or equal to 80% defines a positive change in PCT test result representing a higher risk for 28-day all-cause mortality of patients diagnosed with severe sepsis or septic shock.    Change in PCT >80 %   A decrease of PCT levels of more than 80% defines a negative change in PCT result representing a lower risk for 28-day all-cause mortality of patients diagnosed with severe sepsis or septic shock.                Lactic Acid, Plasma [[id number]]  (Abnormal) Collected: 02/04/22 0906    Specimen: Blood Updated: 02/04/22 0940     Lactate 3.4 mmol/L     Ammonia [[id number]]  (Abnormal) Collected: 02/04/22 0906    Specimen: Blood Updated: 02/04/22 0935     Ammonia 106 umol/L     Comprehensive Metabolic Panel [[id number]]  (Abnormal) Collected: 02/04/22 0906    Specimen: Blood Updated: 02/04/22 0934     Glucose 125 mg/dL      BUN 13 mg/dL      Creatinine 1.10 mg/dL      Sodium 134 mmol/L      Potassium 4.3 mmol/L      Chloride 106 mmol/L      CO2 18.6 mmol/L      Calcium 8.5 mg/dL      Total Protein 6.8 g/dL      Albumin 2.70 g/dL      ALT (SGPT) 26 U/L      AST (SGOT) 59 U/L      Alkaline Phosphatase 139 U/L      Total Bilirubin 3.1 mg/dL      eGFR Non African Amer 67 mL/min/1.73      Globulin 4.1 gm/dL      A/G Ratio 0.7 g/dL      BUN/Creatinine Ratio 11.8     Anion Gap 9.4 mmol/L     Narrative:      GFR Normal >60  Chronic Kidney Disease <60  Kidney Failure <15      Lipase [[id number]]  (Abnormal) Collected: 02/04/22 0906    Specimen: Blood Updated: 02/04/22 0934     Lipase 79 U/L     Troponin [[id number]]  (Normal) Collected: 02/04/22 0906    Specimen: Blood Updated: 02/04/22 0934     Troponin T <0.010 ng/mL     Narrative:      Troponin T Reference Range:  <= 0.03 ng/mL-   Negative for AMI  >0.03 ng/mL-     Abnormal for myocardial necrosis.  Clinicians would have to utilize clinical acumen, EKG, Troponin and serial changes to  determine if it is an Acute Myocardial Infarction or myocardial injury due to an underlying chronic condition.       Results may be falsely decreased if patient taking Biotin.      Magnesium [[id number]]  (Normal) Collected: 02/04/22 0906    Specimen: Blood Updated: 02/04/22 0934     Magnesium 2.1 mg/dL     Ethanol [[id number]] Collected: 02/04/22 0906    Specimen: Blood Updated: 02/04/22 0934     Ethanol <10 mg/dL      Ethanol % <0.010 %     BNP [[id number]]  (Normal) Collected: 02/04/22 0906    Specimen: Blood Updated: 02/04/22 0932     proBNP 339.0 pg/mL     Narrative:      Among patients with dyspnea, NT-proBNP is highly sensitive for the detection of acute congestive heart failure. In addition NT-proBNP of <300 pg/ml effectively rules out acute congestive heart failure with 99% negative predictive value.    Results may be falsely decreased if patient taking Biotin.      POC Glucose Once [[id number]]  (Abnormal) Collected: 02/04/22 0906    Specimen: Blood Updated: 02/04/22 0907     Glucose 142 mg/dL      Comment: Meter: [id number] : [id number] Glen Dickerson ([facility] [employer]                      Imaging Results (All)     None        Past Medical History:   Diagnosis Date   • Alcoholism (HCC)    • Anemia    • Cirrhosis (HCC)    • Encephalopathy    • Hypertension    • Liver disease        Assessment:  Active Hospital Problems    Diagnosis  POA   • Hyperammonemia (HCC) [E72.20]  Yes   • Hypertension [I10]  Yes   • GERD (gastroesophageal reflux disease) [K21.9]  Yes   • Hepatic encephalopathy (HCC) [K72.90]  Yes   • Coagulopathy (HCC) [D68.9]  Yes   • 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       Plan:  The patient admitted to the hospital and will continue with lactulose and add some Xifaxan.  Will ask for GI consultation for assistance with further evaluation treatment of hepatic  encephalopathy with elevated ammonia levels.    Jer Serrano MD  2/4/2022  16:53 EST

## 2022-02-04 NOTE — ED TRIAGE NOTES
Pt wife brings patient ER reports pt is confused since last night, pt is able to answer A&Ox4 at this time. Pt also reports nausea. Pt appears jaundice. Pt states not new for him has hx of cirrhosis.    Pt arrives in triage with mask on. Triage staff wearing N95 masks and goggles.

## 2022-02-04 NOTE — ED PROVIDER NOTES
EMERGENCY DEPARTMENT ENCOUNTER    Room Number:  10/10  Date of encounter:  2/4/2022  PCP: Bella Villalta MD  Historian: Patient      HPI:  Chief Complaint: Confusion, nausea  A complete HPI/ROS/PMH/PSH/SH/FH are unobtainable due to: None    Context: Wei Potts is a 66 y.o. male who presents to the ED via private vehicle for evaluation for increased nausea and disorientation since last night.  Patient has a history of liver cirrhosis, reports having a paracentesis done 2 weeks ago.  Denies any vomiting, regular bowel movement.  Taking lactulose daily.  No fevers, chills, chest pain, short of breath.  Has had a mild cough.  Reports eating and drinking well.  Denies any abdominal pain or significant distention.      MEDICAL RECORD REVIEW    GI office visit note reviewed from January 12 of 2022 patient has a history of SBP as well as liver cirrhosis and hepatic encephalopathy, low-sodium diet of 2 g daily.  Plan for repeat EGD in the near future.    PAST MEDICAL HISTORY  Active Ambulatory Problems     Diagnosis Date Noted   • ED (erectile dysfunction) of organic origin 10/29/2015   • Alcoholic cirrhosis of liver with ascites (HCC) 07/06/2020   • Anemia 07/06/2020   • Jaundice 07/06/2020   • Coagulopathy (HCC) 07/08/2020   • Secondary thrombocytopenia 07/08/2020   • Lung abnormality- left apex 07/09/2020   • Hepatic encephalopathy (HCC) 12/25/2020   • Sepsis (HCC) 01/01/2021   • Colitis 01/02/2021   • Hypertension 01/02/2021   • GERD (gastroesophageal reflux disease) 01/02/2021   • SBP (spontaneous bacterial peritonitis) (HCC) 01/06/2021     Resolved Ambulatory Problems     Diagnosis Date Noted   • Benign essential hypertension 10/29/2015   • Hyponatremia 07/06/2020     Past Medical History:   Diagnosis Date   • Alcoholism (HCC)    • Cirrhosis (HCC)    • Encephalopathy    • Liver disease          PAST SURGICAL HISTORY  Past Surgical History:   Procedure Laterality Date   • COLONOSCOPY     • ENDOSCOPY            FAMILY HISTORY  Family History   Problem Relation Age of Onset   • Diabetes Mother    • Hypertension Father          SOCIAL HISTORY  Social History     Socioeconomic History   • Marital status:    Tobacco Use   • Smoking status: Never Smoker   • Smokeless tobacco: Never Used   Vaping Use   • Vaping Use: Never used   Substance and Sexual Activity   • Alcohol use: Not Currently   • Drug use: No   • Sexual activity: Defer         ALLERGIES  Iron        REVIEW OF SYSTEMS  Review of Systems     All systems reviewed and negative except for those discussed in HPI.       PHYSICAL EXAM    I have reviewed the triage vital signs and nursing notes.    ED Triage Vitals   Temp Heart Rate Resp BP SpO2   02/04/22 0900 02/04/22 0900 02/04/22 0900 02/04/22 0910 02/04/22 0900   98 °F (36.7 °C) 93 18 133/66 100 %      Temp src Heart Rate Source Patient Position BP Location FiO2 (%)   02/04/22 0900 -- -- -- --   Tympanic           Physical Exam  General: No acute distress, nontoxic, nondiaphoretic  HEENT: Mucous membranes moist, atraumatic, EOMI, mild scleral icterus  Neck: Full ROM  Pulm: Symmetric chest rise, nonlabored, lungs CTAB  Cardiovascular: Regular rate and rhythm, intact distal pulses  GI: Soft, nontender, nondistended, no rebound, no guarding, bowel sounds present  MSK: Full ROM, no deformity  Skin: Warm, dry  Neuro: Awake, alert, oriented x 4, GCS 15, moving all extremities, no focal deficits  Psych: Calm, cooperative      N95, protective eye goggles, and gloves used during this encounter. Patient in surgical mask.      LAB RESULTS  Recent Results (from the past 24 hour(s))   Comprehensive Metabolic Panel    Collection Time: 02/04/22  9:06 AM    Specimen: Blood   Result Value Ref Range    Glucose 125 (H) 65 - 99 mg/dL    BUN 13 8 - 23 mg/dL    Creatinine 1.10 0.76 - 1.27 mg/dL    Sodium 134 (L) 136 - 145 mmol/L    Potassium 4.3 3.5 - 5.2 mmol/L    Chloride 106 98 - 107 mmol/L    CO2 18.6 (L) 22.0 - 29.0  mmol/L    Calcium 8.5 (L) 8.6 - 10.5 mg/dL    Total Protein 6.8 6.0 - 8.5 g/dL    Albumin 2.70 (L) 3.50 - 5.20 g/dL    ALT (SGPT) 26 1 - 41 U/L    AST (SGOT) 59 (H) 1 - 40 U/L    Alkaline Phosphatase 139 (H) 39 - 117 U/L    Total Bilirubin 3.1 (H) 0.0 - 1.2 mg/dL    eGFR Non African Amer 67 >60 mL/min/1.73    Globulin 4.1 gm/dL    A/G Ratio 0.7 g/dL    BUN/Creatinine Ratio 11.8 7.0 - 25.0    Anion Gap 9.4 5.0 - 15.0 mmol/L   Protime-INR    Collection Time: 02/04/22  9:06 AM    Specimen: Blood   Result Value Ref Range    Protime 23.9 (H) 11.7 - 14.2 Seconds    INR 2.14 (H) 0.90 - 1.10   Lipase    Collection Time: 02/04/22  9:06 AM    Specimen: Blood   Result Value Ref Range    Lipase 79 (H) 13 - 60 U/L   Troponin    Collection Time: 02/04/22  9:06 AM    Specimen: Blood   Result Value Ref Range    Troponin T <0.010 0.000 - 0.030 ng/mL   BNP    Collection Time: 02/04/22  9:06 AM    Specimen: Blood   Result Value Ref Range    proBNP 339.0 0.0 - 900.0 pg/mL   Lactic Acid, Plasma    Collection Time: 02/04/22  9:06 AM    Specimen: Blood   Result Value Ref Range    Lactate 3.4 (C) 0.5 - 2.0 mmol/L   Procalcitonin    Collection Time: 02/04/22  9:06 AM    Specimen: Blood   Result Value Ref Range    Procalcitonin 0.13 0.00 - 0.25 ng/mL   Ammonia    Collection Time: 02/04/22  9:06 AM    Specimen: Blood   Result Value Ref Range    Ammonia 106 (H) 16 - 60 umol/L   Magnesium    Collection Time: 02/04/22  9:06 AM    Specimen: Blood   Result Value Ref Range    Magnesium 2.1 1.6 - 2.4 mg/dL   Ethanol    Collection Time: 02/04/22  9:06 AM    Specimen: Blood   Result Value Ref Range    Ethanol <10 0 - 10 mg/dL    Ethanol % <0.010 %   CBC Auto Differential    Collection Time: 02/04/22  9:06 AM    Specimen: Blood   Result Value Ref Range    WBC 2.58 (L) 3.40 - 10.80 10*3/mm3    RBC 3.10 (L) 4.14 - 5.80 10*6/mm3    Hemoglobin 8.5 (L) 13.0 - 17.7 g/dL    Hematocrit 25.9 (L) 37.5 - 51.0 %    MCV 83.5 79.0 - 97.0 fL    MCH 27.4 26.6 - 33.0  pg    MCHC 32.8 31.5 - 35.7 g/dL    RDW 16.2 (H) 12.3 - 15.4 %    RDW-SD 48.7 37.0 - 54.0 fl    MPV 10.1 6.0 - 12.0 fL    Platelets 48 (C) 140 - 450 10*3/mm3   POC Glucose Once    Collection Time: 02/04/22  9:06 AM    Specimen: Blood   Result Value Ref Range    Glucose 142 (H) 70 - 130 mg/dL   Manual Differential    Collection Time: 02/04/22  9:06 AM    Specimen: Blood   Result Value Ref Range    Neutrophil % 67.7 42.7 - 76.0 %    Lymphocyte % 7.3 (L) 19.6 - 45.3 %    Monocyte % 25.0 (H) 5.0 - 12.0 %    Neutrophils Absolute 1.75 1.70 - 7.00 10*3/mm3    Lymphocytes Absolute 0.19 (L) 0.70 - 3.10 10*3/mm3    Monocytes Absolute 0.65 0.10 - 0.90 10*3/mm3    Ovalocytes Mod/2+ None Seen    Poikilocytes Mod/2+ None Seen    Polychromasia Slight/1+ None Seen    WBC Morphology Normal Normal    Platelet Morphology Normal Normal   Urinalysis With Microscopic If Indicated (No Culture) - Urine, Clean Catch    Collection Time: 02/04/22  9:22 AM    Specimen: Urine, Clean Catch   Result Value Ref Range    Color, UA Dark Yellow (A) Yellow, Straw    Appearance, UA Clear Clear    pH, UA 7.0 5.0 - 8.0    Specific Gravity, UA 1.026 1.005 - 1.030    Glucose, UA Negative Negative    Ketones, UA Trace (A) Negative    Bilirubin, UA Small (1+) (A) Negative    Blood, UA Negative Negative    Protein, UA Negative Negative    Leuk Esterase, UA Trace (A) Negative    Nitrite, UA Negative Negative    Urobilinogen, UA 1.0 E.U./dL 0.2 - 1.0 E.U./dL   Urine Drug Screen - Urine, Clean Catch    Collection Time: 02/04/22  9:22 AM    Specimen: Urine, Clean Catch   Result Value Ref Range    Amphet/Methamphet, Screen Negative Negative    Barbiturates Screen, Urine Negative Negative    Benzodiazepine Screen, Urine Negative Negative    Cocaine Screen, Urine Negative Negative    Opiate Screen Negative Negative    THC, Screen, Urine Negative Negative    Methadone Screen, Urine Negative Negative    Oxycodone Screen, Urine Negative Negative   Urinalysis,  Microscopic Only - Urine, Clean Catch    Collection Time: 02/04/22  9:22 AM    Specimen: Urine, Clean Catch   Result Value Ref Range    RBC, UA 0-2 None Seen, 0-2 /HPF    WBC, UA 0-2 None Seen, 0-2 /HPF    Bacteria, UA None Seen None Seen /HPF    Squamous Epithelial Cells, UA 0-2 None Seen, 0-2 /HPF    Hyaline Casts, UA 0-2 None Seen /LPF    Methodology Automated Microscopy    COVID-19,[facility] IN-HOUSE CEPHEID/HAY NP SWAB IN TRANSPORT MEDIA 8-12 HR TAT - Swab, Nasopharynx    Collection Time: 02/04/22 12:53 PM    Specimen: Nasopharynx; Swab   Result Value Ref Range    COVID19 Detected (C) Not Detected - Ref. Range   STAT Lactic Acid, Reflex    Collection Time: 02/04/22  1:02 PM    Specimen: Blood   Result Value Ref Range    Lactate 1.8 0.5 - 2.0 mmol/L       Ordered the above labs and independently reviewed the results.        RADIOLOGY  No Radiology Exams Resulted Within Past 24 Hours        PROCEDURES    Procedures      MEDICATIONS GIVEN IN ER    Medications   ondansetron (ZOFRAN) injection 4 mg (4 mg Intravenous Given 2/4/22 0911)   sodium chloride 0.9 % bolus 500 mL (0 mL Intravenous Stopped 2/4/22 1253)   lactulose (CHRONULAC) 10 GM/15ML solution 20 g (20 g Oral Given 2/4/22 0957)         PROGRESS, DATA ANALYSIS, CONSULTS, AND MEDICAL DECISION MAKING    All labs have been independently reviewed by me.  All radiology studies have been reviewed by me and discussed with radiologist dictating the report.   EKG's independently viewed and interpreted by me.  Discussion below represents my analysis of pertinent findings related to patient's condition, differential diagnosis, treatment plan and final disposition.    Initial concern for hyperammonemia, renal failure, hepatic failure, electrolyte abnormalities, among others.    ED Course as of 02/04/22 1402   Fri Feb 04, 2022   0936 Glucose(!): 125 [DC]   0936 BUN: 13 [DC]   0936 Creatinine: 1.10 [DC]   0936 Sodium(!): 134 [DC]   0936 Potassium: 4.3 [DC]   0936 CO2(!):  18.6 [DC]   0936 ALT (SGPT): 26 [DC]   0936 AST (SGOT)(!): 59 [DC]   0936 Alkaline Phosphatase(!): 139 [DC]   0936 Total Bilirubin(!): 3.1 [DC]   0936 Anion Gap: 9.4 [DC]   0937 Ammonia(!): 106 [DC]   0937 Magnesium: 2.1 [DC]   0937 Lipase(!): 79 [DC]   0940 Lactate(!!): 3.4 [DC]   0941 Nausea and disorientation may be related to hyperammonemia, the ammonia level of 106 is elevated from his prior values.  Also with an elevated lactate of 3.4, unclear if this is  more of a volume issue or related to underlying liver disease, and he is frequently elevated and this is not unusual for him.  Bilirubin is slightly elevated from prior 3.1 today up from 2.6. [DC]   0945 Specifically, the disorientation he was having was roaming behavior and confusion, was walking around the table continuously and also went outside and was walking around without seemingly being aware of his situation.  I think this is a little more extreme behavior than I would be comfortable sending home at this time, I do think keeping him overnight for monitoring and ammonia control to be the safest option for the patient.  Patient and wife are agreeable and comfortable with plan moving forward for hospitalization.  All questions and concerns addressed. [DC]   0948 Ethanol %: <0.010 [DC]   0957 Discussed with Dr. Serrano, hospitalist [DC]      ED Course User Index  [DC] Tad Morris MD       AS OF 14:02 EST VITALS:    BP - 136/77  HR - 90  TEMP - 98 °F (36.7 °C) (Tympanic)  02 SATS - 99%        DIAGNOSIS  Final diagnoses:   Hyperammonemia (HCC)   Metabolic encephalopathy   Hepatic cirrhosis, unspecified hepatic cirrhosis type, unspecified whether ascites present (HCC)   History of hypertension   Thrombocytopenia (HCC)   Chronic anemia         DISPOSITION  HOSPITALIZATION    Discussed treatment plan and reason for hospitalization with pt/family and hospitalizing physician.  Pt/family voiced understanding of the plan for hospitalization for further  testing/treatment as needed.                  Tad Morris MD  02/04/22 [id number]

## 2022-02-05 ENCOUNTER — APPOINTMENT (OUTPATIENT)
Dept: ULTRASOUND IMAGING | Facility: HOSPITAL | Age: 67
End: 2022-02-05

## 2022-02-05 LAB
ALBUMIN SERPL-MCNC: 2.4 G/DL (ref 3.5–5.2)
ALBUMIN/GLOB SERPL: 0.6 G/DL
ALP SERPL-CCNC: 104 U/L (ref 39–117)
ALPHA-FETOPROTEIN: 2.56 NG/ML (ref 0–8.3)
ALT SERPL W P-5'-P-CCNC: 33 U/L (ref 1–41)
AMMONIA BLD-SCNC: 83 UMOL/L (ref 16–60)
ANION GAP SERPL CALCULATED.3IONS-SCNC: 11 MMOL/L (ref 5–15)
AST SERPL-CCNC: 82 U/L (ref 1–40)
BILIRUB SERPL-MCNC: 2.4 MG/DL (ref 0–1.2)
BUN SERPL-MCNC: 12 MG/DL (ref 8–23)
BUN/CREAT SERPL: 11.8 (ref 7–25)
BURR CELLS BLD QL SMEAR: ABNORMAL
CALCIUM SPEC-SCNC: 7.7 MG/DL (ref 8.6–10.5)
CHLORIDE SERPL-SCNC: 106 MMOL/L (ref 98–107)
CO2 SERPL-SCNC: 15 MMOL/L (ref 22–29)
CREAT SERPL-MCNC: 1.02 MG/DL (ref 0.76–1.27)
DEPRECATED RDW RBC AUTO: 47.6 FL (ref 37–54)
ELLIPTOCYTES BLD QL SMEAR: ABNORMAL
ERYTHROCYTE [DISTWIDTH] IN BLOOD BY AUTOMATED COUNT: 15.8 % (ref 12.3–15.4)
GFR SERPL CREATININE-BSD FRML MDRD: 74 ML/MIN/1.73
GLOBULIN UR ELPH-MCNC: 4.1 GM/DL
GLUCOSE SERPL-MCNC: 99 MG/DL (ref 65–99)
HCT VFR BLD AUTO: 24.2 % (ref 37.5–51)
HGB BLD-MCNC: 8.1 G/DL (ref 13–17.7)
INR PPP: 2.07 (ref 0.9–1.1)
LYMPHOCYTES # BLD MANUAL: 0.28 10*3/MM3 (ref 0.7–3.1)
LYMPHOCYTES NFR BLD MANUAL: 22.5 % (ref 5–12)
MCH RBC QN AUTO: 27.6 PG (ref 26.6–33)
MCHC RBC AUTO-ENTMCNC: 33.5 G/DL (ref 31.5–35.7)
MCV RBC AUTO: 82.3 FL (ref 79–97)
MONOCYTES # BLD: 0.57 10*3/MM3 (ref 0.1–0.9)
NEUTROPHILS # BLD AUTO: 1.67 10*3/MM3 (ref 1.7–7)
NEUTROPHILS NFR BLD MANUAL: 66.3 % (ref 42.7–76)
PLAT MORPH BLD: NORMAL
PLATELET # BLD AUTO: 38 10*3/MM3 (ref 140–450)
PMV BLD AUTO: 9.1 FL (ref 6–12)
POTASSIUM SERPL-SCNC: 4.1 MMOL/L (ref 3.5–5.2)
PROT SERPL-MCNC: 6.5 G/DL (ref 6–8.5)
PROTHROMBIN TIME: 23.3 SECONDS (ref 11.7–14.2)
RBC # BLD AUTO: 2.94 10*6/MM3 (ref 4.14–5.8)
SODIUM SERPL-SCNC: 132 MMOL/L (ref 136–145)
VARIANT LYMPHS NFR BLD MANUAL: 11.3 % (ref 19.6–45.3)
WBC MORPH BLD: NORMAL
WBC NRBC COR # BLD: 2.52 10*3/MM3 (ref 3.4–10.8)

## 2022-02-05 PROCEDURE — 99244 OFF/OP CNSLTJ NEW/EST MOD 40: CPT | Performed by: INTERNAL MEDICINE

## 2022-02-05 PROCEDURE — 80053 COMPREHEN METABOLIC PANEL: CPT | Performed by: HOSPITALIST

## 2022-02-05 PROCEDURE — 82105 ALPHA-FETOPROTEIN SERUM: CPT | Performed by: INTERNAL MEDICINE

## 2022-02-05 PROCEDURE — 85025 COMPLETE CBC W/AUTO DIFF WBC: CPT | Performed by: HOSPITALIST

## 2022-02-05 PROCEDURE — 85610 PROTHROMBIN TIME: CPT | Performed by: HOSPITALIST

## 2022-02-05 PROCEDURE — 76705 ECHO EXAM OF ABDOMEN: CPT

## 2022-02-05 PROCEDURE — 36415 COLL VENOUS BLD VENIPUNCTURE: CPT | Performed by: HOSPITALIST

## 2022-02-05 PROCEDURE — 85007 BL SMEAR W/DIFF WBC COUNT: CPT | Performed by: HOSPITALIST

## 2022-02-05 PROCEDURE — G0378 HOSPITAL OBSERVATION PER HR: HCPCS

## 2022-02-05 PROCEDURE — 82140 ASSAY OF AMMONIA: CPT | Performed by: HOSPITALIST

## 2022-02-05 RX ADMIN — SPIRONOLACTONE 50 MG: 50 TABLET, FILM COATED ORAL at 10:00

## 2022-02-05 RX ADMIN — METOPROLOL TARTRATE 12.5 MG: 25 TABLET, FILM COATED ORAL at 10:00

## 2022-02-05 RX ADMIN — FUROSEMIDE 40 MG: 40 TABLET ORAL at 10:00

## 2022-02-05 RX ADMIN — METOPROLOL TARTRATE 12.5 MG: 25 TABLET, FILM COATED ORAL at 20:13

## 2022-02-05 RX ADMIN — LACTULOSE 10 G: 10 SOLUTION ORAL at 20:13

## 2022-02-05 RX ADMIN — LACTULOSE 10 G: 10 SOLUTION ORAL at 10:00

## 2022-02-05 RX ADMIN — SODIUM CHLORIDE, PRESERVATIVE FREE 10 ML: 5 INJECTION INTRAVENOUS at 10:01

## 2022-02-05 RX ADMIN — RIFAXIMIN 550 MG: 550 TABLET ORAL at 10:03

## 2022-02-05 RX ADMIN — RIFAXIMIN 550 MG: 550 TABLET ORAL at 20:14

## 2022-02-05 RX ADMIN — SODIUM CHLORIDE, PRESERVATIVE FREE 10 ML: 5 INJECTION INTRAVENOUS at 20:14

## 2022-02-05 NOTE — CONSULTS
Gastroenterology   Initial Inpatient Consult Note  Thank you for asking our opinion on this patient.  Referring Provider: Jer Serrano MD    Reason for Consultation: Hepatic encephalopathy    Subjective     History of present illness:    66 y.o. male who is a patient of Dr. Nicolas who we are asked to see for hepatic encephalopathy.  His ammonia level at admission was 106 and is now down to 83.  He has a history of cirrhosis complicated by ascites requiring large-volume paracentesis as well as esophageal varices which have required banding in the past.  He presented with change in mental status with confusion and orientation.  Patient endorses that he takes lactulose daily.  He also takes spironolactone and Lasix for volume control.    MRI of the liver on 1/7/2022 showed hepatic cirrhosis with moderate splenomegaly and moderate to large ascites, peripheral chronic nonocclusive thrombus of the main portal vein, sympathetic states withliver lesion.     Liver ultrasound on 7/27/2021 showed cirrhosis, tiny subcentimeter hepatic cysts reidentified from previous exam.  No ascites fluid at that time.  No focal liver lesion identified     Last AFP tumor marker on 6/17/2021 was normal.     April 2021 EGD with [facility] with banding of esophageal varices x 5, colonoscopy with suboptimal prep-1 polyp removed. 6/3/21 with 4 additional bands placed.  Follow-up colonoscopy recommended in 1 year.       Past Medical History:  Past Medical History:   Diagnosis Date   • Alcoholism (HCC)    • Anemia    • Cirrhosis (HCC)    • Encephalopathy    • Hypertension    • Liver disease      Past Surgical History:  Past Surgical History:   Procedure Laterality Date   • COLONOSCOPY     • ENDOSCOPY        Social History:   Social History     Tobacco Use   • Smoking status: Never Smoker   • Smokeless tobacco: Never Used   Substance Use Topics   • Alcohol use: Not Currently      Family History:  Family History   Problem Relation Age of  Onset   • Diabetes Mother    • Hypertension Father        Home Meds:  Medications Prior to Admission   Medication Sig Dispense Refill Last Dose   • amitriptyline (ELAVIL) 10 MG tablet Take 1 tablet by mouth Every Night. 30 tablet 1    • furosemide (LASIX) 40 MG tablet TAKE 1 TABLET BY MOUTH DAILY 30 tablet 2    • Generlac 10 GM/15ML solution solution (encephalopathy) TAKE 30 ML BY MOUTH ONCE TO TWICE DAILY UNTIL 3-4 BOWEL MOVEMENTS ARE MADE 1800 mL 2    • lactulose (CHRONULAC) 10 GM/15ML solution TAKE 15 ML BY MOUTH TWICE DAILY, ADJUST DOSE UNTIL YOU HAVE 3-4 BOWEL MOVEMENTS A  mL 0    • MAGnesium-Oxide 400 (241.3 Mg) MG tablet tablet 400 mg 2 (Two) Times a Day.      • metoprolol tartrate (LOPRESSOR) 25 MG tablet TAKE 1/2 TABLET BY MOUTH EVERY 12 HOURS 90 tablet 0    • spironolactone (ALDACTONE) 50 MG tablet Take 1 tablet by mouth Daily. 30 tablet 3      Current Meds:   acetaminophen, 650 mg, Oral, Once  furosemide, 40 mg, Oral, Daily  lactulose, 10 g, Oral, TID  metoprolol tartrate, 12.5 mg, Oral, BID  pantoprazole, 40 mg, Oral, QAM  rifAXIMin, 550 mg, Oral, Q12H  sodium chloride, 10 mL, Intravenous, Q12H  spironolactone, 50 mg, Oral, Daily  zinc sulfate, 220 mg, Oral, Daily      Allergies:  Allergies   Allergen Reactions   • Iron GI Intolerance     Review of Systems  Pertinent items are noted in HPI, all other systems reviewed and negative    Objective     Vital Signs  Temp:  [97.8 °F (36.6 °C)-100.3 °F (37.9 °C)] 98.5 °F (36.9 °C)  Heart Rate:  [70-90] 80  Resp:  [12-18] 16  BP: (114-136)/(54-92) 117/75    Physical Exam:  CONSTITUTIONAL:  today's vital signs reviewed  EARS NOSE THROAT: trachea midline and no deformity of the nares  EYES: + scleral icterus  GASTROINTESTINAL: abdomen is soft, nontender, mildly distended without fluid wave with normal active bowel sounds, no masses are appreciated  PSYCHIATRIC: appropriate mood and affect  RESPIRATORY: normal inspiratory effort with no increased work of  breathing  NEUROLOGIC: patient is awake and alert  DERMATOLOGIC: skin is warm with no cyanosis  LYMPHATIC: no periumbilical lymphadenopathy     Results Review:   I reviewed the patient's new clinical results.    Results from last 7 days   Lab Units 02/05/22  0746 02/04/22  0906   WBC 10*3/mm3 2.52* 2.58*   HEMOGLOBIN g/dL 8.1* 8.5*   HEMATOCRIT % 24.2* 25.9*   PLATELETS 10*3/mm3 38* 48*     Results from last 7 days   Lab Units 02/05/22  0746 02/04/22  0906   SODIUM mmol/L 132* 134*   POTASSIUM mmol/L 4.1 4.3   CHLORIDE mmol/L 106 106   CO2 mmol/L 15.0* 18.6*   BUN mg/dL 12 13   CREATININE mg/dL 1.02 1.10   CALCIUM mg/dL 7.7* 8.5*   BILIRUBIN mg/dL 2.4* 3.1*   ALK PHOS U/L 104 139*   ALT (SGPT) U/L 33 26   AST (SGOT) U/L 82* 59*   GLUCOSE mg/dL 99 125*     Results from last 7 days   Lab Units 02/05/22  0746 02/04/22  0906   INR  2.07* 2.14*     Lab Results   Lab Value Date/Time    LIPASE 79 (H) 02/04/2022 0906    LIPASE 36 05/07/2021 1248    LIPASE 68 (H) 01/01/2021 2125    LIPASE 133 (H) 07/22/2020 1759    LIPASE 59 07/07/2020 0633    LIPASE 65 (H) 07/06/2020 1630       Radiology:  No orders to display       Assessment/Plan   Patient Active Problem List   Diagnosis   • ED (erectile dysfunction) of organic origin   • Alcoholic cirrhosis of liver with ascites (HCC)   • Anemia   • Jaundice   • Coagulopathy (HCC)   • Secondary thrombocytopenia   • Lung abnormality- left apex   • Hepatic encephalopathy (HCC)   • Sepsis (HCC)   • Colitis   • Hypertension   • GERD (gastroesophageal reflux disease)   • SBP (spontaneous bacterial peritonitis) (HCC)   • Hyperammonemia (HCC)       Assessment:  1. Hepatic encephalopathy.  Ammonia level is improving and his mental status seems to be clearing this morning.  2. Ascites with history of paracentesis.  Continue 2 g sodium diet and diuretics  3. History of esophageal varices status post banding.  No hematemesis or melena or evidence of overt bleeding.    Plan:  · Continue lactulose  and will add Xifaxan.  · Check ultrasound for hepatoma screening.  · Check alpha-fetoprotein.  · Follow-up as an outpatient with Dr. Mcgee for consideration of repeat EGD to evaluate his varices.      I discussed the patients findings and my recommendations with patient and nursing staff.    MD Jamal Martinez M.D.  [facility] [place] [address]  Office: [phone]

## 2022-02-05 NOTE — PROGRESS NOTES
"DAILY PROGRESS NOTE  [facility]    Patient Identification:  Name: Wei Potts  Age: 66 y.o.  Sex: male  :  1955  MRN: [id number]         Primary Care Physician: Bella Villalta MD    Subjective:  Interval History: He feels better today.    Objective:    Scheduled Meds:acetaminophen, 650 mg, Oral, Once  furosemide, 40 mg, Oral, Daily  lactulose, 10 g, Oral, TID  metoprolol tartrate, 12.5 mg, Oral, BID  pantoprazole, 40 mg, Oral, QAM  rifAXIMin, 550 mg, Oral, Q12H  sodium chloride, 10 mL, Intravenous, Q12H  spironolactone, 50 mg, Oral, Daily  zinc sulfate, 220 mg, Oral, Daily      Continuous Infusions:     Vital signs in last 24 hours:  Temp:  [97.8 °F (36.6 °C)-100.3 °F (37.9 °C)] 98.4 °F (36.9 °C)  Heart Rate:  [64-80] 64  Resp:  [12-18] 16  BP: (108-132)/(54-92) 108/61    Intake/Output:    Intake/Output Summary (Last 24 hours) at 2022 1359  Last data filed at 2022 2014  Gross per 24 hour   Intake 420 ml   Output --   Net 420 ml       Exam:  /61 (BP Location: Right arm, Patient Position: Lying)   Pulse 64   Temp 98.4 °F (36.9 °C) (Oral)   Resp 16   Ht 180.3 cm (71\")   Wt 72.8 kg (160 lb 8 oz)   SpO2 100%   BMI 22.39 kg/m²     General Appearance:    Alert, cooperative, no distress   Head:    Normocephalic, without obvious abnormality, atraumatic   Eyes:       Throat:   Lips, tongue, gums normal   Neck:   Supple, symmetrical, trachea midline, no JVD   Lungs:     Clear to auscultation bilaterally, respirations unlabored   Chest Wall:    No tenderness or deformity    Heart:    Regular rate and rhythm, S1 and S2 normal, no murmur,no  Rub or gallop   Abdomen:     Soft, ascites, nontender, bowel sounds active, no masses, no organomegaly    Extremities:   Extremities normal, atraumatic, no cyanosis or edema   Pulses:      Skin:   Skin is warm and dry,  no rashes or palpable lesions   Neurologic:   no focal deficits noted      Lab Results (last 72 hours)     Procedure " Component Value Units Date/Time    AFP Tumor Marker [[id number]]  (Normal) Collected: 02/05/22 1105    Specimen: Blood Updated: 02/05/22 1312     ALPHA-FETOPROTEIN 2.56 ng/mL     Narrative:      Alpha Fetoprotein Tumor Marker Reference Range:    0.0-8.3 ng/mL    Note: Normal values apply only to males and nonpregnant females. These results are not interpretable for pregnant females.    Results may be falsely decreased if patient taking Biotin.      Manual Differential [[id number]]  (Abnormal) Collected: 02/05/22 0746    Specimen: Blood Updated: 02/05/22 0935     Neutrophil % 66.3 %      Lymphocyte % 11.3 %      Monocyte % 22.5 %      Neutrophils Absolute 1.67 10*3/mm3      Lymphocytes Absolute 0.28 10*3/mm3      Monocytes Absolute 0.57 10*3/mm3      Crenated RBC's Mod/2+     Elliptocytes Mod/2+     WBC Morphology Normal     Platelet Morphology Normal    CBC & Differential [[id number]]  (Abnormal) Collected: 02/05/22 0746    Specimen: Blood Updated: 02/05/22 0935    Narrative:      The following orders were created for panel order CBC & Differential.  Procedure                               Abnormality         Status                     ---------                               -----------         ------                     CBC Auto Differential[[id number]]        Abnormal            Final result                 Please view results for these tests on the individual orders.    CBC Auto Differential [[id number]]  (Abnormal) Collected: 02/05/22 0746    Specimen: Blood Updated: 02/05/22 0935     WBC 2.52 10*3/mm3      RBC 2.94 10*6/mm3      Hemoglobin 8.1 g/dL      Hematocrit 24.2 %      MCV 82.3 fL      MCH 27.6 pg      MCHC 33.5 g/dL      RDW 15.8 %      RDW-SD 47.6 fl      MPV 9.1 fL      Platelets 38 10*3/mm3     Comprehensive Metabolic Panel [[id number]]  (Abnormal) Collected: 02/05/22 0746    Specimen: Blood Updated: 02/05/22 0850     Glucose 99 mg/dL      BUN 12 mg/dL      Creatinine 1.02 mg/dL      Sodium 132 mmol/L       Potassium 4.1 mmol/L      Chloride 106 mmol/L      CO2 15.0 mmol/L      Calcium 7.7 mg/dL      Total Protein 6.5 g/dL      Albumin 2.40 g/dL      ALT (SGPT) 33 U/L      AST (SGOT) 82 U/L      Alkaline Phosphatase 104 U/L      Total Bilirubin 2.4 mg/dL      eGFR Non African Amer 74 mL/min/1.73      Globulin 4.1 gm/dL      A/G Ratio 0.6 g/dL      BUN/Creatinine Ratio 11.8     Anion Gap 11.0 mmol/L     Narrative:      GFR Normal >60  Chronic Kidney Disease <60  Kidney Failure <15      Ammonia [[id number]]  (Abnormal) Collected: 02/05/22 0746    Specimen: Blood Updated: 02/05/22 0816     Ammonia 83 umol/L     Protime-INR [[id number]]  (Abnormal) Collected: 02/05/22 0746    Specimen: Blood Updated: 02/05/22 0813     Protime 23.3 Seconds      INR 2.07    COVID PRE-OP / PRE-PROCEDURE SCREENING ORDER (NO ISOLATION) - Swab, Nasopharynx [[id number]]  (Abnormal) Collected: 02/04/22 1253    Specimen: Swab from Nasopharynx Updated: 02/04/22 1333    Narrative:      The following orders were created for panel order COVID PRE-OP / PRE-PROCEDURE SCREENING ORDER (NO ISOLATION) - Swab, Nasopharynx.  Procedure                               Abnormality         Status                     ---------                               -----------         ------                     COVID-19,[facility]...[[id number]]  Abnormal            Final result                 Please view results for these tests on the individual orders.    COVID-19,[facility] CEPHEID/HAY NP SWAB IN TRANSPORT MEDIA 8-12 HR TAT - Swab, Nasopharynx [[id number]]  (Abnormal) Collected: 02/04/22 1253    Specimen: Swab from Nasopharynx Updated: 02/04/22 1333     COVID19 Detected    Narrative:      Fact sheet for providers: https://www.fda.gov/media/842145/download    Fact sheet for patients: https://www.fda.gov/media/071803/download    Test performed by PCR.    STAT Lactic Acid, Reflex [[id number]]  (Normal) Collected: 02/04/22 1302    Specimen: Blood Updated: 02/04/22 1326      Lactate 1.8 mmol/L     Urinalysis With Microscopic If Indicated (No Culture) - Urine, Clean Catch [[id number]]  (Abnormal) Collected: 02/04/22 0922    Specimen: Urine, Clean Catch Updated: 02/04/22 1008     Color, UA Dark Yellow     Appearance, UA Clear     pH, UA 7.0     Specific Gravity, UA 1.026     Glucose, UA Negative     Ketones, UA Trace     Bilirubin, UA Small (1+)     Blood, UA Negative     Protein, UA Negative     Leuk Esterase, UA Trace     Nitrite, UA Negative     Urobilinogen, UA 1.0 E.U./dL    Urinalysis, Microscopic Only - Urine, Clean Catch [[id number]] Collected: 02/04/22 0922    Specimen: Urine, Clean Catch Updated: 02/04/22 1008     RBC, UA 0-2 /HPF      WBC, UA 0-2 /HPF      Bacteria, UA None Seen /HPF      Squamous Epithelial Cells, UA 0-2 /HPF      Hyaline Casts, UA 0-2 /LPF      Methodology Automated Microscopy    Manual Differential [[id number]]  (Abnormal) Collected: 02/04/22 0906    Specimen: Blood Updated: 02/04/22 1002     Neutrophil % 67.7 %      Lymphocyte % 7.3 %      Monocyte % 25.0 %      Neutrophils Absolute 1.75 10*3/mm3      Lymphocytes Absolute 0.19 10*3/mm3      Monocytes Absolute 0.65 10*3/mm3      Ovalocytes Mod/2+     Poikilocytes Mod/2+     Polychromasia Slight/1+     WBC Morphology Normal     Platelet Morphology Normal    CBC & Differential [[id number]]  (Abnormal) Collected: 02/04/22 0906    Specimen: Blood Updated: 02/04/22 1002    Narrative:      The following orders were created for panel order CBC & Differential.  Procedure                               Abnormality         Status                     ---------                               -----------         ------                     CBC Auto Differential[[id number]]        Abnormal            Final result                 Please view results for these tests on the individual orders.    CBC Auto Differential [[id number]]  (Abnormal) Collected: 02/04/22 0906    Specimen: Blood Updated: 02/04/22 1002     WBC 2.58 10*3/mm3       RBC 3.10 10*6/mm3      Hemoglobin 8.5 g/dL      Hematocrit 25.9 %      MCV 83.5 fL      MCH 27.4 pg      MCHC 32.8 g/dL      RDW 16.2 %      RDW-SD 48.7 fl      MPV 10.1 fL      Platelets 48 10*3/mm3     Urine Drug Screen - Urine, Clean Catch [[id number]]  (Normal) Collected: 02/04/22 0922    Specimen: Urine, Clean Catch Updated: 02/04/22 0949     Amphet/Methamphet, Screen Negative     Barbiturates Screen, Urine Negative     Benzodiazepine Screen, Urine Negative     Cocaine Screen, Urine Negative     Opiate Screen Negative     THC, Screen, Urine Negative     Methadone Screen, Urine Negative     Oxycodone Screen, Urine Negative    Narrative:      Negative Thresholds Per Drugs Screened:    Amphetamines                 500 ng/ml  Barbiturates                 200 ng/ml  Benzodiazepines              100 ng/ml  Cocaine                      300 ng/ml  Methadone                    300 ng/ml  Opiates                      300 ng/ml  Oxycodone                    100 ng/ml  THC                           50 ng/ml    The Normal Value for all drugs tested is negative. This report includes final unconfirmed screening results to be used for medical treatment purposes only. Unconfirmed results must not be used for non-medical purposes such as employment or legal testing. Clinical consideration should be applied to any drug of abuse test, particularly when unconfirmed results are used.            Protime-INR [[id number]]  (Abnormal) Collected: 02/04/22 0906    Specimen: Blood Updated: 02/04/22 0941     Protime 23.9 Seconds      INR 2.14    Procalcitonin [[id number]]  (Normal) Collected: 02/04/22 0906    Specimen: Blood Updated: 02/04/22 0941     Procalcitonin 0.13 ng/mL     Narrative:      As a Marker for Sepsis (Non-Neonates):     1. <0.5 ng/mL represents a low risk of severe sepsis and/or septic shock.  2. >2 ng/mL represents a high risk of severe sepsis and/or septic shock.    As a Marker for Lower Respiratory Tract Infections  "that require antibiotic therapy:  PCT on Admission     Antibiotic Therapy             6-12 Hrs later  >0.5                          Strongly Recommended            >0.25 - <0.5             Recommended  0.1 - 0.25                  Discouraged                       Remeasure/reassess PCT  <0.1                         Strongly Discouraged         Remeasure/reassess PCT      As 28 day mortality risk marker: \"Change in Procalcitonin Result\" (>80% or <=80%) if Day 0 (or Day 1) and Day 4 values are available. Refer to [email or web address][email or web address]/    Change in PCT <=80 %   A decrease of PCT levels below or equal to 80% defines a positive change in PCT test result representing a higher risk for 28-day all-cause mortality of patients diagnosed with severe sepsis or septic shock.    Change in PCT >80 %   A decrease of PCT levels of more than 80% defines a negative change in PCT result representing a lower risk for 28-day all-cause mortality of patients diagnosed with severe sepsis or septic shock.                Lactic Acid, Plasma [[id number]]  (Abnormal) Collected: 02/04/22 0906    Specimen: Blood Updated: 02/04/22 0940     Lactate 3.4 mmol/L     Ammonia [[id number]]  (Abnormal) Collected: 02/04/22 0906    Specimen: Blood Updated: 02/04/22 0935     Ammonia 106 umol/L     Comprehensive Metabolic Panel [[id number]]  (Abnormal) Collected: 02/04/22 0906    Specimen: Blood Updated: 02/04/22 0934     Glucose 125 mg/dL      BUN 13 mg/dL      Creatinine 1.10 mg/dL      Sodium 134 mmol/L      Potassium 4.3 mmol/L      Chloride 106 mmol/L      CO2 18.6 mmol/L      Calcium 8.5 mg/dL      Total Protein 6.8 g/dL      Albumin 2.70 g/dL      ALT (SGPT) 26 U/L      AST (SGOT) 59 U/L      Alkaline Phosphatase 139 U/L      Total Bilirubin 3.1 mg/dL      eGFR Non African Amer 67 mL/min/1.73      Globulin 4.1 gm/dL      A/G Ratio 0.7 g/dL      BUN/Creatinine Ratio 11.8     Anion Gap 9.4 mmol/L     Narrative:      GFR Normal >60  Chronic " Kidney Disease <60  Kidney Failure <15      Lipase [[id number]]  (Abnormal) Collected: 02/04/22 0906    Specimen: Blood Updated: 02/04/22 0934     Lipase 79 U/L     Troponin [[id number]]  (Normal) Collected: 02/04/22 0906    Specimen: Blood Updated: 02/04/22 0934     Troponin T <0.010 ng/mL     Narrative:      Troponin T Reference Range:  <= 0.03 ng/mL-   Negative for AMI  >0.03 ng/mL-     Abnormal for myocardial necrosis.  Clinicians would have to utilize clinical acumen, EKG, Troponin and serial changes to determine if it is an Acute Myocardial Infarction or myocardial injury due to an underlying chronic condition.       Results may be falsely decreased if patient taking Biotin.      Magnesium [[id number]]  (Normal) Collected: 02/04/22 0906    Specimen: Blood Updated: 02/04/22 0934     Magnesium 2.1 mg/dL     Ethanol [[id number]] Collected: 02/04/22 0906    Specimen: Blood Updated: 02/04/22 0934     Ethanol <10 mg/dL      Ethanol % <0.010 %     BNP [[id number]]  (Normal) Collected: 02/04/22 0906    Specimen: Blood Updated: 02/04/22 0932     proBNP 339.0 pg/mL     Narrative:      Among patients with dyspnea, NT-proBNP is highly sensitive for the detection of acute congestive heart failure. In addition NT-proBNP of <300 pg/ml effectively rules out acute congestive heart failure with 99% negative predictive value.    Results may be falsely decreased if patient taking Biotin.      POC Glucose Once [[id number]]  (Abnormal) Collected: 02/04/22 0906    Specimen: Blood Updated: 02/04/22 0907     Glucose 142 mg/dL      Comment: Meter: [id number] : [id number] Glen Dickerson (ahoyDoc           Data Review:  Results from last 7 days   Lab Units 02/05/22 0746 02/04/22 0906 02/04/22 0906   SODIUM mmol/L 132*  --  134*   POTASSIUM mmol/L 4.1  --  4.3   CHLORIDE mmol/L 106  --  106   CO2 mmol/L 15.0*  --  18.6*   BUN mg/dL 12  --  13   CREATININE mg/dL 1.02  --  1.10   GLUCOSE mg/dL 99   < > 125*   CALCIUM mg/dL 7.7*  --  8.5*     < > = values in this interval not displayed.     Results from last 7 days   Lab Units 02/05/22  0746 02/04/22  0906   WBC 10*3/mm3 2.52* 2.58*   HEMOGLOBIN g/dL 8.1* 8.5*   HEMATOCRIT % 24.2* 25.9*   PLATELETS 10*3/mm3 38* 48*             Lab Results   Lab Value Date/Time    TROPONINT <0.010 02/04/2022 0906    TROPONINT <0.010 05/07/2021 1248    TROPONINT <0.010 12/25/2020 0122         Results from last 7 days   Lab Units 02/05/22  0746 02/04/22  0906   ALK PHOS U/L 104 139*   BILIRUBIN mg/dL 2.4* 3.1*   ALT (SGPT) U/L 33 26   AST (SGOT) U/L 82* 59*             Glucose   Date/Time Value Ref Range Status   02/04/2022 0906 142 (H) 70 - 130 mg/dL Final     Comment:     Meter: [id number] : [id number] Glen Mcdowell) Tech     Results from last 7 days   Lab Units 02/05/22  0746 02/04/22  0906   INR  2.07* 2.14*       Past Medical History:   Diagnosis Date   • Alcoholism (HCC)    • Anemia    • Cirrhosis (HCC)    • Encephalopathy    • Hypertension    • Liver disease        Assessment:  Active Hospital Problems    Diagnosis  POA   • **Hepatic encephalopathy (HCC) [K72.90]  Yes   • Hyperammonemia (HCC) [E72.20]  Yes   • Hypertension [I10]  Yes   • GERD (gastroesophageal reflux disease) [K21.9]  Yes   • Coagulopathy (HCC) [D68.9]  Yes   • 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       Plan:  GI consult noted.  We will continue with lactulose and Xifaxan.  We will get follow-up lab studies.    Jer Serrano MD  2/5/2022  13:59 EST

## 2022-02-05 NOTE — PLAN OF CARE
Goal Outcome Evaluation:  Plan of Care Reviewed With: patient        Progress: no change  Outcome Summary: Patient aox4 on room air NSR on the monitor up ad oliver. Lactulose and Rifaximin

## 2022-02-06 ENCOUNTER — APPOINTMENT (OUTPATIENT)
Dept: CARDIOLOGY | Facility: HOSPITAL | Age: 67
End: 2022-02-06

## 2022-02-06 LAB
ALBUMIN SERPL-MCNC: 2.3 G/DL (ref 3.5–5.2)
ALBUMIN/GLOB SERPL: 0.5 G/DL
ALP SERPL-CCNC: 133 U/L (ref 39–117)
ALT SERPL W P-5'-P-CCNC: 30 U/L (ref 1–41)
AMMONIA BLD-SCNC: 75 UMOL/L (ref 16–60)
ANION GAP SERPL CALCULATED.3IONS-SCNC: 8.5 MMOL/L (ref 5–15)
AST SERPL-CCNC: 79 U/L (ref 1–40)
BH CV VAS HEPATIC PORTAL VEIN DIAMETER: 1.5 CM
BH CV VAS HEPATOPORTAL HEPATIC V LT DIRECTION: NORMAL
BH CV VAS HEPATOPORTAL HEPATIC V MID DIRECTION: NORMAL
BH CV VAS HEPATOPORTAL HEPATIC V RT DIRECTION: NORMAL
BH CV VAS HEPATOPORTAL IVC FLOW: NORMAL
BH CV VAS HEPATOPORTAL PORTAL V EXTRAHEPATIC DIRECTION: NORMAL
BH CV VAS HEPATOPORTAL PORTAL V LT INTRA DIRECTION: NORMAL
BH CV VAS HEPATOPORTAL PORTAL V MAIN INTRA DIRECTION: NORMAL
BH CV VAS HEPATOPORTAL PORTAL V MAIN INTRA THROMBUS: NORMAL
BH CV VAS HEPATOPORTAL PORTAL V RT INTRA THROMBUS: NORMAL
BH CV VAS SMA HEPATIC EDV: 25.8 CM/S
BH CV VAS SMA HEPATIC PSV: 90.3 CM/S
BH CV VAS SMA SPLENIC EDV: 25.8 CM/S
BH CV VAS SMA SPLENIC PSV: 142 CM/S
BILIRUB SERPL-MCNC: 1.6 MG/DL (ref 0–1.2)
BUN SERPL-MCNC: 12 MG/DL (ref 8–23)
BUN/CREAT SERPL: 11.2 (ref 7–25)
CALCIUM SPEC-SCNC: 7.7 MG/DL (ref 8.6–10.5)
CHLORIDE SERPL-SCNC: 104 MMOL/L (ref 98–107)
CO2 SERPL-SCNC: 19.5 MMOL/L (ref 22–29)
CREAT SERPL-MCNC: 1.07 MG/DL (ref 0.76–1.27)
DEPRECATED RDW RBC AUTO: 47.1 FL (ref 37–54)
EOSINOPHIL # BLD MANUAL: 0.08 10*3/MM3 (ref 0–0.4)
EOSINOPHIL NFR BLD MANUAL: 3.4 % (ref 0.3–6.2)
ERYTHROCYTE [DISTWIDTH] IN BLOOD BY AUTOMATED COUNT: 15.8 % (ref 12.3–15.4)
GFR SERPL CREATININE-BSD FRML MDRD: 70 ML/MIN/1.73
GLOBULIN UR ELPH-MCNC: 4.4 GM/DL
GLUCOSE SERPL-MCNC: 113 MG/DL (ref 65–99)
HCT VFR BLD AUTO: 26.4 % (ref 37.5–51)
HGB BLD-MCNC: 9 G/DL (ref 13–17.7)
HYPOCHROMIA BLD QL: ABNORMAL
INR PPP: 2.06 (ref 0.9–1.1)
LYMPHOCYTES # BLD MANUAL: 0.4 10*3/MM3 (ref 0.7–3.1)
LYMPHOCYTES NFR BLD MANUAL: 10.1 % (ref 5–12)
MAXIMAL PREDICTED HEART RATE: 156 BPM
MCH RBC QN AUTO: 27.8 PG (ref 26.6–33)
MCHC RBC AUTO-ENTMCNC: 34.1 G/DL (ref 31.5–35.7)
MCV RBC AUTO: 81.5 FL (ref 79–97)
MONOCYTES # BLD: 0.23 10*3/MM3 (ref 0.1–0.9)
NEUTROPHILS # BLD AUTO: 1.53 10*3/MM3 (ref 1.7–7)
NEUTROPHILS NFR BLD MANUAL: 68.5 % (ref 42.7–76)
NRBC SPEC MANUAL: 1.1 /100 WBC (ref 0–0.2)
OVALOCYTES BLD QL SMEAR: ABNORMAL
PLAT MORPH BLD: NORMAL
PLATELET # BLD AUTO: 46 10*3/MM3 (ref 140–450)
PMV BLD AUTO: 10.5 FL (ref 6–12)
POTASSIUM SERPL-SCNC: 4 MMOL/L (ref 3.5–5.2)
PROT SERPL-MCNC: 6.7 G/DL (ref 6–8.5)
PROTHROMBIN TIME: 23.3 SECONDS (ref 11.7–14.2)
RBC # BLD AUTO: 3.24 10*6/MM3 (ref 4.14–5.8)
SCHISTOCYTES BLD QL SMEAR: ABNORMAL
SODIUM SERPL-SCNC: 132 MMOL/L (ref 136–145)
STRESS TARGET HR: 133 BPM
VARIANT LYMPHS NFR BLD MANUAL: 18 % (ref 19.6–45.3)
WBC MORPH BLD: NORMAL
WBC NRBC COR # BLD: 2.24 10*3/MM3 (ref 3.4–10.8)

## 2022-02-06 PROCEDURE — 85007 BL SMEAR W/DIFF WBC COUNT: CPT | Performed by: HOSPITALIST

## 2022-02-06 PROCEDURE — 85025 COMPLETE CBC W/AUTO DIFF WBC: CPT | Performed by: HOSPITALIST

## 2022-02-06 PROCEDURE — 82140 ASSAY OF AMMONIA: CPT | Performed by: HOSPITALIST

## 2022-02-06 PROCEDURE — 99214 OFFICE O/P EST MOD 30 MIN: CPT | Performed by: INTERNAL MEDICINE

## 2022-02-06 PROCEDURE — 80053 COMPREHEN METABOLIC PANEL: CPT | Performed by: HOSPITALIST

## 2022-02-06 PROCEDURE — G0378 HOSPITAL OBSERVATION PER HR: HCPCS

## 2022-02-06 PROCEDURE — 93975 VASCULAR STUDY: CPT

## 2022-02-06 PROCEDURE — 85610 PROTHROMBIN TIME: CPT | Performed by: HOSPITALIST

## 2022-02-06 PROCEDURE — 36415 COLL VENOUS BLD VENIPUNCTURE: CPT | Performed by: HOSPITALIST

## 2022-02-06 RX ADMIN — LACTULOSE 10 G: 10 SOLUTION ORAL at 08:30

## 2022-02-06 RX ADMIN — RIFAXIMIN 550 MG: 550 TABLET ORAL at 08:29

## 2022-02-06 RX ADMIN — SODIUM CHLORIDE, PRESERVATIVE FREE 10 ML: 5 INJECTION INTRAVENOUS at 20:25

## 2022-02-06 RX ADMIN — LACTULOSE 10 G: 10 SOLUTION ORAL at 20:25

## 2022-02-06 RX ADMIN — SPIRONOLACTONE 50 MG: 50 TABLET, FILM COATED ORAL at 08:28

## 2022-02-06 RX ADMIN — LACTULOSE 10 G: 10 SOLUTION ORAL at 15:18

## 2022-02-06 RX ADMIN — METOPROLOL TARTRATE 12.5 MG: 25 TABLET, FILM COATED ORAL at 20:23

## 2022-02-06 RX ADMIN — FUROSEMIDE 40 MG: 40 TABLET ORAL at 08:30

## 2022-02-06 RX ADMIN — SODIUM CHLORIDE, PRESERVATIVE FREE 10 ML: 5 INJECTION INTRAVENOUS at 08:30

## 2022-02-06 RX ADMIN — METOPROLOL TARTRATE 12.5 MG: 25 TABLET, FILM COATED ORAL at 08:28

## 2022-02-06 RX ADMIN — PANTOPRAZOLE SODIUM 40 MG: 40 TABLET, DELAYED RELEASE ORAL at 06:00

## 2022-02-06 RX ADMIN — RIFAXIMIN 550 MG: 550 TABLET ORAL at 20:23

## 2022-02-06 NOTE — PROGRESS NOTES
Gastroenterology   Inpatient Progress Note    Reason for Follow Up: Hepatic encephalopathy    Subjective     Interval History:   No new complaints.  Patient seems to be more awake and alert.    Current Facility-Administered Medications:   •  acetaminophen (TYLENOL) tablet 650 mg, 650 mg, Oral, Once, Wes Matos, APRN  •  furosemide (LASIX) tablet 40 mg, 40 mg, Oral, Daily, Jer Serrano MD, 40 mg at 02/06/22 0830  •  lactulose (CHRONULAC) 10 GM/15ML solution 10 g, 10 g, Oral, TID, Jer Serrano MD, 10 g at 02/06/22 0830  •  metoprolol tartrate (LOPRESSOR) tablet 12.5 mg, 12.5 mg, Oral, BID, Jer Serrano MD, 12.5 mg at 02/06/22 0828  •  ondansetron (ZOFRAN) tablet 4 mg, 4 mg, Oral, Q6H PRN **OR** ondansetron (ZOFRAN) injection 4 mg, 4 mg, Intravenous, Q6H PRN, Jer Serrano MD, 4 mg at 02/04/22 2159  •  pantoprazole (PROTONIX) EC tablet 40 mg, 40 mg, Oral, QAM, Jer Serrano MD, 40 mg at 02/06/22 0600  •  riFAXIMin (XIFAXAN) tablet 550 mg, 550 mg, Oral, Q12H, Jer Serrano MD, 550 mg at 02/06/22 0829  •  sodium chloride 0.9 % flush 10 mL, 10 mL, Intravenous, Q12H, Jer Serrano MD, 10 mL at 02/06/22 0830  •  sodium chloride 0.9 % flush 10 mL, 10 mL, Intravenous, PRN, Jer Serrano MD  •  spironolactone (ALDACTONE) tablet 50 mg, 50 mg, Oral, Daily, Jer Serrano MD, 50 mg at 02/06/22 0828  •  zinc sulfate (ZINCATE) capsule 220 mg, 220 mg, Oral, Daily, Jer Serrano MD, 220 mg at 02/04/22 2014  Review of Systems:    The following systems were reviewed and negative;  gastrointestinal    Objective     Vital Signs  Temp:  [98.5 °F (36.9 °C)-99.6 °F (37.6 °C)] 99.6 °F (37.6 °C)  Heart Rate:  [63-83] 83  Resp:  [16-18] 18  BP: ()/(49-64) 106/64  Body mass index is 22.39 kg/m².  No intake or output data in the 24 hours ending 02/06/22 1329  No intake/output data recorded.     Physical Exam:   General: patient awake, alert and cooperative   Eyes: no scleral icterus   Skin: warm and dry, not  jaundiced   Abdomen: soft, nontender, nondistended; normal bowel sounds, no masses palpated, no periumbical lymphadenopathy   Psychiatric: Appropriate affect and behavior     Results Review:     I reviewed the patient's new clinical results.    Results from last 7 days   Lab Units 02/06/22  0733 02/05/22  0746 02/04/22  0906   WBC 10*3/mm3 2.24* 2.52* 2.58*   HEMOGLOBIN g/dL 9.0* 8.1* 8.5*   HEMATOCRIT % 26.4* 24.2* 25.9*   PLATELETS 10*3/mm3 46* 38* 48*     Results from last 7 days   Lab Units 02/06/22  0733 02/05/22  0746 02/04/22  0906   SODIUM mmol/L 132* 132* 134*   POTASSIUM mmol/L 4.0 4.1 4.3   CHLORIDE mmol/L 104 106 106   CO2 mmol/L 19.5* 15.0* 18.6*   BUN mg/dL 12 12 13   CREATININE mg/dL 1.07 1.02 1.10   CALCIUM mg/dL 7.7* 7.7* 8.5*   BILIRUBIN mg/dL 1.6* 2.4* 3.1*   ALK PHOS U/L 133* 104 139*   ALT (SGPT) U/L 30 33 26   AST (SGOT) U/L 79* 82* 59*   GLUCOSE mg/dL 113* 99 125*     Results from last 7 days   Lab Units 02/06/22  0733 02/05/22  0746 02/04/22  0906   INR  2.06* 2.07* 2.14*     Lab Results   Lab Value Date/Time    LIPASE 79 (H) 02/04/2022 0906    LIPASE 36 05/07/2021 1248    LIPASE 68 (H) 01/01/2021 2125    LIPASE 133 (H) 07/22/2020 1759    LIPASE 59 07/07/2020 0633    LIPASE 65 (H) 07/06/2020 1630       Radiology:  US Abdomen Limited   Final Result   1.  Cirrhosis with findings of portosystemic shunting including mild to   moderate splenomegaly and ascites. There also appears to be hepatofugal   in the main portal vein with concern for thrombus formation as well.   Further evaluation with Doppler sonogram of the portal vein is   recommended. This was discussed with Shavon, the patient's nurse, at   8:30 PM 02/05/2022   2.  Thickened appearance the gallbladder likely related to adjacent   liver disease, as before.   3.  Other findings as above.       This report was finalized on 2/5/2022 8:32 PM by Dr. Jason Maki M.D.              Assessment/Plan     Patient Active Problem List   Diagnosis    • ED (erectile dysfunction) of organic origin   • Alcoholic cirrhosis of liver with ascites (HCC)   • Anemia   • Jaundice   • Coagulopathy (HCC)   • Secondary thrombocytopenia   • Lung abnormality- left apex   • Hepatic encephalopathy (HCC)   • Sepsis (HCC)   • Colitis   • Hypertension   • GERD (gastroesophageal reflux disease)   • SBP (spontaneous bacterial peritonitis) (HCC)   • Hyperammonemia (HCC)       Assessment:  1. Hepatic encephalopathy.  Ammonia level improved.  2. History of esophageal varices  3. Ascites.  4. Preliminary report of positive portal vein thrombosis on ultrasound      Plan:  · Continue lactulose and Xifaxan  · Await final reading on ultrasound but if portal vein thrombosis confirmed, would initiate anticoagulation  · 2 g sodium diet  I discussed the patients findings and my recommendations with patient and nursing staff.    MD Jamal Martinez M.D.  [facility] [place] [address]  Office: [phone]

## 2022-02-06 NOTE — PROGRESS NOTES
"DAILY PROGRESS NOTE  [facility]    Patient Identification:  Name: Wei Potts  Age: 66 y.o.  Sex: male  :  1955  MRN: [id number]         Primary Care Physician: Bella Villalta MD    Subjective:  Interval History: He feels better today.    Objective:    Scheduled Meds:acetaminophen, 650 mg, Oral, Once  furosemide, 40 mg, Oral, Daily  lactulose, 10 g, Oral, TID  metoprolol tartrate, 12.5 mg, Oral, BID  pantoprazole, 40 mg, Oral, QAM  rifAXIMin, 550 mg, Oral, Q12H  sodium chloride, 10 mL, Intravenous, Q12H  spironolactone, 50 mg, Oral, Daily  zinc sulfate, 220 mg, Oral, Daily      Continuous Infusions:     Vital signs in last 24 hours:  Temp:  [98.5 °F (36.9 °C)-99.7 °F (37.6 °C)] 99.7 °F (37.6 °C)  Heart Rate:  [63-83] 76  Resp:  [16-18] 18  BP: ()/(49-67) 117/67    Intake/Output:  No intake or output data in the 24 hours ending 22 1451    Exam:  /67 (BP Location: Right arm, Patient Position: Lying)   Pulse 76   Temp 99.7 °F (37.6 °C) (Oral)   Resp 18   Ht 180.3 cm (71\")   Wt 72.8 kg (160 lb 8 oz)   SpO2 99%   BMI 22.39 kg/m²     General Appearance:    Alert, cooperative, no distress   Head:    Normocephalic, without obvious abnormality, atraumatic   Eyes:       Throat:   Lips, tongue, gums normal   Neck:   Supple, symmetrical, trachea midline, no JVD   Lungs:     Clear to auscultation bilaterally, respirations unlabored   Chest Wall:    No tenderness or deformity    Heart:    Regular rate and rhythm, S1 and S2 normal, no murmur,no  Rub or gallop   Abdomen:     Soft, ascites, nontender, bowel sounds active, no masses, no organomegaly    Extremities:   Extremities normal, atraumatic, no cyanosis or edema   Pulses:      Skin:   Skin is warm and dry,  no rashes or palpable lesions   Neurologic:   no focal deficits noted      Lab Results (last 72 hours)     Procedure Component Value Units Date/Time    AFP Tumor Marker [[id number]]  (Normal) Collected: 22 1105 "    Specimen: Blood Updated: 02/05/22 1312     ALPHA-FETOPROTEIN 2.56 ng/mL     Narrative:      Alpha Fetoprotein Tumor Marker Reference Range:    0.0-8.3 ng/mL    Note: Normal values apply only to males and nonpregnant females. These results are not interpretable for pregnant females.    Results may be falsely decreased if patient taking Biotin.      Manual Differential [[id number]]  (Abnormal) Collected: 02/05/22 0746    Specimen: Blood Updated: 02/05/22 0935     Neutrophil % 66.3 %      Lymphocyte % 11.3 %      Monocyte % 22.5 %      Neutrophils Absolute 1.67 10*3/mm3      Lymphocytes Absolute 0.28 10*3/mm3      Monocytes Absolute 0.57 10*3/mm3      Crenated RBC's Mod/2+     Elliptocytes Mod/2+     WBC Morphology Normal     Platelet Morphology Normal    CBC & Differential [[id number]]  (Abnormal) Collected: 02/05/22 0746    Specimen: Blood Updated: 02/05/22 0935    Narrative:      The following orders were created for panel order CBC & Differential.  Procedure                               Abnormality         Status                     ---------                               -----------         ------                     CBC Auto Differential[[id number]]        Abnormal            Final result                 Please view results for these tests on the individual orders.    CBC Auto Differential [[id number]]  (Abnormal) Collected: 02/05/22 0746    Specimen: Blood Updated: 02/05/22 0935     WBC 2.52 10*3/mm3      RBC 2.94 10*6/mm3      Hemoglobin 8.1 g/dL      Hematocrit 24.2 %      MCV 82.3 fL      MCH 27.6 pg      MCHC 33.5 g/dL      RDW 15.8 %      RDW-SD 47.6 fl      MPV 9.1 fL      Platelets 38 10*3/mm3     Comprehensive Metabolic Panel [[id number]]  (Abnormal) Collected: 02/05/22 0746    Specimen: Blood Updated: 02/05/22 0850     Glucose 99 mg/dL      BUN 12 mg/dL      Creatinine 1.02 mg/dL      Sodium 132 mmol/L      Potassium 4.1 mmol/L      Chloride 106 mmol/L      CO2 15.0 mmol/L      Calcium 7.7 mg/dL       Total Protein 6.5 g/dL      Albumin 2.40 g/dL      ALT (SGPT) 33 U/L      AST (SGOT) 82 U/L      Alkaline Phosphatase 104 U/L      Total Bilirubin 2.4 mg/dL      eGFR Non African Amer 74 mL/min/1.73      Globulin 4.1 gm/dL      A/G Ratio 0.6 g/dL      BUN/Creatinine Ratio 11.8     Anion Gap 11.0 mmol/L     Narrative:      GFR Normal >60  Chronic Kidney Disease <60  Kidney Failure <15      Ammonia [[id number]]  (Abnormal) Collected: 02/05/22 0746    Specimen: Blood Updated: 02/05/22 0816     Ammonia 83 umol/L     Protime-INR [[id number]]  (Abnormal) Collected: 02/05/22 0746    Specimen: Blood Updated: 02/05/22 0813     Protime 23.3 Seconds      INR 2.07    COVID PRE-OP / PRE-PROCEDURE SCREENING ORDER (NO ISOLATION) - Swab, Nasopharynx [[id number]]  (Abnormal) Collected: 02/04/22 1253    Specimen: Swab from Nasopharynx Updated: 02/04/22 1333    Narrative:      The following orders were created for panel order COVID PRE-OP / PRE-PROCEDURE SCREENING ORDER (NO ISOLATION) - Swab, Nasopharynx.  Procedure                               Abnormality         Status                     ---------                               -----------         ------                     COVID-19,[facility]...[[id number]]  Abnormal            Final result                 Please view results for these tests on the individual orders.    COVID-19,[facility]HOU[facility] CEPHEID/HAY NP SWAB IN TRANSPORT MEDIA 8-12 HR TAT - Swab, Nasopharynx [[id number]]  (Abnormal) Collected: 02/04/22 1253    Specimen: Swab from Nasopharynx Updated: 02/04/22 1333     COVID19 Detected    Narrative:      Fact sheet for providers: https://www.fda.gov/media/818150/download    Fact sheet for patients: https://www.fda.gov/media/993593/download    Test performed by PCR.    STAT Lactic Acid, Reflex [[id number]]  (Normal) Collected: 02/04/22 1302    Specimen: Blood Updated: 02/04/22 1327     Lactate 1.8 mmol/L     Urinalysis With Microscopic If Indicated (No Culture) - Urine, Clean  Catch [[id number]]  (Abnormal) Collected: 02/04/22 0922    Specimen: Urine, Clean Catch Updated: 02/04/22 1008     Color, UA Dark Yellow     Appearance, UA Clear     pH, UA 7.0     Specific Gravity, UA 1.026     Glucose, UA Negative     Ketones, UA Trace     Bilirubin, UA Small (1+)     Blood, UA Negative     Protein, UA Negative     Leuk Esterase, UA Trace     Nitrite, UA Negative     Urobilinogen, UA 1.0 E.U./dL    Urinalysis, Microscopic Only - Urine, Clean Catch [[id number]] Collected: 02/04/22 0922    Specimen: Urine, Clean Catch Updated: 02/04/22 1008     RBC, UA 0-2 /HPF      WBC, UA 0-2 /HPF      Bacteria, UA None Seen /HPF      Squamous Epithelial Cells, UA 0-2 /HPF      Hyaline Casts, UA 0-2 /LPF      Methodology Automated Microscopy    Manual Differential [[id number]]  (Abnormal) Collected: 02/04/22 0906    Specimen: Blood Updated: 02/04/22 1002     Neutrophil % 67.7 %      Lymphocyte % 7.3 %      Monocyte % 25.0 %      Neutrophils Absolute 1.75 10*3/mm3      Lymphocytes Absolute 0.19 10*3/mm3      Monocytes Absolute 0.65 10*3/mm3      Ovalocytes Mod/2+     Poikilocytes Mod/2+     Polychromasia Slight/1+     WBC Morphology Normal     Platelet Morphology Normal    CBC & Differential [[id number]]  (Abnormal) Collected: 02/04/22 0906    Specimen: Blood Updated: 02/04/22 1002    Narrative:      The following orders were created for panel order CBC & Differential.  Procedure                               Abnormality         Status                     ---------                               -----------         ------                     CBC Auto Differential[[id number]]        Abnormal            Final result                 Please view results for these tests on the individual orders.    CBC Auto Differential [[id number]]  (Abnormal) Collected: 02/04/22 0906    Specimen: Blood Updated: 02/04/22 1002     WBC 2.58 10*3/mm3      RBC 3.10 10*6/mm3      Hemoglobin 8.5 g/dL      Hematocrit 25.9 %      MCV 83.5 fL      MCH  27.4 pg      MCHC 32.8 g/dL      RDW 16.2 %      RDW-SD 48.7 fl      MPV 10.1 fL      Platelets 48 10*3/mm3     Urine Drug Screen - Urine, Clean Catch [[id number]]  (Normal) Collected: 02/04/22 0922    Specimen: Urine, Clean Catch Updated: 02/04/22 0949     Amphet/Methamphet, Screen Negative     Barbiturates Screen, Urine Negative     Benzodiazepine Screen, Urine Negative     Cocaine Screen, Urine Negative     Opiate Screen Negative     THC, Screen, Urine Negative     Methadone Screen, Urine Negative     Oxycodone Screen, Urine Negative    Narrative:      Negative Thresholds Per Drugs Screened:    Amphetamines                 500 ng/ml  Barbiturates                 200 ng/ml  Benzodiazepines              100 ng/ml  Cocaine                      300 ng/ml  Methadone                    300 ng/ml  Opiates                      300 ng/ml  Oxycodone                    100 ng/ml  THC                           50 ng/ml    The Normal Value for all drugs tested is negative. This report includes final unconfirmed screening results to be used for medical treatment purposes only. Unconfirmed results must not be used for non-medical purposes such as employment or legal testing. Clinical consideration should be applied to any drug of abuse test, particularly when unconfirmed results are used.            Protime-INR [[id number]]  (Abnormal) Collected: 02/04/22 0906    Specimen: Blood Updated: 02/04/22 0941     Protime 23.9 Seconds      INR 2.14    Procalcitonin [[id number]]  (Normal) Collected: 02/04/22 0906    Specimen: Blood Updated: 02/04/22 0941     Procalcitonin 0.13 ng/mL     Narrative:      As a Marker for Sepsis (Non-Neonates):     1. <0.5 ng/mL represents a low risk of severe sepsis and/or septic shock.  2. >2 ng/mL represents a high risk of severe sepsis and/or septic shock.    As a Marker for Lower Respiratory Tract Infections that require antibiotic therapy:  PCT on Admission     Antibiotic Therapy             6-12 Hrs  "later  >0.5                          Strongly Recommended            >0.25 - <0.5             Recommended  0.1 - 0.25                  Discouraged                       Remeasure/reassess PCT  <0.1                         Strongly Discouraged         Remeasure/reassess PCT      As 28 day mortality risk marker: \"Change in Procalcitonin Result\" (>80% or <=80%) if Day 0 (or Day 1) and Day 4 values are available. Refer to http://[email or web address]-pct-[email or web address]/    Change in PCT <=80 %   A decrease of PCT levels below or equal to 80% defines a positive change in PCT test result representing a higher risk for 28-day all-cause mortality of patients diagnosed with severe sepsis or septic shock.    Change in PCT >80 %   A decrease of PCT levels of more than 80% defines a negative change in PCT result representing a lower risk for 28-day all-cause mortality of patients diagnosed with severe sepsis or septic shock.                Lactic Acid, Plasma [[id number]]  (Abnormal) Collected: 02/04/22 0906    Specimen: Blood Updated: 02/04/22 0940     Lactate 3.4 mmol/L     Ammonia [[id number]]  (Abnormal) Collected: 02/04/22 0906    Specimen: Blood Updated: 02/04/22 0935     Ammonia 106 umol/L     Comprehensive Metabolic Panel [[id number]]  (Abnormal) Collected: 02/04/22 0906    Specimen: Blood Updated: 02/04/22 0934     Glucose 125 mg/dL      BUN 13 mg/dL      Creatinine 1.10 mg/dL      Sodium 134 mmol/L      Potassium 4.3 mmol/L      Chloride 106 mmol/L      CO2 18.6 mmol/L      Calcium 8.5 mg/dL      Total Protein 6.8 g/dL      Albumin 2.70 g/dL      ALT (SGPT) 26 U/L      AST (SGOT) 59 U/L      Alkaline Phosphatase 139 U/L      Total Bilirubin 3.1 mg/dL      eGFR Non African Amer 67 mL/min/1.73      Globulin 4.1 gm/dL      A/G Ratio 0.7 g/dL      BUN/Creatinine Ratio 11.8     Anion Gap 9.4 mmol/L     Narrative:      GFR Normal >60  Chronic Kidney Disease <60  Kidney Failure <15      Lipase [[id number]]  (Abnormal) Collected: 02/04/22 " 0906    Specimen: Blood Updated: 02/04/22 0934     Lipase 79 U/L     Troponin [[id number]]  (Normal) Collected: 02/04/22 0906    Specimen: Blood Updated: 02/04/22 0934     Troponin T <0.010 ng/mL     Narrative:      Troponin T Reference Range:  <= 0.03 ng/mL-   Negative for AMI  >0.03 ng/mL-     Abnormal for myocardial necrosis.  Clinicians would have to utilize clinical acumen, EKG, Troponin and serial changes to determine if it is an Acute Myocardial Infarction or myocardial injury due to an underlying chronic condition.       Results may be falsely decreased if patient taking Biotin.      Magnesium [[id number]]  (Normal) Collected: 02/04/22 0906    Specimen: Blood Updated: 02/04/22 0934     Magnesium 2.1 mg/dL     Ethanol [[id number]] Collected: 02/04/22 0906    Specimen: Blood Updated: 02/04/22 0934     Ethanol <10 mg/dL      Ethanol % <0.010 %     BNP [[id number]]  (Normal) Collected: 02/04/22 0906    Specimen: Blood Updated: 02/04/22 0932     proBNP 339.0 pg/mL     Narrative:      Among patients with dyspnea, NT-proBNP is highly sensitive for the detection of acute congestive heart failure. In addition NT-proBNP of <300 pg/ml effectively rules out acute congestive heart failure with 99% negative predictive value.    Results may be falsely decreased if patient taking Biotin.      POC Glucose Once [[id number]]  (Abnormal) Collected: 02/04/22 0906    Specimen: Blood Updated: 02/04/22 0907     Glucose 142 mg/dL      Comment: Meter: [id number] : [id number] Glen Dickerson ([employer]           Data Review:  Results from last 7 days   Lab Units 02/06/22  0733 02/05/22  0746 02/05/22 0746 02/04/22 0906 02/04/22 0906   SODIUM mmol/L 132*  --  132*  --  134*   POTASSIUM mmol/L 4.0  --  4.1  --  4.3   CHLORIDE mmol/L 104  --  106  --  106   CO2 mmol/L 19.5*  --  15.0*  --  18.6*   BUN mg/dL 12  --  12  --  13   CREATININE mg/dL 1.07  --  1.02  --  1.10   GLUCOSE mg/dL 113*   < > 99   < > 125*   CALCIUM mg/dL 7.7*  --   7.7*  --  8.5*    < > = values in this interval not displayed.     Results from last 7 days   Lab Units 02/06/22  0733 02/05/22  0746 02/04/22  0906   WBC 10*3/mm3 2.24* 2.52* 2.58*   HEMOGLOBIN g/dL 9.0* 8.1* 8.5*   HEMATOCRIT % 26.4* 24.2* 25.9*   PLATELETS 10*3/mm3 46* 38* 48*             Lab Results   Lab Value Date/Time    TROPONINT <0.010 02/04/2022 0906    TROPONINT <0.010 05/07/2021 1248    TROPONINT <0.010 12/25/2020 0122         Results from last 7 days   Lab Units 02/06/22  0733 02/05/22  0746 02/04/22  0906   ALK PHOS U/L 133* 104 139*   BILIRUBIN mg/dL 1.6* 2.4* 3.1*   ALT (SGPT) U/L 30 33 26   AST (SGOT) U/L 79* 82* 59*             Glucose   Date/Time Value Ref Range Status   02/04/2022 0906 142 (H) 70 - 130 mg/dL Final     Comment:     Meter: [id number] : [id number] Carroll Tuan Hoover (Robert)     Results from last 7 days   Lab Units 02/06/22  0733 02/05/22  0746 02/04/22  0906   INR  2.06* 2.07* 2.14*       Past Medical History:   Diagnosis Date   • Alcoholism (HCC)    • Anemia    • Cirrhosis (HCC)    • Encephalopathy    • Hypertension    • Liver disease        Assessment:  Active Hospital Problems    Diagnosis  POA   • **Hepatic encephalopathy (HCC) [K72.90]  Yes   • Hyperammonemia (HCC) [E72.20]  Yes   • Hypertension [I10]  Yes   • GERD (gastroesophageal reflux disease) [K21.9]  Yes   • Coagulopathy (HCC) [D68.9]  Yes   • 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       Plan:  GI consult noted.  We will continue with lactulose and Xifaxan.  We will get follow-up lab studies. We will consult hematology in regard to possible portal vein thrombosis. With his INR already being 2 and his platelet count around 40 obviously is poor candidate for any further anticoagulation.    Jer Serrano MD  2/6/2022  14:51 EST

## 2022-02-07 PROBLEM — I81 PORTAL VEIN THROMBOSIS: Status: ACTIVE | Noted: 2022-02-07

## 2022-02-07 LAB
ALBUMIN SERPL-MCNC: 2.1 G/DL (ref 3.5–5.2)
ALBUMIN/GLOB SERPL: 0.6 G/DL
ALP SERPL-CCNC: 151 U/L (ref 39–117)
ALT SERPL W P-5'-P-CCNC: 30 U/L (ref 1–41)
AMMONIA BLD-SCNC: 133 UMOL/L (ref 16–60)
ANION GAP SERPL CALCULATED.3IONS-SCNC: 7 MMOL/L (ref 5–15)
AST SERPL-CCNC: 75 U/L (ref 1–40)
BILIRUB SERPL-MCNC: 1.3 MG/DL (ref 0–1.2)
BUN SERPL-MCNC: 11 MG/DL (ref 8–23)
BUN/CREAT SERPL: 11.1 (ref 7–25)
CALCIUM SPEC-SCNC: 7.4 MG/DL (ref 8.6–10.5)
CHLORIDE SERPL-SCNC: 103 MMOL/L (ref 98–107)
CO2 SERPL-SCNC: 18 MMOL/L (ref 22–29)
CREAT SERPL-MCNC: 0.99 MG/DL (ref 0.76–1.27)
DEPRECATED RDW RBC AUTO: 45.9 FL (ref 37–54)
EOSINOPHIL # BLD MANUAL: 0.03 10*3/MM3 (ref 0–0.4)
EOSINOPHIL NFR BLD MANUAL: 2 % (ref 0.3–6.2)
ERYTHROCYTE [DISTWIDTH] IN BLOOD BY AUTOMATED COUNT: 15.8 % (ref 12.3–15.4)
F5 GENE MUT ANL BLD/T: NORMAL
GFR SERPL CREATININE-BSD FRML MDRD: 76 ML/MIN/1.73
GLOBULIN UR ELPH-MCNC: 3.8 GM/DL
GLUCOSE SERPL-MCNC: 103 MG/DL (ref 65–99)
HCT VFR BLD AUTO: 24 % (ref 37.5–51)
HGB BLD-MCNC: 8.3 G/DL (ref 13–17.7)
HYPOCHROMIA BLD QL: ABNORMAL
INR PPP: 2.07 (ref 0.9–1.1)
LYMPHOCYTES # BLD MANUAL: 0.46 10*3/MM3 (ref 0.7–3.1)
LYMPHOCYTES NFR BLD MANUAL: 22 % (ref 5–12)
MCH RBC QN AUTO: 27.8 PG (ref 26.6–33)
MCHC RBC AUTO-ENTMCNC: 34.6 G/DL (ref 31.5–35.7)
MCV RBC AUTO: 80.3 FL (ref 79–97)
MONOCYTES # BLD: 0.38 10*3/MM3 (ref 0.1–0.9)
NEUTROPHILS # BLD AUTO: 0.84 10*3/MM3 (ref 1.7–7)
NEUTROPHILS NFR BLD MANUAL: 49 % (ref 42.7–76)
PLAT MORPH BLD: NORMAL
PLATELET # BLD AUTO: 40 10*3/MM3 (ref 140–450)
PMV BLD AUTO: 10.8 FL (ref 6–12)
POTASSIUM SERPL-SCNC: 3.9 MMOL/L (ref 3.5–5.2)
PROT SERPL-MCNC: 5.9 G/DL (ref 6–8.5)
PROTHROMBIN TIME: 23.4 SECONDS (ref 11.7–14.2)
RBC # BLD AUTO: 2.99 10*6/MM3 (ref 4.14–5.8)
SODIUM SERPL-SCNC: 128 MMOL/L (ref 136–145)
VARIANT LYMPHS NFR BLD MANUAL: 27 % (ref 19.6–45.3)
WBC MORPH BLD: NORMAL
WBC NRBC COR # BLD: 1.72 10*3/MM3 (ref 3.4–10.8)

## 2022-02-07 PROCEDURE — 82140 ASSAY OF AMMONIA: CPT | Performed by: HOSPITALIST

## 2022-02-07 PROCEDURE — 80053 COMPREHEN METABOLIC PANEL: CPT | Performed by: HOSPITALIST

## 2022-02-07 PROCEDURE — 85007 BL SMEAR W/DIFF WBC COUNT: CPT | Performed by: HOSPITALIST

## 2022-02-07 PROCEDURE — 81241 F5 GENE: CPT | Performed by: INTERNAL MEDICINE

## 2022-02-07 PROCEDURE — 85210 CLOT FACTOR II PROTHROM SPEC: CPT | Performed by: INTERNAL MEDICINE

## 2022-02-07 PROCEDURE — 81270 JAK2 GENE: CPT | Performed by: INTERNAL MEDICINE

## 2022-02-07 PROCEDURE — 99255 IP/OBS CONSLTJ NEW/EST HI 80: CPT | Performed by: INTERNAL MEDICINE

## 2022-02-07 PROCEDURE — 36415 COLL VENOUS BLD VENIPUNCTURE: CPT | Performed by: HOSPITALIST

## 2022-02-07 PROCEDURE — 85300 ANTITHROMBIN III ACTIVITY: CPT | Performed by: INTERNAL MEDICINE

## 2022-02-07 PROCEDURE — 99232 SBSQ HOSP IP/OBS MODERATE 35: CPT | Performed by: INTERNAL MEDICINE

## 2022-02-07 PROCEDURE — 25010000002 ENOXAPARIN PER 10 MG: Performed by: INTERNAL MEDICINE

## 2022-02-07 PROCEDURE — 85610 PROTHROMBIN TIME: CPT | Performed by: HOSPITALIST

## 2022-02-07 PROCEDURE — 85025 COMPLETE CBC W/AUTO DIFF WBC: CPT | Performed by: HOSPITALIST

## 2022-02-07 RX ADMIN — ENOXAPARIN SODIUM 40 MG: 100 INJECTION SUBCUTANEOUS at 13:57

## 2022-02-07 RX ADMIN — METOPROLOL TARTRATE 12.5 MG: 25 TABLET, FILM COATED ORAL at 08:14

## 2022-02-07 RX ADMIN — SODIUM CHLORIDE, PRESERVATIVE FREE 10 ML: 5 INJECTION INTRAVENOUS at 20:30

## 2022-02-07 RX ADMIN — METOPROLOL TARTRATE 12.5 MG: 25 TABLET, FILM COATED ORAL at 20:29

## 2022-02-07 RX ADMIN — SPIRONOLACTONE 50 MG: 50 TABLET, FILM COATED ORAL at 08:14

## 2022-02-07 RX ADMIN — Medication 220 MG: at 08:14

## 2022-02-07 RX ADMIN — LACTULOSE 10 G: 10 SOLUTION ORAL at 20:29

## 2022-02-07 RX ADMIN — LACTULOSE 10 G: 10 SOLUTION ORAL at 17:07

## 2022-02-07 RX ADMIN — RIFAXIMIN 550 MG: 550 TABLET ORAL at 08:14

## 2022-02-07 RX ADMIN — SODIUM CHLORIDE, PRESERVATIVE FREE 10 ML: 5 INJECTION INTRAVENOUS at 08:15

## 2022-02-07 RX ADMIN — LACTULOSE 10 G: 10 SOLUTION ORAL at 08:15

## 2022-02-07 RX ADMIN — RIFAXIMIN 550 MG: 550 TABLET ORAL at 20:29

## 2022-02-07 RX ADMIN — FUROSEMIDE 40 MG: 40 TABLET ORAL at 08:14

## 2022-02-07 RX ADMIN — PANTOPRAZOLE SODIUM 40 MG: 40 TABLET, DELAYED RELEASE ORAL at 06:23

## 2022-02-07 NOTE — PAYOR COMM NOTE
"Wei Potts (66 y.o. Male)     ATTN: NOTIFICATION OF INPATIENT ADMISSION: ID# [id number]    PLEASE REPLY TO UR DEPT: [phone],  [phone]              Date of Birth Social Security Number Address Home Phone MRN    1955  [id number] [facility] [phone] [id number]    Restorationism Marital Status             Taoist        Admission Date Admission Type Admitting Provider Attending Provider Department, Room/Bed    22 Emergency Jer Serrano MD Beard, Lyle E, MD [address], [address]/    Discharge Date Discharge Disposition Discharge Destination                         Attending Provider: Jer Serrano MD    Allergies: Iron    Isolation: Enh Drop/Con   Infection: COVID (confirmed) (22)   Code Status: CPR   Advance Care Planning Activity    Ht: 180.3 cm (71\")   Wt: 72.8 kg (160 lb 8 oz)    Admission Cmt: None   Principal Problem: Hepatic encephalopathy (HCC) [K72.90]                 Active Insurance as of 2022     Primary Coverage     Payor Plan Insurance Group Employer/Plan Group    [facility] [facility] [place] [facility]      Payor Plan Address Payor Plan Phone Number Payor Plan Fax Number Effective Dates    [address]   2020 - None Entered    [facility] [id number]       Subscriber Name Subscriber Birth Date Member ID       WEI POTTS 1955 [id number]                 Emergency Contacts      (Rel.) Home Phone Work Phone Mobile Phone    Milagro Potts (Spouse) [phone] -- --               History & Physical      Jer Serrano MD at 22 [phone]          HISTORY AND PHYSICAL   [facility]        Patient Identification:  Name: Wei Potts  Age: 66 y.o.  Sex: male  :  1955  MRN: [id number]                     Primary Care Physician: Bella Villalta MD    Chief Complaint: Disorientation    History of Present Illness:       The patient is a 66 y.o. male with " history of alcoholism, cirrhosis, anemia, hypertension and liver disease who presents to the hospital for evaluation for increased nausea and disorientation since last night.  Patient has a history of liver cirrhosis, reports having a paracentesis done 2 weeks ago.  Denies any vomiting, regular bowel movement.  Taking lactulose daily.  No fevers, chills, chest pain, short of breath.  Has had a mild cough.  Reports eating and drinking well.  Denies any abdominal pain or significant distention.  The patient was evaluated in the ER and noted to have very elevated ammonia level and felt to have hepatic encephalopathy.  The patient was admitted for further evaluation treatment.      Past Medical History:  Past Medical History:   Diagnosis Date   • Alcoholism (HCC)    • Anemia    • Cirrhosis (HCC)    • Encephalopathy    • Hypertension    • Liver disease      Past Surgical History:  Past Surgical History:   Procedure Laterality Date   • COLONOSCOPY     • ENDOSCOPY        Home Meds:  (Not in a hospital admission)    Current meds  No current facility-administered medications for this encounter.    Current Outpatient Medications:   •  amitriptyline (ELAVIL) 10 MG tablet, Take 1 tablet by mouth Every Night., Disp: 30 tablet, Rfl: 1  •  Cholecalciferol (VITAMIN D3 PO), Take 25 mg by mouth Daily., Disp: , Rfl:   •  furosemide (LASIX) 40 MG tablet, TAKE 1 TABLET BY MOUTH DAILY, Disp: 30 tablet, Rfl: 2  •  Generlac 10 GM/15ML solution solution (encephalopathy), TAKE 30 ML BY MOUTH ONCE TO TWICE DAILY UNTIL 3-4 BOWEL MOVEMENTS ARE MADE, Disp: 1800 mL, Rfl: 2  •  lactulose (CHRONULAC) 10 GM/15ML solution, TAKE 15 ML BY MOUTH TWICE DAILY, ADJUST DOSE UNTIL YOU HAVE 3-4 BOWEL MOVEMENTS A DAY, Disp: 512 mL, Rfl: 0  •  MAGnesium-Oxide 400 (241.3 Mg) MG tablet tablet, , Disp: , Rfl:   •  metoprolol tartrate (LOPRESSOR) 25 MG tablet, TAKE 1/2 TABLET BY MOUTH EVERY 12 HOURS, Disp: 90 tablet, Rfl: 0  •  Multiple Vitamins-Minerals  (MULTIVITAMIN ADULT EXTRA C PO), , Disp: , Rfl:   •  omeprazole (priLOSEC) 40 MG capsule, Take 40 mg by mouth As Needed., Disp: , Rfl:   •  spironolactone (ALDACTONE) 50 MG tablet, Take 1 tablet by mouth Daily., Disp: 30 tablet, Rfl: 3  •  VITAMIN E PO, Take  by mouth., Disp: , Rfl:   •  zinc gluconate 50 MG tablet, Take 50 mg by mouth Daily., Disp: , Rfl:   Allergies:  Allergies   Allergen Reactions   • Iron GI Intolerance     Immunizations:  Immunization History   Administered Date(s) Administered   • COVID-19 (PFIZER) PURPLE CAP 05/20/2021, 06/10/2021   • Hep B, Adolescent or Pediatric 07/09/2020   • Hepatitis B Vaccine Adult IM 10/12/2020   • MMR 11/16/1999   • Td 11/16/1999, 01/01/2008   • flucelvax quad pfs =>4 YRS 10/12/2020     Social History:   Social History     Social History Narrative   • Not on file     Social History     Socioeconomic History   • Marital status:    Tobacco Use   • Smoking status: Never Smoker   • Smokeless tobacco: Never Used   Vaping Use   • Vaping Use: Never used   Substance and Sexual Activity   • Alcohol use: Not Currently   • Drug use: No   • Sexual activity: Defer       Family History:  Family History   Problem Relation Age of Onset   • Diabetes Mother    • Hypertension Father         Review of Systems  See history of present illness and past medical history.  Patient denies headache, dizziness, syncope, falls, trauma, change in vision, change in hearing, change in taste, changes in weight, changes in appetite, focal weakness, numbness, or paresthesia.  Patient denies chest pain, palpitations, dyspnea, orthopnea, PND, cough, sinus pressure, rhinorrhea, epistaxis, hemoptysis, nausea, vomiting, hematemesis, diarrhea, constipation or hematchezia.  Denies cold or heat intolerance, polydipsia, polyuria, polyphagia. Denies hematuria, pyuria, dysuria, hesitancy, frequency or urgency. Denies consumption of raw and under cooked meats foods or change in water source.  Denies fever,  "chills, sweats, night sweats.  Denies missing any routine medications. Remainder of ROS is negative.    Objective:  tMax 24 hrs: Temp (24hrs), Av °F (36.7 °C), Min:98 °F (36.7 °C), Max:98 °F (36.7 °C)    Vitals Ranges:   Temp:  [98 °F (36.7 °C)] 98 °F (36.7 °C)  Heart Rate:  [90-93] 90  Resp:  [18] 18  BP: (133-136)/(66-77) 136/77      Exam:  /77   Pulse 90   Temp 98 °F (36.7 °C) (Tympanic)   Resp 18   Ht 180.3 cm (71\")   Wt 72.8 kg (160 lb 8 oz)   SpO2 99%   BMI 22.39 kg/m²     General Appearance:    Alert, cooperative, no distress, appears stated age   Head:    Normocephalic, without obvious abnormality, atraumatic   Eyes:    PERRL, conjunctivae/corneas clear, EOM's intact, both eyes   Ears:    Normal external ear canals, both ears   Nose:   Nares normal, septum midline, mucosa normal, no drainage    or sinus tenderness   Throat:   Lips, mucosa, and tongue normal   Neck:   Supple, symmetrical, trachea midline, no adenopathy;     thyroid:  no enlargement/tenderness/nodules; no carotid    bruit or JVD   Back:     Symmetric, no curvature, ROM normal, no CVA tenderness   Lungs:     Clear to auscultation bilaterally, respirations unlabored   Chest Wall:    No tenderness or deformity    Heart:    Regular rate and rhythm, S1 and S2 normal, no murmur, rub   or gallop   Abdomen:     Soft, nontender, bowel sounds active all four quadrants,     no masses, no hepatomegaly, no splenomegaly   Extremities:   Extremities normal, atraumatic, no cyanosis or edema   Pulses:   2+ and symmetric all extremities   Skin:   Skin color, texture, turgor normal, no rashes or lesions   Lymph nodes:   Cervical, supraclavicular, and axillary nodes normal   Neurologic:   CNII-XII intact, normal strength, sensation intact throughout      .    Data Review:  Lab Results (last 72 hours)     Procedure Component Value Units Date/Time    COVID PRE-OP / PRE-PROCEDURE SCREENING ORDER (NO ISOLATION) - Swab, Nasopharynx [[id number]]  " (Abnormal) Collected: 02/04/22 1253    Specimen: Swab from Nasopharynx Updated: 02/04/22 1333    Narrative:      The following orders were created for panel order COVID PRE-OP / PRE-PROCEDURE SCREENING ORDER (NO ISOLATION) - Swab, Nasopharynx.  Procedure                               Abnormality         Status                     ---------                               -----------         ------                     COVID-19,[facility]...[[id number]]  Abnormal            Final result                 Please view results for these tests on the individual orders.    COVID-19,[facility] CEPHEID/HAY NP SWAB IN TRANSPORT MEDIA 8-12 HR TAT - Swab, Nasopharynx [[id number]]  (Abnormal) Collected: 02/04/22 1253    Specimen: Swab from Nasopharynx Updated: 02/04/22 1333     COVID19 Detected    Narrative:      Fact sheet for providers: https://www.fda.gov/media/616184/download    Fact sheet for patients: https://www.fda.gov/media/174292/download    Test performed by PCR.    STAT Lactic Acid, Reflex [[id number]]  (Normal) Collected: 02/04/22 1302    Specimen: Blood Updated: 02/04/22 1327     Lactate 1.8 mmol/L     Urinalysis With Microscopic If Indicated (No Culture) - Urine, Clean Catch [[id number]]  (Abnormal) Collected: 02/04/22 0922    Specimen: Urine, Clean Catch Updated: 02/04/22 1008     Color, UA Dark Yellow     Appearance, UA Clear     pH, UA 7.0     Specific Gravity, UA 1.026     Glucose, UA Negative     Ketones, UA Trace     Bilirubin, UA Small (1+)     Blood, UA Negative     Protein, UA Negative     Leuk Esterase, UA Trace     Nitrite, UA Negative     Urobilinogen, UA 1.0 E.U./dL    Urinalysis, Microscopic Only - Urine, Clean Catch [[id number]] Collected: 02/04/22 0922    Specimen: Urine, Clean Catch Updated: 02/04/22 1008     RBC, UA 0-2 /HPF      WBC, UA 0-2 /HPF      Bacteria, UA None Seen /HPF      Squamous Epithelial Cells, UA 0-2 /HPF      Hyaline Casts, UA 0-2 /LPF      Methodology Automated Microscopy     Manual Differential [[id number]]  (Abnormal) Collected: 02/04/22 0906    Specimen: Blood Updated: 02/04/22 1002     Neutrophil % 67.7 %      Lymphocyte % 7.3 %      Monocyte % 25.0 %      Neutrophils Absolute 1.75 10*3/mm3      Lymphocytes Absolute 0.19 10*3/mm3      Monocytes Absolute 0.65 10*3/mm3      Ovalocytes Mod/2+     Poikilocytes Mod/2+     Polychromasia Slight/1+     WBC Morphology Normal     Platelet Morphology Normal    CBC & Differential [[id number]]  (Abnormal) Collected: 02/04/22 0906    Specimen: Blood Updated: 02/04/22 1002    Narrative:      The following orders were created for panel order CBC & Differential.  Procedure                               Abnormality         Status                     ---------                               -----------         ------                     CBC Auto Differential[[id number]]        Abnormal            Final result                 Please view results for these tests on the individual orders.    CBC Auto Differential [[id number]]  (Abnormal) Collected: 02/04/22 0906    Specimen: Blood Updated: 02/04/22 1002     WBC 2.58 10*3/mm3      RBC 3.10 10*6/mm3      Hemoglobin 8.5 g/dL      Hematocrit 25.9 %      MCV 83.5 fL      MCH 27.4 pg      MCHC 32.8 g/dL      RDW 16.2 %      RDW-SD 48.7 fl      MPV 10.1 fL      Platelets 48 10*3/mm3     Urine Drug Screen - Urine, Clean Catch [[id number]]  (Normal) Collected: 02/04/22 0922    Specimen: Urine, Clean Catch Updated: 02/04/22 0949     Amphet/Methamphet, Screen Negative     Barbiturates Screen, Urine Negative     Benzodiazepine Screen, Urine Negative     Cocaine Screen, Urine Negative     Opiate Screen Negative     THC, Screen, Urine Negative     Methadone Screen, Urine Negative     Oxycodone Screen, Urine Negative    Narrative:      Negative Thresholds Per Drugs Screened:    Amphetamines                 500 ng/ml  Barbiturates                 200 ng/ml  Benzodiazepines              100 ng/ml  Cocaine                       "300 ng/ml  Methadone                    300 ng/ml  Opiates                      300 ng/ml  Oxycodone                    100 ng/ml  THC                           50 ng/ml    The Normal Value for all drugs tested is negative. This report includes final unconfirmed screening results to be used for medical treatment purposes only. Unconfirmed results must not be used for non-medical purposes such as employment or legal testing. Clinical consideration should be applied to any drug of abuse test, particularly when unconfirmed results are used.            Protime-INR [[id number]]  (Abnormal) Collected: 02/04/22 0906    Specimen: Blood Updated: 02/04/22 0941     Protime 23.9 Seconds      INR 2.14    Procalcitonin [[id number]]  (Normal) Collected: 02/04/22 0906    Specimen: Blood Updated: 02/04/22 0941     Procalcitonin 0.13 ng/mL     Narrative:      As a Marker for Sepsis (Non-Neonates):     1. <0.5 ng/mL represents a low risk of severe sepsis and/or septic shock.  2. >2 ng/mL represents a high risk of severe sepsis and/or septic shock.    As a Marker for Lower Respiratory Tract Infections that require antibiotic therapy:  PCT on Admission     Antibiotic Therapy             6-12 Hrs later  >0.5                          Strongly Recommended            >0.25 - <0.5             Recommended  0.1 - 0.25                  Discouraged                       Remeasure/reassess PCT  <0.1                         Strongly Discouraged         Remeasure/reassess PCT      As 28 day mortality risk marker: \"Change in Procalcitonin Result\" (>80% or <=80%) if Day 0 (or Day 1) and Day 4 values are available. Refer to http://www.[employer]-pct-calculator.com/    Change in PCT <=80 %   A decrease of PCT levels below or equal to 80% defines a positive change in PCT test result representing a higher risk for 28-day all-cause mortality of patients diagnosed with severe sepsis or septic shock.    Change in PCT >80 %   A decrease of PCT levels of more than " 80% defines a negative change in PCT result representing a lower risk for 28-day all-cause mortality of patients diagnosed with severe sepsis or septic shock.                Lactic Acid, Plasma [[id number]]  (Abnormal) Collected: 02/04/22 0906    Specimen: Blood Updated: 02/04/22 0940     Lactate 3.4 mmol/L     Ammonia [[id number]]  (Abnormal) Collected: 02/04/22 0906    Specimen: Blood Updated: 02/04/22 0935     Ammonia 106 umol/L     Comprehensive Metabolic Panel [[id number]]  (Abnormal) Collected: 02/04/22 0906    Specimen: Blood Updated: 02/04/22 0934     Glucose 125 mg/dL      BUN 13 mg/dL      Creatinine 1.10 mg/dL      Sodium 134 mmol/L      Potassium 4.3 mmol/L      Chloride 106 mmol/L      CO2 18.6 mmol/L      Calcium 8.5 mg/dL      Total Protein 6.8 g/dL      Albumin 2.70 g/dL      ALT (SGPT) 26 U/L      AST (SGOT) 59 U/L      Alkaline Phosphatase 139 U/L      Total Bilirubin 3.1 mg/dL      eGFR Non African Amer 67 mL/min/1.73      Globulin 4.1 gm/dL      A/G Ratio 0.7 g/dL      BUN/Creatinine Ratio 11.8     Anion Gap 9.4 mmol/L     Narrative:      GFR Normal >60  Chronic Kidney Disease <60  Kidney Failure <15      Lipase [[id number]]  (Abnormal) Collected: 02/04/22 0906    Specimen: Blood Updated: 02/04/22 0934     Lipase 79 U/L     Troponin [[id number]]  (Normal) Collected: 02/04/22 0906    Specimen: Blood Updated: 02/04/22 0934     Troponin T <0.010 ng/mL     Narrative:      Troponin T Reference Range:  <= 0.03 ng/mL-   Negative for AMI  >0.03 ng/mL-     Abnormal for myocardial necrosis.  Clinicians would have to utilize clinical acumen, EKG, Troponin and serial changes to determine if it is an Acute Myocardial Infarction or myocardial injury due to an underlying chronic condition.       Results may be falsely decreased if patient taking Biotin.      Magnesium [[id number]]  (Normal) Collected: 02/04/22 0906    Specimen: Blood Updated: 02/04/22 0934     Magnesium 2.1 mg/dL     Ethanol [[id number]] Collected:  02/04/22 0906    Specimen: Blood Updated: 02/04/22 0934     Ethanol <10 mg/dL      Ethanol % <0.010 %     BNP [[id number]]  (Normal) Collected: 02/04/22 0906    Specimen: Blood Updated: 02/04/22 0932     proBNP 339.0 pg/mL     Narrative:      Among patients with dyspnea, NT-proBNP is highly sensitive for the detection of acute congestive heart failure. In addition NT-proBNP of <300 pg/ml effectively rules out acute congestive heart failure with 99% negative predictive value.    Results may be falsely decreased if patient taking Biotin.      POC Glucose Once [[id number]]  (Abnormal) Collected: 02/04/22 0906    Specimen: Blood Updated: 02/04/22 0907     Glucose 142 mg/dL      Comment: Meter: [id number] : [id number] Glen Dickerson (Abe) Tech                      Imaging Results (All)     None        Past Medical History:   Diagnosis Date   • Alcoholism (HCC)    • Anemia    • Cirrhosis (HCC)    • Encephalopathy    • Hypertension    • Liver disease        Assessment:  Active Hospital Problems    Diagnosis  POA   • Hyperammonemia (HCC) [E72.20]  Yes   • Hypertension [I10]  Yes   • GERD (gastroesophageal reflux disease) [K21.9]  Yes   • Hepatic encephalopathy (HCC) [K72.90]  Yes   • Coagulopathy (HCC) [D68.9]  Yes   • 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       Plan:  The patient admitted to the hospital and will continue with lactulose and add some Xifaxan.  Will ask for GI consultation for assistance with further evaluation treatment of hepatic encephalopathy with elevated ammonia levels.    Jer Serrano MD  2/4/2022  16:53 EST    Electronically signed by Jer Serrano MD at 02/04/22 1659          Emergency Department Notes      Pritesh Roman, RN at 02/04/22 0851        Pt wife brings patient ER reports pt is confused since last night, pt is able to answer A&Ox4 at this time. Pt  also reports nausea. Pt appears jaundice. Pt states not new for him has hx of cirrhosis.    Pt arrives in triage with mask on. Triage staff wearing N95 masks and goggles.        Electronically signed by Pritesh Roman RN at 02/04/22 0900     Tad Morris MD at 02/04/22 0937           EMERGENCY DEPARTMENT ENCOUNTER    Room Number:  10/10  Date of encounter:  2/4/2022  PCP: Bella Villalta MD  Historian: Patient      HPI:  Chief Complaint: Confusion, nausea  A complete HPI/ROS/PMH/PSH/SH/FH are unobtainable due to: None    Context: Wei Potts is a 66 y.o. male who presents to the ED via private vehicle for evaluation for increased nausea and disorientation since last night.  Patient has a history of liver cirrhosis, reports having a paracentesis done 2 weeks ago.  Denies any vomiting, regular bowel movement.  Taking lactulose daily.  No fevers, chills, chest pain, short of breath.  Has had a mild cough.  Reports eating and drinking well.  Denies any abdominal pain or significant distention.      MEDICAL RECORD REVIEW    GI office visit note reviewed from January 12 of 2022 patient has a history of SBP as well as liver cirrhosis and hepatic encephalopathy, low-sodium diet of 2 g daily.  Plan for repeat EGD in the near future.    PAST MEDICAL HISTORY  Active Ambulatory Problems     Diagnosis Date Noted   • ED (erectile dysfunction) of organic origin 10/29/2015   • Alcoholic cirrhosis of liver with ascites (HCC) 07/06/2020   • Anemia 07/06/2020   • Jaundice 07/06/2020   • Coagulopathy (HCC) 07/08/2020   • Secondary thrombocytopenia 07/08/2020   • Lung abnormality- left apex 07/09/2020   • Hepatic encephalopathy (HCC) 12/25/2020   • Sepsis (HCC) 01/01/2021   • Colitis 01/02/2021   • Hypertension 01/02/2021   • GERD (gastroesophageal reflux disease) 01/02/2021   • SBP (spontaneous bacterial peritonitis) (HCC) 01/06/2021     Resolved Ambulatory Problems     Diagnosis Date Noted   • Benign essential hypertension  10/29/2015   • Hyponatremia 07/06/2020     Past Medical History:   Diagnosis Date   • Alcoholism (HCC)    • Cirrhosis (HCC)    • Encephalopathy    • Liver disease          PAST SURGICAL HISTORY  Past Surgical History:   Procedure Laterality Date   • COLONOSCOPY     • ENDOSCOPY           FAMILY HISTORY  Family History   Problem Relation Age of Onset   • Diabetes Mother    • Hypertension Father          SOCIAL HISTORY  Social History     Socioeconomic History   • Marital status:    Tobacco Use   • Smoking status: Never Smoker   • Smokeless tobacco: Never Used   Vaping Use   • Vaping Use: Never used   Substance and Sexual Activity   • Alcohol use: Not Currently   • Drug use: No   • Sexual activity: Defer         ALLERGIES  Iron        REVIEW OF SYSTEMS  Review of Systems     All systems reviewed and negative except for those discussed in HPI.       PHYSICAL EXAM    I have reviewed the triage vital signs and nursing notes.    ED Triage Vitals   Temp Heart Rate Resp BP SpO2   02/04/22 0900 02/04/22 0900 02/04/22 0900 02/04/22 0910 02/04/22 0900   98 °F (36.7 °C) 93 18 133/66 100 %      Temp src Heart Rate Source Patient Position BP Location FiO2 (%)   02/04/22 0900 -- -- -- --   Tympanic           Physical Exam  General: No acute distress, nontoxic, nondiaphoretic  HEENT: Mucous membranes moist, atraumatic, EOMI, mild scleral icterus  Neck: Full ROM  Pulm: Symmetric chest rise, nonlabored, lungs CTAB  Cardiovascular: Regular rate and rhythm, intact distal pulses  GI: Soft, nontender, nondistended, no rebound, no guarding, bowel sounds present  MSK: Full ROM, no deformity  Skin: Warm, dry  Neuro: Awake, alert, oriented x 4, GCS 15, moving all extremities, no focal deficits  Psych: Calm, cooperative      N95, protective eye goggles, and gloves used during this encounter. Patient in surgical mask.      LAB RESULTS  Recent Results (from the past 24 hour(s))   Comprehensive Metabolic Panel    Collection Time: 02/04/22   9:06 AM    Specimen: Blood   Result Value Ref Range    Glucose 125 (H) 65 - 99 mg/dL    BUN 13 8 - 23 mg/dL    Creatinine 1.10 0.76 - 1.27 mg/dL    Sodium 134 (L) 136 - 145 mmol/L    Potassium 4.3 3.5 - 5.2 mmol/L    Chloride 106 98 - 107 mmol/L    CO2 18.6 (L) 22.0 - 29.0 mmol/L    Calcium 8.5 (L) 8.6 - 10.5 mg/dL    Total Protein 6.8 6.0 - 8.5 g/dL    Albumin 2.70 (L) 3.50 - 5.20 g/dL    ALT (SGPT) 26 1 - 41 U/L    AST (SGOT) 59 (H) 1 - 40 U/L    Alkaline Phosphatase 139 (H) 39 - 117 U/L    Total Bilirubin 3.1 (H) 0.0 - 1.2 mg/dL    eGFR Non African Amer 67 >60 mL/min/1.73    Globulin 4.1 gm/dL    A/G Ratio 0.7 g/dL    BUN/Creatinine Ratio 11.8 7.0 - 25.0    Anion Gap 9.4 5.0 - 15.0 mmol/L   Protime-INR    Collection Time: 02/04/22  9:06 AM    Specimen: Blood   Result Value Ref Range    Protime 23.9 (H) 11.7 - 14.2 Seconds    INR 2.14 (H) 0.90 - 1.10   Lipase    Collection Time: 02/04/22  9:06 AM    Specimen: Blood   Result Value Ref Range    Lipase 79 (H) 13 - 60 U/L   Troponin    Collection Time: 02/04/22  9:06 AM    Specimen: Blood   Result Value Ref Range    Troponin T <0.010 0.000 - 0.030 ng/mL   BNP    Collection Time: 02/04/22  9:06 AM    Specimen: Blood   Result Value Ref Range    proBNP 339.0 0.0 - 900.0 pg/mL   Lactic Acid, Plasma    Collection Time: 02/04/22  9:06 AM    Specimen: Blood   Result Value Ref Range    Lactate 3.4 (C) 0.5 - 2.0 mmol/L   Procalcitonin    Collection Time: 02/04/22  9:06 AM    Specimen: Blood   Result Value Ref Range    Procalcitonin 0.13 0.00 - 0.25 ng/mL   Ammonia    Collection Time: 02/04/22  9:06 AM    Specimen: Blood   Result Value Ref Range    Ammonia 106 (H) 16 - 60 umol/L   Magnesium    Collection Time: 02/04/22  9:06 AM    Specimen: Blood   Result Value Ref Range    Magnesium 2.1 1.6 - 2.4 mg/dL   Ethanol    Collection Time: 02/04/22  9:06 AM    Specimen: Blood   Result Value Ref Range    Ethanol <10 0 - 10 mg/dL    Ethanol % <0.010 %   CBC Auto Differential     Collection Time: 02/04/22  9:06 AM    Specimen: Blood   Result Value Ref Range    WBC 2.58 (L) 3.40 - 10.80 10*3/mm3    RBC 3.10 (L) 4.14 - 5.80 10*6/mm3    Hemoglobin 8.5 (L) 13.0 - 17.7 g/dL    Hematocrit 25.9 (L) 37.5 - 51.0 %    MCV 83.5 79.0 - 97.0 fL    MCH 27.4 26.6 - 33.0 pg    MCHC 32.8 31.5 - 35.7 g/dL    RDW 16.2 (H) 12.3 - 15.4 %    RDW-SD 48.7 37.0 - 54.0 fl    MPV 10.1 6.0 - 12.0 fL    Platelets 48 (C) 140 - 450 10*3/mm3   POC Glucose Once    Collection Time: 02/04/22  9:06 AM    Specimen: Blood   Result Value Ref Range    Glucose 142 (H) 70 - 130 mg/dL   Manual Differential    Collection Time: 02/04/22  9:06 AM    Specimen: Blood   Result Value Ref Range    Neutrophil % 67.7 42.7 - 76.0 %    Lymphocyte % 7.3 (L) 19.6 - 45.3 %    Monocyte % 25.0 (H) 5.0 - 12.0 %    Neutrophils Absolute 1.75 1.70 - 7.00 10*3/mm3    Lymphocytes Absolute 0.19 (L) 0.70 - 3.10 10*3/mm3    Monocytes Absolute 0.65 0.10 - 0.90 10*3/mm3    Ovalocytes Mod/2+ None Seen    Poikilocytes Mod/2+ None Seen    Polychromasia Slight/1+ None Seen    WBC Morphology Normal Normal    Platelet Morphology Normal Normal   Urinalysis With Microscopic If Indicated (No Culture) - Urine, Clean Catch    Collection Time: 02/04/22  9:22 AM    Specimen: Urine, Clean Catch   Result Value Ref Range    Color, UA Dark Yellow (A) Yellow, Straw    Appearance, UA Clear Clear    pH, UA 7.0 5.0 - 8.0    Specific Gravity, UA 1.026 1.005 - 1.030    Glucose, UA Negative Negative    Ketones, UA Trace (A) Negative    Bilirubin, UA Small (1+) (A) Negative    Blood, UA Negative Negative    Protein, UA Negative Negative    Leuk Esterase, UA Trace (A) Negative    Nitrite, UA Negative Negative    Urobilinogen, UA 1.0 E.U./dL 0.2 - 1.0 E.U./dL   Urine Drug Screen - Urine, Clean Catch    Collection Time: 02/04/22  9:22 AM    Specimen: Urine, Clean Catch   Result Value Ref Range    Amphet/Methamphet, Screen Negative Negative    Barbiturates Screen, Urine Negative Negative     Benzodiazepine Screen, Urine Negative Negative    Cocaine Screen, Urine Negative Negative    Opiate Screen Negative Negative    THC, Screen, Urine Negative Negative    Methadone Screen, Urine Negative Negative    Oxycodone Screen, Urine Negative Negative   Urinalysis, Microscopic Only - Urine, Clean Catch    Collection Time: 02/04/22  9:22 AM    Specimen: Urine, Clean Catch   Result Value Ref Range    RBC, UA 0-2 None Seen, 0-2 /HPF    WBC, UA 0-2 None Seen, 0-2 /HPF    Bacteria, UA None Seen None Seen /HPF    Squamous Epithelial Cells, UA 0-2 None Seen, 0-2 /HPF    Hyaline Casts, UA 0-2 None Seen /LPF    Methodology Automated Microscopy    COVID-19,[facility] IN-HOUSE CEPHEID/HAY NP SWAB IN TRANSPORT MEDIA 8-12 HR TAT - Swab, Nasopharynx    Collection Time: 02/04/22 12:53 PM    Specimen: Nasopharynx; Swab   Result Value Ref Range    COVID19 Detected (C) Not Detected - Ref. Range   STAT Lactic Acid, Reflex    Collection Time: 02/04/22  1:02 PM    Specimen: Blood   Result Value Ref Range    Lactate 1.8 0.5 - 2.0 mmol/L       Ordered the above labs and independently reviewed the results.        RADIOLOGY  No Radiology Exams Resulted Within Past 24 Hours        PROCEDURES    Procedures      MEDICATIONS GIVEN IN ER    Medications   ondansetron (ZOFRAN) injection 4 mg (4 mg Intravenous Given 2/4/22 0911)   sodium chloride 0.9 % bolus 500 mL (0 mL Intravenous Stopped 2/4/22 1253)   lactulose (CHRONULAC) 10 GM/15ML solution 20 g (20 g Oral Given 2/4/22 0957)         PROGRESS, DATA ANALYSIS, CONSULTS, AND MEDICAL DECISION MAKING    All labs have been independently reviewed by me.  All radiology studies have been reviewed by me and discussed with radiologist dictating the report.   EKG's independently viewed and interpreted by me.  Discussion below represents my analysis of pertinent findings related to patient's condition, differential diagnosis, treatment plan and final disposition.    Initial concern for hyperammonemia,  renal failure, hepatic failure, electrolyte abnormalities, among others.    ED Course as of 02/04/22 1402   Fri Feb 04, 2022   0936 Glucose(!): 125 [DC]   0936 BUN: 13 [DC]   0936 Creatinine: 1.10 [DC]   0936 Sodium(!): 134 [DC]   0936 Potassium: 4.3 [DC]   0936 CO2(!): 18.6 [DC]   0936 ALT (SGPT): 26 [DC]   0936 AST (SGOT)(!): 59 [DC]   0936 Alkaline Phosphatase(!): 139 [DC]   0936 Total Bilirubin(!): 3.1 [DC]   0936 Anion Gap: 9.4 [DC]   0937 Ammonia(!): 106 [DC]   0937 Magnesium: 2.1 [DC]   0937 Lipase(!): 79 [DC]   0940 Lactate(!!): 3.4 [DC]   0941 Nausea and disorientation may be related to hyperammonemia, the ammonia level of 106 is elevated from his prior values.  Also with an elevated lactate of 3.4, unclear if this is  more of a volume issue or related to underlying liver disease, and he is frequently elevated and this is not unusual for him.  Bilirubin is slightly elevated from prior 3.1 today up from 2.6. [DC]   0945 Specifically, the disorientation he was having was roaming behavior and confusion, was walking around the table continuously and also went outside and was walking around without seemingly being aware of his situation.  I think this is a little more extreme behavior than I would be comfortable sending home at this time, I do think keeping him overnight for monitoring and ammonia control to be the safest option for the patient.  Patient and wife are agreeable and comfortable with plan moving forward for hospitalization.  All questions and concerns addressed. [DC]   0948 Ethanol %: <0.010 [DC]   0957 Discussed with Dr. Serrano, hospitalist [DC]      ED Course User Index  [DC] Tad Morris MD       AS OF 14:02 EST VITALS:    BP - 136/77  HR - 90  TEMP - 98 °F (36.7 °C) (Tympanic)  02 SATS - 99%        DIAGNOSIS  Final diagnoses:   Hyperammonemia (HCC)   Metabolic encephalopathy   Hepatic cirrhosis, unspecified hepatic cirrhosis type, unspecified whether ascites present (HCC)   History of  "hypertension   Thrombocytopenia (HCC)   Chronic anemia         DISPOSITION  HOSPITALIZATION    Discussed treatment plan and reason for hospitalization with pt/family and hospitalizing physician.  Pt/family voiced understanding of the plan for hospitalization for further testing/treatment as needed.                  Tad Morris MD  02/04/22 1402      Electronically signed by Tad Morris MD at 02/04/22 1402     Marley Phelps, RN at 02/04/22 1511          Nursing report ED to floor  Wei Potts  66 y.o.  male    HPI :   Chief Complaint   Patient presents with   • Altered Mental Status   • Nausea       Admitting doctor:   Jer Serrano MD    Admitting diagnosis:   The primary encounter diagnosis was Hyperammonemia (HCC). Diagnoses of Metabolic encephalopathy, Hepatic cirrhosis, unspecified hepatic cirrhosis type, unspecified whether ascites present (HCC), History of hypertension, Thrombocytopenia (HCC), and Chronic anemia were also pertinent to this visit.    Code status:   Current Code Status     Date Active Code Status Order ID Comments User Context       Prior    Advance Care Planning Activity          Allergies:   Iron    Intake and Output    Intake/Output Summary (Last 24 hours) at 2/4/2022 1511  Last data filed at 2/4/2022 1253  Gross per 24 hour   Intake 500 ml   Output --   Net 500 ml       Weight:       02/04/22  0910   Weight: 72.8 kg (160 lb 8 oz)       Most recent vitals:   Vitals:    02/04/22 0900 02/04/22 0910 02/04/22 1241   BP:  133/66 136/77   Pulse: 93  90   Resp: 18     Temp: 98 °F (36.7 °C)     TempSrc: Tympanic     SpO2: 100%  99%   Weight:  72.8 kg (160 lb 8 oz)    Height:  180.3 cm (71\")        Active LDAs/IV Access:   Lines, Drains & Airways     Active LDAs     Name Placement date Placement time Site Days    Peripheral IV 02/04/22 0911 Left Forearm 02/04/22  0911  Forearm  less than 1                Labs (abnormal labs have a star):   Labs Reviewed   COVID-19,[facility] IN-HOUSE " CEPHEID/HAY, NP SWAB IN TRANSPORT MEDIA 8-12 HR TAT - Abnormal; Notable for the following components:       Result Value    COVID19 Detected (*)     All other components within normal limits    Narrative:     Fact sheet for providers: https://www.fda.gov/media/626741/download    Fact sheet for patients: https://www.fda.gov/media/845628/download    Test performed by PCR.   COMPREHENSIVE METABOLIC PANEL - Abnormal; Notable for the following components:    Glucose 125 (*)     Sodium 134 (*)     CO2 18.6 (*)     Calcium 8.5 (*)     Albumin 2.70 (*)     AST (SGOT) 59 (*)     Alkaline Phosphatase 139 (*)     Total Bilirubin 3.1 (*)     All other components within normal limits    Narrative:     GFR Normal >60  Chronic Kidney Disease <60  Kidney Failure <15     PROTIME-INR - Abnormal; Notable for the following components:    Protime 23.9 (*)     INR 2.14 (*)     All other components within normal limits   LIPASE - Abnormal; Notable for the following components:    Lipase 79 (*)     All other components within normal limits   URINALYSIS W/ MICROSCOPIC IF INDICATED (NO CULTURE) - Abnormal; Notable for the following components:    Color, UA Dark Yellow (*)     Ketones, UA Trace (*)     Bilirubin, UA Small (1+) (*)     Leuk Esterase, UA Trace (*)     All other components within normal limits   LACTIC ACID, PLASMA - Abnormal; Notable for the following components:    Lactate 3.4 (*)     All other components within normal limits   AMMONIA - Abnormal; Notable for the following components:    Ammonia 106 (*)     All other components within normal limits   CBC WITH AUTO DIFFERENTIAL - Abnormal; Notable for the following components:    WBC 2.58 (*)     RBC 3.10 (*)     Hemoglobin 8.5 (*)     Hematocrit 25.9 (*)     RDW 16.2 (*)     Platelets 48 (*)     All other components within normal limits   MANUAL DIFFERENTIAL - Abnormal; Notable for the following components:    Lymphocyte % 7.3 (*)     Monocyte % 25.0 (*)     Lymphocytes  "Absolute 0.19 (*)     All other components within normal limits   POCT GLUCOSE FINGERSTICK - Abnormal; Notable for the following components:    Glucose 142 (*)     All other components within normal limits   TROPONIN (IN-HOUSE) - Normal    Narrative:     Troponin T Reference Range:  <= 0.03 ng/mL-   Negative for AMI  >0.03 ng/mL-     Abnormal for myocardial necrosis.  Clinicians would have to utilize clinical acumen, EKG, Troponin and serial changes to determine if it is an Acute Myocardial Infarction or myocardial injury due to an underlying chronic condition.       Results may be falsely decreased if patient taking Biotin.     BNP (IN-HOUSE) - Normal    Narrative:     Among patients with dyspnea, NT-proBNP is highly sensitive for the detection of acute congestive heart failure. In addition NT-proBNP of <300 pg/ml effectively rules out acute congestive heart failure with 99% negative predictive value.    Results may be falsely decreased if patient taking Biotin.     PROCALCITONIN - Normal    Narrative:     As a Marker for Sepsis (Non-Neonates):     1. <0.5 ng/mL represents a low risk of severe sepsis and/or septic shock.  2. >2 ng/mL represents a high risk of severe sepsis and/or septic shock.    As a Marker for Lower Respiratory Tract Infections that require antibiotic therapy:  PCT on Admission     Antibiotic Therapy             6-12 Hrs later  >0.5                          Strongly Recommended            >0.25 - <0.5             Recommended  0.1 - 0.25                  Discouraged                       Remeasure/reassess PCT  <0.1                         Strongly Discouraged         Remeasure/reassess PCT      As 28 day mortality risk marker: \"Change in Procalcitonin Result\" (>80% or <=80%) if Day 0 (or Day 1) and Day 4 values are available. Refer to http://www.[facility]-pct-calculator.com/    Change in PCT <=80 %   A decrease of PCT levels below or equal to 80% defines a positive change in PCT test result " representing a higher risk for 28-day all-cause mortality of patients diagnosed with severe sepsis or septic shock.    Change in PCT >80 %   A decrease of PCT levels of more than 80% defines a negative change in PCT result representing a lower risk for 28-day all-cause mortality of patients diagnosed with severe sepsis or septic shock.               MAGNESIUM - Normal   URINE DRUG SCREEN - Normal    Narrative:     Negative Thresholds Per Drugs Screened:    Amphetamines                 500 ng/ml  Barbiturates                 200 ng/ml  Benzodiazepines              100 ng/ml  Cocaine                      300 ng/ml  Methadone                    300 ng/ml  Opiates                      300 ng/ml  Oxycodone                    100 ng/ml  THC                           50 ng/ml    The Normal Value for all drugs tested is negative. This report includes final unconfirmed screening results to be used for medical treatment purposes only. Unconfirmed results must not be used for non-medical purposes such as employment or legal testing. Clinical consideration should be applied to any drug of abuse test, particularly when unconfirmed results are used.           LACTIC ACID, REFLEX - Normal   COVID PRE-OP / PRE-PROCEDURE SCREENING ORDER (NO ISOLATION)    Narrative:     The following orders were created for panel order COVID PRE-OP / PRE-PROCEDURE SCREENING ORDER (NO ISOLATION) - Swab, Nasopharynx.  Procedure                               Abnormality         Status                     ---------                               -----------         ------                     COVID-19,[facility] IN-HOUSE...[[id number]]  Abnormal            Final result                 Please view results for these tests on the individual orders.   ETHANOL   URINALYSIS, MICROSCOPIC ONLY   CBC AND DIFFERENTIAL    Narrative:     The following orders were created for panel order CBC & Differential.  Procedure                               Abnormality         Status                      ---------                               -----------         ------                     CBC Auto Differential[[id number]]        Abnormal            Final result                 Please view results for these tests on the individual orders.       EKG:   No orders to display       Meds given in ED:   Medications   ondansetron (ZOFRAN) injection 4 mg (4 mg Intravenous Given 2/4/22 0911)   sodium chloride 0.9 % bolus 500 mL (0 mL Intravenous Stopped 2/4/22 1253)   lactulose (CHRONULAC) 10 GM/15ML solution 20 g (20 g Oral Given 2/4/22 0957)       Imaging results:  No radiology results for the last day    Ambulatory status:   Self     Social issues:   Social History     Socioeconomic History   • Marital status:    Tobacco Use   • Smoking status: Never Smoker   • Smokeless tobacco: Never Used   Vaping Use   • Vaping Use: Never used   Substance and Sexual Activity   • Alcohol use: Not Currently   • Drug use: No   • Sexual activity: Defer       NIH Stroke Scale:        Nursing report ED to floor:     Marley Phelps RN  02/04/22 8638      Electronically signed by Marley Phelps RN at 02/04/22 8763

## 2022-02-07 NOTE — PROGRESS NOTES
Discharge Planning Assessment  [facility]     Patient Name: Wei Potts  MRN: [id number]  Today's Date: 2/7/2022    Admit Date: 2/4/2022     Discharge Needs Assessment     Row Name 02/07/22 1208       Living Environment    Lives With spouse    Name(s) of Who Lives With Patient russell Humphrey    Current Living Arrangements home/apartment/condo    Primary Care Provided by self    Family Caregiver if Needed spouse    Family Caregiver Names Milagro [phone]    Quality of Family Relationships involved    Able to Return to Prior Arrangements yes       Resource/Environmental Concerns    Resource/Environmental Concerns none       Transition Planning    Patient/Family Anticipates Transition to home with family    Patient/Family Anticipated Services at Transition none    Transportation Anticipated family or friend will provide       Discharge Needs Assessment    Readmission Within the Last 30 Days no previous admission in last 30 days    Concerns to be Addressed adjustment to diagnosis/illness               Discharge Plan     Row Name 02/07/22 1202       Plan    Plan Home with spouse    Patient/Family in Agreement with Plan yes    Plan Comments Face sheet verified. Pt lives w/ spouse, he is IADL. PCP is Dr. Villalta  and preferred pharmacy is [facility]. Pt opted to use [facility]. Plans are to return home w/ spouse. CCP will follow progress.              Continued Care and Services - Admitted Since 2/4/2022    Coordination has not been started for this encounter.          Demographic Summary    No documentation.                Functional Status    No documentation.                Psychosocial    No documentation.                Abuse/Neglect    No documentation.                Legal    No documentation.                Substance Abuse    No documentation.                Patient Forms    No documentation.                   Zoie Casas RN

## 2022-02-07 NOTE — PROGRESS NOTES
Gastroenterology   Inpatient Progress Note    Reason for Follow Up: Cirrhosis/hepatic encephalopathy    Subjective     Interval History:   Patient has no new complaints.  He seems to be stable from mental status standpoint despite the fact his ammonia level went back up.  Ultrasound shows evidence of portal vein thrombosis.    Current Facility-Administered Medications:   •  acetaminophen (TYLENOL) tablet 650 mg, 650 mg, Oral, Once, Wes Matos, APRN  •  furosemide (LASIX) tablet 40 mg, 40 mg, Oral, Daily, Jer Serrano MD, 40 mg at 02/07/22 0814  •  lactulose (CHRONULAC) 10 GM/15ML solution 10 g, 10 g, Oral, TID, Jer Serrano MD, 10 g at 02/07/22 0815  •  metoprolol tartrate (LOPRESSOR) tablet 12.5 mg, 12.5 mg, Oral, BID, Jer Serrano MD, 12.5 mg at 02/07/22 0814  •  ondansetron (ZOFRAN) tablet 4 mg, 4 mg, Oral, Q6H PRN **OR** ondansetron (ZOFRAN) injection 4 mg, 4 mg, Intravenous, Q6H PRN, Jer Serrano MD, 4 mg at 02/04/22 2159  •  pantoprazole (PROTONIX) EC tablet 40 mg, 40 mg, Oral, QAM, Jer Serrano MD, 40 mg at 02/07/22 0623  •  riFAXIMin (XIFAXAN) tablet 550 mg, 550 mg, Oral, Q12H, Jer Serrano MD, 550 mg at 02/07/22 0814  •  sodium chloride 0.9 % flush 10 mL, 10 mL, Intravenous, Q12H, Jer Serrano MD, 10 mL at 02/07/22 0815  •  sodium chloride 0.9 % flush 10 mL, 10 mL, Intravenous, PRN, Jer Serrano MD  •  spironolactone (ALDACTONE) tablet 50 mg, 50 mg, Oral, Daily, Jer Serrano MD, 50 mg at 02/07/22 0814  •  zinc sulfate (ZINCATE) capsule 220 mg, 220 mg, Oral, Daily, Jer Serrano MD, 220 mg at 02/07/22 0814  Review of Systems:    The following systems were reviewed and negative;  gastrointestinal    Objective     Vital Signs  Temp:  [99.2 °F (37.3 °C)-100.2 °F (37.9 °C)] 99.2 °F (37.3 °C)  Heart Rate:  [76-87] 87  Resp:  [16-18] 18  BP: (111-121)/(52-71) 119/71  Body mass index is 22.39 kg/m².    Intake/Output Summary (Last 24 hours) at 2/7/2022 1237  Last data filed at 2/7/2022  0900  Gross per 24 hour   Intake 220 ml   Output --   Net 220 ml     I/O this shift:  In: 220 [P.O.:220]  Out: -      Physical Exam:   General: patient awake, alert and cooperative   Eyes: no scleral icterus   Skin: warm and dry, not jaundiced   Abdomen: soft, nontender, nondistended; normal bowel sounds, no masses palpated, no periumbical lymphadenopathy   Psychiatric: Appropriate affect and behavior     Results Review:     I reviewed the patient's new clinical results.    Results from last 7 days   Lab Units 02/07/22 0526 02/06/22 0733 02/05/22 0746   WBC 10*3/mm3 1.72* 2.24* 2.52*   HEMOGLOBIN g/dL 8.3* 9.0* 8.1*   HEMATOCRIT % 24.0* 26.4* 24.2*   PLATELETS 10*3/mm3 40* 46* 38*     Results from last 7 days   Lab Units 02/07/22 0526 02/06/22 0733 02/05/22 0746   SODIUM mmol/L 128* 132* 132*   POTASSIUM mmol/L 3.9 4.0 4.1   CHLORIDE mmol/L 103 104 106   CO2 mmol/L 18.0* 19.5* 15.0*   BUN mg/dL 11 12 12   CREATININE mg/dL 0.99 1.07 1.02   CALCIUM mg/dL 7.4* 7.7* 7.7*   BILIRUBIN mg/dL 1.3* 1.6* 2.4*   ALK PHOS U/L 151* 133* 104   ALT (SGPT) U/L 30 30 33   AST (SGOT) U/L 75* 79* 82*   GLUCOSE mg/dL 103* 113* 99     Results from last 7 days   Lab Units 02/07/22 0526 02/06/22 0733 02/05/22 0746   INR  2.07* 2.06* 2.07*     Lab Results   Lab Value Date/Time    LIPASE 79 (H) 02/04/2022 0906    LIPASE 36 05/07/2021 1248    LIPASE 68 (H) 01/01/2021 2125    LIPASE 133 (H) 07/22/2020 1759    LIPASE 59 07/07/2020 0633    LIPASE 65 (H) 07/06/2020 1630       Radiology:  US Abdomen Limited   Final Result   1.  Cirrhosis with findings of portosystemic shunting including mild to   moderate splenomegaly and ascites. There also appears to be hepatofugal   in the main portal vein with concern for thrombus formation as well.   Further evaluation with Doppler sonogram of the portal vein is   recommended. This was discussed with Shavon, the patient's nurse, at   8:30 PM 02/05/2022   2.  Thickened appearance the gallbladder  likely related to adjacent   liver disease, as before.   3.  Other findings as above.       This report was finalized on 2/5/2022 8:32 PM by Dr. Jason Maki M.D.              Assessment/Plan     Patient Active Problem List   Diagnosis   • ED (erectile dysfunction) of organic origin   • Alcoholic cirrhosis of liver with ascites (HCC)   • Anemia   • Jaundice   • Coagulopathy (HCC)   • Secondary thrombocytopenia   • Lung abnormality- left apex   • Hepatic encephalopathy (HCC)   • Sepsis (HCC)   • Colitis   • Hypertension   • GERD (gastroesophageal reflux disease)   • SBP (spontaneous bacterial peritonitis) (HCC)   • Hyperammonemia (HCC)       Assessment:  1. Hepatic encephalopathy.  Clinically seems to be stable though his ammonia level did go back up somewhat today.  2. Portal  vein thrombosis.  This is a new finding on recent ultrasound.  This could be contributing to need for repeated paracentesis.  Hypercoagulable work-up ordered but likely due to stasis and elevated portal pressure due to cirrhosis.  3. Pancytopenia  4. GERD.  Stable.      Plan:  · Continue lactulose and Xifaxan  · 2 g sodium diet  · Hematology consultation regarding possible anticoagulation.  I discussed the patients findings and my recommendations with patient and nursing staff.    MD Jamal Martinez M.D.  [facility] [place] [address]  Office: [phone]

## 2022-02-07 NOTE — PROGRESS NOTES
Duplex portal hepatic vein duplex complete. Preliminary acute thrombus finding verbally given to Regina @1200pm.

## 2022-02-07 NOTE — CONSULTS
Subjective     REASON FOR CONSULTATION:    Evaluation & management for portal vein thrombosis evaluation                             REQUESTING PHYSICIAN:  MD Milton    RECORDS OBTAINED:  Records of the patients history including those obtained from the referring provider were reviewed and summarized in detail.    HISTORY OF PRESENT ILLNESS:  The patient is a 66 y.o. year old male with medical issues comprising alcoholic liver disease, cirrhosis, esophageal varices s/p banding recurrent ascites & HTN who was admitted on 2/4/22 with hepatic encephalopathy.     2/5/22: A US abdomen Cirrhosis with findings of portosystemic shunting including mild to moderate splenomegaly and ascites. There also appears to be hepatofugal in the main portal vein with concern for thrombus formation as well.    2/6/22: A doppler portal vein revealed acute thrombus in the main portal vein, right intrahepatic portal vein, along with abnormal flow in the extrahepatic portal vein and left intrahepatic portal vein.     Hem/Onc has been consulted for further evaluation & management.     Patient is much more alert, awake & oriented today. Has abdominal distension. Denies any bruise or bleed. No abdominal pain, N/V or any other GI/ symptoms.     Past Medical History:   Diagnosis Date   • Alcoholism (HCC)    • Anemia    • Cirrhosis (HCC)    • Encephalopathy    • Hypertension    • Liver disease         Past Surgical History:   Procedure Laterality Date   • COLONOSCOPY     • ENDOSCOPY          No current facility-administered medications on file prior to encounter.     Current Outpatient Medications on File Prior to Encounter   Medication Sig Dispense Refill   • amitriptyline (ELAVIL) 10 MG tablet Take 1 tablet by mouth Every Night. 30 tablet 1   • furosemide (LASIX) 40 MG tablet TAKE 1 TABLET BY MOUTH DAILY 30 tablet 2   • Generlac 10 GM/15ML solution solution (encephalopathy) TAKE 30 ML BY MOUTH ONCE TO TWICE DAILY UNTIL 3-4 BOWEL MOVEMENTS  ARE MADE 1800 mL 2   • lactulose (CHRONULAC) 10 GM/15ML solution TAKE 15 ML BY MOUTH TWICE DAILY, ADJUST DOSE UNTIL YOU HAVE 3-4 BOWEL MOVEMENTS A  mL 0   • MAGnesium-Oxide 400 (241.3 Mg) MG tablet tablet 400 mg 2 (Two) Times a Day.     • metoprolol tartrate (LOPRESSOR) 25 MG tablet TAKE 1/2 TABLET BY MOUTH EVERY 12 HOURS 90 tablet 0   • spironolactone (ALDACTONE) 50 MG tablet Take 1 tablet by mouth Daily. 30 tablet 3        ALLERGIES:    Allergies   Allergen Reactions   • Iron GI Intolerance        Social History     Socioeconomic History   • Marital status:    Tobacco Use   • Smoking status: Never Smoker   • Smokeless tobacco: Never Used   Vaping Use   • Vaping Use: Never used   Substance and Sexual Activity   • Alcohol use: Not Currently   • Drug use: No   • Sexual activity: Defer        Family History   Problem Relation Age of Onset   • Diabetes Mother    • Hypertension Father         Review of Systems   GENERAL: No change in appetite or weight;   No fevers, chills, sweats.    SKIN: No nonhealing lesions.   No rashes.  HEME/LYMPH: No easy bruising, bleeding.   No swollen nodes.   EYES: No vision changes or diplopia.   ENT: No tinnitus, hearing loss, gum bleeding, epistaxis, hoarseness or dysphagia.   RESPIRATORY: No cough, shortness of breath, hemoptysis or wheezing.   CVS: No chest pain, palpitations, orthopnea, dyspnea on exertion or PND.   GI: Abdominal distension  No melena or hematochezia.   No abdominal pain.  No nausea, vomiting, constipation, diarrhea  : No lower tract obstructive symptoms, dysuria or hematuria.   MUSCULOSKELETAL: No bone pain.  No joint stiffness.   NEUROLOGICAL: No global weakness, loss of consciousness or seizures.   PSYCHIATRIC: No increased nervousness, mood changes or depression.       Objective     Vitals:    02/06/22 1346 02/06/22 2020 02/06/22 2355 02/07/22 0727   BP: 117/67 121/64 111/52 119/71   BP Location: Right arm Right arm Right arm Right arm   Patient  Position: Lying Lying Lying    Pulse: 76 83 87    Resp: 18 16 16 18   Temp: 99.7 °F (37.6 °C) 100.2 °F (37.9 °C) 99.2 °F (37.3 °C) 99.2 °F (37.3 °C)   TempSrc: Oral Oral Oral Oral   SpO2: 99% 99% 100%    Weight:       Height:         No flowsheet data found.    Physical Exam    CONSTITUTIONAL:  Vital signs reviewed.  No distress, looks comfortable.  EYES:  Conjunctiva and lids unremarkable.    EARS,NOSE,MOUTH,THROAT:  Ears and nose appear unremarkable.  Lips, teeth, gums appear unremarkable.  RESPIRATORY:  Normal respiratory effort.  Lungs clear to auscultation bilaterally.  CARDIOVASCULAR:  Normal S1, S2.  No murmurs rubs or gallops.  No significant lower extremity edema.  GASTROINTESTINAL: Abdomen appears unremarkable.  Nontender.  No hepatomegaly.  No splenomegaly.  LYMPHATIC:  No cervical, supraclavicular, axillary lymphadenopathy.  SKIN:  Warm.  No rashes.  PSYCHIATRIC:  Normal judgment and insight.  Normal mood and affect.  NEURO: AAO x 3, no focal deficits    RECENT LABS:  Hematology WBC   Date Value Ref Range Status   02/07/2022 1.72 (C) 3.40 - 10.80 10*3/mm3 Final     RBC   Date Value Ref Range Status   02/07/2022 2.99 (L) 4.14 - 5.80 10*6/mm3 Final     Hemoglobin   Date Value Ref Range Status   02/07/2022 8.3 (L) 13.0 - 17.7 g/dL Final     Hematocrit   Date Value Ref Range Status   02/07/2022 24.0 (L) 37.5 - 51.0 % Final     Platelets   Date Value Ref Range Status   02/07/2022 40 (C) 140 - 450 10*3/mm3 Final          Assessment/Plan    is a pleasant 65 y/o WM with medical issues comprising alcoholic liver disease, cirrhosis, esophageal varices s/p banding recurrent ascites & HTN who was admitted on 2/4/22 with hepatic encephalopathy. Hematology has been consulted for portal vein evaluation & management.     # Portal vein thrombosis:  - Acute on chronic. MRI abdomen from 1/7/22 done at [facility] had demonstrated peripheral, chronic nonocclusive thrombus of the main portal vein. The doppler from  2/6/22 shows acute thrombus in the main and right intrahepatic portal veins.   - This is likely due to altered coagulation due to hepatic dysfunction. The liver produces endogenous inhibitors of coagulation (eg, protein S, protein C, antithrombin-III and fibrinolytic factors) - which are reduced in liver disease.  Liver disease also leads to elevated Von Willebrand factor (VWF), & other acute phase reactants, leading to prothrombotic state.    - However given unusual location, hypercoagulable state work up is warranted. No prior history of VTEs. No family history of thrombosis.   - Will check Factor V leiden, prothrombin gene mutation, JAK2 mutation, P[facility] flow cytometry, & AT-III. Will defer protein C & S as levels would be altered.   - Patient has chronic thrombocytopenia with platelets in 40-50,000s range. The CBC from 2/7/22 shows platelet count of 40,000. No c/o bleed or bruise.   - Given risk for intestinal infarction with PV thrombus and likely prothrombotic state, will start him on low dose lovenox for now. If platelets improve, will consider increasing the dose.     # Thrombocytopenia:  - Acute on chronic. Likely liver disease related.   - No bleed/bruise    # Hepatic encephalopathy  # Decompensated liver cirrhosis  # Ascites    RECOMMENDATIONS:  - Portal vein thrombus likely liver disease related. Perform hypercoagulable state work up given unusual location.   - Start a trial of low-dose lovenox given high risk of intestinal infarction with acute PV thrombus. Will consider increasing the dose if platelets improve.   - Will continue to follow.

## 2022-02-07 NOTE — PLAN OF CARE
Problem: Adult Inpatient Plan of Care  Goal: Plan of Care Review  Outcome: Ongoing, Progressing  Flowsheets (Taken 2/7/2022 1722)  Progress: no change  Plan of Care Reviewed With: patient  Outcome Summary: VSS, room air, NSR, up ad oliver, lovenox started today.  Goal: Patient-Specific Goal (Individualized)  Outcome: Ongoing, Progressing  Goal: Absence of Hospital-Acquired Illness or Injury  Outcome: Ongoing, Progressing  Intervention: Identify and Manage Fall Risk  Recent Flowsheet Documentation  Taken 2/7/2022 1551 by Keri Nath RN  Safety Promotion/Fall Prevention:   assistive device/personal items within reach   clutter free environment maintained   nonskid shoes/slippers when out of bed   room organization consistent   safety round/check completed  Taken 2/7/2022 1357 by Keri Nath RN  Safety Promotion/Fall Prevention:   clutter free environment maintained   assistive device/personal items within reach   nonskid shoes/slippers when out of bed   safety round/check completed   room organization consistent  Taken 2/7/2022 1200 by Keri Nath RN  Safety Promotion/Fall Prevention:   assistive device/personal items within reach   clutter free environment maintained   nonskid shoes/slippers when out of bed   room organization consistent   safety round/check completed  Taken 2/7/2022 1007 by Keri Nath RN  Safety Promotion/Fall Prevention:   assistive device/personal items within reach   clutter free environment maintained   nonskid shoes/slippers when out of bed   safety round/check completed   room organization consistent  Taken 2/7/2022 0812 by Keri Nath RN  Safety Promotion/Fall Prevention:   clutter free environment maintained   assistive device/personal items within reach   nonskid shoes/slippers when out of bed   room organization consistent   safety round/check completed  Intervention: Prevent Skin Injury  Recent Flowsheet Documentation  Taken 2/7/2022 1551 by  Keri Nath RN  Body Position: side-lying, right  Taken 2/7/2022 1357 by Keri Nath RN  Body Position: side-lying, right  Taken 2/7/2022 1200 by Keri Nath RN  Body Position: supine  Taken 2/7/2022 0812 by Keri Nath RN  Body Position: side-lying, left  Goal: Optimal Comfort and Wellbeing  Outcome: Ongoing, Progressing  Goal: Readiness for Transition of Care  Outcome: Ongoing, Progressing     Problem: Fall Injury Risk  Goal: Absence of Fall and Fall-Related Injury  Outcome: Ongoing, Progressing  Intervention: Promote Injury-Free Environment  Recent Flowsheet Documentation  Taken 2/7/2022 1551 by Keri Nath RN  Safety Promotion/Fall Prevention:   assistive device/personal items within reach   clutter free environment maintained   nonskid shoes/slippers when out of bed   room organization consistent   safety round/check completed  Taken 2/7/2022 1357 by Keri Nath RN  Safety Promotion/Fall Prevention:   clutter free environment maintained   assistive device/personal items within reach   nonskid shoes/slippers when out of bed   safety round/check completed   room organization consistent  Taken 2/7/2022 1200 by Keri Nath RN  Safety Promotion/Fall Prevention:   assistive device/personal items within reach   clutter free environment maintained   nonskid shoes/slippers when out of bed   room organization consistent   safety round/check completed  Taken 2/7/2022 1007 by Keri Nath RN  Safety Promotion/Fall Prevention:   assistive device/personal items within reach   clutter free environment maintained   nonskid shoes/slippers when out of bed   safety round/check completed   room organization consistent  Taken 2/7/2022 0812 by Keri Nath RN  Safety Promotion/Fall Prevention:   clutter free environment maintained   assistive device/personal items within reach   nonskid shoes/slippers when out of bed   room organization consistent   safety  round/check completed   Goal Outcome Evaluation:  Plan of Care Reviewed With: patient        Progress: no change  Outcome Summary: VSS, room air, NSR, up ad oliver, lovenox started today.

## 2022-02-07 NOTE — PROGRESS NOTES
"DAILY PROGRESS NOTE  [facility]    Patient Identification:  Name: Wei Potts  Age: 66 y.o.  Sex: male  :  1955  MRN: [id number]         Primary Care Physician: Bella Villalta MD    Subjective:  Interval History: He feels better today.    Objective:    Scheduled Meds:acetaminophen, 650 mg, Oral, Once  enoxaparin, 40 mg, Subcutaneous, Q24H  furosemide, 40 mg, Oral, Daily  lactulose, 10 g, Oral, TID  metoprolol tartrate, 12.5 mg, Oral, BID  pantoprazole, 40 mg, Oral, QAM  rifAXIMin, 550 mg, Oral, Q12H  sodium chloride, 10 mL, Intravenous, Q12H  spironolactone, 50 mg, Oral, Daily  zinc sulfate, 220 mg, Oral, Daily      Continuous Infusions:     Vital signs in last 24 hours:  Temp:  [99 °F (37.2 °C)-100.2 °F (37.9 °C)] 99 °F (37.2 °C)  Heart Rate:  [76-87] 78  Resp:  [16-18] 18  BP: (108-121)/(52-71) 108/66    Intake/Output:    Intake/Output Summary (Last 24 hours) at 2022 1335  Last data filed at 2022 0900  Gross per 24 hour   Intake 220 ml   Output --   Net 220 ml       Exam:  /66 (BP Location: Right arm, Patient Position: Lying)   Pulse 78   Temp 99 °F (37.2 °C) (Oral)   Resp 18   Ht 180.3 cm (71\")   Wt 72.8 kg (160 lb 8 oz)   SpO2 97%   BMI 22.39 kg/m²     General Appearance:    Alert, cooperative, no distress   Head:    Normocephalic, without obvious abnormality, atraumatic   Eyes:       Throat:   Lips, tongue, gums normal   Neck:   Supple, symmetrical, trachea midline, no JVD   Lungs:     Clear to auscultation bilaterally, respirations unlabored   Chest Wall:    No tenderness or deformity    Heart:    Regular rate and rhythm, S1 and S2 normal, no murmur,no  Rub or gallop   Abdomen:     Soft, ascites, nontender, bowel sounds active, no masses, no organomegaly    Extremities:   Extremities normal, atraumatic, no cyanosis or edema   Pulses:      Skin:   Skin is warm and dry,  no rashes or palpable lesions   Neurologic:   no focal deficits noted      Lab Results " (last 72 hours)     Procedure Component Value Units Date/Time    AFP Tumor Marker [[id number]]  (Normal) Collected: 02/05/22 1105    Specimen: Blood Updated: 02/05/22 1312     ALPHA-FETOPROTEIN 2.56 ng/mL     Narrative:      Alpha Fetoprotein Tumor Marker Reference Range:    0.0-8.3 ng/mL    Note: Normal values apply only to males and nonpregnant females. These results are not interpretable for pregnant females.    Results may be falsely decreased if patient taking Biotin.      Manual Differential [[id number]]  (Abnormal) Collected: 02/05/22 0746    Specimen: Blood Updated: 02/05/22 0935     Neutrophil % 66.3 %      Lymphocyte % 11.3 %      Monocyte % 22.5 %      Neutrophils Absolute 1.67 10*3/mm3      Lymphocytes Absolute 0.28 10*3/mm3      Monocytes Absolute 0.57 10*3/mm3      Crenated RBC's Mod/2+     Elliptocytes Mod/2+     WBC Morphology Normal     Platelet Morphology Normal    CBC & Differential [[id number]]  (Abnormal) Collected: 02/05/22 0746    Specimen: Blood Updated: 02/05/22 0935    Narrative:      The following orders were created for panel order CBC & Differential.  Procedure                               Abnormality         Status                     ---------                               -----------         ------                     CBC Auto Differential[[id number]]        Abnormal            Final result                 Please view results for these tests on the individual orders.    CBC Auto Differential [[id number]]  (Abnormal) Collected: 02/05/22 0746    Specimen: Blood Updated: 02/05/22 0935     WBC 2.52 10*3/mm3      RBC 2.94 10*6/mm3      Hemoglobin 8.1 g/dL      Hematocrit 24.2 %      MCV 82.3 fL      MCH 27.6 pg      MCHC 33.5 g/dL      RDW 15.8 %      RDW-SD 47.6 fl      MPV 9.1 fL      Platelets 38 10*3/mm3     Comprehensive Metabolic Panel [[id number]]  (Abnormal) Collected: 02/05/22 0746    Specimen: Blood Updated: 02/05/22 0850     Glucose 99 mg/dL      BUN 12 mg/dL      Creatinine 1.02  mg/dL      Sodium 132 mmol/L      Potassium 4.1 mmol/L      Chloride 106 mmol/L      CO2 15.0 mmol/L      Calcium 7.7 mg/dL      Total Protein 6.5 g/dL      Albumin 2.40 g/dL      ALT (SGPT) 33 U/L      AST (SGOT) 82 U/L      Alkaline Phosphatase 104 U/L      Total Bilirubin 2.4 mg/dL      eGFR Non African Amer 74 mL/min/1.73      Globulin 4.1 gm/dL      A/G Ratio 0.6 g/dL      BUN/Creatinine Ratio 11.8     Anion Gap 11.0 mmol/L     Narrative:      GFR Normal >60  Chronic Kidney Disease <60  Kidney Failure <15      Ammonia [[id number]]  (Abnormal) Collected: 02/05/22 0746    Specimen: Blood Updated: 02/05/22 0816     Ammonia 83 umol/L     Protime-INR [[id number]]  (Abnormal) Collected: 02/05/22 0746    Specimen: Blood Updated: 02/05/22 0813     Protime 23.3 Seconds      INR 2.07    COVID PRE-OP / PRE-PROCEDURE SCREENING ORDER (NO ISOLATION) - Swab, Nasopharynx [[id number]]  (Abnormal) Collected: 02/04/22 1253    Specimen: Swab from Nasopharynx Updated: 02/04/22 1333    Narrative:      The following orders were created for panel order COVID PRE-OP / PRE-PROCEDURE SCREENING ORDER (NO ISOLATION) - Swab, Nasopharynx.  Procedure                               Abnormality         Status                     ---------                               -----------         ------                     COVID-19,[facility]HOU[facility]...[[id number]]  Abnormal            Final result                 Please view results for these tests on the individual orders.    COVID-19,[facility] CEPHEID/HAY NP SWAB IN TRANSPORT MEDIA 8-12 HR TAT - Swab, Nasopharynx [[id number]]  (Abnormal) Collected: 02/04/22 1253    Specimen: Swab from Nasopharynx Updated: 02/04/22 1333     COVID19 Detected    Narrative:      Fact sheet for providers: https://www.fda.gov/media/045660/download    Fact sheet for patients: https://www.fda.gov/media/539475/download    Test performed by PCR.    STAT Lactic Acid, Reflex [[id number]]  (Normal) Collected: 02/04/22 1308     Specimen: Blood Updated: 02/04/22 1327     Lactate 1.8 mmol/L     Urinalysis With Microscopic If Indicated (No Culture) - Urine, Clean Catch [[id number]]  (Abnormal) Collected: 02/04/22 0922    Specimen: Urine, Clean Catch Updated: 02/04/22 1008     Color, UA Dark Yellow     Appearance, UA Clear     pH, UA 7.0     Specific Gravity, UA 1.026     Glucose, UA Negative     Ketones, UA Trace     Bilirubin, UA Small (1+)     Blood, UA Negative     Protein, UA Negative     Leuk Esterase, UA Trace     Nitrite, UA Negative     Urobilinogen, UA 1.0 E.U./dL    Urinalysis, Microscopic Only - Urine, Clean Catch [[id number]] Collected: 02/04/22 0922    Specimen: Urine, Clean Catch Updated: 02/04/22 1008     RBC, UA 0-2 /HPF      WBC, UA 0-2 /HPF      Bacteria, UA None Seen /HPF      Squamous Epithelial Cells, UA 0-2 /HPF      Hyaline Casts, UA 0-2 /LPF      Methodology Automated Microscopy    Manual Differential [[id number]]  (Abnormal) Collected: 02/04/22 0906    Specimen: Blood Updated: 02/04/22 1002     Neutrophil % 67.7 %      Lymphocyte % 7.3 %      Monocyte % 25.0 %      Neutrophils Absolute 1.75 10*3/mm3      Lymphocytes Absolute 0.19 10*3/mm3      Monocytes Absolute 0.65 10*3/mm3      Ovalocytes Mod/2+     Poikilocytes Mod/2+     Polychromasia Slight/1+     WBC Morphology Normal     Platelet Morphology Normal    CBC & Differential [[id number]]  (Abnormal) Collected: 02/04/22 0906    Specimen: Blood Updated: 02/04/22 1002    Narrative:      The following orders were created for panel order CBC & Differential.  Procedure                               Abnormality         Status                     ---------                               -----------         ------                     CBC Auto Differential[[id number]]        Abnormal            Final result                 Please view results for these tests on the individual orders.    CBC Auto Differential [[id number]]  (Abnormal) Collected: 02/04/22 0906    Specimen: Blood  Updated: 02/04/22 1002     WBC 2.58 10*3/mm3      RBC 3.10 10*6/mm3      Hemoglobin 8.5 g/dL      Hematocrit 25.9 %      MCV 83.5 fL      MCH 27.4 pg      MCHC 32.8 g/dL      RDW 16.2 %      RDW-SD 48.7 fl      MPV 10.1 fL      Platelets 48 10*3/mm3     Urine Drug Screen - Urine, Clean Catch [[id number]]  (Normal) Collected: 02/04/22 0922    Specimen: Urine, Clean Catch Updated: 02/04/22 0949     Amphet/Methamphet, Screen Negative     Barbiturates Screen, Urine Negative     Benzodiazepine Screen, Urine Negative     Cocaine Screen, Urine Negative     Opiate Screen Negative     THC, Screen, Urine Negative     Methadone Screen, Urine Negative     Oxycodone Screen, Urine Negative    Narrative:      Negative Thresholds Per Drugs Screened:    Amphetamines                 500 ng/ml  Barbiturates                 200 ng/ml  Benzodiazepines              100 ng/ml  Cocaine                      300 ng/ml  Methadone                    300 ng/ml  Opiates                      300 ng/ml  Oxycodone                    100 ng/ml  THC                           50 ng/ml    The Normal Value for all drugs tested is negative. This report includes final unconfirmed screening results to be used for medical treatment purposes only. Unconfirmed results must not be used for non-medical purposes such as employment or legal testing. Clinical consideration should be applied to any drug of abuse test, particularly when unconfirmed results are used.            Protime-INR [[id number]]  (Abnormal) Collected: 02/04/22 0906    Specimen: Blood Updated: 02/04/22 0941     Protime 23.9 Seconds      INR 2.14    Procalcitonin [[id number]]  (Normal) Collected: 02/04/22 0906    Specimen: Blood Updated: 02/04/22 0941     Procalcitonin 0.13 ng/mL     Narrative:      As a Marker for Sepsis (Non-Neonates):     1. <0.5 ng/mL represents a low risk of severe sepsis and/or septic shock.  2. >2 ng/mL represents a high risk of severe sepsis and/or septic shock.    As a  "Marker for Lower Respiratory Tract Infections that require antibiotic therapy:  PCT on Admission     Antibiotic Therapy             6-12 Hrs later  >0.5                          Strongly Recommended            >0.25 - <0.5             Recommended  0.1 - 0.25                  Discouraged                       Remeasure/reassess PCT  <0.1                         Strongly Discouraged         Remeasure/reassess PCT      As 28 day mortality risk marker: \"Change in Procalcitonin Result\" (>80% or <=80%) if Day 0 (or Day 1) and Day 4 values are available. Refer to http://www.[facility]/    Change in PCT <=80 %   A decrease of PCT levels below or equal to 80% defines a positive change in PCT test result representing a higher risk for 28-day all-cause mortality of patients diagnosed with severe sepsis or septic shock.    Change in PCT >80 %   A decrease of PCT levels of more than 80% defines a negative change in PCT result representing a lower risk for 28-day all-cause mortality of patients diagnosed with severe sepsis or septic shock.                Lactic Acid, Plasma [[id number]]  (Abnormal) Collected: 02/04/22 0906    Specimen: Blood Updated: 02/04/22 0940     Lactate 3.4 mmol/L     Ammonia [[id number]]  (Abnormal) Collected: 02/04/22 0906    Specimen: Blood Updated: 02/04/22 0935     Ammonia 106 umol/L     Comprehensive Metabolic Panel [[id number]]  (Abnormal) Collected: 02/04/22 0906    Specimen: Blood Updated: 02/04/22 0934     Glucose 125 mg/dL      BUN 13 mg/dL      Creatinine 1.10 mg/dL      Sodium 134 mmol/L      Potassium 4.3 mmol/L      Chloride 106 mmol/L      CO2 18.6 mmol/L      Calcium 8.5 mg/dL      Total Protein 6.8 g/dL      Albumin 2.70 g/dL      ALT (SGPT) 26 U/L      AST (SGOT) 59 U/L      Alkaline Phosphatase 139 U/L      Total Bilirubin 3.1 mg/dL      eGFR Non African Amer 67 mL/min/1.73      Globulin 4.1 gm/dL      A/G Ratio 0.7 g/dL      BUN/Creatinine Ratio 11.8     Anion Gap 9.4 mmol/L "     Narrative:      GFR Normal >60  Chronic Kidney Disease <60  Kidney Failure <15      Lipase [[id number]]  (Abnormal) Collected: 02/04/22 0906    Specimen: Blood Updated: 02/04/22 0934     Lipase 79 U/L     Troponin [[id number]]  (Normal) Collected: 02/04/22 0906    Specimen: Blood Updated: 02/04/22 0934     Troponin T <0.010 ng/mL     Narrative:      Troponin T Reference Range:  <= 0.03 ng/mL-   Negative for AMI  >0.03 ng/mL-     Abnormal for myocardial necrosis.  Clinicians would have to utilize clinical acumen, EKG, Troponin and serial changes to determine if it is an Acute Myocardial Infarction or myocardial injury due to an underlying chronic condition.       Results may be falsely decreased if patient taking Biotin.      Magnesium [[id number]]  (Normal) Collected: 02/04/22 0906    Specimen: Blood Updated: 02/04/22 0934     Magnesium 2.1 mg/dL     Ethanol [[id number]] Collected: 02/04/22 0906    Specimen: Blood Updated: 02/04/22 0934     Ethanol <10 mg/dL      Ethanol % <0.010 %     BNP [[id number]]  (Normal) Collected: 02/04/22 0906    Specimen: Blood Updated: 02/04/22 0932     proBNP 339.0 pg/mL     Narrative:      Among patients with dyspnea, NT-proBNP is highly sensitive for the detection of acute congestive heart failure. In addition NT-proBNP of <300 pg/ml effectively rules out acute congestive heart failure with 99% negative predictive value.    Results may be falsely decreased if patient taking Biotin.      POC Glucose Once [[id number]]  (Abnormal) Collected: 02/04/22 0906    Specimen: Blood Updated: 02/04/22 0907     Glucose 142 mg/dL      Comment: Meter: [id number] : [id number] Glen Dickerson ([place]) Tech           Data Review:  Results from last 7 days   Lab Units 02/07/22 0526 02/06/22 0733 02/06/22 0733 02/05/22 0746 02/05/22 0746   SODIUM mmol/L 128*  --  132*  --  132*   POTASSIUM mmol/L 3.9  --  4.0  --  4.1   CHLORIDE mmol/L 103  --  104  --  106   CO2 mmol/L 18.0*  --  19.5*  --  15.0*    BUN mg/dL 11  --  12  --  12   CREATININE mg/dL 0.99  --  1.07  --  1.02   GLUCOSE mg/dL 103*   < > 113*   < > 99   CALCIUM mg/dL 7.4*  --  7.7*  --  7.7*    < > = values in this interval not displayed.     Results from last 7 days   Lab Units 02/07/22  0526 02/06/22  0733 02/05/22  0746   WBC 10*3/mm3 1.72* 2.24* 2.52*   HEMOGLOBIN g/dL 8.3* 9.0* 8.1*   HEMATOCRIT % 24.0* 26.4* 24.2*   PLATELETS 10*3/mm3 40* 46* 38*             Lab Results   Lab Value Date/Time    TROPONINT <0.010 02/04/2022 0906    TROPONINT <0.010 05/07/2021 1248    TROPONINT <0.010 12/25/2020 0122         Results from last 7 days   Lab Units 02/07/22 0526 02/06/22  0733 02/05/22  0746   ALK PHOS U/L 151* 133* 104   BILIRUBIN mg/dL 1.3* 1.6* 2.4*   ALT (SGPT) U/L 30 30 33   AST (SGOT) U/L 75* 79* 82*             No results found for: POCGLU  Results from last 7 days   Lab Units 02/07/22 0526 02/06/22  0733 02/05/22  0746   INR  2.07* 2.06* 2.07*       Past Medical History:   Diagnosis Date   • Alcoholism (HCC)    • Anemia    • Cirrhosis (HCC)    • Encephalopathy    • Hypertension    • Liver disease        Assessment:  Active Hospital Problems    Diagnosis  POA   • **Hepatic encephalopathy (HCC) [K72.90]  Yes   • Hyperammonemia (HCC) [E72.20]  Yes   • Hypertension [I10]  Yes   • GERD (gastroesophageal reflux disease) [K21.9]  Yes   • Coagulopathy (HCC) [D68.9]  Yes   • 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       Plan:  GI consult noted.  We will continue with lactulose and Xifaxan.  We will get follow-up lab studies. We will consult hematology in regard to possible portal vein thrombosis. With his INR already being 2 and his platelet count around 40 obviously is poor candidate for any further anticoagulation.  Hematology consult noted.  Plans for low-dose Lovenox.    Jer Serrano MD  2/7/2022  13:35 EST

## 2022-02-08 PROBLEM — U07.1 COVID-19 VIRUS DETECTED: Status: ACTIVE | Noted: 2022-02-08

## 2022-02-08 LAB
ALBUMIN SERPL-MCNC: 2.1 G/DL (ref 3.5–5.2)
ALBUMIN/GLOB SERPL: 0.5 G/DL
ALP SERPL-CCNC: 186 U/L (ref 39–117)
ALT SERPL W P-5'-P-CCNC: 36 U/L (ref 1–41)
AMMONIA BLD-SCNC: 108 UMOL/L (ref 16–60)
ANION GAP SERPL CALCULATED.3IONS-SCNC: 12.6 MMOL/L (ref 5–15)
AST SERPL-CCNC: 83 U/L (ref 1–40)
AT III PPP CHRO-ACNC: 41 % (ref 90–134)
BASOPHILS # BLD AUTO: 0.01 10*3/MM3 (ref 0–0.2)
BASOPHILS NFR BLD AUTO: 0.5 % (ref 0–1.5)
BILIRUB SERPL-MCNC: 1.3 MG/DL (ref 0–1.2)
BUN SERPL-MCNC: 10 MG/DL (ref 8–23)
BUN/CREAT SERPL: 11.4 (ref 7–25)
CALCIUM SPEC-SCNC: 7.9 MG/DL (ref 8.6–10.5)
CHLORIDE SERPL-SCNC: 105 MMOL/L (ref 98–107)
CO2 SERPL-SCNC: 18.4 MMOL/L (ref 22–29)
CREAT SERPL-MCNC: 0.88 MG/DL (ref 0.76–1.27)
DEPRECATED RDW RBC AUTO: 48.4 FL (ref 37–54)
EOSINOPHIL # BLD AUTO: 0.05 10*3/MM3 (ref 0–0.4)
EOSINOPHIL NFR BLD AUTO: 2.6 % (ref 0.3–6.2)
ERYTHROCYTE [DISTWIDTH] IN BLOOD BY AUTOMATED COUNT: 16.2 % (ref 12.3–15.4)
GFR SERPL CREATININE-BSD FRML MDRD: 87 ML/MIN/1.73
GLOBULIN UR ELPH-MCNC: 4.5 GM/DL
GLUCOSE BLDC GLUCOMTR-MCNC: 160 MG/DL (ref 70–130)
GLUCOSE SERPL-MCNC: 101 MG/DL (ref 65–99)
HCT VFR BLD AUTO: 25.8 % (ref 37.5–51)
HGB BLD-MCNC: 8.8 G/DL (ref 13–17.7)
HYPOCHROMIA BLD QL: NORMAL
IMM GRANULOCYTES # BLD AUTO: 0.01 10*3/MM3 (ref 0–0.05)
IMM GRANULOCYTES NFR BLD AUTO: 0.5 % (ref 0–0.5)
LYMPHOCYTES # BLD AUTO: 0.54 10*3/MM3 (ref 0.7–3.1)
LYMPHOCYTES NFR BLD AUTO: 28.4 % (ref 19.6–45.3)
MCH RBC QN AUTO: 27.9 PG (ref 26.6–33)
MCHC RBC AUTO-ENTMCNC: 34.1 G/DL (ref 31.5–35.7)
MCV RBC AUTO: 81.9 FL (ref 79–97)
MONOCYTES # BLD AUTO: 0.37 10*3/MM3 (ref 0.1–0.9)
MONOCYTES NFR BLD AUTO: 19.5 % (ref 5–12)
NEUTROPHILS NFR BLD AUTO: 0.92 10*3/MM3 (ref 1.7–7)
NEUTROPHILS NFR BLD AUTO: 48.5 % (ref 42.7–76)
NRBC BLD AUTO-RTO: 0 /100 WBC (ref 0–0.2)
PLAT MORPH BLD: NORMAL
PLATELET # BLD AUTO: 42 10*3/MM3 (ref 140–450)
PMV BLD AUTO: 10.8 FL (ref 6–12)
POTASSIUM SERPL-SCNC: 4 MMOL/L (ref 3.5–5.2)
PROT SERPL-MCNC: 6.6 G/DL (ref 6–8.5)
PROTHROM ACT/NOR PPP: 27 % (ref 50–154)
RBC # BLD AUTO: 3.15 10*6/MM3 (ref 4.14–5.8)
SODIUM SERPL-SCNC: 136 MMOL/L (ref 136–145)
WBC MORPH BLD: NORMAL
WBC NRBC COR # BLD: 1.9 10*3/MM3 (ref 3.4–10.8)

## 2022-02-08 PROCEDURE — 85025 COMPLETE CBC W/AUTO DIFF WBC: CPT | Performed by: HOSPITALIST

## 2022-02-08 PROCEDURE — 82140 ASSAY OF AMMONIA: CPT | Performed by: HOSPITALIST

## 2022-02-08 PROCEDURE — 25010000002 ENOXAPARIN PER 10 MG: Performed by: INTERNAL MEDICINE

## 2022-02-08 PROCEDURE — 80053 COMPREHEN METABOLIC PANEL: CPT | Performed by: HOSPITALIST

## 2022-02-08 PROCEDURE — 99233 SBSQ HOSP IP/OBS HIGH 50: CPT | Performed by: INTERNAL MEDICINE

## 2022-02-08 PROCEDURE — 82962 GLUCOSE BLOOD TEST: CPT

## 2022-02-08 PROCEDURE — 99232 SBSQ HOSP IP/OBS MODERATE 35: CPT | Performed by: INTERNAL MEDICINE

## 2022-02-08 PROCEDURE — 85007 BL SMEAR W/DIFF WBC COUNT: CPT | Performed by: HOSPITALIST

## 2022-02-08 RX ADMIN — PANTOPRAZOLE SODIUM 40 MG: 40 TABLET, DELAYED RELEASE ORAL at 06:17

## 2022-02-08 RX ADMIN — METOPROLOL TARTRATE 12.5 MG: 25 TABLET, FILM COATED ORAL at 08:09

## 2022-02-08 RX ADMIN — RIFAXIMIN 550 MG: 550 TABLET ORAL at 20:41

## 2022-02-08 RX ADMIN — SODIUM CHLORIDE, PRESERVATIVE FREE 10 ML: 5 INJECTION INTRAVENOUS at 20:43

## 2022-02-08 RX ADMIN — SPIRONOLACTONE 50 MG: 50 TABLET, FILM COATED ORAL at 08:10

## 2022-02-08 RX ADMIN — ENOXAPARIN SODIUM 40 MG: 100 INJECTION SUBCUTANEOUS at 14:30

## 2022-02-08 RX ADMIN — LACTULOSE 10 G: 10 SOLUTION ORAL at 10:11

## 2022-02-08 RX ADMIN — SODIUM CHLORIDE, PRESERVATIVE FREE 10 ML: 5 INJECTION INTRAVENOUS at 08:10

## 2022-02-08 RX ADMIN — RIFAXIMIN 550 MG: 550 TABLET ORAL at 08:09

## 2022-02-08 RX ADMIN — METOPROLOL TARTRATE 12.5 MG: 25 TABLET, FILM COATED ORAL at 20:41

## 2022-02-08 RX ADMIN — LACTULOSE 10 G: 10 SOLUTION ORAL at 17:14

## 2022-02-08 RX ADMIN — FUROSEMIDE 40 MG: 40 TABLET ORAL at 08:10

## 2022-02-08 NOTE — PROGRESS NOTES
"DAILY PROGRESS NOTE  [facility]    Patient Identification:  Name: Wei Potts  Age: 66 y.o.  Sex: male  :  1955  MRN: [id number]         Primary Care Physician: Bella Villalta MD    Subjective:  Interval History: He feels better today.    Objective:    Scheduled Meds:acetaminophen, 650 mg, Oral, Once  enoxaparin, 40 mg, Subcutaneous, Q24H  furosemide, 40 mg, Oral, Daily  lactulose, 10 g, Oral, TID  metoprolol tartrate, 12.5 mg, Oral, BID  pantoprazole, 40 mg, Oral, QAM  rifAXIMin, 550 mg, Oral, Q12H  sodium chloride, 10 mL, Intravenous, Q12H  spironolactone, 50 mg, Oral, Daily  zinc sulfate, 220 mg, Oral, Daily      Continuous Infusions:     Vital signs in last 24 hours:  Temp:  [98.2 °F (36.8 °C)-99.3 °F (37.4 °C)] 99.1 °F (37.3 °C)  Heart Rate:  [71-78] 72  Resp:  [18] 18  BP: (106-117)/(64-71) 106/66    Intake/Output:    Intake/Output Summary (Last 24 hours) at 2022 1634  Last data filed at 2022 0031  Gross per 24 hour   Intake 840 ml   Output --   Net 840 ml       Exam:  /66 (BP Location: Right arm, Patient Position: Lying)   Pulse 72   Temp 99.1 °F (37.3 °C) (Oral)   Resp 18   Ht 180.3 cm (71\")   Wt 72.8 kg (160 lb 8 oz)   SpO2 99%   BMI 22.39 kg/m²     General Appearance:    Alert, cooperative, no distress   Head:    Normocephalic, without obvious abnormality, atraumatic   Eyes:       Throat:   Lips, tongue, gums normal   Neck:   Supple, symmetrical, trachea midline, no JVD   Lungs:     Clear to auscultation bilaterally, respirations unlabored   Chest Wall:    No tenderness or deformity    Heart:    Regular rate and rhythm, S1 and S2 normal, no murmur,no  Rub or gallop   Abdomen:     Soft, ascites, nontender, bowel sounds active, no masses, no organomegaly    Extremities:   Extremities normal, atraumatic, no cyanosis or edema   Pulses:      Skin:   Skin is warm and dry,  no rashes or palpable lesions   Neurologic:   no focal deficits noted      Lab Results " (last 72 hours)     Procedure Component Value Units Date/Time    AFP Tumor Marker [[id number]]  (Normal) Collected: 02/05/22 1105    Specimen: Blood Updated: 02/05/22 1312     ALPHA-FETOPROTEIN 2.56 ng/mL     Narrative:      Alpha Fetoprotein Tumor Marker Reference Range:    0.0-8.3 ng/mL    Note: Normal values apply only to males and nonpregnant females. These results are not interpretable for pregnant females.    Results may be falsely decreased if patient taking Biotin.      Manual Differential [[id number]]  (Abnormal) Collected: 02/05/22 0746    Specimen: Blood Updated: 02/05/22 0935     Neutrophil % 66.3 %      Lymphocyte % 11.3 %      Monocyte % 22.5 %      Neutrophils Absolute 1.67 10*3/mm3      Lymphocytes Absolute 0.28 10*3/mm3      Monocytes Absolute 0.57 10*3/mm3      Crenated RBC's Mod/2+     Elliptocytes Mod/2+     WBC Morphology Normal     Platelet Morphology Normal    CBC & Differential [[id number]]  (Abnormal) Collected: 02/05/22 0746    Specimen: Blood Updated: 02/05/22 0935    Narrative:      The following orders were created for panel order CBC & Differential.  Procedure                               Abnormality         Status                     ---------                               -----------         ------                     CBC Auto Differential[[id number]]        Abnormal            Final result                 Please view results for these tests on the individual orders.    CBC Auto Differential [[id number]]  (Abnormal) Collected: 02/05/22 0746    Specimen: Blood Updated: 02/05/22 0935     WBC 2.52 10*3/mm3      RBC 2.94 10*6/mm3      Hemoglobin 8.1 g/dL      Hematocrit 24.2 %      MCV 82.3 fL      MCH 27.6 pg      MCHC 33.5 g/dL      RDW 15.8 %      RDW-SD 47.6 fl      MPV 9.1 fL      Platelets 38 10*3/mm3     Comprehensive Metabolic Panel [[id number]]  (Abnormal) Collected: 02/05/22 0746    Specimen: Blood Updated: 02/05/22 0850     Glucose 99 mg/dL      BUN 12 mg/dL      Creatinine 1.02  mg/dL      Sodium 132 mmol/L      Potassium 4.1 mmol/L      Chloride 106 mmol/L      CO2 15.0 mmol/L      Calcium 7.7 mg/dL      Total Protein 6.5 g/dL      Albumin 2.40 g/dL      ALT (SGPT) 33 U/L      AST (SGOT) 82 U/L      Alkaline Phosphatase 104 U/L      Total Bilirubin 2.4 mg/dL      eGFR Non African Amer 74 mL/min/1.73      Globulin 4.1 gm/dL      A/G Ratio 0.6 g/dL      BUN/Creatinine Ratio 11.8     Anion Gap 11.0 mmol/L     Narrative:      GFR Normal >60  Chronic Kidney Disease <60  Kidney Failure <15      Ammonia [[id number]]  (Abnormal) Collected: 02/05/22 0746    Specimen: Blood Updated: 02/05/22 0816     Ammonia 83 umol/L     Protime-INR [[id number]]  (Abnormal) Collected: 02/05/22 0746    Specimen: Blood Updated: 02/05/22 0813     Protime 23.3 Seconds      INR 2.07    COVID PRE-OP / PRE-PROCEDURE SCREENING ORDER (NO ISOLATION) - Swab, Nasopharynx [[id number]]  (Abnormal) Collected: 02/04/22 1253    Specimen: Swab from Nasopharynx Updated: 02/04/22 1333    Narrative:      The following orders were created for panel order COVID PRE-OP / PRE-PROCEDURE SCREENING ORDER (NO ISOLATION) - Swab, Nasopharynx.  Procedure                               Abnormality         Status                     ---------                               -----------         ------                     COVID-19,[facility] IN-HOUSE...[[id number]]  Abnormal            Final result                 Please view results for these tests on the individual orders.    COVID-19,[facility]-HOU[facility] CEPHEID/HAY NP SWAB IN TRANSPORT MEDIA 8-12 HR TAT - Swab, Nasopharynx [[id number]]  (Abnormal) Collected: 02/04/22 1253    Specimen: Swab from Nasopharynx Updated: 02/04/22 1333     COVID19 Detected    Narrative:      Fact sheet for providers: https://www.fda.gov/media/267624/download    Fact sheet for patients: https://www.fda.gov/media/800261/download    Test performed by PCR.    STAT Lactic Acid, Reflex [[id number]]  (Normal) Collected: 02/04/22 1304     Specimen: Blood Updated: 02/04/22 1327     Lactate 1.8 mmol/L     Urinalysis With Microscopic If Indicated (No Culture) - Urine, Clean Catch [[id number]]  (Abnormal) Collected: 02/04/22 0922    Specimen: Urine, Clean Catch Updated: 02/04/22 1008     Color, UA Dark Yellow     Appearance, UA Clear     pH, UA 7.0     Specific Gravity, UA 1.026     Glucose, UA Negative     Ketones, UA Trace     Bilirubin, UA Small (1+)     Blood, UA Negative     Protein, UA Negative     Leuk Esterase, UA Trace     Nitrite, UA Negative     Urobilinogen, UA 1.0 E.U./dL    Urinalysis, Microscopic Only - Urine, Clean Catch [[id number]] Collected: 02/04/22 0922    Specimen: Urine, Clean Catch Updated: 02/04/22 1008     RBC, UA 0-2 /HPF      WBC, UA 0-2 /HPF      Bacteria, UA None Seen /HPF      Squamous Epithelial Cells, UA 0-2 /HPF      Hyaline Casts, UA 0-2 /LPF      Methodology Automated Microscopy    Manual Differential [[id number]]  (Abnormal) Collected: 02/04/22 0906    Specimen: Blood Updated: 02/04/22 1002     Neutrophil % 67.7 %      Lymphocyte % 7.3 %      Monocyte % 25.0 %      Neutrophils Absolute 1.75 10*3/mm3      Lymphocytes Absolute 0.19 10*3/mm3      Monocytes Absolute 0.65 10*3/mm3      Ovalocytes Mod/2+     Poikilocytes Mod/2+     Polychromasia Slight/1+     WBC Morphology Normal     Platelet Morphology Normal    CBC & Differential [[id number]]  (Abnormal) Collected: 02/04/22 0906    Specimen: Blood Updated: 02/04/22 1002    Narrative:      The following orders were created for panel order CBC & Differential.  Procedure                               Abnormality         Status                     ---------                               -----------         ------                     CBC Auto Differential[[id number]]        Abnormal            Final result                 Please view results for these tests on the individual orders.    CBC Auto Differential [[id number]]  (Abnormal) Collected: 02/04/22 0906    Specimen: Blood  Updated: 02/04/22 1002     WBC 2.58 10*3/mm3      RBC 3.10 10*6/mm3      Hemoglobin 8.5 g/dL      Hematocrit 25.9 %      MCV 83.5 fL      MCH 27.4 pg      MCHC 32.8 g/dL      RDW 16.2 %      RDW-SD 48.7 fl      MPV 10.1 fL      Platelets 48 10*3/mm3     Urine Drug Screen - Urine, Clean Catch [[id number]]  (Normal) Collected: 02/04/22 0922    Specimen: Urine, Clean Catch Updated: 02/04/22 0949     Amphet/Methamphet, Screen Negative     Barbiturates Screen, Urine Negative     Benzodiazepine Screen, Urine Negative     Cocaine Screen, Urine Negative     Opiate Screen Negative     THC, Screen, Urine Negative     Methadone Screen, Urine Negative     Oxycodone Screen, Urine Negative    Narrative:      Negative Thresholds Per Drugs Screened:    Amphetamines                 500 ng/ml  Barbiturates                 200 ng/ml  Benzodiazepines              100 ng/ml  Cocaine                      300 ng/ml  Methadone                    300 ng/ml  Opiates                      300 ng/ml  Oxycodone                    100 ng/ml  THC                           50 ng/ml    The Normal Value for all drugs tested is negative. This report includes final unconfirmed screening results to be used for medical treatment purposes only. Unconfirmed results must not be used for non-medical purposes such as employment or legal testing. Clinical consideration should be applied to any drug of abuse test, particularly when unconfirmed results are used.            Protime-INR [[id number]]  (Abnormal) Collected: 02/04/22 0906    Specimen: Blood Updated: 02/04/22 0941     Protime 23.9 Seconds      INR 2.14    Procalcitonin [[id number]]  (Normal) Collected: 02/04/22 0906    Specimen: Blood Updated: 02/04/22 0941     Procalcitonin 0.13 ng/mL     Narrative:      As a Marker for Sepsis (Non-Neonates):     1. <0.5 ng/mL represents a low risk of severe sepsis and/or septic shock.  2. >2 ng/mL represents a high risk of severe sepsis and/or septic shock.    As a  "Marker for Lower Respiratory Tract Infections that require antibiotic therapy:  PCT on Admission     Antibiotic Therapy             6-12 Hrs later  >0.5                          Strongly Recommended            >0.25 - <0.5             Recommended  0.1 - 0.25                  Discouraged                       Remeasure/reassess PCT  <0.1                         Strongly Discouraged         Remeasure/reassess PCT      As 28 day mortality risk marker: \"Change in Procalcitonin Result\" (>80% or <=80%) if Day 0 (or Day 1) and Day 4 values are available. Refer to http://www."[employer] [email or web address]/    Change in PCT <=80 %   A decrease of PCT levels below or equal to 80% defines a positive change in PCT test result representing a higher risk for 28-day all-cause mortality of patients diagnosed with severe sepsis or septic shock.    Change in PCT >80 %   A decrease of PCT levels of more than 80% defines a negative change in PCT result representing a lower risk for 28-day all-cause mortality of patients diagnosed with severe sepsis or septic shock.                Lactic Acid, Plasma [[id number]]  (Abnormal) Collected: 02/04/22 0906    Specimen: Blood Updated: 02/04/22 0940     Lactate 3.4 mmol/L     Ammonia [[id number]]  (Abnormal) Collected: 02/04/22 0906    Specimen: Blood Updated: 02/04/22 0935     Ammonia 106 umol/L     Comprehensive Metabolic Panel [[id number]]  (Abnormal) Collected: 02/04/22 0906    Specimen: Blood Updated: 02/04/22 0934     Glucose 125 mg/dL      BUN 13 mg/dL      Creatinine 1.10 mg/dL      Sodium 134 mmol/L      Potassium 4.3 mmol/L      Chloride 106 mmol/L      CO2 18.6 mmol/L      Calcium 8.5 mg/dL      Total Protein 6.8 g/dL      Albumin 2.70 g/dL      ALT (SGPT) 26 U/L      AST (SGOT) 59 U/L      Alkaline Phosphatase 139 U/L      Total Bilirubin 3.1 mg/dL      eGFR Non African Amer 67 mL/min/1.73      Globulin 4.1 gm/dL      A/G Ratio 0.7 g/dL      BUN/Creatinine Ratio 11.8     Anion Gap 9.4 mmol/L "     Narrative:      GFR Normal >60  Chronic Kidney Disease <60  Kidney Failure <15      Lipase [[id number]]  (Abnormal) Collected: 02/04/22 0906    Specimen: Blood Updated: 02/04/22 0934     Lipase 79 U/L     Troponin [[id number]]  (Normal) Collected: 02/04/22 0906    Specimen: Blood Updated: 02/04/22 0934     Troponin T <0.010 ng/mL     Narrative:      Troponin T Reference Range:  <= 0.03 ng/mL-   Negative for AMI  >0.03 ng/mL-     Abnormal for myocardial necrosis.  Clinicians would have to utilize clinical acumen, EKG, Troponin and serial changes to determine if it is an Acute Myocardial Infarction or myocardial injury due to an underlying chronic condition.       Results may be falsely decreased if patient taking Biotin.      Magnesium [[id number]]  (Normal) Collected: 02/04/22 0906    Specimen: Blood Updated: 02/04/22 0934     Magnesium 2.1 mg/dL     Ethanol [[id number]] Collected: 02/04/22 0906    Specimen: Blood Updated: 02/04/22 0934     Ethanol <10 mg/dL      Ethanol % <0.010 %     BNP [[id number]]  (Normal) Collected: 02/04/22 0906    Specimen: Blood Updated: 02/04/22 0932     proBNP 339.0 pg/mL     Narrative:      Among patients with dyspnea, NT-proBNP is highly sensitive for the detection of acute congestive heart failure. In addition NT-proBNP of <300 pg/ml effectively rules out acute congestive heart failure with 99% negative predictive value.    Results may be falsely decreased if patient taking Biotin.      POC Glucose Once [[id number]]  (Abnormal) Collected: 02/04/22 0906    Specimen: Blood Updated: 02/04/22 0907     Glucose 142 mg/dL      Comment: Meter: [id number] : [id number] Glen Dickerson ([place]) Tech           Data Review:  Results from last 7 days   Lab Units 02/08/22  0401 02/07/22 0526 02/07/22  0526 02/06/22 0733 02/06/22 0733   SODIUM mmol/L 136  --  128*  --  132*   POTASSIUM mmol/L 4.0  --  3.9  --  4.0   CHLORIDE mmol/L 105  --  103  --  104   CO2 mmol/L 18.4*  --  18.0*  --  19.5*   BUN  mg/dL 10  --  11  --  12   CREATININE mg/dL 0.88  --  0.99  --  1.07   GLUCOSE mg/dL 101*   < > 103*   < > 113*   CALCIUM mg/dL 7.9*  --  7.4*  --  7.7*    < > = values in this interval not displayed.     Results from last 7 days   Lab Units 02/08/22  0401 02/07/22  0526 02/06/22  0733   WBC 10*3/mm3 1.90* 1.72* 2.24*   HEMOGLOBIN g/dL 8.8* 8.3* 9.0*   HEMATOCRIT % 25.8* 24.0* 26.4*   PLATELETS 10*3/mm3 42* 40* 46*             Lab Results   Lab Value Date/Time    TROPONINT <0.010 02/04/2022 0906    TROPONINT <0.010 05/07/2021 1248    TROPONINT <0.010 12/25/2020 0122         Results from last 7 days   Lab Units 02/08/22 0401 02/07/22  0526 02/06/22  0733   ALK PHOS U/L 186* 151* 133*   BILIRUBIN mg/dL 1.3* 1.3* 1.6*   ALT (SGPT) U/L 36 30 30   AST (SGOT) U/L 83* 75* 79*             Glucose   Date/Time Value Ref Range Status   02/08/2022 1131 160 (H) 70 - 130 mg/dL Final     Comment:     Meter: [id number] : [id number] Glen Avelar CNA     Results from last 7 days   Lab Units 02/07/22  0526 02/06/22  0733 02/05/22  0746   INR  2.07* 2.06* 2.07*       Past Medical History:   Diagnosis Date   • Alcoholism (HCC)    • Anemia    • Cirrhosis (HCC)    • Encephalopathy    • Hypertension    • Liver disease        Assessment:  Active Hospital Problems    Diagnosis  POA   • **Hepatic encephalopathy (HCC) [K72.90]  Yes   • COVID-19 virus detected [U07.1]  Unknown   • Portal vein thrombosis [I81]  Unknown   • Hyperammonemia (HCC) [E72.20]  Yes   • Hypertension [I10]  Yes   • GERD (gastroesophageal reflux disease) [K21.9]  Yes   • Coagulopathy (HCC) [D68.9]  Yes   • 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       Plan:  GI consult noted.  We will continue with lactulose and Xifaxan.  We will get follow-up lab studies. We will consult hematology in regard to possible portal vein thrombosis. With  his INR already being 2 and his platelet count around 40 obviously is poor candidate for any further anticoagulation.  Hematology consult noted.  Plans for low-dose Lovenox.   Follow lab. Maybe home with low dose lovenox.   ? 1-2 days.      positive for COVID-19 but not symptomatic with no symptoms at all not requiring any treatment.    Jer Serrano MD  2/8/2022  16:34 EST

## 2022-02-08 NOTE — PROGRESS NOTES
Subjective     HISTORY OF PRESENT ILLNESS:   No acute issues overnight. Denies any bleed or bruise. His mentation appears to be clear, but per wife has slurred speech.     Past Medical History, Past Surgical History, Social History, Family History have been reviewed and are without significant changes except as mentioned.    Review of Systems   A comprehensive 14 point review of systems was performed and was negative except as mentioned.    Medications:  The current medication list was reviewed in the EMR    ALLERGIES:    Allergies   Allergen Reactions   • Iron GI Intolerance       Objective      Vitals:    02/07/22 1329 02/07/22 2024 02/08/22 0029 02/08/22 0728   BP: 108/66 112/71 113/64 117/67   BP Location: Right arm Right arm Right arm Right arm   Patient Position: Lying Lying Lying Sitting   Pulse: 78 72 71 78   Resp: 18 18 18 18   Temp: 99 °F (37.2 °C) 99.1 °F (37.3 °C) 99.3 °F (37.4 °C) 98.2 °F (36.8 °C)   TempSrc: Oral Oral Oral Oral   SpO2: 97% 100% 100% 96%   Weight:       Height:         No flowsheet data found.    Physical Exam    CONSTITUTIONAL:  Vital signs reviewed.  No distress, looks comfortable.  EYES:  Conjunctiva and lids unremarkable.    EARS,NOSE,MOUTH,THROAT:  Ears and nose appear unremarkable.  Lips, teeth, gums appear unremarkable.  RESPIRATORY:  Normal respiratory effort.    CARDIOVASCULAR:  Normal heart rate. No significant lower extremity edema.  GASTROINTESTINAL: Abdomen appears unremarkable.  Nontender. Mild distension  LYMPHATIC:  No cervical, supraclavicular lymphadenopathy.  SKIN:  Warm.  No rashes.  PSYCHIATRIC:  Normal judgment and insight.  Normal mood and affect.  NEURO: AAO x 3, no focal deficits  RECENT LABS:  Hematology WBC   Date Value Ref Range Status   02/08/2022 1.90 (C) 3.40 - 10.80 10*3/mm3 Final     RBC   Date Value Ref Range Status   02/08/2022 3.15 (L) 4.14 - 5.80 10*6/mm3 Final     Hemoglobin   Date Value Ref Range Status   02/08/2022 8.8 (L) 13.0 - 17.7 g/dL Final      Hematocrit   Date Value Ref Range Status   02/08/2022 25.8 (L) 37.5 - 51.0 % Final     Platelets   Date Value Ref Range Status   02/08/2022 42 (C) 140 - 450 10*3/mm3 Final              Assessment/Plan    is a pleasant 67 y/o WM with medical issues comprising alcoholic liver disease, cirrhosis, esophageal varices s/p banding recurrent ascites & HTN who was admitted on 2/4/22 with hepatic encephalopathy. Hematology has been consulted for portal vein evaluation & management.      # Portal vein thrombosis:  - Acute on chronic. MRI abdomen from 1/7/22 done at [facility] had demonstrated peripheral, chronic nonocclusive thrombus of the main portal vein. The doppler from 2/6/22 shows acute thrombus in the main and right intrahepatic portal veins.   - This is likely due to altered coagulation due to hepatic dysfunction.  No prior history of VTEs. No family history of thrombosis.   - However given unusual location, hypercoagulable state work up was sent on 2/7/22.   -  Factor V leiden mutation negative. AT-III activity low (likely due to liver dysfunction). Prothrombin gene mutation, JAK2 mutation, [facility] flow cytometry, & APLS pending. Will defer protein C & S as levels would be altered.   - Patient has chronic thrombocytopenia with platelets in 40-50,000s range. The CBC from 2/7/22 shows platelet count of 40,000. No c/o bleed or bruise.   - Given risk for intestinal infarction with PV thrombus and likely prothrombotic state,  started him on low dose lovenox on 2/7/22.   - Tolerating well. Platelets remain low at 42,000 on 2/8/22. Continue current dose. If platelets improve, will consider increasing the dose.   - If not, could be discharged on the same dose with close outpatient follow up. Will plan to follow up in hematology clinic.      # Thrombocytopenia:  - Acute on chronic. Likely liver disease related.   - No bleed/bruise     # Hepatic encephalopathy  # Decompensated liver cirrhosis  #  Ascites     RECOMMENDATIONS:  - Portal vein thrombus likely liver disease related.   - Hypercoagulable work ups: Factor V leiden mutation negative. AT-III activity low (likely due to liver dysfunction). Prothrombin gene mutation, JAK2 mutation, [facility] flow cytometry, & APLS pending.  - Continue low-dose lovenox given high risk of intestinal infarction with acute PV thrombus. Will consider increasing the dose if platelets improve.  If not, could be discharged on the same dose with close outpatient follow up. Will plan to follow up in hematology clinic.   - Will continue to follow while inpatient.      2/8/2022      CC:

## 2022-02-08 NOTE — PLAN OF CARE
Problem: Adult Inpatient Plan of Care  Goal: Plan of Care Review  Outcome: Ongoing, Progressing  Flowsheets (Taken 2/8/2022 1617)  Progress: no change  Plan of Care Reviewed With: patient  Outcome Summary: VSS, room air, NSR, up ad oliver, no complaints.  Goal: Patient-Specific Goal (Individualized)  Outcome: Ongoing, Progressing  Goal: Absence of Hospital-Acquired Illness or Injury  Outcome: Ongoing, Progressing  Intervention: Identify and Manage Fall Risk  Recent Flowsheet Documentation  Taken 2/8/2022 1611 by Keri Nath RN  Safety Promotion/Fall Prevention:   assistive device/personal items within reach   clutter free environment maintained   nonskid shoes/slippers when out of bed   room organization consistent   safety round/check completed  Taken 2/8/2022 1400 by Keri Nath RN  Safety Promotion/Fall Prevention:   assistive device/personal items within reach   clutter free environment maintained   nonskid shoes/slippers when out of bed   room organization consistent   safety round/check completed  Taken 2/8/2022 1153 by Keri Nath RN  Safety Promotion/Fall Prevention:   clutter free environment maintained   assistive device/personal items within reach   nonskid shoes/slippers when out of bed   room organization consistent   safety round/check completed  Taken 2/8/2022 1003 by Keri Nath RN  Safety Promotion/Fall Prevention:   assistive device/personal items within reach   clutter free environment maintained   nonskid shoes/slippers when out of bed   room organization consistent   safety round/check completed  Taken 2/8/2022 0809 by Keri Nath RN  Safety Promotion/Fall Prevention:   assistive device/personal items within reach   clutter free environment maintained   nonskid shoes/slippers when out of bed   room organization consistent   safety round/check completed  Intervention: Prevent Skin Injury  Recent Flowsheet Documentation  Taken 2/8/2022 1400 by Pham  Keri JOHNSTON RN  Body Position:   position changed independently   supine  Taken 2/8/2022 1003 by Keri Nath RN  Body Position: position changed independently  Taken 2/8/2022 0809 by Keri Nath RN  Body Position:   position changed independently   supine  Goal: Optimal Comfort and Wellbeing  Outcome: Ongoing, Progressing  Goal: Readiness for Transition of Care  Outcome: Ongoing, Progressing     Problem: Fall Injury Risk  Goal: Absence of Fall and Fall-Related Injury  Outcome: Ongoing, Progressing  Intervention: Promote Injury-Free Environment  Recent Flowsheet Documentation  Taken 2/8/2022 1611 by Keri Nath RN  Safety Promotion/Fall Prevention:   assistive device/personal items within reach   clutter free environment maintained   nonskid shoes/slippers when out of bed   room organization consistent   safety round/check completed  Taken 2/8/2022 1400 by Keri Nath RN  Safety Promotion/Fall Prevention:   assistive device/personal items within reach   clutter free environment maintained   nonskid shoes/slippers when out of bed   room organization consistent   safety round/check completed  Taken 2/8/2022 1153 by Keri Nath RN  Safety Promotion/Fall Prevention:   clutter free environment maintained   assistive device/personal items within reach   nonskid shoes/slippers when out of bed   room organization consistent   safety round/check completed  Taken 2/8/2022 1003 by Keri Nath RN  Safety Promotion/Fall Prevention:   assistive device/personal items within reach   clutter free environment maintained   nonskid shoes/slippers when out of bed   room organization consistent   safety round/check completed  Taken 2/8/2022 0809 by Keri Nath RN  Safety Promotion/Fall Prevention:   assistive device/personal items within reach   clutter free environment maintained   nonskid shoes/slippers when out of bed   room organization consistent   safety round/check completed    Goal Outcome Evaluation:  Plan of Care Reviewed With: patient        Progress: no change  Outcome Summary: VSS, room air, NSR, up ad oliver, no complaints.

## 2022-02-08 NOTE — PROGRESS NOTES
Gastroenterology   Inpatient Progress Note    Reason for Follow Up:  Cirrhosis and hepatic encephalopathy    Subjective     Interval History:   No new complaints  New diagnosis of portal vein thrombosis, started on low-dose Lovenox per hematology  Patient with no new complaints today feels good    Current Facility-Administered Medications:   •  acetaminophen (TYLENOL) tablet 650 mg, 650 mg, Oral, Once, Wes Matos APRN  •  enoxaparin (LOVENOX) syringe 40 mg, 40 mg, Subcutaneous, Q24H, Eulalio Espino MD, 40 mg at 02/07/22 1357  •  furosemide (LASIX) tablet 40 mg, 40 mg, Oral, Daily, Jer Serrano MD, 40 mg at 02/07/22 0814  •  lactulose (CHRONULAC) 10 GM/15ML solution 10 g, 10 g, Oral, TID, Jer Serrano MD, 10 g at 02/07/22 2029  •  metoprolol tartrate (LOPRESSOR) tablet 12.5 mg, 12.5 mg, Oral, BID, Jer Serrano MD, 12.5 mg at 02/07/22 2029  •  ondansetron (ZOFRAN) tablet 4 mg, 4 mg, Oral, Q6H PRN **OR** ondansetron (ZOFRAN) injection 4 mg, 4 mg, Intravenous, Q6H PRN, Jer Serrano MD, 4 mg at 02/04/22 2159  •  pantoprazole (PROTONIX) EC tablet 40 mg, 40 mg, Oral, QAM, Jer Serrano MD, 40 mg at 02/08/22 0617  •  riFAXIMin (XIFAXAN) tablet 550 mg, 550 mg, Oral, Q12H, Jer Serrano MD, 550 mg at 02/07/22 2029  •  sodium chloride 0.9 % flush 10 mL, 10 mL, Intravenous, Q12H, Jer Serrano MD, 10 mL at 02/07/22 2030  •  sodium chloride 0.9 % flush 10 mL, 10 mL, Intravenous, PRN, Jer Serrano MD  •  spironolactone (ALDACTONE) tablet 50 mg, 50 mg, Oral, Daily, Jer Serrano MD, 50 mg at 02/07/22 0814  •  zinc sulfate (ZINCATE) capsule 220 mg, 220 mg, Oral, Daily, Jer Serrano MD, 220 mg at 02/07/22 0814  Review of Systems:               The following systems were reviewed and negative;  gastrointestinal    Objective     Vital Signs  Temp:  [98.2 °F (36.8 °C)-99.3 °F (37.4 °C)] 98.2 °F (36.8 °C)  Heart Rate:  [71-78] 78  Resp:  [18] 18  BP: (108-117)/(64-71) 117/67  Body mass index is 22.39  kg/m².    Intake/Output Summary (Last 24 hours) at 2/8/2022 0751  Last data filed at 2/8/2022 0031  Gross per 24 hour   Intake 1060 ml   Output --   Net 1060 ml     No intake/output data recorded.                Physical Exam:              General: patient awake, alert and cooperative              Eyes: no scleral icterus              Skin: warm and dry, not jaundiced              Abdomen: soft, nontender, nondistended; normal bowel sounds, no masses palpated, no periumbical lymphadenopathy              Psychiatric: Appropriate affect and behavior                Results Review:                I reviewed the patient's new clinical results.    Results from last 7 days   Lab Units 02/08/22  0401 02/07/22  0526 02/06/22  0733   WBC 10*3/mm3 1.90* 1.72* 2.24*   HEMOGLOBIN g/dL 8.8* 8.3* 9.0*   HEMATOCRIT % 25.8* 24.0* 26.4*   PLATELETS 10*3/mm3 42* 40* 46*     Results from last 7 days   Lab Units 02/08/22  0401 02/07/22  0526 02/06/22  0733   SODIUM mmol/L 136 128* 132*   POTASSIUM mmol/L 4.0 3.9 4.0   CHLORIDE mmol/L 105 103 104   CO2 mmol/L 18.4* 18.0* 19.5*   BUN mg/dL 10 11 12   CREATININE mg/dL 0.88 0.99 1.07   CALCIUM mg/dL 7.9* 7.4* 7.7*   BILIRUBIN mg/dL 1.3* 1.3* 1.6*   ALK PHOS U/L 186* 151* 133*   ALT (SGPT) U/L 36 30 30   AST (SGOT) U/L 83* 75* 79*   GLUCOSE mg/dL 101* 103* 113*     Results from last 7 days   Lab Units 02/07/22  0526 02/06/22  0733 02/05/22  0746   INR  2.07* 2.06* 2.07*     Lab Results   Lab Value Date/Time    LIPASE 79 (H) 02/04/2022 0906    LIPASE 36 05/07/2021 1248    LIPASE 68 (H) 01/01/2021 2125    LIPASE 133 (H) 07/22/2020 1759    LIPASE 59 07/07/2020 0633    LIPASE 65 (H) 07/06/2020 1630       Radiology:  US Abdomen Limited   Final Result   1.  Cirrhosis with findings of portosystemic shunting including mild to   moderate splenomegaly and ascites. There also appears to be hepatofugal   in the main portal vein with concern for thrombus formation as well.   Further evaluation with  Doppler sonogram of the portal vein is   recommended. This was discussed with Shavon, the patient's nurse, at   8:30 PM 02/05/2022   2.  Thickened appearance the gallbladder likely related to adjacent   liver disease, as before.   3.  Other findings as above.       This report was finalized on 2/5/2022 8:32 PM by Dr. Jason Maki M.D.              Assessment/Plan     Patient Active Problem List   Diagnosis   • ED (erectile dysfunction) of organic origin   • Alcoholic cirrhosis of liver with ascites (HCC)   • Anemia   • Jaundice   • Coagulopathy (HCC)   • Secondary thrombocytopenia   • Lung abnormality- left apex   • Hepatic encephalopathy (HCC)   • Sepsis (HCC)   • Colitis   • Hypertension   • GERD (gastroesophageal reflux disease)   • SBP (spontaneous bacterial peritonitis) (HCC)   • Hyperammonemia (HCC)   • Portal vein thrombosis       Assessment:  1. Hepatic encephalopathy  2. Cirrhosis of the liver  3. Portal vein thrombosis  4. Ascites  5. Pancytopenia  6. GERD    These problems are new to me.  Plan:  · Continue treatment of hepatic encephalopathy with lactulose and Xifaxan.  The ammonia level does not always completely correlate but need to follow his mental status and work on any underlying contributing factors to exacerbation of his mental status  · 2 g sodium diet  · Hematology evaluation regarding the portal vein thrombosis appreciated and Lovenox has been started  I discussed the patients findings and my recommendations with patient.             Thomas Lynch M.D.  [facility] [place] [address]  Office: [phone]

## 2022-02-09 LAB
ALBUMIN SERPL-MCNC: 1.9 G/DL (ref 3.5–5.2)
ALBUMIN/GLOB SERPL: 0.4 G/DL
ALP SERPL-CCNC: 150 U/L (ref 39–117)
ALT SERPL W P-5'-P-CCNC: 34 U/L (ref 1–41)
AMMONIA BLD-SCNC: 82 UMOL/L (ref 16–60)
ANION GAP SERPL CALCULATED.3IONS-SCNC: 9.9 MMOL/L (ref 5–15)
AST SERPL-CCNC: 81 U/L (ref 1–40)
BASOPHILS # BLD MANUAL: 0.02 10*3/MM3 (ref 0–0.2)
BASOPHILS NFR BLD MANUAL: 1 % (ref 0–1.5)
BILIRUB SERPL-MCNC: 1.4 MG/DL (ref 0–1.2)
BUN SERPL-MCNC: 10 MG/DL (ref 8–23)
BUN/CREAT SERPL: 10.6 (ref 7–25)
CALCIUM SPEC-SCNC: 7.6 MG/DL (ref 8.6–10.5)
CHLORIDE SERPL-SCNC: 102 MMOL/L (ref 98–107)
CO2 SERPL-SCNC: 18.1 MMOL/L (ref 22–29)
CREAT SERPL-MCNC: 0.94 MG/DL (ref 0.76–1.27)
DEPRECATED RDW RBC AUTO: 44.9 FL (ref 37–54)
ERYTHROCYTE [DISTWIDTH] IN BLOOD BY AUTOMATED COUNT: 15.5 % (ref 12.3–15.4)
GFR SERPL CREATININE-BSD FRML MDRD: 81 ML/MIN/1.73
GIANT PLATELETS: ABNORMAL
GLOBULIN UR ELPH-MCNC: 4.4 GM/DL
GLUCOSE SERPL-MCNC: 150 MG/DL (ref 65–99)
HCT VFR BLD AUTO: 24.9 % (ref 37.5–51)
HGB BLD-MCNC: 8.6 G/DL (ref 13–17.7)
LYMPHOCYTES # BLD MANUAL: 0.26 10*3/MM3 (ref 0.7–3.1)
LYMPHOCYTES NFR BLD MANUAL: 8.2 % (ref 5–12)
MCH RBC QN AUTO: 27.7 PG (ref 26.6–33)
MCHC RBC AUTO-ENTMCNC: 34.5 G/DL (ref 31.5–35.7)
MCV RBC AUTO: 80.1 FL (ref 79–97)
MONOCYTES # BLD: 0.16 10*3/MM3 (ref 0.1–0.9)
NEUTROPHILS # BLD AUTO: 1.54 10*3/MM3 (ref 1.7–7)
NEUTROPHILS NFR BLD MANUAL: 77.6 % (ref 42.7–76)
PLATELET # BLD AUTO: 36 10*3/MM3 (ref 140–450)
PMV BLD AUTO: 10.7 FL (ref 6–12)
POTASSIUM SERPL-SCNC: 4 MMOL/L (ref 3.5–5.2)
PROCALCITONIN SERPL-MCNC: 0.14 NG/ML (ref 0–0.25)
PROT SERPL-MCNC: 6.3 G/DL (ref 6–8.5)
RBC # BLD AUTO: 3.11 10*6/MM3 (ref 4.14–5.8)
RBC MORPH BLD: NORMAL
SMUDGE CELLS BLD QL SMEAR: ABNORMAL
SODIUM SERPL-SCNC: 130 MMOL/L (ref 136–145)
VARIANT LYMPHS NFR BLD MANUAL: 13.3 % (ref 19.6–45.3)
WBC NRBC COR # BLD: 1.98 10*3/MM3 (ref 3.4–10.8)

## 2022-02-09 PROCEDURE — 82140 ASSAY OF AMMONIA: CPT | Performed by: HOSPITALIST

## 2022-02-09 PROCEDURE — 86147 CARDIOLIPIN ANTIBODY EA IG: CPT | Performed by: INTERNAL MEDICINE

## 2022-02-09 PROCEDURE — 85732 THROMBOPLASTIN TIME PARTIAL: CPT | Performed by: INTERNAL MEDICINE

## 2022-02-09 PROCEDURE — 85670 THROMBIN TIME PLASMA: CPT | Performed by: INTERNAL MEDICINE

## 2022-02-09 PROCEDURE — 84145 PROCALCITONIN (PCT): CPT | Performed by: STUDENT IN AN ORGANIZED HEALTH CARE EDUCATION/TRAINING PROGRAM

## 2022-02-09 PROCEDURE — 86146 BETA-2 GLYCOPROTEIN ANTIBODY: CPT | Performed by: INTERNAL MEDICINE

## 2022-02-09 PROCEDURE — 85613 RUSSELL VIPER VENOM DILUTED: CPT | Performed by: INTERNAL MEDICINE

## 2022-02-09 PROCEDURE — 85007 BL SMEAR W/DIFF WBC COUNT: CPT | Performed by: STUDENT IN AN ORGANIZED HEALTH CARE EDUCATION/TRAINING PROGRAM

## 2022-02-09 PROCEDURE — 99233 SBSQ HOSP IP/OBS HIGH 50: CPT | Performed by: INTERNAL MEDICINE

## 2022-02-09 PROCEDURE — 25010000002 ENOXAPARIN PER 10 MG: Performed by: INTERNAL MEDICINE

## 2022-02-09 PROCEDURE — 85705 THROMBOPLASTIN INHIBITION: CPT | Performed by: INTERNAL MEDICINE

## 2022-02-09 PROCEDURE — 99232 SBSQ HOSP IP/OBS MODERATE 35: CPT | Performed by: INTERNAL MEDICINE

## 2022-02-09 PROCEDURE — 85025 COMPLETE CBC W/AUTO DIFF WBC: CPT | Performed by: STUDENT IN AN ORGANIZED HEALTH CARE EDUCATION/TRAINING PROGRAM

## 2022-02-09 PROCEDURE — 80053 COMPREHEN METABOLIC PANEL: CPT | Performed by: STUDENT IN AN ORGANIZED HEALTH CARE EDUCATION/TRAINING PROGRAM

## 2022-02-09 PROCEDURE — 36415 COLL VENOUS BLD VENIPUNCTURE: CPT | Performed by: HOSPITALIST

## 2022-02-09 RX ORDER — NADOLOL 20 MG/1
20 TABLET ORAL
Status: DISCONTINUED | OUTPATIENT
Start: 2022-02-09 | End: 2022-02-14 | Stop reason: HOSPADM

## 2022-02-09 RX ORDER — AMOXICILLIN 250 MG
1 CAPSULE ORAL DAILY
Status: DISCONTINUED | OUTPATIENT
Start: 2022-02-09 | End: 2022-02-14 | Stop reason: HOSPADM

## 2022-02-09 RX ADMIN — LACTULOSE 10 G: 10 SOLUTION ORAL at 16:16

## 2022-02-09 RX ADMIN — SODIUM CHLORIDE, PRESERVATIVE FREE 10 ML: 5 INJECTION INTRAVENOUS at 11:21

## 2022-02-09 RX ADMIN — NADOLOL 20 MG: 20 TABLET ORAL at 11:21

## 2022-02-09 RX ADMIN — LACTULOSE 10 G: 10 SOLUTION ORAL at 00:00

## 2022-02-09 RX ADMIN — SODIUM CHLORIDE, PRESERVATIVE FREE 10 ML: 5 INJECTION INTRAVENOUS at 20:39

## 2022-02-09 RX ADMIN — DOCUSATE SODIUM 50MG AND SENNOSIDES 8.6MG 1 TABLET: 8.6; 5 TABLET, FILM COATED ORAL at 10:12

## 2022-02-09 RX ADMIN — RIFAXIMIN 550 MG: 550 TABLET ORAL at 10:12

## 2022-02-09 RX ADMIN — SPIRONOLACTONE 50 MG: 50 TABLET, FILM COATED ORAL at 10:12

## 2022-02-09 RX ADMIN — FUROSEMIDE 40 MG: 40 TABLET ORAL at 10:12

## 2022-02-09 RX ADMIN — LACTULOSE 10 G: 10 SOLUTION ORAL at 20:40

## 2022-02-09 RX ADMIN — PANTOPRAZOLE SODIUM 40 MG: 40 TABLET, DELAYED RELEASE ORAL at 06:14

## 2022-02-09 RX ADMIN — ENOXAPARIN SODIUM 40 MG: 100 INJECTION SUBCUTANEOUS at 13:41

## 2022-02-09 RX ADMIN — RIFAXIMIN 550 MG: 550 TABLET ORAL at 20:40

## 2022-02-09 RX ADMIN — LACTULOSE 10 G: 10 SOLUTION ORAL at 10:12

## 2022-02-09 NOTE — PROGRESS NOTES
Subjective     HISTORY OF PRESENT ILLNESS:   No acute issues overnight. Denies any bleed or bruise. His mentation appears to be clear. Has not had a BM yet. Had low grade temps overnight.      Past Medical History, Past Surgical History, Social History, Family History have been reviewed and are without significant changes except as mentioned.    Review of Systems   A comprehensive 14 point review of systems was performed and was negative except as mentioned.    Medications:  The current medication list was reviewed in the EMR    ALLERGIES:    Allergies   Allergen Reactions   • Iron GI Intolerance       Objective      Vitals:    02/08/22 2034 02/08/22 2038 02/09/22 0002 02/09/22 0753   BP: 115/64  112/65 106/60   BP Location: Right arm  Right arm Right arm   Patient Position: Lying  Lying Lying   Pulse: 80 78 76 73   Resp: 18 18 18 18   Temp: 99.9 °F (37.7 °C)  99.8 °F (37.7 °C) 98.9 °F (37.2 °C)   TempSrc: Oral  Oral Oral   SpO2: 97% 97% 99% 98%   Weight:       Height:         No flowsheet data found.    Physical Exam    CONSTITUTIONAL:  Vital signs reviewed.  No distress, looks comfortable.  EYES:  Conjunctiva and lids unremarkable.    EARS,NOSE,MOUTH,THROAT:  Ears and nose appear unremarkable.  Lips, teeth, gums appear unremarkable.  RESPIRATORY:  Normal respiratory effort.    CARDIOVASCULAR:  Normal heart rate. No significant lower extremity edema.  GASTROINTESTINAL: Abdomen appears unremarkable.  Nontender. Mild distension  LYMPHATIC:  No cervical, supraclavicular lymphadenopathy.  SKIN:  Warm.  No rashes.  PSYCHIATRIC:  Normal judgment and insight.  Normal mood and affect.  NEURO: AAO x 3, no focal deficits  RECENT LABS:  Hematology WBC   Date Value Ref Range Status   02/08/2022 1.90 (C) 3.40 - 10.80 10*3/mm3 Final     RBC   Date Value Ref Range Status   02/08/2022 3.15 (L) 4.14 - 5.80 10*6/mm3 Final     Hemoglobin   Date Value Ref Range Status   02/08/2022 8.8 (L) 13.0 - 17.7 g/dL Final     Hematocrit   Date  Value Ref Range Status   02/08/2022 25.8 (L) 37.5 - 51.0 % Final     Platelets   Date Value Ref Range Status   02/08/2022 42 (C) 140 - 450 10*3/mm3 Final          Assessment/Plan    is a pleasant 65 y/o WM with medical issues comprising alcoholic liver disease, cirrhosis, esophageal varices s/p banding recurrent ascites & HTN who was admitted on 2/4/22 with hepatic encephalopathy. Hematology has been consulted for portal vein evaluation & management.      # Portal vein thrombosis:  - Acute on chronic. MRI abdomen from 1/7/22 done at [facility] had demonstrated peripheral, chronic nonocclusive thrombus of the main portal vein. The doppler from 2/6/22 shows acute thrombus in the main and right intrahepatic portal veins.   - This is likely due to altered coagulation due to hepatic dysfunction.  No prior history of VTEs. No family history of thrombosis.   - However given unusual location, hypercoagulable state work up was sent on 2/7/22.   -  Factor V leiden mutation negative. AT-III activity low (likely due to liver dysfunction). Prothrombin gene mutation, JAK2 mutation, [facility] flow cytometry, & APLS pending. Will defer protein C & S as levels would be altered.   - Patient has chronic thrombocytopenia with platelets in 40-50,000s range. The CBC from 2/7/22 shows platelet count of 40,000. No c/o bleed or bruise.   - Given risk for intestinal infarction with PV thrombus and likely prothrombotic state,  started him on low dose lovenox on 2/7/22.   - Tolerating well. Platelets remain low, dropped from 42,000 on 2/8/22 to 36,000 on 2/9.   - Continue current dose. If platelets improve, will consider increasing the dose.   - If not, could be discharged on the same dose with close outpatient follow up. Will plan to follow up in hematology clinic.      # Thrombocytopenia:  - Acute on chronic. Likely liver disease related.   - No bleed/bruise    # Leukopenia:  Likely liver disease related.   Monitor     # Hepatic  encephalopathy  # Decompensated liver cirrhosis  # Ascites     RECOMMENDATIONS:  - Portal vein thrombus likely liver disease related.   - Hypercoagulable work ups: Factor V leiden mutation negative. AT-III activity low (likely due to liver dysfunction). Prothrombin gene mutation, JAK2 mutation, [facility] flow cytometry, & APLS pending.  - Continue low-dose lovenox given high risk of intestinal infarction with acute PV thrombus. Will consider increasing the dose if platelets improve.  If not, could be discharged on the same dose with close outpatient follow up. Will plan to follow up in hematology clinic.   - Will continue to follow while inpatient.      2/9/2022      CC:

## 2022-02-09 NOTE — PROGRESS NOTES
Continued Stay Note  [facility]     Patient Name: Wei Potts  MRN: [id number]  Today's Date: 2/9/2022    Admit Date: 2/4/2022     Discharge Plan     Row Name 02/09/22 1334       Plan    Plan Home with spouse    Plan Comments Attempted to call the patient via phone in room and his cell phone and clarified the cell phone number with his spouse.  Attempted to get permission to make HH referrals from his spouse and ask for choices.  She prefers that CCP call him on his cell phone and have the patient provide those choices.  Attempted again to call phone and room.  Dr Mcgee mentions having HH follow H&H closely.......................Amina Boykin RN               Discharge Codes    No documentation.               Expected Discharge Date and Time     Expected Discharge Date Expected Discharge Time    Feb 12, 2022             Amina Boykin RN

## 2022-02-09 NOTE — DISCHARGE PLACEMENT REQUEST
"Wei Potts (66 y.o. Male)             Date of Birth Social Security Number Address Home Phone MRN    1955  [id number] [facility] [address] [facility] [id number] [phone] [id number]    Mandaeism Marital Status             Religious        Admission Date Admission Type Admitting Provider Attending Provider Department, Room/Bed    2/4/22 Emergency Jer Serrano MD Mamedov, Bokhodir S, MD [address], [address]    Discharge Date Discharge Disposition Discharge Destination                         Attending Provider: Joselito Marr MD    Allergies: Iron    Isolation: Enh Drop/Con   Infection: COVID (confirmed) (02/04/22)   Code Status: CPR   Advance Care Planning Activity    Ht: 180.3 cm (71\")   Wt: 72.8 kg (160 lb 8 oz)    Admission Cmt: None   Principal Problem: Hepatic encephalopathy (HCC) [K72.90]                 Active Insurance as of 2/4/2022     Primary Coverage     Payor Plan Insurance Group Employer/Plan Group    [facility]      Payor Plan Address Payor Plan Phone Number Payor Plan Fax Number Effective Dates    [address]   8/1/2020 - None Entered    [place] [address]       Subscriber Name Subscriber Birth Date Member ID       WEI POTTS [id number]                 Emergency Contacts      (Rel.) Home Phone Work Phone Mobile Phone    Milagro Potts (Spouse) [phone] -- --            "

## 2022-02-09 NOTE — PROGRESS NOTES
[facility]  Inpatient Progress Note    Reason for Follow Up:  Hepatic encephalopathy    Subjective     Interval History:   He reports bowels moving yesterday but not today. No n/v.  No confusion.  Eating.  He reports inconsistent BMs at home despite lactulose    Current Facility-Administered Medications:   •  acetaminophen (TYLENOL) tablet 650 mg, 650 mg, Oral, Once, Wes Matos, APRN  •  enoxaparin (LOVENOX) syringe 40 mg, 40 mg, Subcutaneous, Q24H, Eulalio Espino MD, 40 mg at 02/08/22 1430  •  furosemide (LASIX) tablet 40 mg, 40 mg, Oral, Daily, Jer Serrano MD, 40 mg at 02/08/22 0810  •  lactulose (CHRONULAC) 10 GM/15ML solution 10 g, 10 g, Oral, TID, Jer Serrano MD, 10 g at 02/09/22 0000  •  metoprolol tartrate (LOPRESSOR) tablet 12.5 mg, 12.5 mg, Oral, BID, Jer Serrano MD, 12.5 mg at 02/08/22 2041  •  ondansetron (ZOFRAN) tablet 4 mg, 4 mg, Oral, Q6H PRN **OR** ondansetron (ZOFRAN) injection 4 mg, 4 mg, Intravenous, Q6H PRN, Jer Serrano MD, 4 mg at 02/04/22 2159  •  pantoprazole (PROTONIX) EC tablet 40 mg, 40 mg, Oral, QAM, Jer Serrano MD, 40 mg at 02/09/22 0614  •  riFAXIMin (XIFAXAN) tablet 550 mg, 550 mg, Oral, Q12H, Jer Serrano MD, 550 mg at 02/08/22 2041  •  sodium chloride 0.9 % flush 10 mL, 10 mL, Intravenous, Q12H, Jer Serrano MD, 10 mL at 02/08/22 2043  •  sodium chloride 0.9 % flush 10 mL, 10 mL, Intravenous, PRN, Jer Serrano MD  •  spironolactone (ALDACTONE) tablet 50 mg, 50 mg, Oral, Daily, Jer Serrano MD, 50 mg at 02/08/22 0810  •  zinc sulfate (ZINCATE) capsule 220 mg, 220 mg, Oral, Daily, Jer Srerano MD, 220 mg at 02/07/22 0814  Review of Systems:    The following systems were reviewed and negative;  respiratory and gastrointestinal    Objective     Vital Signs  Temp:  [98.9 °F (37.2 °C)-99.9 °F (37.7 °C)] 98.9 °F (37.2 °C)  Heart Rate:  [72-80] 73  Resp:  [18] 18  BP: (106-115)/(60-66) 106/60  Body mass index is 22.39  kg/m².    Intake/Output Summary (Last 24 hours) at 2/9/2022 0834  Last data filed at 2/9/2022 0753  Gross per 24 hour   Intake 1150 ml   Output --   Net 1150 ml     I/O this shift:  In: 210 [P.O.:210]  Out: -      Physical Exam:   General: patient awake, alert and cooperative   Eyes: Normal lids and lashes, no scleral icterus   Neck: supple, normal ROM   Skin: warm and dry, not jaundiced   Pulm: regular and unlabored   Abdomen: soft, nontender, distended; normal bowel sounds   Extremities: no rash or edema   Psychiatric: Normal mood and behavior; memory intact     Results Review:     I reviewed the patient's new clinical results.    Results from last 7 days   Lab Units 02/08/22 0401 02/07/22 0526 02/06/22 0733   WBC 10*3/mm3 1.90* 1.72* 2.24*   HEMOGLOBIN g/dL 8.8* 8.3* 9.0*   HEMATOCRIT % 25.8* 24.0* 26.4*   PLATELETS 10*3/mm3 42* 40* 46*     Results from last 7 days   Lab Units 02/08/22  0401 02/07/22 0526 02/06/22  0733   SODIUM mmol/L 136 128* 132*   POTASSIUM mmol/L 4.0 3.9 4.0   CHLORIDE mmol/L 105 103 104   CO2 mmol/L 18.4* 18.0* 19.5*   BUN mg/dL 10 11 12   CREATININE mg/dL 0.88 0.99 1.07   CALCIUM mg/dL 7.9* 7.4* 7.7*   BILIRUBIN mg/dL 1.3* 1.3* 1.6*   ALK PHOS U/L 186* 151* 133*   ALT (SGPT) U/L 36 30 30   AST (SGOT) U/L 83* 75* 79*   GLUCOSE mg/dL 101* 103* 113*     Results from last 7 days   Lab Units 02/07/22  0526 02/06/22 0733 02/05/22  0746   INR  2.07* 2.06* 2.07*     Lab Results   Lab Value Date/Time    LIPASE 79 (H) 02/04/2022 0906    LIPASE 36 05/07/2021 1248    LIPASE 68 (H) 01/01/2021 2125    LIPASE 133 (H) 07/22/2020 1759    LIPASE 59 07/07/2020 0633    LIPASE 65 (H) 07/06/2020 1630       Radiology:  US Abdomen Limited   Final Result   1.  Cirrhosis with findings of portosystemic shunting including mild to   moderate splenomegaly and ascites. There also appears to be hepatofugal   in the main portal vein with concern for thrombus formation as well.   Further evaluation with Doppler  sonogram of the portal vein is   recommended. This was discussed with Shavon, the patient's nurse, at   8:30 PM 02/05/2022   2.  Thickened appearance the gallbladder likely related to adjacent   liver disease, as before.   3.  Other findings as above.       This report was finalized on 2/5/2022 8:32 PM by Dr. Jason Maki M.D.              Assessment/Plan     Patient Active Problem List   Diagnosis   • ED (erectile dysfunction) of organic origin   • Alcoholic cirrhosis of liver with ascites (HCC)   • Anemia   • Jaundice   • Coagulopathy (HCC)   • Secondary thrombocytopenia   • Lung abnormality- left apex   • Hepatic encephalopathy (HCC)   • Sepsis (HCC)   • Colitis   • Hypertension   • GERD (gastroesophageal reflux disease)   • SBP (spontaneous bacterial peritonitis) (HCC)   • Hyperammonemia (HCC)   • Portal vein thrombosis   • COVID-19 virus detected     All problems are new to me today  Assessment:  1. Hepatic encephalopathy  2. Cirrhosis of the liver  3. Portal vein thrombosis  4. Ascites  5. Pancytopenia  6. GERD  7. Esophageal varices - no prior bleeding- banded 4/21 and 6/21      Plan:  · He needs to be on a nonselective BB for esophageal variceal prophylaxis - change to nadolol, d/c metoprolol  · H/h stable, platelets remain low but stable  · Start sennakot - cont lactulose and titrate to 3-4 soft stools daily  · Continue xifaxan, zinc  · No previous variceal bleed but has been banded and now with need for AC - ideally would have repeated egd to reeval for need for additional banding before starting AC.  Unfortunately patient did not f/u this past summer for repeat egd as recommended by [facility].  Cont BB for bleeding prophlyaxis and would use lowest possible dose of AC with close monitoring of h/h  · OK to discharge home from GI standpoint with close outpt f/u with me and UL, hematology    I discussed the patients findings and my recommendations with patient.    Ruby Mcgee MD

## 2022-02-09 NOTE — PLAN OF CARE
Problem: Adult Inpatient Plan of Care  Goal: Plan of Care Review  Outcome: Ongoing, Progressing  Flowsheets (Taken 2/9/2022 2223)  Outcome Summary:   VSS   RA. Inpendent. Trending labs. Will continue to monitor.   Goal Outcome Evaluation:              Outcome Summary: VSS; RA. Inpendent. Trending labs. Will continue to monitor.

## 2022-02-09 NOTE — PROGRESS NOTES
Name: Wei Potts ADMIT: 2022   : 1955  PCP: Bella Villalta MD    MRN: [id number] LOS: 2 days   AGE/SEX: 66 y.o. male  ROOM: [facility]     Subjective   Subjective   Patient seen this morning.  Alert.  Oriented x3.  Denies abdominal pain.  Had bowel movements yesterday, no bowel movements so far today.  Denies fevers, chills, shivers, nausea, vomiting.  Review of Systems   As above  Objective   Objective   Vital Signs  Temp:  [98.3 °F (36.8 °C)-99.9 °F (37.7 °C)] 98.3 °F (36.8 °C)  Heart Rate:  [73-80] 73  Resp:  [18] 18  BP: (106-115)/(60-66) 109/66  SpO2:  [97 %-100 %] 100 %  on   ;   Device (Oxygen Therapy): room air  Body mass index is 22.39 kg/m².  Physical Exam    General: Alert and oriented x3, no acute distress  HEENT: Normocephalic, atraumatic  CV: Regular rate and rhythm, no murmurs rubs or gallops  Lungs: Clear to auscultation bilaterally, no crackles or wheezes  Abdomen: Soft, nontender, distended.  Normoactive bowel sounds.  Extremities: No significant peripheral edema , no cyanosis     Results Review     I reviewed the patient's new clinical results.  Results from last 7 days   Lab Units 22  0822  04022  0733   WBC 10*3/mm3 1.98* 1.90* 1.72* 2.24*   HEMOGLOBIN g/dL 8.6* 8.8* 8.3* 9.0*   PLATELETS 10*3/mm3 36* 42* 40* 46*     Results from last 7 days   Lab Units 22  0822  04022  0522  0733   SODIUM mmol/L 130* 136 128* 132*   POTASSIUM mmol/L 4.0 4.0 3.9 4.0   CHLORIDE mmol/L 102 105 103 104   CO2 mmol/L 18.1* 18.4* 18.0* 19.5*   BUN mg/dL 10 10 11 12   CREATININE mg/dL 0.94 0.88 0.99 1.07   GLUCOSE mg/dL 150* 101* 103* 113*   Estimated Creatinine Clearance: 81.7 mL/min (by C-G formula based on SCr of 0.94 mg/dL).  Results from last 7 days   Lab Units 22  0804 22  0401 22  0526 22  0733   ALBUMIN g/dL 1.90* 2.10* 2.10* 2.30*   BILIRUBIN mg/dL 1.4* 1.3* 1.3* 1.6*   ALK PHOS U/L 150* 186* 151*  133*   AST (SGOT) U/L 81* 83* 75* 79*   ALT (SGPT) U/L 34 36 30 30     Results from last 7 days   Lab Units 02/09/22  0804 02/08/22  0401 02/07/22  0526 02/06/22  0733 02/05/22  0746 02/04/22  0906   CALCIUM mg/dL 7.6* 7.9* 7.4* 7.7*   < > 8.5*   ALBUMIN g/dL 1.90* 2.10* 2.10* 2.30*   < > 2.70*   MAGNESIUM mg/dL  --   --   --   --   --  2.1    < > = values in this interval not displayed.     Results from last 7 days   Lab Units 02/09/22  0804 02/04/22  1302 02/04/22  0906   PROCALCITONIN ng/mL 0.14  --  0.13   LACTATE mmol/L  --  1.8 3.4*     COVID19   Date Value Ref Range Status   02/04/2022 Detected (C) Not Detected - Ref. Range Final   01/01/2021 Not Detected Not Detected - Ref. Range Final     Glucose   Date/Time Value Ref Range Status   02/08/2022 1131 160 (H) 70 - 130 mg/dL Final     Comment:     Meter: [id number] : [id number] Glen Avelar CNA       Duplex Portal Hepatic Complete CAR  · Main Portal Vein: acute thrombus present.  · Right Intrahepatic Portal Vein: acute thrombus present.  · Extrahepatic Portal Vein: abnormal, hepatofugal flow direction present.  · Left Intrahepatic Portal Vein: abnormal, hepatofugal flow direction   present.       Scheduled Medications  acetaminophen, 650 mg, Oral, Once  enoxaparin, 40 mg, Subcutaneous, Q24H  furosemide, 40 mg, Oral, Daily  lactulose, 10 g, Oral, TID  nadolol, 20 mg, Oral, Q24H  pantoprazole, 40 mg, Oral, QAM  rifAXIMin, 550 mg, Oral, Q12H  senna-docusate sodium, 1 tablet, Oral, Daily  sodium chloride, 10 mL, Intravenous, Q12H  spironolactone, 50 mg, Oral, Daily  zinc sulfate, 220 mg, Oral, Daily    Infusions   Diet  Diet Regular       Assessment/Plan     Active Hospital Problems    Diagnosis  POA   • **Hepatic encephalopathy (HCC) [K72.90]  Yes   • COVID-19 virus detected [U07.1]  Unknown   • Portal vein thrombosis [I81]  Unknown   • Hyperammonemia (HCC) [E72.20]  Yes   • Hypertension [I10]  Yes   • GERD (gastroesophageal reflux disease) [K21.9]  Yes   •  Coagulopathy (HCC) [D68.9]  Yes   • 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        is a pleasant 67 y/o male with PMH of alcoholic liver disease, cirrhosis, esophageal varices s/p banding recurrent ascites & HTN who was admitted on 2/4/22 with hepatic encephalopathy.     Hepatic encephalopathy: Improved.  Alert oriented x3.  Continue lactulose, rifaximin.  Docusate senna.  Titrate to 3-4 bowel movements a day.    Alcoholic cirrhosis of the liver decompensated by ascites and esophageal varices.On spironolactone and Lasix.  Low-sodium diet.  Beta-blocker for for bleeding prophylaxis    Portal vein thrombosis.  On prophylactic Lovenox, hematology following.  Hypercoagulable work-up.    Thrombocytopenia: Likely secondary to cirrhosis.  Slowly trending down.  No signs of bleeding.  Continue to monitor.    Leukopenia: White cell count 1.98 this morning.  ANC 1500.  Monitor daily.  Low threshold to start broad-spectrum antibiotics if infection signs.    Hyponatremia: Sodium 130 today. 2/2 to cirrhosis.  Daily BMP.      DVT prophylaxis: Lovenox  Full code.  Disposition: Home with spouse and home health when cleared by consultants.      Joselito Marr MD  [facility]  02/09/22  15:52 EST

## 2022-02-10 LAB
ALBUMIN SERPL-MCNC: 1.9 G/DL (ref 3.5–5.2)
ALBUMIN/GLOB SERPL: 0.5 G/DL
ALP SERPL-CCNC: 142 U/L (ref 39–117)
ALT SERPL W P-5'-P-CCNC: 34 U/L (ref 1–41)
AMMONIA BLD-SCNC: 102 UMOL/L (ref 16–60)
ANION GAP SERPL CALCULATED.3IONS-SCNC: 9.4 MMOL/L (ref 5–15)
AST SERPL-CCNC: 79 U/L (ref 1–40)
BASOPHILS # BLD AUTO: 0.01 10*3/MM3 (ref 0–0.2)
BASOPHILS NFR BLD AUTO: 0.5 % (ref 0–1.5)
BILIRUB SERPL-MCNC: 1.4 MG/DL (ref 0–1.2)
BUN SERPL-MCNC: 10 MG/DL (ref 8–23)
BUN/CREAT SERPL: 11.4 (ref 7–25)
CALCIUM SPEC-SCNC: 7.4 MG/DL (ref 8.6–10.5)
CARDIOLIPIN IGG SER IA-ACNC: <9 GPL U/ML (ref 0–14)
CARDIOLIPIN IGM SER IA-ACNC: 20 MPL U/ML (ref 0–12)
CHLORIDE SERPL-SCNC: 101 MMOL/L (ref 98–107)
CO2 SERPL-SCNC: 18.6 MMOL/L (ref 22–29)
CREAT SERPL-MCNC: 0.88 MG/DL (ref 0.76–1.27)
DEPRECATED RDW RBC AUTO: 52.3 FL (ref 37–54)
EOSINOPHIL # BLD AUTO: 0.03 10*3/MM3 (ref 0–0.4)
EOSINOPHIL NFR BLD AUTO: 1.5 % (ref 0.3–6.2)
ERYTHROCYTE [DISTWIDTH] IN BLOOD BY AUTOMATED COUNT: 16.7 % (ref 12.3–15.4)
GFR SERPL CREATININE-BSD FRML MDRD: 87 ML/MIN/1.73
GLOBULIN UR ELPH-MCNC: 4.2 GM/DL
GLUCOSE SERPL-MCNC: 159 MG/DL (ref 65–99)
HCT VFR BLD AUTO: 27 % (ref 37.5–51)
HGB BLD-MCNC: 8.7 G/DL (ref 13–17.7)
INR PPP: 1.7 (ref 0.9–1.1)
LYMPHOCYTES # BLD AUTO: 0.5 10*3/MM3 (ref 0.7–3.1)
LYMPHOCYTES NFR BLD AUTO: 25.1 % (ref 19.6–45.3)
MCH RBC QN AUTO: 27.4 PG (ref 26.6–33)
MCHC RBC AUTO-ENTMCNC: 32.2 G/DL (ref 31.5–35.7)
MCV RBC AUTO: 85.2 FL (ref 79–97)
MONOCYTES # BLD AUTO: 0.37 10*3/MM3 (ref 0.1–0.9)
MONOCYTES NFR BLD AUTO: 18.6 % (ref 5–12)
NEUTROPHILS NFR BLD AUTO: 1.07 10*3/MM3 (ref 1.7–7)
NEUTROPHILS NFR BLD AUTO: 53.8 % (ref 42.7–76)
PLATELET # BLD AUTO: 34 10*3/MM3 (ref 140–450)
PMV BLD AUTO: 10.5 FL (ref 6–12)
POTASSIUM SERPL-SCNC: 4 MMOL/L (ref 3.5–5.2)
PROCALCITONIN SERPL-MCNC: 0.09 NG/ML (ref 0–0.25)
PROT SERPL-MCNC: 6.1 G/DL (ref 6–8.5)
PROTHROMBIN TIME: 19.9 SECONDS (ref 11.7–14.2)
RBC # BLD AUTO: 3.17 10*6/MM3 (ref 4.14–5.8)
REF LAB TEST METHOD: NORMAL
SODIUM SERPL-SCNC: 129 MMOL/L (ref 136–145)
WBC NRBC COR # BLD: 1.99 10*3/MM3 (ref 3.4–10.8)

## 2022-02-10 PROCEDURE — 99233 SBSQ HOSP IP/OBS HIGH 50: CPT | Performed by: INTERNAL MEDICINE

## 2022-02-10 PROCEDURE — 85610 PROTHROMBIN TIME: CPT | Performed by: STUDENT IN AN ORGANIZED HEALTH CARE EDUCATION/TRAINING PROGRAM

## 2022-02-10 PROCEDURE — 99232 SBSQ HOSP IP/OBS MODERATE 35: CPT | Performed by: INTERNAL MEDICINE

## 2022-02-10 PROCEDURE — 82140 ASSAY OF AMMONIA: CPT | Performed by: HOSPITALIST

## 2022-02-10 PROCEDURE — 84145 PROCALCITONIN (PCT): CPT | Performed by: STUDENT IN AN ORGANIZED HEALTH CARE EDUCATION/TRAINING PROGRAM

## 2022-02-10 PROCEDURE — 36415 COLL VENOUS BLD VENIPUNCTURE: CPT | Performed by: HOSPITALIST

## 2022-02-10 PROCEDURE — 86022 PLATELET ANTIBODIES: CPT | Performed by: INTERNAL MEDICINE

## 2022-02-10 PROCEDURE — 80053 COMPREHEN METABOLIC PANEL: CPT | Performed by: STUDENT IN AN ORGANIZED HEALTH CARE EDUCATION/TRAINING PROGRAM

## 2022-02-10 PROCEDURE — 85025 COMPLETE CBC W/AUTO DIFF WBC: CPT | Performed by: STUDENT IN AN ORGANIZED HEALTH CARE EDUCATION/TRAINING PROGRAM

## 2022-02-10 RX ORDER — SULFAMETHOXAZOLE AND TRIMETHOPRIM 400; 80 MG/1; MG/1
1 TABLET ORAL
Status: DISCONTINUED | OUTPATIENT
Start: 2022-02-10 | End: 2022-02-14 | Stop reason: HOSPADM

## 2022-02-10 RX ADMIN — FUROSEMIDE 40 MG: 40 TABLET ORAL at 08:44

## 2022-02-10 RX ADMIN — LACTULOSE 10 G: 10 SOLUTION ORAL at 16:38

## 2022-02-10 RX ADMIN — NADOLOL 20 MG: 20 TABLET ORAL at 08:44

## 2022-02-10 RX ADMIN — PANTOPRAZOLE SODIUM 40 MG: 40 TABLET, DELAYED RELEASE ORAL at 06:26

## 2022-02-10 RX ADMIN — LACTULOSE 10 G: 10 SOLUTION ORAL at 08:44

## 2022-02-10 RX ADMIN — RIFAXIMIN 550 MG: 550 TABLET ORAL at 08:44

## 2022-02-10 RX ADMIN — SULFAMETHOXAZOLE AND TRIMETHOPRIM 1 TABLET: 400; 80 TABLET ORAL at 16:38

## 2022-02-10 RX ADMIN — SPIRONOLACTONE 50 MG: 50 TABLET, FILM COATED ORAL at 08:44

## 2022-02-10 RX ADMIN — LACTULOSE 10 G: 10 SOLUTION ORAL at 20:52

## 2022-02-10 RX ADMIN — RIFAXIMIN 550 MG: 550 TABLET ORAL at 20:52

## 2022-02-10 RX ADMIN — SODIUM CHLORIDE, PRESERVATIVE FREE 10 ML: 5 INJECTION INTRAVENOUS at 20:52

## 2022-02-10 RX ADMIN — DOCUSATE SODIUM 50MG AND SENNOSIDES 8.6MG 1 TABLET: 8.6; 5 TABLET, FILM COATED ORAL at 08:44

## 2022-02-10 NOTE — PROGRESS NOTES
Subjective     HISTORY OF PRESENT ILLNESS:   No acute issues overnight. Denies any bleed or bruise. His mentation appears to be clear. Has not had a BM yet. Afebrile    Past Medical History, Past Surgical History, Social History, Family History have been reviewed and are without significant changes except as mentioned.    Review of Systems   A comprehensive 14 point review of systems was performed and was negative except as mentioned.    Medications:  The current medication list was reviewed in the EMR    ALLERGIES:    Allergies   Allergen Reactions   • Iron GI Intolerance       Objective      Vitals:    02/09/22 1334 02/09/22 2000 02/10/22 0017 02/10/22 0723   BP: 109/66 119/73 97/53 114/66   BP Location: Right arm Right arm Right arm Right arm   Patient Position: Lying Lying Lying Lying   Pulse: 73 69 74 71   Resp: 18 18 18 18   Temp: 98.3 °F (36.8 °C) 98.7 °F (37.1 °C) 99.5 °F (37.5 °C) 99.6 °F (37.6 °C)   TempSrc: Oral Oral Oral Oral   SpO2: 100% 100% 99% 98%   Weight:       Height:         No flowsheet data found.    Physical Exam    CONSTITUTIONAL:  Vital signs reviewed.  No distress, looks comfortable.  EYES:  Conjunctiva and lids unremarkable.    EARS,NOSE,MOUTH,THROAT:  Ears and nose appear unremarkable.  Lips, teeth, gums appear unremarkable.  RESPIRATORY:  Normal respiratory effort.    CARDIOVASCULAR:  Normal heart rate. No significant lower extremity edema.  GASTROINTESTINAL: Abdomen appears unremarkable.  Nontender. Mild distension  LYMPHATIC:  No cervical, supraclavicular lymphadenopathy.  SKIN:  Warm.  No rashes.  PSYCHIATRIC:  Normal judgment and insight.  Normal mood and affect.  NEURO: AAO x 3, no focal deficits  RECENT LABS:  Hematology WBC   Date Value Ref Range Status   02/10/2022 1.99 (C) 3.40 - 10.80 10*3/mm3 Final     RBC   Date Value Ref Range Status   02/10/2022 3.17 (L) 4.14 - 5.80 10*6/mm3 Final     Hemoglobin   Date Value Ref Range Status   02/10/2022 8.7 (L) 13.0 - 17.7 g/dL Final      Hematocrit   Date Value Ref Range Status   02/10/2022 27.0 (L) 37.5 - 51.0 % Final     Platelets   Date Value Ref Range Status   02/10/2022 34 (C) 140 - 450 10*3/mm3 Final          Assessment/Plan    is a pleasant 67 y/o WM with medical issues comprising alcoholic liver disease, cirrhosis, esophageal varices s/p banding recurrent ascites & HTN who was admitted on 2/4/22 with hepatic encephalopathy. Hematology has been consulted for portal vein evaluation & management.      # Portal vein thrombosis:  - Acute on chronic. MRI abdomen from 1/7/22 done at [facility] had demonstrated peripheral, chronic nonocclusive thrombus of the main portal vein. The doppler from 2/6/22 shows acute thrombus in the main and right intrahepatic portal veins.   - This is likely due to altered coagulation due to hepatic dysfunction.  No prior history of VTEs. No family history of thrombosis.   - However given unusual location, hypercoagulable state work up was sent on 2/7/22.   -  Factor V leiden mutation negative. AT-III activity low (likely due to liver dysfunction). Prothrombin gene mutation, JAK2 mutation, [facility] flow cytometry, & APLS pending. Will defer protein C & S as levels would be altered.   - Patient has chronic thrombocytopenia with platelets in 40-50,000s range. The CBC from 2/7/22 shows platelet count of 40,000. No c/o bleed or bruise.   - Given risk for intestinal infarction with PV thrombus and likely prothrombotic state,  started him on low dose lovenox on 2/7/22.   - Tolerating well.   - Platelets remain low, dropped from 42,000 on 2/8/22 to 36,000 on 2/9 to 34,000 on 2/10.   - Will hold lovenox now. Consult vascular surgery.      # Thrombocytopenia:  - Acute on chronic. Likely liver disease related.   - No bleed/bruise    # Leukopenia:  Likely liver disease related.   Monitor     # Hepatic encephalopathy: Per GI  # Decompensated liver cirrhosis  # Ascites     RECOMMENDATIONS:  - Portal vein thrombus likely liver  disease related.   - Hypercoagulable work ups: Factor V leiden mutation negative. AT-III activity low (likely due to liver dysfunction). Prothrombin gene mutation, JAK2 mutation, [facility] flow cytometry, & APLS pending.  - Persistent drop in platelts. Now down to 34,000. Will stop lovenox. - Check HIT Ab.   - Consult vascular surgery.   - Monitor while off of anticoagulation.       2/10/2022      CC:

## 2022-02-10 NOTE — PROGRESS NOTES
[facility]  Inpatient Progress Note    Reason for Follow Up: Hepatic encephalopathy, cirrhosis, ascites, portal vein thrombosis    Subjective     Interval History:   No complaints this morning.  He denies abdominal pain.  He has no confusion.  Bowels are moving with current bowel regimen.    Current Facility-Administered Medications:   •  acetaminophen (TYLENOL) tablet 650 mg, 650 mg, Oral, Once, Wes Matos, APRN  •  enoxaparin (LOVENOX) syringe 40 mg, 40 mg, Subcutaneous, Q24H, Eulalio Espino MD, 40 mg at 02/09/22 1341  •  furosemide (LASIX) tablet 40 mg, 40 mg, Oral, Daily, Jer Serrano MD, 40 mg at 02/09/22 1012  •  lactulose (CHRONULAC) 10 GM/15ML solution 10 g, 10 g, Oral, TID, Jer Serrano MD, 10 g at 02/09/22 2040  •  nadolol (CORGARD) tablet 20 mg, 20 mg, Oral, Q24H, Ruby Mcgee MD, 20 mg at 02/09/22 1121  •  ondansetron (ZOFRAN) tablet 4 mg, 4 mg, Oral, Q6H PRN **OR** ondansetron (ZOFRAN) injection 4 mg, 4 mg, Intravenous, Q6H PRN, Jer Serrano MD, 4 mg at 02/04/22 2159  •  pantoprazole (PROTONIX) EC tablet 40 mg, 40 mg, Oral, QAM, Jer Serrano MD, 40 mg at 02/10/22 0626  •  riFAXIMin (XIFAXAN) tablet 550 mg, 550 mg, Oral, Q12H, Jer Serrano MD, 550 mg at 02/09/22 2040  •  sennosides-docusate (PERICOLACE) 8.6-50 MG per tablet 1 tablet, 1 tablet, Oral, Daily, Ruby Mcgee MD, 1 tablet at 02/09/22 1012  •  sodium chloride 0.9 % flush 10 mL, 10 mL, Intravenous, Q12H, Jer Serrano MD, 10 mL at 02/09/22 2039  •  sodium chloride 0.9 % flush 10 mL, 10 mL, Intravenous, PRN, Jer Serrano MD  •  spironolactone (ALDACTONE) tablet 50 mg, 50 mg, Oral, Daily, Jer Serrano MD, 50 mg at 02/09/22 1012  •  zinc sulfate (ZINCATE) capsule 220 mg, 220 mg, Oral, Daily, Jer Serrano MD, 220 mg at 02/07/22 0814  Review of Systems:    Negative for fever chills-fever of 99.5 noted.  No nausea vomiting or abdominal pain    Objective     Vital Signs  Temp:  [98.3 °F  (36.8 °C)-99.5 °F (37.5 °C)] 99.5 °F (37.5 °C)  Heart Rate:  [69-74] 74  Resp:  [18] 18  BP: ()/(53-73) 97/53  Body mass index is 22.39 kg/m².    Intake/Output Summary (Last 24 hours) at 2/10/2022 0646  Last data filed at 2/10/2022 0625  Gross per 24 hour   Intake 900 ml   Output --   Net 900 ml     I/O this shift:  In: 360 [P.O.:360]  Out: -      Physical Exam:   General: patient awake, alert and cooperative   Eyes: Normal lids and lashes, no scleral icterus   Neck: supple, normal ROM   Skin: warm and dry, not jaundiced   Pulm:   regular and unlabored   Abdomen: soft, nontender, nondistended    Extremities: no rash or edema   Psychiatric: Normal mood and behavior; memory intact     Results Review:     I reviewed the patient's new clinical results.    Results from last 7 days   Lab Units 02/10/22  0510 02/09/22  0804 02/08/22  0401   WBC 10*3/mm3 1.99* 1.98* 1.90*   HEMOGLOBIN g/dL 8.7* 8.6* 8.8*   HEMATOCRIT % 27.0* 24.9* 25.8*   PLATELETS 10*3/mm3 34* 36* 42*     Results from last 7 days   Lab Units 02/10/22  0510 02/09/22  0804 02/08/22  0401   SODIUM mmol/L 129* 130* 136   POTASSIUM mmol/L 4.0 4.0 4.0   CHLORIDE mmol/L 101 102 105   CO2 mmol/L 18.6* 18.1* 18.4*   BUN mg/dL 10 10 10   CREATININE mg/dL 0.88 0.94 0.88   CALCIUM mg/dL 7.4* 7.6* 7.9*   BILIRUBIN mg/dL 1.4* 1.4* 1.3*   ALK PHOS U/L 142* 150* 186*   ALT (SGPT) U/L 34 34 36   AST (SGOT) U/L 79* 81* 83*   GLUCOSE mg/dL 159* 150* 101*     Results from last 7 days   Lab Units 02/10/22  0510 02/07/22  0526 02/06/22  0733   INR  1.70* 2.07* 2.06*     Lab Results   Lab Value Date/Time    LIPASE 79 (H) 02/04/2022 0906    LIPASE 36 05/07/2021 1248    LIPASE 68 (H) 01/01/2021 2125    LIPASE 133 (H) 07/22/2020 1759    LIPASE 59 07/07/2020 0633    LIPASE 65 (H) 07/06/2020 1630       Radiology:  US Abdomen Limited   Final Result   1.  Cirrhosis with findings of portosystemic shunting including mild to   moderate splenomegaly and ascites. There also appears to  be hepatofugal   in the main portal vein with concern for thrombus formation as well.   Further evaluation with Doppler sonogram of the portal vein is   recommended. This was discussed with Shavon, the patient's nurse, at   8:30 PM 02/05/2022   2.  Thickened appearance the gallbladder likely related to adjacent   liver disease, as before.   3.  Other findings as above.       This report was finalized on 2/5/2022 8:32 PM by Dr. Jason Maki M.D.              Assessment/Plan     Patient Active Problem List   Diagnosis   • ED (erectile dysfunction) of organic origin   • Alcoholic cirrhosis of liver with ascites (HCC)   • Anemia   • Jaundice   • Coagulopathy (HCC)   • Secondary thrombocytopenia   • Lung abnormality- left apex   • Hepatic encephalopathy (HCC)   • Sepsis (HCC)   • Colitis   • Hypertension   • GERD (gastroesophageal reflux disease)   • SBP (spontaneous bacterial peritonitis) (HCC)   • Hyperammonemia (HCC)   • Portal vein thrombosis   • COVID-19 virus detected       Assessment:  1. Hepatic encephalopathy  2. Cirrhosis of the liver  3. Portal vein thrombosis  4. Ascites  5. Pancytopenia  6. GERD  7. Esophageal varices - no prior bleeding- banded 4/21 and 6/21  8.  History of SBP-daily antibiotic prophylaxis  9.  Covid positive      Plan:  · Continue nadolol (replaced metoprolol) for esophageal variceal prophylaxis    · H/h stable   · contnue sennakot - cont lactulose and titrate to 3-4 soft stools daily  · Continue xifaxan, zinc  · No previous variceal bleed but has been banded and now with need for AC - ideally would have repeated egd to reeval for need for additional banding before starting AC.  Unfortunately patient did not f/u this past summer for repeat egd as recommended by UL.  Cont BB for bleeding prophlyaxis and would use lowest possible dose of AC with close monitoring of h/h  · Has a history of SBP and should be on daily Bactrim-at some point this is fallen off his medication list.  We will  restart  · OK to discharge home from GI standpoint with close outpt f/u with me and UL when okay with hematology-plans to stop Lovenox and check HIT antibody noted    I discussed the patients findings and my recommendations with patient and primary care team.    Ruby Mcgee MD

## 2022-02-10 NOTE — PROGRESS NOTES
Name: Wei Potts ADMIT: 2022   : 1955  PCP: Bella Villalta MD    MRN: [id number] LOS: 3 days   AGE/SEX: 66 y.o. male  ROOM: [facility]     Subjective   Subjective   Patient seen this morning.  No new changes since yesterday.  Denies abdominal pain. no fevers no chills.  Review of Systems   As above  Objective   Objective   Vital Signs  Temp:  [98.7 °F (37.1 °C)-99.6 °F (37.6 °C)] 99.6 °F (37.6 °C)  Heart Rate:  [69-74] 71  Resp:  [18] 18  BP: ()/(53-73) 114/66  SpO2:  [98 %-100 %] 98 %  on   ;   Device (Oxygen Therapy): room air  Body mass index is 22.39 kg/m².  Physical Exam    General: Alert and oriented x3, no acute distress  HEENT: Normocephalic, atraumatic  CV: Regular rate and rhythm, no murmurs rubs or gallops  Lungs: Clear to auscultation bilaterally, no crackles or wheezes  Abdomen: Soft, nontender, distended.  Normoactive bowel sounds.  Extremities: No significant peripheral edema , no cyanosis     Results Review     I reviewed the patient's new clinical results.  Results from last 7 days   Lab Units 02/10/22  0510 02/09/22  0822  04022  0526   WBC 10*3/mm3 1.99* 1.98* 1.90* 1.72*   HEMOGLOBIN g/dL 8.7* 8.6* 8.8* 8.3*   PLATELETS 10*3/mm3 34* 36* 42* 40*     Results from last 7 days   Lab Units 02/10/22  0510 22  0804 22  04022  0526   SODIUM mmol/L 129* 130* 136 128*   POTASSIUM mmol/L 4.0 4.0 4.0 3.9   CHLORIDE mmol/L 101 102 105 103   CO2 mmol/L 18.6* 18.1* 18.4* 18.0*   BUN mg/dL 10 10 10 11   CREATININE mg/dL 0.88 0.94 0.88 0.99   GLUCOSE mg/dL 159* 150* 101* 103*   Estimated Creatinine Clearance: 87.3 mL/min (by C-G formula based on SCr of 0.88 mg/dL).  Results from last 7 days   Lab Units 02/10/22  0510 22  0804 22  0401 22  0526   ALBUMIN g/dL 1.90* 1.90* 2.10* 2.10*   BILIRUBIN mg/dL 1.4* 1.4* 1.3* 1.3*   ALK PHOS U/L 142* 150* 186* 151*   AST (SGOT) U/L 79* 81* 83* 75*   ALT (SGPT) U/L 34 34 36 30     Results from  last 7 days   Lab Units 02/10/22  0510 02/09/22  0804 02/08/22  0401 02/07/22  0526 02/05/22  0746 02/04/22  0906   CALCIUM mg/dL 7.4* 7.6* 7.9* 7.4*   < > 8.5*   ALBUMIN g/dL 1.90* 1.90* 2.10* 2.10*   < > 2.70*   MAGNESIUM mg/dL  --   --   --   --   --  2.1    < > = values in this interval not displayed.     Results from last 7 days   Lab Units 02/10/22  0510 02/09/22  0804 02/04/22  1302 02/04/22  0906   PROCALCITONIN ng/mL 0.09 0.14  --  0.13   LACTATE mmol/L  --   --  1.8 3.4*     COVID19   Date Value Ref Range Status   02/04/2022 Detected (C) Not Detected - Ref. Range Final   01/01/2021 Not Detected Not Detected - Ref. Range Final     Glucose   Date/Time Value Ref Range Status   02/08/2022 1131 160 (H) 70 - 130 mg/dL Final     Comment:     Meter: [id number] : [id number] Glen Rosioana DUBON       Duplex Portal Hepatic Complete CAR  · Main Portal Vein: acute thrombus present.  · Right Intrahepatic Portal Vein: acute thrombus present.  · Extrahepatic Portal Vein: abnormal, hepatofugal flow direction present.  · Left Intrahepatic Portal Vein: abnormal, hepatofugal flow direction   present.       Scheduled Medications  acetaminophen, 650 mg, Oral, Once  furosemide, 40 mg, Oral, Daily  lactulose, 10 g, Oral, TID  nadolol, 20 mg, Oral, Q24H  pantoprazole, 40 mg, Oral, QAM  rifAXIMin, 550 mg, Oral, Q12H  senna-docusate sodium, 1 tablet, Oral, Daily  sodium chloride, 10 mL, Intravenous, Q12H  spironolactone, 50 mg, Oral, Daily  sulfamethoxazole-trimethoprim, 1 tablet, Oral, Q24H  zinc sulfate, 220 mg, Oral, Daily    Infusions   Diet  Diet Regular; Low Sodium; 2,000 mg Na       Assessment/Plan     Active Hospital Problems    Diagnosis  POA   • **Hepatic encephalopathy (HCC) [K72.90]  Yes   • COVID-19 virus detected [U07.1]  Unknown   • Portal vein thrombosis [I81]  Unknown   • Hyperammonemia (HCC) [E72.20]  Yes   • Hypertension [I10]  Yes   • GERD (gastroesophageal reflux disease) [K21.9]  Yes   • Coagulopathy (HCC)  [D68.9]  Yes   • 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        is a pleasant 67 y/o male with PMH of alcoholic liver disease, cirrhosis, esophageal varices s/p banding recurrent ascites & HTN who was admitted on 2/4/22 with hepatic encephalopathy.     Hepatic encephalopathy: Improved.  Alert oriented x3.  Continue lactulose, rifaximin.  Docusate senna.  Titrate to 3-4 bowel movements a day.    Alcoholic cirrhosis of the liver decompensated by ascites and esophageal varices.On spironolactone and Lasix.  Low-sodium diet.  Beta-blocker for for bleeding prophylaxis. Bactrim restarted for history of SBP 02/10    Portal vein thrombosis.  On prophylactic Lovenox, hematology following.  Lovenox discontinued 02/10 due to platelets trending down.  Hypercoagulable work-up.  Discussed with Dr. Espino. HIT panel  Pending. Vascular surgery consulted.     Thrombocytopenia: Likely secondary to cirrhosis.  Slowly trending down.  No signs of bleeding.  Continue to monitor.HIT panel pending.     Leukopenia: .  Monitor daily.  Low threshold to start broad-spectrum antibiotics if infection signs.    Hyponatremia: Sodium 130 today. 2/2 to cirrhosis.  Daily BMP.    COVID 19 positive: Asymptomatic, remains on room air, Continue to monitor, no indication for treatment.       DVT prophylaxis: Lovenox  Full code.  Disposition: Home with spouse and home health when cleared by consultants.      Joselito Marr MD  [facility]  02/10/22  15:39 EST

## 2022-02-10 NOTE — CONSULTS
Name: Wei Potts ADMIT: 2022   : 1955  PCP: Bella Villalta MD    MRN: [id number] LOS: 3 days   AGE/SEX: 66 y.o. male  ROOM: [facility]     Consults  Frances Ngo MD     LOS: 3 days   Patient Care Team:  Bella Villalta MD as PCP - General (Family Medicine)  Austin Barkley MD (Gastroenterology)    Subjective     History of Present Illness  66 y.o. male with a h/o alcoholism, cirrhosis with portal hypertension, HTN, and anemia who was admitted with hepatic encephalopathy on 22.  During this admission, he has been diagnosed with acute on chronic portal venous thrombosis.  He had evidence of chronic, non occlusive portal venous thrombus on an MRI performed at [facility] on 22.  Duplex performed here on 22 confirmed acute thrombus of the main portal vein and right intrahepatic portal vein.  He has reversed flow in his portal vein consistent with portal hypertension.  He has no personal or family history of blood clotting disorder or DVT/PTE.  He does not smoke cigarettes.  He has been treated with anticoagulation by hematology, who has been managing his thrombocytopenia as well.  Vascular surgery was consulted due to thrombocytopenia and anticoagulation being held.      Review of Systems   All other systems reviewed and are negative.      Past Medical History:   Diagnosis Date   • Alcoholism (HCC)    • Anemia    • Cirrhosis (HCC)    • Encephalopathy    • Hypertension    • Liver disease        Past Surgical History:   Procedure Laterality Date   • COLONOSCOPY     • ENDOSCOPY         Family History   Problem Relation Age of Onset   • Diabetes Mother    • Hypertension Father        Social History     Tobacco Use   • Smoking status: Never Smoker   • Smokeless tobacco: Never Used   Vaping Use   • Vaping Use: Never used   Substance Use Topics   • Alcohol use: Not Currently   • Drug use: No       Allergies: Iron    Medications Prior to Admission   Medication Sig Dispense Refill Last Dose    • amitriptyline (ELAVIL) 10 MG tablet Take 1 tablet by mouth Every Night. 30 tablet 1    • furosemide (LASIX) 40 MG tablet TAKE 1 TABLET BY MOUTH DAILY 30 tablet 2    • Generlac 10 GM/15ML solution solution (encephalopathy) TAKE 30 ML BY MOUTH ONCE TO TWICE DAILY UNTIL 3-4 BOWEL MOVEMENTS ARE MADE 1800 mL 2    • lactulose (CHRONULAC) 10 GM/15ML solution TAKE 15 ML BY MOUTH TWICE DAILY, ADJUST DOSE UNTIL YOU HAVE 3-4 BOWEL MOVEMENTS A  mL 0    • MAGnesium-Oxide 400 (241.3 Mg) MG tablet tablet 400 mg 2 (Two) Times a Day.      • metoprolol tartrate (LOPRESSOR) 25 MG tablet TAKE 1/2 TABLET BY MOUTH EVERY 12 HOURS 90 tablet 0    • spironolactone (ALDACTONE) 50 MG tablet Take 1 tablet by mouth Daily. 30 tablet 3      acetaminophen, 650 mg, Oral, Once  furosemide, 40 mg, Oral, Daily  lactulose, 10 g, Oral, TID  nadolol, 20 mg, Oral, Q24H  pantoprazole, 40 mg, Oral, QAM  rifAXIMin, 550 mg, Oral, Q12H  senna-docusate sodium, 1 tablet, Oral, Daily  sodium chloride, 10 mL, Intravenous, Q12H  spironolactone, 50 mg, Oral, Daily  sulfamethoxazole-trimethoprim, 1 tablet, Oral, Q24H  zinc sulfate, 220 mg, Oral, Daily         ondansetron **OR** ondansetron  •  sodium chloride      Objective   Temp:  [98.7 °F (37.1 °C)-99.6 °F (37.6 °C)] 99.6 °F (37.6 °C)  Heart Rate:  [69-74] 71  Resp:  [18] 18  BP: ()/(53-73) 114/66    No intake/output data recorded.    Physical Exam  Constitutional:       General: He is not in acute distress.     Appearance: Normal appearance. He is not ill-appearing or toxic-appearing.   HENT:      Head: Normocephalic and atraumatic.      Right Ear: External ear normal.      Left Ear: External ear normal.      Nose: Nose normal.      Mouth/Throat:      Mouth: Mucous membranes are moist.      Pharynx: Oropharynx is clear.   Eyes:      Conjunctiva/sclera: Conjunctivae normal.      Pupils: Pupils are equal, round, and reactive to light.   Cardiovascular:      Rate and Rhythm: Normal rate and regular  rhythm.      Comments: Palpable radial pulses bilaterally    Pulmonary:      Effort: Pulmonary effort is normal. No respiratory distress.   Abdominal:      Comments: Abdomen distended, soft with + fluid wave   Musculoskeletal:         General: No swelling or deformity.      Cervical back: Normal range of motion and neck supple.   Skin:     General: Skin is warm and dry.      Capillary Refill: Capillary refill takes less than 2 seconds.   Neurological:      General: No focal deficit present.      Mental Status: He is alert and oriented to person, place, and time.   Psychiatric:         Mood and Affect: Mood normal.         Behavior: Behavior normal.         Results from last 7 days   Lab Units 02/10/22  0510 02/09/22  0804 02/08/22  0401 02/07/22  0526 02/06/22  0733 02/05/22  0746 02/04/22  0906   WBC 10*3/mm3 1.99* 1.98* 1.90* 1.72* 2.24* 2.52* 2.58*   HEMOGLOBIN g/dL 8.7* 8.6* 8.8* 8.3* 9.0* 8.1* 8.5*   PLATELETS 10*3/mm3 34* 36* 42* 40* 46* 38* 48*     Results from last 7 days   Lab Units 02/10/22  0510 02/09/22  0804 02/08/22  0401 02/07/22  0526 02/06/22  0733 02/05/22  0746 02/04/22  0906   SODIUM mmol/L 129* 130* 136 128* 132* 132* 134*   POTASSIUM mmol/L 4.0 4.0 4.0 3.9 4.0 4.1 4.3   CHLORIDE mmol/L 101 102 105 103 104 106 106   CO2 mmol/L 18.6* 18.1* 18.4* 18.0* 19.5* 15.0* 18.6*   BUN mg/dL 10 10 10 11 12 12 13   CREATININE mg/dL 0.88 0.94 0.88 0.99 1.07 1.02 1.10   GLUCOSE mg/dL 159* 150* 101* 103* 113* 99 125*   Estimated Creatinine Clearance: 87.3 mL/min (by C-G formula based on SCr of 0.88 mg/dL).  Results from last 7 days   Lab Units 02/10/22  0510 02/07/22  0526 02/06/22  0733 02/05/22  0746 02/04/22  0906   PROTIME Seconds 19.9* 23.4* 23.3* 23.3* 23.9*   INR  1.70* 2.07* 2.06* 2.07* 2.14*       Imaging Studies:  [facility] NON-INV [facility]  [address] [id number] [phone]          Wei Potts  Duplex Portal Hepatic Complete CAR  Order# [id number]  Reading  physician: Alexandro Lowe MD Ordering physician: Wes Matos APRN Study date: 22       Patient Information    Patient Name   Wei Potts MRN   [id number] Legal Sex   Male  (Age)   1955 (66 y.o.)     Clinical Indication    portal vein thrombosis     Admission Information    Admission Date/Time Discharge Date/Time Room/Bed   22  09:01 AM  S402/1     Interpretation Summary    · Main Portal Vein: acute thrombus present.  · Right Intrahepatic Portal Vein: acute thrombus present.  · Extrahepatic Portal Vein: abnormal, hepatofugal flow direction present.  · Left Intrahepatic Portal Vein: abnormal, hepatofugal flow direction present.         Patient Hx Of Height, Weight, and Vitals    Height Weight BSA (Calculated - sq m) BMI (kg/m2) Pulse BP       83 121/64     Study Findings    •     Right Intrahepatic Portal Vein: Not well visualized.     Study Impression    •     Main Portal Vein: Acute thrombus present.   •     Extrahepatic Portal Vein: Patent. Abnormal flow direction.  •     Right Intrahepatic Portal Vein: Acute thrombus present.   •     Left Intrahepatic Portal Vein: Patent. Abnormal flow direction.   • Right Hepatic Vein: Patent. Normal flow direction.   •     Mid Hepatic Vein: Patent. Normal flow direction.   •     Left Hepatic Vein: Patent. Normal flow direction.   •     IVC: Patent.   •     Hepatic Artery: No hemodynamically significant stenosis present.   •     Splenic Artery: No hemodynamically significant stenosis present.     Cardiac History    Diagnosis Date Comment Source   Hypertension        Social History      Tobacco Use    Never smoked or used smokeless tobacco.     Vaping Use    Never used        Hepatic Portal Measurements    Location PSV EDV   Hepatic A 90.3 cm/s       25.8 cm/s         Splenic A 142 cm/s       25.8 cm/s          Diameter    Portal V 1.5 cm           Length Width     Hepatic Portal Flow Characteristics    Location Direction Pattern Thrombus   Portal  V - Main Intrahepatic Hepatofugal            A          Portal V - Extrahepatic Hepatofugal                  Portal V - Rt Intrahepatic         A          Portal V - Lt Intrahepatic Hepatofugal                  Hepatic V - Rt Hepatofugal                  Hepatic V - Mid Hepatofugal                  Hepatic V - Lt Hepatofugal                  IVC  P                   Flow Pattern   P = Pulsatile   C = Continuous   D = Diminished     Spontaneous   Y = Yes   N = No   D = Diminished     Thrombus   A = Acute   S = Subacute   C = Chronic                Study Information    Physician Technologist Supporting Staff    Dylan Pro RVT            Active Hospital Problems    Diagnosis  POA   • **Hepatic encephalopathy (HCC) [K72.90]  Yes   • COVID-19 virus detected [U07.1]  Unknown   • Portal vein thrombosis [I81]  Unknown   • Hyperammonemia (HCC) [E72.20]  Yes   • Hypertension [I10]  Yes   • GERD (gastroesophageal reflux disease) [K21.9]  Yes   • Coagulopathy (HCC) [D68.9]  Yes   • 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     Problem Points:  4:  Patient has a new problem, with additional work-up planned  Total problem points:4 or more    Data Points:  1:  I have reviewed or order clinical lab test  1:  I have reviewed or order radiology test (except heart catheterization or echo)  2:  I have reviewed and summation of old records and/or discussed the patients care with another health care provider  Total data points:4 or more    Risk Points:  High:  Any illness that poses threat to life or body funciton    MDM Prob point Data point Risk   SF 1 1 Minimal   Low 2 2 Low   Mod 3 3 Moderate   High 4 4 High     Code MDM History Exam Time   40357 SF/Low Detailed Detailed 30   15426 Mod Comprehensive Comprehensive 50   93768 High Comprehensive Comprehensive 70     Detailed history:  4 elements HPI  or status of 3 chronic problems; 2-9 system ROS  Comprehensive:  4 elements HPI or status of 3 chronic problems;  10 system ROS    Detailed Exam:  12 findings from any organ system  Comprehensive Exam:  2 findings from each of 9 systems.     Billin    Assessment/Plan       Hepatic encephalopathy (HCC)    Alcoholic cirrhosis of liver with ascites (HCC)    Coagulopathy (HCC)    Secondary thrombocytopenia    Hypertension    GERD (gastroesophageal reflux disease)    Hyperammonemia (HCC)    Portal vein thrombosis    COVID-19 virus detected        66 y.o. male with a h/o alcoholism, cirrhosis with portal hypertension, HTN, and anemia who was admitted with hepatic encephalopathy on 22.  During this admission, he has been diagnosed with acute on chronic portal venous thrombosis. He has developed significant thrombocytopenia, so his anticoagulation has been held. We were consulted regarding intervention for portal venous thrombosis.    -he is not a candidate for surgical thrombectomy or thrombolysis of his acute on chronic portal venous thrombosis.    -I would agree with resuming anticoagulation when OK with hematology.  Noted that HIT antibody is pending and anticoagulation has been held due to thrombocytopenia.    -I discussed this patient with GI, Dr. Mcgee.  She will discuss with IR if they have any available intervention options  -vascular to sign off, we will see patient again upon request.    I discussed the patients findings and my recommendations with patient and Dr. Mcgee.  Please call my office with any question: [phone]    Frances Ngo MD  02/10/22  15:46 EST

## 2022-02-11 LAB
ALBUMIN SERPL-MCNC: 2 G/DL (ref 3.5–5.2)
ALBUMIN/GLOB SERPL: 0.5 G/DL
ALP SERPL-CCNC: 118 U/L (ref 39–117)
ALT SERPL W P-5'-P-CCNC: 36 U/L (ref 1–41)
AMMONIA BLD-SCNC: 85 UMOL/L (ref 16–60)
ANION GAP SERPL CALCULATED.3IONS-SCNC: 7.3 MMOL/L (ref 5–15)
ANISOCYTOSIS BLD QL: NORMAL
APTT SCREEN TO CONFIRM RATIO: 0.76 RATIO (ref 0–1.34)
AST SERPL-CCNC: 80 U/L (ref 1–40)
B2 GLYCOPROT1 IGA SER-ACNC: <9 GPI IGA UNITS (ref 0–25)
B2 GLYCOPROT1 IGG SER-ACNC: <9 GPI IGG UNITS (ref 0–20)
B2 GLYCOPROT1 IGM SER-ACNC: 11 GPI IGM UNITS (ref 0–32)
BASOPHILS # BLD AUTO: 0 10*3/MM3 (ref 0–0.2)
BASOPHILS NFR BLD AUTO: 0 % (ref 0–1.5)
BILIRUB SERPL-MCNC: 1.9 MG/DL (ref 0–1.2)
BUN SERPL-MCNC: 11 MG/DL (ref 8–23)
BUN/CREAT SERPL: 11.7 (ref 7–25)
CALCIUM SPEC-SCNC: 7.3 MG/DL (ref 8.6–10.5)
CHLORIDE SERPL-SCNC: 101 MMOL/L (ref 98–107)
CO2 SERPL-SCNC: 19.7 MMOL/L (ref 22–29)
CONFIRM APTT/NORMAL: 46.9 SEC (ref 0–47.6)
CREAT SERPL-MCNC: 0.94 MG/DL (ref 0.76–1.27)
DEPRECATED RDW RBC AUTO: 44.7 FL (ref 37–54)
EOSINOPHIL # BLD AUTO: 0.02 10*3/MM3 (ref 0–0.4)
EOSINOPHIL NFR BLD AUTO: 1.1 % (ref 0.3–6.2)
ERYTHROCYTE [DISTWIDTH] IN BLOOD BY AUTOMATED COUNT: 16 % (ref 12.3–15.4)
GFR SERPL CREATININE-BSD FRML MDRD: 81 ML/MIN/1.73
GLOBULIN UR ELPH-MCNC: 4 GM/DL
GLUCOSE SERPL-MCNC: 98 MG/DL (ref 65–99)
HCT VFR BLD AUTO: 24.5 % (ref 37.5–51)
HGB BLD-MCNC: 8.6 G/DL (ref 13–17.7)
INR PPP: 1.66 (ref 0.9–1.1)
JAK2 P.V617F BLD/T QL: NORMAL
LA 2 SCREEN W REFLEX-IMP: ABNORMAL
LAB DIRECTOR NAME PROVIDER: NORMAL
LABORATORY COMMENT REPORT: NORMAL
LYMPHOCYTES # BLD AUTO: 0.5 10*3/MM3 (ref 0.7–3.1)
LYMPHOCYTES NFR BLD AUTO: 27.3 % (ref 19.6–45.3)
MCH RBC QN AUTO: 27.7 PG (ref 26.6–33)
MCHC RBC AUTO-ENTMCNC: 35.1 G/DL (ref 31.5–35.7)
MCV RBC AUTO: 78.8 FL (ref 79–97)
MICROCYTES BLD QL: NORMAL
MONOCYTES # BLD AUTO: 0.41 10*3/MM3 (ref 0.1–0.9)
MONOCYTES NFR BLD AUTO: 22.4 % (ref 5–12)
NEUTROPHILS NFR BLD AUTO: 0.88 10*3/MM3 (ref 1.7–7)
NEUTROPHILS NFR BLD AUTO: 48.1 % (ref 42.7–76)
OVALOCYTES BLD QL SMEAR: NORMAL
PLAT MORPH BLD: NORMAL
PLATELET # BLD AUTO: 36 10*3/MM3 (ref 140–450)
PMV BLD AUTO: 11 FL (ref 6–12)
POTASSIUM SERPL-SCNC: 4 MMOL/L (ref 3.5–5.2)
PROT SERPL-MCNC: 6 G/DL (ref 6–8.5)
PROTHROMBIN TIME: 19.6 SECONDS (ref 11.7–14.2)
RBC # BLD AUTO: 3.11 10*6/MM3 (ref 4.14–5.8)
SCREEN APTT: 50.7 SEC (ref 0–51.9)
SCREEN DRVVT: 36.9 SEC (ref 0–47)
SODIUM SERPL-SCNC: 128 MMOL/L (ref 136–145)
THROMBIN TIME: 27.6 SEC (ref 0–23)
WBC MORPH BLD: NORMAL
WBC NRBC COR # BLD: 1.83 10*3/MM3 (ref 3.4–10.8)

## 2022-02-11 PROCEDURE — 99233 SBSQ HOSP IP/OBS HIGH 50: CPT | Performed by: INTERNAL MEDICINE

## 2022-02-11 PROCEDURE — 36415 COLL VENOUS BLD VENIPUNCTURE: CPT | Performed by: HOSPITALIST

## 2022-02-11 PROCEDURE — 85610 PROTHROMBIN TIME: CPT | Performed by: STUDENT IN AN ORGANIZED HEALTH CARE EDUCATION/TRAINING PROGRAM

## 2022-02-11 PROCEDURE — 82140 ASSAY OF AMMONIA: CPT | Performed by: HOSPITALIST

## 2022-02-11 PROCEDURE — 85025 COMPLETE CBC W/AUTO DIFF WBC: CPT | Performed by: STUDENT IN AN ORGANIZED HEALTH CARE EDUCATION/TRAINING PROGRAM

## 2022-02-11 PROCEDURE — 85007 BL SMEAR W/DIFF WBC COUNT: CPT | Performed by: STUDENT IN AN ORGANIZED HEALTH CARE EDUCATION/TRAINING PROGRAM

## 2022-02-11 PROCEDURE — 99232 SBSQ HOSP IP/OBS MODERATE 35: CPT | Performed by: INTERNAL MEDICINE

## 2022-02-11 PROCEDURE — 80053 COMPREHEN METABOLIC PANEL: CPT | Performed by: STUDENT IN AN ORGANIZED HEALTH CARE EDUCATION/TRAINING PROGRAM

## 2022-02-11 RX ORDER — FUROSEMIDE 40 MG/1
40 TABLET ORAL DAILY
Qty: 30 TABLET | Refills: 2 | Status: SHIPPED | OUTPATIENT
Start: 2022-02-11 | End: 2022-09-13

## 2022-02-11 RX ADMIN — RIFAXIMIN 550 MG: 550 TABLET ORAL at 10:37

## 2022-02-11 RX ADMIN — SPIRONOLACTONE 50 MG: 50 TABLET, FILM COATED ORAL at 10:37

## 2022-02-11 RX ADMIN — DOCUSATE SODIUM 50MG AND SENNOSIDES 8.6MG 1 TABLET: 8.6; 5 TABLET, FILM COATED ORAL at 10:37

## 2022-02-11 RX ADMIN — SULFAMETHOXAZOLE AND TRIMETHOPRIM 1 TABLET: 400; 80 TABLET ORAL at 10:37

## 2022-02-11 RX ADMIN — RIFAXIMIN 550 MG: 550 TABLET ORAL at 20:28

## 2022-02-11 RX ADMIN — LACTULOSE 10 G: 10 SOLUTION ORAL at 10:37

## 2022-02-11 RX ADMIN — LACTULOSE 10 G: 10 SOLUTION ORAL at 18:02

## 2022-02-11 RX ADMIN — FUROSEMIDE 40 MG: 40 TABLET ORAL at 10:37

## 2022-02-11 RX ADMIN — NADOLOL 20 MG: 20 TABLET ORAL at 10:37

## 2022-02-11 RX ADMIN — SODIUM CHLORIDE, PRESERVATIVE FREE 10 ML: 5 INJECTION INTRAVENOUS at 20:28

## 2022-02-11 RX ADMIN — LACTULOSE 10 G: 10 SOLUTION ORAL at 20:28

## 2022-02-11 RX ADMIN — PANTOPRAZOLE SODIUM 40 MG: 40 TABLET, DELAYED RELEASE ORAL at 10:37

## 2022-02-11 NOTE — PROGRESS NOTES
Subjective     HISTORY OF PRESENT ILLNESS:   No acute issues overnight. Denies any bleed or bruise. His mentation appears to be clear.  Afebrile    Past Medical History, Past Surgical History, Social History, Family History have been reviewed and are without significant changes except as mentioned.    Review of Systems   A comprehensive 14 point review of systems was performed and was negative except as mentioned.    Medications:  The current medication list was reviewed in the EMR    ALLERGIES:    Allergies   Allergen Reactions   • Iron GI Intolerance       Objective      Vitals:    02/09/22 2000 02/10/22 0017 02/10/22 0723 02/10/22 2342   BP: 119/73 97/53 114/66 96/53   BP Location: Right arm Right arm Right arm Right arm   Patient Position: Lying Lying Lying Lying   Pulse: 69 74 71 63   Resp: 18 18 18 18   Temp: 98.7 °F (37.1 °C) 99.5 °F (37.5 °C) 99.6 °F (37.6 °C) 99.2 °F (37.3 °C)   TempSrc: Oral Oral Oral Oral   SpO2: 100% 99% 98%    Weight:       Height:         No flowsheet data found.    Physical Exam    CONSTITUTIONAL:  Vital signs reviewed.  No distress, looks comfortable.  EYES:  Conjunctiva and lids unremarkable.    EARS,NOSE,MOUTH,THROAT:  Ears and nose appear unremarkable.  Lips, teeth, gums appear unremarkable.  RESPIRATORY:  Normal respiratory effort.    CARDIOVASCULAR:  Normal heart rate. No significant lower extremity edema.  GASTROINTESTINAL: Abdomen appears unremarkable.  Nontender. Mild distension  LYMPHATIC:  No cervical, supraclavicular lymphadenopathy.  SKIN:  Warm.  No rashes.  PSYCHIATRIC:  Normal judgment and insight.  Normal mood and affect.  NEURO: AAO x 3, no focal deficits  RECENT LABS:  Hematology WBC   Date Value Ref Range Status   02/11/2022 1.83 (C) 3.40 - 10.80 10*3/mm3 Final     RBC   Date Value Ref Range Status   02/11/2022 3.11 (L) 4.14 - 5.80 10*6/mm3 Final     Hemoglobin   Date Value Ref Range Status   02/11/2022 8.6 (L) 13.0 - 17.7 g/dL Final     Hematocrit   Date Value  Ref Range Status   02/11/2022 24.5 (L) 37.5 - 51.0 % Final     Platelets   Date Value Ref Range Status   02/11/2022 36 (C) 140 - 450 10*3/mm3 Final          Assessment/Plan    is a pleasant 67 y/o WM with medical issues comprising alcoholic liver disease, cirrhosis, esophageal varices s/p banding recurrent ascites & HTN who was admitted on 2/4/22 with hepatic encephalopathy. Hematology has been consulted for portal vein evaluation & management.      # Portal vein thrombosis:  - Acute on chronic. MRI abdomen from 1/7/22 done at [facility] had demonstrated peripheral, chronic nonocclusive thrombus of the main portal vein. The doppler from 2/6/22 shows acute thrombus in the main and right intrahepatic portal veins.   - This is likely due to altered coagulation due to hepatic dysfunction.  No prior history of VTEs. No family history of thrombosis.   - However given unusual location, hypercoagulable state work up was sent on 2/7/22.   -  Factor V leiden mutation negative. AT-III activity low (likely due to liver dysfunction). Prothrombin gene mutation, JAK2 mutation, P[facility] flow cytometry, & APLS pending. Will defer protein C & S as levels would be altered.   - Patient has chronic thrombocytopenia with platelets in 40-50,000s range. The CBC from 2/7/22 shows platelet count of 40,000. No c/o bleed or bruise.   - Given risk for intestinal infarction with PV thrombus and likely prothrombotic state,  started him on low dose lovenox on 2/7/22.   - Tolerating well.   - Platelets remain low, dropped from 42,000 on 2/8/22 to 36,000 on 2/9 to 34,000 on 2/10.   - 2/11: Seen by vascular surgery. No interventions planned. Platelet slightly improved to 36,000 today. Await HIT Ab. If HIT Ab negative & platelets continue to improve, will consider resuming lovenox.      # Thrombocytopenia:  - Acute on chronic. Likely liver disease related.   - No bleed/bruise    # Leukopenia:  Likely liver disease related.   Monitor     # Hepatic  encephalopathy: Per GI  # Decompensated liver cirrhosis  # Ascites     RECOMMENDATIONS:  - Portal vein thrombus likely liver disease related.   - Hypercoagulable work ups: Factor V leiden mutation negative. AT-III & Factor II activity low (likely due to liver dysfunction). Mildly elevated ACL, IgM (unclear significance). LA & b2-GP negative.  JAK2 mutation, & PNH flow cytometry  -  Seen by vascular surgery. No interventions planned.   - Platelet slightly improved to 36,000 today. Await HIT Ab. If HIT Ab negative & platelets continue to improve, will consider resuming lovenox.     - Monitor while off of anticoagulation.       2/11/2022      CC:

## 2022-02-11 NOTE — TELEPHONE ENCOUNTER
Scheduled hospital follow up with Dr Jeronimo June 24 arrive at 230 pm at [address] [facility] [address] office phone [phone] option 4 PT CONFIRMED

## 2022-02-11 NOTE — PROGRESS NOTES
Name: Wei Potts ADMIT: 2022   : 1955  PCP: Bella Villalta MD    MRN: [id number] LOS: 4 days   AGE/SEX: 66 y.o. male  ROOM: [facility]     Subjective   Subjective   Patient seen this morning.  No changes.  Denies abdominal pain, fevers, chills.    Review of Systems   As above  Objective   Objective   Vital Signs  Temp:  [99.2 °F (37.3 °C)] 99.2 °F (37.3 °C)  Heart Rate:  [63] 63  Resp:  [18] 18  BP: (96)/(53) 96/53     on   ;   Device (Oxygen Therapy): room air  Body mass index is 22.39 kg/m².  Physical Exam    General: Alert and oriented x3, no acute distress  HEENT: Normocephalic, atraumatic  CV: Regular rate and rhythm, no murmurs rubs or gallops  Lungs: Clear to auscultation bilaterally, no crackles or wheezes  Abdomen: Soft, nontender, distended.  Normoactive bowel sounds.  Extremities: No significant peripheral edema , no cyanosis     Results Review     I reviewed the patient's new clinical results.  Results from last 7 days   Lab Units 22  0650 02/10/22  0510 22  0804 22  0401   WBC 10*3/mm3 1.83* 1.99* 1.98* 1.90*   HEMOGLOBIN g/dL 8.6* 8.7* 8.6* 8.8*   PLATELETS 10*3/mm3 36* 34* 36* 42*     Results from last 7 days   Lab Units 22  0650 02/10/22  0510 22  0804 22  0401   SODIUM mmol/L 128* 129* 130* 136   POTASSIUM mmol/L 4.0 4.0 4.0 4.0   CHLORIDE mmol/L 101 101 102 105   CO2 mmol/L 19.7* 18.6* 18.1* 18.4*   BUN mg/dL 11 10 10 10   CREATININE mg/dL 0.94 0.88 0.94 0.88   GLUCOSE mg/dL 98 159* 150* 101*   Estimated Creatinine Clearance: 81.7 mL/min (by C-G formula based on SCr of 0.94 mg/dL).  Results from last 7 days   Lab Units 22  0650 02/10/22  0510 22  0804 22  0401   ALBUMIN g/dL 2.00* 1.90* 1.90* 2.10*   BILIRUBIN mg/dL 1.9* 1.4* 1.4* 1.3*   ALK PHOS U/L 118* 142* 150* 186*   AST (SGOT) U/L 80* 79* 81* 83*   ALT (SGPT) U/L 36 34 34 36     Results from last 7 days   Lab Units 22  0650 02/10/22  0510 22  0804  02/08/22  0401   CALCIUM mg/dL 7.3* 7.4* 7.6* 7.9*   ALBUMIN g/dL 2.00* 1.90* 1.90* 2.10*     Results from last 7 days   Lab Units 02/10/22  0510 02/09/22  0804   PROCALCITONIN ng/mL 0.09 0.14     COVID19   Date Value Ref Range Status   02/04/2022 Detected (C) Not Detected - Ref. Range Final   01/01/2021 Not Detected Not Detected - Ref. Range Final     No results found for: HGBA1C, POCGLU    Duplex Portal Hepatic Complete CAR  · Main Portal Vein: acute thrombus present.  · Right Intrahepatic Portal Vein: acute thrombus present.  · Extrahepatic Portal Vein: abnormal, hepatofugal flow direction present.  · Left Intrahepatic Portal Vein: abnormal, hepatofugal flow direction   present.       Scheduled Medications  acetaminophen, 650 mg, Oral, Once  furosemide, 40 mg, Oral, Daily  lactulose, 10 g, Oral, TID  nadolol, 20 mg, Oral, Q24H  pantoprazole, 40 mg, Oral, QAM  rifAXIMin, 550 mg, Oral, Q12H  senna-docusate sodium, 1 tablet, Oral, Daily  sodium chloride, 10 mL, Intravenous, Q12H  spironolactone, 50 mg, Oral, Daily  sulfamethoxazole-trimethoprim, 1 tablet, Oral, Q24H  zinc sulfate, 220 mg, Oral, Daily    Infusions   Diet  Diet Regular; Low Sodium; 2,000 mg Na       Assessment/Plan     Active Hospital Problems    Diagnosis  POA   • **Hepatic encephalopathy (HCC) [K72.90]  Yes   • COVID-19 virus detected [U07.1]  Unknown   • Portal vein thrombosis [I81]  Unknown   • Hyperammonemia (HCC) [E72.20]  Yes   • Hypertension [I10]  Yes   • GERD (gastroesophageal reflux disease) [K21.9]  Yes   • Coagulopathy (HCC) [D68.9]  Yes   • 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        is a pleasant 67 y/o male with PMH of alcoholic liver disease, cirrhosis, esophageal varices s/p banding recurrent ascites & HTN who was admitted on 2/4/22 with hepatic encephalopathy.     #Hepatic  encephalopathy: Improved.  Alert oriented x3.  Continue lactulose, rifaximin.  Docusate senna.  Titrate to 3-4 bowel movements a day.    #Alcoholic cirrhosis of the liver decompensated by ascites and esophageal varices.  On spironolactone and Lasix.  Low-sodium diet.  Beta-blocker for for bleeding prophylaxis.   Bactrim restarted for history of SBP 02/10    #Portal vein thrombosis.  On prophylactic Lovenox, hematology following.  Lovenox discontinued 02/10 due to platelets trending down.  Hypercoagulable work-up.  Discussed with Dr. Espino. HIT panel  pending. -Vascular surgery consulted-not a candidate for intervention.  -Discussed with hematology, continue to monitor off anticoagulation and trend CBC daily.  Follow-up HIT panel  If negative,may restart Lovenox if platelets remains stable.     #Thrombocytopenia:   Likely secondary to cirrhosis.   No signs of bleeding.    Platelets remain stable around 35.  HIT panel pending.   -Off anticoagulation.    #Leukopenia/neutropenia..  Monitor daily.  Currently on Bactrim for history of SBP.  Low threshold to start broad-spectrum antibiotics if infection signs.    H#yponatremia: 2/2 to cirrhosis.  Daily BMP.    COVID 19 positive: Asymptomatic, remains on room air, Continue to monitor, no indication for treatment.       DVT prophylaxis: Lovenox  Full code.  Disposition: Home with spouse and home health when cleared by consultants.      Joselito Marr MD  [facility]  02/11/22  14:06 EST

## 2022-02-11 NOTE — PROGRESS NOTES
[facility]  Inpatient Progress Note    Reason for Follow Up:  Hepatic encephalopathy, cirrhosis, ascites, portal vein thrombosis    Subjective     Interval History:   He reports 3 bowel movements overnight.  Appetite is good.  No abdominal pain or blood in the stool    Current Facility-Administered Medications:   •  acetaminophen (TYLENOL) tablet 650 mg, 650 mg, Oral, Once, Wes Matos, APRN  •  furosemide (LASIX) tablet 40 mg, 40 mg, Oral, Daily, Jer Serrano MD, 40 mg at 02/11/22 1037  •  lactulose (CHRONULAC) 10 GM/15ML solution 10 g, 10 g, Oral, TID, Jer Serrano MD, 10 g at 02/11/22 1037  •  nadolol (CORGARD) tablet 20 mg, 20 mg, Oral, Q24H, Ruby Mcgee MD, 20 mg at 02/11/22 1037  •  ondansetron (ZOFRAN) tablet 4 mg, 4 mg, Oral, Q6H PRN **OR** ondansetron (ZOFRAN) injection 4 mg, 4 mg, Intravenous, Q6H PRN, Jer Serrano MD, 4 mg at 02/04/22 2159  •  pantoprazole (PROTONIX) EC tablet 40 mg, 40 mg, Oral, QAM, Jer Serrano MD, 40 mg at 02/11/22 1037  •  riFAXIMin (XIFAXAN) tablet 550 mg, 550 mg, Oral, Q12H, Jer Serrano MD, 550 mg at 02/11/22 1037  •  sennosides-docusate (PERICOLACE) 8.6-50 MG per tablet 1 tablet, 1 tablet, Oral, Daily, Ruby Mcgee MD, 1 tablet at 02/11/22 1037  •  sodium chloride 0.9 % flush 10 mL, 10 mL, Intravenous, Q12H, Jer Serrano MD, 10 mL at 02/10/22 2052  •  sodium chloride 0.9 % flush 10 mL, 10 mL, Intravenous, PRN, Jer Serrano MD  •  spironolactone (ALDACTONE) tablet 50 mg, 50 mg, Oral, Daily, Jer Serrano MD, 50 mg at 02/11/22 1037  •  sulfamethoxazole-trimethoprim (BACTRIM,SEPTRA) 400-80 MG tablet 1 tablet, 1 tablet, Oral, Q24H, Ruby Mcgee MD, 1 tablet at 02/11/22 1037  •  zinc sulfate (ZINCATE) capsule 220 mg, 220 mg, Oral, Daily, Jer Serrano MD, 220 mg at 02/07/22 0814  Review of Systems:    Negative for abdominal pain or nausea, negative for blood in stool, negative for confusion, negative for fevers or  chills    Objective     Vital Signs  Temp:  [99.2 °F (37.3 °C)] 99.2 °F (37.3 °C)  Heart Rate:  [63] 63  Resp:  [18] 18  BP: (96)/(53) 96/53  Body mass index is 22.39 kg/m².  No intake or output data in the 24 hours ending 02/11/22 1307  No intake/output data recorded.     Physical Exam:   General: patient awake, alert and cooperative   Eyes: Normal lids and lashes, no scleral icterus   Neck: supple, normal ROM   Skin: warm and dry, not jaundiced   Pulm:  regular and unlabored   Abdomen: soft, nontender, distended but not tense; normal bowel sounds   Extremities: no rash or edema   Psychiatric: Normal mood and behavior; memory intact     Results Review:     I reviewed the patient's new clinical results.    Results from last 7 days   Lab Units 02/11/22  0650 02/10/22  0510 02/09/22  0804   WBC 10*3/mm3 1.83* 1.99* 1.98*   HEMOGLOBIN g/dL 8.6* 8.7* 8.6*   HEMATOCRIT % 24.5* 27.0* 24.9*   PLATELETS 10*3/mm3 36* 34* 36*     Results from last 7 days   Lab Units 02/11/22  0650 02/10/22  0510 02/09/22  0804   SODIUM mmol/L 128* 129* 130*   POTASSIUM mmol/L 4.0 4.0 4.0   CHLORIDE mmol/L 101 101 102   CO2 mmol/L 19.7* 18.6* 18.1*   BUN mg/dL 11 10 10   CREATININE mg/dL 0.94 0.88 0.94   CALCIUM mg/dL 7.3* 7.4* 7.6*   BILIRUBIN mg/dL 1.9* 1.4* 1.4*   ALK PHOS U/L 118* 142* 150*   ALT (SGPT) U/L 36 34 34   AST (SGOT) U/L 80* 79* 81*   GLUCOSE mg/dL 98 159* 150*     Results from last 7 days   Lab Units 02/11/22  0650 02/10/22  0510 02/07/22  0526   INR  1.66* 1.70* 2.07*     Lab Results   Lab Value Date/Time    LIPASE 79 (H) 02/04/2022 0906    LIPASE 36 05/07/2021 1248    LIPASE 68 (H) 01/01/2021 2125    LIPASE 133 (H) 07/22/2020 1759    LIPASE 59 07/07/2020 0633    LIPASE 65 (H) 07/06/2020 1630       Radiology:  US Abdomen Limited   Final Result   1.  Cirrhosis with findings of portosystemic shunting including mild to   moderate splenomegaly and ascites. There also appears to be hepatofugal   in the main portal vein with  concern for thrombus formation as well.   Further evaluation with Doppler sonogram of the portal vein is   recommended. This was discussed with Shavon, the patient's nurse, at   8:30 PM 02/05/2022   2.  Thickened appearance the gallbladder likely related to adjacent   liver disease, as before.   3.  Other findings as above.       This report was finalized on 2/5/2022 8:32 PM by Dr. Jason Maki M.D.              Assessment/Plan     Patient Active Problem List   Diagnosis   • ED (erectile dysfunction) of organic origin   • Alcoholic cirrhosis of liver with ascites (HCC)   • Anemia   • Jaundice   • Coagulopathy (HCC)   • Secondary thrombocytopenia   • Lung abnormality- left apex   • Hepatic encephalopathy (HCC)   • Sepsis (HCC)   • Colitis   • Hypertension   • GERD (gastroesophageal reflux disease)   • SBP (spontaneous bacterial peritonitis) (HCC)   • Hyperammonemia (HCC)   • Portal vein thrombosis   • COVID-19 virus detected       Assessment:  1. Hepatic encephalopathy  2. Cirrhosis of the liver  3. Portal vein thrombosis  4. Ascites  5. GERD  6.  Esophageal varices - no prior bleeding- banded 4/21 and 6/21  7.  History of SBP-daily antibiotic prophylaxis  8.  Covid positive  9. Thrombocytopenia      Plan:  · Continue nadolol (replaced metoprolol) for esophageal variceal prophylaxis    · H/h stable   · Continue Senokot daily and lactulose titrated to 3-4 bowel movements daily  · Continue xifaxan, okay to hold zinc zinc  · No previous variceal bleed but has been banded and now with need for AC - ideally would have repeated egd to reeval for need for additional banding before starting AC.  Unfortunately patient did not f/u this past summer for repeat egd as recommended by UL.  Cont BB for bleeding prophlyaxis and would use lowest possible dose of AC with close monitoring of h/h  · Continue daily Bactrim due to history of SBP  · HIT antibody pending-hematology notes reviewed.  Plans to resume Lovenox if this is  negative given stability of platelets.  · Discussed TIPS procedure with thrombectomy with Dr. Ngo yesterday as well as the patient today.  Even if this was technically feasible, and I am not sure that it is, encephalopathy is a real concern.  He is a highly functioning gentleman and despite his decompensated cirrhosis, he continues to work to support his family.  He struggles with encephalopathy presently and this in all likelihood would worsen after a TIPS and realistically could have a significant impact on his quality of life and ability to continue at a high fully functioning level.  I think this would be a last resort  · Current meld is 22 points-median meld in [place] most recently was 26.  Will need to get transplant opinion regarding his situation of anticoagulation is not an option    I discussed the patients findings and my recommendations with patient.    Ruby Mcgee MD

## 2022-02-12 LAB
ALBUMIN SERPL-MCNC: 2.3 G/DL (ref 3.5–5.2)
ALBUMIN/GLOB SERPL: 0.6 G/DL
ALP SERPL-CCNC: 129 U/L (ref 39–117)
ALT SERPL W P-5'-P-CCNC: 37 U/L (ref 1–41)
AMMONIA BLD-SCNC: 59 UMOL/L (ref 16–60)
ANION GAP SERPL CALCULATED.3IONS-SCNC: 11 MMOL/L (ref 5–15)
ANISOCYTOSIS BLD QL: ABNORMAL
AST SERPL-CCNC: 92 U/L (ref 1–40)
BILIRUB SERPL-MCNC: 1.8 MG/DL (ref 0–1.2)
BUN SERPL-MCNC: 12 MG/DL (ref 8–23)
BUN/CREAT SERPL: 11.9 (ref 7–25)
CALCIUM SPEC-SCNC: 7.3 MG/DL (ref 8.6–10.5)
CHLORIDE SERPL-SCNC: 100 MMOL/L (ref 98–107)
CO2 SERPL-SCNC: 18 MMOL/L (ref 22–29)
CREAT SERPL-MCNC: 1.01 MG/DL (ref 0.76–1.27)
DEPRECATED RDW RBC AUTO: 45.8 FL (ref 37–54)
ERYTHROCYTE [DISTWIDTH] IN BLOOD BY AUTOMATED COUNT: 16.4 % (ref 12.3–15.4)
FERRITIN SERPL-MCNC: 103 NG/ML (ref 30–400)
GFR SERPL CREATININE-BSD FRML MDRD: 74 ML/MIN/1.73
GLOBULIN UR ELPH-MCNC: 4.1 GM/DL
GLUCOSE SERPL-MCNC: 88 MG/DL (ref 65–99)
HCT VFR BLD AUTO: 23.9 % (ref 37.5–51)
HGB BLD-MCNC: 8.5 G/DL (ref 13–17.7)
HYPOCHROMIA BLD QL: ABNORMAL
INR PPP: 1.64 (ref 0.9–1.1)
IRON 24H UR-MRATE: 51 MCG/DL (ref 59–158)
IRON SATN MFR SERPL: 22 % (ref 20–50)
LYMPHOCYTES # BLD MANUAL: 0.12 10*3/MM3 (ref 0.7–3.1)
LYMPHOCYTES NFR BLD MANUAL: 14.9 % (ref 5–12)
MCH RBC QN AUTO: 27.6 PG (ref 26.6–33)
MCHC RBC AUTO-ENTMCNC: 35.6 G/DL (ref 31.5–35.7)
MCV RBC AUTO: 77.6 FL (ref 79–97)
MICROCYTES BLD QL: ABNORMAL
MONOCYTES # BLD: 0.35 10*3/MM3 (ref 0.1–0.9)
NEUTROPHILS # BLD AUTO: 1.85 10*3/MM3 (ref 1.7–7)
NEUTROPHILS NFR BLD MANUAL: 79.8 % (ref 42.7–76)
PF4 HEPARIN CMPLX IGG SERPL IA: 0.18 OD (ref 0–0.4)
PLAT MORPH BLD: NORMAL
PLATELET # BLD AUTO: 38 10*3/MM3 (ref 140–450)
PMV BLD AUTO: 12 FL (ref 6–12)
POIKILOCYTOSIS BLD QL SMEAR: ABNORMAL
POLYCHROMASIA BLD QL SMEAR: ABNORMAL
POTASSIUM SERPL-SCNC: 4.1 MMOL/L (ref 3.5–5.2)
PROCALCITONIN SERPL-MCNC: 0.2 NG/ML (ref 0–0.25)
PROT SERPL-MCNC: 6.4 G/DL (ref 6–8.5)
PROTHROMBIN TIME: 19.4 SECONDS (ref 11.7–14.2)
RBC # BLD AUTO: 3.08 10*6/MM3 (ref 4.14–5.8)
SODIUM SERPL-SCNC: 129 MMOL/L (ref 136–145)
TIBC SERPL-MCNC: 232 MCG/DL (ref 298–536)
TRANSFERRIN SERPL-MCNC: 156 MG/DL (ref 200–360)
VARIANT LYMPHS NFR BLD MANUAL: 5.3 % (ref 19.6–45.3)
WBC MORPH BLD: NORMAL
WBC NRBC COR # BLD: 2.32 10*3/MM3 (ref 3.4–10.8)

## 2022-02-12 PROCEDURE — 84466 ASSAY OF TRANSFERRIN: CPT | Performed by: STUDENT IN AN ORGANIZED HEALTH CARE EDUCATION/TRAINING PROGRAM

## 2022-02-12 PROCEDURE — 85610 PROTHROMBIN TIME: CPT | Performed by: STUDENT IN AN ORGANIZED HEALTH CARE EDUCATION/TRAINING PROGRAM

## 2022-02-12 PROCEDURE — 83540 ASSAY OF IRON: CPT | Performed by: STUDENT IN AN ORGANIZED HEALTH CARE EDUCATION/TRAINING PROGRAM

## 2022-02-12 PROCEDURE — 99232 SBSQ HOSP IP/OBS MODERATE 35: CPT | Performed by: INTERNAL MEDICINE

## 2022-02-12 PROCEDURE — 85025 COMPLETE CBC W/AUTO DIFF WBC: CPT | Performed by: STUDENT IN AN ORGANIZED HEALTH CARE EDUCATION/TRAINING PROGRAM

## 2022-02-12 PROCEDURE — 85007 BL SMEAR W/DIFF WBC COUNT: CPT | Performed by: STUDENT IN AN ORGANIZED HEALTH CARE EDUCATION/TRAINING PROGRAM

## 2022-02-12 PROCEDURE — 82140 ASSAY OF AMMONIA: CPT | Performed by: HOSPITALIST

## 2022-02-12 PROCEDURE — 82728 ASSAY OF FERRITIN: CPT | Performed by: STUDENT IN AN ORGANIZED HEALTH CARE EDUCATION/TRAINING PROGRAM

## 2022-02-12 PROCEDURE — 99233 SBSQ HOSP IP/OBS HIGH 50: CPT | Performed by: INTERNAL MEDICINE

## 2022-02-12 PROCEDURE — 84145 PROCALCITONIN (PCT): CPT | Performed by: STUDENT IN AN ORGANIZED HEALTH CARE EDUCATION/TRAINING PROGRAM

## 2022-02-12 PROCEDURE — 80053 COMPREHEN METABOLIC PANEL: CPT | Performed by: STUDENT IN AN ORGANIZED HEALTH CARE EDUCATION/TRAINING PROGRAM

## 2022-02-12 RX ORDER — LACTULOSE 10 G/15ML
20 SOLUTION ORAL 3 TIMES DAILY
Status: DISCONTINUED | OUTPATIENT
Start: 2022-02-12 | End: 2022-02-14 | Stop reason: HOSPADM

## 2022-02-12 RX ORDER — MULTIPLE VITAMINS W/ MINERALS TAB 9MG-400MCG
1 TAB ORAL DAILY
Status: DISCONTINUED | OUTPATIENT
Start: 2022-02-12 | End: 2022-02-14 | Stop reason: HOSPADM

## 2022-02-12 RX ORDER — MIDODRINE HYDROCHLORIDE 2.5 MG/1
2.5 TABLET ORAL EVERY 8 HOURS
Status: DISCONTINUED | OUTPATIENT
Start: 2022-02-12 | End: 2022-02-14 | Stop reason: HOSPADM

## 2022-02-12 RX ADMIN — MIDODRINE HYDROCHLORIDE 2.5 MG: 2.5 TABLET ORAL at 19:54

## 2022-02-12 RX ADMIN — SULFAMETHOXAZOLE AND TRIMETHOPRIM 1 TABLET: 400; 80 TABLET ORAL at 09:04

## 2022-02-12 RX ADMIN — LACTULOSE 20 G: 10 SOLUTION ORAL at 16:08

## 2022-02-12 RX ADMIN — RIFAXIMIN 550 MG: 550 TABLET ORAL at 09:04

## 2022-02-12 RX ADMIN — MULTIPLE VITAMINS W/ MINERALS TAB 1 TABLET: TAB at 11:01

## 2022-02-12 RX ADMIN — SODIUM CHLORIDE, PRESERVATIVE FREE 10 ML: 5 INJECTION INTRAVENOUS at 20:02

## 2022-02-12 RX ADMIN — DOCUSATE SODIUM 50MG AND SENNOSIDES 8.6MG 1 TABLET: 8.6; 5 TABLET, FILM COATED ORAL at 09:04

## 2022-02-12 RX ADMIN — MIDODRINE HYDROCHLORIDE 2.5 MG: 2.5 TABLET ORAL at 11:01

## 2022-02-12 RX ADMIN — LACTULOSE 20 G: 10 SOLUTION ORAL at 09:04

## 2022-02-12 RX ADMIN — LACTULOSE 20 G: 10 SOLUTION ORAL at 20:02

## 2022-02-12 RX ADMIN — RIFAXIMIN 550 MG: 550 TABLET ORAL at 20:02

## 2022-02-12 RX ADMIN — PANTOPRAZOLE SODIUM 40 MG: 40 TABLET, DELAYED RELEASE ORAL at 06:27

## 2022-02-12 RX ADMIN — SPIRONOLACTONE 50 MG: 50 TABLET, FILM COATED ORAL at 09:04

## 2022-02-12 RX ADMIN — NADOLOL 20 MG: 20 TABLET ORAL at 09:04

## 2022-02-12 RX ADMIN — FUROSEMIDE 40 MG: 40 TABLET ORAL at 09:04

## 2022-02-12 RX ADMIN — SODIUM CHLORIDE, PRESERVATIVE FREE 10 ML: 5 INJECTION INTRAVENOUS at 09:04

## 2022-02-12 NOTE — PROGRESS NOTES
[facility]  Inpatient Progress Note    Reason for Follow Up:  Hepatic encephalopathy, cirrhosis, ascites, portal vein thrombosis    Subjective     Interval History:   Only had 2 nonbloody BM's yesterday. NO c/o.    Current Facility-Administered Medications:   •  acetaminophen (TYLENOL) tablet 650 mg, 650 mg, Oral, Once, Wes Matos, APRN  •  furosemide (LASIX) tablet 40 mg, 40 mg, Oral, Daily, Jer Serrano MD, 40 mg at 02/11/22 1037  •  lactulose (CHRONULAC) 10 GM/15ML solution 20 g, 20 g, Oral, TID, Maurice Rodriguez MD  •  nadolol (CORGARD) tablet 20 mg, 20 mg, Oral, Q24H, Ruby Mcgee MD, 20 mg at 02/11/22 1037  •  ondansetron (ZOFRAN) tablet 4 mg, 4 mg, Oral, Q6H PRN **OR** ondansetron (ZOFRAN) injection 4 mg, 4 mg, Intravenous, Q6H PRN, Jer Serrano MD, 4 mg at 02/04/22 2159  •  pantoprazole (PROTONIX) EC tablet 40 mg, 40 mg, Oral, QAM, Jer Serrano MD, 40 mg at 02/12/22 0627  •  riFAXIMin (XIFAXAN) tablet 550 mg, 550 mg, Oral, Q12H, Jer Serrano MD, 550 mg at 02/11/22 2028  •  sennosides-docusate (PERICOLACE) 8.6-50 MG per tablet 1 tablet, 1 tablet, Oral, Daily, Ruby Mcgee MD, 1 tablet at 02/11/22 1037  •  sodium chloride 0.9 % flush 10 mL, 10 mL, Intravenous, Q12H, Jer Serrano MD, 10 mL at 02/11/22 2028  •  sodium chloride 0.9 % flush 10 mL, 10 mL, Intravenous, PRN, Jer Serrano MD  •  spironolactone (ALDACTONE) tablet 50 mg, 50 mg, Oral, Daily, Jer Serrano MD, 50 mg at 02/11/22 1037  •  sulfamethoxazole-trimethoprim (BACTRIM,SEPTRA) 400-80 MG tablet 1 tablet, 1 tablet, Oral, Q24H, Ruby Mcgee MD, 1 tablet at 02/11/22 1037  •  zinc sulfate (ZINCATE) capsule 220 mg, 220 mg, Oral, Daily, Jer Serrano MD, 220 mg at 02/07/22 0814  Review of Systems:    The following systems were reviewed and negative;  constitution, ENT, respiratory, cardiovascular and musculoskeletal    Objective     Vital Signs  Temp:  [97.7 °F (36.5 °C)-98.6 °F (37 °C)] 98.6  °F (37 °C)  Heart Rate:  [67-77] 67  Resp:  [18] 18  BP: ()/(48-72) 98/51  Body mass index is 22.39 kg/m².  No intake or output data in the 24 hours ending 02/12/22 0851  No intake/output data recorded.     Physical Exam:   General: patient awake, alert and cooperative   Eyes: Normal lids and lashes, no scleral icterus   Neck: supple, normal ROM   Skin: warm and dry, not jaundiced   Cardiovascular: regular rhythm and rate, no murmurs auscultated   Pulm: clear to auscultation bilaterally, regular and unlabored   Abdomen: soft, nontender, distended; normal bowel sounds   Extremities: no rash or edema   Psychiatric: Normal mood and behavior; memory intact     Results Review:     I reviewed the patient's new clinical results.    Results from last 7 days   Lab Units 02/12/22  0401 02/11/22  0650 02/10/22  0510   WBC 10*3/mm3 2.32* 1.83* 1.99*   HEMOGLOBIN g/dL 8.5* 8.6* 8.7*   HEMATOCRIT % 23.9* 24.5* 27.0*   PLATELETS 10*3/mm3 38* 36* 34*     Results from last 7 days   Lab Units 02/12/22  0401 02/11/22  0650 02/10/22  0510   SODIUM mmol/L 129* 128* 129*   POTASSIUM mmol/L 4.1 4.0 4.0   CHLORIDE mmol/L 100 101 101   CO2 mmol/L 18.0* 19.7* 18.6*   BUN mg/dL 12 11 10   CREATININE mg/dL 1.01 0.94 0.88   CALCIUM mg/dL 7.3* 7.3* 7.4*   BILIRUBIN mg/dL 1.8* 1.9* 1.4*   ALK PHOS U/L 129* 118* 142*   ALT (SGPT) U/L 37 36 34   AST (SGOT) U/L 92* 80* 79*   GLUCOSE mg/dL 88 98 159*     Results from last 7 days   Lab Units 02/12/22  0401 02/11/22  0650 02/10/22  0510   INR  1.64* 1.66* 1.70*     Lab Results   Lab Value Date/Time    LIPASE 79 (H) 02/04/2022 0906    LIPASE 36 05/07/2021 1248    LIPASE 68 (H) 01/01/2021 2125    LIPASE 133 (H) 07/22/2020 1759    LIPASE 59 07/07/2020 0633    LIPASE 65 (H) 07/06/2020 1630       Radiology:  US Abdomen Limited   Final Result   1.  Cirrhosis with findings of portosystemic shunting including mild to   moderate splenomegaly and ascites. There also appears to be hepatofugal   in the main  portal vein with concern for thrombus formation as well.   Further evaluation with Doppler sonogram of the portal vein is   recommended. This was discussed with Shavon, the patient's nurse, at   8:30 PM 02/05/2022   2.  Thickened appearance the gallbladder likely related to adjacent   liver disease, as before.   3.  Other findings as above.       This report was finalized on 2/5/2022 8:32 PM by Dr. Jason Maki M.D.              Assessment/Plan     Patient Active Problem List   Diagnosis   • ED (erectile dysfunction) of organic origin   • Alcoholic cirrhosis of liver with ascites (HCC)   • Anemia   • Jaundice   • Coagulopathy (HCC)   • Secondary thrombocytopenia   • Lung abnormality- left apex   • Hepatic encephalopathy (HCC)   • Sepsis (HCC)   • Colitis   • Hypertension   • GERD (gastroesophageal reflux disease)   • SBP (spontaneous bacterial peritonitis) (HCC)   • Hyperammonemia (HCC)   • Portal vein thrombosis   • COVID-19 virus detected       Assessment/Recommendations:    Assessment:  1. Hepatic encephalopathy  2. Cirrhosis of the liver  3. Portal vein thrombosis  4. Ascites  5. GERD  6.  Esophageal varices - no prior bleeding- banded 4/21 and 6/21  7.  History of SBP-daily antibiotic prophylaxis  8.  Covid positive  9. Thrombocytopenia     Plan:  · Continue nadolol (replaced metoprolol) for esophageal variceal prophylaxis    · H/h stable   · Continue Senokot daily and lactulose titrated to 3-4 bowel movements daily. I will increase lactulose since he only had 2 BM yesterday.  · Continue xifaxan  · No previous variceal bleed but has been banded and now with need for AC - ideally would have repeated egd to reeval for need for additional banding before starting AC.  Unfortunately patient did not f/u this past summer for repeat egd as recommended by UL.  Cont BB for bleeding prophlyaxis and would use lowest possible dose of AC with close monitoring of h/h  · Continue daily Bactrim due to history of SBP  · HIT  antibody pending-hematology notes reviewed.  Plans to resume Lovenox if this is negative given stability of platelets.  · Will have to have him see the liver transplant folks (as an outpatient) to reconsider liver transplantation?      I discussed the patients findings and my recommendations with patient and nursing staff.    Maurice Rodriguez MD

## 2022-02-12 NOTE — PROGRESS NOTES
Name: Wei Potts ADMIT: 2022   : 1955  PCP: Bella Villalta MD    MRN: [id number] LOS: 5 days   AGE/SEX: 66 y.o. male  ROOM: [facility]     Subjective   Subjective   Patient seen this morning.  No changes.  Denies abdominal pain, fevers, chills. Blood pressures on the softer side, with systolic blood pressures in 90s.  Review of Systems   As above  Objective   Objective   Vital Signs  Temp:  [97.7 °F (36.5 °C)-98.6 °F (37 °C)] 98.6 °F (37 °C)  Heart Rate:  [67-77] 67  Resp:  [18] 18  BP: ()/(48-72) 98/51  SpO2:  [99 %-100 %] 99 %  on   ;   Device (Oxygen Therapy): room air  Body mass index is 22.39 kg/m².  Physical Exam    General: Alert and oriented x3, no acute distress  HEENT: Normocephalic, atraumatic  CV: Regular rate and rhythm, no murmurs rubs or gallops  Lungs: Clear to auscultation bilaterally, no crackles or wheezes  Abdomen: Soft, nontender, distended.  Normoactive bowel sounds.  Extremities: No significant peripheral edema , no cyanosis     Results Review     I reviewed the patient's new clinical results.  Results from last 7 days   Lab Units 22  04022  0650 02/10/22  0510 22  0804   WBC 10*3/mm3 2.32* 1.83* 1.99* 1.98*   HEMOGLOBIN g/dL 8.5* 8.6* 8.7* 8.6*   PLATELETS 10*3/mm3 38* 36* 34* 36*     Results from last 7 days   Lab Units 22  0401 22  0650 02/10/22  0510 22  0804   SODIUM mmol/L 129* 128* 129* 130*   POTASSIUM mmol/L 4.1 4.0 4.0 4.0   CHLORIDE mmol/L 100 101 101 102   CO2 mmol/L 18.0* 19.7* 18.6* 18.1*   BUN mg/dL 12 11 10 10   CREATININE mg/dL 1.01 0.94 0.88 0.94   GLUCOSE mg/dL 88 98 159* 150*   Estimated Creatinine Clearance: 76.1 mL/min (by C-G formula based on SCr of 1.01 mg/dL).  Results from last 7 days   Lab Units 22  0401 22  0650 02/10/22  0510 22  0804   ALBUMIN g/dL 2.30* 2.00* 1.90* 1.90*   BILIRUBIN mg/dL 1.8* 1.9* 1.4* 1.4*   ALK PHOS U/L 129* 118* 142* 150*   AST (SGOT) U/L 92* 80* 79* 81*   ALT  (SGPT) U/L 37 36 34 34     Results from last 7 days   Lab Units 02/12/22  0401 02/11/22  0650 02/10/22  0510 02/09/22  0804   CALCIUM mg/dL 7.3* 7.3* 7.4* 7.6*   ALBUMIN g/dL 2.30* 2.00* 1.90* 1.90*     Results from last 7 days   Lab Units 02/12/22  0401 02/10/22  0510 02/09/22  0804   PROCALCITONIN ng/mL 0.20 0.09 0.14     COVID19   Date Value Ref Range Status   02/04/2022 Detected (C) Not Detected - Ref. Range Final   01/01/2021 Not Detected Not Detected - Ref. Range Final     No results found for: HGBA1C, POCGLU    Duplex Portal Hepatic Complete CAR  · Main Portal Vein: acute thrombus present.  · Right Intrahepatic Portal Vein: acute thrombus present.  · Extrahepatic Portal Vein: abnormal, hepatofugal flow direction present.  · Left Intrahepatic Portal Vein: abnormal, hepatofugal flow direction   present.       Scheduled Medications  acetaminophen, 650 mg, Oral, Once  furosemide, 40 mg, Oral, Daily  lactulose, 20 g, Oral, TID  midodrine, 2.5 mg, Oral, Q8H  nadolol, 20 mg, Oral, Q24H  pantoprazole, 40 mg, Oral, QAM  rifAXIMin, 550 mg, Oral, Q12H  senna-docusate sodium, 1 tablet, Oral, Daily  sodium chloride, 10 mL, Intravenous, Q12H  spironolactone, 50 mg, Oral, Daily  sulfamethoxazole-trimethoprim, 1 tablet, Oral, Q24H  zinc sulfate, 220 mg, Oral, Daily    Infusions   Diet  Diet Regular; Low Sodium; 2,000 mg Na       Assessment/Plan     Active Hospital Problems    Diagnosis  POA   • **Hepatic encephalopathy (HCC) [K72.90]  Yes   • COVID-19 virus detected [U07.1]  Unknown   • Portal vein thrombosis [I81]  Unknown   • Hyperammonemia (HCC) [E72.20]  Yes   • Hypertension [I10]  Yes   • GERD (gastroesophageal reflux disease) [K21.9]  Yes   • Coagulopathy (HCC) [D68.9]  Yes   • 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        is a pleasant 65 y/o male with PMH of alcoholic  liver disease, cirrhosis, esophageal varices s/p banding recurrent ascites & HTN who was admitted on 2/4/22 with hepatic encephalopathy.     #Hepatic encephalopathy: Improved.  Alert oriented x3.  Continue lactulose, rifaximin.  Docusate senna.  Titrate to 3-4 bowel movements a day.    #Alcoholic cirrhosis of the liver decompensated by ascites and esophageal varices.  On spironolactone and Lasix.  Low-sodium diet.  Beta-blocker for for bleeding prophylaxis.   Bactrim restarted for history of SBP 02/10  Added midodrine 2.5 mg every 8 hours 02/12 due to soft blood pressures. Uptitrate as tolerated.    #Portal vein thrombosis.  On prophylactic Lovenox, hematology following.  Lovenox discontinued 02/10 due to platelets trending down.  Hypercoagulable work-up.  Discussed with Dr. Espino. HIT panel  pending. -Vascular surgery consulted-not a candidate for intervention.  -Discussed with hematology, continue to monitor off anticoagulation and trend CBC daily.  Follow-up HIT panel  If negative,may restart Lovenox if platelets remains stable.     #Thrombocytopenia:   Likely secondary to cirrhosis.   No signs of bleeding.    Platelets remain stable around 35.  -HIT panel pending.   -Off anticoagulation.  -Platelets slowly trending up, 48 this morning.    #Leukopenia/neutropenia..  Monitor daily.  Currently on Bactrim for history of SBP.  Low threshold to start broad-spectrum antibiotics if infection signs.    H#yponatremia: 2/2 to cirrhosis.  Daily BMP.    COVID 19 positive: Asymptomatic, remains on room air, Continue to monitor, no indication for treatment.       Anemia: Hemoglobin stable around 8.5. Ordered iron panel and ferritin      DVT prophylaxis: Lovenox  Full code.  Disposition: Home with spouse and home health when cleared by consultants. Pending HIT panel. If HIT panel negative, and platelets remain stable, could be discharged home with Lovenox once cleared by consultants.  Joselito Marr MD  [facility]  02/12/22  09:18 EST

## 2022-02-12 NOTE — PROGRESS NOTES
Subjective     HISTORY OF PRESENT ILLNESS:   No acute issues overnight. Denies any bleed or bruise. His mentation appears to be clear.  Afebrile    Past Medical History, Past Surgical History, Social History, Family History have been reviewed and are without significant changes except as mentioned.    Review of Systems   A comprehensive 14 point review of systems was performed and was negative except as mentioned.    Medications:  The current medication list was reviewed in the EMR    ALLERGIES:    Allergies   Allergen Reactions   • Iron GI Intolerance       Objective      Vitals:    02/11/22 1935 02/11/22 2246 02/12/22 0714 02/12/22 0903   BP: 110/67 98/48 98/51 111/63   BP Location: Left arm Left arm Left arm    Patient Position: Sitting Lying Lying    Pulse: 71 77 67 79   Resp: 18 18 18    Temp: 97.9 °F (36.6 °C) 98.5 °F (36.9 °C) 98.6 °F (37 °C)    TempSrc: Oral Oral Oral    SpO2: 99% 99% 99% 99%   Weight:       Height:         No flowsheet data found.    Physical Exam    CONSTITUTIONAL:  Vital signs reviewed.  No distress, looks comfortable.  EYES:  Conjunctiva and lids unremarkable.    EARS,NOSE,MOUTH,THROAT:  Ears and nose appear unremarkable.  Lips, teeth, gums appear unremarkable.  RESPIRATORY:  Normal respiratory effort.    CARDIOVASCULAR:  Normal heart rate. No significant lower extremity edema.  GASTROINTESTINAL: Abdomen appears unremarkable.  Nontender. Mild distension  LYMPHATIC:  No cervical, supraclavicular lymphadenopathy.  SKIN:  Warm.  No rashes.  PSYCHIATRIC:  Normal judgment and insight.  Normal mood and affect.  NEURO: AAO x 3, no focal deficits  RECENT LABS:  Hematology WBC   Date Value Ref Range Status   02/12/2022 2.32 (L) 3.40 - 10.80 10*3/mm3 Final     RBC   Date Value Ref Range Status   02/12/2022 3.08 (L) 4.14 - 5.80 10*6/mm3 Final     Hemoglobin   Date Value Ref Range Status   02/12/2022 8.5 (L) 13.0 - 17.7 g/dL Final     Hematocrit   Date Value Ref Range Status   02/12/2022 23.9 (L)  37.5 - 51.0 % Final     Platelets   Date Value Ref Range Status   02/12/2022 38 (C) 140 - 450 10*3/mm3 Final          Assessment/Plan    is a pleasant 67 y/o WM with medical issues comprising alcoholic liver disease, cirrhosis, esophageal varices s/p banding recurrent ascites & HTN who was admitted on 2/4/22 with hepatic encephalopathy. Hematology has been consulted for portal vein evaluation & management.      # Portal vein thrombosis:  - Acute on chronic. MRI abdomen from 1/7/22 done at [facility] had demonstrated peripheral, chronic nonocclusive thrombus of the main portal vein. The doppler from 2/6/22 shows acute thrombus in the main and right intrahepatic portal veins.   - This is likely due to altered coagulation due to hepatic dysfunction.  No prior history of VTEs. No family history of thrombosis.   - However given unusual location, hypercoagulable state work up was sent on 2/7/22.   -  Factor V leiden mutation negative. Factor II & AT-III activity low (likely due to liver dysfunction). Mildly elevated ACL, IgM (unclear significance). LA & b2-GP negative.  JAK2 mutation negative. [facility] flow cytometry pending. Will defer protein C & S as levels would be altered.   - Patient has chronic thrombocytopenia with platelets in 40-50,000s range. The CBC from 2/7/22 shows platelet count of 40,000. No c/o bleed or bruise.   - Given risk for intestinal infarction with PV thrombus and likely prothrombotic state,  started him on low dose lovenox on 2/7/22.   - Tolerating well.   - Platelets remain low, dropped from 42,000 on 2/8/22 to 36,000 on 2/9 to 34,000 on 2/10. Lovenox stopped. HIT Ab sent.   - 2/11: Seen by vascular surgery. No interventions planned. Platelet slightly improved to 36,000 today. Await HIT Ab. If HIT Ab negative & platelets continue to improve, will consider resuming lovenox.   - 2/12: Await HIT Ab. Platelets slightly improved to 38,000. Patient being planned for liver transplant evaluation as  outpatient. Will consider resuming lovenox if HIT Ab negative.     # Thrombocytopenia:  - Acute on chronic. Likely liver disease related.   - No bleed/bruise    # Leukopenia:  Likely liver disease related.   Monitor    # Coagulopathy:  PT/INR elevated.   No bleed/bruise. Monitor    # Anemia:  Likely anemia of chronic disease. Baseline Hb in 8-9 gm/dl range since 2020.  Iron panel sent today     # Hepatic encephalopathy: Per GI  # Decompensated liver cirrhosis  # Ascites     RECOMMENDATIONS:  - Portal vein thrombus likely liver disease related.   - Hypercoagulable work ups: Factor V leiden mutation negative. AT-III & Factor II activity low (likely due to liver dysfunction). Mildly elevated ACL, IgM (unclear significance). LA & b2-GP negative.  JAK2 mutation negative. [facility] flow cytometry pending  -  Seen by vascular surgery. No interventions planned.   - Platelet slightly improved to 38,000 today. Await HIT Ab. If HIT Ab negative & platelets continue to improve, will consider resuming lovenox.     - Monitor while off of anticoagulation.       2/12/2022      CC:

## 2022-02-12 NOTE — PLAN OF CARE
VSS. No c/o pain. Pt had 2BM's this shift. Will continue to monitor     Problem: Adult Inpatient Plan of Care  Goal: Plan of Care Review  Outcome: Ongoing, Progressing  Goal: Patient-Specific Goal (Individualized)  Outcome: Ongoing, Progressing  Goal: Absence of Hospital-Acquired Illness or Injury  Outcome: Ongoing, Progressing  Intervention: Identify and Manage Fall Risk  Recent Flowsheet Documentation  Taken 2/12/2022 1800 by Saray Valentino RN  Safety Promotion/Fall Prevention:   activity supervised   clutter free environment maintained   fall prevention program maintained   nonskid shoes/slippers when out of bed   safety round/check completed   room organization consistent  Taken 2/12/2022 1600 by Saray Valentino RN  Safety Promotion/Fall Prevention:   activity supervised   clutter free environment maintained   fall prevention program maintained   nonskid shoes/slippers when out of bed   room organization consistent   safety round/check completed  Taken 2/12/2022 1400 by Saray Valentino RN  Safety Promotion/Fall Prevention:   activity supervised   clutter free environment maintained   fall prevention program maintained   nonskid shoes/slippers when out of bed   safety round/check completed   room organization consistent  Taken 2/12/2022 1200 by Saray Valentino RN  Safety Promotion/Fall Prevention:   activity supervised   clutter free environment maintained   fall prevention program maintained   nonskid shoes/slippers when out of bed   safety round/check completed   room organization consistent  Taken 2/12/2022 1000 by Saray Valentino RN  Safety Promotion/Fall Prevention:   activity supervised   clutter free environment maintained   fall prevention program maintained   nonskid shoes/slippers when out of bed   room organization consistent   safety round/check completed  Taken 2/12/2022 0904 by Saray Valentino, BELINDA  Safety Promotion/Fall Prevention:   activity supervised   clutter free environment maintained   fall  prevention program maintained   nonskid shoes/slippers when out of bed   room organization consistent   safety round/check completed  Intervention: Prevent and Manage VTE (venous thromboembolism) Risk  Recent Flowsheet Documentation  Taken 2/12/2022 0904 by Saray Valentino RN  VTE Prevention/Management:   bilateral   dorsiflexion/plantar flexion performed  Intervention: Prevent Infection  Recent Flowsheet Documentation  Taken 2/12/2022 1000 by Saray Valentino RN  Infection Prevention: visitors restricted/screened  Taken 2/12/2022 0904 by Saray Valentino RN  Infection Prevention: visitors restricted/screened  Goal: Optimal Comfort and Wellbeing  Outcome: Ongoing, Progressing  Intervention: Provide Person-Centered Care  Recent Flowsheet Documentation  Taken 2/12/2022 0904 by Saray Valentino RN  Trust Relationship/Rapport: care explained  Goal: Readiness for Transition of Care  Outcome: Ongoing, Progressing     Problem: Fall Injury Risk  Goal: Absence of Fall and Fall-Related Injury  Outcome: Ongoing, Progressing  Intervention: Identify and Manage Contributors to Fall Injury Risk  Recent Flowsheet Documentation  Taken 2/12/2022 0904 by Saray Valentino RN  Medication Review/Management: medications reviewed  Intervention: Promote Injury-Free Environment  Recent Flowsheet Documentation  Taken 2/12/2022 1800 by Saray Valentino RN  Safety Promotion/Fall Prevention:   activity supervised   clutter free environment maintained   fall prevention program maintained   nonskid shoes/slippers when out of bed   safety round/check completed   room organization consistent  Taken 2/12/2022 1600 by Saray Valentino RN  Safety Promotion/Fall Prevention:   activity supervised   clutter free environment maintained   fall prevention program maintained   nonskid shoes/slippers when out of bed   room organization consistent   safety round/check completed  Taken 2/12/2022 1400 by Saray Valentino RN  Safety Promotion/Fall Prevention:   activity  supervised   clutter free environment maintained   fall prevention program maintained   nonskid shoes/slippers when out of bed   safety round/check completed   room organization consistent  Taken 2/12/2022 1200 by Saray Valentino RN  Safety Promotion/Fall Prevention:   activity supervised   clutter free environment maintained   fall prevention program maintained   nonskid shoes/slippers when out of bed   safety round/check completed   room organization consistent  Taken 2/12/2022 1000 by Saray Valentino RN  Safety Promotion/Fall Prevention:   activity supervised   clutter free environment maintained   fall prevention program maintained   nonskid shoes/slippers when out of bed   room organization consistent   safety round/check completed  Taken 2/12/2022 0904 by Saray Valentino RN  Safety Promotion/Fall Prevention:   activity supervised   clutter free environment maintained   fall prevention program maintained   nonskid shoes/slippers when out of bed   room organization consistent   safety round/check completed     Problem: Adjustment to Illness (Liver Failure)  Goal: Optimal Coping with Liver Failure  Outcome: Ongoing, Progressing  Intervention: Support Patient/Family Psychosocial Response to Liver Disease  Recent Flowsheet Documentation  Taken 2/12/2022 0904 by Saray Valentino RN  Diversional Activities:   television   smartphone  Family/Support System Care:   self-care encouraged   support provided     Problem: Bleeding (Liver Failure)  Goal: Absence of Bleeding  Outcome: Ongoing, Progressing     Problem: Neurologic Function Impaired (Liver Failure)  Goal: Optimal Neurologic Function  Outcome: Ongoing, Progressing     Problem: Fluid Imbalance (Liver Failure)  Goal: Optimal Fluid Balance  Outcome: Ongoing, Progressing     Problem: Oral Intake Inadequate (Liver Failure)  Goal: Optimal Nutrition Intake  Outcome: Ongoing, Progressing     Problem: Infection (Liver Failure)  Goal: Absence of Infection Signs and  Symptoms  Outcome: Ongoing, Progressing     Problem: Pain (Liver Failure)  Goal: Acceptable Pain Control  Outcome: Ongoing, Progressing     Problem: Respiratory Compromise (Liver Failure)  Goal: Effective Oxygenation and Ventilation  Outcome: Ongoing, Progressing   Goal Outcome Evaluation:

## 2022-02-13 LAB
ALBUMIN SERPL-MCNC: 2.3 G/DL (ref 3.5–5.2)
ALBUMIN/GLOB SERPL: 0.6 G/DL
ALP SERPL-CCNC: 142 U/L (ref 39–117)
ALT SERPL W P-5'-P-CCNC: 43 U/L (ref 1–41)
AMMONIA BLD-SCNC: 99 UMOL/L (ref 16–60)
ANION GAP SERPL CALCULATED.3IONS-SCNC: 7.1 MMOL/L (ref 5–15)
AST SERPL-CCNC: 87 U/L (ref 1–40)
BILIRUB SERPL-MCNC: 1.5 MG/DL (ref 0–1.2)
BUN SERPL-MCNC: 9 MG/DL (ref 8–23)
BUN/CREAT SERPL: 9.4 (ref 7–25)
CALCIUM SPEC-SCNC: 7.8 MG/DL (ref 8.6–10.5)
CHLORIDE SERPL-SCNC: 101 MMOL/L (ref 98–107)
CO2 SERPL-SCNC: 18.9 MMOL/L (ref 22–29)
CREAT SERPL-MCNC: 0.96 MG/DL (ref 0.76–1.27)
DEPRECATED RDW RBC AUTO: 50.5 FL (ref 37–54)
ERYTHROCYTE [DISTWIDTH] IN BLOOD BY AUTOMATED COUNT: 16.7 % (ref 12.3–15.4)
GFR SERPL CREATININE-BSD FRML MDRD: 79 ML/MIN/1.73
GLOBULIN UR ELPH-MCNC: 4 GM/DL
GLUCOSE SERPL-MCNC: 124 MG/DL (ref 65–99)
HCT VFR BLD AUTO: 27.1 % (ref 37.5–51)
HGB BLD-MCNC: 9.3 G/DL (ref 13–17.7)
INR PPP: 1.75 (ref 0.9–1.1)
LYMPHOCYTES # BLD MANUAL: 0.3 10*3/MM3 (ref 0.7–3.1)
LYMPHOCYTES NFR BLD MANUAL: 16 % (ref 5–12)
MCH RBC QN AUTO: 28.4 PG (ref 26.6–33)
MCHC RBC AUTO-ENTMCNC: 34.3 G/DL (ref 31.5–35.7)
MCV RBC AUTO: 82.6 FL (ref 79–97)
MONOCYTES # BLD: 0.51 10*3/MM3 (ref 0.1–0.9)
NEUTROPHILS # BLD AUTO: 2.35 10*3/MM3 (ref 1.7–7)
NEUTROPHILS NFR BLD MANUAL: 74.5 % (ref 42.7–76)
PLAT MORPH BLD: NORMAL
PLATELET # BLD AUTO: 48 10*3/MM3 (ref 140–450)
PMV BLD AUTO: 11.7 FL (ref 6–12)
POTASSIUM SERPL-SCNC: 4.3 MMOL/L (ref 3.5–5.2)
PROT SERPL-MCNC: 6.3 G/DL (ref 6–8.5)
PROTHROMBIN TIME: 20.4 SECONDS (ref 11.7–14.2)
RBC # BLD AUTO: 3.28 10*6/MM3 (ref 4.14–5.8)
RBC MORPH BLD: NORMAL
SODIUM SERPL-SCNC: 127 MMOL/L (ref 136–145)
VARIANT LYMPHS NFR BLD MANUAL: 1.1 % (ref 0–5)
VARIANT LYMPHS NFR BLD MANUAL: 8.5 % (ref 19.6–45.3)
WBC MORPH BLD: NORMAL
WBC NRBC COR # BLD: 3.16 10*3/MM3 (ref 3.4–10.8)

## 2022-02-13 PROCEDURE — 85007 BL SMEAR W/DIFF WBC COUNT: CPT | Performed by: STUDENT IN AN ORGANIZED HEALTH CARE EDUCATION/TRAINING PROGRAM

## 2022-02-13 PROCEDURE — 99232 SBSQ HOSP IP/OBS MODERATE 35: CPT | Performed by: INTERNAL MEDICINE

## 2022-02-13 PROCEDURE — 36415 COLL VENOUS BLD VENIPUNCTURE: CPT | Performed by: HOSPITALIST

## 2022-02-13 PROCEDURE — 25010000002 ENOXAPARIN PER 10 MG: Performed by: INTERNAL MEDICINE

## 2022-02-13 PROCEDURE — 99233 SBSQ HOSP IP/OBS HIGH 50: CPT | Performed by: INTERNAL MEDICINE

## 2022-02-13 PROCEDURE — 82140 ASSAY OF AMMONIA: CPT | Performed by: HOSPITALIST

## 2022-02-13 PROCEDURE — 85025 COMPLETE CBC W/AUTO DIFF WBC: CPT | Performed by: STUDENT IN AN ORGANIZED HEALTH CARE EDUCATION/TRAINING PROGRAM

## 2022-02-13 PROCEDURE — 85610 PROTHROMBIN TIME: CPT | Performed by: STUDENT IN AN ORGANIZED HEALTH CARE EDUCATION/TRAINING PROGRAM

## 2022-02-13 PROCEDURE — 80053 COMPREHEN METABOLIC PANEL: CPT | Performed by: STUDENT IN AN ORGANIZED HEALTH CARE EDUCATION/TRAINING PROGRAM

## 2022-02-13 RX ADMIN — SODIUM CHLORIDE, PRESERVATIVE FREE 10 ML: 5 INJECTION INTRAVENOUS at 20:25

## 2022-02-13 RX ADMIN — SODIUM CHLORIDE, PRESERVATIVE FREE 10 ML: 5 INJECTION INTRAVENOUS at 09:11

## 2022-02-13 RX ADMIN — PANTOPRAZOLE SODIUM 40 MG: 40 TABLET, DELAYED RELEASE ORAL at 09:10

## 2022-02-13 RX ADMIN — MULTIPLE VITAMINS W/ MINERALS TAB 1 TABLET: TAB at 09:10

## 2022-02-13 RX ADMIN — NADOLOL 20 MG: 20 TABLET ORAL at 09:10

## 2022-02-13 RX ADMIN — RIFAXIMIN 550 MG: 550 TABLET ORAL at 20:25

## 2022-02-13 RX ADMIN — SPIRONOLACTONE 50 MG: 50 TABLET, FILM COATED ORAL at 09:10

## 2022-02-13 RX ADMIN — ENOXAPARIN SODIUM 40 MG: 100 INJECTION SUBCUTANEOUS at 12:13

## 2022-02-13 RX ADMIN — RIFAXIMIN 550 MG: 550 TABLET ORAL at 09:10

## 2022-02-13 RX ADMIN — SULFAMETHOXAZOLE AND TRIMETHOPRIM 1 TABLET: 400; 80 TABLET ORAL at 09:10

## 2022-02-13 RX ADMIN — LACTULOSE 20 G: 10 SOLUTION ORAL at 09:10

## 2022-02-13 RX ADMIN — MIDODRINE HYDROCHLORIDE 2.5 MG: 2.5 TABLET ORAL at 11:22

## 2022-02-13 RX ADMIN — LACTULOSE 20 G: 10 SOLUTION ORAL at 16:07

## 2022-02-13 RX ADMIN — FUROSEMIDE 40 MG: 40 TABLET ORAL at 09:10

## 2022-02-13 RX ADMIN — LACTULOSE 20 G: 10 SOLUTION ORAL at 20:25

## 2022-02-13 RX ADMIN — MIDODRINE HYDROCHLORIDE 2.5 MG: 2.5 TABLET ORAL at 20:25

## 2022-02-13 RX ADMIN — DOCUSATE SODIUM 50MG AND SENNOSIDES 8.6MG 1 TABLET: 8.6; 5 TABLET, FILM COATED ORAL at 09:10

## 2022-02-13 RX ADMIN — MIDODRINE HYDROCHLORIDE 2.5 MG: 2.5 TABLET ORAL at 02:25

## 2022-02-13 NOTE — PLAN OF CARE
Goal Outcome Evaluation:  Plan of Care Reviewed With: patient        Progress: improving   VSS on RA, No c/o pain. Total of 3 bowl movements yesterday. One Bowl movement so far this morning. No nausea, will continue to monitor

## 2022-02-13 NOTE — PROGRESS NOTES
[facility]  Inpatient Progress Note    Reason for Follow Up:  Hepatic encephalopathy, cirrhosis, ascites, portal vein thrombosis    Subjective     Interval History:   3-4 BM yesterday. No c/o. No gross evidence of GI bleeding.     Current Facility-Administered Medications:   •  acetaminophen (TYLENOL) tablet 650 mg, 650 mg, Oral, Once, Wes Matos, MILAGROS  •  furosemide (LASIX) tablet 40 mg, 40 mg, Oral, Daily, Jer Serrano MD, 40 mg at 02/12/22 0904  •  lactulose (CHRONULAC) 10 GM/15ML solution 20 g, 20 g, Oral, TID, Maurice Rodriguez MD, 20 g at 02/12/22 2002  •  midodrine (PROAMATINE) tablet 2.5 mg, 2.5 mg, Oral, Q8H, Joselito Marr MD, 2.5 mg at 02/13/22 0225  •  multivitamin with minerals 1 tablet, 1 tablet, Oral, Daily, Joselito Marr MD, 1 tablet at 02/12/22 1101  •  nadolol (CORGARD) tablet 20 mg, 20 mg, Oral, Q24H, Ruby Mcgee MD, 20 mg at 02/12/22 0904  •  ondansetron (ZOFRAN) tablet 4 mg, 4 mg, Oral, Q6H PRN **OR** ondansetron (ZOFRAN) injection 4 mg, 4 mg, Intravenous, Q6H PRN, Jer Serrano MD, 4 mg at 02/04/22 2159  •  pantoprazole (PROTONIX) EC tablet 40 mg, 40 mg, Oral, QAM, Jer Serrano MD, 40 mg at 02/12/22 0627  •  riFAXIMin (XIFAXAN) tablet 550 mg, 550 mg, Oral, Q12H, Jre Serrano MD, 550 mg at 02/12/22 2002  •  sennosides-docusate (PERICOLACE) 8.6-50 MG per tablet 1 tablet, 1 tablet, Oral, Daily, Ruby Mcgee MD, 1 tablet at 02/12/22 0904  •  sodium chloride 0.9 % flush 10 mL, 10 mL, Intravenous, Q12H, Jer Serrano MD, 10 mL at 02/12/22 2002  •  sodium chloride 0.9 % flush 10 mL, 10 mL, Intravenous, PRN, Jer Serrano MD  •  spironolactone (ALDACTONE) tablet 50 mg, 50 mg, Oral, Daily, Jer Serrano MD, 50 mg at 02/12/22 0904  •  sulfamethoxazole-trimethoprim (BACTRIM,SEPTRA) 400-80 MG tablet 1 tablet, 1 tablet, Oral, Q24H, Ruby Mcgee MD, 1 tablet at 02/12/22 0904  •  zinc sulfate (ZINCATE) capsule 220 mg, 220 mg, Oral, Daily,  Jer Serrano MD, 220 mg at 02/07/22 0814  Review of Systems:    The following systems were reviewed and negative;  constitution, respiratory, cardiovascular, musculoskeletal and neurological    Objective     Vital Signs  Temp:  [98 °F (36.7 °C)-98.8 °F (37.1 °C)] 98.8 °F (37.1 °C)  Heart Rate:  [70-79] 70  Resp:  [16-18] 18  BP: (106-122)/(63-71) 106/67  Body mass index is 22.39 kg/m².  No intake or output data in the 24 hours ending 02/13/22 0859  No intake/output data recorded.     Physical Exam:   General: patient awake, alert and cooperative   Eyes: Normal lids and lashes, no scleral icterus   Neck: supple, normal ROM   Skin: warm and dry, not jaundiced   Cardiovascular: regular rhythm and rate, no murmurs auscultated   Pulm: clear to auscultation bilaterally, regular and unlabored   Abdomen: soft, nontender, mildly distended; normal bowel sounds   Extremities: no rash or edema   Psychiatric: Normal mood and behavior; memory intact. NO asterixis.      Results Review:     I reviewed the patient's new clinical results.    Results from last 7 days   Lab Units 02/12/22  0401 02/11/22  0650 02/10/22  0510   WBC 10*3/mm3 2.32* 1.83* 1.99*   HEMOGLOBIN g/dL 8.5* 8.6* 8.7*   HEMATOCRIT % 23.9* 24.5* 27.0*   PLATELETS 10*3/mm3 38* 36* 34*     Results from last 7 days   Lab Units 02/12/22  0401 02/11/22  0650 02/10/22  0510   SODIUM mmol/L 129* 128* 129*   POTASSIUM mmol/L 4.1 4.0 4.0   CHLORIDE mmol/L 100 101 101   CO2 mmol/L 18.0* 19.7* 18.6*   BUN mg/dL 12 11 10   CREATININE mg/dL 1.01 0.94 0.88   CALCIUM mg/dL 7.3* 7.3* 7.4*   BILIRUBIN mg/dL 1.8* 1.9* 1.4*   ALK PHOS U/L 129* 118* 142*   ALT (SGPT) U/L 37 36 34   AST (SGOT) U/L 92* 80* 79*   GLUCOSE mg/dL 88 98 159*     Results from last 7 days   Lab Units 02/12/22  0401 02/11/22  0650 02/10/22  0510   INR  1.64* 1.66* 1.70*     Lab Results   Lab Value Date/Time    LIPASE 79 (H) 02/04/2022 0906    LIPASE 36 05/07/2021 1248    LIPASE 68 (H) 01/01/2021 2125    LIPASE  133 (H) 07/22/2020 1759    LIPASE 59 07/07/2020 0633    LIPASE 65 (H) 07/06/2020 1630       Radiology:  US Abdomen Limited   Final Result   1.  Cirrhosis with findings of portosystemic shunting including mild to   moderate splenomegaly and ascites. There also appears to be hepatofugal   in the main portal vein with concern for thrombus formation as well.   Further evaluation with Doppler sonogram of the portal vein is   recommended. This was discussed with Shavon, the patient's nurse, at   8:30 PM 02/05/2022   2.  Thickened appearance the gallbladder likely related to adjacent   liver disease, as before.   3.  Other findings as above.       This report was finalized on 2/5/2022 8:32 PM by Dr. Jason Maki M.D.              Assessment/Plan     Patient Active Problem List   Diagnosis   • ED (erectile dysfunction) of organic origin   • Alcoholic cirrhosis of liver with ascites (HCC)   • Anemia   • Jaundice   • Coagulopathy (HCC)   • Secondary thrombocytopenia   • Lung abnormality- left apex   • Hepatic encephalopathy (HCC)   • Sepsis (HCC)   • Colitis   • Hypertension   • GERD (gastroesophageal reflux disease)   • SBP (spontaneous bacterial peritonitis) (HCC)   • Hyperammonemia (HCC)   • Portal vein thrombosis   • COVID-19 virus detected       Assessment/Recommendations:    Assessment:  1. Hepatic encephalopathy  2. Cirrhosis of the liver  3. Portal vein thrombosis  4. Ascites  5. GERD  6.  Esophageal varices - no prior bleeding- banded 4/21 and 6/21  7.  History of SBP-daily antibiotic prophylaxis  8.  Covid positive  9. Thrombocytopenia     Plan:  · Continue nadolol (replaced metoprolol) for esophageal variceal prophylaxis    · H/h stable   · Continue Senokot daily and lactulose titrated to 3-4 bowel movements daily. I will increase lactulose since he only had 2 BM yesterday.  · Continue xifaxan  · No previous variceal bleed but has been banded and now with need for AC - ideally would have repeated egd to reeval  for need for additional banding before starting AC.  Unfortunately patient did not f/u this past summer for repeat egd as recommended by .  Cont BB for bleeding prophlyaxis and would use lowest possible dose of AC with close monitoring of h/h  · Continue daily Bactrim due to history of SBP  · HIT antibody pending-hematology notes reviewed.  Plans to resume Lovenox if this is negative given stability of platelets.  · Will have to have him see the liver transplant folks (as an outpatient) to reconsider liver transplantation?    GI will sign off. He should f/u with Dr. Ruby Mcgee 4 weeks after discharge. She could make sure that he is scheduled to see someone at  [facility].        I discussed the patients findings and my recommendations with patient and nursing staff.    Maurice Rodriguez MD

## 2022-02-13 NOTE — PLAN OF CARE
VSS. Educated on Lovenox inj. Up ad oliver. Prob DC home tomorrow, will continue to monitor.     Problem: Adult Inpatient Plan of Care  Goal: Plan of Care Review  Outcome: Ongoing, Progressing  Goal: Patient-Specific Goal (Individualized)  Outcome: Ongoing, Progressing  Goal: Absence of Hospital-Acquired Illness or Injury  Outcome: Ongoing, Progressing  Intervention: Identify and Manage Fall Risk  Recent Flowsheet Documentation  Taken 2/13/2022 1800 by Saray Valentino RN  Safety Promotion/Fall Prevention:   activity supervised   clutter free environment maintained   fall prevention program maintained   nonskid shoes/slippers when out of bed   room organization consistent   safety round/check completed  Taken 2/13/2022 1600 by Saray Valentino, RN  Safety Promotion/Fall Prevention:   activity supervised   clutter free environment maintained   fall prevention program maintained   nonskid shoes/slippers when out of bed   room organization consistent   safety round/check completed  Taken 2/13/2022 1430 by Saray Valentino, BELINDA  Safety Promotion/Fall Prevention:   activity supervised   clutter free environment maintained   fall prevention program maintained   nonskid shoes/slippers when out of bed   safety round/check completed   room organization consistent  Taken 2/13/2022 1200 by Saray Valentino, RN  Safety Promotion/Fall Prevention:   activity supervised   clutter free environment maintained   fall prevention program maintained   nonskid shoes/slippers when out of bed   room organization consistent   safety round/check completed  Taken 2/13/2022 1000 by Saray Valentino, RN  Safety Promotion/Fall Prevention:   activity supervised   clutter free environment maintained   fall prevention program maintained   nonskid shoes/slippers when out of bed   room organization consistent   safety round/check completed  Taken 2/13/2022 0900 by Saray Valentino RN  Safety Promotion/Fall Prevention:   activity supervised   clutter free environment  maintained   fall prevention program maintained   nonskid shoes/slippers when out of bed   room organization consistent   safety round/check completed  Intervention: Prevent and Manage VTE (venous thromboembolism) Risk  Recent Flowsheet Documentation  Taken 2/13/2022 0900 by Saray Valentino RN  VTE Prevention/Management:   bilateral   dorsiflexion/plantar flexion performed  Intervention: Prevent Infection  Recent Flowsheet Documentation  Taken 2/13/2022 1200 by Saray Valentino RN  Infection Prevention: visitors restricted/screened  Taken 2/13/2022 1000 by Saray Valentino RN  Infection Prevention: visitors restricted/screened  Taken 2/13/2022 0900 by Saray Valentino RN  Infection Prevention: visitors restricted/screened  Goal: Optimal Comfort and Wellbeing  Outcome: Ongoing, Progressing  Intervention: Provide Person-Centered Care  Recent Flowsheet Documentation  Taken 2/13/2022 0900 by Saray Valentino RN  Trust Relationship/Rapport: care explained  Goal: Readiness for Transition of Care  Outcome: Ongoing, Progressing     Problem: Fall Injury Risk  Goal: Absence of Fall and Fall-Related Injury  Outcome: Ongoing, Progressing  Intervention: Identify and Manage Contributors to Fall Injury Risk  Recent Flowsheet Documentation  Taken 2/13/2022 0900 by Saray Valentino RN  Medication Review/Management: medications reviewed  Intervention: Promote Injury-Free Environment  Recent Flowsheet Documentation  Taken 2/13/2022 1800 by Saray Valentino RN  Safety Promotion/Fall Prevention:   activity supervised   clutter free environment maintained   fall prevention program maintained   nonskid shoes/slippers when out of bed   room organization consistent   safety round/check completed  Taken 2/13/2022 1600 by Saray Valentino RN  Safety Promotion/Fall Prevention:   activity supervised   clutter free environment maintained   fall prevention program maintained   nonskid shoes/slippers when out of bed   room organization consistent   safety  round/check completed  Taken 2/13/2022 1430 by Saray Valentino RN  Safety Promotion/Fall Prevention:   activity supervised   clutter free environment maintained   fall prevention program maintained   nonskid shoes/slippers when out of bed   safety round/check completed   room organization consistent  Taken 2/13/2022 1200 by Saray Valentino RN  Safety Promotion/Fall Prevention:   activity supervised   clutter free environment maintained   fall prevention program maintained   nonskid shoes/slippers when out of bed   room organization consistent   safety round/check completed  Taken 2/13/2022 1000 by Saray Valentino RN  Safety Promotion/Fall Prevention:   activity supervised   clutter free environment maintained   fall prevention program maintained   nonskid shoes/slippers when out of bed   room organization consistent   safety round/check completed  Taken 2/13/2022 0900 by Saray Valentino RN  Safety Promotion/Fall Prevention:   activity supervised   clutter free environment maintained   fall prevention program maintained   nonskid shoes/slippers when out of bed   room organization consistent   safety round/check completed     Problem: Adjustment to Illness (Liver Failure)  Goal: Optimal Coping with Liver Failure  Outcome: Ongoing, Progressing  Intervention: Support Patient/Family Psychosocial Response to Liver Disease  Recent Flowsheet Documentation  Taken 2/13/2022 0900 by Saray Valentino, RN  Diversional Activities:   television   smartphone  Family/Support System Care:   support provided   self-care encouraged     Problem: Bleeding (Liver Failure)  Goal: Absence of Bleeding  Outcome: Ongoing, Progressing     Problem: Neurologic Function Impaired (Liver Failure)  Goal: Optimal Neurologic Function  Outcome: Ongoing, Progressing     Problem: Fluid Imbalance (Liver Failure)  Goal: Optimal Fluid Balance  Outcome: Ongoing, Progressing     Problem: Oral Intake Inadequate (Liver Failure)  Goal: Optimal Nutrition Intake  Outcome:  Ongoing, Progressing     Problem: Infection (Liver Failure)  Goal: Absence of Infection Signs and Symptoms  Outcome: Ongoing, Progressing     Problem: Pain (Liver Failure)  Goal: Acceptable Pain Control  Outcome: Ongoing, Progressing     Problem: Respiratory Compromise (Liver Failure)  Goal: Effective Oxygenation and Ventilation  Outcome: Ongoing, Progressing   Goal Outcome Evaluation:

## 2022-02-13 NOTE — PROGRESS NOTES
Subjective     HISTORY OF PRESENT ILLNESS:   No acute issues overnight. Denies any bleed or bruise. His mentation appears to be clear.  Afebrile    Past Medical History, Past Surgical History, Social History, Family History have been reviewed and are without significant changes except as mentioned.    Review of Systems   A comprehensive 14 point review of systems was performed and was negative except as mentioned.    Medications:  The current medication list was reviewed in the EMR    ALLERGIES:    Allergies   Allergen Reactions   • Iron GI Intolerance       Objective      Vitals:    02/12/22 1100 02/12/22 1900 02/12/22 2315 02/13/22 0731   BP: 110/65 112/71 122/68 106/67   BP Location:  Left arm Left arm Left arm   Patient Position:  Lying Lying Lying   Pulse:    70   Resp:  18 16 18   Temp:  98 °F (36.7 °C) 98.4 °F (36.9 °C) 98.8 °F (37.1 °C)   TempSrc:  Oral Oral Oral   SpO2:  100% 99% 99%   Weight:       Height:         No flowsheet data found.    Physical Exam    CONSTITUTIONAL:  Vital signs reviewed.  No distress, looks comfortable.  EYES:  Conjunctiva and lids unremarkable.    EARS,NOSE,MOUTH,THROAT:  Ears and nose appear unremarkable.  Lips, teeth, gums appear unremarkable.  RESPIRATORY:  Normal respiratory effort.    CARDIOVASCULAR:  Normal heart rate. No significant lower extremity edema.  GASTROINTESTINAL: Abdomen appears unremarkable.  Nontender. Mild distension  LYMPHATIC:  No cervical, supraclavicular lymphadenopathy.  SKIN:  Warm.  No rashes.  PSYCHIATRIC:  Normal judgment and insight.  Normal mood and affect.  NEURO: AAO x 3, no focal deficits  RECENT LABS:  Hematology WBC   Date Value Ref Range Status   02/12/2022 2.32 (L) 3.40 - 10.80 10*3/mm3 Final     RBC   Date Value Ref Range Status   02/12/2022 3.08 (L) 4.14 - 5.80 10*6/mm3 Final     Hemoglobin   Date Value Ref Range Status   02/12/2022 8.5 (L) 13.0 - 17.7 g/dL Final     Hematocrit   Date Value Ref Range Status   02/12/2022 23.9 (L) 37.5 -  51.0 % Final     Platelets   Date Value Ref Range Status   02/12/2022 38 (C) 140 - 450 10*3/mm3 Final          Assessment/Plan    is a pleasant 67 y/o WM with medical issues comprising alcoholic liver disease, cirrhosis, esophageal varices s/p banding recurrent ascites & HTN who was admitted on 2/4/22 with hepatic encephalopathy. Hematology has been consulted for portal vein evaluation & management.      # Portal vein thrombosis:  - Acute on chronic. MRI abdomen from 1/7/22 done at [facility] had demonstrated peripheral, chronic nonocclusive thrombus of the main portal vein. The doppler from 2/6/22 shows acute thrombus in the main and right intrahepatic portal veins.   - This is likely due to altered coagulation due to hepatic dysfunction.  No prior history of VTEs. No family history of thrombosis.   - However given unusual location, hypercoagulable state work up was sent on 2/7/22.   -  Factor V leiden mutation negative. Factor II & AT-III activity low (likely due to liver dysfunction). Mildly elevated ACL, IgM (unclear significance). LA & b2-GP negative.  JAK2 mutation negative. [facility] flow cytometry pending. Will defer protein C & S as levels would be altered.   - Patient has chronic thrombocytopenia with platelets in 40-50,000s range. The CBC from 2/7/22 shows platelet count of 40,000. No c/o bleed or bruise.   - Given risk for intestinal infarction with PV thrombus and likely prothrombotic state,  started him on low dose lovenox on 2/7/22.   - Tolerating well.   - Platelets remain low, dropped from 42,000 on 2/8/22 to 36,000 on 2/9 to 34,000 on 2/10. Lovenox stopped. HIT Ab sent.   - 2/11: Seen by vascular surgery. No interventions planned. Platelet slightly improved to 36,000 today. Await HIT Ab. If HIT Ab negative & platelets continue to improve, will consider resuming lovenox.   - 2/12: Await HIT Ab. Platelets slightly improved to 38,000. Patient being planned for liver transplant evaluation as  outpatient. Will consider resuming lovenox if HIT Ab negative.  2/13: HIT ab came back negative. Platelets improved to 48,000. Will resume him on lovenox 40 mg sq daily. Plan to follow up as outpatient.      # Thrombocytopenia:  - Acute on chronic. Likely liver disease related.   - No bleed/bruise    # Leukopenia:  Likely liver disease related.   Monitor    # Coagulopathy:  PT/INR elevated.   No bleed/bruise. Monitor    # Anemia:  Likely anemia of chronic disease. Baseline Hb in 8-9 gm/dl range since 2020.  Iron panel with no deficiency.   Hb improved.      # Hepatic encephalopathy: Per GI  # Decompensated liver cirrhosis  # Ascites     RECOMMENDATIONS:  - Portal vein thrombus likely liver disease related.   - Hypercoagulable work ups: Factor V leiden mutation negative. AT-III & Factor II activity low (likely due to liver dysfunction). Mildly elevated ACL, IgM (unclear significance). LA & b2-GP negative.  JAK2 mutation negative. [facility] flow cytometry pending  -  Seen by vascular surgery. No interventions planned.   - Platelet slightly improved to 48,000 today. HIT Ab negative. Will resume lovenox.   - Patient is eager to go home.   - If patient is discharged today, will plan to follow up next week in clinic. If still here tomorrow, will continue to follow.     2/13/2022      CC:

## 2022-02-13 NOTE — PROGRESS NOTES
Name: Wei Potts ADMIT: 2022   : 1955  PCP: Bella Villalta MD    MRN: [id number] LOS: 6 days   AGE/SEX: 66 y.o. male  ROOM: [facility]/     Subjective   Subjective   Patient feels well.  There is no headache or focal neurological symptoms.  Medications are normal.  No abdominal pain.  No nausea or vomiting.  Normal bowel habits without constipation/diarrhea/bleeding per rectum/melena.  No fever or chills.    Review of Systems  Cardiovascular/respiratory.  No chest pain.  No shortness of breath.  No cough.  No wheeze.  No hemoptysis.  .  No dysuria or hematuria.       Objective   Objective   Vital Signs  Temp:  [98 °F (36.7 °C)-98.8 °F (37.1 °C)] 98.3 °F (36.8 °C)  Heart Rate:  [70-76] 76  Resp:  [16-18] 18  BP: (106-122)/(67-71) 118/69  SpO2:  [99 %-100 %] 100 %  on   ;   Device (Oxygen Therapy): room air  No intake or output data in the 24 hours ending 22 1350  Body mass index is 22.39 kg/m².      22  0910   Weight: 72.8 kg (160 lb 8 oz)     Physical Exam  General.  Middle-aged gentleman.  Alert and oriented x3.  No apparent pain distress or diaphoresis.  Normal mood and affect.    Eyes.  Pupils equal round and reactive.  Intact extraocular musculature.  No pallor or jaundice.  Normal conjunctive and lids.  Oral cavity.  Moist mucous membrane.  Neck.  Supple.  No JVD.  No lymphadenopathy or thyromegaly.  Cardiovascular.  Regular rate and rhythm.  Grade 2 systolic murmur.  Chest.  Clear to auscultation bilaterally with no added sounds.  Abdomen.  Mildly distended.  Soft lax.  No tenderness.  No organomegaly.  No guarding or rebound.  Extremities.  No clubbing cyanosis or edema.    CNS.  No acute focal neurological deficits.      Results Review:      Results from last 7 days   Lab Units 22  0904 22  0401 22  0650 02/10/22  0510 22  0804 22  0401 22  0526   SODIUM mmol/L 127* 129* 128* 129* 130* 136 128*   POTASSIUM mmol/L 4.3 4.1 4.0 4.0 4.0 4.0 3.9    CHLORIDE mmol/L 101 100 101 101 102 105 103   CO2 mmol/L 18.9* 18.0* 19.7* 18.6* 18.1* 18.4* 18.0*   BUN mg/dL 9 12 11 10 10 10 11   CREATININE mg/dL 0.96 1.01 0.94 0.88 0.94 0.88 0.99   GLUCOSE mg/dL 124* 88 98 159* 150* 101* 103*   CALCIUM mg/dL 7.8* 7.3* 7.3* 7.4* 7.6* 7.9* 7.4*   AST (SGOT) U/L 87* 92* 80* 79* 81* 83* 75*   ALT (SGPT) U/L 43* 37 36 34 34 36 30     Estimated Creatinine Clearance: 80 mL/min (by C-G formula based on SCr of 0.96 mg/dL).      Results from last 7 days   Lab Units 02/08/22  1131   GLUCOSE mg/dL 160*                       Invalid input(s):  PHOS        Invalid input(s): LDLCALC  Results from last 7 days   Lab Units 02/13/22  0904 02/12/22  0401 02/12/22  0401 02/11/22  0650 02/10/22  0510 02/10/22  0510 02/09/22  0804 02/09/22  0804 02/08/22  0401 02/07/22  0526 02/07/22  0526   WBC 10*3/mm3 3.16*  --  2.32* 1.83*  --  1.99*  --  1.98* 1.90*  --  1.72*   HEMOGLOBIN g/dL 9.3*  --  8.5* 8.6*  --  8.7*  --  8.6* 8.8*  --  8.3*   HEMATOCRIT % 27.1*  --  23.9* 24.5*  --  27.0*  --  24.9* 25.8*  --  24.0*   PLATELETS 10*3/mm3 48*  --  38* 36*  --  34*  --  36* 42*  --  40*   MCV fL 82.6   < > 77.6* 78.8*   < > 85.2   < > 80.1 81.9   < > 80.3   MCH pg 28.4   < > 27.6 27.7   < > 27.4   < > 27.7 27.9   < > 27.8   MCHC g/dL 34.3   < > 35.6 35.1   < > 32.2   < > 34.5 34.1   < > 34.6   RDW % 16.7*   < > 16.4* 16.0*   < > 16.7*   < > 15.5* 16.2*   < > 15.8*   RDW-SD fl 50.5   < > 45.8 44.7   < > 52.3   < > 44.9 48.4   < > 45.9   MPV fL 11.7   < > 12.0 11.0   < > 10.5   < > 10.7 10.8   < > 10.8   NEUTROPHIL % %  --   --   --  48.1   < > 53.8  --   --  48.5   < >  --    LYMPHOCYTE % %  --   --   --  27.3   < > 25.1  --   --  28.4   < >  --    MONOCYTES % %  --   --   --  22.4*   < > 18.6*  --   --  19.5*   < >  --    EOSINOPHIL % %  --   --   --  1.1   < > 1.5  --   --  2.6   < >  --    BASOPHIL % %  --   --   --  0.0   < > 0.5  --   --  0.5   < >  --    IMM GRAN % %  --   --   --   --   --   --    --   --  0.5  --   --    NEUTROS ABS 10*3/mm3 2.35   < > 1.85 0.88*   < > 1.07*   < > 1.54* 0.92*   < > 0.84*   LYMPHS ABS 10*3/mm3  --   --   --  0.50*   < > 0.50*  --   --  0.54*   < >  --    MONOS ABS 10*3/mm3  --   --   --  0.41   < > 0.37  --   --  0.37   < >  --    EOS ABS 10*3/mm3  --   --   --  0.02   < > 0.03  --   --  0.05   < > 0.03   BASOS ABS 10*3/mm3  --   --   --  0.00   < > 0.01  --  0.02 0.01   < >  --    IMMATURE GRANS (ABS) 10*3/mm3  --   --   --   --   --   --   --   --  0.01  --   --    NRBC /100 WBC  --   --   --   --   --   --   --   --  0.0  --   --     < > = values in this interval not displayed.     Results from last 7 days   Lab Units 02/13/22  0904 02/12/22  0401 02/11/22  0650 02/10/22  0510 02/07/22  0526   INR  1.75* 1.64* 1.66* 1.70* 2.07*         Results from last 7 days   Lab Units 02/12/22  0401 02/10/22  0510 02/09/22  0804   PROCALCITONIN ng/mL 0.20 0.09 0.14         Results from last 7 days   Lab Units 02/13/22  0904 02/12/22  0401 02/11/22  0650 02/10/22  1125 02/09/22  0443 02/08/22  0401 02/07/22  0526   AMMONIA umol/L 99* 59 85* 102* 82* 108* 133*                       Imaging:  Imaging Results (Last 24 Hours)     ** No results found for the last 24 hours. 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           · Hepatic encephalopathy/alcoholic liver cirrhosis with ascites/portal  hypertension/esophageal diuresis/SBP/portal vein thrombosis/pancytopenia.  Hepatic encephalopathy has resolved.  Ammonia is still elevated but stable.  Currently on Aldactone/Lasix is/nadolol.  Pancytopenia has improved.  Vascular surgery recommends no intervention for the portal vein thrombosis.  GI has seen the patient and recommended continuation of the same and a low dose anticoagulation.  Discussed with hematologist , HIT is negative.  Noted hypercoagulable status work-up.  Hematology recommends Lovenox low-dose at discharge and if the insurance does not he recommends Eliquis 2.5 mg p.o. twice daily.  Liver function test is stable.  Patient alcoholic.  There is no active bleed.  Will need a follow-up with his hepatologist as an outpatient and hematologist as an outpatient.  · Hypotension.  Resolved with midodrine.  Asymptomatic.  · Hyponatremia.  Secondary to liver cirrhosis and volume overload.  Stable.  Continue Lasix and Aldactone.   · Covid positive infection.  No respiratory symptoms.  No hypoxemia.  No treatment indicated.       · Discussed my findings and plan of treatment with the patient/wife/nurse  · Anticipate discharge tomorrow.      Hardik Swenson MD  [facility]  02/13/22  13:50 EST

## 2022-02-14 ENCOUNTER — READMISSION MANAGEMENT (OUTPATIENT)
Dept: CALL CENTER | Facility: HOSPITAL | Age: 67
End: 2022-02-14

## 2022-02-14 VITALS
BODY MASS INDEX: 22.47 KG/M2 | TEMPERATURE: 97.3 F | HEART RATE: 72 BPM | OXYGEN SATURATION: 100 % | SYSTOLIC BLOOD PRESSURE: 126 MMHG | DIASTOLIC BLOOD PRESSURE: 69 MMHG | RESPIRATION RATE: 18 BRPM | HEIGHT: 71 IN | WEIGHT: 160.5 LBS

## 2022-02-14 PROBLEM — K76.82 HEPATIC ENCEPHALOPATHY (HCC): Status: RESOLVED | Noted: 2020-12-25 | Resolved: 2022-02-14

## 2022-02-14 LAB
ALBUMIN SERPL-MCNC: 2.4 G/DL (ref 3.5–5.2)
ALBUMIN/GLOB SERPL: 0.6 G/DL
ALP SERPL-CCNC: 136 U/L (ref 39–117)
ALT SERPL W P-5'-P-CCNC: 42 U/L (ref 1–41)
AMMONIA BLD-SCNC: 70 UMOL/L (ref 16–60)
ANION GAP SERPL CALCULATED.3IONS-SCNC: 9 MMOL/L (ref 5–15)
AST SERPL-CCNC: 90 U/L (ref 1–40)
BASOPHILS # BLD AUTO: 0.01 10*3/MM3 (ref 0–0.2)
BASOPHILS NFR BLD AUTO: 0.3 % (ref 0–1.5)
BILIRUB SERPL-MCNC: 1.8 MG/DL (ref 0–1.2)
BUN SERPL-MCNC: 10 MG/DL (ref 8–23)
BUN/CREAT SERPL: 10.3 (ref 7–25)
CALCIUM SPEC-SCNC: 7.5 MG/DL (ref 8.6–10.5)
CHLORIDE SERPL-SCNC: 102 MMOL/L (ref 98–107)
CO2 SERPL-SCNC: 18 MMOL/L (ref 22–29)
CREAT SERPL-MCNC: 0.97 MG/DL (ref 0.76–1.27)
DEPRECATED RDW RBC AUTO: 45.8 FL (ref 37–54)
EOSINOPHIL # BLD AUTO: 0.12 10*3/MM3 (ref 0–0.4)
EOSINOPHIL NFR BLD AUTO: 3.3 % (ref 0.3–6.2)
ERYTHROCYTE [DISTWIDTH] IN BLOOD BY AUTOMATED COUNT: 16.3 % (ref 12.3–15.4)
GFR SERPL CREATININE-BSD FRML MDRD: 78 ML/MIN/1.73
GLOBULIN UR ELPH-MCNC: 4 GM/DL
GLUCOSE SERPL-MCNC: 81 MG/DL (ref 65–99)
HCT VFR BLD AUTO: 25.4 % (ref 37.5–51)
HGB BLD-MCNC: 9 G/DL (ref 13–17.7)
INR PPP: 1.73 (ref 0.9–1.1)
LYMPHOCYTES # BLD AUTO: 0.66 10*3/MM3 (ref 0.7–3.1)
LYMPHOCYTES NFR BLD AUTO: 17.9 % (ref 19.6–45.3)
MCH RBC QN AUTO: 27.8 PG (ref 26.6–33)
MCHC RBC AUTO-ENTMCNC: 35.4 G/DL (ref 31.5–35.7)
MCV RBC AUTO: 78.4 FL (ref 79–97)
MONOCYTES # BLD AUTO: 0.62 10*3/MM3 (ref 0.1–0.9)
MONOCYTES NFR BLD AUTO: 16.8 % (ref 5–12)
NEUTROPHILS NFR BLD AUTO: 2.24 10*3/MM3 (ref 1.7–7)
NEUTROPHILS NFR BLD AUTO: 60.9 % (ref 42.7–76)
PLATELET # BLD AUTO: 49 10*3/MM3 (ref 140–450)
PMV BLD AUTO: 11.2 FL (ref 6–12)
POTASSIUM SERPL-SCNC: 4.1 MMOL/L (ref 3.5–5.2)
PROCALCITONIN SERPL-MCNC: 0.18 NG/ML (ref 0–0.25)
PROT SERPL-MCNC: 6.4 G/DL (ref 6–8.5)
PROTHROMBIN TIME: 20.2 SECONDS (ref 11.7–14.2)
RBC # BLD AUTO: 3.24 10*6/MM3 (ref 4.14–5.8)
SODIUM SERPL-SCNC: 129 MMOL/L (ref 136–145)
WBC NRBC COR # BLD: 3.68 10*3/MM3 (ref 3.4–10.8)

## 2022-02-14 PROCEDURE — 25010000002 ENOXAPARIN PER 10 MG: Performed by: INTERNAL MEDICINE

## 2022-02-14 PROCEDURE — 82140 ASSAY OF AMMONIA: CPT | Performed by: HOSPITALIST

## 2022-02-14 PROCEDURE — 85610 PROTHROMBIN TIME: CPT | Performed by: STUDENT IN AN ORGANIZED HEALTH CARE EDUCATION/TRAINING PROGRAM

## 2022-02-14 PROCEDURE — 85025 COMPLETE CBC W/AUTO DIFF WBC: CPT | Performed by: STUDENT IN AN ORGANIZED HEALTH CARE EDUCATION/TRAINING PROGRAM

## 2022-02-14 PROCEDURE — 80053 COMPREHEN METABOLIC PANEL: CPT | Performed by: STUDENT IN AN ORGANIZED HEALTH CARE EDUCATION/TRAINING PROGRAM

## 2022-02-14 PROCEDURE — 84145 PROCALCITONIN (PCT): CPT | Performed by: STUDENT IN AN ORGANIZED HEALTH CARE EDUCATION/TRAINING PROGRAM

## 2022-02-14 RX ORDER — AMOXICILLIN 250 MG
1 CAPSULE ORAL DAILY
Qty: 30 TABLET | Refills: 3 | Status: SHIPPED | OUTPATIENT
Start: 2022-02-15 | End: 2022-03-18

## 2022-02-14 RX ORDER — ZINC SULFATE 50(220)MG
220 CAPSULE ORAL DAILY
Qty: 30 CAPSULE | Refills: 3 | Status: SHIPPED | OUTPATIENT
Start: 2022-02-14 | End: 2022-03-18

## 2022-02-14 RX ORDER — DIPHENOXYLATE HYDROCHLORIDE AND ATROPINE SULFATE 2.5; .025 MG/1; MG/1
1 TABLET ORAL DAILY
Qty: 30 TABLET | Refills: 3 | Status: SHIPPED | OUTPATIENT
Start: 2022-02-15

## 2022-02-14 RX ORDER — PANTOPRAZOLE SODIUM 40 MG/1
40 TABLET, DELAYED RELEASE ORAL EVERY MORNING
Qty: 30 TABLET | Refills: 3 | Status: SHIPPED | OUTPATIENT
Start: 2022-02-14

## 2022-02-14 RX ORDER — MIDODRINE HYDROCHLORIDE 5 MG/1
2.5 TABLET ORAL EVERY 8 HOURS
Qty: 45 TABLET | Refills: 3 | Status: SHIPPED | OUTPATIENT
Start: 2022-02-14

## 2022-02-14 RX ORDER — SULFAMETHOXAZOLE AND TRIMETHOPRIM 400; 80 MG/1; MG/1
1 TABLET ORAL
Qty: 9 TABLET | Refills: 0 | Status: SHIPPED | OUTPATIENT
Start: 2022-02-15 | End: 2022-02-24

## 2022-02-14 RX ORDER — NADOLOL 20 MG/1
20 TABLET ORAL
Qty: 30 TABLET | Refills: 3 | Status: SHIPPED | OUTPATIENT
Start: 2022-02-15

## 2022-02-14 RX ORDER — LACTULOSE 10 G/15ML
20 SOLUTION ORAL 3 TIMES DAILY
Qty: 500 ML | Refills: 3 | Status: SHIPPED | OUTPATIENT
Start: 2022-02-14 | End: 2022-05-26

## 2022-02-14 RX ADMIN — FUROSEMIDE 40 MG: 40 TABLET ORAL at 10:15

## 2022-02-14 RX ADMIN — MULTIPLE VITAMINS W/ MINERALS TAB 1 TABLET: TAB at 10:15

## 2022-02-14 RX ADMIN — LACTULOSE 20 G: 10 SOLUTION ORAL at 10:14

## 2022-02-14 RX ADMIN — SPIRONOLACTONE 50 MG: 50 TABLET, FILM COATED ORAL at 10:16

## 2022-02-14 RX ADMIN — ENOXAPARIN SODIUM 40 MG: 100 INJECTION SUBCUTANEOUS at 12:06

## 2022-02-14 RX ADMIN — SULFAMETHOXAZOLE AND TRIMETHOPRIM 1 TABLET: 400; 80 TABLET ORAL at 10:14

## 2022-02-14 RX ADMIN — DOCUSATE SODIUM 50MG AND SENNOSIDES 8.6MG 1 TABLET: 8.6; 5 TABLET, FILM COATED ORAL at 10:16

## 2022-02-14 RX ADMIN — MIDODRINE HYDROCHLORIDE 2.5 MG: 2.5 TABLET ORAL at 05:01

## 2022-02-14 RX ADMIN — NADOLOL 20 MG: 20 TABLET ORAL at 10:16

## 2022-02-14 RX ADMIN — MIDODRINE HYDROCHLORIDE 2.5 MG: 2.5 TABLET ORAL at 12:08

## 2022-02-14 RX ADMIN — RIFAXIMIN 550 MG: 550 TABLET ORAL at 10:14

## 2022-02-14 RX ADMIN — PANTOPRAZOLE SODIUM 40 MG: 40 TABLET, DELAYED RELEASE ORAL at 05:01

## 2022-02-14 NOTE — NURSING NOTE
Explained the medication Lovenox to patient. Nurse encouraged patient to demonstrate administration, but patient refused. Patient stated he will be able to self administer Lovenox at home.   Patient with numerous questions about medications. Called pharmacy to assist with medication education. Pharmacist to call into room, due to covid isolation, and speak with patient

## 2022-02-14 NOTE — OUTREACH NOTE
Prep Survey      Responses   [facility] patient discharged from? [place]   Is LACE score < 7 ? No   Emergency Room discharge w/ pulse ox? No   Eligibility [facility]   Date of Admission 02/04/22   Date of Discharge 02/14/22   Discharge Disposition Home or Self Care   Discharge diagnosis COVID-19 virus detected    Does the patient have one of the following disease processes/diagnoses(primary or secondary)? COVID-19   Does the patient have Home health ordered? No   Is there a DME ordered? No   Prep survey completed? Yes          Rosalba Jackson RN

## 2022-02-14 NOTE — NURSING NOTE
"Pharmacy tech delivered Meds to beds to nurse since patient in covid isolation. Followed up with patient after medication education by pharmacist. Pt states \"I can't see the paper because I don't have my readers and that has been a problem\". Patient showed nurse notes from pharmacist education. Patient continues to demonstrate difficulty learning new information. On AVS, wrote down medication administrations times and asked patient to seek assistance from wife upon discharge.   "

## 2022-02-14 NOTE — DISCHARGE SUMMARY
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  Please refer to the H&P for full details.  Very pleasant 66 years old white gentleman with a history of alcoholic liver cirrhosis/hepatic encephalopathy/hypertension/portal hypertension who continues to abstain from alcohol.  He had paracentesis about 4 weeks ago but on Bactrim.  He presented to emergency department with mental status changes.  No other patient in admission a temperature of 98 a pulse of 90 respiratory rate of 18 blood pressure 136/77.  O2 sats of 99% pulmonary arrest of markable for mild abdominal distention otherwise negative  Hospital course.  Initial ER evaluation included a Covid test that was positive.  Trace ketones and small bilirubin.  CBC was normal except for white count of 2.5 hemoglobin 8.5 platelets of 48.  Urine drug screen was negative.  INR was 2.14.  ProCalcitonin was normal.  Lactic acid was elevated at 3.4.  Ammonia was elevated at 106.  CMP normal except a glucose of 125 sodium 134 and bicarbonate of 18 with albumin 2.7 AST of 59 alkaline phosphatase 139 total bilirubin 3.1 lipase was mildly elevated at 79.  Troponin was negative.  Magnesium was normal.  Ethanol was negative.  proBNP was normal.  The  impression of this gentleman was that of hepatic encephalopathy/alcoholic liver cirrhosis with ascites/portal hypertension/esophageal varices/recent SBP/pancytopenia.  Hepatic encephalopathy was treated with Xifaxan and lactulose and it has clinically resolved.  GI consult was obtained.  Ammonia level has decreased but remains slightly elevated.  He was maintained on his Bactrim for the recent SBP.  Patient was maintained on Aldactone/Lasix.  His metoprolol was stopped and was started on nadolol.  Liver ultrasound was obtained and confirmed liver cirrhosis and portal hypertension.  Liver Doppler study revealed acute portal vein thrombosis.  Patient was started on Lovenox but his platelets has dropped and hematology oncology consult was obtained HIT test was negative.  Hypercoagulable status was done by hematology oncology.  Platelets has reached tolerated but remained low and hematology oncology recommended continuation of the Lovenox at the low dose.  Vascular surgery was consulted to see if any surgical intervention is warranted however there was no indication for surgical intervention by vascular surgery.  After initiation of Medrol dose Lovenox his platelets remained stable and he had no active bleed.  He will follow-up with his hepatologist and hematology as an outpatient.  He was noted to have low blood pressure and midodrine was added and this has resolved.  He developed hyponatremia which was thought to be secondary to liver cirrhosis and volume overload and we continued him on the Aldactone and Lasix and he was counseled about fluid restriction and low-salt diet.  Sodium is starting to improve by the time of discharge and it was 129.  And incidental findings of Covid positive infection was found he had no respiratory symptoms and no hypoxemia and no treatment was indicated.  At the time of discharge he was tolerating regular diet well and he was hemodynamically stable.  Consultants     Consult Orders (all)  (From admission, onward)     Start     Ordered    02/10/22 0917  Inpatient Vascular Surgery Consult  Once        Specialty:  Vascular Surgery  Provider:  Alexandro Lowe MD    02/10/22 0917    02/06/22 1230  Hematology & Oncology Inpatient Consult  Once        Specialty:  Hematology and Oncology  Provider:  Tejinder Sevilla MD    02/06/22 1230    02/04/22 1659  Inpatient Gastroenterology Consult  Once        Specialty:  Gastroenterology  Provider:  Ruby Mcgee MD    02/04/22 1659    02/04/22 0942  [facility] (on-call MD unless specified) Details  Once        Specialty:  Hospitalist  Provider:  Robert Contreras APRN    02/04/22 0941              Procedures     Imaging Results (All)     Procedure Component Value Units Date/Time    US Abdomen Limited [[id number]] Collected: 02/05/22 2029     Updated: 02/05/22 2035    Narrative:      Right upper quadrant abdominal sonogram     TECHNIQUE: Grayscale and color Doppler images of the right upper  quadrant of the abdomen were obtained.     HISTORY: Cirrhosis     COMPARISON: CT abdomen pelvis 01/01/2021     FINDINGS:     The gallbladder is nondistended and demonstrates a thickened appearance,  similar to that seen on 01/01/2021.     The common bile duct measures 6 mm. in diameter. There is no  intrahepatic biliary ductal dilation.      The liver is cirrhotic, as before. At least a moderate amount of ascites  is present within the visualized abdomen. There appears to be  hepatofugal flow in the main portal vein. The main portal vein also has  an increased echogenic appearance of the nayeli hepatis with findings of  colonic flow in this area.     The right kidney measures 10.5 cm in length.  The right kidney  demonstrates normal echogenicity and cortical thickness. There is no  right-sided hydronephrosis. There are no right-sided cysts, masses or  renal calculi.      The visualized portion of the pancreas is unremarkable.      Visualized portions of the aorta and IVC appear normal.  The spleen is  enlarged, measuring 15.3 cm in length.       Impression:      1.  Cirrhosis with findings of portosystemic shunting including mild to  moderate splenomegaly and ascites. There also appears to be hepatofugal  in the main portal vein with concern for thrombus formation as well.  Further evaluation with Doppler sonogram of the portal vein is  recommended. This was discussed with Shavon, the patient's nurse, at  8:30 PM 02/05/2022  2.  Thickened appearance the gallbladder likely related to adjacent  liver disease, as before.  3.  Other findings as above.     This report was finalized on 2/5/2022 8:32 PM by Dr. Jason Maki M.D.             Pertinent Labs     Results from last 7 days   Lab Units 02/14/22 0408 02/13/22 0904 02/12/22 0401 02/11/22  0650   WBC 10*3/mm3 3.68 3.16* 2.32* 1.83*   HEMOGLOBIN g/dL 9.0* 9.3* 8.5* 8.6*   PLATELETS 10*3/mm3 49* 48* 38* 36*     Results from last 7 days   Lab Units 02/14/22 0408 02/13/22 0904 02/12/22 0401 02/11/22  0650   SODIUM mmol/L 129* 127* 129* 128*   POTASSIUM mmol/L 4.1 4.3 4.1 4.0   CHLORIDE mmol/L 102 101 100 101   CO2 mmol/L 18.0* 18.9* 18.0* 19.7*   BUN mg/dL 10 9 12 11   CREATININE mg/dL 0.97 0.96 1.01 0.94   GLUCOSE mg/dL 81 124* 88 98   Estimated Creatinine Clearance: 79.2 mL/min (by C-G formula based on SCr of 0.97 mg/dL).  Results from last 7 days   Lab Units 02/14/22 0408 02/13/22 0904 02/12/22 0401 02/11/22  0650   ALBUMIN g/dL 2.40* 2.30* 2.30* 2.00*   BILIRUBIN mg/dL 1.8* 1.5* 1.8* 1.9*   ALK PHOS U/L 136* 142* 129* 118*   AST (SGOT) U/L 90* 87* 92* 80*   ALT (SGPT) U/L 42* 43* 37 36     Results from last 7 days   Lab Units 02/14/22  0408 02/13/22  0904 02/12/22  0401 02/11/22  0650   CALCIUM mg/dL 7.5* 7.8* 7.3* 7.3*   ALBUMIN g/dL 2.40* 2.30* 2.30* 2.00*     Results from last 7 days   Lab Units 02/14/22  0408 02/13/22  0904 02/12/22  0401 02/11/22  0650   AMMONIA umol/L 70* 99* 59 85*             Invalid input(s): LDLCALC       Imaging Results (Last 24 Hours)     ** No results found for the last 24 hours. **          Test Results Pending at Discharge         Discharge Exam   Physical Exam  Vitals.  Temperature 97.3 a pulse of 72 respiratory rate of 18 blood pressure 126/69 and O2 sats of 100% on room air  General.  Middle-aged gentleman.  Alert and oriented x3.  No apparent pain distress or diaphoresis.  Normal mood and affect.    Eyes.  Pupils equal round and reactive.  Intact extraocular musculature.  No pallor or jaundice.  Normal conjunctive and lids.  Oral cavity.  Moist mucous membrane.  Neck.  Supple.  No JVD.  No lymphadenopathy or thyromegaly.  Cardiovascular.  Regular rate and rhythm.  Grade 2 systolic murmur.  Chest.  Clear to auscultation bilaterally with no added sounds.  Abdomen.  Mildly distended.  Soft lax.  No tenderness.  No organomegaly.  No guarding or rebound.  Extremities.  No clubbing cyanosis or edema.    CNS.  No acute focal neurological deficits.     Discharge Details        Discharge Medications      New Medications      Instructions Start Date   enoxaparin 40 MG/0.4ML solution syringe  Commonly known as: LOVENOX   40 mg, Subcutaneous, Every 24 Hours      midodrine 5 MG tablet  Commonly known as: PROAMATINE   2.5 mg, Oral, Every 8 Hours      multivitamin tablet tablet   1 tablet, Oral, Daily   Start Date: February 15, 2022     nadolol 20 MG tablet  Commonly known as: CORGARD   20 mg, Oral, Every 24 Hours Scheduled   Start Date: February 15, 2022     pantoprazole 40 MG EC tablet  Commonly known as: PROTONIX  Replaces: omeprazole 40 MG capsule   40 mg, Oral, Every Morning      riFAXIMin 550 MG tablet  Commonly known as: XIFAXAN   550 mg, Oral, Every 12 Hours Scheduled      sennosides-docusate 8.6-50 MG per tablet  Commonly known as: PERICOLACE   1 tablet, Oral, Daily   Start Date: February 15, 2022     sulfamethoxazole-trimethoprim 400-80 MG tablet  Commonly known as: BACTRIM,SEPTRA   1 tablet, Oral, Every 24  Hours Scheduled   Start Date: February 15, 2022     zinc sulfate 220 (50 Zn) MG capsule  Commonly known as: ZINCATE  Replaces: zinc gluconate 50 MG tablet   220 mg, Oral, Daily         Changes to Medications      Instructions Start Date   lactulose 10 GM/15ML solution  Commonly known as: CHRONULAC  What changed: See the new instructions.   20 g, Oral, 3 Times Daily         Continue These Medications      Instructions Start Date   amitriptyline 10 MG tablet  Commonly known as: ELAVIL   10 mg, Oral, Nightly      furosemide 40 MG tablet  Commonly known as: LASIX   40 mg, Oral, Daily      MAGnesium-Oxide 400 (241.3 Mg) MG tablet tablet  Generic drug: magnesium oxide   400 mg, 2 Times Daily      spironolactone 50 MG tablet  Commonly known as: ALDACTONE   50 mg, Oral, Daily         Stop These Medications    Generlac 10 GM/15ML solution solution (encephalopathy)  Generic drug: lactulose     metoprolol tartrate 25 MG tablet  Commonly known as: LOPRESSOR     omeprazole 40 MG capsule  Commonly known as: priLOSEC  Replaced by: pantoprazole 40 MG EC tablet     zinc gluconate 50 MG tablet  Replaced by: zinc sulfate 220 (50 Zn) MG capsule            Allergies   Allergen Reactions   • Iron GI Intolerance         Discharge Disposition:  Condition: Stable    Diet:   Diet Order   Procedures   • Diet Regular; Low Sodium; 2,000 mg Na   Fluid restriction to 1500/day.    Activity:   Activity Instructions     Activity as Tolerated            Counseling : No nonsteroidal anti-inflammatory medication over-the-counter    CODE STATUS:    Code Status and Medical Interventions:   Ordered at: 02/04/22 1329     Level Of Support Discussed With:    Patient     Code Status (Patient has no pulse and is not breathing):    CPR (Attempt to Resuscitate)     Medical Interventions (Patient has pulse or is breathing):    Full Support       Future Appointments   Date Time Provider Department Center   2/15/2022  1:40 PM LAB CHAIR 1 [facility]   2/15/2022  2:20 PM Eulalio Espino MD [facility]   3/3/2022 12:15 PM Ruby Mcgee MD [facility]   7/18/2022  9:45 AM Ruby Mcgee MD [facility]     Additional Instructions for the Follow-ups that You Need to Schedule     Call MD With Problems / Concerns   As directed      Instructions: Call MD or return to ER if abdominal pain/nausea or vomiting/bleeding per rectum/melena/fever or chills/chest pain/shortness of breath    Order Comments: Instructions: Call MD or return to ER if abdominal pain/nausea or vomiting/bleeding per rectum/melena/fever or chills/chest pain/shortness of breath          Discharge Follow-up with PCP   As directed       Currently Documented PCP:    Bella Villalta MD    PCP Phone Number:    [phone]     Follow Up Details: D.  1 week.  Hepatic encephalopathy/liver cirrhosis/hypertension/SBP/portal vein thrombosis         Discharge Follow-up with Specified Provider: Hematology.; 2 Weeks   As directed      To: Hematology.    Follow Up: 2 Weeks    Follow Up Details: Thrombocytopenia.  Portal vein thrombosis.  Liver cirrhosis.         Discharge Follow-up with Specified Provider: hipatologist; 2 Weeks   As directed      To: hipatologist    Follow Up: 2 Weeks    Follow Up Details: Alcoholic liver cirrhosis/metabolic encephalopathy/coagulopathy/portal vein thrombosis            Follow-up Information     Eulalio Espino MD Follow up in 2 week(s).    Specialties: Hematology and Oncology, Oncology  Contact information:  [address]  [facility] [address]  [phone]             Ruby Mcgee MD Follow up in 4 week(s).    Specialty: Gastroenterology  Contact information:  [address]  [facility] [address]  [phone]             Bella Villalta MD .    Specialty: Family Medicine  Why: D.  1 week.  Hepatic encephalopathy/liver cirrhosis/hypertension/SBP/portal vein thrombosis  Contact information:  [address]  [facility]  [address]  [phone]                           Time Spent on Discharge:  Greater than 30 minutes      Hardik Swenson MD  [facility]  02/14/22  11:36 EST

## 2022-02-15 ENCOUNTER — TRANSITIONAL CARE MANAGEMENT TELEPHONE ENCOUNTER (OUTPATIENT)
Dept: CALL CENTER | Facility: HOSPITAL | Age: 67
End: 2022-02-15

## 2022-02-15 ENCOUNTER — APPOINTMENT (OUTPATIENT)
Dept: LAB | Facility: HOSPITAL | Age: 67
End: 2022-02-15

## 2022-02-15 ENCOUNTER — LAB (OUTPATIENT)
Dept: LAB | Facility: HOSPITAL | Age: 67
End: 2022-02-15

## 2022-02-15 ENCOUNTER — OFFICE VISIT (OUTPATIENT)
Dept: ONCOLOGY | Facility: CLINIC | Age: 67
End: 2022-02-15

## 2022-02-15 VITALS
WEIGHT: 149 LBS | SYSTOLIC BLOOD PRESSURE: 120 MMHG | HEIGHT: 71 IN | BODY MASS INDEX: 20.86 KG/M2 | OXYGEN SATURATION: 100 % | RESPIRATION RATE: 17 BRPM | DIASTOLIC BLOOD PRESSURE: 66 MMHG | TEMPERATURE: 97.4 F | HEART RATE: 75 BPM

## 2022-02-15 DIAGNOSIS — K70.9 ALCOHOLIC LIVER DISEASE: Primary | ICD-10-CM

## 2022-02-15 DIAGNOSIS — K70.31 ALCOHOLIC CIRRHOSIS OF LIVER WITH ASCITES: Primary | ICD-10-CM

## 2022-02-15 DIAGNOSIS — I81 PORTAL VEIN THROMBOSIS: ICD-10-CM

## 2022-02-15 LAB
ALBUMIN SERPL-MCNC: 2.6 G/DL (ref 3.5–5.2)
ALBUMIN/GLOB SERPL: 0.5 G/DL (ref 1.1–2.4)
ALP SERPL-CCNC: 158 U/L (ref 38–116)
ALT SERPL W P-5'-P-CCNC: 44 U/L (ref 0–41)
ANION GAP SERPL CALCULATED.3IONS-SCNC: 10.1 MMOL/L (ref 5–15)
AST SERPL-CCNC: 101 U/L (ref 0–40)
BASOPHILS # BLD AUTO: 0.01 10*3/MM3 (ref 0–0.2)
BASOPHILS NFR BLD AUTO: 0.3 % (ref 0–1.5)
BILIRUB SERPL-MCNC: 1.7 MG/DL (ref 0.2–1.2)
BUN SERPL-MCNC: 9 MG/DL (ref 6–20)
BUN/CREAT SERPL: 9.6 (ref 7.3–30)
CALCIUM SPEC-SCNC: 8 MG/DL (ref 8.5–10.2)
CHLORIDE SERPL-SCNC: 103 MMOL/L (ref 98–107)
CO2 SERPL-SCNC: 20.9 MMOL/L (ref 22–29)
CREAT SERPL-MCNC: 0.94 MG/DL (ref 0.7–1.3)
DEPRECATED RDW RBC AUTO: 58 FL (ref 37–54)
EOSINOPHIL # BLD AUTO: 0.11 10*3/MM3 (ref 0–0.4)
EOSINOPHIL NFR BLD AUTO: 2.9 % (ref 0.3–6.2)
ERYTHROCYTE [DISTWIDTH] IN BLOOD BY AUTOMATED COUNT: 18.1 % (ref 12.3–15.4)
GFR SERPL CREATININE-BSD FRML MDRD: 81 ML/MIN/1.73
GLOBULIN UR ELPH-MCNC: 4.8 GM/DL (ref 1.8–3.5)
GLUCOSE SERPL-MCNC: 129 MG/DL (ref 74–124)
HCT VFR BLD AUTO: 30.6 % (ref 37.5–51)
HGB BLD-MCNC: 9.8 G/DL (ref 13–17.7)
IMM GRANULOCYTES # BLD AUTO: 0.04 10*3/MM3 (ref 0–0.05)
IMM GRANULOCYTES NFR BLD AUTO: 1.1 % (ref 0–0.5)
LYMPHOCYTES # BLD AUTO: 0.56 10*3/MM3 (ref 0.7–3.1)
LYMPHOCYTES NFR BLD AUTO: 15 % (ref 19.6–45.3)
MCH RBC QN AUTO: 28.4 PG (ref 26.6–33)
MCHC RBC AUTO-ENTMCNC: 32 G/DL (ref 31.5–35.7)
MCV RBC AUTO: 88.7 FL (ref 79–97)
MONOCYTES # BLD AUTO: 0.69 10*3/MM3 (ref 0.1–0.9)
MONOCYTES NFR BLD AUTO: 18.4 % (ref 5–12)
NEUTROPHILS NFR BLD AUTO: 2.33 10*3/MM3 (ref 1.7–7)
NEUTROPHILS NFR BLD AUTO: 62.3 % (ref 42.7–76)
NRBC BLD AUTO-RTO: 0 /100 WBC (ref 0–0.2)
PLATELET # BLD AUTO: 62 10*3/MM3 (ref 140–450)
PMV BLD AUTO: 11.6 FL (ref 6–12)
POTASSIUM SERPL-SCNC: 4.2 MMOL/L (ref 3.5–4.7)
PROT SERPL-MCNC: 7.4 G/DL (ref 6.3–8)
RBC # BLD AUTO: 3.45 10*6/MM3 (ref 4.14–5.8)
SODIUM SERPL-SCNC: 134 MMOL/L (ref 134–145)
WBC NRBC COR # BLD: 3.74 10*3/MM3 (ref 3.4–10.8)

## 2022-02-15 PROCEDURE — 80053 COMPREHEN METABOLIC PANEL: CPT

## 2022-02-15 PROCEDURE — 36415 COLL VENOUS BLD VENIPUNCTURE: CPT

## 2022-02-15 PROCEDURE — 85025 COMPLETE CBC W/AUTO DIFF WBC: CPT

## 2022-02-15 PROCEDURE — 99215 OFFICE O/P EST HI 40 MIN: CPT | Performed by: INTERNAL MEDICINE

## 2022-02-15 NOTE — PROGRESS NOTES
[facility]    CONSULTING IN BLOOD DISORDERS & CANCER      REASON FOR CONSULTATION/CHIEF COMPLAINT:     Evaluation & management for portal vein thrombosis                             REQUESTING PHYSICIAN: No ref. provider found  RECORDS OBTAINED:  Records of the patients history including those from the electronic medical record were reviewed and summarized in detail.    HISTORY OF PRESENT ILLNESS:    The patient is a 66 y.o. year old male with medical issues comprising alcoholic liver disease, cirrhosis, esophageal varices s/p banding recurrent ascites & HTN who was admitted to the [facility] on 2/4/22 with hepatic encephalopathy.      2/5/22: A US abdomen Cirrhosis with findings of portosystemic shunting including mild to moderate splenomegaly and ascites. There also appears to be hepatofugal in the main portal vein with concern for thrombus formation as well.     2/6/22: A doppler portal vein revealed acute thrombus in the main portal vein, right intrahepatic portal vein, along with abnormal flow in the extrahepatic portal vein and left intrahepatic portal vein.     Of note, a MRI abdomen from 1/7/22 done at [facility] had demonstrated peripheral, chronic nonocclusive thrombus of the main portal vein.     He was seen by inpatient hem/onc team. Given unusual location, hypercoagulable state work up was performed  on 2/7/22. . No prior history of VTEs. No family history of thrombosis.     -  Factor V leiden mutation negative. Factor II & AT-III activity low (likely due to liver dysfunction). Mildly elevated ACL, IgM (unclear significance). LA & b2-GP negative.  JAK2 mutation negative. [facility] flow cytometry negative. Protein C & S levels  not done as they would be altered due to liver disease.     Patient has chronic thrombocytopenia with platelets in 40-50,000s range. The CBC from 2/7/22 showed platelet count of 40,000. No c/o bleed or bruise.   -Given risk for intestinal infarction with PV thrombus and likely prothrombotic state,   started him on low dose lovenox on 2/7/22.   - Tolerating well.   - Platelets dropped from 42,000 on 2/8/22 to 36,000 on 2/9 to 34,000 on 2/10. Lovenox stopped. HIT Ab sent - which later came back negative.   - 2/11: Seen by vascular surgery. No interventions planned.   2/13: HIT ab came back negative. Platelets improved to 48,000. Was resumed on lovenox 40 mg sq daily & discharged home.     Patient first seen in the hematology clinic on 2/15/22. He has been tolerating lovenox well. Denies any bleed/bruise.        Past Medical History:   Diagnosis Date   • Alcoholism (HCC)    • Anemia    • Cirrhosis (HCC)    • Encephalopathy    • Hypertension    • Liver disease      Past Surgical History:   Procedure Laterality Date   • COLONOSCOPY     • ENDOSCOPY         MEDICATIONS    Current Outpatient Medications:   •  amitriptyline (ELAVIL) 10 MG tablet, Take 1 tablet by mouth Every Night., Disp: 30 tablet, Rfl: 1  •  enoxaparin (LOVENOX) 40 MG/0.4ML solution syringe, Inject 0.4 mL under the skin into the appropriate area as directed Daily. Indications: DVT/PE (active thrombosis), Disp: 11.2 mL, Rfl: 3  •  furosemide (LASIX) 40 MG tablet, TAKE 1 TABLET BY MOUTH DAILY, Disp: 30 tablet, Rfl: 2  •  lactulose (CHRONULAC) 10 GM/15ML solution, Take 30 mL by mouth 3 (Three) Times a Day., Disp: 500 mL, Rfl: 3  •  MAGnesium-Oxide 400 (241.3 Mg) MG tablet tablet, 400 mg 2 (Two) Times a Day., Disp: , Rfl:   •  midodrine (PROAMATINE) 5 MG tablet, Take 0.5 tablets by mouth Every 8 (Eight) Hours., Disp: 45 tablet, Rfl: 3  •  multivitamin (multivitamin) tablet tablet, Take 1 tablet by mouth Daily., Disp: 30 tablet, Rfl: 3  •  nadolol (CORGARD) 20 MG tablet, Take 1 tablet by mouth Daily., Disp: 30 tablet, Rfl: 3  •  pantoprazole (PROTONIX) 40 MG EC tablet, Take 1 tablet by mouth Every Morning., Disp: 30 tablet, Rfl: 3  •  riFAXIMin (XIFAXAN) 550 MG tablet, Take 1 tablet by mouth Every 12 (Twelve) Hours for 40 doses. Indications: Impaired Brain  "Function due to Liver Disease, Disp: 40 tablet, Rfl: 0  •  sennosides-docusate (PERICOLACE) 8.6-50 MG per tablet, Take 1 tablet by mouth Daily., Disp: 30 tablet, Rfl: 3  •  spironolactone (ALDACTONE) 50 MG tablet, Take 1 tablet by mouth Daily., Disp: 30 tablet, Rfl: 3  •  sulfamethoxazole-trimethoprim (BACTRIM,SEPTRA) 400-80 MG tablet, Take 1 tablet by mouth Daily for 9 doses. Indications: PROPHYLAXIS, h/o sbp, Disp: 9 tablet, Rfl: 0  •  zinc sulfate (ZINCATE) 220 (50 Zn) MG capsule, Take 1 capsule by mouth Daily., Disp: 30 capsule, Rfl: 3    ALLERGIES:     Allergies   Allergen Reactions   • Iron GI Intolerance       SOCIAL HISTORY:       Social History     Socioeconomic History   • Marital status:    Tobacco Use   • Smoking status: Never Smoker   • Smokeless tobacco: Never Used   Vaping Use   • Vaping Use: Never used   Substance and Sexual Activity   • Alcohol use: Not Currently   • Drug use: No   • Sexual activity: Defer         FAMILY HISTORY:  Family History   Problem Relation Age of Onset   • Diabetes Mother    • Hypertension Father        REVIEW OF SYSTEMS:  Constitutional: [No fevers, chills, sweats]  Eye: [No recent visual problems]  ENMT: [No ear pain, nasal congestion, sore throat]  Respiratory: [No shortness of breath, cough]  Cardiovascular: [No Chest pain, palpitations, syncope]  Gastrointestinal: [No nausea, vomiting, diarrhea]  Genitourinary: [No hematuria]  Hema/Lymph: [Negative for bruising tendency, swollen lymph glands]  Endocrine: [Negative for excessive thirst, excessive hunger]  Musculoskeletal: [Denies any musculoskeletal pain or swelling]  Integumentary: [No rash, pruritus, abrasions]  Neurologic: [ No weakness or numbness, Alert & oriented X 4]       Vitals:    02/15/22 1508   BP: 120/66   Pulse: 75   Resp: 17   Temp: 97.4 °F (36.3 °C)   TempSrc: Temporal   SpO2: 100%   Weight: 67.6 kg (149 lb)   Height: 180.3 cm (70.98\")   PainSc: 0-No pain     Current Status 2/15/2022   ECOG score 0 "      PHYSICAL EXAM:    CONSTITUTIONAL:  Vital signs reviewed.  No distress, looks comfortable.  EYES:  Conjunctiva and lids unremarkable.   EARS,NOSE,MOUTH,THROAT:  Ears and nose appear unremarkable.  Lips, teeth, gums appear unremarkable.  RESPIRATORY:  Normal respiratory effort.  Lungs clear to auscultation bilaterally.  CARDIOVASCULAR:  Normal S1, S2.  No murmurs rubs or gallops.  No significant lower extremity edema.  GASTROINTESTINAL: Abdomen appears unremarkable.  Nontender.  Mild distension  LYMPHATIC:  No cervical, supraclavicular lymphadenopathy.  NEURO: cranial nerves 2-12 grossly intact.  No focal deficits.  Appears to have equal strength all 4 extremities.  MUSCULOSKELETAL:  Unremarkable digits/nails.  No cyanosis or clubbing.  No apparent joint deformities.  SKIN:  Warm.  No rashes.  PSYCHIATRIC:  Normal judgment and insight.  Normal mood and affect.     RECENT LABS:        Lab on 02/15/2022   Component Date Value Ref Range Status   • Glucose 02/15/2022 129* 74 - 124 mg/dL Final   • BUN 02/15/2022 9  6 - 20 mg/dL Final   • Creatinine 02/15/2022 0.94  0.70 - 1.30 mg/dL Final   • Sodium 02/15/2022 134  134 - 145 mmol/L Final   • Potassium 02/15/2022 4.2  3.5 - 4.7 mmol/L Final   • Chloride 02/15/2022 103  98 - 107 mmol/L Final   • CO2 02/15/2022 20.9* 22.0 - 29.0 mmol/L Final   • Calcium 02/15/2022 8.0* 8.5 - 10.2 mg/dL Final   • Total Protein 02/15/2022 7.4  6.3 - 8.0 g/dL Final   • Albumin 02/15/2022 2.60* 3.50 - 5.20 g/dL Final   • ALT (SGPT) 02/15/2022 44* 0 - 41 U/L Final   • AST (SGOT) 02/15/2022 101* 0 - 40 U/L Final   • Alkaline Phosphatase 02/15/2022 158* 38 - 116 U/L Final   • Total Bilirubin 02/15/2022 1.7* 0.2 - 1.2 mg/dL Final   • eGFR Non  Amer 02/15/2022 81  >60 mL/min/1.73 Final   • Globulin 02/15/2022 4.8* 1.8 - 3.5 gm/dL Final   • A/G Ratio 02/15/2022 0.5* 1.1 - 2.4 g/dL Final   • BUN/Creatinine Ratio 02/15/2022 9.6  7.3 - 30.0 Final   • Anion Gap 02/15/2022 10.1  5.0 - 15.0  mmol/L Final   • WBC 02/15/2022 3.74  3.40 - 10.80 10*3/mm3 Final   • RBC 02/15/2022 3.45* 4.14 - 5.80 10*6/mm3 Final   • Hemoglobin 02/15/2022 9.8* 13.0 - 17.7 g/dL Final   • Hematocrit 02/15/2022 30.6* 37.5 - 51.0 % Final   • MCV 02/15/2022 88.7  79.0 - 97.0 fL Final   • MCH 02/15/2022 28.4  26.6 - 33.0 pg Final   • MCHC 02/15/2022 32.0  31.5 - 35.7 g/dL Final   • RDW 02/15/2022 18.1* 12.3 - 15.4 % Final   • RDW-SD 02/15/2022 58.0* 37.0 - 54.0 fl Final   • MPV 02/15/2022 11.6  6.0 - 12.0 fL Final   • Platelets 02/15/2022 62* 140 - 450 10*3/mm3 Final   • Neutrophil % 02/15/2022 62.3  42.7 - 76.0 % Final   • Lymphocyte % 02/15/2022 15.0* 19.6 - 45.3 % Final   • Monocyte % 02/15/2022 18.4* 5.0 - 12.0 % Final   • Eosinophil % 02/15/2022 2.9  0.3 - 6.2 % Final   • Basophil % 02/15/2022 0.3  0.0 - 1.5 % Final   • Immature Grans % 02/15/2022 1.1* 0.0 - 0.5 % Final   • Neutrophils, Absolute 02/15/2022 2.33  1.70 - 7.00 10*3/mm3 Final   • Lymphocytes, Absolute 02/15/2022 0.56* 0.70 - 3.10 10*3/mm3 Final   • Monocytes, Absolute 02/15/2022 0.69  0.10 - 0.90 10*3/mm3 Final   • Eosinophils, Absolute 02/15/2022 0.11  0.00 - 0.40 10*3/mm3 Final   • Basophils, Absolute 02/15/2022 0.01  0.00 - 0.20 10*3/mm3 Final   • Immature Grans, Absolute 02/15/2022 0.04  0.00 - 0.05 10*3/mm3 Final   • nRBC 02/15/2022 0.0  0.0 - 0.2 /100 WBC Final   No results displayed because visit has over 200 results.            ASSESSMENT:   is a pleasant 65 y/o WM with medical issues comprising alcoholic liver disease, cirrhosis, esophageal varices s/p banding recurrent ascites & HTN who comes for portal vein thrombosis evaluation & management.      # Portal vein thrombosis:  - Acute on chronic. MRI abdomen from 1/7/22 done at [facility] had demonstrated peripheral, chronic nonocclusive thrombus of the main portal vein. The doppler from 2/6/22 shows acute thrombus in the main and right intrahepatic portal veins.   - This is likely due to altered  coagulation due to hepatic dysfunction.  No prior history of VTEs. No family history of thrombosis.   - However given unusual location, hypercoagulable state work up was sent on 2/7/22.   -  Factor V leiden mutation negative. Factor II & AT-III activity low (likely due to liver dysfunction). Mildly elevated ACL, IgM (unclear significance). LA & b2-GP negative.  JAK2 mutation negative. [facility] flow cytometry negative. Protein C & S not performed as levels would be altered.   - Patient has chronic thrombocytopenia with platelets in 40-50,000s range. The CBC from 2/7/22 shows platelet count of 40,000. No c/o bleed or bruise.   - Given risk for intestinal infarction with PV thrombus and likely prothrombotic state,  started him on low dose lovenox on 2/7/22.   - Platelets dropped from 42,000 on 2/8/22 to 36,000 on 2/9 to 34,000 on 2/10. Lovenox stopped. HIT Ab sent - which later came back negative.   - 2/11: Seen by vascular surgery. No interventions planned.   2/13: HIT ab came back negative. Platelets improved to 48,000. Was resumed on lovenox 40 mg sq daily & discharged home.  - 2/15: Pt returns to the clinic for a f/u visit. Is tolerating lovenox well. No bleed/bruise. No abd pain or distension. Plan to check CBC weekly. If improving platelets, will consider increasing the dose. F/u in a month.     # Thrombocytopenia:  - Acute on chronic. Likely liver disease related.   - No bleed/bruise     # Leukopenia:  Likely liver disease related.   Monitor     # Coagulopathy:  PT/INR elevated.   No bleed/bruise. Monitor     # Anemia:  Likely anemia of chronic disease. Baseline Hb in 8-9 gm/dl range since 2020.  Iron panel with no deficiency.   Hb improved.      # Hepatic encephalopathy: Per GI  # Decompensated liver cirrhosis  # Ascites: Was getting paracentesis every 2-3 months     PLAN:   - Portal vein thrombosis in the setting of liver-disease related thrombocytopenia. Will continue Lovenox 40 mg sq daily.   - Check CBC weekly  with RN review. If platelets continue to improve, will consider increasing the dose of lovenox.   - Advised to maintain close f/u with GI.  - F/u in a month    Orders Placed This Encounter   Procedures   • CBC & Differential     Standing Status:   Standing     Number of Occurrences:   4     Standing Expiration Date:   2/15/2023     Order Specific Question:   Manual Differential     Answer:   No     Total time spent during this patient encounter is 45 minutes. The total time spent with the patient includes at least one or more of the following: preparing to see the patient by reviewing of tests, prior notes or other relevant information, performing appropriate independent examination & evaluation, counseling, ordering of medications, tests or procedures, communicating with other healthcare professionals, when appropriate to coordinate care, documenting clinic information in the electronic medical records or other health records, independently interpreting results of tests and communicating the results to the patient/family or caregiver.

## 2022-02-15 NOTE — PROGRESS NOTES
Case Management Discharge Note      Final Note: home         Selected Continued Care - Discharged on 2/14/2022 Admission date: 2/4/2022 - Discharge disposition: Home or Self Care    Destination    No services have been selected for the patient.              Durable Medical Equipment    No services have been selected for the patient.              Dialysis/Infusion    No services have been selected for the patient.              Home Medical Care    No services have been selected for the patient.              Therapy    No services have been selected for the patient.              Community Resources    No services have been selected for the patient.              Community & DME    No services have been selected for the patient.                  Transportation Services  Private: Car    Final Discharge Disposition Code: 01 - home or self-care

## 2022-02-15 NOTE — OUTREACH NOTE
Call Center TCM Note      Responses   [facility] patient discharged from? [place]   Does the patient have one of the following disease processes/diagnoses(primary or secondary)? COVID-19   COVID-19 underlying condition? None   TCM attempt successful? Yes   Call start time 1152   Call end time 1156   Discharge diagnosis COVID-19 virus detected    Person spoke with today (if not patient) and relationship spouse   Meds reviewed with patient/caregiver? Yes   Is the patient having any side effects they believe may be caused by any medication additions or changes? No   Does the patient have all medications ordered at discharge? Yes   Is the patient taking all medications as directed (includes completed medication regime)? Yes   Does the patient have a primary care provider?  Yes   Comments regarding PCP hospital fu appt on 2/17/22 at 9:45 AM   Does the patient have an appointment with their PCP or specialist within 7 days of discharge? Yes   Has the patient kept scheduled appointments due by today? N/A   Psychosocial issues? No   Did the patient receive a copy of their discharge instructions? Yes   Did the patient receive a copy of COVID-19 specific instructions? Yes   Nursing interventions Reviewed instructions with patient   What is the patient's perception of their health status since discharge? Improving   Does the patient have any of the following symptoms? Cough   Nursing Interventions Nurse provided patient education   Pulse Ox monitoring None  [Pt states O2 sats at hospital before DC was %]   Is the patient/caregiver able to teach back steps to recovery at home? Rest and rebuild strength, gradually increase activity,  Eat a well-balance diet   If the patient is a current smoker, are they able to teach back resources for cessation? Not a smoker   Is the patient/caregiver able to teach back the hierarchy of who to call/visit for symptoms/problems? PCP, Specialist, Home health nurse, Urgent Care, ED,  [phone] Yes   TCM call completed? Yes   Wrap up additional comments Pt states he is doing good,  reports coughing. Pt verified PCP hospital fu appt on 2/17/22. No questions/concerns.          Rona Yoder RN    2/15/2022, 11:58 EST

## 2022-02-15 NOTE — PAYOR COMM NOTE
"Wei Potts (66 y.o. Male)     ATTN: DISCHARGE SUMMARY TO REVIEW:  [id number]     DEPT: [phone],   [phone]              Date of Birth Social Security Number Address Home Phone MRN    1955  [id number] [facility] [id number] [phone] [id number]    Yarsanism Marital Status             Voodoo        Admission Date Admission Type Admitting Provider Attending Provider Department, Room/Bed    22 Emergency Jer Serrano MD  [address], S402/1    Discharge Date Discharge Disposition Discharge Destination          2022 Home or Self Care              Attending Provider: (none)   Allergies: Iron    Isolation: None   Infection: COVID (confirmed) (22)   Code Status: Prior   Advance Care Planning Activity    Ht: 180.3 cm (71\")   Wt: 72.8 kg (160 lb 8 oz)    Admission Cmt: None   Principal Problem: Hepatic encephalopathy (HCC) [K72.90]                 Active Insurance as of 2022     Primary Coverage     Payor Plan Insurance Group Employer/Plan Group    [employer]      Payor Plan Address Payor Plan Phone Number Payor Plan Fax Number Effective Dates    [address]   2020 - None Entered    [facility] [address]       Subscriber Name Subscriber Birth Date Member ID       WEI POTTS 1955 [id number]                 Emergency Contacts      (Rel.) Home Phone Work Phone Mobile Phone    Milagro Potts (Spouse) [phone] -- --               Discharge Summary      Hardik Swenson MD at 22 1136              Patient Name: Wei Potts  : 1955  MRN: [id number]    Date of Admission: 2022  Date of Discharge:  2022  Primary Care Physician: Bella Villalta MD      Discharge Diagnoses     Active Hospital Problems    Diagnosis  POA   • COVID-19 virus detected [U07.1]  Yes   • Portal vein thrombosis [I81]  Yes   • Hyperammonemia (HCC) [E72.20]  Yes "   • Hypertension [I10]  Yes   • GERD (gastroesophageal reflux disease) [K21.9]  Yes   • Coagulopathy (HCC) [D68.9]  Yes   • 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  Please refer to the H&P for full details.  Very pleasant 66 years old white gentleman with a history of alcoholic liver cirrhosis/hepatic encephalopathy/hypertension/portal hypertension who continues to abstain from alcohol.  He had paracentesis about 4 weeks ago but on Bactrim.  He presented to emergency department with mental status changes.  No other patient in admission a temperature of 98 a pulse of 90 respiratory rate of 18 blood pressure 136/77.  O2 sats of 99% pulmonary arrest of markable for mild abdominal distention otherwise negative  Hospital course.  Initial ER evaluation included a Covid test that was positive.  Trace ketones and small bilirubin.  CBC was normal except for white count of 2.5 hemoglobin 8.5 platelets of 48.  Urine drug screen was negative.  INR was 2.14.  ProCalcitonin was normal.  Lactic acid was elevated at 3.4.  Ammonia was elevated at 106.  CMP normal except a glucose of 125 sodium 134 and bicarbonate of 18 with albumin 2.7 AST of 59 alkaline phosphatase 139 total bilirubin 3.1 lipase was mildly elevated at 79.  Troponin was negative.  Magnesium was normal.  Ethanol was negative.  proBNP was normal.  The impression of this gentleman was that of hepatic encephalopathy/alcoholic liver cirrhosis with ascites/portal hypertension/esophageal varices/recent SBP/pancytopenia.  Hepatic encephalopathy was treated with Xifaxan and lactulose and it has clinically resolved.  GI consult was obtained.  Ammonia level has decreased but remains slightly elevated.  He was maintained on his  Bactrim for the recent SBP.  Patient was maintained on Aldactone/Lasix.  His metoprolol was stopped and was started on nadolol.  Liver ultrasound was obtained and confirmed liver cirrhosis and portal hypertension.  Liver Doppler study revealed acute portal vein thrombosis.  Patient was started on Lovenox but his platelets has dropped and hematology oncology consult was obtained HIT test was negative.  Hypercoagulable status was done by hematology oncology.  Platelets has reached tolerated but remained low and hematology oncology recommended continuation of the Lovenox at the low dose.  Vascular surgery was consulted to see if any surgical intervention is warranted however there was no indication for surgical intervention by vascular surgery.  After initiation of Medrol dose Lovenox his platelets remained stable and he had no active bleed.  He will follow-up with his hepatologist and hematology as an outpatient.  He was noted to have low blood pressure and midodrine was added and this has resolved.  He developed hyponatremia which was thought to be secondary to liver cirrhosis and volume overload and we continued him on the Aldactone and Lasix and he was counseled about fluid restriction and low-salt diet.  Sodium is starting to improve by the time of discharge and it was 129.  And incidental findings of Covid positive infection was found he had no respiratory symptoms and no hypoxemia and no treatment was indicated.  At the time of discharge he was tolerating regular diet well and he was hemodynamically stable.  Consultants     Consult Orders (all) (From admission, onward)     Start     Ordered    02/10/22 0917  Inpatient Vascular Surgery Consult  Once        Specialty:  Vascular Surgery  Provider:  Alexandro Lowe MD    02/10/22 0917    02/06/22 1230  Hematology & Oncology Inpatient Consult  Once        Specialty:  Hematology and Oncology  Provider:  Tejinder Sevilla MD    02/06/22 1230    02/04/22 1659  Inpatient  Gastroenterology Consult  Once        Specialty:  Gastroenterology  Provider:  Ruby Mcgee MD    02/04/22 1659    02/04/22 0942  [facility] (on-call MD unless specified) Details  Once        Specialty:  Hospitalist  Provider:  Robert Contreras APRN    02/04/22 0941              Procedures     Imaging Results (All)     Procedure Component Value Units Date/Time    US Abdomen Limited [[id number]] Collected: 02/05/22 2029     Updated: 02/05/22 2035    Narrative:      Right upper quadrant abdominal sonogram     TECHNIQUE: Grayscale and color Doppler images of the right upper  quadrant of the abdomen were obtained.     HISTORY: Cirrhosis     COMPARISON: CT abdomen pelvis 01/01/2021     FINDINGS:     The gallbladder is nondistended and demonstrates a thickened appearance,  similar to that seen on 01/01/2021.     The common bile duct measures 6 mm. in diameter. There is no  intrahepatic biliary ductal dilation.      The liver is cirrhotic, as before. At least a moderate amount of ascites  is present within the visualized abdomen. There appears to be  hepatofugal flow in the main portal vein. The main portal vein also has  an increased echogenic appearance of the nayeli hepatis with findings of  colonic flow in this area.     The right kidney measures 10.5 cm in length.  The right kidney  demonstrates normal echogenicity and cortical thickness. There is no  right-sided hydronephrosis. There are no right-sided cysts, masses or  renal calculi.      The visualized portion of the pancreas is unremarkable.      Visualized portions of the aorta and IVC appear normal. The spleen is  enlarged, measuring 15.3 cm in length.       Impression:      1.  Cirrhosis with findings of portosystemic shunting including mild to  moderate splenomegaly and ascites. There also appears to be hepatofugal  in the main portal vein with concern for thrombus formation as well.  Further evaluation with Doppler sonogram of the portal vein is  recommended.  This was discussed with Shavon, the patient's nurse, at  8:30 PM 02/05/2022  2.  Thickened appearance the gallbladder likely related to adjacent  liver disease, as before.  3.  Other findings as above.     This report was finalized on 2/5/2022 8:32 PM by Dr. Jason Maki M.D.             Pertinent Labs     Results from last 7 days   Lab Units 02/14/22 0408 02/13/22 0904 02/12/22 0401 02/11/22  0650   WBC 10*3/mm3 3.68 3.16* 2.32* 1.83*   HEMOGLOBIN g/dL 9.0* 9.3* 8.5* 8.6*   PLATELETS 10*3/mm3 49* 48* 38* 36*     Results from last 7 days   Lab Units 02/14/22 0408 02/13/22 0904 02/12/22 0401 02/11/22  0650   SODIUM mmol/L 129* 127* 129* 128*   POTASSIUM mmol/L 4.1 4.3 4.1 4.0   CHLORIDE mmol/L 102 101 100 101   CO2 mmol/L 18.0* 18.9* 18.0* 19.7*   BUN mg/dL 10 9 12 11   CREATININE mg/dL 0.97 0.96 1.01 0.94   GLUCOSE mg/dL 81 124* 88 98   Estimated Creatinine Clearance: 79.2 mL/min (by C-G formula based on SCr of 0.97 mg/dL).  Results from last 7 days   Lab Units 02/14/22 0408 02/13/22 0904 02/12/22 0401 02/11/22  0650   ALBUMIN g/dL 2.40* 2.30* 2.30* 2.00*   BILIRUBIN mg/dL 1.8* 1.5* 1.8* 1.9*   ALK PHOS U/L 136* 142* 129* 118*   AST (SGOT) U/L 90* 87* 92* 80*   ALT (SGPT) U/L 42* 43* 37 36     Results from last 7 days   Lab Units 02/14/22 0408 02/13/22 0904 02/12/22 0401 02/11/22  0650   CALCIUM mg/dL 7.5* 7.8* 7.3* 7.3*   ALBUMIN g/dL 2.40* 2.30* 2.30* 2.00*     Results from last 7 days   Lab Units 02/14/22  0408 02/13/22  0904 02/12/22  0401 02/11/22  0650   AMMONIA umol/L 70* 99* 59 85*             Invalid input(s): LDLCALC      Imaging Results (Last 24 Hours)     ** No results found for the last 24 hours. **          Test Results Pending at Discharge         Discharge Exam   Physical Exam  Vitals.  Temperature 97.3 a pulse of 72 respiratory rate of 18 blood pressure 126/69 and O2 sats of 100% on room air  General.  Middle-aged gentleman.  Alert and oriented x3.  No apparent pain distress or  diaphoresis.  Normal mood and affect.    Eyes.  Pupils equal round and reactive.  Intact extraocular musculature.  No pallor or jaundice.  Normal conjunctive and lids.  Oral cavity.  Moist mucous membrane.  Neck.  Supple.  No JVD.  No lymphadenopathy or thyromegaly.  Cardiovascular.  Regular rate and rhythm.  Grade 2 systolic murmur.  Chest.  Clear to auscultation bilaterally with no added sounds.  Abdomen.  Mildly distended.  Soft lax.  No tenderness.  No organomegaly.  No guarding or rebound.  Extremities.  No clubbing cyanosis or edema.    CNS.  No acute focal neurological deficits.     Discharge Details        Discharge Medications      New Medications      Instructions Start Date   enoxaparin 40 MG/0.4ML solution syringe  Commonly known as: LOVENOX   40 mg, Subcutaneous, Every 24 Hours      midodrine 5 MG tablet  Commonly known as: PROAMATINE   2.5 mg, Oral, Every 8 Hours      multivitamin tablet tablet   1 tablet, Oral, Daily   Start Date: February 15, 2022     nadolol 20 MG tablet  Commonly known as: CORGARD   20 mg, Oral, Every 24 Hours Scheduled   Start Date: February 15, 2022     pantoprazole 40 MG EC tablet  Commonly known as: PROTONIX  Replaces: omeprazole 40 MG capsule   40 mg, Oral, Every Morning      riFAXIMin 550 MG tablet  Commonly known as: XIFAXAN   550 mg, Oral, Every 12 Hours Scheduled      sennosides-docusate 8.6-50 MG per tablet  Commonly known as: PERICOLACE   1 tablet, Oral, Daily   Start Date: February 15, 2022     sulfamethoxazole-trimethoprim 400-80 MG tablet  Commonly known as: BACTRIM,SEPTRA   1 tablet, Oral, Every 24 Hours Scheduled   Start Date: February 15, 2022     zinc sulfate 220 (50 Zn) MG capsule  Commonly known as: ZINCATE  Replaces: zinc gluconate 50 MG tablet   220 mg, Oral, Daily         Changes to Medications      Instructions Start Date   lactulose 10 GM/15ML solution  Commonly known as: CHRONULAC  What changed: See the new instructions.   20 g, Oral, 3 Times Daily          Continue These Medications      Instructions Start Date   amitriptyline 10 MG tablet  Commonly known as: ELAVIL   10 mg, Oral, Nightly      furosemide 40 MG tablet  Commonly known as: LASIX   40 mg, Oral, Daily      MAGnesium-Oxide 400 (241.3 Mg) MG tablet tablet  Generic drug: magnesium oxide   400 mg, 2 Times Daily      spironolactone 50 MG tablet  Commonly known as: ALDACTONE   50 mg, Oral, Daily         Stop These Medications    Generlac 10 GM/15ML solution solution (encephalopathy)  Generic drug: lactulose     metoprolol tartrate 25 MG tablet  Commonly known as: LOPRESSOR     omeprazole 40 MG capsule  Commonly known as: priLOSEC  Replaced by: pantoprazole 40 MG EC tablet     zinc gluconate 50 MG tablet  Replaced by: zinc sulfate 220 (50 Zn) MG capsule            Allergies   Allergen Reactions   • Iron GI Intolerance         Discharge Disposition:  Condition: Stable    Diet:   Diet Order   Procedures   • Diet Regular; Low Sodium; 2,000 mg Na   Fluid restriction to 1500/day.    Activity:   Activity Instructions     Activity as Tolerated            Counseling : No nonsteroidal anti-inflammatory medication over-the-counter    CODE STATUS:    Code Status and Medical Interventions:   Ordered at: 02/04/22 7660     Level Of Support Discussed With:    Patient     Code Status (Patient has no pulse and is not breathing):    CPR (Attempt to Resuscitate)     Medical Interventions (Patient has pulse or is breathing):    Full Support       Future Appointments   Date Time Provider Department Center   2/15/2022  1:40 PM LAB CHAIR 1 [facility]  [facility]   2/15/2022  2:20 PM Eulalio Espino MD [facility] CBC [facility]   3/3/2022 12:15 PM Ruby Mcgee MD [facility] GE EA LU [facility]   7/18/2022  9:45 AM Ruby Mcgee MD [facility] GE EA [facility]     Additional Instructions for the Follow-ups that You Need to Schedule     Call MD With Problems / Concerns   As directed      Instructions: Call MD or return to ER if abdominal pain/nausea  or vomiting/bleeding per rectum/melena/fever or chills/chest pain/shortness of breath    Order Comments: Instructions: Call MD or return to ER if abdominal pain/nausea or vomiting/bleeding per rectum/melena/fever or chills/chest pain/shortness of breath          Discharge Follow-up with PCP   As directed       Currently Documented PCP:    Bella Villalta MD    PCP Phone Number:    [phone]     Follow Up Details: D.  1 week.  Hepatic encephalopathy/liver cirrhosis/hypertension/SBP/portal vein thrombosis         Discharge Follow-up with Specified Provider: Hematology.; 2 Weeks   As directed      To: Hematology.    Follow Up: 2 Weeks    Follow Up Details: Thrombocytopenia.  Portal vein thrombosis.  Liver cirrhosis.         Discharge Follow-up with Specified Provider: hipatologist; 2 Weeks   As directed      To: hipatologist    Follow Up: 2 Weeks    Follow Up Details: Alcoholic liver cirrhosis/metabolic encephalopathy/coagulopathy/portal vein thrombosis            Follow-up Information     Eulalio Espino MD Follow up in 2 week(s).    Specialties: Hematology and Oncology, Oncology  Contact information:  [address]  [place] [address]  [phone]             Ruby Mcgee MD Follow up in 4 week(s).    Specialty: Gastroenterology  Contact information:  [address]  [place] [address]  [phone]             Bella Villalta MD .    Specialty: Family Medicine  Why: D.  1 week.  Hepatic encephalopathy/liver cirrhosis/hypertension/SBP/portal vein thrombosis  Contact information:  [address]  [place] [address]  [phone]                           Time Spent on Discharge:  Greater than 30 minutes      Hardik Swenson MD  [facility]  02/14/22  11:36 EST    Electronically signed by Hardik Swenson MD at 02/14/22 0450

## 2022-02-16 ENCOUNTER — READMISSION MANAGEMENT (OUTPATIENT)
Dept: CALL CENTER | Facility: HOSPITAL | Age: 67
End: 2022-02-16

## 2022-02-16 NOTE — OUTREACH NOTE
COVID-19 Week 1 Survey      Responses   [facility] patient discharged from? [place]   Does the patient have one of the following disease processes/diagnoses(primary or secondary)? COVID-19   COVID-19 underlying condition? None   Call Number Call 2   Week 1 Call successful? No          Dunia Steven RN

## 2022-02-17 ENCOUNTER — READMISSION MANAGEMENT (OUTPATIENT)
Dept: CALL CENTER | Facility: HOSPITAL | Age: 67
End: 2022-02-17

## 2022-02-17 ENCOUNTER — OFFICE VISIT (OUTPATIENT)
Dept: FAMILY MEDICINE CLINIC | Facility: CLINIC | Age: 67
End: 2022-02-17

## 2022-02-17 VITALS
HEART RATE: 67 BPM | OXYGEN SATURATION: 100 % | RESPIRATION RATE: 16 BRPM | WEIGHT: 144 LBS | SYSTOLIC BLOOD PRESSURE: 100 MMHG | BODY MASS INDEX: 20.16 KG/M2 | DIASTOLIC BLOOD PRESSURE: 64 MMHG | HEIGHT: 71 IN

## 2022-02-17 DIAGNOSIS — I81 PORTAL VEIN THROMBOSIS: ICD-10-CM

## 2022-02-17 DIAGNOSIS — D50.8 OTHER IRON DEFICIENCY ANEMIA: ICD-10-CM

## 2022-02-17 DIAGNOSIS — R17 JAUNDICE: ICD-10-CM

## 2022-02-17 DIAGNOSIS — E72.20 HYPERAMMONEMIA: ICD-10-CM

## 2022-02-17 DIAGNOSIS — K70.31 ALCOHOLIC CIRRHOSIS OF LIVER WITH ASCITES: Primary | ICD-10-CM

## 2022-02-17 PROBLEM — B15.9 ACUTE HEPATITIS A VIRUS INFECTION: Status: ACTIVE | Noted: 2022-02-17

## 2022-02-17 PROBLEM — I85.00 ESOPHAGEAL VARIX (HCC): Status: ACTIVE | Noted: 2021-04-26

## 2022-02-17 PROBLEM — K74.60 CIRRHOSIS (HCC): Status: ACTIVE | Noted: 2021-02-24

## 2022-02-17 PROCEDURE — 99214 OFFICE O/P EST MOD 30 MIN: CPT | Performed by: FAMILY MEDICINE

## 2022-02-17 NOTE — OUTREACH NOTE
COVID-19 Week 1 Survey      Responses   [facility] patient discharged from? [place]   Does the patient have one of the following disease processes/diagnoses(primary or secondary)? COVID-19   COVID-19 underlying condition? None   Call Number Call 3   Week 1 Call successful? No   Discharge diagnosis COVID-19 virus detected           Franci Pérez RN

## 2022-02-17 NOTE — PROGRESS NOTES
Subjective   Wei Potts is a 66 y.o. male.   Hepatic Disease (Hospital Follow Up)    History of Present Illness   Mr. Potts is a new patient to me today.  He was admitted to the hospital through the emergency room on 2/4/2022.  He was discharged on 2/14/2022.  Pt of Dr Villalta / Anaid Kilgore here today for hospital follow up.    He states he is feeling well and has no complaints today.  He did not bring a medication list and is slightly confused about some of his new medications.    He is a very pleasant 66 years old man with a history of alcoholic liver cirrhosis/hepatic encephalopathy/hypertension/portal hypertension who continues to abstain from alcohol.  He had paracentesis about 4 weeks before his admission to the hospital. He presented to emergency department with mental status changes.  At that time he had a temperature of 98 a pulse of 90 respiratory rate of 18 blood pressure 136/77.  O2 sats of 99%. He had mild abdominal distention otherwise negative exam.  Fortunately he tested positive for Covid.  Most of his labs were within normal limits and a urine drug screen was negative.  He was diagnosed with hepatic encephalopathy from alcoholic liver cirrhosis with ascites and portal hypertension as well as esophageal varices and a recent case of pancytopenia..  He was found to have a portal vein thrombosis.  His hepatic encephalopathy was treated with Xifaxan and lactulose and clinically resolved.  The ammonia levels he had decreased but remain slightly elevated.  He was maintained on Bactrim for recent SBP.  He was maintained on Aldactone and Lasix.  He was started on now the loll.  Liver ultrasound confirmed his liver cirrhosis and portal hypertension and a liver Doppler study revealed an acute portal vein thrombosis.  There was a time in the hospital with and they struggled with Lovenox because of his platelets and hematology was consulted.  They concluded based on a HIT test that was negative  they can continue to use Xarelto.  No surgical intervention was required.  The following portions of the patient's history were reviewed and updated as appropriate: allergies, current medications, past family history, past medical history, past social history, past surgical history and problem list.    Today he is alert and communicating well.  And he states he is feeling much better.  He has multiple specialists that he will be following up within the next few weeks.  At this point he seems to be stable at his baseline.    Review of Systems   Constitutional: Negative.    HENT: Negative.    Eyes: Negative.    Respiratory: Negative.    Cardiovascular: Negative.    Genitourinary: Negative.    Skin: Negative.    Neurological: Negative.    Psychiatric/Behavioral: Negative.        Objective   Physical Exam  Vitals and nursing note reviewed.   Constitutional:       Appearance: He is well-developed.   HENT:      Head: Normocephalic and atraumatic.   Eyes:      Conjunctiva/sclera: Conjunctivae normal.      Pupils: Pupils are equal, round, and reactive to light.   Neck:      Thyroid: No thyromegaly.   Cardiovascular:      Rate and Rhythm: Normal rate and regular rhythm.      Pulses: Normal pulses.      Heart sounds: Normal heart sounds. No murmur heard.  No friction rub. No gallop.    Pulmonary:      Effort: Pulmonary effort is normal.      Breath sounds: Normal breath sounds.   Chest:      Chest wall: No mass, lacerations, deformity, swelling, tenderness, crepitus or edema. There is no dullness to percussion.   Breasts: Breasts are symmetrical.      Right: Normal.      Left: Normal.       Musculoskeletal:      Cervical back: Normal range of motion and neck supple.   Lymphadenopathy:      Cervical: No cervical adenopathy.   Skin:     General: Skin is warm and dry.      Capillary Refill: Capillary refill takes 2 to 3 seconds.      Coloration: Skin is jaundiced.   Neurological:      Mental Status: He is alert and oriented to  person, place, and time.      Cranial Nerves: No cranial nerve deficit.   Psychiatric:         Behavior: Behavior normal.         Thought Content: Thought content normal.         Judgment: Judgment normal.           Assessment/Plan   Problem List Items Addressed This Visit        Coag and Thromboembolic    Portal vein thrombosis       Endocrine and Metabolic    Hyperammonemia (HCC)       Gastrointestinal Abdominal     Alcoholic cirrhosis of liver with ascites (HCC) - Primary    Jaundice       Hematology and Neoplasia    Anemia      He is doing well at this time and will continue to follow-up with his oncologist and his hepatologist to make sure that his chemistries remain within normal range.  He states he is taking lactulose as advised.         Return in about 3 months (around 5/17/2022) for Recheck.

## 2022-02-21 ENCOUNTER — TELEPHONE (OUTPATIENT)
Dept: ONCOLOGY | Facility: HOSPITAL | Age: 67
End: 2022-02-21

## 2022-02-21 ENCOUNTER — CLINICAL SUPPORT (OUTPATIENT)
Dept: ONCOLOGY | Facility: HOSPITAL | Age: 67
End: 2022-02-21

## 2022-02-21 ENCOUNTER — LAB (OUTPATIENT)
Dept: LAB | Facility: HOSPITAL | Age: 67
End: 2022-02-21

## 2022-02-21 DIAGNOSIS — I81 PORTAL VEIN THROMBOSIS: ICD-10-CM

## 2022-02-21 DIAGNOSIS — K70.9 ALCOHOLIC LIVER DISEASE: ICD-10-CM

## 2022-02-21 LAB
BASOPHILS # BLD AUTO: 0.06 10*3/MM3 (ref 0–0.2)
BASOPHILS NFR BLD AUTO: 1.1 % (ref 0–1.5)
DEPRECATED RDW RBC AUTO: 59.9 FL (ref 37–54)
EOSINOPHIL # BLD AUTO: 0.01 10*3/MM3 (ref 0–0.4)
EOSINOPHIL NFR BLD AUTO: 0.2 % (ref 0.3–6.2)
ERYTHROCYTE [DISTWIDTH] IN BLOOD BY AUTOMATED COUNT: 19 % (ref 12.3–15.4)
HCT VFR BLD AUTO: 30.4 % (ref 37.5–51)
HGB BLD-MCNC: 9.9 G/DL (ref 13–17.7)
IMM GRANULOCYTES # BLD AUTO: 0.17 10*3/MM3 (ref 0–0.05)
IMM GRANULOCYTES NFR BLD AUTO: 3 % (ref 0–0.5)
LYMPHOCYTES # BLD AUTO: 1 10*3/MM3 (ref 0.7–3.1)
LYMPHOCYTES NFR BLD AUTO: 17.8 % (ref 19.6–45.3)
MCH RBC QN AUTO: 28.5 PG (ref 26.6–33)
MCHC RBC AUTO-ENTMCNC: 32.6 G/DL (ref 31.5–35.7)
MCV RBC AUTO: 87.6 FL (ref 79–97)
MONOCYTES # BLD AUTO: 0.88 10*3/MM3 (ref 0.1–0.9)
MONOCYTES NFR BLD AUTO: 15.6 % (ref 5–12)
NEUTROPHILS NFR BLD AUTO: 3.51 10*3/MM3 (ref 1.7–7)
NEUTROPHILS NFR BLD AUTO: 62.3 % (ref 42.7–76)
NRBC BLD AUTO-RTO: 0 /100 WBC (ref 0–0.2)
PLATELET # BLD AUTO: 92 10*3/MM3 (ref 140–450)
PMV BLD AUTO: 10.4 FL (ref 6–12)
RBC # BLD AUTO: 3.47 10*6/MM3 (ref 4.14–5.8)
WBC NRBC COR # BLD: 5.63 10*3/MM3 (ref 3.4–10.8)

## 2022-02-21 PROCEDURE — G0463 HOSPITAL OUTPT CLINIC VISIT: HCPCS

## 2022-02-21 PROCEDURE — 36415 COLL VENOUS BLD VENIPUNCTURE: CPT

## 2022-02-21 PROCEDURE — 85025 COMPLETE CBC W/AUTO DIFF WBC: CPT

## 2022-02-21 NOTE — PROGRESS NOTES
Pt here for RN review. Pt accidentally came a day early. Plts have improved and pt confirms taking 40 mg lovenox daily. Sent Dr. Espino a message to review counts and advise if changes need to be made. Pt reports feeling well. Copy of labs and schedule provided.     Lab Results   Component Value Date    WBC 5.63 02/21/2022    HGB 9.9 (L) 02/21/2022    HCT 30.4 (L) 02/21/2022    MCV 87.6 02/21/2022    PLT 92 (L) 02/21/2022

## 2022-02-21 NOTE — TELEPHONE ENCOUNTER
Attempted to call pt. No answer. LVM stating MD instructions.     ----- Message from Eulalio Espino MD sent at 2/21/2022 10:53 AM EST -----  Can we advise him to increase the lovenox 40 mg sq to BID dose?    Thanks  ----- Message -----  From: Meenakshi Lambert RN  Sent: 2/21/2022  10:51 AM EST  To: Eulalio Espino MD    The pts platelets are 92,000. He is taking 40 mg Lovenox daily. I didn't know if you wanted to keep him on the same dose. Please advise    Thanks

## 2022-02-22 ENCOUNTER — APPOINTMENT (OUTPATIENT)
Dept: LAB | Facility: HOSPITAL | Age: 67
End: 2022-02-22

## 2022-02-22 ENCOUNTER — APPOINTMENT (OUTPATIENT)
Dept: ONCOLOGY | Facility: HOSPITAL | Age: 67
End: 2022-02-22

## 2022-03-01 ENCOUNTER — TELEPHONE (OUTPATIENT)
Dept: ONCOLOGY | Facility: HOSPITAL | Age: 67
End: 2022-03-01

## 2022-03-01 ENCOUNTER — LAB (OUTPATIENT)
Dept: LAB | Facility: HOSPITAL | Age: 67
End: 2022-03-01

## 2022-03-01 ENCOUNTER — CLINICAL SUPPORT (OUTPATIENT)
Dept: ONCOLOGY | Facility: HOSPITAL | Age: 67
End: 2022-03-01

## 2022-03-01 DIAGNOSIS — I81 PORTAL VEIN THROMBOSIS: ICD-10-CM

## 2022-03-01 DIAGNOSIS — K70.9 ALCOHOLIC LIVER DISEASE: ICD-10-CM

## 2022-03-01 DIAGNOSIS — I81 PORTAL VEIN THROMBOSIS: Primary | ICD-10-CM

## 2022-03-01 LAB
BASOPHILS # BLD AUTO: 0.03 10*3/MM3 (ref 0–0.2)
BASOPHILS NFR BLD AUTO: 0.5 % (ref 0–1.5)
DEPRECATED RDW RBC AUTO: 69.4 FL (ref 37–54)
EOSINOPHIL # BLD AUTO: 0.06 10*3/MM3 (ref 0–0.4)
EOSINOPHIL NFR BLD AUTO: 1 % (ref 0.3–6.2)
ERYTHROCYTE [DISTWIDTH] IN BLOOD BY AUTOMATED COUNT: 20.8 % (ref 12.3–15.4)
HCT VFR BLD AUTO: 26.3 % (ref 37.5–51)
HGB BLD-MCNC: 8.6 G/DL (ref 13–17.7)
IMM GRANULOCYTES # BLD AUTO: 0.08 10*3/MM3 (ref 0–0.05)
IMM GRANULOCYTES NFR BLD AUTO: 1.3 % (ref 0–0.5)
LYMPHOCYTES # BLD AUTO: 0.67 10*3/MM3 (ref 0.7–3.1)
LYMPHOCYTES NFR BLD AUTO: 11.2 % (ref 19.6–45.3)
MCH RBC QN AUTO: 29.9 PG (ref 26.6–33)
MCHC RBC AUTO-ENTMCNC: 32.7 G/DL (ref 31.5–35.7)
MCV RBC AUTO: 91.3 FL (ref 79–97)
MONOCYTES # BLD AUTO: 1.21 10*3/MM3 (ref 0.1–0.9)
MONOCYTES NFR BLD AUTO: 20.2 % (ref 5–12)
NEUTROPHILS NFR BLD AUTO: 3.94 10*3/MM3 (ref 1.7–7)
NEUTROPHILS NFR BLD AUTO: 65.8 % (ref 42.7–76)
NRBC BLD AUTO-RTO: 0 /100 WBC (ref 0–0.2)
PLATELET # BLD AUTO: 54 10*3/MM3 (ref 140–450)
PMV BLD AUTO: 10 FL (ref 6–12)
RBC # BLD AUTO: 2.88 10*6/MM3 (ref 4.14–5.8)
WBC NRBC COR # BLD: 5.99 10*3/MM3 (ref 3.4–10.8)

## 2022-03-01 PROCEDURE — G0463 HOSPITAL OUTPT CLINIC VISIT: HCPCS

## 2022-03-01 PROCEDURE — 36415 COLL VENOUS BLD VENIPUNCTURE: CPT

## 2022-03-01 PROCEDURE — 85025 COMPLETE CBC W/AUTO DIFF WBC: CPT

## 2022-03-01 RX ORDER — SPIRONOLACTONE 50 MG/1
50 TABLET, FILM COATED ORAL DAILY
Qty: 30 TABLET | Refills: 3 | OUTPATIENT
Start: 2022-03-01

## 2022-03-01 NOTE — TELEPHONE ENCOUNTER
Caller: Wei Potts    Relationship: Self      Requested Prescriptions:   Requested Prescriptions     Pending Prescriptions Disp Refills   • spironolactone (ALDACTONE) 50 MG tablet 30 tablet 3     Sig: Take 1 tablet by mouth Daily.        Pharmacy where request should be sent:  [facility]     Additional details provided by patient: N/A    Does the patient have less than a 3 day supply:  [x] Yes  [] No    Milind SAXENA Rep   03/01/22 13:51 EST

## 2022-03-01 NOTE — NURSING NOTE
Pt here for CBC and Rn review. Hgb dropped to 8.6 from 9.9 on 2/21 and platelet count dropped to 54 from 92 on 2/21. Pt denies any bleeding and is tolerating his Lovenox injections without issue. Pt stated he never got a voicemail with instructions to increase Lovenox injections to twice a day and has only been giving himself injections once a day. I sent a message to Dr. Espino regarding CBC results and if patient needs new dosing instructions. I told patient I would call today if Dr. Espino given any new instructions. Patient also requested to have an Ammonia level added to his labs next week, will place that order. Pt v/u.    Lab Results   Component Value Date    WBC 5.99 03/01/2022    HGB 8.6 (L) 03/01/2022    HCT 26.3 (L) 03/01/2022    MCV 91.3 03/01/2022    PLT 54 (L) 03/01/2022     Addendum: Received message from Dr. Espino to have patient continue Lovenox 40 mg once a day until next CBC check on 3/8. Will call patient with an update.

## 2022-03-01 NOTE — TELEPHONE ENCOUNTER
Called patient and left voicemail that pt can continue taking Lovenox injections 40 mg once a day. I encouraged patient to call the office if any questions or concerns.

## 2022-03-03 ENCOUNTER — PREP FOR SURGERY (OUTPATIENT)
Dept: OTHER | Facility: HOSPITAL | Age: 67
End: 2022-03-03

## 2022-03-03 ENCOUNTER — OFFICE VISIT (OUTPATIENT)
Dept: GASTROENTEROLOGY | Facility: CLINIC | Age: 67
End: 2022-03-03

## 2022-03-03 VITALS
BODY MASS INDEX: 22.96 KG/M2 | TEMPERATURE: 98.2 F | DIASTOLIC BLOOD PRESSURE: 66 MMHG | HEART RATE: 76 BPM | HEIGHT: 71 IN | SYSTOLIC BLOOD PRESSURE: 110 MMHG | WEIGHT: 164 LBS

## 2022-03-03 DIAGNOSIS — R18.8 CIRRHOSIS OF LIVER WITH ASCITES, UNSPECIFIED HEPATIC CIRRHOSIS TYPE: Primary | ICD-10-CM

## 2022-03-03 DIAGNOSIS — I81 PORTAL VEIN THROMBOSIS: ICD-10-CM

## 2022-03-03 DIAGNOSIS — K65.2 SBP (SPONTANEOUS BACTERIAL PERITONITIS): ICD-10-CM

## 2022-03-03 DIAGNOSIS — K74.60 CIRRHOSIS OF LIVER WITH ASCITES, UNSPECIFIED HEPATIC CIRRHOSIS TYPE: Primary | ICD-10-CM

## 2022-03-03 PROCEDURE — 99214 OFFICE O/P EST MOD 30 MIN: CPT | Performed by: INTERNAL MEDICINE

## 2022-03-03 RX ORDER — SULFAMETHOXAZOLE AND TRIMETHOPRIM 800; 160 MG/1; MG/1
1 TABLET ORAL DAILY
Qty: 30 TABLET | Refills: 11 | Status: ON HOLD | OUTPATIENT
Start: 2022-03-03 | End: 2022-03-20 | Stop reason: SDUPTHER

## 2022-03-03 RX ORDER — ALBUMIN (HUMAN) 12.5 G/50ML
12.5 SOLUTION INTRAVENOUS ONCE
Status: CANCELLED | OUTPATIENT
Start: 2022-04-06 | End: 2022-03-03

## 2022-03-03 RX ORDER — OMEPRAZOLE 40 MG/1
40 CAPSULE, DELAYED RELEASE ORAL DAILY
COMMUNITY
Start: 2022-02-27 | End: 2022-03-18

## 2022-03-03 RX ORDER — SPIRONOLACTONE 50 MG/1
50 TABLET, FILM COATED ORAL DAILY
Qty: 90 TABLET | Refills: 3 | Status: SHIPPED | OUTPATIENT
Start: 2022-03-03 | End: 2022-11-16 | Stop reason: SINTOL

## 2022-03-03 RX ORDER — LACTULOSE 10 G/15ML
20 SOLUTION ORAL; RECTAL 3 TIMES DAILY
COMMUNITY
Start: 2022-02-26 | End: 2022-03-20 | Stop reason: HOSPADM

## 2022-03-03 RX ORDER — SULFAMETHOXAZOLE AND TRIMETHOPRIM 800; 160 MG/1; MG/1
TABLET ORAL
COMMUNITY
Start: 2022-02-26 | End: 2022-03-03 | Stop reason: SDUPTHER

## 2022-03-03 NOTE — H&P (VIEW-ONLY)
Subjective   Chief Complaint   Patient presents with   • Cirrhosis       Wei Potts is a  66 y.o. male here for a follow up visit for cirrhosis complicated by ascites, recent SBP, PSE and PVT.    He was recently hospitalized w/SBP.  Also found to have PVT during that time.  He was started on lovenox.  He has previous esophageal varices without bleeding - banded x 2 4/21 and 6/21.  Completed antibiotics for sbp but there was some confusion and he did not start the bactrim daily.  He also had covid during this recent hospitalization.    No abdominal pain with the exception of transient episode yesterday which resolved.  Distension has worsened.  3-4 BMs daily which is better - no blood in stool - using lovenox.  Scheduled to see Dr Jeronimo tomorrow.       HPI  Past Medical History:   Diagnosis Date   • Alcoholism (HCC)    • Anemia    • Cirrhosis (HCC)    • Encephalopathy    • Hypertension    • Liver disease      Past Surgical History:   Procedure Laterality Date   • COLONOSCOPY     • ENDOSCOPY         Current Outpatient Medications:   •  amitriptyline (ELAVIL) 10 MG tablet, Take 1 tablet by mouth Every Night., Disp: 30 tablet, Rfl: 1  •  enoxaparin (LOVENOX) 40 MG/0.4ML solution syringe, Inject 0.4 mL under the skin into the appropriate area as directed Daily. Indications: DVT/PE (active thrombosis), Disp: 11.2 mL, Rfl: 3  •  furosemide (LASIX) 40 MG tablet, TAKE 1 TABLET BY MOUTH DAILY, Disp: 30 tablet, Rfl: 2  •  Generlac 10 GM/15ML solution solution (encephalopathy), , Disp: , Rfl:   •  lactulose (CHRONULAC) 10 GM/15ML solution, Take 30 mL by mouth 3 (Three) Times a Day., Disp: 500 mL, Rfl: 3  •  MAGnesium-Oxide 400 (241.3 Mg) MG tablet tablet, 400 mg 2 (Two) Times a Day., Disp: , Rfl:   •  midodrine (PROAMATINE) 5 MG tablet, Take 0.5 tablets by mouth Every 8 (Eight) Hours., Disp: 45 tablet, Rfl: 3  •  multivitamin (multivitamin) tablet tablet, Take 1 tablet by mouth Daily., Disp: 30 tablet, Rfl: 3  •   nadolol (CORGARD) 20 MG tablet, Take 1 tablet by mouth Daily., Disp: 30 tablet, Rfl: 3  •  omeprazole (priLOSEC) 40 MG capsule, , Disp: , Rfl:   •  riFAXIMin (XIFAXAN) 550 MG tablet, Take 1 tablet by mouth Every 12 (Twelve) Hours for 40 doses. Indications: Impaired Brain Function due to Liver Disease, Disp: 40 tablet, Rfl: 0  •  sennosides-docusate (PERICOLACE) 8.6-50 MG per tablet, Take 1 tablet by mouth Daily., Disp: 30 tablet, Rfl: 3  •  spironolactone (ALDACTONE) 50 MG tablet, Take 1 tablet by mouth Daily., Disp: 90 tablet, Rfl: 3  •  sulfamethoxazole-trimethoprim (BACTRIM DS,SEPTRA DS) 800-160 MG per tablet, Take 1 tablet by mouth Daily., Disp: 30 tablet, Rfl: 11  •  zinc sulfate (ZINCATE) 220 (50 Zn) MG capsule, Take 1 capsule by mouth Daily., Disp: 30 capsule, Rfl: 3  •  pantoprazole (PROTONIX) 40 MG EC tablet, Take 1 tablet by mouth Every Morning., Disp: 30 tablet, Rfl: 3  PRN Meds:.  Allergies   Allergen Reactions   • Iron GI Intolerance     Social History     Socioeconomic History   • Marital status:    Tobacco Use   • Smoking status: Never Smoker   • Smokeless tobacco: Never Used   Vaping Use   • Vaping Use: Never used   Substance and Sexual Activity   • Alcohol use: Not Currently     Comment: No ETOH on 2.5 yrs   • Drug use: No   • Sexual activity: Defer     Family History   Problem Relation Age of Onset   • Diabetes Mother    • Hypertension Father      Review of Systems   Constitutional: Positive for unexpected weight change. Negative for appetite change.   Gastrointestinal: Positive for abdominal distention. Negative for abdominal pain and blood in stool.     Vitals:    03/03/22 1249   BP: 110/66   Pulse: 76   Temp: 98.2 °F (36.8 °C)         03/03/22  1249   Weight: 74.4 kg (164 lb)       Objective   Physical Exam  Constitutional:       Appearance: Normal appearance. He is well-developed.   HENT:      Head: Normocephalic and atraumatic.   Eyes:      General: No scleral icterus.      Conjunctiva/sclera: Conjunctivae normal.   Pulmonary:      Effort: Pulmonary effort is normal.      Breath sounds: Normal breath sounds.   Abdominal:      General: There is distension.      Palpations: Abdomen is soft.   Musculoskeletal:      Cervical back: Normal range of motion and neck supple.   Skin:     General: Skin is warm and dry.   Neurological:      Mental Status: He is alert.   Psychiatric:         Mood and Affect: Mood normal.         Behavior: Behavior normal.       No radiology results for the last 7 days    Assessment/Plan   Diagnoses and all orders for this visit:    Cirrhosis of liver with ascites, unspecified hepatic cirrhosis type (HCC)    SBP (spontaneous bacterial peritonitis) (HCC)  Comments:  2/2022 - treatment complete    Portal vein thrombosis    Other orders  -     omeprazole (priLOSEC) 40 MG capsule  -     Generlac 10 GM/15ML solution solution (encephalopathy)  -     Discontinue: sulfamethoxazole-trimethoprim (BACTRIM DS,SEPTRA DS) 800-160 MG per tablet  -     spironolactone (ALDACTONE) 50 MG tablet; Take 1 tablet by mouth Daily.  -     sulfamethoxazole-trimethoprim (BACTRIM DS,SEPTRA DS) 800-160 MG per tablet; Take 1 tablet by mouth Daily.      Plan:  · Complicated situation with his new PVT requiring AC - hematology was concerned regarding the location of the clot with concern for mesenteric ischemic without AC. Not ideal that he may have residual varices bc has not had f/u egd since last banded 6/21.  Await UL recs - most recent MELD-Na based on 2/14 labs was 21.  Concern for worsening encephalopathy with TIPS as he is highly functioning and still working  · Schedule paracentesis  · May need increase dose diuretics - I expect UL will redraw labs tomorrow and I will f/u on those results and adjust accordingly if not titrated by Dr Jeronimo  · Continue bowel regimen to achieve 3-4 BM/d

## 2022-03-04 ENCOUNTER — TRANSCRIBE ORDERS (OUTPATIENT)
Dept: ADMINISTRATIVE | Facility: HOSPITAL | Age: 67
End: 2022-03-04

## 2022-03-04 DIAGNOSIS — R16.0 LIVER MASS: Primary | ICD-10-CM

## 2022-03-08 ENCOUNTER — TELEPHONE (OUTPATIENT)
Dept: ONCOLOGY | Facility: HOSPITAL | Age: 67
End: 2022-03-08

## 2022-03-08 ENCOUNTER — LAB (OUTPATIENT)
Dept: LAB | Facility: HOSPITAL | Age: 67
End: 2022-03-08

## 2022-03-08 ENCOUNTER — CLINICAL SUPPORT (OUTPATIENT)
Dept: ONCOLOGY | Facility: HOSPITAL | Age: 67
End: 2022-03-08

## 2022-03-08 DIAGNOSIS — K70.9 ALCOHOLIC LIVER DISEASE: ICD-10-CM

## 2022-03-08 DIAGNOSIS — I81 PORTAL VEIN THROMBOSIS: ICD-10-CM

## 2022-03-08 LAB
AMMONIA BLD-SCNC: 113 UMOL/L (ref 16–60)
BASOPHILS # BLD AUTO: 0.03 10*3/MM3 (ref 0–0.2)
BASOPHILS NFR BLD AUTO: 0.7 % (ref 0–1.5)
DEPRECATED RDW RBC AUTO: 71.3 FL (ref 37–54)
EOSINOPHIL # BLD AUTO: 0.22 10*3/MM3 (ref 0–0.4)
EOSINOPHIL NFR BLD AUTO: 5.2 % (ref 0.3–6.2)
ERYTHROCYTE [DISTWIDTH] IN BLOOD BY AUTOMATED COUNT: 20.2 % (ref 12.3–15.4)
HCT VFR BLD AUTO: 27.9 % (ref 37.5–51)
HGB BLD-MCNC: 8.6 G/DL (ref 13–17.7)
IMM GRANULOCYTES # BLD AUTO: 0.02 10*3/MM3 (ref 0–0.05)
IMM GRANULOCYTES NFR BLD AUTO: 0.5 % (ref 0–0.5)
LYMPHOCYTES # BLD AUTO: 0.74 10*3/MM3 (ref 0.7–3.1)
LYMPHOCYTES NFR BLD AUTO: 17.5 % (ref 19.6–45.3)
MCH RBC QN AUTO: 29.4 PG (ref 26.6–33)
MCHC RBC AUTO-ENTMCNC: 30.8 G/DL (ref 31.5–35.7)
MCV RBC AUTO: 95.2 FL (ref 79–97)
MONOCYTES # BLD AUTO: 0.94 10*3/MM3 (ref 0.1–0.9)
MONOCYTES NFR BLD AUTO: 22.2 % (ref 5–12)
NEUTROPHILS NFR BLD AUTO: 2.29 10*3/MM3 (ref 1.7–7)
NEUTROPHILS NFR BLD AUTO: 53.9 % (ref 42.7–76)
NRBC BLD AUTO-RTO: 0 /100 WBC (ref 0–0.2)
PLATELET # BLD AUTO: 74 10*3/MM3 (ref 140–450)
PMV BLD AUTO: 9.9 FL (ref 6–12)
RBC # BLD AUTO: 2.93 10*6/MM3 (ref 4.14–5.8)
WBC NRBC COR # BLD: 4.24 10*3/MM3 (ref 3.4–10.8)

## 2022-03-08 PROCEDURE — 36415 COLL VENOUS BLD VENIPUNCTURE: CPT

## 2022-03-08 PROCEDURE — 82140 ASSAY OF AMMONIA: CPT | Performed by: INTERNAL MEDICINE

## 2022-03-08 PROCEDURE — 85025 COMPLETE CBC W/AUTO DIFF WBC: CPT

## 2022-03-08 PROCEDURE — G0463 HOSPITAL OUTPT CLINIC VISIT: HCPCS

## 2022-03-08 NOTE — NURSING NOTE
Patient here for CBC and RN review. CBC is stable for this patient. Hgb remained the same at 8.6 but platelets improved to 74. Pt reports still taking Lovenox 40 mg daily and he takes this at night. Patient mentioned he is having paracentesis tomorrow morning (3/9), I told him he might have to hold his Lovenox today but I would confirm with Dr. Espino. I told patient I would call him with any new instructions and patient voiced understanding.    Message sent to Dr. Espino regarding any new instructions for the patient and to notify him of the paracentesis taking place tomorrow.    Lab Results   Component Value Date    WBC 4.24 03/08/2022    HGB 8.6 (L) 03/08/2022    HCT 27.9 (L) 03/08/2022    MCV 95.2 03/08/2022    PLT 74 (L) 03/08/2022     Addendum: Per Dr. Espino, patient to remain on same Lovenox dosing for now and recheck labs next week. Patient is to hold tonight's Lovenox dose and if doesn't have much bleeding/bruising post procedure can resume Lovenox tomorrow night. Called to update patient of this information.

## 2022-03-08 NOTE — TELEPHONE ENCOUNTER
Attempted to call patient with new instructions per Dr. Espino. Patient did not answer and patient's voicemail is full. Will attempt to call patient again.

## 2022-03-08 NOTE — TELEPHONE ENCOUNTER
Second attempt to call patient's cell number on file and got pt's voicemail box and it was full and unable to leave a message. Called the home number on file and pt did not answer. Left a voicemail on home number with Dr. Espino's instructions and encouraged for pt to call the office with any questions or concerns.

## 2022-03-09 ENCOUNTER — HOSPITAL ENCOUNTER (OUTPATIENT)
Dept: ULTRASOUND IMAGING | Facility: HOSPITAL | Age: 67
Discharge: HOME | End: 2022-03-09
Admitting: INTERNAL MEDICINE

## 2022-03-09 ENCOUNTER — TELEPHONE (OUTPATIENT)
Dept: GASTROENTEROLOGY | Facility: CLINIC | Age: 67
End: 2022-03-09

## 2022-03-09 VITALS
BODY MASS INDEX: 22.4 KG/M2 | HEIGHT: 71 IN | DIASTOLIC BLOOD PRESSURE: 62 MMHG | TEMPERATURE: 98.4 F | SYSTOLIC BLOOD PRESSURE: 120 MMHG | OXYGEN SATURATION: 100 % | HEART RATE: 90 BPM | RESPIRATION RATE: 18 BRPM | WEIGHT: 160 LBS

## 2022-03-09 DIAGNOSIS — K74.60 CIRRHOSIS OF LIVER WITH ASCITES, UNSPECIFIED HEPATIC CIRRHOSIS TYPE: ICD-10-CM

## 2022-03-09 DIAGNOSIS — R18.8 CIRRHOSIS OF LIVER WITH ASCITES, UNSPECIFIED HEPATIC CIRRHOSIS TYPE: ICD-10-CM

## 2022-03-09 LAB
APPEARANCE FLD: ABNORMAL
COLOR FLD: ABNORMAL
LYMPHOCYTES NFR FLD MANUAL: 32 %
METHOD: ABNORMAL
MONOS+MACROS NFR FLD: 64 %
NEUTROPHILS NFR FLD MANUAL: 4 %
NUC CELL # FLD: 144 /MM3
PROT FLD-MCNC: 0.6 G/DL
RBC # FLD AUTO: 334 /MM3

## 2022-03-09 PROCEDURE — 87015 SPECIMEN INFECT AGNT CONCNTJ: CPT | Performed by: INTERNAL MEDICINE

## 2022-03-09 PROCEDURE — P9047 ALBUMIN (HUMAN), 25%, 50ML: HCPCS | Performed by: INTERNAL MEDICINE

## 2022-03-09 PROCEDURE — 87205 SMEAR GRAM STAIN: CPT | Performed by: INTERNAL MEDICINE

## 2022-03-09 PROCEDURE — 76942 ECHO GUIDE FOR BIOPSY: CPT

## 2022-03-09 PROCEDURE — 89051 BODY FLUID CELL COUNT: CPT | Performed by: INTERNAL MEDICINE

## 2022-03-09 PROCEDURE — 25010000002 ALBUMIN HUMAN 25% PER 50 ML: Performed by: INTERNAL MEDICINE

## 2022-03-09 PROCEDURE — 84157 ASSAY OF PROTEIN OTHER: CPT | Performed by: INTERNAL MEDICINE

## 2022-03-09 PROCEDURE — 0 LIDOCAINE 1 % SOLUTION: Performed by: RADIOLOGY

## 2022-03-09 PROCEDURE — 96365 THER/PROPH/DIAG IV INF INIT: CPT

## 2022-03-09 PROCEDURE — 87070 CULTURE OTHR SPECIMN AEROBIC: CPT | Performed by: INTERNAL MEDICINE

## 2022-03-09 RX ORDER — ALBUMIN (HUMAN) 12.5 G/50ML
50 SOLUTION INTRAVENOUS ONCE
Status: COMPLETED | OUTPATIENT
Start: 2022-03-09 | End: 2022-03-09

## 2022-03-09 RX ORDER — LIDOCAINE HYDROCHLORIDE 10 MG/ML
10 INJECTION, SOLUTION INFILTRATION; PERINEURAL ONCE
Status: COMPLETED | OUTPATIENT
Start: 2022-03-09 | End: 2022-03-09

## 2022-03-09 RX ORDER — SODIUM CHLORIDE 0.9 % (FLUSH) 0.9 %
10 SYRINGE (ML) INJECTION AS NEEDED
Status: DISCONTINUED | OUTPATIENT
Start: 2022-03-09 | End: 2022-03-10 | Stop reason: HOSPADM

## 2022-03-09 RX ORDER — SODIUM CHLORIDE 0.9 % (FLUSH) 0.9 %
3 SYRINGE (ML) INJECTION EVERY 12 HOURS SCHEDULED
Status: DISCONTINUED | OUTPATIENT
Start: 2022-03-09 | End: 2022-03-10 | Stop reason: HOSPADM

## 2022-03-09 RX ADMIN — LIDOCAINE HYDROCHLORIDE 10 ML: 10 INJECTION, SOLUTION INFILTRATION; PERINEURAL at 08:38

## 2022-03-09 RX ADMIN — Medication 3 ML: at 10:29

## 2022-03-09 RX ADMIN — LIDOCAINE HYDROCHLORIDE 10 ML: 10 INJECTION, SOLUTION INFILTRATION; PERINEURAL at 09:07

## 2022-03-09 RX ADMIN — ALBUMIN HUMAN: 0.25 SOLUTION INTRAVENOUS at 10:30

## 2022-03-09 NOTE — TELEPHONE ENCOUNTER
"----- Message from Milind Patino sent at 3/9/2022  8:19 AM EST -----  Nurse lauryn neff at the hospital is calling and is wanting to talk to 1 of you guys. Say she has a question she said she was wonder about the  \"albumin amount\" please give her a call [phone]     "

## 2022-03-09 NOTE — TELEPHONE ENCOUNTER
Called Jessica TREVINO in radiology triage at number below.  She reports pt has a standing paracentesis order.  She states pt usually gets Albumin 50mg.  However she reports that there is an order for the future placed ordering albumin 12.5 gm for 04/06/2022.  She is asking if they should go with the 50gm Albumin for today's para.   Advised will send message to Dr Mcgee and in the meantime advised to give the dose pt usually gets which is the 50 gm.   Verb understanding.

## 2022-03-09 NOTE — DISCHARGE INSTRUCTIONS
EDUCATION /DISCHARGE INSTRUCTIONS  Paracentesis:  A needle is inserted into the space between your abdominal organs and the membrane that surrounds them (peritoneal space).  It is done for the diagnosis and treatment of fluid that is resistant to other therapies.  It helps determine the cause of the fluid and at the relieves pressure created by the fluid.  A sample is obtained and sent to the laboratory for study.  During the procedure:  You will lie on a bed on your back with your legs drawn up.  Your abdomen will be exposed from the chest to the pelvis.  You will otherwise be covered to maintain comfort.  A physician will clean your abdomen with antiseptic soap, place a sterile towel around the site and administer a local anesthetic to numb the area.  The physician will insert a needle into your abdominal wall.  There may be a popping sound which signifies the needle has pierced the abdominal wall. Next, the physician will attach tubing to transfer a sample into a collection bottle.  After the fluid is obtained the needle will be removed.  A pressure dressing is applied to the site.  Risks of the procedure include but are not limited to:   *  Bleeding    *  Wound infection   *  Low blood pressure   *  Decreased urination   *  Low sodium if a large amount of fluid is removed   *  Puncture of abdominal organs by the needle    Benefits of the procedure:  Benefits include the removal of fluid from the abdomen, relief of abdominal pressure and facilitation of a diagnosis.  Alternatives to the procedure:  Possible alternatives are diuretic drug therapy or surgery to place a shunt to drain fluid.  Risks of diuretic drug therapy include possible dehydration and renal failure.  The benefit of drug therapy is that it can be done at home under physician supervision.  Risks of shunt placement include exposure to anesthesia, infection, excessive bleeding and injury to abdominal organs.  The benefit of a shunt is that it can be  used to drain fluid over a longer period of time.  THIS EDUCATION INFORMATION WAS REVIEWED PRIOR TO THE PROCEDURE AND CONSENT. Patient initials__________________Time_________________    Post procedure: (Follow all the instructions below carefully)   *  Weigh yourself daily.   *  Follow your doctors dietary instructions.   *  Rest today (no pushing pulling, straining or heavy lifting).   *  Slowly increase activity tomorow.   *  If you received sedation do not drive for 24 hours.              * Skin affix  applied to puncture site. Do not try to remove, scratch or apply lotion   * Skin affix will fall off on it's own   *  You may shower tomorrow  Call your doctor if experiencing:   *  Signs of infection such as redness, swelling, excessive pain and / or foul       smelling drainage from the puncture site.   *  Chills or fever over 101 degrees (by mouth).   *  Fainting or any noted Rapid weight gain/loss   *  Unrelieved pain or any new or severe symptoms  Following the procedure:      Follow-up with the ordering physician as directed.   Continue to take other medications as directed by your physician unless    otherwise instructed.   If applicable, resume taking your blood thinners or Aspirin in 24 hours.              If you have any concerns please call the Radiology Nurses Desk at [phone]

## 2022-03-09 NOTE — TELEPHONE ENCOUNTER
Called pt and pt did confirm that he is taking the nadolol and not the metoprolol.   Update sent to Dr Mcgee.

## 2022-03-15 ENCOUNTER — CLINICAL SUPPORT (OUTPATIENT)
Dept: ONCOLOGY | Facility: HOSPITAL | Age: 67
End: 2022-03-15

## 2022-03-15 ENCOUNTER — LAB (OUTPATIENT)
Dept: LAB | Facility: HOSPITAL | Age: 67
End: 2022-03-15

## 2022-03-15 DIAGNOSIS — K70.9 ALCOHOLIC LIVER DISEASE: ICD-10-CM

## 2022-03-15 DIAGNOSIS — I81 PORTAL VEIN THROMBOSIS: ICD-10-CM

## 2022-03-15 LAB
BACTERIA FLD CULT: NORMAL
BASOPHILS # BLD AUTO: 0.05 10*3/MM3 (ref 0–0.2)
BASOPHILS NFR BLD AUTO: 1.1 % (ref 0–1.5)
DEPRECATED RDW RBC AUTO: 65.5 FL (ref 37–54)
EOSINOPHIL # BLD AUTO: 0.15 10*3/MM3 (ref 0–0.4)
EOSINOPHIL NFR BLD AUTO: 3.4 % (ref 0.3–6.2)
ERYTHROCYTE [DISTWIDTH] IN BLOOD BY AUTOMATED COUNT: 19.6 % (ref 12.3–15.4)
GRAM STN SPEC: NORMAL
HCT VFR BLD AUTO: 26.1 % (ref 37.5–51)
HGB BLD-MCNC: 8.5 G/DL (ref 13–17.7)
IMM GRANULOCYTES # BLD AUTO: 0.15 10*3/MM3 (ref 0–0.05)
IMM GRANULOCYTES NFR BLD AUTO: 3.4 % (ref 0–0.5)
LYMPHOCYTES # BLD AUTO: 0.62 10*3/MM3 (ref 0.7–3.1)
LYMPHOCYTES NFR BLD AUTO: 13.9 % (ref 19.6–45.3)
MCH RBC QN AUTO: 29.8 PG (ref 26.6–33)
MCHC RBC AUTO-ENTMCNC: 32.6 G/DL (ref 31.5–35.7)
MCV RBC AUTO: 91.6 FL (ref 79–97)
MONOCYTES # BLD AUTO: 0.77 10*3/MM3 (ref 0.1–0.9)
MONOCYTES NFR BLD AUTO: 17.3 % (ref 5–12)
NEUTROPHILS NFR BLD AUTO: 2.71 10*3/MM3 (ref 1.7–7)
NEUTROPHILS NFR BLD AUTO: 60.9 % (ref 42.7–76)
NRBC BLD AUTO-RTO: 0 /100 WBC (ref 0–0.2)
PLATELET # BLD AUTO: 85 10*3/MM3 (ref 140–450)
PMV BLD AUTO: 10.1 FL (ref 6–12)
RBC # BLD AUTO: 2.85 10*6/MM3 (ref 4.14–5.8)
WBC NRBC COR # BLD: 4.45 10*3/MM3 (ref 3.4–10.8)

## 2022-03-15 PROCEDURE — 85025 COMPLETE CBC W/AUTO DIFF WBC: CPT

## 2022-03-15 PROCEDURE — 36415 COLL VENOUS BLD VENIPUNCTURE: CPT

## 2022-03-15 PROCEDURE — G0463 HOSPITAL OUTPT CLINIC VISIT: HCPCS

## 2022-03-15 NOTE — NURSING NOTE
Pt here for CBC and review. CBC is stable for this patient at this time. Hgb 8.5 and Platelets 85 today. Pt tolerating Lovenox daily without issue. Copy of labs provided and f/u appointments printed for patient.    Lab Results   Component Value Date    WBC 4.45 03/15/2022    HGB 8.5 (L) 03/15/2022    HCT 26.1 (L) 03/15/2022    MCV 91.6 03/15/2022    PLT 85 (L) 03/15/2022

## 2022-03-18 ENCOUNTER — APPOINTMENT (OUTPATIENT)
Dept: ULTRASOUND IMAGING | Facility: HOSPITAL | Age: 67
End: 2022-03-18

## 2022-03-18 ENCOUNTER — HOSPITAL ENCOUNTER (INPATIENT)
Facility: HOSPITAL | Age: 67
LOS: 2 days | Discharge: HOME | End: 2022-03-20
Attending: EMERGENCY MEDICINE | Admitting: STUDENT IN AN ORGANIZED HEALTH CARE EDUCATION/TRAINING PROGRAM

## 2022-03-18 DIAGNOSIS — K76.82 HEPATIC ENCEPHALOPATHY: Primary | ICD-10-CM

## 2022-03-18 DIAGNOSIS — K70.30 ALCOHOLIC CIRRHOSIS, UNSPECIFIED WHETHER ASCITES PRESENT: ICD-10-CM

## 2022-03-18 PROBLEM — D69.6 THROMBOCYTOPENIA (HCC): Chronic | Status: ACTIVE | Noted: 2022-03-18

## 2022-03-18 LAB
ALBUMIN FLD-MCNC: 0.5 G/DL
ALBUMIN SERPL-MCNC: 3.2 G/DL (ref 3.5–5.2)
ALBUMIN/GLOB SERPL: 0.7 G/DL
ALP SERPL-CCNC: 174 U/L (ref 39–117)
ALT SERPL W P-5'-P-CCNC: 34 U/L (ref 1–41)
AMMONIA BLD-SCNC: 168 UMOL/L (ref 16–60)
ANION GAP SERPL CALCULATED.3IONS-SCNC: 11 MMOL/L (ref 5–15)
APPEARANCE FLD: ABNORMAL
APTT PPP: 41.1 SECONDS (ref 22.7–35.4)
AST SERPL-CCNC: 92 U/L (ref 1–40)
BASOPHILS # BLD AUTO: 0.02 10*3/MM3 (ref 0–0.2)
BASOPHILS NFR BLD AUTO: 0.6 % (ref 0–1.5)
BILIRUB SERPL-MCNC: 2.9 MG/DL (ref 0–1.2)
BILIRUB UR QL STRIP: NEGATIVE
BUN SERPL-MCNC: 13 MG/DL (ref 8–23)
BUN/CREAT SERPL: 13 (ref 7–25)
CALCIUM SPEC-SCNC: 8.4 MG/DL (ref 8.6–10.5)
CHLORIDE SERPL-SCNC: 106 MMOL/L (ref 98–107)
CLARITY UR: CLEAR
CO2 SERPL-SCNC: 20 MMOL/L (ref 22–29)
COLOR FLD: YELLOW
COLOR UR: YELLOW
CREAT SERPL-MCNC: 1 MG/DL (ref 0.76–1.27)
DEPRECATED RDW RBC AUTO: 58.2 FL (ref 37–54)
EGFRCR SERPLBLD CKD-EPI 2021: 83.5 ML/MIN/1.73
EOSINOPHIL # BLD AUTO: 0.12 10*3/MM3 (ref 0–0.4)
EOSINOPHIL NFR BLD AUTO: 3.8 % (ref 0.3–6.2)
ERYTHROCYTE [DISTWIDTH] IN BLOOD BY AUTOMATED COUNT: 18.2 % (ref 12.3–15.4)
ETHANOL BLD-MCNC: <10 MG/DL (ref 0–10)
ETHANOL UR QL: <0.01 %
GLOBULIN UR ELPH-MCNC: 4.4 GM/DL
GLUCOSE BLDC GLUCOMTR-MCNC: 104 MG/DL (ref 70–130)
GLUCOSE FLD-MCNC: 156 MG/DL
GLUCOSE SERPL-MCNC: 101 MG/DL (ref 65–99)
GLUCOSE UR STRIP-MCNC: NEGATIVE MG/DL
HCT VFR BLD AUTO: 25.3 % (ref 37.5–51)
HGB BLD-MCNC: 8.6 G/DL (ref 13–17.7)
HGB UR QL STRIP.AUTO: NEGATIVE
IMM GRANULOCYTES # BLD AUTO: 0.01 10*3/MM3 (ref 0–0.05)
IMM GRANULOCYTES NFR BLD AUTO: 0.3 % (ref 0–0.5)
INR PPP: 1.69 (ref 0.9–1.1)
KETONES UR QL STRIP: NEGATIVE
LDH FLD-CCNC: 48 U/L
LEUKOCYTE ESTERASE UR QL STRIP.AUTO: NEGATIVE
LYMPHOCYTES # BLD AUTO: 0.5 10*3/MM3 (ref 0.7–3.1)
LYMPHOCYTES NFR BLD AUTO: 16 % (ref 19.6–45.3)
LYMPHOCYTES NFR FLD MANUAL: 24 %
MCH RBC QN AUTO: 30.1 PG (ref 26.6–33)
MCHC RBC AUTO-ENTMCNC: 34 G/DL (ref 31.5–35.7)
MCV RBC AUTO: 88.5 FL (ref 79–97)
METHOD: ABNORMAL
MONOCYTES # BLD AUTO: 0.49 10*3/MM3 (ref 0.1–0.9)
MONOCYTES NFR BLD AUTO: 15.7 % (ref 5–12)
MONOCYTES NFR FLD: 6 %
MONOS+MACROS NFR FLD: 69 %
NEUTROPHILS NFR BLD AUTO: 1.99 10*3/MM3 (ref 1.7–7)
NEUTROPHILS NFR BLD AUTO: 63.6 % (ref 42.7–76)
NEUTROPHILS NFR FLD MANUAL: 1 %
NITRITE UR QL STRIP: NEGATIVE
NRBC BLD AUTO-RTO: 0 /100 WBC (ref 0–0.2)
NUC CELL # FLD: 316 /MM3
PH UR STRIP.AUTO: 7.5 [PH] (ref 5–8)
PLATELET # BLD AUTO: 66 10*3/MM3 (ref 140–450)
PMV BLD AUTO: 9.3 FL (ref 6–12)
POTASSIUM SERPL-SCNC: 3.6 MMOL/L (ref 3.5–5.2)
PROT FLD-MCNC: 0.8 G/DL
PROT SERPL-MCNC: 7.6 G/DL (ref 6–8.5)
PROT UR QL STRIP: NEGATIVE
PROTHROMBIN TIME: 19.8 SECONDS (ref 11.7–14.2)
RBC # BLD AUTO: 2.86 10*6/MM3 (ref 4.14–5.8)
RBC # FLD AUTO: 551 /MM3
SARS-COV-2 ORF1AB RESP QL NAA+PROBE: NOT DETECTED
SODIUM SERPL-SCNC: 137 MMOL/L (ref 136–145)
SP GR UR STRIP: 1.01 (ref 1–1.03)
UROBILINOGEN UR QL STRIP: NORMAL
WBC NRBC COR # BLD: 3.13 10*3/MM3 (ref 3.4–10.8)

## 2022-03-18 PROCEDURE — 85730 THROMBOPLASTIN TIME PARTIAL: CPT | Performed by: EMERGENCY MEDICINE

## 2022-03-18 PROCEDURE — 85610 PROTHROMBIN TIME: CPT | Performed by: EMERGENCY MEDICINE

## 2022-03-18 PROCEDURE — 82945 GLUCOSE OTHER FLUID: CPT | Performed by: STUDENT IN AN ORGANIZED HEALTH CARE EDUCATION/TRAINING PROGRAM

## 2022-03-18 PROCEDURE — 0 LIDOCAINE 1 % SOLUTION: Performed by: RADIOLOGY

## 2022-03-18 PROCEDURE — 80053 COMPREHEN METABOLIC PANEL: CPT | Performed by: EMERGENCY MEDICINE

## 2022-03-18 PROCEDURE — 84157 ASSAY OF PROTEIN OTHER: CPT | Performed by: STUDENT IN AN ORGANIZED HEALTH CARE EDUCATION/TRAINING PROGRAM

## 2022-03-18 PROCEDURE — 76942 ECHO GUIDE FOR BIOPSY: CPT

## 2022-03-18 PROCEDURE — 87205 SMEAR GRAM STAIN: CPT | Performed by: STUDENT IN AN ORGANIZED HEALTH CARE EDUCATION/TRAINING PROGRAM

## 2022-03-18 PROCEDURE — 87070 CULTURE OTHR SPECIMN AEROBIC: CPT | Performed by: STUDENT IN AN ORGANIZED HEALTH CARE EDUCATION/TRAINING PROGRAM

## 2022-03-18 PROCEDURE — 82140 ASSAY OF AMMONIA: CPT | Performed by: EMERGENCY MEDICINE

## 2022-03-18 PROCEDURE — 89051 BODY FLUID CELL COUNT: CPT | Performed by: STUDENT IN AN ORGANIZED HEALTH CARE EDUCATION/TRAINING PROGRAM

## 2022-03-18 PROCEDURE — U0004 COV-19 TEST NON-CDC HGH THRU: HCPCS | Performed by: EMERGENCY MEDICINE

## 2022-03-18 PROCEDURE — 99285 EMERGENCY DEPT VISIT HI MDM: CPT

## 2022-03-18 PROCEDURE — 82077 ASSAY SPEC XCP UR&BREATH IA: CPT | Performed by: EMERGENCY MEDICINE

## 2022-03-18 PROCEDURE — 82042 OTHER SOURCE ALBUMIN QUAN EA: CPT | Performed by: STUDENT IN AN ORGANIZED HEALTH CARE EDUCATION/TRAINING PROGRAM

## 2022-03-18 PROCEDURE — 82962 GLUCOSE BLOOD TEST: CPT

## 2022-03-18 PROCEDURE — 87015 SPECIMEN INFECT AGNT CONCNTJ: CPT | Performed by: STUDENT IN AN ORGANIZED HEALTH CARE EDUCATION/TRAINING PROGRAM

## 2022-03-18 PROCEDURE — 87075 CULTR BACTERIA EXCEPT BLOOD: CPT | Performed by: STUDENT IN AN ORGANIZED HEALTH CARE EDUCATION/TRAINING PROGRAM

## 2022-03-18 PROCEDURE — 0W9G3ZZ DRAINAGE OF PERITONEAL CAVITY, PERCUTANEOUS APPROACH: ICD-10-PCS | Performed by: RADIOLOGY

## 2022-03-18 PROCEDURE — 81003 URINALYSIS AUTO W/O SCOPE: CPT | Performed by: EMERGENCY MEDICINE

## 2022-03-18 PROCEDURE — 85025 COMPLETE CBC W/AUTO DIFF WBC: CPT | Performed by: EMERGENCY MEDICINE

## 2022-03-18 PROCEDURE — 25010000002 ENOXAPARIN PER 10 MG: Performed by: STUDENT IN AN ORGANIZED HEALTH CARE EDUCATION/TRAINING PROGRAM

## 2022-03-18 PROCEDURE — 83615 LACTATE (LD) (LDH) ENZYME: CPT | Performed by: STUDENT IN AN ORGANIZED HEALTH CARE EDUCATION/TRAINING PROGRAM

## 2022-03-18 RX ORDER — SODIUM CHLORIDE 0.9 % (FLUSH) 0.9 %
10 SYRINGE (ML) INJECTION AS NEEDED
Status: DISCONTINUED | OUTPATIENT
Start: 2022-03-18 | End: 2022-03-20 | Stop reason: HOSPADM

## 2022-03-18 RX ORDER — DIPHENOXYLATE HYDROCHLORIDE AND ATROPINE SULFATE 2.5; .025 MG/1; MG/1
1 TABLET ORAL DAILY
Status: DISCONTINUED | OUTPATIENT
Start: 2022-03-18 | End: 2022-03-20 | Stop reason: HOSPADM

## 2022-03-18 RX ORDER — LACTULOSE 10 G/15ML
30 SOLUTION ORAL ONCE
Status: COMPLETED | OUTPATIENT
Start: 2022-03-18 | End: 2022-03-18

## 2022-03-18 RX ORDER — NITROGLYCERIN 0.4 MG/1
0.4 TABLET SUBLINGUAL
Status: DISCONTINUED | OUTPATIENT
Start: 2022-03-18 | End: 2022-03-20 | Stop reason: HOSPADM

## 2022-03-18 RX ORDER — ONDANSETRON 2 MG/ML
4 INJECTION INTRAMUSCULAR; INTRAVENOUS EVERY 6 HOURS PRN
Status: DISCONTINUED | OUTPATIENT
Start: 2022-03-18 | End: 2022-03-20 | Stop reason: HOSPADM

## 2022-03-18 RX ORDER — ONDANSETRON 4 MG/1
4 TABLET, FILM COATED ORAL EVERY 6 HOURS PRN
Status: DISCONTINUED | OUTPATIENT
Start: 2022-03-18 | End: 2022-03-20 | Stop reason: HOSPADM

## 2022-03-18 RX ORDER — LIDOCAINE HYDROCHLORIDE 10 MG/ML
10 INJECTION, SOLUTION INFILTRATION; PERINEURAL ONCE
Status: COMPLETED | OUTPATIENT
Start: 2022-03-18 | End: 2022-03-18

## 2022-03-18 RX ORDER — SULFAMETHOXAZOLE AND TRIMETHOPRIM 800; 160 MG/1; MG/1
1 TABLET ORAL
Status: DISCONTINUED | OUTPATIENT
Start: 2022-03-18 | End: 2022-03-20 | Stop reason: HOSPADM

## 2022-03-18 RX ORDER — NADOLOL 20 MG/1
20 TABLET ORAL
Status: DISCONTINUED | OUTPATIENT
Start: 2022-03-18 | End: 2022-03-20 | Stop reason: HOSPADM

## 2022-03-18 RX ORDER — ZINC SULFATE 50(220)MG
220 CAPSULE ORAL DAILY
Status: DISCONTINUED | OUTPATIENT
Start: 2022-03-18 | End: 2022-03-20 | Stop reason: HOSPADM

## 2022-03-18 RX ORDER — FUROSEMIDE 40 MG/1
40 TABLET ORAL DAILY
Status: DISCONTINUED | OUTPATIENT
Start: 2022-03-18 | End: 2022-03-20 | Stop reason: HOSPADM

## 2022-03-18 RX ORDER — PANTOPRAZOLE SODIUM 40 MG/1
40 TABLET, DELAYED RELEASE ORAL EVERY MORNING
Status: DISCONTINUED | OUTPATIENT
Start: 2022-03-18 | End: 2022-03-20 | Stop reason: HOSPADM

## 2022-03-18 RX ORDER — MIDODRINE HYDROCHLORIDE 2.5 MG/1
2.5 TABLET ORAL EVERY 8 HOURS
Status: DISCONTINUED | OUTPATIENT
Start: 2022-03-18 | End: 2022-03-20 | Stop reason: HOSPADM

## 2022-03-18 RX ORDER — AMOXICILLIN 250 MG
1 CAPSULE ORAL DAILY
Status: DISCONTINUED | OUTPATIENT
Start: 2022-03-18 | End: 2022-03-20 | Stop reason: HOSPADM

## 2022-03-18 RX ORDER — SPIRONOLACTONE 50 MG/1
50 TABLET, FILM COATED ORAL DAILY
Status: DISCONTINUED | OUTPATIENT
Start: 2022-03-18 | End: 2022-03-20 | Stop reason: HOSPADM

## 2022-03-18 RX ORDER — LACTULOSE 10 G/15ML
20 SOLUTION ORAL 3 TIMES DAILY
Status: DISCONTINUED | OUTPATIENT
Start: 2022-03-18 | End: 2022-03-20 | Stop reason: HOSPADM

## 2022-03-18 RX ORDER — SODIUM CHLORIDE 0.9 % (FLUSH) 0.9 %
10 SYRINGE (ML) INJECTION EVERY 12 HOURS SCHEDULED
Status: DISCONTINUED | OUTPATIENT
Start: 2022-03-18 | End: 2022-03-20 | Stop reason: HOSPADM

## 2022-03-18 RX ADMIN — MAGNESIUM OXIDE 400 MG (241.3 MG MAGNESIUM) TABLET 400 MG: TABLET at 20:26

## 2022-03-18 RX ADMIN — MIDODRINE HYDROCHLORIDE 2.5 MG: 2.5 TABLET ORAL at 18:37

## 2022-03-18 RX ADMIN — Medication 1 TABLET: at 09:45

## 2022-03-18 RX ADMIN — MIDODRINE HYDROCHLORIDE 2.5 MG: 2.5 TABLET ORAL at 09:45

## 2022-03-18 RX ADMIN — RIFAXIMIN 550 MG: 550 TABLET ORAL at 20:26

## 2022-03-18 RX ADMIN — FUROSEMIDE 40 MG: 20 TABLET ORAL at 09:44

## 2022-03-18 RX ADMIN — NADOLOL 20 MG: 20 TABLET ORAL at 09:45

## 2022-03-18 RX ADMIN — PANTOPRAZOLE SODIUM 40 MG: 40 TABLET, DELAYED RELEASE ORAL at 09:44

## 2022-03-18 RX ADMIN — LIDOCAINE HYDROCHLORIDE 7 ML: 10 INJECTION, SOLUTION INFILTRATION; PERINEURAL at 14:52

## 2022-03-18 RX ADMIN — ENOXAPARIN SODIUM 40 MG: 100 INJECTION SUBCUTANEOUS at 20:26

## 2022-03-18 RX ADMIN — LACTULOSE 20 G: 20 SOLUTION ORAL at 20:25

## 2022-03-18 RX ADMIN — DOCUSATE SODIUM 50MG AND SENNOSIDES 8.6MG 1 TABLET: 8.6; 5 TABLET, FILM COATED ORAL at 09:44

## 2022-03-18 RX ADMIN — LACTULOSE 30 G: 10 SOLUTION ORAL at 05:03

## 2022-03-18 RX ADMIN — SULFAMETHOXAZOLE AND TRIMETHOPRIM 1 TABLET: 800; 160 TABLET ORAL at 09:44

## 2022-03-18 RX ADMIN — MAGNESIUM OXIDE 400 MG (241.3 MG MAGNESIUM) TABLET 400 MG: TABLET at 09:45

## 2022-03-18 RX ADMIN — RIFAXIMIN 550 MG: 550 TABLET ORAL at 09:45

## 2022-03-18 RX ADMIN — SPIRONOLACTONE 50 MG: 50 TABLET, FILM COATED ORAL at 09:45

## 2022-03-18 RX ADMIN — Medication 10 ML: at 20:27

## 2022-03-18 RX ADMIN — LACTULOSE 20 G: 20 SOLUTION ORAL at 09:43

## 2022-03-18 RX ADMIN — Medication 220 MG: at 09:44

## 2022-03-18 NOTE — ED NOTES
Patient arrived in the ED via car from home by wife. She states he is experiencing increased confusion and was told to bring him back if confusion increased.     Patient was inpatient a few weeks ago for increased confusion as well. Admitted 2/4/22 and dc on the 14th with hepatic encephalopothy.

## 2022-03-18 NOTE — ED PROVIDER NOTES
EMERGENCY DEPARTMENT ENCOUNTER    Room Number:  13/13  Date of encounter:  3/18/2022  PCP: Bella Villalta MD  Historian: Patient, spouse      HPI:  Chief Complaint: Altered mental status  A complete HPI/ROS/PMH/PSH/SH/FH are unobtainable due to: Patient is slightly altered    Context: Wei Potts is a 67 y.o. male who presents to the ED c/o increased confusion and roaming behavior at home been intermittent for the past 2 days.  Patient has history of alcoholic cirrhosis.  Has had bouts of hepatic encephalopathy before.  Wife states she thinks he is taking his lactulose as prescribed.  Patient is somewhat unhelpful in terms of confirming this.      PAST MEDICAL HISTORY  Active Ambulatory Problems     Diagnosis Date Noted   • ED (erectile dysfunction) of organic origin 10/29/2015   • Alcoholic cirrhosis of liver with ascites (HCC) 07/06/2020   • Anemia 07/06/2020   • Jaundice 07/06/2020   • Coagulopathy (HCC) 07/08/2020   • Secondary thrombocytopenia 07/08/2020   • Lung abnormality- left apex 07/09/2020   • Sepsis (HCC) 01/01/2021   • Colitis 01/02/2021   • Hypertension 01/02/2021   • GERD (gastroesophageal reflux disease) 01/02/2021   • SBP (spontaneous bacterial peritonitis) (HCC) 01/06/2021   • Hyperammonemia (HCC) 02/04/2022   • Portal vein thrombosis 02/07/2022   • COVID-19 virus detected 02/08/2022   • Acute hepatitis A virus infection 02/17/2022   • Ascites due to alcoholic cirrhosis (HCC) 07/06/2020   • Cirrhosis (HCC) 02/24/2021   • Esophageal varix (HCC) 04/26/2021     Resolved Ambulatory Problems     Diagnosis Date Noted   • Benign essential hypertension 10/29/2015   • Hyponatremia 07/06/2020   • Hepatic encephalopathy (HCC) 12/25/2020     Past Medical History:   Diagnosis Date   • Alcoholism (HCC)    • Clot    • Encephalopathy    • Liver disease          PAST SURGICAL HISTORY  Past Surgical History:   Procedure Laterality Date   • COLONOSCOPY     • ENDOSCOPY           FAMILY HISTORY  Family History    Problem Relation Age of Onset   • Diabetes Mother    • Hypertension Father          SOCIAL HISTORY  Social History     Socioeconomic History   • Marital status:    Tobacco Use   • Smoking status: Never Smoker   • Smokeless tobacco: Never Used   Vaping Use   • Vaping Use: Never used   Substance and Sexual Activity   • Alcohol use: Not Currently     Comment: No ETOH on 2.5 yrs   • Drug use: No   • Sexual activity: Defer         ALLERGIES  Iron and Zinc        REVIEW OF SYSTEMS  Review of Systems     All systems reviewed and negative except for those discussed in HPI.       PHYSICAL EXAM    I have reviewed the triage vital signs and nursing notes.    ED Triage Vitals   Temp Heart Rate Resp BP SpO2   03/18/22 0325 03/18/22 0325 03/18/22 0325 03/18/22 0328 03/18/22 0325   96.9 °F (36.1 °C) 71 16 119/71 99 %      Temp src Heart Rate Source Patient Position BP Location FiO2 (%)   03/18/22 0325 03/18/22 0325 -- 03/18/22 0328 --   Tympanic Monitor  Right arm        Physical Exam  GENERAL: not distressed  HENT: nares patent  EYES: no scleral icterus  CV: regular rhythm, regular rate  RESPIRATORY: normal effort  ABDOMEN: soft, nontender nondistended  MUSCULOSKELETAL: no deformity  NEURO: alert, moves all extremities, follows commands  SKIN: warm, dry        LAB RESULTS  Recent Results (from the past 24 hour(s))   Comprehensive Metabolic Panel    Collection Time: 03/18/22  4:12 AM    Specimen: Blood   Result Value Ref Range    Glucose 101 (H) 65 - 99 mg/dL    BUN 13 8 - 23 mg/dL    Creatinine 1.00 0.76 - 1.27 mg/dL    Sodium 137 136 - 145 mmol/L    Potassium 3.6 3.5 - 5.2 mmol/L    Chloride 106 98 - 107 mmol/L    CO2 20.0 (L) 22.0 - 29.0 mmol/L    Calcium 8.4 (L) 8.6 - 10.5 mg/dL    Total Protein 7.6 6.0 - 8.5 g/dL    Albumin 3.20 (L) 3.50 - 5.20 g/dL    ALT (SGPT) 34 1 - 41 U/L    AST (SGOT) 92 (H) 1 - 40 U/L    Alkaline Phosphatase 174 (H) 39 - 117 U/L    Total Bilirubin 2.9 (H) 0.0 - 1.2 mg/dL    Globulin 4.4 gm/dL     A/G Ratio 0.7 g/dL    BUN/Creatinine Ratio 13.0 7.0 - 25.0    Anion Gap 11.0 5.0 - 15.0 mmol/L    eGFR 83.5 >60.0 mL/min/1.73   Protime-INR    Collection Time: 03/18/22  4:12 AM    Specimen: Blood   Result Value Ref Range    Protime 19.8 (H) 11.7 - 14.2 Seconds    INR 1.69 (H) 0.90 - 1.10   aPTT    Collection Time: 03/18/22  4:12 AM    Specimen: Blood   Result Value Ref Range    PTT 41.1 (H) 22.7 - 35.4 seconds   Ammonia    Collection Time: 03/18/22  4:12 AM    Specimen: Blood   Result Value Ref Range    Ammonia 168 (H) 16 - 60 umol/L   CBC Auto Differential    Collection Time: 03/18/22  4:12 AM    Specimen: Blood   Result Value Ref Range    WBC 3.13 (L) 3.40 - 10.80 10*3/mm3    RBC 2.86 (L) 4.14 - 5.80 10*6/mm3    Hemoglobin 8.6 (L) 13.0 - 17.7 g/dL    Hematocrit 25.3 (L) 37.5 - 51.0 %    MCV 88.5 79.0 - 97.0 fL    MCH 30.1 26.6 - 33.0 pg    MCHC 34.0 31.5 - 35.7 g/dL    RDW 18.2 (H) 12.3 - 15.4 %    RDW-SD 58.2 (H) 37.0 - 54.0 fl    MPV 9.3 6.0 - 12.0 fL    Platelets 66 (L) 140 - 450 10*3/mm3    Neutrophil % 63.6 42.7 - 76.0 %    Lymphocyte % 16.0 (L) 19.6 - 45.3 %    Monocyte % 15.7 (H) 5.0 - 12.0 %    Eosinophil % 3.8 0.3 - 6.2 %    Basophil % 0.6 0.0 - 1.5 %    Immature Grans % 0.3 0.0 - 0.5 %    Neutrophils, Absolute 1.99 1.70 - 7.00 10*3/mm3    Lymphocytes, Absolute 0.50 (L) 0.70 - 3.10 10*3/mm3    Monocytes, Absolute 0.49 0.10 - 0.90 10*3/mm3    Eosinophils, Absolute 0.12 0.00 - 0.40 10*3/mm3    Basophils, Absolute 0.02 0.00 - 0.20 10*3/mm3    Immature Grans, Absolute 0.01 0.00 - 0.05 10*3/mm3    nRBC 0.0 0.0 - 0.2 /100 WBC   Ethanol    Collection Time: 03/18/22  4:12 AM    Specimen: Blood   Result Value Ref Range    Ethanol <10 0 - 10 mg/dL    Ethanol % <0.010 %   POC Glucose Once    Collection Time: 03/18/22  4:36 AM    Specimen: Blood   Result Value Ref Range    Glucose 104 70 - 130 mg/dL   Urinalysis With Microscopic If Indicated (No Culture) - Urine, Clean Catch    Collection Time: 03/18/22  5:05 AM     Specimen: Urine, Clean Catch   Result Value Ref Range    Color, UA Yellow Yellow, Straw    Appearance, UA Clear Clear    pH, UA 7.5 5.0 - 8.0    Specific Gravity, UA 1.007 1.005 - 1.030    Glucose, UA Negative Negative    Ketones, UA Negative Negative    Bilirubin, UA Negative Negative    Blood, UA Negative Negative    Protein, UA Negative Negative    Leuk Esterase, UA Negative Negative    Nitrite, UA Negative Negative    Urobilinogen, UA 1.0 E.U./dL 0.2 - 1.0 E.U./dL       Ordered the above labs and independently reviewed the results.        RADIOLOGY  No Radiology Exams Resulted Within Past 24 Hours    I ordered the above noted radiological studies. Reviewed by me and discussed with radiologist.  See dictation for official radiology interpretation.      PROCEDURES    Procedures      MEDICATIONS GIVEN IN ER    Medications   sodium chloride 0.9 % flush 10 mL (has no administration in time range)   sodium chloride 0.9 % flush 10 mL (has no administration in time range)   nitroglycerin (NITROSTAT) SL tablet 0.4 mg (has no administration in time range)   ondansetron (ZOFRAN) tablet 4 mg (has no administration in time range)     Or   ondansetron (ZOFRAN) injection 4 mg (has no administration in time range)   lactulose (CHRONULAC) 10 GM/15ML solution 30 g (30 g Oral Given 3/18/22 0503)         PROGRESS, DATA ANALYSIS, CONSULTS, AND MEDICAL DECISION MAKING    All labs have been independently reviewed by me.  All radiology studies have been reviewed by me and discussed with radiologist dictating the report.   EKG's independently viewed and interpreted by me.  Discussion below represents my analysis of pertinent findings related to patient's condition, differential diagnosis, treatment plan and final disposition.        ED Course as of 03/18/22 0628   Fri Mar 18, 2022   0518 Ammonia(!): 168 [TJ]   0518 INR(!): 1.69 [TJ]   0518 Hemoglobin(!): 8.6 [TJ]   0518 WBC(!): 3.13 [TJ]   0518 Platelets(!): 66 [TJ]   0518 Ethanol %:  <0.010 [TJ]      ED Course User Index  [TJ] Beto Wilkes MD           PPE: The patient wore a surgical mask throughout the entire patient encounter. I wore an N95.    AS OF 06:28 EDT VITALS:    BP - 145/85  HR - 79  TEMP - 96.9 °F (36.1 °C) (Tympanic)  O2 SATS - 100%        DIAGNOSIS  Final diagnoses:   Hepatic encephalopathy (HCC)   Alcoholic cirrhosis, unspecified whether ascites present (HCC)         DISPOSITION  Admit           Beto Wilkes MD  03/18/22 0629

## 2022-03-18 NOTE — PROGRESS NOTES
Clinical Pharmacy Services: Medication History    Wei Potts is a 67 y.o. male presenting to [facility] for   Chief Complaint   Patient presents with   • Altered Mental Status       He  has a past medical history of Alcoholism (HCC), Anemia, Cirrhosis (HCC), Clot, Encephalopathy, Hypertension, and Liver disease.    Allergies as of 03/18/2022 - Reviewed 03/18/2022   Allergen Reaction Noted   • Iron GI Intolerance 02/04/2021   • Zinc GI Intolerance 03/04/2022       Medication information was obtained from: Patient   Pharmacy and Phone Number:     Prior to Admission Medications     Prescriptions Last Dose Informant Patient Reported? Taking?    amitriptyline (ELAVIL) 10 MG tablet 3/17/2022  No Yes    Take 1 tablet by mouth Every Night.    enoxaparin (LOVENOX) 40 MG/0.4ML solution syringe 3/17/2022 Self No Yes    Inject 0.4 mL under the skin into the appropriate area as directed Daily. Indications: DVT/PE (active thrombosis)    furosemide (LASIX) 40 MG tablet 3/17/2022 Self No Yes    TAKE 1 TABLET BY MOUTH DAILY    Generlac 10 GM/15ML solution solution (encephalopathy) Past Week Self Yes Yes    Take 20 g by mouth 3 (Three) Times a Day.    lactulose (CHRONULAC) 10 GM/15ML solution 3/17/2022 Self No Yes    Take 30 mL by mouth 3 (Three) Times a Day.    MAGnesium-Oxide 400 (241.3 Mg) MG tablet tablet 3/17/2022 Self Yes Yes    Take 400 mg by mouth 2 (Two) Times a Day.    midodrine (PROAMATINE) 5 MG tablet 3/17/2022 Self No Yes    Take 0.5 tablets by mouth Every 8 (Eight) Hours.    Patient taking differently:  Take 5 mg by mouth Daily.    multivitamin (multivitamin) tablet tablet 3/17/2022 Self No Yes    Take 1 tablet by mouth Daily.    nadolol (CORGARD) 20 MG tablet 3/17/2022 Self No Yes    Take 1 tablet by mouth Daily.    pantoprazole (PROTONIX) 40 MG EC tablet 3/17/2022 Self No Yes    Take 1 tablet by mouth Every Morning.    riFAXIMin (XIFAXAN) 550 MG tablet 3/17/2022 Self Yes Yes    Take 550 mg by  mouth Every 12 (Twelve) Hours.    spironolactone (ALDACTONE) 50 MG tablet 3/17/2022 Self No Yes    Take 1 tablet by mouth Daily.    sulfamethoxazole-trimethoprim (BACTRIM DS,SEPTRA DS) 800-160 MG per tablet 3/17/2022 Self No Yes    Take 1 tablet by mouth Daily.            Medication notes:     This medication list is complete to the best of my knowledge as of 3/18/2022    Please call if questions.    Morgan Amezcua  Medication History Technician  [phone]    3/18/2022 09:23 EDT

## 2022-03-18 NOTE — PLAN OF CARE
Problem: Adult Inpatient Plan of Care  Goal: Plan of Care Review  Outcome: Ongoing, Progressing  Flowsheets (Taken 3/18/2022 1606)  Plan of Care Reviewed With: patient  Outcome Evaluation: admitted from ER for AMS due to hepatic encep, lactulose given and paracentsis done with improvment, patient A&Ox4 currently, resting in bed comfortably, vss, will continue to monitor

## 2022-03-18 NOTE — H&P
Patient Name:  Wei Potts  YOB: 1955  MRN:  [id number]  Admit Date:  3/18/2022  Patient Care Team:  Bella Villalta MD as PCP - General (Family Medicine)  Austin Barkley MD (Gastroenterology)  Jer Serrano MD as Referring Physician (Hospitalist)      Subjective   History Present Illness     Chief Complaint   Patient presents with   • Altered Mental Status       History of Present Illness    is a pleasant 67 y/o male with PMH of alcoholic liver disease, cirrhosis, esophageal varices s/p banding recurrent ascites & HTN c/o increased confusion  for the past 2 days.  Per wife patient's mentation has not been  back to his baseline after discharge however for the last 2 days patient has been more confused.  He is taking lactulose but she is not unsure how many bowel movements patient has per day.  Patient himself also states that he has been taking lactulose however unable to provide much history in terms of how many times a day patient was taking lactulose and how many bowel movements daily patient had due to encephalopathy.  Per patient and wife he had last paracentesis outpatient about a week ago.  A follow-up with outpatient liver specialist.  No fevers, chills, nausea, vomiting, abdominal pain, reported.    Review of Systems   GENERAL: + Confusion, generalized weakness  No fevers, chills, sweats.    SKIN: No nonhealing lesions.   No rashes.  HEME/LYMPH: No easy bruising, bleeding.   No swollen nodes.   EYES: No vision changes or diplopia.   ENT: No tinnitus, hearing loss, gum bleeding, epistaxis, hoarseness or dysphagia.   RESPIRATORY: No cough, shortness of breath, hemoptysis or wheezing.   CVS: No chest pain, palpitations, orthopnea, dyspnea on exertion or PND.   GI: + Abdominal distention  : No lower tract obstructive symptoms, dysuria or hematuria.   MUSCULOSKELETAL: No bone pain.  No joint stiffness.   NEUROLOGICAL: No global weakness, loss of consciousness or  seizures.   PSYCHIATRIC: No increased nervousness, mood changes or depression.         Personal History     Past Medical History:   Diagnosis Date   • Alcoholism (HCC)    • Anemia    • Cirrhosis (HCC)    • Clot    • Encephalopathy    • Hypertension    • Liver disease      Past Surgical History:   Procedure Laterality Date   • COLONOSCOPY     • ENDOSCOPY       Family History   Problem Relation Age of Onset   • Diabetes Mother    • Hypertension Father      Social History     Tobacco Use   • Smoking status: Never Smoker   • Smokeless tobacco: Never Used   Vaping Use   • Vaping Use: Never used   Substance Use Topics   • Alcohol use: Not Currently     Comment: No ETOH on 2.5 yrs   • Drug use: No     No current facility-administered medications on file prior to encounter.     Current Outpatient Medications on File Prior to Encounter   Medication Sig Dispense Refill   • amitriptyline (ELAVIL) 10 MG tablet Take 1 tablet by mouth Every Night. 30 tablet 1   • enoxaparin (LOVENOX) 40 MG/0.4ML solution syringe Inject 0.4 mL under the skin into the appropriate area as directed Daily. Indications: DVT/PE (active thrombosis) 11.2 mL 3   • furosemide (LASIX) 40 MG tablet TAKE 1 TABLET BY MOUTH DAILY 30 tablet 2   • Generlac 10 GM/15ML solution solution (encephalopathy) Take 20 g by mouth 3 (Three) Times a Day.     • lactulose (CHRONULAC) 10 GM/15ML solution Take 30 mL by mouth 3 (Three) Times a Day. 500 mL 3   • MAGnesium-Oxide 400 (241.3 Mg) MG tablet tablet Take 400 mg by mouth 2 (Two) Times a Day.     • midodrine (PROAMATINE) 5 MG tablet Take 0.5 tablets by mouth Every 8 (Eight) Hours. (Patient taking differently: Take 5 mg by mouth Daily.) 45 tablet 3   • multivitamin (multivitamin) tablet tablet Take 1 tablet by mouth Daily. 30 tablet 3   • nadolol (CORGARD) 20 MG tablet Take 1 tablet by mouth Daily. 30 tablet 3   • pantoprazole (PROTONIX) 40 MG EC tablet Take 1 tablet by mouth Every Morning. 30 tablet 3   • riFAXIMin  (XIFAXAN) 550 MG tablet Take 550 mg by mouth Every 12 (Twelve) Hours.     • spironolactone (ALDACTONE) 50 MG tablet Take 1 tablet by mouth Daily. 90 tablet 3   • sulfamethoxazole-trimethoprim (BACTRIM DS,SEPTRA DS) 800-160 MG per tablet Take 1 tablet by mouth Daily. 30 tablet 11   • [DISCONTINUED] omeprazole (priLOSEC) 40 MG capsule Take 40 mg by mouth Daily.     • [DISCONTINUED] sennosides-docusate (PERICOLACE) 8.6-50 MG per tablet Take 1 tablet by mouth Daily. 30 tablet 3   • [DISCONTINUED] zinc sulfate (ZINCATE) 220 (50 Zn) MG capsule Take 1 capsule by mouth Daily. 30 capsule 3     Allergies   Allergen Reactions   • Iron GI Intolerance   • Zinc GI Intolerance     vomiting       Objective    Objective     Vital Signs  Temp:  [96.9 °F (36.1 °C)] 96.9 °F (36.1 °C)  Heart Rate:  [71-83] 83  Resp:  [16] 16  BP: (119-146)/(71-85) 140/77  SpO2:  [90 %-100 %] 100 %  on   ;   Device (Oxygen Therapy): room air  Body mass index is 22.32 kg/m².    Physical Exam  General: Alert ,, no acute distress, chronically ill-appearing  HEENT: Normocephalic, atraumatic  Eyes: PERRL, EOMI, anicteric sclera  Lungs: Clear to auscultation bilaterally, no crackles or wheezes  CV: Regular rate and rhythm, no murmurs rubs or gallops  Abdomen: Distended, nontender to palpation, normoactive bowel sounds.  Extremities: No significant peripheral edema  Skin: Clean/dry/intact, no rashes  Neuro: Patient is confused, oriented x2, follows basic commands, no gross focal neurological deficits appreciated      Results Review:  I reviewed the patient's new clinical results.  I reviewed the patient's new imaging results and agree with the interpretation.  I reviewed the patient's other test results and agree with the interpretation  I personally viewed and interpreted the patient's EKG/Telemetry data  Discussed with ED provider.    Lab Results (last 24 hours)     Procedure Component Value Units Date/Time    CBC & Differential [[id number]]  (Abnormal)  Collected: 03/18/22 0412    Specimen: Blood Updated: 03/18/22 0429    Narrative:      The following orders were created for panel order CBC & Differential.  Procedure                               Abnormality         Status                     ---------                               -----------         ------                     CBC Auto Differential[[id number]]        Abnormal            Final result                 Please view results for these tests on the individual orders.    Comprehensive Metabolic Panel [[id number]]  (Abnormal) Collected: 03/18/22 0412    Specimen: Blood Updated: 03/18/22 0444     Glucose 101 mg/dL      BUN 13 mg/dL      Creatinine 1.00 mg/dL      Sodium 137 mmol/L      Potassium 3.6 mmol/L      Comment: Slight hemolysis detected by analyzer. Results may be affected.        Chloride 106 mmol/L      CO2 20.0 mmol/L      Calcium 8.4 mg/dL      Total Protein 7.6 g/dL      Albumin 3.20 g/dL      ALT (SGPT) 34 U/L      AST (SGOT) 92 U/L      Comment: Slight hemolysis detected by analyzer. Results may be affected.        Alkaline Phosphatase 174 U/L      Total Bilirubin 2.9 mg/dL      Globulin 4.4 gm/dL      A/G Ratio 0.7 g/dL      BUN/Creatinine Ratio 13.0     Anion Gap 11.0 mmol/L      eGFR 83.5 mL/min/1.73      Comment: National Kidney Foundation and American Society of Nephrology (ASN) Task Force recommended calculation based on the Chronic Kidney Disease Epidemiology Collaboration (CKD-EPI) equation refit without adjustment for race.       Narrative:      GFR Normal >60  Chronic Kidney Disease <60  Kidney Failure <15      Protime-INR [[id number]]  (Abnormal) Collected: 03/18/22 0412    Specimen: Blood Updated: 03/18/22 0438     Protime 19.8 Seconds      INR 1.69    aPTT [[id number]]  (Abnormal) Collected: 03/18/22 0412    Specimen: Blood Updated: 03/18/22 0438     PTT 41.1 seconds     Ammonia [[id number]]  (Abnormal) Collected: 03/18/22 0412    Specimen: Blood Updated: 03/18/22 0441     Ammonia 168  umol/L     CBC Auto Differential [[id number]]  (Abnormal) Collected: 03/18/22 0412    Specimen: Blood Updated: 03/18/22 0429     WBC 3.13 10*3/mm3      RBC 2.86 10*6/mm3      Hemoglobin 8.6 g/dL      Hematocrit 25.3 %      MCV 88.5 fL      MCH 30.1 pg      MCHC 34.0 g/dL      RDW 18.2 %      RDW-SD 58.2 fl      MPV 9.3 fL      Platelets 66 10*3/mm3      Neutrophil % 63.6 %      Lymphocyte % 16.0 %      Monocyte % 15.7 %      Eosinophil % 3.8 %      Basophil % 0.6 %      Immature Grans % 0.3 %      Neutrophils, Absolute 1.99 10*3/mm3      Lymphocytes, Absolute 0.50 10*3/mm3      Monocytes, Absolute 0.49 10*3/mm3      Eosinophils, Absolute 0.12 10*3/mm3      Basophils, Absolute 0.02 10*3/mm3      Immature Grans, Absolute 0.01 10*3/mm3      nRBC 0.0 /100 WBC     Ethanol [[id number]] Collected: 03/18/22 0412    Specimen: Blood Updated: 03/18/22 0442     Ethanol <10 mg/dL      Ethanol % <0.010 %     COVID PRE-OP / PRE-PROCEDURE SCREENING ORDER (NO ISOLATION) - Swab, Nasopharynx [[id number]] Collected: 03/18/22 0420    Specimen: Swab from Nasopharynx Updated: 03/18/22 0425    Narrative:      The following orders were created for panel order COVID PRE-OP / PRE-PROCEDURE SCREENING ORDER (NO ISOLATION) - Swab, Nasopharynx.  Procedure                               Abnormality         Status                     ---------                               -----------         ------                     COVID-19,APTIMA PANTHER(...[[id number]]                      In process                   Please view results for these tests on the individual orders.    COVID-19,APTIMA PANTHER(ZEE), DAXA/ RANDALL, NP/OP SWAB IN UTM/VTM/SALINE TRANSPORT MEDIA,24 HR TAT - Swab, Nasopharynx [[id number]] Collected: 03/18/22 0420    Specimen: Swab from Nasopharynx Updated: 03/18/22 0425    POC Glucose Once [[id number]]  (Normal) Collected: 03/18/22 0436    Specimen: Blood Updated: 03/18/22 0438     Glucose 104 mg/dL      Comment: Meter: [id number] :  [id number] [facility]       Urinalysis With Microscopic If Indicated (No Culture) - Urine, Clean Catch [[id number]]  (Normal) Collected: 03/18/22 0505    Specimen: Urine, Clean Catch Updated: 03/18/22 0560     Color, UA Yellow     Appearance, UA Clear     pH, UA 7.5     Specific Gravity, UA 1.007     Glucose, UA Negative     Ketones, UA Negative     Bilirubin, UA Negative     Blood, UA Negative     Protein, UA Negative     Leuk Esterase, UA Negative     Nitrite, UA Negative     Urobilinogen, UA 1.0 E.U./dL    Narrative:      Urine microscopic not indicated.          Imaging Results (Last 24 Hours)     ** No results found for the last 24 hours. **          Results for orders placed during the hospital encounter of 07/06/20    Adult Transthoracic Echo Complete W/ Cont if Necessary Per Protocol    Interpretation Summary  · Left ventricular systolic function is hyperdynamic (EF > 70).  · Mild mitral valve regurgitation is present      No orders to display        Assessment/Plan     Active Hospital Problems    Diagnosis  POA   • Hepatic encephalopathy (HCC) [K72.90]  Yes   • Thrombocytopenia (HCC) [D69.6]  Unknown   • Portal vein thrombosis [I81]  Yes   • Esophageal varix (HCC) [I85.00]  Yes   • SBP (spontaneous bacterial peritonitis) (HCC) [K65.2]  Yes   • Alcoholic cirrhosis of liver with ascites (HCC) [K70.31]  Yes   • Anemia [D64.9]  Yes   • Ascites due to alcoholic cirrhosis (HCC) [K70.31]  Yes      Resolved Hospital Problems   No resolved problems to display.         is a pleasant 65 y/o male with PMH of alcoholic liver disease, cirrhosis, esophageal varices s/p banding recurrent ascites & HTN  admitted with altered mental status secondary to hepatic encephalopathy.      #Hepatic encephalopathy:   -AST 92, ALT 44, total bilirubin 2.9.  Alkaline phosphatase 174,INR 1.69.  -Ammonia was 168 on admission, white blood cell count was 2.13, patient is afebrile.  Low suspicion for infection.  .Continue  lactulose, rifaximin.  Docusate senna.  Titrate to 3-4 bowel movements a day.    -Ordered IR paracentesis  -GI consult     #Alcoholic cirrhosis of the liver decompensated by ascites and esophageal varices.  On spironolactone and Lasix.  Continue beta-blocker for for bleeding prophylaxis.   Bactrim  for history of SBP 02/10  Continue midodrine  GI consult     #Portal vein thrombosis.  On prophylactic Lovenox, not full anticoagulation secondary to thrombocytopenia.  Will restart Lovenox after paracentesis.    #Thrombocytopenia: secondary to cirrhosis.,  Monitor daily     #Leukopenia/anemia of chronic disease: Secondary to cirrhosis.  Monitor daily.  Transfuse as indicated.    CODE STATUS: Full code  Disposition: To be determined  Discussed with wife, patient, ED provider.       Joselito Marr MD  [facility]  03/18/22  12:39 EDT

## 2022-03-18 NOTE — PAYOR COMM NOTE
"Wei Potts (67 y.o. Male)     INPATIENT REQ FOR [id number]    CONTACT SANDEEP ZAIDI  P# [phone]  F# [phone]              Date of Birth   1955    Social Security Number       Address   [address] [id number]    Home Phone   [phone]    MRN   [id number]       Sabianism   Sikh    Marital Status                               Admission Date   3/18/22    Admission Type   Emergency    Admitting Provider   Joselito Marr MD    Attending Provider   Joselito Marr MD    Department, Room/Bed   [address], E448/1       Discharge Date       Discharge Disposition       Discharge Destination                               Attending Provider: Joselito Marr MD    Allergies: Iron, Zinc    Isolation: None   Infection: COVID (History) (03/09/22)   Code Status: CPR   Advance Care Planning Activity    Ht: 180.3 cm (71\")   Wt: 71.4 kg (157 lb 8 oz)    Admission Cmt: None   Principal Problem: None                Active Insurance as of 3/18/2022     Primary Coverage     Payor Plan Insurance Group Employer/Plan Group    [facility] [employer] [facility] [place] [facility] [place]      Payor Plan Address Payor Plan Phone Number Payor Plan Fax Number Effective Dates    [address]   8/1/2020 - None Entered    [facility] [id number]       Subscriber Name Subscriber Birth Date Member ID       WEI POTTS 1955 [id number]                 Emergency Contacts      (Rel.) Home Phone Work Phone Mobile Phone    Milagro Potts (Spouse) [phone] -- --               History & Physical      Joselito Marr MD at 03/18/22 0718              Patient Name:  Wei Potts  YOB: 1955  MRN:  [id number]  Admit Date:  3/18/2022  Patient Care Team:  Bella Villalta MD as PCP - General (Family Medicine)  Austin Barkley MD (Gastroenterology)  Jer Serrano MD as Referring Physician (Hospitalist)      Subjective "   History Present Illness     Chief Complaint   Patient presents with   • Altered Mental Status       History of Present Illness    is a pleasant 67 y/o male with PMH of alcoholic liver disease, cirrhosis, esophageal varices s/p banding recurrent ascites & HTN c/o increased confusion  for the past 2 days.  Per wife patient's mentation has not been  back to his baseline after discharge however for the last 2 days patient has been more confused.  He is taking lactulose but she is not unsure how many bowel movements patient has per day.  Patient himself also states that he has been taking lactulose however unable to provide much history in terms of how many times a day patient was taking lactulose and how many bowel movements daily patient had due to encephalopathy.  Per patient and wife he had last paracentesis outpatient about a week ago.  A follow-up with outpatient liver specialist.  No fevers, chills, nausea, vomiting, abdominal pain, reported.    Review of Systems   GENERAL: + Confusion, generalized weakness  No fevers, chills, sweats.    SKIN: No nonhealing lesions.   No rashes.  HEME/LYMPH: No easy bruising, bleeding.   No swollen nodes.   EYES: No vision changes or diplopia.   ENT: No tinnitus, hearing loss, gum bleeding, epistaxis, hoarseness or dysphagia.   RESPIRATORY: No cough, shortness of breath, hemoptysis or wheezing.   CVS: No chest pain, palpitations, orthopnea, dyspnea on exertion or PND.   GI: + Abdominal distention  : No lower tract obstructive symptoms, dysuria or hematuria.   MUSCULOSKELETAL: No bone pain.  No joint stiffness.   NEUROLOGICAL: No global weakness, loss of consciousness or seizures.   PSYCHIATRIC: No increased nervousness, mood changes or depression.         Personal History     Past Medical History:   Diagnosis Date   • Alcoholism (HCC)    • Anemia    • Cirrhosis (HCC)    • Clot    • Encephalopathy    • Hypertension    • Liver disease      Past Surgical History:    Procedure Laterality Date   • COLONOSCOPY     • ENDOSCOPY       Family History   Problem Relation Age of Onset   • Diabetes Mother    • Hypertension Father      Social History     Tobacco Use   • Smoking status: Never Smoker   • Smokeless tobacco: Never Used   Vaping Use   • Vaping Use: Never used   Substance Use Topics   • Alcohol use: Not Currently     Comment: No ETOH on 2.5 yrs   • Drug use: No     No current facility-administered medications on file prior to encounter.     Current Outpatient Medications on File Prior to Encounter   Medication Sig Dispense Refill   • amitriptyline (ELAVIL) 10 MG tablet Take 1 tablet by mouth Every Night. 30 tablet 1   • enoxaparin (LOVENOX) 40 MG/0.4ML solution syringe Inject 0.4 mL under the skin into the appropriate area as directed Daily. Indications: DVT/PE (active thrombosis) 11.2 mL 3   • furosemide (LASIX) 40 MG tablet TAKE 1 TABLET BY MOUTH DAILY 30 tablet 2   • Generlac 10 GM/15ML solution solution (encephalopathy) Take 20 g by mouth 3 (Three) Times a Day.     • lactulose (CHRONULAC) 10 GM/15ML solution Take 30 mL by mouth 3 (Three) Times a Day. 500 mL 3   • MAGnesium-Oxide 400 (241.3 Mg) MG tablet tablet Take 400 mg by mouth 2 (Two) Times a Day.     • midodrine (PROAMATINE) 5 MG tablet Take 0.5 tablets by mouth Every 8 (Eight) Hours. (Patient taking differently: Take 5 mg by mouth Daily.) 45 tablet 3   • multivitamin (multivitamin) tablet tablet Take 1 tablet by mouth Daily. 30 tablet 3   • nadolol (CORGARD) 20 MG tablet Take 1 tablet by mouth Daily. 30 tablet 3   • pantoprazole (PROTONIX) 40 MG EC tablet Take 1 tablet by mouth Every Morning. 30 tablet 3   • riFAXIMin (XIFAXAN) 550 MG tablet Take 550 mg by mouth Every 12 (Twelve) Hours.     • spironolactone (ALDACTONE) 50 MG tablet Take 1 tablet by mouth Daily. 90 tablet 3   • sulfamethoxazole-trimethoprim (BACTRIM DS,SEPTRA DS) 800-160 MG per tablet Take 1 tablet by mouth Daily. 30 tablet 11   • [DISCONTINUED]  omeprazole (priLOSEC) 40 MG capsule Take 40 mg by mouth Daily.     • [DISCONTINUED] sennosides-docusate (PERICOLACE) 8.6-50 MG per tablet Take 1 tablet by mouth Daily. 30 tablet 3   • [DISCONTINUED] zinc sulfate (ZINCATE) 220 (50 Zn) MG capsule Take 1 capsule by mouth Daily. 30 capsule 3     Allergies   Allergen Reactions   • Iron GI Intolerance   • Zinc GI Intolerance     vomiting       Objective    Objective     Vital Signs  Temp:  [96.9 °F (36.1 °C)] 96.9 °F (36.1 °C)  Heart Rate:  [71-83] 83  Resp:  [16] 16  BP: (119-146)/(71-85) 140/77  SpO2:  [90 %-100 %] 100 %  on   ;   Device (Oxygen Therapy): room air  Body mass index is 22.32 kg/m².    Physical Exam  General: Alert ,, no acute distress, chronically ill-appearing  HEENT: Normocephalic, atraumatic  Eyes: PERRL, EOMI, anicteric sclera  Lungs: Clear to auscultation bilaterally, no crackles or wheezes  CV: Regular rate and rhythm, no murmurs rubs or gallops  Abdomen: Distended, nontender to palpation, normoactive bowel sounds.  Extremities: No significant peripheral edema  Skin: Clean/dry/intact, no rashes  Neuro: Patient is confused, oriented x2, follows basic commands, no gross focal neurological deficits appreciated      Results Review:  I reviewed the patient's new clinical results.  I reviewed the patient's new imaging results and agree with the interpretation.  I reviewed the patient's other test results and agree with the interpretation  I personally viewed and interpreted the patient's EKG/Telemetry data  Discussed with ED provider.    Lab Results (last 24 hours)     Procedure Component Value Units Date/Time    CBC & Differential [[id number]]  (Abnormal) Collected: 03/18/22 0412    Specimen: Blood Updated: 03/18/22 0429    Narrative:      The following orders were created for panel order CBC & Differential.  Procedure                               Abnormality         Status                     ---------                               -----------          ------                     CBC Auto Differential[[id number]]        Abnormal            Final result                 Please view results for these tests on the individual orders.    Comprehensive Metabolic Panel [[id number]]  (Abnormal) Collected: 03/18/22 0412    Specimen: Blood Updated: 03/18/22 0444     Glucose 101 mg/dL      BUN 13 mg/dL      Creatinine 1.00 mg/dL      Sodium 137 mmol/L      Potassium 3.6 mmol/L      Comment: Slight hemolysis detected by analyzer. Results may be affected.        Chloride 106 mmol/L      CO2 20.0 mmol/L      Calcium 8.4 mg/dL      Total Protein 7.6 g/dL      Albumin 3.20 g/dL      ALT (SGPT) 34 U/L      AST (SGOT) 92 U/L      Comment: Slight hemolysis detected by analyzer. Results may be affected.        Alkaline Phosphatase 174 U/L      Total Bilirubin 2.9 mg/dL      Globulin 4.4 gm/dL      A/G Ratio 0.7 g/dL      BUN/Creatinine Ratio 13.0     Anion Gap 11.0 mmol/L      eGFR 83.5 mL/min/1.73      Comment: National Kidney Foundation and American Society of Nephrology (ASN) Task Force recommended calculation based on the Chronic Kidney Disease Epidemiology Collaboration (CKD-EPI) equation refit without adjustment for race.       Narrative:      GFR Normal >60  Chronic Kidney Disease <60  Kidney Failure <15      Protime-INR [[id number]]  (Abnormal) Collected: 03/18/22 0412    Specimen: Blood Updated: 03/18/22 0438     Protime 19.8 Seconds      INR 1.69    aPTT [[id number]]  (Abnormal) Collected: 03/18/22 0412    Specimen: Blood Updated: 03/18/22 0438     PTT 41.1 seconds     Ammonia [[id number]]  (Abnormal) Collected: 03/18/22 0412    Specimen: Blood Updated: 03/18/22 0441     Ammonia 168 umol/L     CBC Auto Differential [[id number]]  (Abnormal) Collected: 03/18/22 0412    Specimen: Blood Updated: 03/18/22 0429     WBC 3.13 10*3/mm3      RBC 2.86 10*6/mm3      Hemoglobin 8.6 g/dL      Hematocrit 25.3 %      MCV 88.5 fL      MCH 30.1 pg      MCHC 34.0 g/dL      RDW 18.2 %      RDW-SD 58.2  fl      MPV 9.3 fL      Platelets 66 10*3/mm3      Neutrophil % 63.6 %      Lymphocyte % 16.0 %      Monocyte % 15.7 %      Eosinophil % 3.8 %      Basophil % 0.6 %      Immature Grans % 0.3 %      Neutrophils, Absolute 1.99 10*3/mm3      Lymphocytes, Absolute 0.50 10*3/mm3      Monocytes, Absolute 0.49 10*3/mm3      Eosinophils, Absolute 0.12 10*3/mm3      Basophils, Absolute 0.02 10*3/mm3      Immature Grans, Absolute 0.01 10*3/mm3      nRBC 0.0 /100 WBC     Ethanol [[id number]] Collected: 03/18/22 0412    Specimen: Blood Updated: 03/18/22 0442     Ethanol <10 mg/dL      Ethanol % <0.010 %     COVID PRE-OP / PRE-PROCEDURE SCREENING ORDER (NO ISOLATION) - Swab, Nasopharynx [[id number]] Collected: 03/18/22 0420    Specimen: Swab from Nasopharynx Updated: 03/18/22 0425    Narrative:      The following orders were created for panel order COVID PRE-OP / PRE-PROCEDURE SCREENING ORDER (NO ISOLATION) - Swab, Nasopharynx.  Procedure                               Abnormality         Status                     ---------                               -----------         ------                     COVID-19,APTIMA PANTHER(...[[id number]]                      In process                   Please view results for these tests on the individual orders.    COVID-19,APTIMA PANTHER(ZEE), DAXA/ RANDALL, NP/OP SWAB IN UTM/VTM/SALINE TRANSPORT MEDIA,24 HR TAT - Swab, Nasopharynx [[id number]] Collected: 03/18/22 0420    Specimen: Swab from Nasopharynx Updated: 03/18/22 0425    POC Glucose Once [[id number]]  (Normal) Collected: 03/18/22 0436    Specimen: Blood Updated: 03/18/22 0438     Glucose 104 mg/dL      Comment: Meter: [id number] : [id number] Eliseo NAZARIO       Urinalysis With Microscopic If Indicated (No Culture) - Urine, Clean Catch [[id number]]  (Normal) Collected: 03/18/22 0505    Specimen: Urine, Clean Catch Updated: 03/18/22 0525     Color, UA Yellow     Appearance, UA Clear     pH, UA 7.5     Specific [place], UA 1.007      Glucose, UA Negative     Ketones, UA Negative     Bilirubin, UA Negative     Blood, UA Negative     Protein, UA Negative     Leuk Esterase, UA Negative     Nitrite, UA Negative     Urobilinogen, UA 1.0 E.U./dL    Narrative:      Urine microscopic not indicated.          Imaging Results (Last 24 Hours)     ** No results found for the last 24 hours. **          Results for orders placed during the hospital encounter of 07/06/20    Adult Transthoracic Echo Complete W/ Cont if Necessary Per Protocol    Interpretation Summary  · Left ventricular systolic function is hyperdynamic (EF > 70).  · Mild mitral valve regurgitation is present      No orders to display        Assessment/Plan     Active Hospital Problems    Diagnosis  POA   • Hepatic encephalopathy (HCC) [K72.90]  Yes   • Thrombocytopenia (HCC) [D69.6]  Unknown   • Portal vein thrombosis [I81]  Yes   • Esophageal varix (HCC) [I85.00]  Yes   • SBP (spontaneous bacterial peritonitis) (HCC) [K65.2]  Yes   • Alcoholic cirrhosis of liver with ascites (HCC) [K70.31]  Yes   • Anemia [D64.9]  Yes   • Ascites due to alcoholic cirrhosis (HCC) [K70.31]  Yes      Resolved Hospital Problems   No resolved problems to display.         is a pleasant 65 y/o male with PMH of alcoholic liver disease, cirrhosis, esophageal varices s/p banding recurrent ascites & HTN  admitted with altered mental status secondary to hepatic encephalopathy.      #Hepatic encephalopathy:   -AST 92, ALT 44, total bilirubin 2.9.  Alkaline phosphatase 174,INR 1.69.  -Ammonia was 168 on admission, white blood cell count was 2.13, patient is afebrile.  Low suspicion for infection.  .Continue lactulose, rifaximin.  Docusate senna.  Titrate to 3-4 bowel movements a day.    -Ordered IR paracentesis  -GI consult     #Alcoholic cirrhosis of the liver decompensated by ascites and esophageal varices.  On spironolactone and Lasix.  Continue beta-blocker for for bleeding prophylaxis.   Bactrim  for  history of SBP 02/10  Continue midodrine  GI consult     #Portal vein thrombosis.  On prophylactic Lovenox, not full anticoagulation secondary to thrombocytopenia.  Will restart Lovenox after paracentesis.    #Thrombocytopenia: secondary to cirrhosis.,  Monitor daily     #Leukopenia/anemia of chronic disease: Secondary to cirrhosis.  Monitor daily.  Transfuse as indicated.    CODE STATUS: Full code  Disposition: To be determined  Discussed with wife, patient, ED provider.       Joselito Marr MD  [facility]  03/18/22  12:39 EDT      Electronically signed by Joselito Marr MD at 03/18/22 1239          Emergency Department Notes      Yeni Abarca, RN at 03/18/22 0320        Patient arrived in the ED via car from home by wife. She states he is experiencing increased confusion and was told to bring him back if confusion increased.     Patient was inpatient a few weeks ago for increased confusion as well. Admitted 2/4/22 and dc on the 14th with hepatic encephalopothy.     Electronically signed by Yeni Abarca RN at 03/18/22 0322     Yeni Abarca RN at 03/18/22 0328        Patient has distended abdomen related to hx of alcoholic cirrhosis with jaundice ascites.      Electronically signed by Yeni Abarca RN at 03/18/22 0329     Beto Wilkes MD at 03/18/22 0408           EMERGENCY DEPARTMENT ENCOUNTER    Room Number:  13/13  Date of encounter:  3/18/2022  PCP: Bella Villalta MD  Historian: Patient, spouse      HPI:  Chief Complaint: Altered mental status  A complete HPI/ROS/PMH/PSH/SH/FH are unobtainable due to: Patient is slightly altered    Context: Wei Potts is a 67 y.o. male who presents to the ED c/o increased confusion and roaming behavior at home been intermittent for the past 2 days.  Patient has history of alcoholic cirrhosis.  Has had bouts of hepatic encephalopathy before.  Wife states she thinks he is taking his lactulose as prescribed.  Patient  is somewhat unhelpful in terms of confirming this.      PAST MEDICAL HISTORY  Active Ambulatory Problems     Diagnosis Date Noted   • ED (erectile dysfunction) of organic origin 10/29/2015   • Alcoholic cirrhosis of liver with ascites (HCC) 07/06/2020   • Anemia 07/06/2020   • Jaundice 07/06/2020   • Coagulopathy (HCC) 07/08/2020   • Secondary thrombocytopenia 07/08/2020   • Lung abnormality- left apex 07/09/2020   • Sepsis (HCC) 01/01/2021   • Colitis 01/02/2021   • Hypertension 01/02/2021   • GERD (gastroesophageal reflux disease) 01/02/2021   • SBP (spontaneous bacterial peritonitis) (HCC) 01/06/2021   • Hyperammonemia (HCC) 02/04/2022   • Portal vein thrombosis 02/07/2022   • COVID-19 virus detected 02/08/2022   • Acute hepatitis A virus infection 02/17/2022   • Ascites due to alcoholic cirrhosis (HCC) 07/06/2020   • Cirrhosis (HCC) 02/24/2021   • Esophageal varix (HCC) 04/26/2021     Resolved Ambulatory Problems     Diagnosis Date Noted   • Benign essential hypertension 10/29/2015   • Hyponatremia 07/06/2020   • Hepatic encephalopathy (HCC) 12/25/2020     Past Medical History:   Diagnosis Date   • Alcoholism (HCC)    • Clot    • Encephalopathy    • Liver disease          PAST SURGICAL HISTORY  Past Surgical History:   Procedure Laterality Date   • COLONOSCOPY     • ENDOSCOPY           FAMILY HISTORY  Family History   Problem Relation Age of Onset   • Diabetes Mother    • Hypertension Father          SOCIAL HISTORY  Social History     Socioeconomic History   • Marital status:    Tobacco Use   • Smoking status: Never Smoker   • Smokeless tobacco: Never Used   Vaping Use   • Vaping Use: Never used   Substance and Sexual Activity   • Alcohol use: Not Currently     Comment: No ETOH on 2.5 yrs   • Drug use: No   • Sexual activity: Defer         ALLERGIES  Iron and Zinc        REVIEW OF SYSTEMS  Review of Systems     All systems reviewed and negative except for those discussed in HPI.       PHYSICAL EXAM    I  have reviewed the triage vital signs and nursing notes.    ED Triage Vitals   Temp Heart Rate Resp BP SpO2   03/18/22 0325 03/18/22 0325 03/18/22 0325 03/18/22 0328 03/18/22 0325   96.9 °F (36.1 °C) 71 16 119/71 99 %      Temp src Heart Rate Source Patient Position BP Location FiO2 (%)   03/18/22 0325 03/18/22 0325 -- 03/18/22 0328 --   Tympanic Monitor  Right arm        Physical Exam  GENERAL: not distressed  HENT: nares patent  EYES: no scleral icterus  CV: regular rhythm, regular rate  RESPIRATORY: normal effort  ABDOMEN: soft, nontender nondistended  MUSCULOSKELETAL: no deformity  NEURO: alert, moves all extremities, follows commands  SKIN: warm, dry        LAB RESULTS  Recent Results (from the past 24 hour(s))   Comprehensive Metabolic Panel    Collection Time: 03/18/22  4:12 AM    Specimen: Blood   Result Value Ref Range    Glucose 101 (H) 65 - 99 mg/dL    BUN 13 8 - 23 mg/dL    Creatinine 1.00 0.76 - 1.27 mg/dL    Sodium 137 136 - 145 mmol/L    Potassium 3.6 3.5 - 5.2 mmol/L    Chloride 106 98 - 107 mmol/L    CO2 20.0 (L) 22.0 - 29.0 mmol/L    Calcium 8.4 (L) 8.6 - 10.5 mg/dL    Total Protein 7.6 6.0 - 8.5 g/dL    Albumin 3.20 (L) 3.50 - 5.20 g/dL    ALT (SGPT) 34 1 - 41 U/L    AST (SGOT) 92 (H) 1 - 40 U/L    Alkaline Phosphatase 174 (H) 39 - 117 U/L    Total Bilirubin 2.9 (H) 0.0 - 1.2 mg/dL    Globulin 4.4 gm/dL    A/G Ratio 0.7 g/dL    BUN/Creatinine Ratio 13.0 7.0 - 25.0    Anion Gap 11.0 5.0 - 15.0 mmol/L    eGFR 83.5 >60.0 mL/min/1.73   Protime-INR    Collection Time: 03/18/22  4:12 AM    Specimen: Blood   Result Value Ref Range    Protime 19.8 (H) 11.7 - 14.2 Seconds    INR 1.69 (H) 0.90 - 1.10   aPTT    Collection Time: 03/18/22  4:12 AM    Specimen: Blood   Result Value Ref Range    PTT 41.1 (H) 22.7 - 35.4 seconds   Ammonia    Collection Time: 03/18/22  4:12 AM    Specimen: Blood   Result Value Ref Range    Ammonia 168 (H) 16 - 60 umol/L   CBC Auto Differential    Collection Time: 03/18/22  4:12 AM     Specimen: Blood   Result Value Ref Range    WBC 3.13 (L) 3.40 - 10.80 10*3/mm3    RBC 2.86 (L) 4.14 - 5.80 10*6/mm3    Hemoglobin 8.6 (L) 13.0 - 17.7 g/dL    Hematocrit 25.3 (L) 37.5 - 51.0 %    MCV 88.5 79.0 - 97.0 fL    MCH 30.1 26.6 - 33.0 pg    MCHC 34.0 31.5 - 35.7 g/dL    RDW 18.2 (H) 12.3 - 15.4 %    RDW-SD 58.2 (H) 37.0 - 54.0 fl    MPV 9.3 6.0 - 12.0 fL    Platelets 66 (L) 140 - 450 10*3/mm3    Neutrophil % 63.6 42.7 - 76.0 %    Lymphocyte % 16.0 (L) 19.6 - 45.3 %    Monocyte % 15.7 (H) 5.0 - 12.0 %    Eosinophil % 3.8 0.3 - 6.2 %    Basophil % 0.6 0.0 - 1.5 %    Immature Grans % 0.3 0.0 - 0.5 %    Neutrophils, Absolute 1.99 1.70 - 7.00 10*3/mm3    Lymphocytes, Absolute 0.50 (L) 0.70 - 3.10 10*3/mm3    Monocytes, Absolute 0.49 0.10 - 0.90 10*3/mm3    Eosinophils, Absolute 0.12 0.00 - 0.40 10*3/mm3    Basophils, Absolute 0.02 0.00 - 0.20 10*3/mm3    Immature Grans, Absolute 0.01 0.00 - 0.05 10*3/mm3    nRBC 0.0 0.0 - 0.2 /100 WBC   Ethanol    Collection Time: 03/18/22  4:12 AM    Specimen: Blood   Result Value Ref Range    Ethanol <10 0 - 10 mg/dL    Ethanol % <0.010 %   POC Glucose Once    Collection Time: 03/18/22  4:36 AM    Specimen: Blood   Result Value Ref Range    Glucose 104 70 - 130 mg/dL   Urinalysis With Microscopic If Indicated (No Culture) - Urine, Clean Catch    Collection Time: 03/18/22  5:05 AM    Specimen: Urine, Clean Catch   Result Value Ref Range    Color, UA Yellow Yellow, Straw    Appearance, UA Clear Clear    pH, UA 7.5 5.0 - 8.0    Specific Gravity, UA 1.007 1.005 - 1.030    Glucose, UA Negative Negative    Ketones, UA Negative Negative    Bilirubin, UA Negative Negative    Blood, UA Negative Negative    Protein, UA Negative Negative    Leuk Esterase, UA Negative Negative    Nitrite, UA Negative Negative    Urobilinogen, UA 1.0 E.U./dL 0.2 - 1.0 E.U./dL       Ordered the above labs and independently reviewed the results.        RADIOLOGY  No Radiology Exams Resulted Within Past 24  Hours    I ordered the above noted radiological studies. Reviewed by me and discussed with radiologist.  See dictation for official radiology interpretation.      PROCEDURES    Procedures      MEDICATIONS GIVEN IN ER    Medications   sodium chloride 0.9 % flush 10 mL (has no administration in time range)   sodium chloride 0.9 % flush 10 mL (has no administration in time range)   nitroglycerin (NITROSTAT) SL tablet 0.4 mg (has no administration in time range)   ondansetron (ZOFRAN) tablet 4 mg (has no administration in time range)     Or   ondansetron (ZOFRAN) injection 4 mg (has no administration in time range)   lactulose (CHRONULAC) 10 GM/15ML solution 30 g (30 g Oral Given 3/18/22 0503)         PROGRESS, DATA ANALYSIS, CONSULTS, AND MEDICAL DECISION MAKING    All labs have been independently reviewed by me.  All radiology studies have been reviewed by me and discussed with radiologist dictating the report.   EKG's independently viewed and interpreted by me.  Discussion below represents my analysis of pertinent findings related to patient's condition, differential diagnosis, treatment plan and final disposition.        ED Course as of 03/18/22 0628   Fri Mar 18, 2022   0518 Ammonia(!): 168 [TJ]   0518 INR(!): 1.69 [TJ]   0518 Hemoglobin(!): 8.6 [TJ]   0518 WBC(!): 3.13 [TJ]   0518 Platelets(!): 66 [TJ]   0518 Ethanol %: <0.010 [TJ]      ED Course User Index  [TJ] Beto Wilkes MD           PPE: The patient wore a surgical mask throughout the entire patient encounter. I wore an N95.    AS OF 06:28 EDT VITALS:    BP - 145/85  HR - 79  TEMP - 96.9 °F (36.1 °C) (Tympanic)  O2 SATS - 100%        DIAGNOSIS  Final diagnoses:   Hepatic encephalopathy (HCC)   Alcoholic cirrhosis, unspecified whether ascites present (HCC)         DISPOSITION  Admit           Beto Wilkes MD  03/18/22 0629      Electronically signed by Beto Wilkes MD at 03/18/22 0629

## 2022-03-19 LAB
ALBUMIN SERPL-MCNC: 2.5 G/DL (ref 3.5–5.2)
ALBUMIN/GLOB SERPL: 0.6 G/DL
ALP SERPL-CCNC: 137 U/L (ref 39–117)
ALT SERPL W P-5'-P-CCNC: 25 U/L (ref 1–41)
AMMONIA BLD-SCNC: 172 UMOL/L (ref 16–60)
ANION GAP SERPL CALCULATED.3IONS-SCNC: 8.7 MMOL/L (ref 5–15)
AST SERPL-CCNC: 59 U/L (ref 1–40)
BASOPHILS # BLD AUTO: 0.03 10*3/MM3 (ref 0–0.2)
BASOPHILS NFR BLD AUTO: 1 % (ref 0–1.5)
BILIRUB SERPL-MCNC: 2.1 MG/DL (ref 0–1.2)
BUN SERPL-MCNC: 10 MG/DL (ref 8–23)
BUN/CREAT SERPL: 9.9 (ref 7–25)
CALCIUM SPEC-SCNC: 8.2 MG/DL (ref 8.6–10.5)
CHLORIDE SERPL-SCNC: 109 MMOL/L (ref 98–107)
CO2 SERPL-SCNC: 18.3 MMOL/L (ref 22–29)
CREAT SERPL-MCNC: 1.01 MG/DL (ref 0.76–1.27)
DEPRECATED RDW RBC AUTO: 55.8 FL (ref 37–54)
EGFRCR SERPLBLD CKD-EPI 2021: 82.5 ML/MIN/1.73
EOSINOPHIL # BLD AUTO: 0.12 10*3/MM3 (ref 0–0.4)
EOSINOPHIL NFR BLD AUTO: 4 % (ref 0.3–6.2)
ERYTHROCYTE [DISTWIDTH] IN BLOOD BY AUTOMATED COUNT: 17.7 % (ref 12.3–15.4)
GLOBULIN UR ELPH-MCNC: 4.2 GM/DL
GLUCOSE SERPL-MCNC: 100 MG/DL (ref 65–99)
HCT VFR BLD AUTO: 24.2 % (ref 37.5–51)
HGB BLD-MCNC: 8 G/DL (ref 13–17.7)
IMM GRANULOCYTES # BLD AUTO: 0.02 10*3/MM3 (ref 0–0.05)
IMM GRANULOCYTES NFR BLD AUTO: 0.7 % (ref 0–0.5)
INR PPP: 1.68 (ref 0.9–1.1)
LYMPHOCYTES # BLD AUTO: 0.54 10*3/MM3 (ref 0.7–3.1)
LYMPHOCYTES NFR BLD AUTO: 18 % (ref 19.6–45.3)
MAGNESIUM SERPL-MCNC: 2.1 MG/DL (ref 1.6–2.4)
MCH RBC QN AUTO: 28.7 PG (ref 26.6–33)
MCHC RBC AUTO-ENTMCNC: 33.1 G/DL (ref 31.5–35.7)
MCV RBC AUTO: 86.7 FL (ref 79–97)
MONOCYTES # BLD AUTO: 0.59 10*3/MM3 (ref 0.1–0.9)
MONOCYTES NFR BLD AUTO: 19.7 % (ref 5–12)
NEUTROPHILS NFR BLD AUTO: 1.7 10*3/MM3 (ref 1.7–7)
NEUTROPHILS NFR BLD AUTO: 56.6 % (ref 42.7–76)
NRBC BLD AUTO-RTO: 0 /100 WBC (ref 0–0.2)
PHOSPHATE SERPL-MCNC: 2.7 MG/DL (ref 2.5–4.5)
PLATELET # BLD AUTO: 63 10*3/MM3 (ref 140–450)
PMV BLD AUTO: 9.3 FL (ref 6–12)
POTASSIUM SERPL-SCNC: 3.8 MMOL/L (ref 3.5–5.2)
PROT SERPL-MCNC: 6.7 G/DL (ref 6–8.5)
PROTHROMBIN TIME: 19.8 SECONDS (ref 11.7–14.2)
RBC # BLD AUTO: 2.79 10*6/MM3 (ref 4.14–5.8)
SODIUM SERPL-SCNC: 136 MMOL/L (ref 136–145)
WBC NRBC COR # BLD: 3 10*3/MM3 (ref 3.4–10.8)

## 2022-03-19 PROCEDURE — 80053 COMPREHEN METABOLIC PANEL: CPT | Performed by: STUDENT IN AN ORGANIZED HEALTH CARE EDUCATION/TRAINING PROGRAM

## 2022-03-19 PROCEDURE — 97162 PT EVAL MOD COMPLEX 30 MIN: CPT

## 2022-03-19 PROCEDURE — 36415 COLL VENOUS BLD VENIPUNCTURE: CPT | Performed by: STUDENT IN AN ORGANIZED HEALTH CARE EDUCATION/TRAINING PROGRAM

## 2022-03-19 PROCEDURE — 83735 ASSAY OF MAGNESIUM: CPT | Performed by: STUDENT IN AN ORGANIZED HEALTH CARE EDUCATION/TRAINING PROGRAM

## 2022-03-19 PROCEDURE — 25010000002 ENOXAPARIN PER 10 MG: Performed by: STUDENT IN AN ORGANIZED HEALTH CARE EDUCATION/TRAINING PROGRAM

## 2022-03-19 PROCEDURE — 84100 ASSAY OF PHOSPHORUS: CPT | Performed by: STUDENT IN AN ORGANIZED HEALTH CARE EDUCATION/TRAINING PROGRAM

## 2022-03-19 PROCEDURE — 85025 COMPLETE CBC W/AUTO DIFF WBC: CPT | Performed by: STUDENT IN AN ORGANIZED HEALTH CARE EDUCATION/TRAINING PROGRAM

## 2022-03-19 PROCEDURE — 85610 PROTHROMBIN TIME: CPT | Performed by: STUDENT IN AN ORGANIZED HEALTH CARE EDUCATION/TRAINING PROGRAM

## 2022-03-19 PROCEDURE — 82140 ASSAY OF AMMONIA: CPT | Performed by: STUDENT IN AN ORGANIZED HEALTH CARE EDUCATION/TRAINING PROGRAM

## 2022-03-19 RX ADMIN — DOCUSATE SODIUM 50MG AND SENNOSIDES 8.6MG 1 TABLET: 8.6; 5 TABLET, FILM COATED ORAL at 09:26

## 2022-03-19 RX ADMIN — FUROSEMIDE 40 MG: 20 TABLET ORAL at 09:26

## 2022-03-19 RX ADMIN — LACTULOSE 20 G: 20 SOLUTION ORAL at 17:23

## 2022-03-19 RX ADMIN — Medication 220 MG: at 09:26

## 2022-03-19 RX ADMIN — LACTULOSE 20 G: 20 SOLUTION ORAL at 09:26

## 2022-03-19 RX ADMIN — Medication 10 ML: at 09:27

## 2022-03-19 RX ADMIN — MAGNESIUM OXIDE 400 MG (241.3 MG MAGNESIUM) TABLET 400 MG: TABLET at 09:26

## 2022-03-19 RX ADMIN — NADOLOL 20 MG: 20 TABLET ORAL at 09:27

## 2022-03-19 RX ADMIN — SPIRONOLACTONE 50 MG: 50 TABLET, FILM COATED ORAL at 09:27

## 2022-03-19 RX ADMIN — RIFAXIMIN 550 MG: 550 TABLET ORAL at 20:01

## 2022-03-19 RX ADMIN — MIDODRINE HYDROCHLORIDE 2.5 MG: 2.5 TABLET ORAL at 09:27

## 2022-03-19 RX ADMIN — SULFAMETHOXAZOLE AND TRIMETHOPRIM 1 TABLET: 800; 160 TABLET ORAL at 09:26

## 2022-03-19 RX ADMIN — MAGNESIUM OXIDE 400 MG (241.3 MG MAGNESIUM) TABLET 400 MG: TABLET at 20:01

## 2022-03-19 RX ADMIN — RIFAXIMIN 550 MG: 550 TABLET ORAL at 09:27

## 2022-03-19 RX ADMIN — Medication 1 TABLET: at 09:25

## 2022-03-19 RX ADMIN — Medication 10 ML: at 20:01

## 2022-03-19 RX ADMIN — PANTOPRAZOLE SODIUM 40 MG: 40 TABLET, DELAYED RELEASE ORAL at 09:25

## 2022-03-19 RX ADMIN — ENOXAPARIN SODIUM 40 MG: 100 INJECTION SUBCUTANEOUS at 20:01

## 2022-03-19 RX ADMIN — LACTULOSE 20 G: 20 SOLUTION ORAL at 20:01

## 2022-03-19 NOTE — PLAN OF CARE
Goal Outcome Evaluation:     68 yo male admitted 3/18 with hepatic encephalopathy. Paracentesis done yesterday. VS stable, room air, A&Ox4 with periods of confusion overnight.

## 2022-03-19 NOTE — PLAN OF CARE
Goal Outcome Evaluation:            VSS.Pt up walking with PT today.Denies any pain/discomfort.Adequate PO intake.BM*1 this shift,continues on R/A .GI consulted ,yet to see pt.WCTM.

## 2022-03-19 NOTE — PLAN OF CARE
Goal Outcome Evaluation:    Patient is a 67 y.o. male admitted to [facility] for increased confusion and hx of alcoholic cirrhosis on 3/18/2022. Patient is currently functioning at or near their reported baseline, IND for bed mobility, SV for STS transfers, and 150' SV-SBA for ambulation with no AD. No safety concerns demo'd. Patient denies any questions or concerns for PT at this time. Patient has no further acute skilled PT needs at this time and is appropriate for DC home with assist once medically appropriate.

## 2022-03-19 NOTE — THERAPY EVALUATION
Patient Name: Wei Potts  : 1955    MRN: [id number]                              Today's Date: 3/19/2022       Admit Date: 3/18/2022    Visit Dx:     ICD-10-CM ICD-9-CM   1. Hepatic encephalopathy (HCC)  K72.90 572.2   2. Alcoholic cirrhosis, unspecified whether ascites present (HCC)  K70.30 571.2     Patient Active Problem List   Diagnosis   • ED (erectile dysfunction) of organic origin   • Alcoholic cirrhosis of liver with ascites (HCC)   • Anemia   • Jaundice   • Coagulopathy (HCC)   • Secondary thrombocytopenia   • Lung abnormality- left apex   • Sepsis (HCC)   • Colitis   • Hypertension   • GERD (gastroesophageal reflux disease)   • SBP (spontaneous bacterial peritonitis) (HCC)   • Hyperammonemia (HCC)   • Portal vein thrombosis   • COVID-19 virus detected   • Acute hepatitis A virus infection   • Ascites due to alcoholic cirrhosis (HCC)   • Cirrhosis (HCC)   • Esophageal varix (HCC)   • Hepatic encephalopathy (HCC)   • Thrombocytopenia (HCC)     Past Medical History:   Diagnosis Date   • Alcoholism (HCC)    • Anemia    • Cirrhosis (HCC)    • Clot    • Encephalopathy    • Hypertension    • Liver disease      Past Surgical History:   Procedure Laterality Date   • COLONOSCOPY     • ENDOSCOPY        General Information     Row Name 22 1445          Physical Therapy Time and Intention    Document Type discharge evaluation/summary  -MS     Mode of Treatment physical therapy  -MS     Row Name 22 1446          General Information    Patient Profile Reviewed yes  -MS     Prior Level of Function independent:;all household mobility  -MS     Existing Precautions/Restrictions fall  -MS     Barriers to Rehab none identified  -MS     Row Name 22 1445          Living Environment    People in Home spouse  -MS     Row Name 22 1445          Cognition    Orientation Status (Cognition) oriented x 4  -MS     Row Name 22 1441          Safety Issues, Functional Mobility    Comment, Safety  Issues/Impairments (Mobility) Gait belt and non skid socks donned.  -MS           User Key  (r) = Recorded By, (t) = Taken By, (c) = Cosigned By    Initials Name Provider Type    MS MenezesEna, PT Physical Therapist               Mobility     Row Name 03/19/22 1512          Bed Mobility    Bed Mobility supine-sit;sit-supine  -MS     Supine-Sit [place] (Bed Mobility) independent  -MS     Sit-Supine [place] (Bed Mobility) independent  -MS     Comment, (Bed Mobility) SV-SBA for sitting balance.  -MS     Row Name 03/19/22 1512          Sit-Stand Transfer    Sit-Stand [place] (Transfers) supervision;verbal cues  -MS     Assistive Device (Sit-Stand Transfers) walker, front-wheeled  -MS     Row Name 03/19/22 1512          Gait/Stairs (Locomotion)    [place] Level (Gait) supervision;standby assist;verbal cues  -MS     Assistive Device (Gait) walker, front-wheeled  -MS     Distance in Feet (Gait) 150'  -MS     Deviations/Abnormal Patterns (Gait) deshawn decreased;gait speed decreased  -MS     Bilateral Gait Deviations forward flexed posture  -MS     Comment, (Gait/Stairs) No overt LOB or veering noted. No safety concerns demo'd.  -MS           User Key  (r) = Recorded By, (t) = Taken By, (c) = Cosigned By    Initials Name Provider Type    MS MenezesEna, PT Physical Therapist               Obj/Interventions     Row Name 03/19/22 1513          Range of Motion Comprehensive    General Range of Motion no range of motion deficits identified  -MS     Row Name 03/19/22 1513          Strength Comprehensive (MMT)    Comment, General Manual Muscle Testing (MMT) Assessment B LEs 5/5  -MS     Row Name 03/19/22 1513          Balance    Balance Assessment sitting static balance;standing static balance  -MS     Static Sitting Balance independent  -MS     Position, Sitting Balance sitting edge of bed  -MS     Static Standing Balance independent  -MS     Position/Device Used, Standing Balance unsupported   -MS           User Key  (r) = Recorded By, (t) = Taken By, (c) = Cosigned By    Initials Name Provider Type    Ena Romero, PT Physical Therapist               Goals/Plan    No documentation.                Clinical Impression     Row Name 03/19/22 1528          Pain    Pretreatment Pain Rating 0/10 - no pain  -MS     Row Name 03/19/22 1528          Vital Signs    O2 Delivery Pre Treatment room air  -MS     Row Name 03/19/22 1528          Positioning and Restraints    Pre-Treatment Position in bed  -MS     Post Treatment Position bed  -MS     In Bed fowlers;call light within reach;encouraged to call for assist;exit alarm on  -MS           User Key  (r) = Recorded By, (t) = Taken By, (c) = Cosigned By    Initials Name Provider Type    Ena Romero, PT Physical Therapist               Outcome Measures     Row Name 03/19/22 1531          How much help from another person do you currently need...    Turning from your back to your side while in flat bed without using bedrails? 4  -MS     Moving from lying on back to sitting on the side of a flat bed without bedrails? 4  -MS     Moving to and from a bed to a chair (including a wheelchair)? 4  -MS     Standing up from a chair using your arms (e.g., wheelchair, bedside chair)? 4  -MS     Climbing 3-5 steps with a railing? 3  -MS     To walk in hospital room? 4  -MS     AM-PAC 6 Clicks Score (PT) 23  -MS     Row Name 03/19/22 1531          Functional Assessment    Outcome Measure Options AM-PAC 6 Clicks Basic Mobility (PT)  -MS           User Key  (r) = Recorded By, (t) = Taken By, (c) = Cosigned By    Initials Name Provider Type    Ena Romero, PT Physical Therapist                               PT Recommendation and Plan           Time Calculation:    PT Charges     Row Name 03/19/22 1444             Time Calculation    Start Time 1341  -MS      Stop Time 1355  -MS      Time Calculation (min) 14 min  -MS      PT Received On 03/19/22  -MS             User Key  (r) = Recorded By, (t) = Taken By, (c) = Cosigned By    Initials Name Provider Type    MS Ena Menezes, PT Physical Therapist              Therapy Charges for Today     Code Description Service Date Service Provider Modifiers Qty    [id number]  PT EVAL MOD COMPLEXITY 1 3/19/2022 Ena Menezes, PT GP 1          PT G-Codes  Outcome Measure Options: AM-PAC 6 Clicks Basic Mobility (PT)  AM-PAC 6 Clicks Score (PT): 23    Ena Menezes, PT  3/19/2022

## 2022-03-19 NOTE — PROGRESS NOTES
Name: Wei Potts ADMIT: 3/18/2022   : 1955  PCP: Bella Villalta MD    MRN: [id number] LOS: 1 days   AGE/SEX: 67 y.o. male  ROOM: [facility]     Subjective   Subjective   Patient seen this morning.  No acute events overnight.  Status post paracentesis yesterday, no signs of SBP on fluid studies.  Cultures are pending.  Patient looks more alert this morning.  Laying in bed.  Denies abdominal pain, nausea, vomiting, fevers, chills.  Review of Systems   As above  Objective   Objective   Vital Signs  Temp:  [98 °F (36.7 °C)-99 °F (37.2 °C)] 99 °F (37.2 °C)  Heart Rate:  [] 74  Resp:  [16-18] 16  BP: (114-140)/(63-88) 114/69  SpO2:  [96 %-100 %] 100 %  on   ;   Device (Oxygen Therapy): room air  Body mass index is 21.97 kg/m².  Physical Exam  General: Alert ,, no acute distress, chronically ill-appearing  HEENT: Normocephalic, atraumatic  Eyes: PERRL, EOMI, anicteric sclera  Lungs: Clear to auscultation bilaterally, no crackles or wheezes  CV: Regular rate and rhythm, no murmurs rubs or gallops  Abdomen:  Mildly distended, nontender to palpation, normoactive bowel sounds.  Extremities: No significant peripheral edema  Neuro:  Alert and oriented x3.  No gross neurological deficits.  Follows commands.  Results Review     I reviewed the patient's new clinical results.  Results from last 7 days   Lab Units 22  0412 03/15/22  1209   WBC 10*3/mm3 3.13* 4.45   HEMOGLOBIN g/dL 8.6* 8.5*   PLATELETS 10*3/mm3 66* 85*     Results from last 7 days   Lab Units 22  0412   SODIUM mmol/L 137   POTASSIUM mmol/L 3.6   CHLORIDE mmol/L 106   CO2 mmol/L 20.0*   BUN mg/dL 13   CREATININE mg/dL 1.00   GLUCOSE mg/dL 101*   Estimated Creatinine Clearance: 74.4 mL/min (by C-G formula based on SCr of 1 mg/dL).  Results from last 7 days   Lab Units 22  0412   ALBUMIN g/dL 3.20*   BILIRUBIN mg/dL 2.9*   ALK PHOS U/L 174*   AST (SGOT) U/L 92*   ALT (SGPT) U/L 34     Results from last 7 days   Lab Units  03/18/22  0412   CALCIUM mg/dL 8.4*   ALBUMIN g/dL 3.20*       COVID19   Date Value Ref Range Status   03/18/2022 Not Detected Not Detected - Ref. Range Final   02/04/2022 Detected (C) Not Detected - Ref. Range Final     Glucose   Date/Time Value Ref Range Status   03/18/2022 0436 104 70 - 130 mg/dL Final     Comment:     Meter: [id number] : [id number] Eliseo Fish PCA       US Paracentesis  Narrative: ULTRASOUND-GUIDED PARACENTESIS     HISTORY: Ascites     After signed informed consent was obtained, the left lower quadrant was  prepped and draped in the usual sterile fashion. Lidocaine was used for  local anesthesia.     A 5 [place] catheter was inserted into the left lower quadrant using  ultrasound guidance. 1500 mL of yellow color ascites was removed. Sample  was sent to the lab.     Confirmatory images were obtained.     Patient tolerated the procedure well with no complications.     Impression: Ultrasound-guided paracentesis as described.     This report was finalized on 3/18/2022 3:48 PM by Dr. Tejinder Carroll M.D.       Scheduled Medications  enoxaparin, 40 mg, Subcutaneous, Nightly  furosemide, 40 mg, Oral, Daily  lactulose, 20 g, Oral, TID  magnesium oxide, 400 mg, Oral, BID  midodrine, 2.5 mg, Oral, Q8H  multivitamin, 1 tablet, Oral, Daily  nadolol, 20 mg, Oral, Q24H  pantoprazole, 40 mg, Oral, QAM  riFAXIMin, 550 mg, Oral, Q12H  sennosides-docusate, 1 tablet, Oral, Daily  sodium chloride, 10 mL, Intravenous, Q12H  spironolactone, 50 mg, Oral, Daily  sulfamethoxazole-trimethoprim, 1 tablet, Oral, Q24H  zinc sulfate, 220 mg, Oral, Daily    Infusions   Diet  Diet Regular; Cardiac       Assessment/Plan     Active Hospital Problems    Diagnosis  POA   • Hepatic encephalopathy (HCC) [K72.90]  Yes   • Thrombocytopenia (HCC) [D69.6]  Unknown   • Portal vein thrombosis [I81]  Yes   • Esophageal varix (HCC) [I85.00]  Yes   • SBP (spontaneous bacterial peritonitis) (HCC) [K65.2]  Yes   • Alcoholic cirrhosis of  liver with ascites (HCC) [K70.31]  Yes   • Anemia [D64.9]  Yes   • Ascites due to alcoholic cirrhosis (HCC) [K70.31]  Yes      Resolved Hospital Problems   No resolved problems to display.      is a pleasant 65 y/o male with PMH of alcoholic liver disease, cirrhosis, esophageal varices s/p banding recurrent ascites & HTN  admitted with altered mental status secondary to hepatic encephalopathy.        #Hepatic encephalopathy:   -AST 92, ALT 44, total bilirubin 2.9.  Alkaline phosphatase 174,INR 1.69.  -Ammonia was 168 on admission, white blood cell count was 2.13, patient is afebrile.  Low suspicion for infection.  .Continue lactulose, rifaximin.  Docusate senna.  Titrate to 3-4 bowel movements a day.    -Status post paracentesis on 03/18.  Around 1.5 L of fluid removed.  No SBP on.  Cultures pending.  -GI consult pending.  -Encephalopathy improved.  Alert and oriented x3.     #Alcoholic cirrhosis of the liver decompensated by ascites and esophageal varices.  On spironolactone and Lasix.  Continue beta-blocker for for bleeding prophylaxis.   Bactrim  for history of SBP 02/10  Continue midodrine  GI consult     #Portal vein thrombosis.  On prophylactic Lovenox, not full anticoagulation secondary to thrombocytopenia.           #Thrombocytopenia: secondary to cirrhosis.,  Monitor daily.  Labs pending.     #Leukopenia/anemia of chronic disease: Secondary to cirrhosis.  Monitor daily.  Transfuse as indicated.     CODE STATUS: Full code  Disposition: To be determined.  Likely home in 1-2 days pending CCP and PT OT consult if cleared by consultants.  DVT prophylaxis: Lovenox      Joselito Marr MD  [facility]  03/19/22  08:46 EDT

## 2022-03-20 ENCOUNTER — READMISSION MANAGEMENT (OUTPATIENT)
Dept: CALL CENTER | Facility: HOSPITAL | Age: 67
End: 2022-03-20

## 2022-03-20 VITALS
RESPIRATION RATE: 16 BRPM | BODY MASS INDEX: 21.74 KG/M2 | HEIGHT: 71 IN | TEMPERATURE: 98.5 F | DIASTOLIC BLOOD PRESSURE: 71 MMHG | HEART RATE: 79 BPM | WEIGHT: 155.3 LBS | OXYGEN SATURATION: 100 % | SYSTOLIC BLOOD PRESSURE: 125 MMHG

## 2022-03-20 LAB
ALBUMIN SERPL-MCNC: 2.6 G/DL (ref 3.5–5.2)
ALBUMIN/GLOB SERPL: 0.7 G/DL
ALP SERPL-CCNC: 123 U/L (ref 39–117)
ALT SERPL W P-5'-P-CCNC: 27 U/L (ref 1–41)
AMMONIA BLD-SCNC: 131 UMOL/L (ref 16–60)
ANION GAP SERPL CALCULATED.3IONS-SCNC: 8 MMOL/L (ref 5–15)
AST SERPL-CCNC: 58 U/L (ref 1–40)
BASOPHILS # BLD AUTO: 0.04 10*3/MM3 (ref 0–0.2)
BASOPHILS NFR BLD AUTO: 1.1 % (ref 0–1.5)
BILIRUB SERPL-MCNC: 2.1 MG/DL (ref 0–1.2)
BUN SERPL-MCNC: 12 MG/DL (ref 8–23)
BUN/CREAT SERPL: 12.2 (ref 7–25)
CALCIUM SPEC-SCNC: 8 MG/DL (ref 8.6–10.5)
CHLORIDE SERPL-SCNC: 108 MMOL/L (ref 98–107)
CO2 SERPL-SCNC: 18 MMOL/L (ref 22–29)
CREAT SERPL-MCNC: 0.98 MG/DL (ref 0.76–1.27)
DEPRECATED RDW RBC AUTO: 55.8 FL (ref 37–54)
EGFRCR SERPLBLD CKD-EPI 2021: 85.6 ML/MIN/1.73
EOSINOPHIL # BLD AUTO: 0.15 10*3/MM3 (ref 0–0.4)
EOSINOPHIL NFR BLD AUTO: 4 % (ref 0.3–6.2)
ERYTHROCYTE [DISTWIDTH] IN BLOOD BY AUTOMATED COUNT: 17.7 % (ref 12.3–15.4)
GLOBULIN UR ELPH-MCNC: 4 GM/DL
GLUCOSE SERPL-MCNC: 92 MG/DL (ref 65–99)
HCT VFR BLD AUTO: 23.2 % (ref 37.5–51)
HGB BLD-MCNC: 7.8 G/DL (ref 13–17.7)
IMM GRANULOCYTES # BLD AUTO: 0.02 10*3/MM3 (ref 0–0.05)
IMM GRANULOCYTES NFR BLD AUTO: 0.5 % (ref 0–0.5)
INR PPP: 1.78 (ref 0.9–1.1)
LYMPHOCYTES # BLD AUTO: 0.79 10*3/MM3 (ref 0.7–3.1)
LYMPHOCYTES NFR BLD AUTO: 21 % (ref 19.6–45.3)
MAGNESIUM SERPL-MCNC: 2.2 MG/DL (ref 1.6–2.4)
MCH RBC QN AUTO: 29.4 PG (ref 26.6–33)
MCHC RBC AUTO-ENTMCNC: 33.6 G/DL (ref 31.5–35.7)
MCV RBC AUTO: 87.5 FL (ref 79–97)
MONOCYTES # BLD AUTO: 0.65 10*3/MM3 (ref 0.1–0.9)
MONOCYTES NFR BLD AUTO: 17.2 % (ref 5–12)
NEUTROPHILS NFR BLD AUTO: 2.12 10*3/MM3 (ref 1.7–7)
NEUTROPHILS NFR BLD AUTO: 56.2 % (ref 42.7–76)
NRBC BLD AUTO-RTO: 0 /100 WBC (ref 0–0.2)
PHOSPHATE SERPL-MCNC: 2.7 MG/DL (ref 2.5–4.5)
PLATELET # BLD AUTO: 64 10*3/MM3 (ref 140–450)
PMV BLD AUTO: 9.2 FL (ref 6–12)
POTASSIUM SERPL-SCNC: 3.9 MMOL/L (ref 3.5–5.2)
PROT SERPL-MCNC: 6.6 G/DL (ref 6–8.5)
PROTHROMBIN TIME: 20.7 SECONDS (ref 11.7–14.2)
RBC # BLD AUTO: 2.65 10*6/MM3 (ref 4.14–5.8)
SODIUM SERPL-SCNC: 134 MMOL/L (ref 136–145)
WBC NRBC COR # BLD: 3.77 10*3/MM3 (ref 3.4–10.8)

## 2022-03-20 PROCEDURE — 80053 COMPREHEN METABOLIC PANEL: CPT | Performed by: STUDENT IN AN ORGANIZED HEALTH CARE EDUCATION/TRAINING PROGRAM

## 2022-03-20 PROCEDURE — 99222 1ST HOSP IP/OBS MODERATE 55: CPT | Performed by: INTERNAL MEDICINE

## 2022-03-20 PROCEDURE — 82140 ASSAY OF AMMONIA: CPT | Performed by: STUDENT IN AN ORGANIZED HEALTH CARE EDUCATION/TRAINING PROGRAM

## 2022-03-20 PROCEDURE — 84100 ASSAY OF PHOSPHORUS: CPT | Performed by: STUDENT IN AN ORGANIZED HEALTH CARE EDUCATION/TRAINING PROGRAM

## 2022-03-20 PROCEDURE — 85610 PROTHROMBIN TIME: CPT | Performed by: STUDENT IN AN ORGANIZED HEALTH CARE EDUCATION/TRAINING PROGRAM

## 2022-03-20 PROCEDURE — 83735 ASSAY OF MAGNESIUM: CPT | Performed by: STUDENT IN AN ORGANIZED HEALTH CARE EDUCATION/TRAINING PROGRAM

## 2022-03-20 PROCEDURE — 85025 COMPLETE CBC W/AUTO DIFF WBC: CPT | Performed by: STUDENT IN AN ORGANIZED HEALTH CARE EDUCATION/TRAINING PROGRAM

## 2022-03-20 RX ORDER — AMOXICILLIN 250 MG
1 CAPSULE ORAL DAILY
Qty: 30 TABLET | Refills: 3 | Status: SHIPPED | OUTPATIENT
Start: 2022-03-20

## 2022-03-20 RX ORDER — ZINC SULFATE 50(220)MG
220 CAPSULE ORAL DAILY
Qty: 30 CAPSULE | Refills: 3 | Status: SHIPPED | OUTPATIENT
Start: 2022-03-20 | End: 2022-03-25

## 2022-03-20 RX ORDER — SULFAMETHOXAZOLE AND TRIMETHOPRIM 800; 160 MG/1; MG/1
1 TABLET ORAL DAILY
Qty: 30 TABLET | Refills: 0 | Status: SHIPPED | OUTPATIENT
Start: 2022-03-20 | End: 2022-04-19

## 2022-03-20 RX ADMIN — SPIRONOLACTONE 50 MG: 50 TABLET, FILM COATED ORAL at 09:34

## 2022-03-20 RX ADMIN — LACTULOSE 20 G: 20 SOLUTION ORAL at 09:33

## 2022-03-20 RX ADMIN — DOCUSATE SODIUM 50MG AND SENNOSIDES 8.6MG 1 TABLET: 8.6; 5 TABLET, FILM COATED ORAL at 09:34

## 2022-03-20 RX ADMIN — FUROSEMIDE 40 MG: 20 TABLET ORAL at 09:33

## 2022-03-20 RX ADMIN — Medication 1 TABLET: at 09:33

## 2022-03-20 RX ADMIN — MAGNESIUM OXIDE 400 MG (241.3 MG MAGNESIUM) TABLET 400 MG: TABLET at 09:33

## 2022-03-20 RX ADMIN — Medication 10 ML: at 09:35

## 2022-03-20 RX ADMIN — SULFAMETHOXAZOLE AND TRIMETHOPRIM 1 TABLET: 800; 160 TABLET ORAL at 09:33

## 2022-03-20 RX ADMIN — PANTOPRAZOLE SODIUM 40 MG: 40 TABLET, DELAYED RELEASE ORAL at 06:37

## 2022-03-20 RX ADMIN — RIFAXIMIN 550 MG: 550 TABLET ORAL at 09:33

## 2022-03-20 RX ADMIN — MIDODRINE HYDROCHLORIDE 2.5 MG: 2.5 TABLET ORAL at 00:37

## 2022-03-20 NOTE — OUTREACH NOTE
Prep Survey    Flowsheet Row Responses   [facility] facility patient discharged from? [place]   Is LACE score < 7 ? No   Emergency Room discharge w/ pulse ox? No   Eligibility [facility]   Date of Admission 03/18/22   Date of Discharge 03/20/22   Discharge Disposition Home or Self Care   Discharge diagnosis patic encephalopathy, alcoholic cirrhosis with ascies, thrombocytopenia   Does the patient have one of the following disease processes/diagnoses(primary or secondary)? Other   Does the patient have Home health ordered? No   Is there a DME ordered? No   Prep survey completed? Yes          SANA JOHNSTON - Registered Nurse

## 2022-03-20 NOTE — PLAN OF CARE
Goal Outcome Evaluation:               Pt due for discharge.VSS denies any pain and discomfort. Went over discharge summary ,answered all questions about meds.Waiting on wife to .

## 2022-03-20 NOTE — DISCHARGE SUMMARY
Patient Name: Wei Potts  : 1955  MRN: [id number]    Date of Admission: 3/18/2022  Date of Discharge:  3/20/2022  Primary Care Physician: Bella Villalta MD      Chief Complaint:   Altered Mental Status      Discharge Diagnoses     Active Hospital Problems    Diagnosis  POA   • Hepatic encephalopathy (HCC) [K72.90]  Yes   • Thrombocytopenia (HCC) [D69.6]  Unknown   • Portal vein thrombosis [I81]  Yes   • Esophageal varix (HCC) [I85.00]  Yes   • SBP (spontaneous bacterial peritonitis) (HCC) [K65.2]  Yes   • Alcoholic cirrhosis of liver with ascites (HCC) [K70.31]  Yes   • Anemia [D64.9]  Yes   • Ascites due to alcoholic cirrhosis (HCC) [K70.31]  Yes      Resolved Hospital Problems   No resolved problems to display.        Hospital Course      is a pleasant 65 y/o male with PMH of alcoholic liver disease, cirrhosis, esophageal varices s/p banding recurrent ascites & HTN  admitted with altered mental status secondary to hepatic encephalopathy.        #Hepatic encephalopathy: -AST 92, ALT 44, total bilirubin 2.9.  Alkaline phosphatase 174,INR 1.69. on admission. Ammonia was 168 on admission, white blood cell count was 2.13, patient is afebrile.  Patient underwent paracentesis by IR on  and around 1.5 L of fluid removed.  No SBP on on fluid studies. Cultures were negative. Continued lactulose, rifaximin, Docusate senna.  Titrate to 3-4 bowel movements a day.  Patient's mentation improved.  Was alert and oriented x4.  Unclear if patient was taking lactulose at home as scheduled and how many bowel movements patient had per day.  Discussed with wife.  She states she is not sure whether he has been taking medication as prescribed at home but now she will  to assist with medications and monitor his bowel movements.  Also discussed with patient and wife he will need to follow-up with Dr. Jeronimo ,liver specialist as scheduled previously.    #Alcoholic cirrhosis of the liver decompensated by  ascites and esophageal varices.  Continue spironolactone , Lasix. beta-blocker for for bleeding prophylaxis. , Bactrim  for history of SBP 02/10  Midodrine.  As needed paracentesis outpatient.       #Portal vein thrombosis.  On prophylactic Lovenox, not full anticoagulation secondary to thrombocytopenia.  Continue Lovenox 40 mg daily.  Follow-up with outpatient hematology scheduled.          Day of Discharge     Subjective:  Patient was seen this morning.  No acute events overnight.  Alert oriented x4.  Follows command.  Denies any abdominal pain.    Physical Exam:  Temp:  [98.3 °F (36.8 °C)-98.5 °F (36.9 °C)] 98.5 °F (36.9 °C)  Heart Rate:  [75-84] 79  Resp:  [16] 16  BP: (121-130)/(68-74) 125/71  Body mass index is 21.66 kg/m².  Physical Exam  General: Alert , oriented x4, no acute distress, chronically ill-appearing  HEENT: Normocephalic, atraumatic  Lungs: Clear to auscultation bilaterally, no crackles or wheezes  CV: Regular rate and rhythm, no murmurs rubs or gallops  Abdomen:  Mildly distended, nontender to palpation, normoactive bowel sounds.  Extremities: No significant peripheral edema    Neuro:  Alert and oriented x3.  No gross neurological deficits.  Follows commands.  Consultants     Consult Orders (all) (From admission, onward)     Start     Ordered    03/19/22 0647  Inpatient Case Management  Consult  Once        Provider:  (Not yet assigned)    03/19/22 0647    03/18/22 0627  Inpatient Gastroenterology Consult  Once        Specialty:  Gastroenterology  Provider:  Henok Patel MD    03/18/22 0627 03/18/22 0448  A (on-call MD unless specified) Details  Once        Specialty:  Hospitalist  Provider:  (Not yet assigned)    03/18/22 0447              Procedures     * Surgery not found *      Imaging Results (All)     Procedure Component Value Units Date/Time    US Paracentesis [[id number]] Collected: 03/18/22 1548    Specimen: Body Fluid Updated: 03/18/22 1491    Narrative:       ULTRASOUND-GUIDED PARACENTESIS     HISTORY: Ascites     After signed informed consent was obtained, the left lower quadrant was  prepped and draped in the usual sterile fashion. Lidocaine was used for  local anesthesia.     A 5 [place] catheter was inserted into the left lower quadrant using  ultrasound guidance. 1500 mL of yellow color ascites was removed. Sample  was sent to the lab.     Confirmatory images were obtained.     Patient tolerated the procedure well with no complications.       Impression:      Ultrasound-guided paracentesis as described.     This report was finalized on 3/18/2022 3:48 PM by Dr. Tejinder Carroll M.D.           Results for orders placed during the hospital encounter of 02/04/22    Duplex Portal Hepatic Complete CAR    Interpretation Summary  · Main Portal Vein: acute thrombus present.  · Right Intrahepatic Portal Vein: acute thrombus present.  · Extrahepatic Portal Vein: abnormal, hepatofugal flow direction present.  · Left Intrahepatic Portal Vein: abnormal, hepatofugal flow direction present.    Results for orders placed during the hospital encounter of 07/06/20    Adult Transthoracic Echo Complete W/ Cont if Necessary Per Protocol    Interpretation Summary  · Left ventricular systolic function is hyperdynamic (EF > 70).  · Mild mitral valve regurgitation is present    Pertinent Labs     Results from last 7 days   Lab Units 03/20/22  0604 03/19/22  0858 03/18/22  0412 03/15/22  1209   WBC 10*3/mm3 3.77 3.00* 3.13* 4.45   HEMOGLOBIN g/dL 7.8* 8.0* 8.6* 8.5*   PLATELETS 10*3/mm3 64* 63* 66* 85*     Results from last 7 days   Lab Units 03/20/22  0604 03/19/22  0858 03/18/22  0412   SODIUM mmol/L 134* 136 137   POTASSIUM mmol/L 3.9 3.8 3.6   CHLORIDE mmol/L 108* 109* 106   CO2 mmol/L 18.0* 18.3* 20.0*   BUN mg/dL 12 10 13   CREATININE mg/dL 0.98 1.01 1.00   GLUCOSE mg/dL 92 100* 101*   Estimated Creatinine Clearance: 74.8 mL/min (by C-G formula based on SCr of 0.98 mg/dL).  Results  from last 7 days   Lab Units 03/20/22  0604 03/19/22  0858 03/18/22  0412   ALBUMIN g/dL 2.60* 2.50* 3.20*   BILIRUBIN mg/dL 2.1* 2.1* 2.9*   ALK PHOS U/L 123* 137* 174*   AST (SGOT) U/L 58* 59* 92*   ALT (SGPT) U/L 27 25 34     Results from last 7 days   Lab Units 03/20/22  0604 03/19/22  0858 03/18/22  0412   CALCIUM mg/dL 8.0* 8.2* 8.4*   ALBUMIN g/dL 2.60* 2.50* 3.20*   MAGNESIUM mg/dL 2.2 2.1  --    PHOSPHORUS mg/dL 2.7 2.7  --      Results from last 7 days   Lab Units 03/20/22  0604 03/19/22  0858 03/18/22  0412   AMMONIA umol/L 131* 172* 168*             Invalid input(s): LDLCALC  Results from last 7 days   Lab Units 03/18/22  1438   BODYFLDCX  No growth at 2 days     Results from last 7 days   Lab Units 03/18/22  0420   COVID19  Not Detected       Test Results Pending at Discharge     Pending Labs     Order Current Status    Anaerobic Culture - Body Fluid, Peritoneum In process    Body Fluid Culture - Body Fluid, Peritoneum Preliminary result          Discharge Details        Discharge Medications      Changes to Medications      Instructions Start Date   midodrine 5 MG tablet  Commonly known as: PROAMATINE  What changed:   · how much to take  · when to take this   2.5 mg, Oral, Every 8 Hours         Continue These Medications      Instructions Start Date   amitriptyline 10 MG tablet  Commonly known as: ELAVIL   10 mg, Oral, Nightly      Daily-Sae Multivitamin tablet tablet  Generic drug: multivitamin   1 tablet, Oral, Daily      enoxaparin 40 MG/0.4ML solution syringe  Commonly known as: LOVENOX   40 mg, Subcutaneous, Every 24 Hours      furosemide 40 MG tablet  Commonly known as: LASIX   40 mg, Oral, Daily      lactulose 10 GM/15ML solution  Commonly known as: CHRONULAC   20 g, Oral, 3 Times Daily      MAGnesium-Oxide 400 (241.3 Mg) MG tablet tablet  Generic drug: magnesium oxide   400 mg, Oral, 2 Times Daily      nadolol 20 MG tablet  Commonly known as: CORGARD   20 mg, Oral, Every 24 Hours Scheduled       pantoprazole 40 MG EC tablet  Commonly known as: PROTONIX   40 mg, Oral, Every Morning      riFAXIMin 550 MG tablet  Commonly known as: XIFAXAN   550 mg, Oral, Every 12 Hours Scheduled      Senexon-S 8.6-50 MG per tablet  Generic drug: sennosides-docusate   1 tablet, Oral, Daily      spironolactone 50 MG tablet  Commonly known as: ALDACTONE   50 mg, Oral, Daily      sulfamethoxazole-trimethoprim 800-160 MG per tablet  Commonly known as: BACTRIM DS,SEPTRA DS   1 tablet, Oral, Daily      zinc sulfate 220 (50 Zn) MG capsule  Commonly known as: ZINCATE   220 mg, Oral, Daily         Stop These Medications    Generlac 10 GM/15ML solution solution (encephalopathy)  Generic drug: lactulose            Allergies   Allergen Reactions   • Iron GI Intolerance   • Zinc GI Intolerance     vomiting       Discharge Disposition:  Home or Self Care      Discharge Diet:  Diet Order   Procedures   • Diet Regular; Cardiac       Discharge Activity:       CODE STATUS:    Code Status and Medical Interventions:   Ordered at: 03/18/22 0718     Level Of Support Discussed With:    Patient     Code Status (Patient has no pulse and is not breathing):    CPR (Attempt to Resuscitate)     Medical Interventions (Patient has pulse or is breathing):    Full Support       Future Appointments   Date Time Provider Department Center   3/22/2022 11:50 AM LAB CHAIR 5 [facility]  [facility]   3/22/2022 12:20 PM Eulalio Espino MD [facility]   4/6/2022  2:00 PM [facility] CT 3  [facility]   5/18/2022 10:15 AM Bella Villalta MD [facility]  DR [facility]   7/18/2022  9:45 AM Ruby Mcgee MD [facility]      Follow-up Information     Bella Villalta MD Follow up in 1 week(s).    Specialty: Family Medicine  Contact information:  [address] [facility] [address] [phone]             Dr. Jeronimo, Liver specialist. Schedule an appointment as soon as possible for a visit.    Why: follow with Dr. Miramontes, liver specialist as scheduled.                        Time Spent on Discharge:  Greater than 30 minutes      Joselito Marr MD  [facility]  03/20/22  15:05 EDT

## 2022-03-20 NOTE — PLAN OF CARE
Problem: Adult Inpatient Plan of Care  Goal: Plan of Care Review  Outcome: Ongoing, Progressing  Flowsheets (Taken 3/20/2022 0359)  Progress: improving  Plan of Care Reviewed With: patient  Outcome Evaluation: VSS. Denies any pain, SOA, N/V. GI to see pt. Pt stable and needs met at this time.

## 2022-03-20 NOTE — CONSULTS
[facility], [facility]  Initial Inpatient Consult Note    Referring Provider: Kathia díaz    Reason for Consultation: Encephalopathy    Subjective     History of present illness:    67 y.o. male  with cirrhosis complicated by ascites, recent SBP, PSE and PVT.  He was recently hospitalized w/SBP.  Also found to have PVT during that time.  He was started on lovenox.  He has previous esophageal varices without bleeding - banded x 2 4/21 and 6/21.  Completed antibiotics for sbp but there was some confusion and he did not start the bactrim daily.  He also had covid during this recent hospitalization.  Admitted with confusion.  No bleeding.  Mentation is back to baseline this morning.  He is appropriate answers all my questions.  He was having issues with his lactulose at home but he now understands how to take it.  He had a paracentesis last week.  Denies abdominal swelling today.  No fevers chills nausea vomiting or abdominal pain.  He also follows with Dr. Kulwinder holliday.  Dr. Mcgee is his local doctor    Past Medical History:  Past Medical History:   Diagnosis Date   • Alcoholism (HCC)    • Anemia    • Cirrhosis (HCC)    • Clot    • Encephalopathy    • Hypertension    • Liver disease      Past Surgical History:  Past Surgical History:   Procedure Laterality Date   • COLONOSCOPY     • ENDOSCOPY        Social History:   Social History     Tobacco Use   • Smoking status: Never Smoker   • Smokeless tobacco: Never Used   Substance Use Topics   • Alcohol use: Not Currently     Comment: No ETOH on 2.5 yrs      Family History:  Family History   Problem Relation Age of Onset   • Diabetes Mother    • Hypertension Father        Home Meds:  Medications Prior to Admission   Medication Sig Dispense Refill Last Dose   • amitriptyline (ELAVIL) 10 MG tablet Take 1 tablet by mouth Every Night. 30 tablet 1 3/17/2022 at Unknown time   • enoxaparin (LOVENOX) 40 MG/0.4ML solution syringe Inject 0.4 mL under the skin into  the appropriate area as directed Daily. Indications: DVT/PE (active thrombosis) 11.2 mL 3 3/17/2022 at Unknown time   • furosemide (LASIX) 40 MG tablet TAKE 1 TABLET BY MOUTH DAILY 30 tablet 2 3/17/2022 at Unknown time   • Generlac 10 GM/15ML solution solution (encephalopathy) Take 20 g by mouth 3 (Three) Times a Day.   Past Week at Unknown time   • lactulose (CHRONULAC) 10 GM/15ML solution Take 30 mL by mouth 3 (Three) Times a Day. 500 mL 3 3/17/2022 at Unknown time   • MAGnesium-Oxide 400 (241.3 Mg) MG tablet tablet Take 400 mg by mouth 2 (Two) Times a Day.   3/17/2022 at Unknown time   • midodrine (PROAMATINE) 5 MG tablet Take 0.5 tablets by mouth Every 8 (Eight) Hours. (Patient taking differently: Take 5 mg by mouth Daily.) 45 tablet 3 3/17/2022 at Unknown time   • multivitamin (multivitamin) tablet tablet Take 1 tablet by mouth Daily. 30 tablet 3 3/17/2022 at Unknown time   • nadolol (CORGARD) 20 MG tablet Take 1 tablet by mouth Daily. 30 tablet 3 3/17/2022 at Unknown time   • pantoprazole (PROTONIX) 40 MG EC tablet Take 1 tablet by mouth Every Morning. 30 tablet 3 3/17/2022 at Unknown time   • riFAXIMin (XIFAXAN) 550 MG tablet Take 550 mg by mouth Every 12 (Twelve) Hours.   3/17/2022 at Unknown time   • spironolactone (ALDACTONE) 50 MG tablet Take 1 tablet by mouth Daily. 90 tablet 3 3/17/2022 at Unknown time   • sulfamethoxazole-trimethoprim (BACTRIM DS,SEPTRA DS) 800-160 MG per tablet Take 1 tablet by mouth Daily. 30 tablet 11 3/17/2022 at Unknown time     Current Meds:   enoxaparin, 40 mg, Subcutaneous, Nightly  furosemide, 40 mg, Oral, Daily  lactulose, 20 g, Oral, TID  magnesium oxide, 400 mg, Oral, BID  midodrine, 2.5 mg, Oral, Q8H  multivitamin, 1 tablet, Oral, Daily  nadolol, 20 mg, Oral, Q24H  pantoprazole, 40 mg, Oral, QAM  riFAXIMin, 550 mg, Oral, Q12H  sennosides-docusate, 1 tablet, Oral, Daily  sodium chloride, 10 mL, Intravenous, Q12H  spironolactone, 50 mg, Oral,  Daily  sulfamethoxazole-trimethoprim, 1 tablet, Oral, Q24H  zinc sulfate, 220 mg, Oral, Daily      Allergies:  Allergies   Allergen Reactions   • Iron GI Intolerance   • Zinc GI Intolerance     vomiting     Review of Systems  There is weakness fatigue all other systems reviewed and negative     Objective     Vital Signs  Temp:  [97.9 °F (36.6 °C)-98.5 °F (36.9 °C)] 98.5 °F (36.9 °C)  Heart Rate:  [75-84] 79  Resp:  [16] 16  BP: (119-130)/(68-74) 130/68  Physical Exam:  General Appearance:    Alert, cooperative, in no acute distress   Head:    Normocephalic, without obvious abnormality, atraumatic   Eyes:          conjunctivae and sclerae normal, no   icterus   Throat:   no thrush, oral mucosa moist   Neck:   Supple, no adenopathy   Lungs:     Clear to auscultation bilaterally    Heart:    Regular rhythm and normal rate    Chest Wall:    No abnormalities observed   Abdomen:     Soft, nondistended, nontender; normal bowel sounds   Extremities:   no edema, no redness   Skin:   No bruising or rash   Psychiatric:  normal mood and insight     Results Review:   I reviewed the patient's new clinical results.    Results from last 7 days   Lab Units 03/20/22  0604 03/19/22  0858 03/18/22  0412   WBC 10*3/mm3 3.77 3.00* 3.13*   HEMOGLOBIN g/dL 7.8* 8.0* 8.6*   HEMATOCRIT % 23.2* 24.2* 25.3*   PLATELETS 10*3/mm3 64* 63* 66*     Results from last 7 days   Lab Units 03/20/22  0604 03/19/22  0858 03/18/22  0412   SODIUM mmol/L 134* 136 137   POTASSIUM mmol/L 3.9 3.8 3.6   CHLORIDE mmol/L 108* 109* 106   CO2 mmol/L 18.0* 18.3* 20.0*   BUN mg/dL 12 10 13   CREATININE mg/dL 0.98 1.01 1.00   CALCIUM mg/dL 8.0* 8.2* 8.4*   BILIRUBIN mg/dL 2.1* 2.1* 2.9*   ALK PHOS U/L 123* 137* 174*   ALT (SGPT) U/L 27 25 34   AST (SGOT) U/L 58* 59* 92*   GLUCOSE mg/dL 92 100* 101*     Results from last 7 days   Lab Units 03/20/22  0604 03/19/22  0858 03/18/22  0412   INR  1.78* 1.68* 1.69*     Lab Results   Lab Value Date/Time    LIPASE 79 (H)  02/04/2022 0906    LIPASE 36 05/07/2021 1248    LIPASE 68 (H) 01/01/2021 2125    LIPASE 133 (H) 07/22/2020 1759    LIPASE 59 07/07/2020 0633    LIPASE 65 (H) 07/06/2020 1630       Radiology:  US Paracentesis   Final Result   Ultrasound-guided paracentesis as described.       This report was finalized on 3/18/2022 3:48 PM by Dr. Tejinder Carroll M.D.              Assessment/Plan   Patient Active Problem List   Diagnosis   • ED (erectile dysfunction) of organic origin   • Alcoholic cirrhosis of liver with ascites (HCC)   • Anemia   • Jaundice   • Coagulopathy (HCC)   • Secondary thrombocytopenia   • Lung abnormality- left apex   • Sepsis (HCC)   • Colitis   • Hypertension   • GERD (gastroesophageal reflux disease)   • SBP (spontaneous bacterial peritonitis) (HCC)   • Hyperammonemia (HCC)   • Portal vein thrombosis   • COVID-19 virus detected   • Acute hepatitis A virus infection   • Ascites due to alcoholic cirrhosis (HCC)   • Cirrhosis (HCC)   • Esophageal varix (HCC)   • Hepatic encephalopathy (HCC)   • Thrombocytopenia (HCC)       Assessment:  1. Cirrhosis  2. Encephalopathy  3. History of ascites and SBP  4. Portal vein thrombosis    Plan:  · Anticoagulation for thrombosis  · Continue diuretics  · Continue lactulose titrate to 4-5 loose stools per day to avoid encephalopathy  · Continue Xifaxan  · Continue Aldactone  · Continue Protonix  · Watch for signs and symptoms of SBP  · GI is okay with discharge when all agree  · He should follow with Dr. Miramontes, liver specialist, [facility]          Henok Couch MD

## 2022-03-21 ENCOUNTER — TRANSITIONAL CARE MANAGEMENT TELEPHONE ENCOUNTER (OUTPATIENT)
Dept: CALL CENTER | Facility: HOSPITAL | Age: 67
End: 2022-03-21

## 2022-03-21 NOTE — PROGRESS NOTES
Discharge Planning Assessment  [facility]     Patient Name: Wei Potts  MRN: [id number]  Today's Date: 3/21/2022    Admit Date: 3/18/2022     Discharge Needs Assessment    No documentation.                Discharge Plan     Row Name 03/21/22 1851       Plan    Plan home    Final Discharge Disposition Code 01 - home or self-care    Final Note home              Continued Care and Services - Discharged on 3/20/2022 Admission date: 3/18/2022 - Discharge disposition: Home or Self Care   Coordination has not been started for this encounter.       Expected Discharge Date and Time     Expected Discharge Date Expected Discharge Time    Mar 20, 2022          Demographic Summary    No documentation.                Functional Status    No documentation.                Psychosocial    No documentation.                Abuse/Neglect    No documentation.                Legal    No documentation.                Substance Abuse    No documentation.                Patient Forms    No documentation.                   Parul Peace RN

## 2022-03-21 NOTE — OUTREACH NOTE
Call Center TCM Note    Flowsheet Row Responses   [facility] patient discharged from? [place]   Does the patient have one of the following disease processes/diagnoses(primary or secondary)? Other   TCM attempt successful? Yes   Call start time 1353   Call end time 1400   Discharge diagnosis patic encephalopathy, alcoholic cirrhosis with ascies, thrombocytopenia   Meds reviewed with patient/caregiver? Yes   Does the patient have all medications ordered at discharge? N/A   Is the patient taking all medications as directed (includes completed medication regime)? Yes   Does the patient have a primary care provider?  Yes   Does the patient have an appointment with their PCP within 7 days of discharge? No   Has the patient kept scheduled appointments due by today? N/A   Comments No appts available in the TCM time frame.  Will message office for an appt.   Psychosocial issues? No   Did the patient receive a copy of their discharge instructions? Yes   Nursing interventions Reviewed instructions with patient   What is the patient's perception of their health status since discharge? Improving   Is the patient/caregiver able to teach back signs and symptoms related to disease process for when to call PCP? Yes   Is the patient/caregiver able to teach back signs and symptoms related to disease process for when to call 911? Yes   Is the patient/caregiver able to teach back the hierarchy of who to call/visit for symptoms/problems? PCP, Specialist, Home health nurse, Urgent Care, ED, 911 Yes   If the patient is a current smoker, are they able to teach back resources for cessation? Not a smoker   Additional teach back comments States he has CT scan scheduled tomorrow and will have labs done in the afternoon tomorrow.     TCM call completed? Yes   Wrap up additional comments Will message PCP office regarding appt due to none are available in the TCM timeframe.          Prisca Winchester LPN    3/21/2022, 14:03 EDT

## 2022-03-21 NOTE — PAYOR COMM NOTE
"Wei Potts (67 y.o. Male)     Dc summary for # [id number]                Date of Birth   1955    Social Security Number       Address   [address] [place] [address]    Home Phone   [phone]    MRN   [id number]       Hinduism   Synagogue    Marital Status                               Admission Date   3/18/22    Admission Type   Emergency    Admitting Provider   Joselito Marr MD    Attending Provider       Department, Room/Bed   [address], E448/1       Discharge Date   3/20/2022    Discharge Disposition   Home or Self Care    Discharge Destination                               Attending Provider: (none)   Allergies: Iron, Zinc    Isolation: None   Infection: COVID (History) (22)   Code Status: Prior   Advance Care Planning Activity    Ht: 180.3 cm (71\")   Wt: 70.4 kg (155 lb 4.8 oz)    Admission Cmt: None   Principal Problem: None                Active Insurance as of 3/18/2022     Primary Coverage     Payor Plan Insurance Group Employer/Plan Group    [id number]      Payor Plan Address Payor Plan Phone Number Payor Plan Fax Number Effective Dates    [address]   2020 - None Entered    [facility] [phone]       Subscriber Name Subscriber Birth Date Member ID       WEI POTTS 1955 [id number]                 Emergency Contacts      (Rel.) Home Phone Work Phone Mobile Phone    Milagro Potts (Spouse) [phone] -- --               Discharge Summary      Joselito Marr MD at 22 1414              Patient Name: Wei Potts  : 1955  MRN: [id number]    Date of Admission: 3/18/2022  Date of Discharge:  3/20/2022  Primary Care Physician: Bella Villalta MD      Chief Complaint:   Altered Mental Status      Discharge Diagnoses     Active Hospital Problems    Diagnosis  POA   • Hepatic encephalopathy (HCC) [K72.90]  Yes   • Thrombocytopenia " (HCC) [D69.6]  Unknown   • Portal vein thrombosis [I81]  Yes   • Esophageal varix (HCC) [I85.00]  Yes   • SBP (spontaneous bacterial peritonitis) (HCC) [K65.2]  Yes   • Alcoholic cirrhosis of liver with ascites (HCC) [K70.31]  Yes   • Anemia [D64.9]  Yes   • Ascites due to alcoholic cirrhosis (HCC) [K70.31]  Yes      Resolved Hospital Problems   No resolved problems to display.        Hospital Course      is a pleasant 65 y/o male with PMH of alcoholic liver disease, cirrhosis, esophageal varices s/p banding recurrent ascites & HTN  admitted with altered mental status secondary to hepatic encephalopathy.        #Hepatic encephalopathy: -AST 92, ALT 44, total bilirubin 2.9.  Alkaline phosphatase 174,INR 1.69. on admission. Ammonia was 168 on admission, white blood cell count was 2.13, patient is afebrile.  Patient underwent paracentesis by IR on 03/18 and around 1.5 L of fluid removed.  No SBP on on fluid studies. Cultures were negative. Continued lactulose, rifaximin, Docusate senna.  Titrate to 3-4 bowel movements a day.  Patient's mentation improved.  Was alert and oriented x4.  Unclear if patient was taking lactulose at home as scheduled and how many bowel movements patient had per day.  Discussed with wife.  She states she is not sure whether he has been taking medication as prescribed at home but now she will  to assist with medications and monitor his bowel movements.  Also discussed with patient and wife he will need to follow-up with Dr. Jeronimo ,liver specialist as scheduled previously.    #Alcoholic cirrhosis of the liver decompensated by ascites and esophageal varices.  Continue spironolactone , Lasix. beta-blocker for for bleeding prophylaxis. , Bactrim  for history of SBP 02/10  Midodrine.  As needed paracentesis outpatient.       #Portal vein thrombosis.  On prophylactic Lovenox, not full anticoagulation secondary to thrombocytopenia.  Continue Lovenox 40 mg daily.  Follow-up with  outpatient hematology scheduled.          Day of Discharge     Subjective:  Patient was seen this morning.  No acute events overnight.  Alert oriented x4.  Follows command.  Denies any abdominal pain.    Physical Exam:  Temp:  [98.3 °F (36.8 °C)-98.5 °F (36.9 °C)] 98.5 °F (36.9 °C)  Heart Rate:  [75-84] 79  Resp:  [16] 16  BP: (121-130)/(68-74) 125/71  Body mass index is 21.66 kg/m².  Physical Exam  General: Alert , oriented x4, no acute distress, chronically ill-appearing  HEENT: Normocephalic, atraumatic  Lungs: Clear to auscultation bilaterally, no crackles or wheezes  CV: Regular rate and rhythm, no murmurs rubs or gallops  Abdomen:  Mildly distended, nontender to palpation, normoactive bowel sounds.  Extremities: No significant peripheral edema    Neuro:  Alert and oriented x3.  No gross neurological deficits.  Follows commands.  Consultants     Consult Orders (all) (From admission, onward)     Start     Ordered    03/19/22 0647  Inpatient Case Management  Consult  Once        Provider:  (Not yet assigned)    03/19/22 0647    03/18/22 0627  Inpatient Gastroenterology Consult  Once        Specialty:  Gastroenterology  Provider:  Henok Patel MD    03/18/22 0627 03/18/22 0448  [facility] (on-call MD unless specified) Details  Once        Specialty:  Hospitalist  Provider:  (Not yet assigned)    03/18/22 0447              Procedures     * Surgery not found *      Imaging Results (All)     Procedure Component Value Units Date/Time    US Paracentesis [[id number]] Collected: 03/18/22 1548    Specimen: Body Fluid Updated: 03/18/22 1551    Narrative:      ULTRASOUND-GUIDED PARACENTESIS     HISTORY: Ascites     After signed informed consent was obtained, the left lower quadrant was  prepped and draped in the usual sterile fashion. Lidocaine was used for  local anesthesia.     A 5 French catheter was inserted into the left lower quadrant using  ultrasound guidance. 1500 mL of yellow color ascites was  removed. Sample  was sent to the lab.     Confirmatory images were obtained.     Patient tolerated the procedure well with no complications.       Impression:      Ultrasound-guided paracentesis as described.     This report was finalized on 3/18/2022 3:48 PM by Dr. Tejinder Carroll M.D.           Results for orders placed during the hospital encounter of 02/04/22    Duplex Portal Hepatic Complete CAR    Interpretation Summary  · Main Portal Vein: acute thrombus present.  · Right Intrahepatic Portal Vein: acute thrombus present.  · Extrahepatic Portal Vein: abnormal, hepatofugal flow direction present.  · Left Intrahepatic Portal Vein: abnormal, hepatofugal flow direction present.    Results for orders placed during the hospital encounter of 07/06/20    Adult Transthoracic Echo Complete W/ Cont if Necessary Per Protocol    Interpretation Summary  · Left ventricular systolic function is hyperdynamic (EF > 70).  · Mild mitral valve regurgitation is present    Pertinent Labs     Results from last 7 days   Lab Units 03/20/22  0604 03/19/22  0858 03/18/22  0412 03/15/22  1209   WBC 10*3/mm3 3.77 3.00* 3.13* 4.45   HEMOGLOBIN g/dL 7.8* 8.0* 8.6* 8.5*   PLATELETS 10*3/mm3 64* 63* 66* 85*     Results from last 7 days   Lab Units 03/20/22  0604 03/19/22  0858 03/18/22  0412   SODIUM mmol/L 134* 136 137   POTASSIUM mmol/L 3.9 3.8 3.6   CHLORIDE mmol/L 108* 109* 106   CO2 mmol/L 18.0* 18.3* 20.0*   BUN mg/dL 12 10 13   CREATININE mg/dL 0.98 1.01 1.00   GLUCOSE mg/dL 92 100* 101*   Estimated Creatinine Clearance: 74.8 mL/min (by C-G formula based on SCr of 0.98 mg/dL).  Results from last 7 days   Lab Units 03/20/22  0604 03/19/22  0858 03/18/22  0412   ALBUMIN g/dL 2.60* 2.50* 3.20*   BILIRUBIN mg/dL 2.1* 2.1* 2.9*   ALK PHOS U/L 123* 137* 174*   AST (SGOT) U/L 58* 59* 92*   ALT (SGPT) U/L 27 25 34     Results from last 7 days   Lab Units 03/20/22  0604 03/19/22  0858 03/18/22  0412   CALCIUM mg/dL 8.0* 8.2* 8.4*   ALBUMIN g/dL  2.60* 2.50* 3.20*   MAGNESIUM mg/dL 2.2 2.1  --    PHOSPHORUS mg/dL 2.7 2.7  --      Results from last 7 days   Lab Units 03/20/22  0604 03/19/22  0858 03/18/22  0412   AMMONIA umol/L 131* 172* 168*             Invalid input(s): LDLCALC  Results from last 7 days   Lab Units 03/18/22  1438   BODYFLDCX  No growth at 2 days     Results from last 7 days   Lab Units 03/18/22  0420   COVID19  Not Detected       Test Results Pending at Discharge     Pending Labs     Order Current Status    Anaerobic Culture - Body Fluid, Peritoneum In process    Body Fluid Culture - Body Fluid, Peritoneum Preliminary result          Discharge Details        Discharge Medications      Changes to Medications      Instructions Start Date   midodrine 5 MG tablet  Commonly known as: PROAMATINE  What changed:   · how much to take  · when to take this   2.5 mg, Oral, Every 8 Hours         Continue These Medications      Instructions Start Date   amitriptyline 10 MG tablet  Commonly known as: ELAVIL   10 mg, Oral, Nightly      Daily-Sae Multivitamin tablet tablet  Generic drug: multivitamin   1 tablet, Oral, Daily      enoxaparin 40 MG/0.4ML solution syringe  Commonly known as: LOVENOX   40 mg, Subcutaneous, Every 24 Hours      furosemide 40 MG tablet  Commonly known as: LASIX   40 mg, Oral, Daily      lactulose 10 GM/15ML solution  Commonly known as: CHRONULAC   20 g, Oral, 3 Times Daily      MAGnesium-Oxide 400 (241.3 Mg) MG tablet tablet  Generic drug: magnesium oxide   400 mg, Oral, 2 Times Daily      nadolol 20 MG tablet  Commonly known as: CORGARD   20 mg, Oral, Every 24 Hours Scheduled      pantoprazole 40 MG EC tablet  Commonly known as: PROTONIX   40 mg, Oral, Every Morning      riFAXIMin 550 MG tablet  Commonly known as: XIFAXAN   550 mg, Oral, Every 12 Hours Scheduled      Senexon-S 8.6-50 MG per tablet  Generic drug: sennosides-docusate   1 tablet, Oral, Daily      spironolactone 50 MG tablet  Commonly known as: ALDACTONE   50 mg,  Oral, Daily      sulfamethoxazole-trimethoprim 800-160 MG per tablet  Commonly known as: BACTRIM DS,SEPTRA DS   1 tablet, Oral, Daily      zinc sulfate 220 (50 Zn) MG capsule  Commonly known as: ZINCATE   220 mg, Oral, Daily         Stop These Medications    Generlac 10 GM/15ML solution solution (encephalopathy)  Generic drug: lactulose            Allergies   Allergen Reactions   • Iron GI Intolerance   • Zinc GI Intolerance     vomiting       Discharge Disposition:  Home or Self Care      Discharge Diet:  Diet Order   Procedures   • Diet Regular; Cardiac       Discharge Activity:       CODE STATUS:    Code Status and Medical Interventions:   Ordered at: 03/18/22 0718     Level Of Support Discussed With:    Patient     Code Status (Patient has no pulse and is not breathing):    CPR (Attempt to Resuscitate)     Medical Interventions (Patient has pulse or is breathing):    Full Support       Future Appointments   Date Time Provider Department Center   3/22/2022 11:50 AM LAB CHAIR 5 [facility]  [facility]   3/22/2022 12:20 PM Eulalio Espino MD [facility]   4/6/2022  2:00 PM [facility] CT 3  [facility] CT [facility]   5/18/2022 10:15 AM Bella Villalta MD [facility]  [facility]   7/18/2022  9:45 AM Ruby Mcgee MD [facility]      Follow-up Information     Bella Villalta MD Follow up in 1 week(s).    Specialty: Family Medicine  Contact information:  [address] [place] [id number] [phone]             Dr. Jeronimo, Liver specialist. Schedule an appointment as soon as possible for a visit.    Why: follow with Dr. Miramontes, liver specialist as scheduled.                       Time Spent on Discharge:  Greater than 30 minutes      Joselito Marr MD  [facility]  03/20/22  15:05 EDT                Electronically signed by Joselito Marr MD at 03/20/22 1375

## 2022-03-22 ENCOUNTER — OFFICE VISIT (OUTPATIENT)
Dept: ONCOLOGY | Facility: CLINIC | Age: 67
End: 2022-03-22

## 2022-03-22 ENCOUNTER — HOSPITAL ENCOUNTER (OUTPATIENT)
Dept: CT IMAGING | Facility: HOSPITAL | Age: 67
Discharge: HOME | End: 2022-03-22
Admitting: INTERNAL MEDICINE

## 2022-03-22 ENCOUNTER — LAB (OUTPATIENT)
Dept: LAB | Facility: HOSPITAL | Age: 67
End: 2022-03-22

## 2022-03-22 VITALS
BODY MASS INDEX: 21.77 KG/M2 | SYSTOLIC BLOOD PRESSURE: 108 MMHG | DIASTOLIC BLOOD PRESSURE: 61 MMHG | WEIGHT: 155.5 LBS | TEMPERATURE: 97.1 F | HEART RATE: 80 BPM | RESPIRATION RATE: 16 BRPM | HEIGHT: 71 IN | OXYGEN SATURATION: 100 %

## 2022-03-22 DIAGNOSIS — R16.0 LIVER MASS: ICD-10-CM

## 2022-03-22 DIAGNOSIS — I81 PORTAL VEIN THROMBOSIS: Primary | ICD-10-CM

## 2022-03-22 DIAGNOSIS — I81 PORTAL VEIN THROMBOSIS: ICD-10-CM

## 2022-03-22 LAB
BASOPHILS # BLD AUTO: 0.03 10*3/MM3 (ref 0–0.2)
BASOPHILS NFR BLD AUTO: 0.7 % (ref 0–1.5)
CREAT BLDA-MCNC: 0.9 MG/DL (ref 0.6–1.3)
DEPRECATED RDW RBC AUTO: 61.7 FL (ref 37–54)
EOSINOPHIL # BLD AUTO: 0.2 10*3/MM3 (ref 0–0.4)
EOSINOPHIL NFR BLD AUTO: 4.9 % (ref 0.3–6.2)
ERYTHROCYTE [DISTWIDTH] IN BLOOD BY AUTOMATED COUNT: 18.2 % (ref 12.3–15.4)
HCT VFR BLD AUTO: 25.5 % (ref 37.5–51)
HGB BLD-MCNC: 8.3 G/DL (ref 13–17.7)
IMM GRANULOCYTES # BLD AUTO: 0.07 10*3/MM3 (ref 0–0.05)
IMM GRANULOCYTES NFR BLD AUTO: 1.7 % (ref 0–0.5)
LYMPHOCYTES # BLD AUTO: 0.64 10*3/MM3 (ref 0.7–3.1)
LYMPHOCYTES NFR BLD AUTO: 15.6 % (ref 19.6–45.3)
MCH RBC QN AUTO: 30.4 PG (ref 26.6–33)
MCHC RBC AUTO-ENTMCNC: 32.5 G/DL (ref 31.5–35.7)
MCV RBC AUTO: 93.4 FL (ref 79–97)
MONOCYTES # BLD AUTO: 0.66 10*3/MM3 (ref 0.1–0.9)
MONOCYTES NFR BLD AUTO: 16.1 % (ref 5–12)
NEUTROPHILS NFR BLD AUTO: 2.49 10*3/MM3 (ref 1.7–7)
NEUTROPHILS NFR BLD AUTO: 61 % (ref 42.7–76)
NRBC BLD AUTO-RTO: 0 /100 WBC (ref 0–0.2)
PLATELET # BLD AUTO: 62 10*3/MM3 (ref 140–450)
PMV BLD AUTO: 9.9 FL (ref 6–12)
RBC # BLD AUTO: 2.73 10*6/MM3 (ref 4.14–5.8)
WBC NRBC COR # BLD: 4.09 10*3/MM3 (ref 3.4–10.8)

## 2022-03-22 PROCEDURE — 99215 OFFICE O/P EST HI 40 MIN: CPT | Performed by: INTERNAL MEDICINE

## 2022-03-22 PROCEDURE — 85025 COMPLETE CBC W/AUTO DIFF WBC: CPT

## 2022-03-22 PROCEDURE — 25010000002 IOPAMIDOL 61 % SOLUTION: Performed by: INTERNAL MEDICINE

## 2022-03-22 PROCEDURE — 74178 CT ABD&PLV WO CNTR FLWD CNTR: CPT

## 2022-03-22 PROCEDURE — 82565 ASSAY OF CREATININE: CPT

## 2022-03-22 PROCEDURE — 36415 COLL VENOUS BLD VENIPUNCTURE: CPT

## 2022-03-22 RX ADMIN — IOPAMIDOL 85 ML: 612 INJECTION, SOLUTION INTRAVENOUS at 08:28

## 2022-03-23 ENCOUNTER — TELEPHONE (OUTPATIENT)
Dept: ONCOLOGY | Facility: CLINIC | Age: 67
End: 2022-03-23

## 2022-03-23 LAB
BACTERIA FLD CULT: NORMAL
BACTERIA SPEC ANAEROBE CULT: NORMAL
GRAM STN SPEC: NORMAL
GRAM STN SPEC: NORMAL

## 2022-03-23 NOTE — TELEPHONE ENCOUNTER
Provider: DR FRIEDMAN  Caller: DAFNE  Relationship to Patient: SELF    Reason for Call: DAFNE IS WANTING TO TALK TO DR FRIEDMAN REGARDING THE CONVERSATION HE HAD WITH DR Jeronimo        PLEASE ADVISE

## 2022-03-23 NOTE — TELEPHONE ENCOUNTER
Mr Potts called today asking to speak with Dr Espino.  He states when he saw him yesterday Dr Espino was going to reach out to Dr Jeronimo and call him.  I reviewed this with Dr Espino, he has not yet spoken with Dr Jeronimo but will call Mr Potts when he does.  I relayed this to Mr Potts.  He was thankful for the call.

## 2022-03-25 ENCOUNTER — TELEPHONE (OUTPATIENT)
Dept: ONCOLOGY | Facility: CLINIC | Age: 67
End: 2022-03-25

## 2022-03-25 ENCOUNTER — OFFICE VISIT (OUTPATIENT)
Dept: FAMILY MEDICINE CLINIC | Facility: CLINIC | Age: 67
End: 2022-03-25

## 2022-03-25 VITALS
DIASTOLIC BLOOD PRESSURE: 62 MMHG | WEIGHT: 164 LBS | OXYGEN SATURATION: 100 % | HEIGHT: 71 IN | HEART RATE: 79 BPM | BODY MASS INDEX: 22.96 KG/M2 | SYSTOLIC BLOOD PRESSURE: 112 MMHG

## 2022-03-25 DIAGNOSIS — Z92.89 HISTORY OF RECENT HOSPITALIZATION: Primary | ICD-10-CM

## 2022-03-25 DIAGNOSIS — K70.31 ALCOHOLIC CIRRHOSIS OF LIVER WITH ASCITES: ICD-10-CM

## 2022-03-25 DIAGNOSIS — I81 PORTAL VEIN THROMBOSIS: ICD-10-CM

## 2022-03-25 DIAGNOSIS — R17 JAUNDICE: ICD-10-CM

## 2022-03-25 PROBLEM — D50.9 IRON DEFICIENCY ANEMIA: Status: ACTIVE | Noted: 2021-03-02

## 2022-03-25 PROCEDURE — 99214 OFFICE O/P EST MOD 30 MIN: CPT | Performed by: NURSE PRACTITIONER

## 2022-03-25 NOTE — PATIENT INSTRUCTIONS
Return for Next scheduled follow up.  Call with any questions or concerns.   Follow-up with GI as scheduled  Follow-up with Dr. Espino as scheduled  Continue current medications as discussed.

## 2022-03-25 NOTE — PROGRESS NOTES
Lakesha Potts is a 67 y.o. male.     History of Present Illness   Patient presents for follow up for recent hospitalization for hepatic encephalopathy and alcoholic cirrhosis. Patient's last labs showed an ammonia of 131.  He does have ascites, but denies any shortness of breath. Patient had paracentesis on 3/18/2022, 1500 ml of fluid was removed.  Patient is anemic and is being followed by Dr. Espino. He was seen on 3/22/2022.  Dr. Martinez is planning TIPPSS. Patient had CT on Wednesday of this week. Patient is on Lovenox due to portal vein thrombosis, he denies any abnormal bleeding or bruising.     The following portions of the patient's history were reviewed and updated as appropriate: allergies, current medications, past family history, past medical history, past social history, past surgical history and problem list.    Review of Systems   Constitutional: Negative for appetite change, chills, fatigue and fever.   Respiratory: Negative for cough, chest tightness, shortness of breath and wheezing.    Cardiovascular: Negative for chest pain, palpitations and leg swelling.   Gastrointestinal: Positive for diarrhea (with medication). Negative for abdominal distention, abdominal pain, anal bleeding, blood in stool, constipation, nausea, rectal pain, vomiting, GERD and indigestion.   Genitourinary: Negative.  Negative for flank pain.   Neurological: Negative for dizziness and headache.       Objective   Physical Exam  Vitals and nursing note reviewed.   Constitutional:       Appearance: He is well-developed.   HENT:      Head: Normocephalic and atraumatic.   Eyes:      Conjunctiva/sclera: Conjunctivae normal.      Pupils: Pupils are equal, round, and reactive to light.   Neck:      Thyroid: No thyromegaly.   Cardiovascular:      Rate and Rhythm: Normal rate and regular rhythm.      Heart sounds: Normal heart sounds. No murmur heard.  Pulmonary:      Effort: Pulmonary effort is normal.      Breath  sounds: Normal breath sounds.   Abdominal:      General: Abdomen is protuberant. Bowel sounds are normal. There is distension.      Palpations: Abdomen is soft. There is no mass.      Tenderness: There is no abdominal tenderness. There is no right CVA tenderness, left CVA tenderness, guarding or rebound.      Hernia: No hernia is present.      Comments: Ascites noted.    Musculoskeletal:      Cervical back: Normal range of motion and neck supple.   Lymphadenopathy:      Cervical: No cervical adenopathy.   Skin:     General: Skin is warm and dry.      Coloration: Skin is jaundiced.   Neurological:      Mental Status: He is alert and oriented to person, place, and time.   Psychiatric:         Behavior: Behavior normal.         Thought Content: Thought content normal.         Judgment: Judgment normal.         Vitals:    03/25/22 1432   BP: 112/62   Pulse: 79   SpO2: 100%     Body mass index is 22.87 kg/m².    Procedures    Assessment/Plan   Problems Addressed this Visit        Coag and Thromboembolic    Portal vein thrombosis       Gastrointestinal Abdominal     Alcoholic cirrhosis of liver with ascites (HCC)    Jaundice      Other Visit Diagnoses     History of recent hospitalization    -  Primary      Diagnoses       Codes Comments    History of recent hospitalization    -  Primary ICD-10-CM: Z92.89  ICD-9-CM: V13.9     Alcoholic cirrhosis of liver with ascites (HCC)     ICD-10-CM: K70.31  ICD-9-CM: 571.2     Portal vein thrombosis     ICD-10-CM: I81  ICD-9-CM: 452     Jaundice     ICD-10-CM: R17  ICD-9-CM: 782.4         Current outpatient and discharge medications have been reconciled for the patient.  Reviewed by: MILAGROS Hunt    Follow-up with GI as scheduled  Follow-up with Dr. Espino as scheduled  Continue current medications as discussed.          Return for Next scheduled follow up.

## 2022-03-29 ENCOUNTER — LAB (OUTPATIENT)
Dept: LAB | Facility: HOSPITAL | Age: 67
End: 2022-03-29

## 2022-03-29 ENCOUNTER — CLINICAL SUPPORT (OUTPATIENT)
Dept: ONCOLOGY | Facility: HOSPITAL | Age: 67
End: 2022-03-29

## 2022-03-29 DIAGNOSIS — I81 PORTAL VEIN THROMBOSIS: ICD-10-CM

## 2022-03-29 DIAGNOSIS — K70.31 ALCOHOLIC CIRRHOSIS OF LIVER WITH ASCITES: Primary | ICD-10-CM

## 2022-03-29 LAB
BASOPHILS # BLD AUTO: 0.03 10*3/MM3 (ref 0–0.2)
BASOPHILS NFR BLD AUTO: 0.6 % (ref 0–1.5)
DEPRECATED RDW RBC AUTO: 59.8 FL (ref 37–54)
EOSINOPHIL # BLD AUTO: 0.19 10*3/MM3 (ref 0–0.4)
EOSINOPHIL NFR BLD AUTO: 4.1 % (ref 0.3–6.2)
ERYTHROCYTE [DISTWIDTH] IN BLOOD BY AUTOMATED COUNT: 17.6 % (ref 12.3–15.4)
HCT VFR BLD AUTO: 25.7 % (ref 37.5–51)
HGB BLD-MCNC: 8.3 G/DL (ref 13–17.7)
IMM GRANULOCYTES # BLD AUTO: 0.06 10*3/MM3 (ref 0–0.05)
IMM GRANULOCYTES NFR BLD AUTO: 1.3 % (ref 0–0.5)
LYMPHOCYTES # BLD AUTO: 0.49 10*3/MM3 (ref 0.7–3.1)
LYMPHOCYTES NFR BLD AUTO: 10.6 % (ref 19.6–45.3)
MCH RBC QN AUTO: 30.2 PG (ref 26.6–33)
MCHC RBC AUTO-ENTMCNC: 32.3 G/DL (ref 31.5–35.7)
MCV RBC AUTO: 93.5 FL (ref 79–97)
MONOCYTES # BLD AUTO: 0.75 10*3/MM3 (ref 0.1–0.9)
MONOCYTES NFR BLD AUTO: 16.2 % (ref 5–12)
NEUTROPHILS NFR BLD AUTO: 3.1 10*3/MM3 (ref 1.7–7)
NEUTROPHILS NFR BLD AUTO: 67.2 % (ref 42.7–76)
NRBC BLD AUTO-RTO: 0 /100 WBC (ref 0–0.2)
PLATELET # BLD AUTO: 77 10*3/MM3 (ref 140–450)
PMV BLD AUTO: 10 FL (ref 6–12)
RBC # BLD AUTO: 2.75 10*6/MM3 (ref 4.14–5.8)
WBC NRBC COR # BLD: 4.62 10*3/MM3 (ref 3.4–10.8)

## 2022-03-29 PROCEDURE — 36415 COLL VENOUS BLD VENIPUNCTURE: CPT

## 2022-03-29 PROCEDURE — 85025 COMPLETE CBC W/AUTO DIFF WBC: CPT

## 2022-03-29 PROCEDURE — G0463 HOSPITAL OUTPT CLINIC VISIT: HCPCS

## 2022-03-29 NOTE — NURSING NOTE
Pt here for CBC and review. CBC is stable. Platelet count improved to 77 today. Pt tolerating Lovenox injections. Pt reports that he is having a procedure done on 4/14 and was told to stop Lovenox on 4/12. Pt asked if he should get a paracentesis prior to procedure and I told pt he should ask his GI MD since they are coordinated by them. Pt requested ammonia level be added to labs on next visit, I will add this lab for patient. Message sent to Dr. Espino to see if any changes need to be made to Lovenox.    Lab Results   Component Value Date    WBC 4.62 03/29/2022    HGB 8.3 (L) 03/29/2022    HCT 25.7 (L) 03/29/2022    MCV 93.5 03/29/2022    PLT 77 (L) 03/29/2022       Per message from Dr. Espino, no change to Lovenox and pt can now come every other week for CBC instead of every week. Will coordinate this with scheduling and call pt with update.    I have attempted to call the patient 3 times, twice to his cell and once to home number without answer. Voicemail box not set up. Will try once more before end of the day.

## 2022-04-01 ENCOUNTER — TELEPHONE (OUTPATIENT)
Dept: GASTROENTEROLOGY | Facility: CLINIC | Age: 67
End: 2022-04-01

## 2022-04-01 RX ORDER — AMITRIPTYLINE HYDROCHLORIDE 10 MG/1
10 TABLET, FILM COATED ORAL NIGHTLY
Qty: 90 TABLET | Refills: 1 | Status: SHIPPED | OUTPATIENT
Start: 2022-04-01

## 2022-04-01 NOTE — TELEPHONE ENCOUNTER
Faxed request received from [facility] for amitriptyline 10 mg - take 1 tab by mouth every night.     Refill request to [facility].

## 2022-04-05 ENCOUNTER — APPOINTMENT (OUTPATIENT)
Dept: LAB | Facility: HOSPITAL | Age: 67
End: 2022-04-05

## 2022-04-05 ENCOUNTER — LAB (OUTPATIENT)
Dept: LAB | Facility: HOSPITAL | Age: 67
End: 2022-04-05

## 2022-04-05 ENCOUNTER — TELEPHONE (OUTPATIENT)
Dept: ONCOLOGY | Facility: CLINIC | Age: 67
End: 2022-04-05

## 2022-04-05 ENCOUNTER — APPOINTMENT (OUTPATIENT)
Dept: ONCOLOGY | Facility: HOSPITAL | Age: 67
End: 2022-04-05

## 2022-04-05 DIAGNOSIS — K70.31 ALCOHOLIC CIRRHOSIS OF LIVER WITH ASCITES: ICD-10-CM

## 2022-04-05 DIAGNOSIS — I81 PORTAL VEIN THROMBOSIS: ICD-10-CM

## 2022-04-05 LAB
AMMONIA BLD-SCNC: 95 UMOL/L (ref 16–60)
BASOPHILS # BLD AUTO: 0.02 10*3/MM3 (ref 0–0.2)
BASOPHILS NFR BLD AUTO: 0.5 % (ref 0–1.5)
DEPRECATED RDW RBC AUTO: 56.8 FL (ref 37–54)
EOSINOPHIL # BLD AUTO: 0.22 10*3/MM3 (ref 0–0.4)
EOSINOPHIL NFR BLD AUTO: 6 % (ref 0.3–6.2)
ERYTHROCYTE [DISTWIDTH] IN BLOOD BY AUTOMATED COUNT: 16.3 % (ref 12.3–15.4)
HCT VFR BLD AUTO: 24.2 % (ref 37.5–51)
HGB BLD-MCNC: 7.8 G/DL (ref 13–17.7)
IMM GRANULOCYTES # BLD AUTO: 0.01 10*3/MM3 (ref 0–0.05)
IMM GRANULOCYTES NFR BLD AUTO: 0.3 % (ref 0–0.5)
LYMPHOCYTES # BLD AUTO: 0.78 10*3/MM3 (ref 0.7–3.1)
LYMPHOCYTES NFR BLD AUTO: 21.1 % (ref 19.6–45.3)
MCH RBC QN AUTO: 30.6 PG (ref 26.6–33)
MCHC RBC AUTO-ENTMCNC: 32.2 G/DL (ref 31.5–35.7)
MCV RBC AUTO: 94.9 FL (ref 79–97)
MONOCYTES # BLD AUTO: 0.7 10*3/MM3 (ref 0.1–0.9)
MONOCYTES NFR BLD AUTO: 19 % (ref 5–12)
NEUTROPHILS NFR BLD AUTO: 1.96 10*3/MM3 (ref 1.7–7)
NEUTROPHILS NFR BLD AUTO: 53.1 % (ref 42.7–76)
NRBC BLD AUTO-RTO: 0 /100 WBC (ref 0–0.2)
PLATELET # BLD AUTO: 78 10*3/MM3 (ref 140–450)
PMV BLD AUTO: 9.7 FL (ref 6–12)
RBC # BLD AUTO: 2.55 10*6/MM3 (ref 4.14–5.8)
WBC NRBC COR # BLD: 3.69 10*3/MM3 (ref 3.4–10.8)

## 2022-04-05 PROCEDURE — 36415 COLL VENOUS BLD VENIPUNCTURE: CPT

## 2022-04-05 PROCEDURE — 82140 ASSAY OF AMMONIA: CPT | Performed by: INTERNAL MEDICINE

## 2022-04-05 PROCEDURE — 85025 COMPLETE CBC W/AUTO DIFF WBC: CPT

## 2022-04-05 NOTE — TELEPHONE ENCOUNTER
----- Message from Eulalio Espino MD sent at 4/5/2022  1:44 PM EDT -----  There should be a gap of at least 12 hours between the last dose of lovenox & paracentesis.   He can resume lovenox the day after paracentesis. Thanks,   ----- Message -----  From: Shira Zelaya RN  Sent: 4/5/2022   1:30 PM EDT  To: Eulalio Espino MD    Pt states his paracentesis is scheduled on 04/07 and is asking about his Lovenox injections and instructions for administration prior to Thursday?

## 2022-04-05 NOTE — TELEPHONE ENCOUNTER
Caller: DAFNE    Relationship to patient: SELF    Best call back number: [phone]    Patient is needing: TO MAKE SURE HE DID NOT MISS CALL FROM RN. RN WAS GOING TO CHECK CBC LEVELS TO SEE IF PT NEEDS TO GO OFF LOVENOX INJECTIONS BEFORE PARACENTESIS ON 4-7-2022.

## 2022-04-06 ENCOUNTER — HOSPITAL ENCOUNTER (OUTPATIENT)
Dept: CT IMAGING | Facility: HOSPITAL | Age: 67
Discharge: HOME | End: 2022-04-06

## 2022-04-07 ENCOUNTER — HOSPITAL ENCOUNTER (OUTPATIENT)
Dept: ULTRASOUND IMAGING | Facility: HOSPITAL | Age: 67
Discharge: HOME | End: 2022-04-07
Admitting: RADIOLOGY

## 2022-04-07 VITALS
SYSTOLIC BLOOD PRESSURE: 111 MMHG | WEIGHT: 165 LBS | OXYGEN SATURATION: 98 % | BODY MASS INDEX: 23.1 KG/M2 | DIASTOLIC BLOOD PRESSURE: 71 MMHG | RESPIRATION RATE: 16 BRPM | HEIGHT: 71 IN | TEMPERATURE: 97.3 F

## 2022-04-07 DIAGNOSIS — R18.8 CIRRHOSIS OF LIVER WITH ASCITES, UNSPECIFIED HEPATIC CIRRHOSIS TYPE: ICD-10-CM

## 2022-04-07 DIAGNOSIS — K74.60 CIRRHOSIS OF LIVER WITH ASCITES, UNSPECIFIED HEPATIC CIRRHOSIS TYPE: ICD-10-CM

## 2022-04-07 LAB
APPEARANCE FLD: ABNORMAL
COLOR FLD: YELLOW
EOSINOPHIL NFR FLD MANUAL: 1 %
INR PPP: 1.3 (ref 0.8–1.2)
LYMPHOCYTES NFR FLD MANUAL: 31 %
METHOD: ABNORMAL
MONOCYTES NFR FLD: 2 %
MONOS+MACROS NFR FLD: 66 %
NUC CELL # FLD: 151 /MM3
PROTHROMBIN TIME: 15.4 SECONDS (ref 12.8–15.2)
RBC # FLD AUTO: 372 /MM3

## 2022-04-07 PROCEDURE — 25010000002 ALBUMIN HUMAN 25% PER 50 ML: Performed by: INTERNAL MEDICINE

## 2022-04-07 PROCEDURE — P9047 ALBUMIN (HUMAN), 25%, 50ML: HCPCS | Performed by: INTERNAL MEDICINE

## 2022-04-07 PROCEDURE — 96365 THER/PROPH/DIAG IV INF INIT: CPT

## 2022-04-07 PROCEDURE — 76942 ECHO GUIDE FOR BIOPSY: CPT

## 2022-04-07 PROCEDURE — 87070 CULTURE OTHR SPECIMN AEROBIC: CPT | Performed by: INTERNAL MEDICINE

## 2022-04-07 PROCEDURE — 96366 THER/PROPH/DIAG IV INF ADDON: CPT

## 2022-04-07 PROCEDURE — 85610 PROTHROMBIN TIME: CPT

## 2022-04-07 PROCEDURE — 87015 SPECIMEN INFECT AGNT CONCNTJ: CPT | Performed by: INTERNAL MEDICINE

## 2022-04-07 PROCEDURE — 87205 SMEAR GRAM STAIN: CPT | Performed by: INTERNAL MEDICINE

## 2022-04-07 PROCEDURE — 89051 BODY FLUID CELL COUNT: CPT | Performed by: INTERNAL MEDICINE

## 2022-04-07 RX ORDER — ALBUMIN (HUMAN) 12.5 G/50ML
50 SOLUTION INTRAVENOUS ONCE
Status: COMPLETED | OUTPATIENT
Start: 2022-04-07 | End: 2022-04-07

## 2022-04-07 RX ORDER — SODIUM CHLORIDE 0.9 % (FLUSH) 0.9 %
3 SYRINGE (ML) INJECTION EVERY 12 HOURS SCHEDULED
Status: CANCELLED | OUTPATIENT
Start: 2022-04-07

## 2022-04-07 RX ORDER — SODIUM CHLORIDE 0.9 % (FLUSH) 0.9 %
10 SYRINGE (ML) INJECTION AS NEEDED
Status: CANCELLED | OUTPATIENT
Start: 2022-04-07

## 2022-04-07 RX ORDER — OMEPRAZOLE 40 MG/1
40 CAPSULE, DELAYED RELEASE ORAL
COMMUNITY
Start: 2022-03-28

## 2022-04-07 RX ORDER — LIDOCAINE HYDROCHLORIDE 10 MG/ML
10 INJECTION, SOLUTION INFILTRATION; PERINEURAL ONCE
Status: DISCONTINUED | OUTPATIENT
Start: 2022-04-07 | End: 2022-04-08 | Stop reason: HOSPADM

## 2022-04-07 RX ORDER — ALBUMIN (HUMAN) 12.5 G/50ML
12.5 SOLUTION INTRAVENOUS ONCE
Status: DISCONTINUED | OUTPATIENT
Start: 2022-04-07 | End: 2022-04-07

## 2022-04-07 RX ADMIN — ALBUMIN HUMAN 50 G: 0.25 SOLUTION INTRAVENOUS at 09:28

## 2022-04-07 NOTE — NURSING NOTE
Pt ambulated self out. IV removed after albumin was completed.  Pt in NAD.  Discharge instructions reviewed and verbalized understanding.

## 2022-04-07 NOTE — DISCHARGE INSTRUCTIONS
EDUCATION /DISCHARGE INSTRUCTIONS  Paracentesis:  A needle is inserted into the space between your abdominal organs and the membrane that surrounds them (peritoneal space).  It is done for the diagnosis and treatment of fluid that is resistant to other therapies.  It helps determine the cause of the fluid and at the relieves pressure created by the fluid.  A sample is obtained and sent to the laboratory for study.  During the procedure:  You will lie on a bed on your back with your legs drawn up.  Your abdomen will be exposed from the chest to the pelvis.  You will otherwise be covered to maintain comfort.  A physician will clean your abdomen with antiseptic soap, place a sterile towel around the site and administer a local anesthetic to numb the area.  The physician will insert a needle into your abdominal wall.  There may be a popping sound which signifies the needle has pierced the abdominal wall. Next, the physician will attach tubing to transfer a sample into a collection bottle.  After the fluid is obtained the needle will be removed.  A pressure dressing is applied to the site.  Risks of the procedure include but are not limited to:   *  Bleeding    *  Wound infection   *  Low blood pressure   *  Decreased urination   *  Low sodium if a large amount of fluid is removed   *  Puncture of abdominal organs by the needle    Benefits of the procedure:  Benefits include the removal of fluid from the abdomen, relief of abdominal pressure and facilitation of a diagnosis.  Alternatives to the procedure:  Possible alternatives are diuretic drug therapy or surgery to place a shunt to drain fluid.  Risks of diuretic drug therapy include possible dehydration and renal failure.  The benefit of drug therapy is that it can be done at home under physician supervision.  Risks of shunt placement include exposure to anesthesia, infection, excessive bleeding and injury to abdominal organs.  The benefit of a shunt is that it can be  used to drain fluid over a longer period of time.  THIS EDUCATION INFORMATION WAS REVIEWED PRIOR TO THE PROCEDURE AND CONSENT. Patient initials__________________Time_________________    Post procedure: (Follow all the instructions below carefully)   *  Weigh yourself daily.   *  Follow your doctors dietary instructions.   *  Rest today (no pushing pulling, straining or heavy lifting).   *  Slowly increase activity tomorow.   *  If you received sedation do not drive for 24 hours.              * Skin affix  applied to puncture site. Do not try to remove, scratch or apply lotion   * Skin affix will fall off on it's own   *  You may shower tomorrow  Call your doctor if experiencing:   *  Signs of infection such as redness, swelling, excessive pain and / or foul       smelling drainage from the puncture site.   *  Chills or fever over 101 degrees (by mouth).   *  Fainting or any noted Rapid weight gain/loss   *  Unrelieved pain or any new or severe symptoms  Following the procedure:      Follow-up with the ordering physician as directed.   Continue to take other medications as directed by your physician unless    otherwise instructed.   If applicable, resume taking your blood thinners or Aspirin in 24 hours.              If you have any concerns please call the Radiology Nurses Desk at [phone]

## 2022-04-07 NOTE — NURSING NOTE
Pt arrived for his para. Protective goggles and mask in place with all patient interactions today

## 2022-04-12 ENCOUNTER — LAB (OUTPATIENT)
Dept: LAB | Facility: HOSPITAL | Age: 67
End: 2022-04-12

## 2022-04-12 ENCOUNTER — CLINICAL SUPPORT (OUTPATIENT)
Dept: ONCOLOGY | Facility: HOSPITAL | Age: 67
End: 2022-04-12

## 2022-04-12 ENCOUNTER — TELEPHONE (OUTPATIENT)
Dept: ONCOLOGY | Facility: HOSPITAL | Age: 67
End: 2022-04-12

## 2022-04-12 DIAGNOSIS — I81 PORTAL VEIN THROMBOSIS: ICD-10-CM

## 2022-04-12 LAB
BACTERIA FLD CULT: NORMAL
BASOPHILS # BLD AUTO: 0.04 10*3/MM3 (ref 0–0.2)
BASOPHILS NFR BLD AUTO: 1.2 % (ref 0–1.5)
DEPRECATED RDW RBC AUTO: 50.5 FL (ref 37–54)
EOSINOPHIL # BLD AUTO: 0.23 10*3/MM3 (ref 0–0.4)
EOSINOPHIL NFR BLD AUTO: 6.7 % (ref 0.3–6.2)
ERYTHROCYTE [DISTWIDTH] IN BLOOD BY AUTOMATED COUNT: 15.3 % (ref 12.3–15.4)
GRAM STN SPEC: NORMAL
GRAM STN SPEC: NORMAL
HCT VFR BLD AUTO: 24.6 % (ref 37.5–51)
HGB BLD-MCNC: 8.1 G/DL (ref 13–17.7)
IMM GRANULOCYTES # BLD AUTO: 0.05 10*3/MM3 (ref 0–0.05)
IMM GRANULOCYTES NFR BLD AUTO: 1.4 % (ref 0–0.5)
LYMPHOCYTES # BLD AUTO: 0.64 10*3/MM3 (ref 0.7–3.1)
LYMPHOCYTES NFR BLD AUTO: 18.6 % (ref 19.6–45.3)
MCH RBC QN AUTO: 29.9 PG (ref 26.6–33)
MCHC RBC AUTO-ENTMCNC: 32.9 G/DL (ref 31.5–35.7)
MCV RBC AUTO: 90.8 FL (ref 79–97)
MONOCYTES # BLD AUTO: 0.63 10*3/MM3 (ref 0.1–0.9)
MONOCYTES NFR BLD AUTO: 18.3 % (ref 5–12)
NEUTROPHILS NFR BLD AUTO: 1.86 10*3/MM3 (ref 1.7–7)
NEUTROPHILS NFR BLD AUTO: 53.8 % (ref 42.7–76)
NRBC BLD AUTO-RTO: 0 /100 WBC (ref 0–0.2)
PLATELET # BLD AUTO: 71 10*3/MM3 (ref 140–450)
PMV BLD AUTO: 9.2 FL (ref 6–12)
RBC # BLD AUTO: 2.71 10*6/MM3 (ref 4.14–5.8)
WBC NRBC COR # BLD: 3.45 10*3/MM3 (ref 3.4–10.8)

## 2022-04-12 PROCEDURE — 36415 COLL VENOUS BLD VENIPUNCTURE: CPT

## 2022-04-12 PROCEDURE — G0463 HOSPITAL OUTPT CLINIC VISIT: HCPCS

## 2022-04-12 PROCEDURE — 85025 COMPLETE CBC W/AUTO DIFF WBC: CPT

## 2022-04-12 NOTE — TELEPHONE ENCOUNTER
----- Message from Eulalio Espino MD sent at 4/12/2022  9:56 AM EDT -----  Regarding: RE: question regarding lovenox  He can resume lovenox the following day after TIPS procedure. Thanks,  ----- Message -----  From: Elizabet Guzmán RN  Sent: 4/12/2022   9:33 AM EDT  To: Eulalio Espino MD  Subject: question regarding lovenox                       Wei Beverly is having a TIPS procedure done this Thursday 4/14. He was instructed to hold Lovenox for 48 hours prior but asked when is it okay to resume after procedure is over? Please advise.    Thank you!

## 2022-04-18 ENCOUNTER — TELEPHONE (OUTPATIENT)
Dept: ONCOLOGY | Facility: CLINIC | Age: 67
End: 2022-04-18

## 2022-04-18 NOTE — TELEPHONE ENCOUNTER
Patient's Lovenox was held for TIPS procedure and he needs to know when to resume the Lovenox.  Please advise.

## 2022-04-18 NOTE — TELEPHONE ENCOUNTER
Returned call to patient to let him know he should have resumed his Lovenox the day following the procedure.  He stated that he was not notified of this.  Patient v/u.

## 2022-04-19 ENCOUNTER — APPOINTMENT (OUTPATIENT)
Dept: LAB | Facility: HOSPITAL | Age: 67
End: 2022-04-19

## 2022-04-22 ENCOUNTER — OFFICE VISIT (OUTPATIENT)
Dept: ONCOLOGY | Facility: CLINIC | Age: 67
End: 2022-04-22

## 2022-04-22 ENCOUNTER — LAB (OUTPATIENT)
Dept: LAB | Facility: HOSPITAL | Age: 67
End: 2022-04-22

## 2022-04-22 VITALS
OXYGEN SATURATION: 100 % | HEART RATE: 83 BPM | SYSTOLIC BLOOD PRESSURE: 120 MMHG | WEIGHT: 161.5 LBS | HEIGHT: 71 IN | BODY MASS INDEX: 22.61 KG/M2 | RESPIRATION RATE: 16 BRPM | TEMPERATURE: 98.4 F | DIASTOLIC BLOOD PRESSURE: 65 MMHG

## 2022-04-22 DIAGNOSIS — I81 PORTAL VEIN THROMBOSIS: ICD-10-CM

## 2022-04-22 DIAGNOSIS — K70.31 ALCOHOLIC CIRRHOSIS OF LIVER WITH ASCITES: Primary | ICD-10-CM

## 2022-04-22 DIAGNOSIS — K70.31 ALCOHOLIC CIRRHOSIS OF LIVER WITH ASCITES: ICD-10-CM

## 2022-04-22 LAB
ALBUMIN SERPL-MCNC: 3 G/DL (ref 3.5–5.2)
ALBUMIN/GLOB SERPL: 0.7 G/DL (ref 1.1–2.4)
ALP SERPL-CCNC: 179 U/L (ref 38–116)
ALT SERPL W P-5'-P-CCNC: 19 U/L (ref 0–41)
AMMONIA BLD-SCNC: 63 UMOL/L (ref 16–60)
ANION GAP SERPL CALCULATED.3IONS-SCNC: 7.9 MMOL/L (ref 5–15)
AST SERPL-CCNC: 38 U/L (ref 0–40)
BASOPHILS # BLD AUTO: 0.04 10*3/MM3 (ref 0–0.2)
BASOPHILS NFR BLD AUTO: 1 % (ref 0–1.5)
BILIRUB SERPL-MCNC: 2 MG/DL (ref 0.2–1.2)
BUN SERPL-MCNC: 8 MG/DL (ref 6–20)
BUN/CREAT SERPL: 8.9 (ref 7.3–30)
CALCIUM SPEC-SCNC: 8.7 MG/DL (ref 8.5–10.2)
CHLORIDE SERPL-SCNC: 106 MMOL/L (ref 98–107)
CO2 SERPL-SCNC: 22.1 MMOL/L (ref 22–29)
CREAT SERPL-MCNC: 0.9 MG/DL (ref 0.7–1.3)
DEPRECATED RDW RBC AUTO: 48.6 FL (ref 37–54)
EGFRCR SERPLBLD CKD-EPI 2021: 94.8 ML/MIN/1.73
EOSINOPHIL # BLD AUTO: 0.27 10*3/MM3 (ref 0–0.4)
EOSINOPHIL NFR BLD AUTO: 6.4 % (ref 0.3–6.2)
ERYTHROCYTE [DISTWIDTH] IN BLOOD BY AUTOMATED COUNT: 15.2 % (ref 12.3–15.4)
GLOBULIN UR ELPH-MCNC: 4.3 GM/DL (ref 1.8–3.5)
GLUCOSE SERPL-MCNC: 135 MG/DL (ref 74–124)
HCT VFR BLD AUTO: 24.3 % (ref 37.5–51)
HGB BLD-MCNC: 7.9 G/DL (ref 13–17.7)
IMM GRANULOCYTES # BLD AUTO: 0.06 10*3/MM3 (ref 0–0.05)
IMM GRANULOCYTES NFR BLD AUTO: 1.4 % (ref 0–0.5)
LYMPHOCYTES # BLD AUTO: 0.58 10*3/MM3 (ref 0.7–3.1)
LYMPHOCYTES NFR BLD AUTO: 13.8 % (ref 19.6–45.3)
MCH RBC QN AUTO: 28.9 PG (ref 26.6–33)
MCHC RBC AUTO-ENTMCNC: 32.5 G/DL (ref 31.5–35.7)
MCV RBC AUTO: 89 FL (ref 79–97)
MONOCYTES # BLD AUTO: 0.97 10*3/MM3 (ref 0.1–0.9)
MONOCYTES NFR BLD AUTO: 23.1 % (ref 5–12)
NEUTROPHILS NFR BLD AUTO: 2.28 10*3/MM3 (ref 1.7–7)
NEUTROPHILS NFR BLD AUTO: 54.3 % (ref 42.7–76)
NRBC BLD AUTO-RTO: 0 /100 WBC (ref 0–0.2)
PLATELET # BLD AUTO: 83 10*3/MM3 (ref 140–450)
PMV BLD AUTO: 9.4 FL (ref 6–12)
POTASSIUM SERPL-SCNC: 4.1 MMOL/L (ref 3.5–4.7)
PROT SERPL-MCNC: 7.3 G/DL (ref 6.3–8)
RBC # BLD AUTO: 2.73 10*6/MM3 (ref 4.14–5.8)
SODIUM SERPL-SCNC: 136 MMOL/L (ref 134–145)
WBC NRBC COR # BLD: 4.2 10*3/MM3 (ref 3.4–10.8)

## 2022-04-22 PROCEDURE — 85025 COMPLETE CBC W/AUTO DIFF WBC: CPT

## 2022-04-22 PROCEDURE — 99215 OFFICE O/P EST HI 40 MIN: CPT | Performed by: INTERNAL MEDICINE

## 2022-04-22 PROCEDURE — 82140 ASSAY OF AMMONIA: CPT | Performed by: INTERNAL MEDICINE

## 2022-04-22 PROCEDURE — 80053 COMPREHEN METABOLIC PANEL: CPT | Performed by: INTERNAL MEDICINE

## 2022-04-22 PROCEDURE — 36415 COLL VENOUS BLD VENIPUNCTURE: CPT

## 2022-04-22 RX ORDER — SPIRONOLACTONE 100 MG/1
100 TABLET, FILM COATED ORAL
COMMUNITY
Start: 2022-04-15 | End: 2022-11-16

## 2022-04-22 RX ORDER — ENOXAPARIN SODIUM 100 MG/ML
40 INJECTION SUBCUTANEOUS EVERY 24 HOURS
Qty: 11.2 ML | Refills: 3 | Status: SHIPPED | OUTPATIENT
Start: 2022-04-22 | End: 2022-07-27 | Stop reason: SDUPTHER

## 2022-04-22 NOTE — PROGRESS NOTES
[facility]    CONSULTING IN BLOOD DISORDERS & CANCER      REASON FOR CONSULTATION/CHIEF COMPLAINT:     Evaluation & management for portal vein thrombosis                             REQUESTING PHYSICIAN: No ref. provider found  RECORDS OBTAINED:  Records of the patients history including those from the electronic medical record were reviewed and summarized in detail.    HISTORY OF PRESENT ILLNESS:    The patient is a 67 y.o. year old male with medical issues comprising alcoholic liver disease, cirrhosis, esophageal varices s/p banding recurrent ascites & HTN who was admitted to the [facility] on 2/4/22 with hepatic encephalopathy.      2/5/22: A US abdomen Cirrhosis with findings of portosystemic shunting including mild to moderate splenomegaly and ascites. There also appears to be hepatofugal in the main portal vein with concern for thrombus formation as well.     2/6/22: A doppler portal vein revealed acute thrombus in the main portal vein, right intrahepatic portal vein, along with abnormal flow in the extrahepatic portal vein and left intrahepatic portal vein.     Of note, a MRI abdomen from 1/7/22 done at [facility] had demonstrated peripheral, chronic nonocclusive thrombus of the main portal vein.     He was seen by inpatient hem/onc team. Given unusual location, hypercoagulable state work up was performed  on 2/7/22. . No prior history of VTEs. No family history of thrombosis.     -  Factor V leiden mutation negative. Factor II & AT-III activity low (likely due to liver dysfunction). Mildly elevated ACL, IgM (unclear significance). LA & b2-GP negative.  JAK2 mutation negative. [facility] flow cytometry negative. Protein C & S levels  not done as they would be altered due to liver disease.     Patient has chronic thrombocytopenia with platelets in 40-50,000s range. The CBC from 2/7/22 showed platelet count of 40,000. No c/o bleed or bruise.   -Given risk for intestinal infarction with PV thrombus and likely prothrombotic state,   started him on low dose lovenox on 2/7/22.   - Tolerating well.   - Platelets dropped from 42,000 on 2/8/22 to 36,000 on 2/9 to 34,000 on 2/10. Lovenox stopped. HIT Ab sent - which later came back negative.   - 2/11: Seen by vascular surgery. No interventions planned.   2/13: HIT ab came back negative. Platelets improved to 48,000. Was resumed on lovenox 40 mg sq daily & discharged home.     Patient first seen in the hematology clinic on 2/15/22. He has been tolerating lovenox 40 mg daily well.  Denies any bleed/bruise. Saw , GI in March 2022. Discussed plan for TIPSS procedure. A CT a/p performed 3/22/22 showed nonocclusive hypodense thrombus within the main portal vein at the nayeli hepatis.     4/14/22: Underwent TIPSS procedure at [facility]. Tolerated well.    INTERIM HISTORY:  Patient returns to the clinic for a f/u visit. Says he had TIPSS done last week. Reports of feeling better since then. Mentation more clearer. Continues lactulose, rifaximin, Docusate senna. No issues with lovenox.     Past Medical History:   Diagnosis Date   • Alcoholism (HCC)    • Anemia    • Cirrhosis (HCC)    • Clot    • COVID-19 03/17/2022   • Encephalopathy    • Hypertension    • Liver disease      Past Surgical History:   Procedure Laterality Date   • COLONOSCOPY     • ENDOSCOPY         MEDICATIONS    Current Outpatient Medications:   •  amitriptyline (ELAVIL) 10 MG tablet, Take 1 tablet by mouth Every Night., Disp: 90 tablet, Rfl: 1  •  enoxaparin (LOVENOX) 40 MG/0.4ML solution syringe, Inject 0.4 mL under the skin into the appropriate area as directed Daily. Indications: DVT/PE (active thrombosis), Disp: 11.2 mL, Rfl: 3  •  furosemide (LASIX) 40 MG tablet, TAKE 1 TABLET BY MOUTH DAILY, Disp: 30 tablet, Rfl: 2  •  lactulose (CHRONULAC) 10 GM/15ML solution, Take 30 mL by mouth 3 (Three) Times a Day., Disp: 500 mL, Rfl: 3  •  MAGnesium-Oxide 400 (241.3 Mg) MG tablet tablet, Take 400 mg by mouth 2 (Two) Times a Day., Disp: , Rfl:    •  midodrine (PROAMATINE) 5 MG tablet, Take 0.5 tablets by mouth Every 8 (Eight) Hours. (Patient taking differently: Take 5 mg by mouth Daily.), Disp: 45 tablet, Rfl: 3  •  multivitamin (multivitamin) tablet tablet, Take 1 tablet by mouth Daily., Disp: 30 tablet, Rfl: 3  •  pantoprazole (PROTONIX) 40 MG EC tablet, Take 1 tablet by mouth Every Morning., Disp: 30 tablet, Rfl: 3  •  riFAXIMin (XIFAXAN) 550 MG tablet, Take 1 tablet by mouth Every 12 (Twelve) Hours., Disp: 14 tablet, Rfl: 0  •  sennosides-docusate (PERICOLACE) 8.6-50 MG per tablet, Take 1 tablet by mouth Daily., Disp: 30 tablet, Rfl: 3  •  spironolactone (ALDACTONE) 100 MG tablet, 100 mg., Disp: , Rfl:   •  spironolactone (ALDACTONE) 50 MG tablet, Take 1 tablet by mouth Daily., Disp: 90 tablet, Rfl: 3  •  nadolol (CORGARD) 20 MG tablet, Take 1 tablet by mouth Daily., Disp: 30 tablet, Rfl: 3  •  omeprazole (priLOSEC) 40 MG capsule, Take 40 mg by mouth., Disp: , Rfl:     ALLERGIES:     Allergies   Allergen Reactions   • Iron GI Intolerance   • Zinc GI Intolerance     vomiting       SOCIAL HISTORY:       Social History     Socioeconomic History   • Marital status:    Tobacco Use   • Smoking status: Never Smoker   • Smokeless tobacco: Never Used   Vaping Use   • Vaping Use: Never used   Substance and Sexual Activity   • Alcohol use: Not Currently     Comment: No ETOH on 2.5 yrs   • Drug use: No   • Sexual activity: Defer         FAMILY HISTORY:  Family History   Problem Relation Age of Onset   • Diabetes Mother    • Hypertension Father      REVIEW OF SYSTEMS:  Constitutional: [No fevers, chills, sweats]  Eye: [No recent visual problems]  ENMT: [No ear pain, nasal congestion, sore throat]  Respiratory: [No shortness of breath, cough]  Cardiovascular: [No Chest pain, palpitations, syncope]  Gastrointestinal: [No nausea, vomiting, diarrhea]  Genitourinary: [No hematuria]  Hema/Lymph: [Negative for bruising tendency, swollen lymph glands]  Endocrine:  "[Negative for excessive thirst, excessive hunger]  Musculoskeletal: [Denies any musculoskeletal pain or swelling]  Integumentary: [No rash, pruritus, abrasions]  Neurologic: [ No weakness or numbness, Alert & oriented X 4]       Vitals:    04/22/22 1447   BP: 120/65   Pulse: 83   Resp: 16   Temp: 98.4 °F (36.9 °C)   TempSrc: Temporal   SpO2: 100%   Weight: 73.3 kg (161 lb 8 oz)   Height: 180 cm (70.87\")   PainSc: 0-No pain     Current Status 4/22/2022   ECOG score 0      PHYSICAL EXAM:    CONSTITUTIONAL:  Vital signs reviewed.  No distress, looks comfortable.  EYES:  Conjunctiva and lids unremarkable.   EARS,NOSE,MOUTH,THROAT:  Ears and nose appear unremarkable.  RESPIRATORY:  Normal respiratory effort.  Lungs clear to auscultation bilaterally.  CARDIOVASCULAR:  Normal S1, S2.  No murmurs rubs or gallops.  No significant lower extremity edema.  GASTROINTESTINAL: Abdomen appears unremarkable.  Nontender.  Mild distension  LYMPHATIC:  No cervical, supraclavicular lymphadenopathy.  NEURO: AAO x 3, No focal deficits.  Appears to have equal strength all 4 extremities.  MUSCULOSKELETAL:  Unremarkable digits/nails.  No cyanosis or clubbing.  No apparent joint deformities.  SKIN:  Warm.  No rashes. Bruises on arms  PSYCHIATRIC:  Normal judgment and insight.  Normal mood and affect.     RECENT LABS:      Recent labs reviewed.     ASSESSMENT:   is a pleasant 68 y/o WM with medical issues comprising alcoholic liver disease, cirrhosis, esophageal varices s/p banding recurrent ascites & HTN who comes for portal vein thrombosis evaluation & management.      # Portal vein thrombosis:  - Acute on chronic. MRI abdomen from 1/7/22 done at [facility] had demonstrated peripheral, chronic nonocclusive thrombus of the main portal vein. The doppler from 2/6/22 shows acute thrombus in the main and right intrahepatic portal veins.   - This is likely due to altered coagulation due to hepatic dysfunction.  No prior history of VTEs. No " family history of thrombosis.   - However given unusual location, hypercoagulable state work up was sent on 2/7/22.   -  Factor V leiden mutation negative. Factor II & AT-III activity low (likely due to liver dysfunction). Mildly elevated ACL, IgM (unclear significance). LA & b2-GP negative.  JAK2 mutation negative. [facility] flow cytometry negative. Protein C & S not performed as levels would be altered.   - Patient has chronic thrombocytopenia with platelets in 40-50,000s range. The CBC from 2/7/22 shows platelet count of 40,000. No c/o bleed or bruise.   - Given risk for intestinal infarction with PV thrombus and likely prothrombotic state,  started him on low dose lovenox on 2/7/22.   - Platelets dropped from 42,000 on 2/8/22 to 36,000 on 2/9 to 34,000 on 2/10. Lovenox stopped. HIT Ab sent - which later came back negative.   - 2/11: Seen by vascular surgery. No interventions planned.   2/13: HIT ab came back negative. Platelets improved to 48,000. Was resumed on lovenox 40 mg sq daily & discharged home.  - 2/15: Pt returns to the clinic for a f/u visit. Is tolerating lovenox well. No bleed/bruise. No abd pain or distension. Plan to check CBC weekly. If improving platelets, will consider increasing the dose. F/u in a month.   - 3/22/22: Pt doing well on lovenox 40 mg daily. Platelets remain low but stable in 60-70,000 range. Will continue current dose and consider CBC checks to every other week. Advised to maintain close f/u with GI.  - 4/22/22: S/p TIPSS on 4/14. Doing well. Plan to continue lovenox for now. Consider stopping after 3-6 months. CBC every other week.      # Thrombocytopenia:  - Acute on chronic. Likely liver disease related.   - No bleed/bruise     # Leukopenia:  Likely liver disease related.   Monitor     # Coagulopathy:  PT/INR elevated.   No bleed/bruise. Monitor     # Anemia:  Likely anemia of chronic disease. Baseline Hb in 8-9 gm/dl range since 2020.  Iron panel with no deficiency.   Hb improved.       # Hepatic encephalopathy: Per GI  # Decompensated liver cirrhosis  # Ascites: Was getting paracentesis every 2-3 months     PLAN:   - Portal vein thrombosis in the setting of liver-disease related thrombocytopenia. Will continue Lovenox 40 mg sq daily.   - Check CBC every other week with RN review.   - S/p TIPSS procedure on 4/14/22. Advised to maintain close f/u with GI.  - F/u in 2 months or sooner if needed    Orders Placed This Encounter   Procedures   • Comprehensive Metabolic Panel     Order Specific Question:   Release to patient     Answer:   Immediate   • Ammonia     Standing Status:   Future     Number of Occurrences:   1     Standing Expiration Date:   4/22/2023     Order Specific Question:   Release to patient     Answer:   Immediate   • CBC & Differential     Standing Status:   Standing     Number of Occurrences:   4     Standing Expiration Date:   4/22/2023     Order Specific Question:   Manual Differential     Answer:   No   Total time spent during this patient encounter is 45 minutes. The total time spent with the patient includes at least one or more of the following: preparing to see the patient by reviewing of tests, prior notes or other relevant information, performing appropriate independent examination & evaluation, counseling, ordering of medications, tests or procedures, communicating with other healthcare professionals, when appropriate to coordinate care, documenting clinic information in the electronic medical records or other health records, independently interpreting results of tests and communicating the results to the patient/family or caregiver.

## 2022-05-05 RX ORDER — LACTULOSE 10 G/15ML
SOLUTION ORAL; RECTAL
Qty: 1800 ML | Refills: 2 | Status: SHIPPED | OUTPATIENT
Start: 2022-05-05 | End: 2022-05-26

## 2022-05-05 NOTE — TELEPHONE ENCOUNTER
"Radha request received for [facility] with following notation: \"The original prescription was discontinued on 2/14/2022 by Hardik Swenson MD for the following reason: Stop Taking at Discharge. Renewing this prescription may not be appropriate.\"    Message to DR Mcgee.   "

## 2022-05-06 ENCOUNTER — LAB (OUTPATIENT)
Dept: LAB | Facility: HOSPITAL | Age: 67
End: 2022-05-06

## 2022-05-06 ENCOUNTER — CLINICAL SUPPORT (OUTPATIENT)
Dept: ONCOLOGY | Facility: HOSPITAL | Age: 67
End: 2022-05-06

## 2022-05-06 DIAGNOSIS — I81 PORTAL VEIN THROMBOSIS: ICD-10-CM

## 2022-05-06 DIAGNOSIS — K70.31 ALCOHOLIC CIRRHOSIS OF LIVER WITH ASCITES: ICD-10-CM

## 2022-05-06 LAB
BASOPHILS # BLD AUTO: 0.04 10*3/MM3 (ref 0–0.2)
BASOPHILS NFR BLD AUTO: 0.9 % (ref 0–1.5)
DEPRECATED RDW RBC AUTO: 48 FL (ref 37–54)
EOSINOPHIL # BLD AUTO: 0.28 10*3/MM3 (ref 0–0.4)
EOSINOPHIL NFR BLD AUTO: 6.6 % (ref 0.3–6.2)
ERYTHROCYTE [DISTWIDTH] IN BLOOD BY AUTOMATED COUNT: 15.2 % (ref 12.3–15.4)
HCT VFR BLD AUTO: 25.5 % (ref 37.5–51)
HGB BLD-MCNC: 8.3 G/DL (ref 13–17.7)
IMM GRANULOCYTES # BLD AUTO: 0.15 10*3/MM3 (ref 0–0.05)
IMM GRANULOCYTES NFR BLD AUTO: 3.5 % (ref 0–0.5)
LYMPHOCYTES # BLD AUTO: 0.65 10*3/MM3 (ref 0.7–3.1)
LYMPHOCYTES NFR BLD AUTO: 15.3 % (ref 19.6–45.3)
MCH RBC QN AUTO: 28.3 PG (ref 26.6–33)
MCHC RBC AUTO-ENTMCNC: 32.5 G/DL (ref 31.5–35.7)
MCV RBC AUTO: 87 FL (ref 79–97)
MONOCYTES # BLD AUTO: 0.79 10*3/MM3 (ref 0.1–0.9)
MONOCYTES NFR BLD AUTO: 18.5 % (ref 5–12)
NEUTROPHILS NFR BLD AUTO: 2.35 10*3/MM3 (ref 1.7–7)
NEUTROPHILS NFR BLD AUTO: 55.2 % (ref 42.7–76)
NRBC BLD AUTO-RTO: 0 /100 WBC (ref 0–0.2)
PLATELET # BLD AUTO: 82 10*3/MM3 (ref 140–450)
PMV BLD AUTO: 9 FL (ref 6–12)
RBC # BLD AUTO: 2.93 10*6/MM3 (ref 4.14–5.8)
WBC NRBC COR # BLD: 4.26 10*3/MM3 (ref 3.4–10.8)

## 2022-05-06 PROCEDURE — G0463 HOSPITAL OUTPT CLINIC VISIT: HCPCS

## 2022-05-06 PROCEDURE — 36415 COLL VENOUS BLD VENIPUNCTURE: CPT

## 2022-05-06 PROCEDURE — 85025 COMPLETE CBC W/AUTO DIFF WBC: CPT

## 2022-05-06 NOTE — PROGRESS NOTES
Pt present for RN visit. Pt reports doing well since have TIPS procedure. Pt needed suture removed. Instructed pt to go to [facility] where the procedure took place and have it removed. D/w Elma LINARES. An order for suture removal would be needed from [facility] in order to do this. Hgb improved compared to previous labs. Copy of labs provided and reviewed schedule. Pt v/u.    Lab Results   Component Value Date    WBC 4.26 05/06/2022    HGB 8.3 (L) 05/06/2022    HCT 25.5 (L) 05/06/2022    MCV 87.0 05/06/2022    PLT 82 (L) 05/06/2022

## 2022-05-06 NOTE — PROGRESS NOTES
Problem List Items Addressed This Visit        Cardiovascular and Mediastinum    Benign essential hypertension    Overview     7/2/2018  BP is controlled well. He will recheck in six months. He will continue present meds.           Relevant Medications    amLODIPine (NORVASC) 5 MG tablet    benazepril (LOTENSIN) 20 MG tablet      Other Visit Diagnoses     Bleeding disorder    -  Primary    Relevant Orders    CBC & Differential    Comprehensive Metabolic Panel    Protime-INR    APTT    history of Elevated LFTs        Agree w/quitting bleeding but concerned he may have permanent liver damage. Will check labs.              Return in about 6 months (around 1/2/2019).  Patient Instructions   Hold the aspirin for right now.     Will see what bleeding does before referring to ENT.     Wei Potts is a 63 y.o. male being seen in our office today for Hypertension                 He  reports that he has never smoked. He has never used smokeless tobacco. He reports that he drinks alcohol. He reports that he does not use drugs.             HPI Patient is here for follow-up of hypertension. He is exercising at work and is adherent to a low-salt diet. Patient does check BP occasionally.. Patient denies chest pain and dyspnea. Cardiovascular risk factors: advanced age (older than 55 for men, 65 for women), hypertension and male gender. Use of agents associated with hypertension: none. History of target organ damage: none. He is compliant with meds.     He is forgetting things. His wife is concerned about that.     He had a stomach/coughing virus about six weeks ago. Since that time he has had nose bleed. Mostly on the left side but occasional has it on the right. A little dizziness. Quit alcohol while he was sick. He hasn't drunk any alcohol since then other than nyquil. Is coughing a fair amount lately.              The following portions of the patient's history were reviewed and updated as appropriate:PMHroutine: Social    Vaccine Information Statement(s) or the Emergency Use Authorization was given today. This has been reviewed, questions answered, and verbal consent given by Patient for injection(s) and administration of COVID-19 Immunization Moderna COVID-19.      Patient tolerated without incident. See immunization grid for documentation.         history , Allergies, Current Medications, Active Problem List and Health Maintenance            Review of Systems   Constitutional: Negative for activity change, appetite change, chills, fatigue, fever and unexpected weight change.   HENT: Positive for nosebleeds. Negative for congestion, ear pain, hearing loss, mouth sores, rhinorrhea and sore throat.    Eyes: Negative for pain and visual disturbance.   Respiratory: Positive for cough. Negative for shortness of breath and wheezing.    Cardiovascular: Negative for chest pain, palpitations and leg swelling.   Gastrointestinal: Negative for abdominal distention, abdominal pain, blood in stool, constipation, diarrhea, nausea and vomiting.   Endocrine: Negative for cold intolerance and heat intolerance.   Genitourinary: Negative for difficulty urinating, discharge, dysuria, frequency, hematuria and urgency.   Musculoskeletal: Negative for back pain and joint swelling.   Skin: Negative for rash and wound.   Neurological: Negative for dizziness, weakness, numbness and headaches.   Hematological: Does not bruise/bleed easily.   Psychiatric/Behavioral: Negative for confusion, dysphoric mood, sleep disturbance and suicidal ideas. The patient is not nervous/anxious.                  BP Readings from Last 1 Encounters:   07/02/18 122/74     Wt Readings from Last 3 Encounters:   07/02/18 70.8 kg (156 lb)   12/08/17 74.4 kg (164 lb)   03/03/17 76.7 kg (169 lb)   Body mass index is 21.76 kg/m².                 Physical Exam   Constitutional: He is oriented to person, place, and time. Vital signs are normal. He appears well-developed and well-nourished. No distress.   HENT:   Head: Normocephalic.   Blood coating sides of right nares   Cardiovascular: Normal rate, regular rhythm and normal heart sounds.    Pulmonary/Chest: Effort normal and breath sounds normal.   Neurological: He is alert and oriented to person, place, and time. Gait normal.   Psychiatric: He has a normal mood and  affect. His behavior is normal. Judgment and thought content normal.   Vitals reviewed.

## 2022-05-20 ENCOUNTER — LAB (OUTPATIENT)
Dept: LAB | Facility: HOSPITAL | Age: 67
End: 2022-05-20

## 2022-05-20 ENCOUNTER — CLINICAL SUPPORT (OUTPATIENT)
Dept: ONCOLOGY | Facility: HOSPITAL | Age: 67
End: 2022-05-20

## 2022-05-20 DIAGNOSIS — I81 PORTAL VEIN THROMBOSIS: ICD-10-CM

## 2022-05-20 DIAGNOSIS — K70.31 ALCOHOLIC CIRRHOSIS OF LIVER WITH ASCITES: ICD-10-CM

## 2022-05-20 LAB
BASOPHILS # BLD AUTO: 0.02 10*3/MM3 (ref 0–0.2)
BASOPHILS NFR BLD AUTO: 0.5 % (ref 0–1.5)
DEPRECATED RDW RBC AUTO: 50.7 FL (ref 37–54)
EOSINOPHIL # BLD AUTO: 0.33 10*3/MM3 (ref 0–0.4)
EOSINOPHIL NFR BLD AUTO: 7.8 % (ref 0.3–6.2)
ERYTHROCYTE [DISTWIDTH] IN BLOOD BY AUTOMATED COUNT: 16.1 % (ref 12.3–15.4)
HCT VFR BLD AUTO: 22.8 % (ref 37.5–51)
HGB BLD-MCNC: 7.4 G/DL (ref 13–17.7)
IMM GRANULOCYTES # BLD AUTO: 0.24 10*3/MM3 (ref 0–0.05)
IMM GRANULOCYTES NFR BLD AUTO: 5.7 % (ref 0–0.5)
LYMPHOCYTES # BLD AUTO: 0.62 10*3/MM3 (ref 0.7–3.1)
LYMPHOCYTES NFR BLD AUTO: 14.7 % (ref 19.6–45.3)
MCH RBC QN AUTO: 27.8 PG (ref 26.6–33)
MCHC RBC AUTO-ENTMCNC: 32.5 G/DL (ref 31.5–35.7)
MCV RBC AUTO: 85.7 FL (ref 79–97)
MONOCYTES # BLD AUTO: 0.65 10*3/MM3 (ref 0.1–0.9)
MONOCYTES NFR BLD AUTO: 15.4 % (ref 5–12)
NEUTROPHILS NFR BLD AUTO: 2.36 10*3/MM3 (ref 1.7–7)
NEUTROPHILS NFR BLD AUTO: 55.9 % (ref 42.7–76)
NRBC BLD AUTO-RTO: 0 /100 WBC (ref 0–0.2)
PLATELET # BLD AUTO: 74 10*3/MM3 (ref 140–450)
PMV BLD AUTO: 9.5 FL (ref 6–12)
RBC # BLD AUTO: 2.66 10*6/MM3 (ref 4.14–5.8)
WBC NRBC COR # BLD: 4.22 10*3/MM3 (ref 3.4–10.8)

## 2022-05-20 PROCEDURE — 85025 COMPLETE CBC W/AUTO DIFF WBC: CPT

## 2022-05-20 PROCEDURE — 36415 COLL VENOUS BLD VENIPUNCTURE: CPT

## 2022-05-20 PROCEDURE — G0463 HOSPITAL OUTPT CLINIC VISIT: HCPCS

## 2022-05-20 NOTE — PROGRESS NOTES
Pt present for visit. Pt reports chronic fatigue. Hgb declined since last visit. Denies active bleeding. Pt declined blood transfusion d/t his Scientology. VS stable. D/w Dr. Espino. No new orders at this time. Confirms taking Lovenox once daily. Pt requested Lovenox sent to [facility]. Called [facility] and had pts Lovenox refilled. Copy of labs provided and reviewed schedule. Pt v/u.    Lab Results   Component Value Date    WBC 4.22 05/20/2022    HGB 7.4 (C) 05/20/2022    HCT 22.8 (L) 05/20/2022    MCV 85.7 05/20/2022    PLT 74 (L) 05/20/2022

## 2022-05-26 RX ORDER — LACTULOSE 10 G/15ML
SOLUTION ORAL
Qty: 5400 ML | Refills: 2 | Status: SHIPPED | OUTPATIENT
Start: 2022-05-26 | End: 2022-08-11 | Stop reason: SDUPTHER

## 2022-05-26 RX ORDER — LACTULOSE 10 G/15ML
SOLUTION ORAL
Qty: 5400 ML | Refills: 2 | Status: SHIPPED | OUTPATIENT
Start: 2022-05-26 | End: 2022-08-11

## 2022-05-31 RX ORDER — RIFAXIMIN 550 MG/1
TABLET ORAL
Qty: 14 TABLET | Refills: 0 | OUTPATIENT
Start: 2022-05-31

## 2022-06-03 ENCOUNTER — CLINICAL SUPPORT (OUTPATIENT)
Dept: ONCOLOGY | Facility: HOSPITAL | Age: 67
End: 2022-06-03

## 2022-06-03 ENCOUNTER — LAB (OUTPATIENT)
Dept: LAB | Facility: HOSPITAL | Age: 67
End: 2022-06-03

## 2022-06-03 ENCOUNTER — TELEPHONE (OUTPATIENT)
Dept: ONCOLOGY | Facility: CLINIC | Age: 67
End: 2022-06-03

## 2022-06-03 ENCOUNTER — TELEPHONE (OUTPATIENT)
Dept: GASTROENTEROLOGY | Facility: CLINIC | Age: 67
End: 2022-06-03

## 2022-06-03 DIAGNOSIS — I81 PORTAL VEIN THROMBOSIS: ICD-10-CM

## 2022-06-03 DIAGNOSIS — D69.6 THROMBOCYTOPENIA: Primary | ICD-10-CM

## 2022-06-03 DIAGNOSIS — K70.31 ALCOHOLIC CIRRHOSIS OF LIVER WITH ASCITES: ICD-10-CM

## 2022-06-03 DIAGNOSIS — D69.6 THROMBOCYTOPENIA: ICD-10-CM

## 2022-06-03 LAB
AMMONIA BLD-SCNC: 61 UMOL/L (ref 16–60)
BASOPHILS # BLD AUTO: 0.03 10*3/MM3 (ref 0–0.2)
BASOPHILS NFR BLD AUTO: 0.9 % (ref 0–1.5)
DEPRECATED RDW RBC AUTO: 50.7 FL (ref 37–54)
EOSINOPHIL # BLD AUTO: 0.28 10*3/MM3 (ref 0–0.4)
EOSINOPHIL NFR BLD AUTO: 8.4 % (ref 0.3–6.2)
ERYTHROCYTE [DISTWIDTH] IN BLOOD BY AUTOMATED COUNT: 16 % (ref 12.3–15.4)
HCT VFR BLD AUTO: 22.9 % (ref 37.5–51)
HGB BLD-MCNC: 7 G/DL (ref 13–17.7)
IMM GRANULOCYTES # BLD AUTO: 0.02 10*3/MM3 (ref 0–0.05)
IMM GRANULOCYTES NFR BLD AUTO: 0.6 % (ref 0–0.5)
LYMPHOCYTES # BLD AUTO: 0.47 10*3/MM3 (ref 0.7–3.1)
LYMPHOCYTES NFR BLD AUTO: 14.1 % (ref 19.6–45.3)
MCH RBC QN AUTO: 26.9 PG (ref 26.6–33)
MCHC RBC AUTO-ENTMCNC: 30.6 G/DL (ref 31.5–35.7)
MCV RBC AUTO: 88.1 FL (ref 79–97)
MONOCYTES # BLD AUTO: 0.6 10*3/MM3 (ref 0.1–0.9)
MONOCYTES NFR BLD AUTO: 18 % (ref 5–12)
NEUTROPHILS NFR BLD AUTO: 1.93 10*3/MM3 (ref 1.7–7)
NEUTROPHILS NFR BLD AUTO: 58 % (ref 42.7–76)
NRBC BLD AUTO-RTO: 0 /100 WBC (ref 0–0.2)
PLATELET # BLD AUTO: 71 10*3/MM3 (ref 140–450)
PMV BLD AUTO: 9 FL (ref 6–12)
RBC # BLD AUTO: 2.6 10*6/MM3 (ref 4.14–5.8)
WBC NRBC COR # BLD: 3.33 10*3/MM3 (ref 3.4–10.8)

## 2022-06-03 PROCEDURE — 36415 COLL VENOUS BLD VENIPUNCTURE: CPT

## 2022-06-03 PROCEDURE — 85025 COMPLETE CBC W/AUTO DIFF WBC: CPT

## 2022-06-03 PROCEDURE — 82140 ASSAY OF AMMONIA: CPT | Performed by: INTERNAL MEDICINE

## 2022-06-03 PROCEDURE — G0463 HOSPITAL OUTPT CLINIC VISIT: HCPCS

## 2022-06-03 NOTE — PROGRESS NOTES
Pt present for visit. Hgb declined since last visit. Pt declined blood transfusion d/t [employer] reasons. Pt c/o of feeling fatigue. Denies issues with bleeding. Confirms taking lovenox once a day. Discussed labs with Dr. Espino. V/o to have pt hold Lovenox and return next week for lab with nurse review. Pt v/u and given copy of labs.     Lab Results   Component Value Date    WBC 3.33 (L) 06/03/2022    HGB 7.0 (C) 06/03/2022    HCT 22.9 (L) 06/03/2022    MCV 88.1 06/03/2022    PLT 71 (L) 06/03/2022

## 2022-06-03 NOTE — TELEPHONE ENCOUNTER
----- Message from Milind Forrest sent at 6/3/2022  3:19 PM EDT -----  Regarding: lactulose (CHRONULAC) 10 GM/15ML solution [13183]  Contact: [phone]  PT needs PA for lactulose (CHRONULAC) 10 GM/15ML solution [[id number]]. Also PT has ran out and he is now having dizzy spells and nausea. PT requesting an increase of medication to prevent this from happening again. PT requesting a call back once script is ready for  and to confirm PA: [phone]

## 2022-06-03 NOTE — TELEPHONE ENCOUNTER
See chronulac rx of 5/26/22 with receipt confirmed. Call to [facility] @ [phone] and spoke with Cristobal.  States partial fill of lactulose was completed on 5/28.  Will have supplies in on 6/6 and will notify pt re: .  States PA not required.      VM to pt - advise of above.  Contact office if any questions.

## 2022-06-07 ENCOUNTER — TELEPHONE (OUTPATIENT)
Dept: GASTROENTEROLOGY | Facility: CLINIC | Age: 67
End: 2022-06-07

## 2022-06-07 NOTE — TELEPHONE ENCOUNTER
Prior authorization for lactulose has been submitted via [facility].

## 2022-06-08 ENCOUNTER — TELEPHONE (OUTPATIENT)
Dept: ONCOLOGY | Facility: CLINIC | Age: 67
End: 2022-06-08

## 2022-06-08 NOTE — TELEPHONE ENCOUNTER
Returned Mr Potts's call.  He said some friends told him about procrit and that he should ask if he can get procrit injections.  I advised him that procrit is indicated in only certain cases, but I would review with Dr Espino.  Per Dr Espino, he would not use procrit for Mr Potts due to the risk of thrombotic event associated with procrit and his history of a portal vein thrombosis already.  I relayed this back to Mr Potts.  He v/u.   He was very thankful for the call and follow up.

## 2022-06-10 ENCOUNTER — LAB (OUTPATIENT)
Dept: LAB | Facility: HOSPITAL | Age: 67
End: 2022-06-10

## 2022-06-10 ENCOUNTER — CLINICAL SUPPORT (OUTPATIENT)
Dept: ONCOLOGY | Facility: HOSPITAL | Age: 67
End: 2022-06-10

## 2022-06-10 DIAGNOSIS — K70.31 ALCOHOLIC CIRRHOSIS OF LIVER WITH ASCITES: ICD-10-CM

## 2022-06-10 DIAGNOSIS — I81 PORTAL VEIN THROMBOSIS: ICD-10-CM

## 2022-06-10 LAB
BASOPHILS # BLD AUTO: 0.03 10*3/MM3 (ref 0–0.2)
BASOPHILS NFR BLD AUTO: 0.7 % (ref 0–1.5)
DEPRECATED RDW RBC AUTO: 47.3 FL (ref 37–54)
EOSINOPHIL # BLD AUTO: 0.39 10*3/MM3 (ref 0–0.4)
EOSINOPHIL NFR BLD AUTO: 8.5 % (ref 0.3–6.2)
ERYTHROCYTE [DISTWIDTH] IN BLOOD BY AUTOMATED COUNT: 15.6 % (ref 12.3–15.4)
HCT VFR BLD AUTO: 26.5 % (ref 37.5–51)
HGB BLD-MCNC: 8.5 G/DL (ref 13–17.7)
IMM GRANULOCYTES # BLD AUTO: 0.15 10*3/MM3 (ref 0–0.05)
IMM GRANULOCYTES NFR BLD AUTO: 3.3 % (ref 0–0.5)
LYMPHOCYTES # BLD AUTO: 0.79 10*3/MM3 (ref 0.7–3.1)
LYMPHOCYTES NFR BLD AUTO: 17.2 % (ref 19.6–45.3)
MCH RBC QN AUTO: 26.5 PG (ref 26.6–33)
MCHC RBC AUTO-ENTMCNC: 32.1 G/DL (ref 31.5–35.7)
MCV RBC AUTO: 82.6 FL (ref 79–97)
MONOCYTES # BLD AUTO: 0.72 10*3/MM3 (ref 0.1–0.9)
MONOCYTES NFR BLD AUTO: 15.7 % (ref 5–12)
NEUTROPHILS NFR BLD AUTO: 2.52 10*3/MM3 (ref 1.7–7)
NEUTROPHILS NFR BLD AUTO: 54.6 % (ref 42.7–76)
NRBC BLD AUTO-RTO: 0 /100 WBC (ref 0–0.2)
PLATELET # BLD AUTO: 50 10*3/MM3 (ref 140–450)
PMV BLD AUTO: 9.8 FL (ref 6–12)
RBC # BLD AUTO: 3.21 10*6/MM3 (ref 4.14–5.8)
WBC NRBC COR # BLD: 4.6 10*3/MM3 (ref 3.4–10.8)

## 2022-06-10 PROCEDURE — 36415 COLL VENOUS BLD VENIPUNCTURE: CPT

## 2022-06-10 PROCEDURE — G0463 HOSPITAL OUTPT CLINIC VISIT: HCPCS

## 2022-06-10 PROCEDURE — 85025 COMPLETE CBC W/AUTO DIFF WBC: CPT

## 2022-06-10 NOTE — NURSING NOTE
Pt here for CBC and RN review. CBC reviewed with pt. Hgb improved to 8.5, plts 50 this week. Pt conformed holding lovenox at this time. Pt denies bleeding and has mild bruising.     R/w Dr. Sue (#2) who recommended continuing to hold lovenox for another week and rechecking in 1 week.      Pt v/u. Pt is already scheduled to come back in 1 week. Pt requesting list of foods that could help with hgb. Told pt food my not necessarily help, but he is welcome to try iron rich foods such as red meat, spinach, dried fruits, and beans. Pt v/u.     Copy of labs given to pt and pt v/u to call with any new concerns.       Lab Results   Component Value Date    WBC 4.60 06/10/2022    HGB 8.5 (L) 06/10/2022    HCT 26.5 (L) 06/10/2022    MCV 82.6 06/10/2022    PLT 50 (L) 06/10/2022

## 2022-06-17 ENCOUNTER — LAB (OUTPATIENT)
Dept: LAB | Facility: HOSPITAL | Age: 67
End: 2022-06-17

## 2022-06-17 ENCOUNTER — CLINICAL SUPPORT (OUTPATIENT)
Dept: ONCOLOGY | Facility: HOSPITAL | Age: 67
End: 2022-06-17

## 2022-06-17 DIAGNOSIS — D69.6 THROMBOCYTOPENIA: Primary | ICD-10-CM

## 2022-06-17 LAB
BASOPHILS # BLD AUTO: 0.03 10*3/MM3 (ref 0–0.2)
BASOPHILS NFR BLD AUTO: 0.6 % (ref 0–1.5)
DEPRECATED RDW RBC AUTO: 45.6 FL (ref 37–54)
EOSINOPHIL # BLD AUTO: 0.46 10*3/MM3 (ref 0–0.4)
EOSINOPHIL NFR BLD AUTO: 9.9 % (ref 0.3–6.2)
ERYTHROCYTE [DISTWIDTH] IN BLOOD BY AUTOMATED COUNT: 15.5 % (ref 12.3–15.4)
HCT VFR BLD AUTO: 26 % (ref 37.5–51)
HGB BLD-MCNC: 8.2 G/DL (ref 13–17.7)
IMM GRANULOCYTES # BLD AUTO: 0.05 10*3/MM3 (ref 0–0.05)
IMM GRANULOCYTES NFR BLD AUTO: 1.1 % (ref 0–0.5)
LYMPHOCYTES # BLD AUTO: 0.63 10*3/MM3 (ref 0.7–3.1)
LYMPHOCYTES NFR BLD AUTO: 13.6 % (ref 19.6–45.3)
MCH RBC QN AUTO: 25.7 PG (ref 26.6–33)
MCHC RBC AUTO-ENTMCNC: 31.5 G/DL (ref 31.5–35.7)
MCV RBC AUTO: 81.5 FL (ref 79–97)
MONOCYTES # BLD AUTO: 0.85 10*3/MM3 (ref 0.1–0.9)
MONOCYTES NFR BLD AUTO: 18.3 % (ref 5–12)
NEUTROPHILS NFR BLD AUTO: 2.62 10*3/MM3 (ref 1.7–7)
NEUTROPHILS NFR BLD AUTO: 56.5 % (ref 42.7–76)
NRBC BLD AUTO-RTO: 0 /100 WBC (ref 0–0.2)
PLATELET # BLD AUTO: 85 10*3/MM3 (ref 140–450)
PMV BLD AUTO: 9.6 FL (ref 6–12)
RBC # BLD AUTO: 3.19 10*6/MM3 (ref 4.14–5.8)
WBC NRBC COR # BLD: 4.64 10*3/MM3 (ref 3.4–10.8)

## 2022-06-17 PROCEDURE — 85025 COMPLETE CBC W/AUTO DIFF WBC: CPT

## 2022-06-17 PROCEDURE — G0463 HOSPITAL OUTPT CLINIC VISIT: HCPCS

## 2022-06-17 PROCEDURE — 36415 COLL VENOUS BLD VENIPUNCTURE: CPT

## 2022-06-17 NOTE — NURSING NOTE
Lab Results   Component Value Date    WBC 4.64 06/17/2022    HGB 8.2 (L) 06/17/2022    HCT 26.0 (L) 06/17/2022    MCV 81.5 06/17/2022    PLT 85 (L) 06/17/2022     Pt is here for lab with RN review.  CBC reviewed with pt, counts are stable for this pt at this time. His only complaint is of tiredness. Labs reviewed with Dr. Espino. Per Dr. Espino, he is to resume Lovenox 40 mg daily. He can also start taking Vitamin B 12 1000 mcg daily as well as folic acid 1 mg daily. Written instructions provided. Copy of labs given to pt and f/u appt reviewed. Pt is instructed to call the office with any concerns or new symptoms prior to next visit. Pt vu and discharge in stable condition.

## 2022-06-22 RX ORDER — LACTULOSE 10 G/15ML
SOLUTION ORAL
Qty: 5400 ML | Refills: 2 | OUTPATIENT
Start: 2022-06-22

## 2022-06-22 NOTE — TELEPHONE ENCOUNTER
See rx of 5/26 for same  #5400 ml, R2 to [facility] with receipt confirmed.     Call to [facility] @ [phone] and spoke with Romana.  5/26 rx active - can fill accordingly.     Attached escribe request declined.     Attempt call to pt - VM full.  Will try back

## 2022-06-22 NOTE — TELEPHONE ENCOUNTER
Caller: Wei Potts    Relationship: Self    Best call back number: [phone], CAN LVM    Requested Prescriptions:   Requested Prescriptions     Pending Prescriptions Disp Refills   • lactulose (CHRONULAC) 10 GM/15ML solution 5400 mL 2        Pharmacy where request should be sent:  [facility] [place]- [address] [facility] [address]    Additional details provided by patient: PT STATED THAT THIS MEDICATION WAS SUPPOSED TO BE REFILLED ALREADY AND HE WENT TO PICK IT UP AND THEY DID NOT HAVE IT.     Does the patient have less than a 3 day supply:  [x] Yes  [] No    Milind RUBY Rep   06/22/22 15:40 EDT

## 2022-07-05 ENCOUNTER — LAB (OUTPATIENT)
Dept: LAB | Facility: HOSPITAL | Age: 67
End: 2022-07-05

## 2022-07-05 ENCOUNTER — OFFICE VISIT (OUTPATIENT)
Dept: ONCOLOGY | Facility: CLINIC | Age: 67
End: 2022-07-05

## 2022-07-05 VITALS
BODY MASS INDEX: 23.51 KG/M2 | SYSTOLIC BLOOD PRESSURE: 149 MMHG | WEIGHT: 167.9 LBS | OXYGEN SATURATION: 100 % | HEIGHT: 71 IN | HEART RATE: 97 BPM | RESPIRATION RATE: 16 BRPM | DIASTOLIC BLOOD PRESSURE: 68 MMHG | TEMPERATURE: 98.4 F

## 2022-07-05 DIAGNOSIS — I81 PORTAL VEIN THROMBOSIS: ICD-10-CM

## 2022-07-05 DIAGNOSIS — D69.6 THROMBOCYTOPENIA: ICD-10-CM

## 2022-07-05 DIAGNOSIS — D69.6 THROMBOCYTOPENIA: Primary | ICD-10-CM

## 2022-07-05 LAB
BASOPHILS # BLD AUTO: 0.03 10*3/MM3 (ref 0–0.2)
BASOPHILS NFR BLD AUTO: 0.7 % (ref 0–1.5)
DEPRECATED RDW RBC AUTO: 44.8 FL (ref 37–54)
EOSINOPHIL # BLD AUTO: 0.24 10*3/MM3 (ref 0–0.4)
EOSINOPHIL NFR BLD AUTO: 5.3 % (ref 0.3–6.2)
ERYTHROCYTE [DISTWIDTH] IN BLOOD BY AUTOMATED COUNT: 15.7 % (ref 12.3–15.4)
HCT VFR BLD AUTO: 26.8 % (ref 37.5–51)
HGB BLD-MCNC: 8.2 G/DL (ref 13–17.7)
IMM GRANULOCYTES # BLD AUTO: 0.05 10*3/MM3 (ref 0–0.05)
IMM GRANULOCYTES NFR BLD AUTO: 1.1 % (ref 0–0.5)
LYMPHOCYTES # BLD AUTO: 0.59 10*3/MM3 (ref 0.7–3.1)
LYMPHOCYTES NFR BLD AUTO: 13 % (ref 19.6–45.3)
MCH RBC QN AUTO: 24.2 PG (ref 26.6–33)
MCHC RBC AUTO-ENTMCNC: 30.6 G/DL (ref 31.5–35.7)
MCV RBC AUTO: 79.1 FL (ref 79–97)
MONOCYTES # BLD AUTO: 0.66 10*3/MM3 (ref 0.1–0.9)
MONOCYTES NFR BLD AUTO: 14.5 % (ref 5–12)
NEUTROPHILS NFR BLD AUTO: 2.97 10*3/MM3 (ref 1.7–7)
NEUTROPHILS NFR BLD AUTO: 65.4 % (ref 42.7–76)
NRBC BLD AUTO-RTO: 0 /100 WBC (ref 0–0.2)
PLATELET # BLD AUTO: 80 10*3/MM3 (ref 140–450)
PMV BLD AUTO: 10.3 FL (ref 6–12)
RBC # BLD AUTO: 3.39 10*6/MM3 (ref 4.14–5.8)
WBC NRBC COR # BLD: 4.54 10*3/MM3 (ref 3.4–10.8)

## 2022-07-05 PROCEDURE — 85025 COMPLETE CBC W/AUTO DIFF WBC: CPT

## 2022-07-05 PROCEDURE — 99215 OFFICE O/P EST HI 40 MIN: CPT | Performed by: INTERNAL MEDICINE

## 2022-07-05 PROCEDURE — 36415 COLL VENOUS BLD VENIPUNCTURE: CPT

## 2022-07-18 ENCOUNTER — TELEPHONE (OUTPATIENT)
Dept: ONCOLOGY | Facility: CLINIC | Age: 67
End: 2022-07-18

## 2022-07-18 DIAGNOSIS — K76.82 HEPATIC ENCEPHALOPATHY: Primary | ICD-10-CM

## 2022-07-18 NOTE — TELEPHONE ENCOUNTER
Caller: Wie Potts    Relationship: Self    Best call back number: [phone]    CAN ALSO LEAVE VOICEMAIL IF NO ANSWER    What is the best time to reach you: ANYTIME     Who are you requesting to speak with (clinical staff, provider,  specific staff member): DR FRIEDMAN OR HIS CLINICAL STAFF      What was the call regarding: WOULD LIKE TO SEE IF CAN ADD AMMONIA TO LAB TEST TOMORROW     Do you require a callback: YES

## 2022-07-19 ENCOUNTER — CLINICAL SUPPORT (OUTPATIENT)
Dept: ONCOLOGY | Facility: HOSPITAL | Age: 67
End: 2022-07-19

## 2022-07-19 ENCOUNTER — LAB (OUTPATIENT)
Dept: LAB | Facility: HOSPITAL | Age: 67
End: 2022-07-19

## 2022-07-19 DIAGNOSIS — D69.6 THROMBOCYTOPENIA: Primary | ICD-10-CM

## 2022-07-19 DIAGNOSIS — K76.82 HEPATIC ENCEPHALOPATHY: ICD-10-CM

## 2022-07-19 LAB
AMMONIA BLD-SCNC: 163 UMOL/L (ref 16–60)
BASOPHILS # BLD AUTO: 0.02 10*3/MM3 (ref 0–0.2)
BASOPHILS NFR BLD AUTO: 0.5 % (ref 0–1.5)
DEPRECATED RDW RBC AUTO: 46.3 FL (ref 37–54)
EOSINOPHIL # BLD AUTO: 0.33 10*3/MM3 (ref 0–0.4)
EOSINOPHIL NFR BLD AUTO: 8 % (ref 0.3–6.2)
ERYTHROCYTE [DISTWIDTH] IN BLOOD BY AUTOMATED COUNT: 16.2 % (ref 12.3–15.4)
HCT VFR BLD AUTO: 23 % (ref 37.5–51)
HGB BLD-MCNC: 7.1 G/DL (ref 13–17.7)
IMM GRANULOCYTES # BLD AUTO: 0.01 10*3/MM3 (ref 0–0.05)
IMM GRANULOCYTES NFR BLD AUTO: 0.2 % (ref 0–0.5)
LYMPHOCYTES # BLD AUTO: 0.55 10*3/MM3 (ref 0.7–3.1)
LYMPHOCYTES NFR BLD AUTO: 13.3 % (ref 19.6–45.3)
MCH RBC QN AUTO: 24.4 PG (ref 26.6–33)
MCHC RBC AUTO-ENTMCNC: 30.9 G/DL (ref 31.5–35.7)
MCV RBC AUTO: 79 FL (ref 79–97)
MONOCYTES # BLD AUTO: 0.61 10*3/MM3 (ref 0.1–0.9)
MONOCYTES NFR BLD AUTO: 14.8 % (ref 5–12)
NEUTROPHILS NFR BLD AUTO: 2.6 10*3/MM3 (ref 1.7–7)
NEUTROPHILS NFR BLD AUTO: 63.2 % (ref 42.7–76)
NRBC BLD AUTO-RTO: 0 /100 WBC (ref 0–0.2)
PLATELET # BLD AUTO: 79 10*3/MM3 (ref 140–450)
PMV BLD AUTO: 9.6 FL (ref 6–12)
RBC # BLD AUTO: 2.91 10*6/MM3 (ref 4.14–5.8)
WBC NRBC COR # BLD: 4.12 10*3/MM3 (ref 3.4–10.8)

## 2022-07-19 PROCEDURE — G0463 HOSPITAL OUTPT CLINIC VISIT: HCPCS

## 2022-07-19 PROCEDURE — 36415 COLL VENOUS BLD VENIPUNCTURE: CPT

## 2022-07-19 PROCEDURE — 85025 COMPLETE CBC W/AUTO DIFF WBC: CPT

## 2022-07-19 PROCEDURE — 82140 ASSAY OF AMMONIA: CPT | Performed by: INTERNAL MEDICINE

## 2022-07-19 NOTE — PROGRESS NOTES
Pt present for visit. Pt reports feeling tired. Hgb declined since last visit. Pt declines blood transfusion d/t [facility] reasons. Denies issues with bleeding. Copy of labs and schedule provided. Pt v/u.    Lab Results   Component Value Date    WBC 4.12 07/19/2022    HGB 7.1 (C) 07/19/2022    HCT 23.0 (L) 07/19/2022    MCV 79.0 07/19/2022    PLT 79 (L) 07/19/2022

## 2022-07-22 ENCOUNTER — TELEPHONE (OUTPATIENT)
Dept: ONCOLOGY | Facility: CLINIC | Age: 67
End: 2022-07-22

## 2022-07-22 NOTE — TELEPHONE ENCOUNTER
Pts ammonia level is 163. He is taking 25 mls of Lactulose 3 times a day. He denies hallucinations and is at his baseline fatigue level. D/W Dr. Bell.Per Dr. Bell pt is to increase his Lactulose to 45mls 3 times a day to produce 2-3 soft BM's daily. He will report to the ED if he becomes tired or starts hallucinating. Lab value sent to Dr. Mcgee. Pt instructed to call Dr. Mcgee. All reviewed with pt and he V/U

## 2022-07-27 RX ORDER — ENOXAPARIN SODIUM 100 MG/ML
40 INJECTION SUBCUTANEOUS EVERY 24 HOURS
Qty: 11.2 ML | Refills: 3 | Status: SHIPPED | OUTPATIENT
Start: 2022-07-27 | End: 2022-08-29 | Stop reason: SDUPTHER

## 2022-07-27 NOTE — TELEPHONE ENCOUNTER
Caller: Wei Potts    Relationship: Self    Requested Prescriptions:   Requested Prescriptions     Pending Prescriptions Disp Refills   • Enoxaparin Sodium (Lovenox) 40 MG/0.4ML solution prefilled syringe syringe 11.2 mL 3     Sig: Inject 0.4 mL under the skin into the appropriate area as directed Daily. Indications: DVT/PE (active thrombosis)        Pharmacy where request should be sent: [facility]     Additional details provided by patient: N/A    Does the patient have less than a 3 day supply:  [x] Yes  [] No    Milind SAXENA Rep   07/27/22 12:22 EDT

## 2022-08-02 ENCOUNTER — LAB (OUTPATIENT)
Dept: LAB | Facility: HOSPITAL | Age: 67
End: 2022-08-02

## 2022-08-02 ENCOUNTER — CLINICAL SUPPORT (OUTPATIENT)
Dept: ONCOLOGY | Facility: HOSPITAL | Age: 67
End: 2022-08-02

## 2022-08-02 ENCOUNTER — TELEPHONE (OUTPATIENT)
Dept: ONCOLOGY | Facility: CLINIC | Age: 67
End: 2022-08-02

## 2022-08-02 DIAGNOSIS — I81 PORTAL VEIN THROMBOSIS: Primary | ICD-10-CM

## 2022-08-02 DIAGNOSIS — D69.6 THROMBOCYTOPENIA: Primary | ICD-10-CM

## 2022-08-02 DIAGNOSIS — I81 PORTAL VEIN THROMBOSIS: ICD-10-CM

## 2022-08-02 LAB
AMMONIA BLD-SCNC: 128 UMOL/L (ref 16–60)
BASOPHILS # BLD AUTO: 0.04 10*3/MM3 (ref 0–0.2)
BASOPHILS NFR BLD AUTO: 0.9 % (ref 0–1.5)
DEPRECATED RDW RBC AUTO: 51.2 FL (ref 37–54)
EOSINOPHIL # BLD AUTO: 0.39 10*3/MM3 (ref 0–0.4)
EOSINOPHIL NFR BLD AUTO: 8.6 % (ref 0.3–6.2)
ERYTHROCYTE [DISTWIDTH] IN BLOOD BY AUTOMATED COUNT: 17.9 % (ref 12.3–15.4)
HCT VFR BLD AUTO: 25.2 % (ref 37.5–51)
HGB BLD-MCNC: 7.8 G/DL (ref 13–17.7)
IMM GRANULOCYTES # BLD AUTO: 0.02 10*3/MM3 (ref 0–0.05)
IMM GRANULOCYTES NFR BLD AUTO: 0.4 % (ref 0–0.5)
LYMPHOCYTES # BLD AUTO: 0.58 10*3/MM3 (ref 0.7–3.1)
LYMPHOCYTES NFR BLD AUTO: 12.9 % (ref 19.6–45.3)
MCH RBC QN AUTO: 24.6 PG (ref 26.6–33)
MCHC RBC AUTO-ENTMCNC: 31 G/DL (ref 31.5–35.7)
MCV RBC AUTO: 79.5 FL (ref 79–97)
MONOCYTES # BLD AUTO: 0.77 10*3/MM3 (ref 0.1–0.9)
MONOCYTES NFR BLD AUTO: 17.1 % (ref 5–12)
NEUTROPHILS NFR BLD AUTO: 2.71 10*3/MM3 (ref 1.7–7)
NEUTROPHILS NFR BLD AUTO: 60.1 % (ref 42.7–76)
NRBC BLD AUTO-RTO: 0 /100 WBC (ref 0–0.2)
PLATELET # BLD AUTO: 90 10*3/MM3 (ref 140–450)
PMV BLD AUTO: 8.7 FL (ref 6–12)
RBC # BLD AUTO: 3.17 10*6/MM3 (ref 4.14–5.8)
WBC NRBC COR # BLD: 4.51 10*3/MM3 (ref 3.4–10.8)

## 2022-08-02 PROCEDURE — 85025 COMPLETE CBC W/AUTO DIFF WBC: CPT

## 2022-08-02 PROCEDURE — 36415 COLL VENOUS BLD VENIPUNCTURE: CPT

## 2022-08-02 PROCEDURE — G0463 HOSPITAL OUTPT CLINIC VISIT: HCPCS

## 2022-08-02 PROCEDURE — 82140 ASSAY OF AMMONIA: CPT | Performed by: INTERNAL MEDICINE

## 2022-08-02 NOTE — TELEPHONE ENCOUNTER
OK FOR HUB TO READ: Attempted to return call to Mr Potts, unable to leave voicemail.  Ammonia level added to today's labs per v/o Dr Espino.

## 2022-08-02 NOTE — NURSING NOTE
Lab Results   Component Value Date    WBC 4.51 08/02/2022    HGB 7.8 (C) 08/02/2022    HCT 25.2 (L) 08/02/2022    MCV 79.5 08/02/2022    PLT 90 (L) 08/02/2022     CBC RESULTS R/W PT. HGB IMPROVED TO 7.8 AND PLTS TO 90K. PT IS PLEASED W/ RESULTS. PT STILL FEELS TIRED W/ HGB 7.8 BUT CONTINUES TO DENY NEED FOR BLD TRANSFUSION. PT ASKED ABOUT WHEN TO GET 3RD COVID BOOSTER. MESSAGED OLIVE RN. PT ALSO REPORTING NAUSEA SINCE STARTING NEW MED FROM [facility]. HE WILL CALL THEM TO SEE IF THEY WOULD RX AN ANTIEMETIC. PT HAS APPT TO RTN HERE IN 2 WKS AND WILL CALL IF NEEDED BEFORE THEN. COPY OF LABS GIVEN TO PT.

## 2022-08-02 NOTE — TELEPHONE ENCOUNTER
Caller: Wei Potts    Relationship: Self    Best call back number: [phone]      What was the call regarding: PATIENT CALLED WANTED TO ADD TO HIS LABS TO CHECK HIS AMMONIA LEVEL FOR TODAY

## 2022-08-10 ENCOUNTER — TELEPHONE (OUTPATIENT)
Dept: GASTROENTEROLOGY | Facility: CLINIC | Age: 67
End: 2022-08-10

## 2022-08-10 DIAGNOSIS — K74.60 CIRRHOSIS OF LIVER WITH ASCITES, UNSPECIFIED HEPATIC CIRRHOSIS TYPE: Primary | ICD-10-CM

## 2022-08-10 DIAGNOSIS — R18.8 CIRRHOSIS OF LIVER WITH ASCITES, UNSPECIFIED HEPATIC CIRRHOSIS TYPE: Primary | ICD-10-CM

## 2022-08-10 NOTE — TELEPHONE ENCOUNTER
Caller: Wei Potts    Relationship: Self    Best call back number: [phone]    What is the best time to reach you: ANYTIME    Who are you requesting to speak with (clinical staff, provider,  specific staff member): CLINICAL STAFF    What was the call regarding: PATIENT WOULD LIKE TO SPEAK WITH SOMEONE IN OFFICE REGARDING PRESCRIPTION.     Do you require a callback: YES

## 2022-08-11 ENCOUNTER — TELEPHONE (OUTPATIENT)
Dept: GASTROENTEROLOGY | Facility: CLINIC | Age: 67
End: 2022-08-11

## 2022-08-11 RX ORDER — LACTULOSE 10 G/15ML
SOLUTION ORAL
Qty: 4050 ML | Refills: 3 | Status: SHIPPED | OUTPATIENT
Start: 2022-08-11 | End: 2022-09-12

## 2022-08-11 NOTE — TELEPHONE ENCOUNTER
I can change his rx but he needs f/u in the office (unless he intends to f/u with dr Jeronimo only).  The ammonia level is not the best way to gauge his need for lactulose dosing-more important is his mental status - his mentation is the true measure of whether he is on the right dose of lactulose.  He is due for ultrasound for surveillance - last image I  have was in March - this is ordered

## 2022-08-11 NOTE — TELEPHONE ENCOUNTER
Caller: Wei Potts    Relationship: Self    Best call back number: [phone]    What is the best time to reach you: ANY    Who are you requesting to speak with (clinical staff, provider,  specific staff member CORDELL    Do you know the name of the person who called: WEI    What was the call regarding: PT RETURNING A CALL FROM [place]    Do you require a callback: YES

## 2022-08-11 NOTE — TELEPHONE ENCOUNTER
Pt called and reports Dr Espino wanted him to increase his lactulose to 45ml 2-3 times a day to help with his ammonia level.     Pt reports that since he increased the dose , he needs a refill showing the new dose. Advised will send message to Dr Mcgee for refill..

## 2022-08-16 ENCOUNTER — CLINICAL SUPPORT (OUTPATIENT)
Dept: ONCOLOGY | Facility: HOSPITAL | Age: 67
End: 2022-08-16

## 2022-08-16 ENCOUNTER — LAB (OUTPATIENT)
Dept: LAB | Facility: HOSPITAL | Age: 67
End: 2022-08-16

## 2022-08-16 DIAGNOSIS — D69.6 THROMBOCYTOPENIA: Primary | ICD-10-CM

## 2022-08-16 LAB
BASOPHILS # BLD AUTO: 0.05 10*3/MM3 (ref 0–0.2)
BASOPHILS NFR BLD AUTO: 0.9 % (ref 0–1.5)
DEPRECATED RDW RBC AUTO: 57.8 FL (ref 37–54)
EOSINOPHIL # BLD AUTO: 0.49 10*3/MM3 (ref 0–0.4)
EOSINOPHIL NFR BLD AUTO: 8.7 % (ref 0.3–6.2)
ERYTHROCYTE [DISTWIDTH] IN BLOOD BY AUTOMATED COUNT: 20.2 % (ref 12.3–15.4)
HCT VFR BLD AUTO: 28 % (ref 37.5–51)
HGB BLD-MCNC: 8.8 G/DL (ref 13–17.7)
IMM GRANULOCYTES # BLD AUTO: 0.07 10*3/MM3 (ref 0–0.05)
IMM GRANULOCYTES NFR BLD AUTO: 1.2 % (ref 0–0.5)
LYMPHOCYTES # BLD AUTO: 0.75 10*3/MM3 (ref 0.7–3.1)
LYMPHOCYTES NFR BLD AUTO: 13.4 % (ref 19.6–45.3)
MCH RBC QN AUTO: 25.3 PG (ref 26.6–33)
MCHC RBC AUTO-ENTMCNC: 31.4 G/DL (ref 31.5–35.7)
MCV RBC AUTO: 80.5 FL (ref 79–97)
MONOCYTES # BLD AUTO: 0.88 10*3/MM3 (ref 0.1–0.9)
MONOCYTES NFR BLD AUTO: 15.7 % (ref 5–12)
NEUTROPHILS NFR BLD AUTO: 3.37 10*3/MM3 (ref 1.7–7)
NEUTROPHILS NFR BLD AUTO: 60.1 % (ref 42.7–76)
NRBC BLD AUTO-RTO: 0 /100 WBC (ref 0–0.2)
PLATELET # BLD AUTO: 91 10*3/MM3 (ref 140–450)
PMV BLD AUTO: 9.3 FL (ref 6–12)
RBC # BLD AUTO: 3.48 10*6/MM3 (ref 4.14–5.8)
WBC NRBC COR # BLD: 5.61 10*3/MM3 (ref 3.4–10.8)

## 2022-08-16 PROCEDURE — G0463 HOSPITAL OUTPT CLINIC VISIT: HCPCS

## 2022-08-16 PROCEDURE — 36415 COLL VENOUS BLD VENIPUNCTURE: CPT

## 2022-08-16 PROCEDURE — 85025 COMPLETE CBC W/AUTO DIFF WBC: CPT

## 2022-08-16 NOTE — PROGRESS NOTES
Pt present for visit. Voices no new concerns. Hgb and plts have improved. Pt reports starting ferrous sulfate under Dr. Jeronimo. Pt denies bleeding. Confirms taking lovenox daily. Copy of labs provided and discussed schedule. Pt v/u.    Lab Results   Component Value Date    WBC 5.61 08/16/2022    HGB 8.8 (L) 08/16/2022    HCT 28.0 (L) 08/16/2022    MCV 80.5 08/16/2022    PLT 91 (L) 08/16/2022

## 2022-08-17 ENCOUNTER — TELEPHONE (OUTPATIENT)
Dept: GASTROENTEROLOGY | Facility: CLINIC | Age: 67
End: 2022-08-17

## 2022-08-17 NOTE — TELEPHONE ENCOUNTER
Caller: Wei Potts    Relationship to patient: Self    Best call back number: [phone]     Patient is needing: PT MISSED NURSE CALL AND IS REQUESTING CALL BACK. REQUESTING TO SPEAK WITH NURSE MARGARITA OR CORDELL ABOUT PREVIOUS CONTACT.

## 2022-08-17 NOTE — TELEPHONE ENCOUNTER
Call to pt.  [facility].     [facility] has received notification that has appt tomorrow with ANGELITA Calixto.  Does not know why this appt has been set.      Advise contact provider with question - [facility] will do so.

## 2022-08-17 NOTE — TELEPHONE ENCOUNTER
Called pt and left vm for pt to call back.     Dr Mcgee's response sent to pt thru my chart. Update sent to Dr Mcgee.

## 2022-08-23 ENCOUNTER — HOSPITAL ENCOUNTER (OUTPATIENT)
Dept: CARDIOLOGY | Facility: HOSPITAL | Age: 67
Discharge: HOME | End: 2022-08-23
Admitting: INTERNAL MEDICINE

## 2022-08-23 DIAGNOSIS — I81 PORTAL VEIN THROMBOSIS: ICD-10-CM

## 2022-08-23 DIAGNOSIS — D69.6 THROMBOCYTOPENIA: ICD-10-CM

## 2022-08-23 LAB
BH CV VAS HEPATIC PORTAL TIPS 1 DIST PSV: 146 CM/S
BH CV VAS HEPATIC PORTAL TIPS 1 MID PSV: 121 CM/S
BH CV VAS HEPATIC PORTAL TIPS 1 POST PSV: 103 CM/S
BH CV VAS HEPATIC PORTAL TIPS 1 PRE PSV: 67.2 CM/S
BH CV VAS HEPATIC PORTAL TIPS 1 PROX PSV: 88.5 CM/S
BH CV VAS HEPATIC PORTAL VEIN DIAMETER: 0.94 CM
BH CV VAS HEPATOPORTAL HEPATIC V MID DIRECTION: NORMAL
BH CV VAS HEPATOPORTAL HEPATIC V RT DIRECTION: NORMAL
BH CV VAS HEPATOPORTAL IVC FLOW: NORMAL
BH CV VAS HEPATOPORTAL PORTAL V EXTRAHEPATIC DIRECTION: NORMAL
BH CV VAS HEPATOPORTAL PORTAL V LT INTRA DIRECTION: NORMAL
BH CV VAS HEPATOPORTAL PORTAL V MAIN INTRA DIRECTION: NORMAL
BH CV VAS HEPATOPORTAL PORTAL V PSV: 62.3 CM/S
BH CV VAS HEPATOPORTAL PORTAL V RT INTRA DIRECTION: NORMAL
BH CV VAS HEPATOPORTAL SMV DIRECTION: NORMAL
BH CV VAS HEPATOPORTAL SPLENIC DIRECTION: NORMAL
BH CV VAS SMA HEPATIC EDV: 18.7 CM/S
BH CV VAS SMA HEPATIC PSV: 109 CM/S
BH CV VAS SMA SPLENIC EDV: 48.7 CM/S
BH CV VAS SMA SPLENIC PSV: 135 CM/S
MAXIMAL PREDICTED HEART RATE: 155 BPM
STRESS TARGET HR: 132 BPM

## 2022-08-23 PROCEDURE — 93975 VASCULAR STUDY: CPT

## 2022-08-29 ENCOUNTER — TELEPHONE (OUTPATIENT)
Dept: ONCOLOGY | Facility: CLINIC | Age: 67
End: 2022-08-29

## 2022-08-29 DIAGNOSIS — D69.6 THROMBOCYTOPENIA: ICD-10-CM

## 2022-08-29 DIAGNOSIS — I81 PORTAL VEIN THROMBOSIS: Primary | ICD-10-CM

## 2022-08-29 DIAGNOSIS — K70.31 ALCOHOLIC CIRRHOSIS OF LIVER WITH ASCITES: ICD-10-CM

## 2022-08-29 RX ORDER — ENOXAPARIN SODIUM 100 MG/ML
40 INJECTION SUBCUTANEOUS EVERY 24 HOURS
Qty: 11.2 ML | Refills: 3 | Status: SHIPPED | OUTPATIENT
Start: 2022-08-29 | End: 2022-11-16

## 2022-08-29 NOTE — TELEPHONE ENCOUNTER
Caller: Wei Potts    Relationship to patient: Self    Best call back number: [phone]    Patient is needing: TO KNOW IF HE CAN GET RX SENT TO [facility]. HE WANTS TO US TO CHECK TO SEE IF THEY HAVE IT IN STOCK. [facility]

## 2022-08-29 NOTE — TELEPHONE ENCOUNTER
Returned call to patient who has an appointment tomorrow to see Dr. Espino.  He is wanting his Ultrasound results and he would like to have an Ammonia level added to his labs for tomorrow.  He is also out of his Lovenox and needs a refill if indicated.  Please advise.

## 2022-08-29 NOTE — TELEPHONE ENCOUNTER
Caller: Wei Potts    Relationship: Self    Best call back number: [phone]    What is the best time to reach you: ANYTIME    Who are you requesting to speak with (clinical staff, provider,  specific staff member): DR FRIEDMAN OR NURSE    What was the call regarding: PT WOULD LIKE DR FRIEDMAN OR THE NURSE TO CALL HIM TO DISCUSS HIS 8/23 US RESULTS.    ALSO WOULD LIKE TO ADD AMMONIA LEVEL TESTING TO HIS LABS ON 8/30 IF POSSIBLE.     PLEASE CALL PT TO ADVISE.     Do you require a callback: YES

## 2022-08-29 NOTE — TELEPHONE ENCOUNTER
Caller: Wei Potts    Relationship: Self    Best call back number: [phone]    Requested Prescriptions:   JootaGuzu       Pharmacy where request should be sent:    [id number] [id number] [place] [address] AT [facility] [place] - [phone] [facility] [phone]     Additional details provided by patient: PATIENT IS TOTALLY OUT      Milind LITTLEJOHN Rep   08/29/22 14:59 EDT

## 2022-08-29 NOTE — TELEPHONE ENCOUNTER
Returned Mr Potts's call from this morning inquiring about his doppler results and lab request. Advised him dr Espino will review the doppler report with him at his visit tomorrow.  Labs have been added.   He reports he requested a refill on his lovenox to be sent to the retail pharmacy at the hospital as he has had issues filling it at [facility] recently.  Advised him that the rx was sent to [facility]. I have contacted them to ensure they rec'd it and it is being processed.

## 2022-08-30 ENCOUNTER — OFFICE VISIT (OUTPATIENT)
Dept: ONCOLOGY | Facility: CLINIC | Age: 67
End: 2022-08-30

## 2022-08-30 ENCOUNTER — LAB (OUTPATIENT)
Dept: LAB | Facility: HOSPITAL | Age: 67
End: 2022-08-30

## 2022-08-30 VITALS
SYSTOLIC BLOOD PRESSURE: 107 MMHG | HEIGHT: 71 IN | TEMPERATURE: 98.7 F | HEART RATE: 97 BPM | DIASTOLIC BLOOD PRESSURE: 63 MMHG | RESPIRATION RATE: 18 BRPM | OXYGEN SATURATION: 100 % | WEIGHT: 175.5 LBS | BODY MASS INDEX: 24.57 KG/M2

## 2022-08-30 DIAGNOSIS — I81 PORTAL VEIN THROMBOSIS: ICD-10-CM

## 2022-08-30 DIAGNOSIS — K70.31 ALCOHOLIC CIRRHOSIS OF LIVER WITH ASCITES: ICD-10-CM

## 2022-08-30 DIAGNOSIS — D69.6 THROMBOCYTOPENIA: ICD-10-CM

## 2022-08-30 DIAGNOSIS — I81 PORTAL VEIN THROMBOSIS: Primary | ICD-10-CM

## 2022-08-30 LAB
ALBUMIN SERPL-MCNC: 3 G/DL (ref 3.5–5.2)
ALBUMIN/GLOB SERPL: 0.7 G/DL (ref 1.1–2.4)
ALP SERPL-CCNC: 217 U/L (ref 38–116)
ALT SERPL W P-5'-P-CCNC: 16 U/L (ref 0–41)
AMMONIA BLD-SCNC: 135 UMOL/L (ref 16–60)
ANION GAP SERPL CALCULATED.3IONS-SCNC: 13.8 MMOL/L (ref 5–15)
AST SERPL-CCNC: 44 U/L (ref 0–40)
BASOPHILS # BLD AUTO: 0.04 10*3/MM3 (ref 0–0.2)
BASOPHILS NFR BLD AUTO: 0.9 % (ref 0–1.5)
BILIRUB SERPL-MCNC: 1 MG/DL (ref 0.2–1.2)
BUN SERPL-MCNC: 5 MG/DL (ref 6–20)
BUN/CREAT SERPL: 6.1 (ref 7.3–30)
CALCIUM SPEC-SCNC: 8.9 MG/DL (ref 8.5–10.2)
CHLORIDE SERPL-SCNC: 105 MMOL/L (ref 98–107)
CO2 SERPL-SCNC: 18.2 MMOL/L (ref 22–29)
CREAT SERPL-MCNC: 0.82 MG/DL (ref 0.7–1.3)
DEPRECATED RDW RBC AUTO: 57.6 FL (ref 37–54)
EGFRCR SERPLBLD CKD-EPI 2021: 97.5 ML/MIN/1.73
EOSINOPHIL # BLD AUTO: 0.45 10*3/MM3 (ref 0–0.4)
EOSINOPHIL NFR BLD AUTO: 10.2 % (ref 0.3–6.2)
ERYTHROCYTE [DISTWIDTH] IN BLOOD BY AUTOMATED COUNT: 19.1 % (ref 12.3–15.4)
GLOBULIN UR ELPH-MCNC: 4.6 GM/DL (ref 1.8–3.5)
GLUCOSE SERPL-MCNC: 239 MG/DL (ref 74–124)
HCT VFR BLD AUTO: 30.7 % (ref 37.5–51)
HGB BLD-MCNC: 9.7 G/DL (ref 13–17.7)
IMM GRANULOCYTES # BLD AUTO: 0.02 10*3/MM3 (ref 0–0.05)
IMM GRANULOCYTES NFR BLD AUTO: 0.5 % (ref 0–0.5)
LYMPHOCYTES # BLD AUTO: 0.57 10*3/MM3 (ref 0.7–3.1)
LYMPHOCYTES NFR BLD AUTO: 12.9 % (ref 19.6–45.3)
MCH RBC QN AUTO: 26.1 PG (ref 26.6–33)
MCHC RBC AUTO-ENTMCNC: 31.6 G/DL (ref 31.5–35.7)
MCV RBC AUTO: 82.7 FL (ref 79–97)
MONOCYTES # BLD AUTO: 0.59 10*3/MM3 (ref 0.1–0.9)
MONOCYTES NFR BLD AUTO: 13.3 % (ref 5–12)
NEUTROPHILS NFR BLD AUTO: 2.76 10*3/MM3 (ref 1.7–7)
NEUTROPHILS NFR BLD AUTO: 62.2 % (ref 42.7–76)
NRBC BLD AUTO-RTO: 0 /100 WBC (ref 0–0.2)
PLATELET # BLD AUTO: 86 10*3/MM3 (ref 140–450)
PMV BLD AUTO: 9.5 FL (ref 6–12)
POTASSIUM SERPL-SCNC: 3.9 MMOL/L (ref 3.5–4.7)
PROT SERPL-MCNC: 7.6 G/DL (ref 6.3–8)
RBC # BLD AUTO: 3.71 10*6/MM3 (ref 4.14–5.8)
SODIUM SERPL-SCNC: 137 MMOL/L (ref 134–145)
WBC NRBC COR # BLD: 4.43 10*3/MM3 (ref 3.4–10.8)

## 2022-08-30 PROCEDURE — 99214 OFFICE O/P EST MOD 30 MIN: CPT | Performed by: INTERNAL MEDICINE

## 2022-08-30 PROCEDURE — 82140 ASSAY OF AMMONIA: CPT | Performed by: INTERNAL MEDICINE

## 2022-08-30 PROCEDURE — 80053 COMPREHEN METABOLIC PANEL: CPT

## 2022-08-30 PROCEDURE — 85025 COMPLETE CBC W/AUTO DIFF WBC: CPT

## 2022-08-30 PROCEDURE — 36415 COLL VENOUS BLD VENIPUNCTURE: CPT

## 2022-08-30 NOTE — PROGRESS NOTES
[facility]    CONSULTING IN BLOOD DISORDERS & CANCER      REASON FOR CONSULTATION/CHIEF COMPLAINT:     Evaluation & management for portal vein thrombosis                             REQUESTING PHYSICIAN: No ref. provider found  RECORDS OBTAINED:  Records of the patients history including those from the electronic medical record were reviewed and summarized in detail.    HISTORY OF PRESENT ILLNESS:    The patient is a 67 y.o. year old male with medical issues comprising alcoholic liver disease, cirrhosis, esophageal varices s/p banding recurrent ascites & HTN who was admitted to the [facility] on 2/4/22 with hepatic encephalopathy.      2/5/22: A US abdomen Cirrhosis with findings of portosystemic shunting including mild to moderate splenomegaly and ascites. There also appears to be hepatofugal in the main portal vein with concern for thrombus formation as well.     2/6/22: A doppler portal vein revealed acute thrombus in the main portal vein, right intrahepatic portal vein, along with abnormal flow in the extrahepatic portal vein and left intrahepatic portal vein.     Of note, a MRI abdomen from 1/7/22 done at [facility] had demonstrated peripheral, chronic nonocclusive thrombus of the main portal vein.     He was seen by inpatient hem/onc team. Given unusual location, hypercoagulable state work up was performed  on 2/7/22. . No prior history of VTEs. No family history of thrombosis.     -  Factor V leiden mutation negative. Factor II & AT-III activity low (likely due to liver dysfunction). Mildly elevated ACL, IgM (unclear significance). LA & b2-GP negative.  JAK2 mutation negative. [facility] flow cytometry negative. Protein C & S levels  not done as they would be altered due to liver disease.     Patient has chronic thrombocytopenia with platelets in 40-50,000s range. The CBC from 2/7/22 showed platelet count of 40,000. No c/o bleed or bruise.   -Given risk for intestinal infarction with PV thrombus and likely prothrombotic state,   started him on low dose lovenox on 2/7/22.   - Tolerating well.   - Platelets dropped from 42,000 on 2/8/22 to 36,000 on 2/9 to 34,000 on 2/10. Lovenox stopped. HIT Ab sent - which later came back negative.   - 2/11: Seen by vascular surgery. No interventions planned.   2/13: HIT ab came back negative. Platelets improved to 48,000. Was resumed on lovenox 40 mg sq daily & discharged home.     Patient first seen in the hematology clinic on 2/15/22. He has been tolerating lovenox 40 mg daily well.  Denies any bleed/bruise. Saw , GI in March 2022. Discussed plan for TIPSS procedure. A CT a/p performed 3/22/22 showed nonocclusive hypodense thrombus within the main portal vein at the nayeli hepatis.     4/14/22: Underwent TIPSS procedure at [facility]. Tolerated well.    8/23/2022: Duplex portable veins negative for any thrombus. TIPS patent.    INTERIM HISTORY:  Patient returns to the clinic for a f/u visit.  Reports of feeling well. Mentation more clearer.  He has not needed paracentesis since March 2022.  Continues lactulose, rifaximin, Docusate senna.  He has been taking Lovenox subcutaneous injections as instructed.  Reports of minimal bruises around injection sites.  Also takes iron supplements.  No other major issues with lovenox.  He reports of having increased breast sensitivity and increase in size.    Past Medical History:   Diagnosis Date   • Alcoholism (HCC)    • Anemia    • Cirrhosis (HCC)    • Clot    • COVID-19 03/17/2022   • Encephalopathy    • Hypertension    • Liver disease      Past Surgical History:   Procedure Laterality Date   • COLONOSCOPY     • ENDOSCOPY         MEDICATIONS    Current Outpatient Medications:   •  amitriptyline (ELAVIL) 10 MG tablet, Take 1 tablet by mouth Every Night., Disp: 90 tablet, Rfl: 1  •  Enoxaparin Sodium (Lovenox) 40 MG/0.4ML solution prefilled syringe syringe, Inject 0.4 mL under the skin into the appropriate area as directed Daily. Indications: DVT/PE (active  thrombosis), Disp: 11.2 mL, Rfl: 3  •  Enoxaparin Sodium (Lovenox) 40 MG/0.4ML solution prefilled syringe syringe, Inject 0.4 mL under the skin into the appropriate area as directed Daily. Indications: DVT/PE (active thrombosis), Disp: 11.2 mL, Rfl: 3  •  furosemide (LASIX) 40 MG tablet, TAKE 1 TABLET BY MOUTH DAILY, Disp: 30 tablet, Rfl: 2  •  lactulose (CHRONULAC) 10 GM/15ML solution, 45 ml po 2-3 times daily with goal 3-4 BMs daily, Disp: 4050 mL, Rfl: 3  •  MAGnesium-Oxide 400 (241.3 Mg) MG tablet tablet, Take 400 mg by mouth 2 (Two) Times a Day., Disp: , Rfl:   •  multivitamin (multivitamin) tablet tablet, Take 1 tablet by mouth Daily., Disp: 30 tablet, Rfl: 3  •  omeprazole (priLOSEC) 40 MG capsule, Take 40 mg by mouth., Disp: , Rfl:   •  pantoprazole (PROTONIX) 40 MG EC tablet, Take 1 tablet by mouth Every Morning., Disp: 30 tablet, Rfl: 3  •  riFAXIMin (XIFAXAN) 550 MG tablet, Take 1 tablet by mouth Every 12 (Twelve) Hours., Disp: 14 tablet, Rfl: 0  •  sennosides-docusate (PERICOLACE) 8.6-50 MG per tablet, Take 1 tablet by mouth Daily., Disp: 30 tablet, Rfl: 3  •  spironolactone (ALDACTONE) 50 MG tablet, Take 1 tablet by mouth Daily., Disp: 90 tablet, Rfl: 3  •  midodrine (PROAMATINE) 5 MG tablet, Take 0.5 tablets by mouth Every 8 (Eight) Hours. (Patient taking differently: Take 5 mg by mouth Daily.), Disp: 45 tablet, Rfl: 3  •  nadolol (CORGARD) 20 MG tablet, Take 1 tablet by mouth Daily., Disp: 30 tablet, Rfl: 3  •  spironolactone (ALDACTONE) 100 MG tablet, 100 mg., Disp: , Rfl:     ALLERGIES:     Allergies   Allergen Reactions   • Iron GI Intolerance   • Zinc GI Intolerance     vomiting       SOCIAL HISTORY:       Social History     Socioeconomic History   • Marital status:    Tobacco Use   • Smoking status: Never Smoker   • Smokeless tobacco: Never Used   Vaping Use   • Vaping Use: Never used   Substance and Sexual Activity   • Alcohol use: Not Currently     Comment: No ETOH on 2.5 yrs   • Drug  "use: No   • Sexual activity: Defer         FAMILY HISTORY:  Family History   Problem Relation Age of Onset   • Diabetes Mother    • Hypertension Father      REVIEW OF SYSTEMS:  Constitutional: [No fevers, chills, sweats]  Eye: [No recent visual problems]  ENMT: [No ear pain, nasal congestion, sore throat]  Respiratory: [No shortness of breath, cough]  Cardiovascular: [No Chest pain, palpitations, syncope]  Gastrointestinal: [No nausea, vomiting, diarrhea]  Genitourinary: [No hematuria]  Hema/Lymph: [Negative for bruising tendency, swollen lymph glands]  Endocrine: [Negative for excessive thirst, excessive hunger]  Musculoskeletal: [Denies any musculoskeletal pain or swelling]  Integumentary: [No rash, pruritus, abrasions]  Neurologic: [ No weakness or numbness, Alert & oriented X 4]       Vitals:    08/30/22 1401   BP: 107/63   Pulse: 97   Resp: 18   Temp: 98.7 °F (37.1 °C)   TempSrc: Temporal   SpO2: 100%   Weight: 79.6 kg (175 lb 8 oz)   Height: 180 cm (70.87\")   PainSc: 0-No pain     Current Status 7/5/2022   ECOG score 0      PHYSICAL EXAM:    CONSTITUTIONAL:  Vital signs reviewed.  No distress, looks comfortable.  EYES:  Conjunctiva and lids unremarkable.   EARS,NOSE,MOUTH,THROAT:  Ears and nose appear unremarkable.  RESPIRATORY:  Normal respiratory effort.  Lungs clear to auscultation bilaterally.  CARDIOVASCULAR:  Normal S1, S2.  No murmurs rubs or gallops.  No significant lower extremity edema.  GASTROINTESTINAL: Abdomen appears unremarkable.  Nontender.  Mild distension  LYMPHATIC:  No cervical, supraclavicular lymphadenopathy.  NEURO: AAO x 3, No focal deficits.  Appears to have equal strength all 4   MUSCULOSKELETAL:  Unremarkable digits/nails.  No cyanosis or clubbing.  No apparent joint deformities.  SKIN:  Warm.  No rashes. Bruises on arms and abdominal injection sites  PSYCHIATRIC:  Normal judgment and insight.  Normal mood and affect.     RECENT LABS:      Recent labs " reviewed    ASSESSMENT:   is a pleasant 66 y/o WM with medical issues comprising alcoholic liver disease, cirrhosis, esophageal varices s/p banding recurrent ascites & HTN who comes for portal vein thrombosis evaluation & management.      # Portal vein thrombosis:  · Acute on chronic. MRI abdomen from 1/7/22 done at [facility] had demonstrated peripheral, chronic nonocclusive thrombus of the main portal vein. The doppler from 2/6/22 shows acute thrombus in the main and right intrahepatic portal veins.   · - This is likely due to altered coagulation due to hepatic dysfunction.  No prior history of VTEs. No family history of thrombosis. However given unusual location, hypercoagulable state work up was sent on 2/7/22 - which was negative.   · Given risk for intestinal infarction with PV thrombus and likely prothrombotic state,  started him on low dose lovenox on 2/7/22.   · Platelets dropped from 42,000 on 2/8/22 to 36,000 on 2/9 to 34,000 on 2/10. Lovenox stopped. HIT Ab sent - which later came back negative. 2/11: Seen by vascular surgery. No interventions recommended.  · 2/15/22: Pt returns to the clinic for a f/u visit. Is tolerating lovenox well. No bleed/bruise. No abd pain or distension. Plan to check CBC weekly. If improving platelets, will consider increasing the dose. F/u in a month.   · 3/22/22: Pt doing well on lovenox 40 mg daily. Platelets remain low but stable in 60-70,000 range. Will continue current dose and consider CBC checks to every other week. Advised to maintain close f/u with GI.   · 4/22/22: S/p TIPSS on 4/14/22 by Dr. Jeronimo with [facility]. Doing well. Plan to continue lovenox for now. Consider stopping after 3-6 months. CBC every other week.   · 8/30/2022: Reports of doing well on Lovenox 40 mg daily.  Has not needed paracentesis since March.  Labs overall stable with persistent anemia and thrombocytopenia in 70-80,000 range.  Saw Dr. Jeronimo in July 2022.  Repeat Doppler of the portal  veins negative for any thrombus.   · As patient's blood counts have been stable with stable LFTs, and he remains asymptomatic, discussed plan to stop Lovenox now.  Patient is agreeable.  Advised to maintain close follow-up with GI.     # Thrombocytopenia:  - Acute on chronic. Likely liver disease related.   - No bleed/bruise     # Leukopenia:  Likely liver disease related.   Monitor     # Coagulopathy:  PT/INR elevated.   No bleed/bruise. Monitor     # Anemia:  Likely anemia of chronic disease. Baseline Hb in 8-9 gm/dl range since 2020.  Iron panel with no deficiency.   Hb stable.  Patient is taking oral iron supplementation through GI.     # Hepatic encephalopathy: Per GI  # Decompensated liver cirrhosis  # Ascites: Was getting paracentesis every 2-3 months prior to TIPSS in April 2022.  None since then.    #Gynecomastia: Likely cirrhosis related.  Monitor.    PLAN:   - Portal vein thrombosis in the setting of liver-disease related thrombocytopenia and coagulopathy.  The portal veins from August 2020 negative for any thrombus.  Lovenox stopped.  - Check CBC every month with RN review.  - Continue po iron through .   - S/p TIPSS procedure on 4/14/22. Advised to maintain close f/u with GI.  - F/u in 3 months or sooner if needed    Orders Placed This Encounter   Procedures   • CBC & Differential     Standing Status:   Standing     Number of Occurrences:   5     Standing Expiration Date:   8/30/2023     Order Specific Question:   Manual Differential     Answer:   No   Total time spent during this patient encounter is 35 minutes. The total time spent with the patient includes at least one or more of the following: preparing to see the patient by reviewing of tests, prior notes or other relevant information, performing appropriate independent examination & evaluation, counseling, ordering of medications, tests or procedures, communicating with other healthcare professionals, when appropriate to coordinate care,  documenting clinic information in the electronic medical records or other health records, independently interpreting results of tests and communicating the results to the patient/family or caregiver.

## 2022-09-12 RX ORDER — LACTULOSE 10 G/15ML
SOLUTION ORAL
Qty: 4050 ML | Refills: 0 | Status: SHIPPED | OUTPATIENT
Start: 2022-09-12 | End: 2022-11-28

## 2022-09-13 ENCOUNTER — TELEPHONE (OUTPATIENT)
Dept: GASTROENTEROLOGY | Facility: CLINIC | Age: 67
End: 2022-09-13

## 2022-09-13 RX ORDER — FUROSEMIDE 40 MG/1
40 TABLET ORAL DAILY
Qty: 90 TABLET | Refills: 0 | Status: SHIPPED | OUTPATIENT
Start: 2022-09-13 | End: 2023-01-24

## 2022-09-13 NOTE — TELEPHONE ENCOUNTER
"Message received from [facility] with question re: US order from 8/11: \"Comments (most recent listed first): Message to nurse to see if still needed , , 8/15 pt is schedule for a duplex portal hepatic exam on 8/23 is this order still needed?    , 8/13 [id number]\"    Question to Dr Mcgee.       "

## 2022-09-13 NOTE — TELEPHONE ENCOUNTER
Call to [facility] and spoke with Erika.  Advise per Dr Mcgee note.  Verb understanding - she will contact pt to schedule.

## 2022-09-19 ENCOUNTER — TELEPHONE (OUTPATIENT)
Dept: ONCOLOGY | Facility: CLINIC | Age: 67
End: 2022-09-19

## 2022-09-19 NOTE — TELEPHONE ENCOUNTER
Returned call to patient who [place] [facility]  He will wear a mask.  I reviewed his chart and approval given for him to go to [facility].  He v/u.

## 2022-09-26 ENCOUNTER — TELEPHONE (OUTPATIENT)
Dept: ONCOLOGY | Facility: CLINIC | Age: 67
End: 2022-09-26

## 2022-09-26 DIAGNOSIS — K70.31 ALCOHOLIC CIRRHOSIS OF LIVER WITH ASCITES: ICD-10-CM

## 2022-09-26 DIAGNOSIS — I81 PORTAL VEIN THROMBOSIS: ICD-10-CM

## 2022-09-26 DIAGNOSIS — D69.6 THROMBOCYTOPENIA: Primary | ICD-10-CM

## 2022-09-26 NOTE — TELEPHONE ENCOUNTER
Caller: Wei Potts    Relationship: Self    Best call back number: [phone]    What was the call regarding: PATIENT WANTED TO ADD TO HIS LABS THE AMMONIA TEST

## 2022-09-27 ENCOUNTER — CLINICAL SUPPORT (OUTPATIENT)
Dept: ONCOLOGY | Facility: HOSPITAL | Age: 67
End: 2022-09-27

## 2022-09-27 ENCOUNTER — LAB (OUTPATIENT)
Dept: LAB | Facility: HOSPITAL | Age: 67
End: 2022-09-27

## 2022-09-27 DIAGNOSIS — D69.6 THROMBOCYTOPENIA: ICD-10-CM

## 2022-09-27 DIAGNOSIS — K70.31 ALCOHOLIC CIRRHOSIS OF LIVER WITH ASCITES: ICD-10-CM

## 2022-09-27 DIAGNOSIS — I81 PORTAL VEIN THROMBOSIS: ICD-10-CM

## 2022-09-27 LAB
AMMONIA BLD-SCNC: 161 UMOL/L (ref 16–60)
BASOPHILS # BLD AUTO: 0.02 10*3/MM3 (ref 0–0.2)
BASOPHILS NFR BLD AUTO: 0.4 % (ref 0–1.5)
DEPRECATED RDW RBC AUTO: 52.8 FL (ref 37–54)
EOSINOPHIL # BLD AUTO: 0.25 10*3/MM3 (ref 0–0.4)
EOSINOPHIL NFR BLD AUTO: 5.3 % (ref 0.3–6.2)
ERYTHROCYTE [DISTWIDTH] IN BLOOD BY AUTOMATED COUNT: 17.6 % (ref 12.3–15.4)
HCT VFR BLD AUTO: 34 % (ref 37.5–51)
HGB BLD-MCNC: 11.2 G/DL (ref 13–17.7)
IMM GRANULOCYTES # BLD AUTO: 0.02 10*3/MM3 (ref 0–0.05)
IMM GRANULOCYTES NFR BLD AUTO: 0.4 % (ref 0–0.5)
LYMPHOCYTES # BLD AUTO: 0.69 10*3/MM3 (ref 0.7–3.1)
LYMPHOCYTES NFR BLD AUTO: 14.6 % (ref 19.6–45.3)
MCH RBC QN AUTO: 27.5 PG (ref 26.6–33)
MCHC RBC AUTO-ENTMCNC: 32.9 G/DL (ref 31.5–35.7)
MCV RBC AUTO: 83.5 FL (ref 79–97)
MONOCYTES # BLD AUTO: 0.7 10*3/MM3 (ref 0.1–0.9)
MONOCYTES NFR BLD AUTO: 14.8 % (ref 5–12)
NEUTROPHILS NFR BLD AUTO: 3.04 10*3/MM3 (ref 1.7–7)
NEUTROPHILS NFR BLD AUTO: 64.5 % (ref 42.7–76)
NRBC BLD AUTO-RTO: 0 /100 WBC (ref 0–0.2)
PLATELET # BLD AUTO: 97 10*3/MM3 (ref 140–450)
PMV BLD AUTO: 8.8 FL (ref 6–12)
RBC # BLD AUTO: 4.07 10*6/MM3 (ref 4.14–5.8)
WBC NRBC COR # BLD: 4.72 10*3/MM3 (ref 3.4–10.8)

## 2022-09-27 PROCEDURE — 36415 COLL VENOUS BLD VENIPUNCTURE: CPT

## 2022-09-27 PROCEDURE — G0463 HOSPITAL OUTPT CLINIC VISIT: HCPCS

## 2022-09-27 PROCEDURE — 85025 COMPLETE CBC W/AUTO DIFF WBC: CPT

## 2022-09-27 PROCEDURE — 82140 ASSAY OF AMMONIA: CPT | Performed by: INTERNAL MEDICINE

## 2022-09-27 NOTE — PROGRESS NOTES
Pt present for visit. Voices no new concerns. Confirms stopping lovenox and taking oral iron every day. Hgb improved. No significant changes in counts. Denies issues with bleeding. Copy of labs and schedule provided. Pt v/u.    Lab Results   Component Value Date    WBC 4.72 09/27/2022    HGB 11.2 (L) 09/27/2022    HCT 34.0 (L) 09/27/2022    MCV 83.5 09/27/2022    PLT 97 (L) 09/27/2022

## 2022-09-29 ENCOUNTER — CLINICAL SUPPORT (OUTPATIENT)
Dept: FAMILY MEDICINE CLINIC | Facility: CLINIC | Age: 67
End: 2022-09-29

## 2022-09-29 DIAGNOSIS — Z23 NEED FOR VACCINATION: Primary | ICD-10-CM

## 2022-09-29 PROCEDURE — 91312 COVID-19 (PFIZER) BIVALENT BOOSTER 12+YRS: CPT | Performed by: FAMILY MEDICINE

## 2022-09-29 PROCEDURE — 0124A PR ADM SARSCOV2 30MCG/0.3ML BST: CPT | Performed by: FAMILY MEDICINE

## 2022-10-04 ENCOUNTER — HOSPITAL ENCOUNTER (OUTPATIENT)
Dept: ULTRASOUND IMAGING | Facility: HOSPITAL | Age: 67
Discharge: HOME | End: 2022-10-04
Admitting: INTERNAL MEDICINE

## 2022-10-04 DIAGNOSIS — K74.60 CIRRHOSIS OF LIVER WITH ASCITES, UNSPECIFIED HEPATIC CIRRHOSIS TYPE: ICD-10-CM

## 2022-10-04 DIAGNOSIS — R18.8 CIRRHOSIS OF LIVER WITH ASCITES, UNSPECIFIED HEPATIC CIRRHOSIS TYPE: ICD-10-CM

## 2022-10-04 PROCEDURE — 76705 ECHO EXAM OF ABDOMEN: CPT

## 2022-10-11 ENCOUNTER — TELEPHONE (OUTPATIENT)
Dept: GASTROENTEROLOGY | Facility: CLINIC | Age: 67
End: 2022-10-11

## 2022-10-11 ENCOUNTER — TELEPHONE (OUTPATIENT)
Dept: ONCOLOGY | Facility: CLINIC | Age: 67
End: 2022-10-11

## 2022-10-11 NOTE — TELEPHONE ENCOUNTER
Caller: Wei Potts    Relationship: Self    Best call back number: [phone]    What is the best time to reach you: ANYTIME    Who are you requesting to speak with (clinical staff, provider,  specific staff member):CLINICAL STAFF        What was the call regarding: PT WANTS TEST RESULTS FROM RECENT ULTRASOUND

## 2022-10-11 NOTE — TELEPHONE ENCOUNTER
Caller: Wei Potts    Relationship: Self    Best call back number: [phone]    What is the best time to reach you: ANYTIME     CAN LEAVE DETAILED VOICEMAIL IF UNABLE TO REACH     Who are you requesting to speak with (clinical staff, provider,  specific staff member): DR FRIEDMAN NURSE         What was the call regarding:      WAS TESTED ABOUT 2-3 WEEKS AGO WITH CBC, AND AMMONIA, WANTED TO KNOW WHAT THE AMMONIA LEVEL WAS , NOT ABLE TO ACCESS MY CHART AT THIS TIME     Do you require a callback: YES

## 2022-10-11 NOTE — TELEPHONE ENCOUNTER
----- Message from Ruby Mcgee MD sent at 10/5/2022  6:28 AM EDT -----  Liver appearance is stable.  TIPS noted.  No ascites.  No new findings otherwise

## 2022-10-11 NOTE — TELEPHONE ENCOUNTER
Returned call to Mr Potts.  He states his phone broke and has been unable to log into [facility].  Reviewed his ammonia level with him.  He expressed concern about this level.  Reviewed with Dr Espino, he recommends he contact Dr Mcgee and follow her instructions.   He voiced understanding.

## 2022-10-12 ENCOUNTER — TELEPHONE (OUTPATIENT)
Dept: GASTROENTEROLOGY | Facility: CLINIC | Age: 67
End: 2022-10-12

## 2022-10-12 NOTE — TELEPHONE ENCOUNTER
Caller: Wei Potts    Relationship: Self    Best call back number: [phone]    What is the best time to reach you: ANYTIME    Who are you requesting to speak with (clinical staff, provider,  specific staff member): CLINICAL STAFF        What was the call regarding: PT WANTS TO SPEAK ABOUT ELEVATED LEVELS

## 2022-10-12 NOTE — TELEPHONE ENCOUNTER
As long as he is not experiencing symptoms of confusion, the elevated ammonia level is less concerning.  This may never normalize and should not be rechecked unless he is experiencing symptoms of confusion.   goal is for 3-4 soft bowel movements a day.  Adjust lactulose accordingly.  From my recollection and notes he did not tolerate Xifaxan so would not try this again.

## 2022-10-12 NOTE — TELEPHONE ENCOUNTER
Call to pt.  Concerned re: persistently elevated ammonia.     Reports does taking lactulose 2-3x/day and has 2-3 BMs/day.      Not currently on xifaxan.  States had about 5-6 capsules - found them difficult to swallow and caused emesis.      Question to Dr Mcgee.

## 2022-10-25 ENCOUNTER — LAB (OUTPATIENT)
Dept: LAB | Facility: HOSPITAL | Age: 67
End: 2022-10-25

## 2022-10-25 ENCOUNTER — CLINICAL SUPPORT (OUTPATIENT)
Dept: ONCOLOGY | Facility: HOSPITAL | Age: 67
End: 2022-10-25

## 2022-10-25 DIAGNOSIS — D69.6 THROMBOCYTOPENIA: ICD-10-CM

## 2022-10-25 DIAGNOSIS — K70.31 ALCOHOLIC CIRRHOSIS OF LIVER WITH ASCITES: ICD-10-CM

## 2022-10-25 DIAGNOSIS — I81 PORTAL VEIN THROMBOSIS: ICD-10-CM

## 2022-10-25 LAB
BASOPHILS # BLD AUTO: 0.04 10*3/MM3 (ref 0–0.2)
BASOPHILS NFR BLD AUTO: 0.7 % (ref 0–1.5)
DEPRECATED RDW RBC AUTO: 54.1 FL (ref 37–54)
EOSINOPHIL # BLD AUTO: 0.31 10*3/MM3 (ref 0–0.4)
EOSINOPHIL NFR BLD AUTO: 5.5 % (ref 0.3–6.2)
ERYTHROCYTE [DISTWIDTH] IN BLOOD BY AUTOMATED COUNT: 17.8 % (ref 12.3–15.4)
HCT VFR BLD AUTO: 32.9 % (ref 37.5–51)
HGB BLD-MCNC: 11.7 G/DL (ref 13–17.7)
IMM GRANULOCYTES # BLD AUTO: 0.05 10*3/MM3 (ref 0–0.05)
IMM GRANULOCYTES NFR BLD AUTO: 0.9 % (ref 0–0.5)
LYMPHOCYTES # BLD AUTO: 0.82 10*3/MM3 (ref 0.7–3.1)
LYMPHOCYTES NFR BLD AUTO: 14.5 % (ref 19.6–45.3)
MCH RBC QN AUTO: 29.8 PG (ref 26.6–33)
MCHC RBC AUTO-ENTMCNC: 35.6 G/DL (ref 31.5–35.7)
MCV RBC AUTO: 83.7 FL (ref 79–97)
MONOCYTES # BLD AUTO: 0.9 10*3/MM3 (ref 0.1–0.9)
MONOCYTES NFR BLD AUTO: 15.9 % (ref 5–12)
NEUTROPHILS NFR BLD AUTO: 3.54 10*3/MM3 (ref 1.7–7)
NEUTROPHILS NFR BLD AUTO: 62.5 % (ref 42.7–76)
NRBC BLD AUTO-RTO: 0 /100 WBC (ref 0–0.2)
PLATELET # BLD AUTO: 99 10*3/MM3 (ref 140–450)
PMV BLD AUTO: 9.4 FL (ref 6–12)
RBC # BLD AUTO: 3.93 10*6/MM3 (ref 4.14–5.8)
WBC NRBC COR # BLD: 5.66 10*3/MM3 (ref 3.4–10.8)

## 2022-10-25 PROCEDURE — 36415 COLL VENOUS BLD VENIPUNCTURE: CPT

## 2022-10-25 PROCEDURE — 85025 COMPLETE CBC W/AUTO DIFF WBC: CPT

## 2022-10-25 PROCEDURE — G0463 HOSPITAL OUTPT CLINIC VISIT: HCPCS

## 2022-10-25 NOTE — NURSING NOTE
Lab Results   Component Value Date    WBC 5.66 10/25/2022    HGB 11.7 (L) 10/25/2022    HCT 32.9 (L) 10/25/2022    MCV 83.7 10/25/2022    PLT 99 (L) 10/25/2022   no complaints. Labs reviewed with patient.

## 2022-11-02 RX ORDER — FUROSEMIDE 40 MG/1
40 TABLET ORAL DAILY
Qty: 90 TABLET | Refills: 0 | OUTPATIENT
Start: 2022-11-02

## 2022-11-02 NOTE — TELEPHONE ENCOUNTER
He needs follow-up.  It appears since have last seen him that he has had a TIPS placed.  He may not require this medication anymore as this should improve his ascites.  Please have him hold it and see if the  fluid reaccumulate's and schedule a follow-up

## 2022-11-15 NOTE — TELEPHONE ENCOUNTER
Call to pt.  Advise per Dr Mcgee note.  Verb understanding.     F/u appt scheduled with Dr cMgee for 11/16 @ 1:15 pm.

## 2022-11-16 ENCOUNTER — OFFICE VISIT (OUTPATIENT)
Dept: GASTROENTEROLOGY | Facility: CLINIC | Age: 67
End: 2022-11-16

## 2022-11-16 VITALS
WEIGHT: 170 LBS | BODY MASS INDEX: 23.8 KG/M2 | DIASTOLIC BLOOD PRESSURE: 70 MMHG | SYSTOLIC BLOOD PRESSURE: 147 MMHG | TEMPERATURE: 96.1 F | HEIGHT: 71 IN | HEART RATE: 85 BPM

## 2022-11-16 DIAGNOSIS — I81 PORTAL VEIN THROMBOSIS: ICD-10-CM

## 2022-11-16 DIAGNOSIS — R18.8 CIRRHOSIS OF LIVER WITH ASCITES, UNSPECIFIED HEPATIC CIRRHOSIS TYPE: Primary | ICD-10-CM

## 2022-11-16 DIAGNOSIS — Z79.899 LONG TERM CURRENT USE OF DIURETIC: ICD-10-CM

## 2022-11-16 DIAGNOSIS — N62 GYNECOMASTIA: ICD-10-CM

## 2022-11-16 DIAGNOSIS — K74.60 CIRRHOSIS OF LIVER WITH ASCITES, UNSPECIFIED HEPATIC CIRRHOSIS TYPE: Primary | ICD-10-CM

## 2022-11-16 DIAGNOSIS — Z95.828 S/P TIPS (TRANSJUGULAR INTRAHEPATIC PORTOSYSTEMIC SHUNT): ICD-10-CM

## 2022-11-16 PROCEDURE — 99214 OFFICE O/P EST MOD 30 MIN: CPT | Performed by: INTERNAL MEDICINE

## 2022-11-16 RX ORDER — DOXYCYCLINE HYCLATE 50 MG/1
324 CAPSULE, GELATIN COATED ORAL
COMMUNITY
Start: 2022-08-04

## 2022-11-16 NOTE — PROGRESS NOTES
Subjective   Chief Complaint   Patient presents with   • Cirrhosis       Wei Potts is a  67 y.o. male here for a follow up visit for cirrhosis with ascites, history of esophageal varices, history of portal venous thrombosis.    He underwent TIPS placement in April 2022 with embolization of varices at that time. He was recently taken off Lovenox by Dr. Espino.  His hemoglobin has significantly improved.  He remains on ferrous gluconate.  He is tolerating this much better than ferrous sulfate which really upset his stomach.  He is having 2-3 bowel movements daily.  He has had no changes in his mental status.  No confusion.  He says that his wife still thinks he smells like ammonia at times but he has had no noticeable confusion.  His fatigue is improved and his strength is much better.  His energy level is significantly improved.  He continues to have a good appetite.  He had lost 20 pounds in the spring and he is gained this back.  He has had no blood in his stool.    He complains of painful breast enlargement related to his Aldactone.    Labs from [facility] in July reviewed-normal AFP.  Portal hepatic Doppler study July 2022 with patent TIPS.  Liver ultrasound 10/22-no ascites, no liver lesions, cirrhotic liver.  Most recent hemoglobin was improved at 11.7.  Stable platelet count.  Normal white count.  HPI  Past Medical History:   Diagnosis Date   • Alcoholism (HCC)    • Anemia    • Cirrhosis (HCC)    • Clot    • COVID-19 03/17/2022   • Encephalopathy    • Hypertension    • Liver disease      Past Surgical History:   Procedure Laterality Date   • COLONOSCOPY     • ENDOSCOPY         Current Outpatient Medications:   •  amitriptyline (ELAVIL) 10 MG tablet, Take 1 tablet by mouth Every Night., Disp: 90 tablet, Rfl: 1  •  ferrous gluconate (FERGON) 324 MG tablet, Take 324 mg by mouth., Disp: , Rfl:   •  furosemide (LASIX) 40 MG tablet, TAKE 1 TABLET BY MOUTH DAILY, Disp: 90 tablet, Rfl: 0  •  lactulose (CHRONULAC) 10  GM/15ML solution, TAKE 45 ML BY MOUTH 2-3 TIMES A DAY WITH GOAL 3-4 BOWEL MOVEMENTS EVERY DAY, Disp: 4050 mL, Rfl: 0  •  midodrine (PROAMATINE) 5 MG tablet, Take 0.5 tablets by mouth Every 8 (Eight) Hours. (Patient taking differently: Take 5 mg by mouth Daily.), Disp: 45 tablet, Rfl: 3  •  multivitamin (multivitamin) tablet tablet, Take 1 tablet by mouth Daily., Disp: 30 tablet, Rfl: 3  •  omeprazole (priLOSEC) 40 MG capsule, Take 40 mg by mouth., Disp: , Rfl:   •  MAGnesium-Oxide 400 (241.3 Mg) MG tablet tablet, Take 400 mg by mouth 2 (Two) Times a Day., Disp: , Rfl:   •  nadolol (CORGARD) 20 MG tablet, Take 1 tablet by mouth Daily., Disp: 30 tablet, Rfl: 3  •  pantoprazole (PROTONIX) 40 MG EC tablet, Take 1 tablet by mouth Every Morning., Disp: 30 tablet, Rfl: 3  •  riFAXIMin (XIFAXAN) 550 MG tablet, Take 1 tablet by mouth Every 12 (Twelve) Hours., Disp: 14 tablet, Rfl: 0  •  sennosides-docusate (PERICOLACE) 8.6-50 MG per tablet, Take 1 tablet by mouth Daily., Disp: 30 tablet, Rfl: 3  PRN Meds:.  Allergies   Allergen Reactions   • Iron GI Intolerance   • Zinc GI Intolerance     vomiting     Social History     Socioeconomic History   • Marital status:    Tobacco Use   • Smoking status: Never   • Smokeless tobacco: Never   Vaping Use   • Vaping Use: Never used   Substance and Sexual Activity   • Alcohol use: Not Currently     Comment: No ETOH on 2.5 yrs   • Drug use: No   • Sexual activity: Defer     Family History   Problem Relation Age of Onset   • Diabetes Mother    • Hypertension Father      Review of Systems   Constitutional: Negative for appetite change and unexpected weight change.   Gastrointestinal: Positive for nausea. Negative for abdominal pain and blood in stool.   Psychiatric/Behavioral: Negative for confusion.     Vitals:    11/16/22 1318   BP: 147/70   Pulse: 85   Temp: 96.1 °F (35.6 °C)         11/16/22  1318   Weight: 77.1 kg (170 lb)       Objective   Physical Exam  Constitutional:        Appearance: Normal appearance. He is well-developed.   HENT:      Head: Normocephalic and atraumatic.   Eyes:      General: No scleral icterus.     Conjunctiva/sclera: Conjunctivae normal.   Pulmonary:      Effort: Pulmonary effort is normal.      Breath sounds: Normal breath sounds.   Abdominal:      Palpations: Abdomen is soft.      Tenderness: There is no abdominal tenderness.   Musculoskeletal:         General: No swelling.      Cervical back: Normal range of motion and neck supple.   Skin:     General: Skin is warm and dry.   Neurological:      Mental Status: He is alert.   Psychiatric:         Mood and Affect: Mood normal.         Behavior: Behavior normal.       No radiology results for the last 7 days    Assessment & Plan   Diagnoses and all orders for this visit:    Cirrhosis of liver with ascites, unspecified hepatic cirrhosis type (HCC)  -     Comprehensive Metabolic Panel; Future  -     US Liver; Future    Long term current use of diuretic  -     Comprehensive Metabolic Panel; Future    Portal vein thrombosis  Comments:  Resolved-off anticoagulation    S/P TIPS (transjugular intrahepatic portosystemic shunt)    Gynecomastia    Other orders  -     ferrous gluconate (FERGON) 324 MG tablet; Take 324 mg by mouth.      Plan:  · Continue furosemide - stop aldactone due to gynecomastia  · Labs in 1-2 weeks to check kidney function, potassium-he has scheduled labs with his hematologist  · Continue lactulose, xifaxan  · Plan for ultrasound for HCC surveillance 4/23  · hemolobin much improved-now off anticoagulation

## 2022-11-28 ENCOUNTER — TELEPHONE (OUTPATIENT)
Dept: ONCOLOGY | Facility: CLINIC | Age: 67
End: 2022-11-28

## 2022-11-28 DIAGNOSIS — D69.6 THROMBOCYTOPENIA: ICD-10-CM

## 2022-11-28 DIAGNOSIS — K70.31 ALCOHOLIC CIRRHOSIS OF LIVER WITH ASCITES: Primary | ICD-10-CM

## 2022-11-28 RX ORDER — LACTULOSE 10 G/15ML
SOLUTION ORAL
Qty: 4050 ML | Refills: 1 | Status: SHIPPED | OUTPATIENT
Start: 2022-11-28 | End: 2023-03-02 | Stop reason: SDUPTHER

## 2022-11-28 NOTE — TELEPHONE ENCOUNTER
Caller: Wei Potts    Relationship: Self    Best call back number: [phone]    What is the best time to reach you: ANYTIME    Who are you requesting to speak with (clinical staff, provider,  specific staff member): , NURSE    What was the call regarding: PT CALLING TO SEE IF DR. FRIEDMAN IS GOING TO DO THE AMMONIA TEST ON 12/7/2022?    IF SO PLEASE ADD TO HIS LAB TESTS    Do you require a callback: NO

## 2022-11-28 NOTE — TELEPHONE ENCOUNTER
Returned pt's call and let him know that we have ordered his ammonia level to be drawn at his next visit. He v/u.

## 2022-12-07 ENCOUNTER — OFFICE VISIT (OUTPATIENT)
Dept: ONCOLOGY | Facility: CLINIC | Age: 67
End: 2022-12-07

## 2022-12-07 ENCOUNTER — LAB (OUTPATIENT)
Dept: LAB | Facility: HOSPITAL | Age: 67
End: 2022-12-07

## 2022-12-07 VITALS
OXYGEN SATURATION: 99 % | WEIGHT: 172.1 LBS | BODY MASS INDEX: 24.09 KG/M2 | HEART RATE: 78 BPM | SYSTOLIC BLOOD PRESSURE: 129 MMHG | DIASTOLIC BLOOD PRESSURE: 70 MMHG | RESPIRATION RATE: 16 BRPM | TEMPERATURE: 98.4 F | HEIGHT: 71 IN

## 2022-12-07 DIAGNOSIS — K70.31 ALCOHOLIC CIRRHOSIS OF LIVER WITH ASCITES: ICD-10-CM

## 2022-12-07 DIAGNOSIS — I81 PORTAL VEIN THROMBOSIS: ICD-10-CM

## 2022-12-07 DIAGNOSIS — K70.31 ALCOHOLIC CIRRHOSIS OF LIVER WITH ASCITES: Primary | ICD-10-CM

## 2022-12-07 DIAGNOSIS — D69.6 THROMBOCYTOPENIA: ICD-10-CM

## 2022-12-07 LAB
ALBUMIN SERPL-MCNC: 3.2 G/DL (ref 3.5–5.2)
ALBUMIN/GLOB SERPL: 0.7 G/DL (ref 1.1–2.4)
ALP SERPL-CCNC: 156 U/L (ref 38–116)
ALT SERPL W P-5'-P-CCNC: 18 U/L (ref 0–41)
AMMONIA BLD-SCNC: 108 UMOL/L (ref 16–60)
ANION GAP SERPL CALCULATED.3IONS-SCNC: 11.7 MMOL/L (ref 5–15)
AST SERPL-CCNC: 44 U/L (ref 0–40)
BASOPHILS # BLD AUTO: 0.02 10*3/MM3 (ref 0–0.2)
BASOPHILS NFR BLD AUTO: 0.5 % (ref 0–1.5)
BILIRUB SERPL-MCNC: 2.5 MG/DL (ref 0.2–1.2)
BUN SERPL-MCNC: 5 MG/DL (ref 6–20)
BUN/CREAT SERPL: 6.7 (ref 7.3–30)
CALCIUM SPEC-SCNC: 9 MG/DL (ref 8.5–10.2)
CHLORIDE SERPL-SCNC: 107 MMOL/L (ref 98–107)
CO2 SERPL-SCNC: 23.3 MMOL/L (ref 22–29)
CREAT SERPL-MCNC: 0.75 MG/DL (ref 0.7–1.3)
DEPRECATED RDW RBC AUTO: 56.1 FL (ref 37–54)
EGFRCR SERPLBLD CKD-EPI 2021: 100.1 ML/MIN/1.73
EOSINOPHIL # BLD AUTO: 0.21 10*3/MM3 (ref 0–0.4)
EOSINOPHIL NFR BLD AUTO: 5.3 % (ref 0.3–6.2)
ERYTHROCYTE [DISTWIDTH] IN BLOOD BY AUTOMATED COUNT: 16.9 % (ref 12.3–15.4)
GLOBULIN UR ELPH-MCNC: 4.3 GM/DL (ref 1.8–3.5)
GLUCOSE SERPL-MCNC: 191 MG/DL (ref 74–124)
HCT VFR BLD AUTO: 35.4 % (ref 37.5–51)
HGB BLD-MCNC: 12.5 G/DL (ref 13–17.7)
IMM GRANULOCYTES # BLD AUTO: 0.01 10*3/MM3 (ref 0–0.05)
IMM GRANULOCYTES NFR BLD AUTO: 0.3 % (ref 0–0.5)
LYMPHOCYTES # BLD AUTO: 0.63 10*3/MM3 (ref 0.7–3.1)
LYMPHOCYTES NFR BLD AUTO: 15.9 % (ref 19.6–45.3)
MCH RBC QN AUTO: 32.5 PG (ref 26.6–33)
MCHC RBC AUTO-ENTMCNC: 35.3 G/DL (ref 31.5–35.7)
MCV RBC AUTO: 91.9 FL (ref 79–97)
MONOCYTES # BLD AUTO: 0.58 10*3/MM3 (ref 0.1–0.9)
MONOCYTES NFR BLD AUTO: 14.6 % (ref 5–12)
NEUTROPHILS NFR BLD AUTO: 2.51 10*3/MM3 (ref 1.7–7)
NEUTROPHILS NFR BLD AUTO: 63.4 % (ref 42.7–76)
NRBC BLD AUTO-RTO: 0 /100 WBC (ref 0–0.2)
PLATELET # BLD AUTO: 82 10*3/MM3 (ref 140–450)
PMV BLD AUTO: 9.8 FL (ref 6–12)
POTASSIUM SERPL-SCNC: 3.6 MMOL/L (ref 3.5–4.7)
PROT SERPL-MCNC: 7.5 G/DL (ref 6.3–8)
RBC # BLD AUTO: 3.85 10*6/MM3 (ref 4.14–5.8)
SODIUM SERPL-SCNC: 142 MMOL/L (ref 134–145)
WBC NRBC COR # BLD: 3.96 10*3/MM3 (ref 3.4–10.8)

## 2022-12-07 PROCEDURE — 85025 COMPLETE CBC W/AUTO DIFF WBC: CPT

## 2022-12-07 PROCEDURE — 82140 ASSAY OF AMMONIA: CPT | Performed by: INTERNAL MEDICINE

## 2022-12-07 PROCEDURE — 36415 COLL VENOUS BLD VENIPUNCTURE: CPT

## 2022-12-07 PROCEDURE — 99215 OFFICE O/P EST HI 40 MIN: CPT | Performed by: INTERNAL MEDICINE

## 2022-12-07 PROCEDURE — 80053 COMPREHEN METABOLIC PANEL: CPT | Performed by: INTERNAL MEDICINE

## 2022-12-07 NOTE — PROGRESS NOTES
[facility]    CONSULTING IN BLOOD DISORDERS & CANCER      REASON FOR CONSULTATION/CHIEF COMPLAINT:     Evaluation & management for portal vein thrombosis                             REQUESTING PHYSICIAN: No ref. provider found  RECORDS OBTAINED:  Records of the patients history including those from the electronic medical record were reviewed and summarized in detail.    HISTORY OF PRESENT ILLNESS:    The patient is a 67 y.o. year old male with medical issues comprising alcoholic liver disease, cirrhosis, esophageal varices s/p banding recurrent ascites & HTN who was admitted to the [facility] on 2/4/22 with hepatic encephalopathy.      2/5/22: A US abdomen Cirrhosis with findings of portosystemic shunting including mild to moderate splenomegaly and ascites. There also appears to be hepatofugal in the main portal vein with concern for thrombus formation as well.     2/6/22: A doppler portal vein revealed acute thrombus in the main portal vein, right intrahepatic portal vein, along with abnormal flow in the extrahepatic portal vein and left intrahepatic portal vein.     Of note, a MRI abdomen from 1/7/22 done at [facility] had demonstrated peripheral, chronic nonocclusive thrombus of the main portal vein.     He was seen by inpatient hem/onc team. Given unusual location, hypercoagulable state work up was performed  on 2/7/22. . No prior history of VTEs. No family history of thrombosis.     -  Factor V leiden mutation negative. Factor II & AT-III activity low (likely due to liver dysfunction). Mildly elevated ACL, IgM (unclear significance). LA & b2-GP negative.  JAK2 mutation negative. [facility] flow cytometry negative. Protein C & S levels  not done as they would be altered due to liver disease.     Patient has chronic thrombocytopenia with platelets in 40-50,000s range. The CBC from 2/7/22 showed platelet count of 40,000. No c/o bleed or bruise.   -Given risk for intestinal infarction with PV thrombus and likely prothrombotic state,   started him on low dose lovenox on 2/7/22.   - Tolerating well.   - Platelets dropped from 42,000 on 2/8/22 to 36,000 on 2/9 to 34,000 on 2/10. Lovenox stopped. HIT Ab sent - which later came back negative.   - 2/11: Seen by vascular surgery. No interventions planned.   2/13: HIT ab came back negative. Platelets improved to 48,000. Was resumed on lovenox 40 mg sq daily & discharged home.     Patient first seen in the hematology clinic on 2/15/22. He has been tolerating lovenox 40 mg daily well.  Denies any bleed/bruise. Saw , GI in March 2022. Discussed plan for TIPSS procedure. A CT a/p performed 3/22/22 showed nonocclusive hypodense thrombus within the main portal vein at the nayeli hepatis.     4/14/22: Underwent TIPSS procedure at [facility]. Tolerated well.    8/23/2022: Duplex portable veins negative for any thrombus. TIPS patent.  Anticoagulation with Lovenox discontinued.    INTERIM HISTORY:  Patient returns to the clinic for a f/u visit.  Reports of feeling well. Mentation more clearer.  He has not needed paracentesis since March 2022.  Continues lactulose, rifaximin, Docusate senna.  Has been taking oral iron supplementation daily.  He does report of easy bruises, denies any bleeding.  Denies any abdominal distention, nausea or vomiting.  Mentation has been clear.  He maintains follow-up with gastroenterologists, Dr. Mcgee and Dr. Miramontes.    Past Medical History:   Diagnosis Date   • Alcoholism (HCC)    • Anemia    • Cirrhosis (HCC)    • Clot    • COVID-19 03/17/2022   • Encephalopathy    • Hypertension    • Liver disease      Past Surgical History:   Procedure Laterality Date   • COLONOSCOPY     • ENDOSCOPY         MEDICATIONS    Current Outpatient Medications:   •  amitriptyline (ELAVIL) 10 MG tablet, Take 1 tablet by mouth Every Night., Disp: 90 tablet, Rfl: 1  •  ferrous gluconate (FERGON) 324 MG tablet, Take 324 mg by mouth., Disp: , Rfl:   •  furosemide (LASIX) 40 MG tablet, TAKE 1 TABLET BY  MOUTH DAILY, Disp: 90 tablet, Rfl: 0  •  lactulose (CHRONULAC) 10 GM/15ML solution, TAKE 45 MLS BY MOUTH 2 TO 3 TIMES A DAY WITH GOAL OF 3 TO 4 BOWEL MOVEMENTS PER DAY, Disp: 4050 mL, Rfl: 1  •  MAGnesium-Oxide 400 (241.3 Mg) MG tablet tablet, Take 400 mg by mouth 2 (Two) Times a Day., Disp: , Rfl:   •  midodrine (PROAMATINE) 5 MG tablet, Take 0.5 tablets by mouth Every 8 (Eight) Hours. (Patient taking differently: Take 5 mg by mouth Daily.), Disp: 45 tablet, Rfl: 3  •  multivitamin (multivitamin) tablet tablet, Take 1 tablet by mouth Daily., Disp: 30 tablet, Rfl: 3  •  nadolol (CORGARD) 20 MG tablet, Take 1 tablet by mouth Daily., Disp: 30 tablet, Rfl: 3  •  omeprazole (priLOSEC) 40 MG capsule, Take 40 mg by mouth., Disp: , Rfl:   •  pantoprazole (PROTONIX) 40 MG EC tablet, Take 1 tablet by mouth Every Morning., Disp: 30 tablet, Rfl: 3  •  riFAXIMin (XIFAXAN) 550 MG tablet, Take 1 tablet by mouth Every 12 (Twelve) Hours., Disp: 14 tablet, Rfl: 0  •  sennosides-docusate (PERICOLACE) 8.6-50 MG per tablet, Take 1 tablet by mouth Daily., Disp: 30 tablet, Rfl: 3    ALLERGIES:     Allergies   Allergen Reactions   • Iron GI Intolerance   • Zinc GI Intolerance     vomiting       SOCIAL HISTORY:       Social History     Socioeconomic History   • Marital status:    Tobacco Use   • Smoking status: Never   • Smokeless tobacco: Never   Vaping Use   • Vaping Use: Never used   Substance and Sexual Activity   • Alcohol use: Not Currently     Comment: No ETOH on 2.5 yrs   • Drug use: No   • Sexual activity: Defer         FAMILY HISTORY:  Family History   Problem Relation Age of Onset   • Diabetes Mother    • Hypertension Father      REVIEW OF SYSTEMS:  Constitutional: [No fevers, chills, sweats]  Eye: [No recent visual problems]  ENMT: [No ear pain, nasal congestion, sore throat]  Respiratory: [No shortness of breath, cough]  Cardiovascular: [No Chest pain, palpitations, syncope]  Gastrointestinal: [No nausea, vomiting,  "diarrhea]  Genitourinary: [No hematuria]  Hema/Lymph: [Negative for bruising tendency, swollen lymph glands]  Endocrine: [Negative for excessive thirst, excessive hunger]  Musculoskeletal: [Denies any musculoskeletal pain or swelling]  Integumentary: [No rash, pruritus, abrasions]  Neurologic: [ No weakness or numbness, Alert & oriented X 4]       Vitals:    12/07/22 1421   BP: 129/70   Pulse: 78   Resp: 16   Temp: 98.4 °F (36.9 °C)   TempSrc: Temporal   SpO2: 99%   Weight: 78.1 kg (172 lb 1.6 oz)   Height: 180.3 cm (70.98\")   PainSc: 0-No pain     Current Status 12/7/2022   ECOG score 0      PHYSICAL EXAM:    CONSTITUTIONAL:  Vital signs reviewed.  No distress, looks comfortable.  EYES:  Conjunctiva and lids unremarkable.   EARS,NOSE,MOUTH,THROAT:  Ears and nose appear unremarkable.  RESPIRATORY:  Normal respiratory effort.  Lungs clear to auscultation bilaterally.  CARDIOVASCULAR:  Normal S1, S2.  No murmurs rubs or gallops.  No significant lower extremity edema.  GASTROINTESTINAL: Abdomen appears unremarkable.  Nontender.  Mild distension  LYMPHATIC:  No cervical, supraclavicular lymphadenopathy.  NEURO: AAO x 3, No focal deficits.  Appears to have equal strength all 4   MUSCULOSKELETAL:  Unremarkable digits/nails.  No cyanosis or clubbing.  No apparent joint deformities  SKIN:  Warm.  No rashes. Bruises on arms and abdominal injection sites  PSYCHIATRIC:  Normal judgment and insight.  Normal mood and affect.     RECENT LABS:  Recent labs reviewed    ASSESSMENT:   is a pleasant 68 y/o WM with medical issues comprising alcoholic liver disease, cirrhosis, esophageal varices s/p banding recurrent ascites & HTN who comes for portal vein thrombosis evaluation & management.      # Portal vein thrombosis:  · Acute on chronic. MRI abdomen from 1/7/22 done at [facility] had demonstrated peripheral, chronic nonocclusive thrombus of the main portal vein. The doppler from 2/6/22 shows acute thrombus in the main and " right intrahepatic portal veins.   · This is likely due to altered coagulation due to hepatic dysfunction.  No prior history of VTEs. No family history of thrombosis. However given unusual location, hypercoagulable state work up was sent on 2/7/22 - which was negative.   · Given risk for intestinal infarction with PV thrombus and likely prothrombotic state,  started him on low dose lovenox on 2/7/22.   · Platelets dropped from 42,000 on 2/8/22 to 36,000 on 2/9 to 34,000 on 2/10. Lovenox stopped. HIT Ab sent - which later came back negative. 2/11: Seen by vascular surgery. No interventions recommended.  · 2/15/22: Pt returns to the clinic for a f/u visit. Is tolerating lovenox well. No bleed/bruise. No abd pain or distension. Plan to check CBC weekly. If improving platelets, will consider increasing the dose. F/u in a month.   · 3/22/22: Pt doing well on lovenox 40 mg daily. Platelets remain low but stable in 60-70,000 range. Will continue current dose and consider CBC checks to every other week. Advised to maintain close f/u with GI.   · 4/22/22: S/p TIPSS on 4/14/22 by Dr. Jeronimo with [facility]. Doing well. Plan to continue lovenox for now. Consider stopping after 3-6 months. CBC every other week.   · 8/30/2022: Reports of doing well on Lovenox 40 mg daily.  Has not needed paracentesis since March.  Labs overall stable with persistent anemia and thrombocytopenia in 70-80,000 range.  Saw Dr. Jeronimo in July 2022.  Repeat Doppler of the portal veins negative for any thrombus.  Lovenox discontinued.  · 12/7/2022: Patient continues to do well.  No abdominal pain or distention.  He has remained off of anticoagulation.  Maintains close follow-up with GI.  We will continue to monitor.      # Thrombocytopenia:  - Acute on chronic. Likely liver disease related.   - No bleed/bruise  -Platelet count 82,000 on 12/7/2022.  Monitor.     # Leukopenia:  Likely liver disease related.   Monitor     # Coagulopathy:  PT/INR elevated.    No bleed/bruise. Monitor     # Anemia:  Likely anemia of chronic disease. Baseline Hb in 8-9 gm/dl range since 2020.  Iron panel with no deficiency.   Hb stable.  Patient is taking oral iron supplementation through GI.  Hemoglobin 12.5 on 12/7/2022.     # Hepatic encephalopathy: Per GI    # Decompensated liver cirrhosis    # Ascites: Was getting paracentesis every 2-3 months prior to TIPSS in April 2022.  None since then.    #Gynecomastia: Likely cirrhosis related.  Monitor.    PLAN:   - Portal vein thrombosis in the setting of liver-disease related thrombocytopenia and coagulopathy.  The portal veins from August 2022 negative for any thrombus.  Lovenox stopped.  -Hemoglobin and platelets are overall stable.  - Continue po iron through .   - S/p TIPSS procedure on 4/14/22. Advised to maintain close f/u with GI.  -Will plan to check CBC in 3 months time and follow-up in 6 months time with repeat labs.    Orders Placed This Encounter   Procedures   • Comprehensive Metabolic Panel     Order Specific Question:   Release to patient     Answer:   Routine Release   • Comprehensive Metabolic Panel     Standing Status:   Future     Standing Expiration Date:   12/7/2023     Order Specific Question:   Release to patient     Answer:   Routine Release   • Ammonia     Standing Status:   Future     Standing Expiration Date:   12/7/2023     Order Specific Question:   Release to patient     Answer:   Routine Release   • CBC & Differential     Standing Status:   Future     Standing Expiration Date:   12/7/2023     Order Specific Question:   Manual Differential     Answer:   No   • CBC & Differential     Standing Status:   Future     Standing Expiration Date:   12/7/2023     Order Specific Question:   Manual Differential     Answer:   No   Total time spent during this patient encounter is 45 minutes. The total time spent with the patient includes at least one or more of the following: preparing to see the patient by  reviewing of tests, prior notes or other relevant information, performing appropriate independent examination & evaluation, counseling, ordering of medications, tests or procedures, communicating with other healthcare professionals, when appropriate to coordinate care, documenting clinic information in the electronic medical records or other health records, independently interpreting results of tests and communicating the results to the patient/family or caregiver.

## 2022-12-15 ENCOUNTER — TELEPHONE (OUTPATIENT)
Dept: GASTROENTEROLOGY | Facility: CLINIC | Age: 67
End: 2022-12-15

## 2022-12-15 NOTE — TELEPHONE ENCOUNTER
----- Message from Ruby Mcgee MD sent at 11/16/2022  1:58 PM EST -----  Regarding: Check CMP in 1 to 2 weeks

## 2023-01-20 ENCOUNTER — CLINICAL SUPPORT (OUTPATIENT)
Dept: FAMILY MEDICINE CLINIC | Facility: CLINIC | Age: 68
End: 2023-01-20
Payer: MEDICARE

## 2023-01-20 DIAGNOSIS — Z23 NEED FOR VACCINATION: Primary | ICD-10-CM

## 2023-01-20 PROCEDURE — G0008 ADMIN INFLUENZA VIRUS VAC: HCPCS | Performed by: FAMILY MEDICINE

## 2023-01-20 PROCEDURE — 90662 IIV NO PRSV INCREASED AG IM: CPT | Performed by: FAMILY MEDICINE

## 2023-01-23 NOTE — TELEPHONE ENCOUNTER
.    Caller: Wei Potts    Relationship: Self    Best call back number: [phone]    Requested Prescriptions:   Requested Prescriptions     Pending Prescriptions Disp Refills   • furosemide (LASIX) 40 MG tablet 90 tablet 0     Sig: Take 1 tablet by mouth Daily.        Pharmacy where request should be sent:  [facility]        Does the patient have less than a 3 day supply:  [x] Yes  [] No    Would you like a call back once the refill request has been completed: [x] Yes [] No    If the office needs to give you a call back, can they leave a voicemail: [x] Yes [] No    Milind Thakkar Rep   01/23/23 16:02 EST

## 2023-01-24 RX ORDER — FUROSEMIDE 40 MG/1
40 TABLET ORAL DAILY
Qty: 90 TABLET | Refills: 0 | OUTPATIENT
Start: 2023-01-24

## 2023-01-24 RX ORDER — FUROSEMIDE 40 MG/1
40 TABLET ORAL DAILY
Qty: 90 TABLET | Refills: 0 | Status: SHIPPED | OUTPATIENT
Start: 2023-01-24

## 2023-01-30 RX ORDER — SPIRONOLACTONE 50 MG/1
50 TABLET, FILM COATED ORAL DAILY
Qty: 90 TABLET | Refills: 3 | OUTPATIENT
Start: 2023-01-30

## 2023-03-01 ENCOUNTER — CLINICAL SUPPORT (OUTPATIENT)
Dept: ONCOLOGY | Facility: HOSPITAL | Age: 68
End: 2023-03-01
Payer: MEDICARE

## 2023-03-01 ENCOUNTER — LAB (OUTPATIENT)
Dept: LAB | Facility: HOSPITAL | Age: 68
End: 2023-03-01
Payer: MEDICARE

## 2023-03-01 DIAGNOSIS — I81 PORTAL VEIN THROMBOSIS: ICD-10-CM

## 2023-03-01 DIAGNOSIS — K70.31 ALCOHOLIC CIRRHOSIS OF LIVER WITH ASCITES: ICD-10-CM

## 2023-03-01 DIAGNOSIS — D69.6 THROMBOCYTOPENIA: ICD-10-CM

## 2023-03-01 LAB
BASOPHILS # BLD AUTO: 0.05 10*3/MM3 (ref 0–0.2)
BASOPHILS NFR BLD AUTO: 1 % (ref 0–1.5)
DEPRECATED RDW RBC AUTO: 47 FL (ref 37–54)
EOSINOPHIL # BLD AUTO: 0.49 10*3/MM3 (ref 0–0.4)
EOSINOPHIL NFR BLD AUTO: 10.2 % (ref 0.3–6.2)
ERYTHROCYTE [DISTWIDTH] IN BLOOD BY AUTOMATED COUNT: 14.6 % (ref 12.3–15.4)
HCT VFR BLD AUTO: 38.5 % (ref 37.5–51)
HGB BLD-MCNC: 14.1 G/DL (ref 13–17.7)
IMM GRANULOCYTES # BLD AUTO: 0.06 10*3/MM3 (ref 0–0.05)
IMM GRANULOCYTES NFR BLD AUTO: 1.2 % (ref 0–0.5)
LYMPHOCYTES # BLD AUTO: 0.74 10*3/MM3 (ref 0.7–3.1)
LYMPHOCYTES NFR BLD AUTO: 15.4 % (ref 19.6–45.3)
MCH RBC QN AUTO: 32.2 PG (ref 26.6–33)
MCHC RBC AUTO-ENTMCNC: 36.6 G/DL (ref 31.5–35.7)
MCV RBC AUTO: 87.9 FL (ref 79–97)
MONOCYTES # BLD AUTO: 0.66 10*3/MM3 (ref 0.1–0.9)
MONOCYTES NFR BLD AUTO: 13.7 % (ref 5–12)
NEUTROPHILS NFR BLD AUTO: 2.81 10*3/MM3 (ref 1.7–7)
NEUTROPHILS NFR BLD AUTO: 58.5 % (ref 42.7–76)
NRBC BLD AUTO-RTO: 0 /100 WBC (ref 0–0.2)
PLATELET # BLD AUTO: 79 10*3/MM3 (ref 140–450)
PMV BLD AUTO: 9.3 FL (ref 6–12)
RBC # BLD AUTO: 4.38 10*6/MM3 (ref 4.14–5.8)
WBC NRBC COR # BLD: 4.81 10*3/MM3 (ref 3.4–10.8)

## 2023-03-01 PROCEDURE — G0463 HOSPITAL OUTPT CLINIC VISIT: HCPCS

## 2023-03-01 PROCEDURE — 85025 COMPLETE CBC W/AUTO DIFF WBC: CPT

## 2023-03-01 PROCEDURE — 36415 COLL VENOUS BLD VENIPUNCTURE: CPT

## 2023-03-01 NOTE — NURSING NOTE
Lab Results   Component Value Date    WBC 4.81 03/01/2023    HGB 14.1 03/01/2023    HCT 38.5 03/01/2023    MCV 87.9 03/01/2023    PLT 79 (L) 03/01/2023     Pt here for 3 month CBC review.  Pt denies any issues or concerns.  Platelets have declined slightly since last lab resulted of 82,000 on 12/7/22.  Pt asked to call if any concerns arise.  Pt given printed copy of labs and v/u.

## 2023-03-02 NOTE — TELEPHONE ENCOUNTER
Caller: Wei Potts    Relationship: Self    Best call back number: [phone]    Requested Prescriptions:   TINY RED MULTIVITAMIN TABLET- UNSURE OF THE NAME OF THE NAME BUT WILL NEED A PRESCRIPTION FOR THIS.    Requested Prescriptions     Pending Prescriptions Disp Refills   • lactulose (CHRONULAC) 10 GM/15ML solution 4050 mL 1     Sig: TAKE 45 MLS BY MOUTH 2 TO 3 TIMES A DAY WITH GOAL OF 3 TO 4 BOWEL MOVEMENTS PER DAY        Pharmacy where request should be sent: [facility]     Additional details provided by patient: HAS ONE WEEK OF LACTULOSE, BUT NEED THE MULTIVITAMIN.    PT REQUESTING TO KNOW WHAT IS IN THE MULTIVITAMIN.    Does the patient have less than a 3 day supply:  [] Yes  [x] No    If the office needs to give you a call back, can they leave a voicemail: [x] Yes [] No    Milind Baron Rep   03/02/23 12:29 EST

## 2023-03-03 RX ORDER — LACTULOSE 10 G/15ML
SOLUTION ORAL
Qty: 4050 ML | Refills: 1 | Status: SHIPPED | OUTPATIENT
Start: 2023-03-03

## 2023-03-13 ENCOUNTER — TELEPHONE (OUTPATIENT)
Dept: GASTROENTEROLOGY | Facility: CLINIC | Age: 68
End: 2023-03-13
Payer: MEDICARE

## 2023-03-13 NOTE — TELEPHONE ENCOUNTER
Caller: Wei Potts    Relationship: Self    Best call back number: [phone]    What is the best time to reach you: ANYTIME     Who are you requesting to speak with (clinical staff, provider,  specific staff member): CLINICAL STAFF     What was the call regarding: PATIENT STATED HE NEEDS A REFILL OF A MEDICATION BUT DOES NOT REMEMBER THE NAME OF THIS MEDICATION. PATIENT STATED IT IS A TINY RED MULTIVITAMIN TABLET AND HE IS COMPLETELY OUT OF THIS MEDICATION. PATIENT IS REQUESTING A CALL BACK. OK TO LVM.     Do you require a callback: YES

## 2023-03-13 NOTE — TELEPHONE ENCOUNTER
Returned call to pt.  Unfortunately he has no idea what pill this is.  He thinks it may be a MVI.  He is going to research this further and reach out to Dr muñiz's office .

## 2023-04-03 ENCOUNTER — TELEPHONE (OUTPATIENT)
Dept: FAMILY MEDICINE CLINIC | Facility: CLINIC | Age: 68
End: 2023-04-03
Payer: MEDICARE

## 2023-04-18 ENCOUNTER — OFFICE VISIT (OUTPATIENT)
Dept: GASTROENTEROLOGY | Facility: CLINIC | Age: 68
End: 2023-04-18
Payer: MEDICARE

## 2023-04-18 VITALS
DIASTOLIC BLOOD PRESSURE: 73 MMHG | HEIGHT: 71 IN | HEART RATE: 88 BPM | BODY MASS INDEX: 24.19 KG/M2 | SYSTOLIC BLOOD PRESSURE: 148 MMHG | TEMPERATURE: 98.7 F | WEIGHT: 172.8 LBS | OXYGEN SATURATION: 96 %

## 2023-04-18 DIAGNOSIS — K74.60 CIRRHOSIS OF LIVER WITH ASCITES, UNSPECIFIED HEPATIC CIRRHOSIS TYPE: ICD-10-CM

## 2023-04-18 DIAGNOSIS — Z95.828 S/P TIPS (TRANSJUGULAR INTRAHEPATIC PORTOSYSTEMIC SHUNT): Primary | ICD-10-CM

## 2023-04-18 DIAGNOSIS — R18.8 CIRRHOSIS OF LIVER WITH ASCITES, UNSPECIFIED HEPATIC CIRRHOSIS TYPE: ICD-10-CM

## 2023-04-18 RX ORDER — FUROSEMIDE 40 MG/1
40 TABLET ORAL DAILY
Qty: 90 TABLET | Refills: 3 | Status: SHIPPED | OUTPATIENT
Start: 2023-04-18 | End: 2023-04-21

## 2023-04-18 NOTE — PROGRESS NOTES
Subjective   Chief Complaint   Patient presents with   • Cirrhosis       Wei Potts is a  68 y.o. male here for a follow up visit for cirrhosis.     He is working again-not full-time but he is fairly physically active.  Increasing his strength.       He underwent TIPS placement in April 2022 with embolization of varices at that time.      Takes lactulose 2 times daily - having regular BMs.  He has some urgency at times. No blood in stool or black stool.      Weight has been stable - he weighs himself daily.  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  No confusion.  HPI  Past Medical History:   Diagnosis Date   • Alcoholism    • Anemia    • Cirrhosis    • Clot    • COVID-19 03/17/2022   • Encephalopathy    • Hypertension    • Liver disease      Past Surgical History:   Procedure Laterality Date   • COLONOSCOPY     • ENDOSCOPY         Current Outpatient Medications:   •  furosemide (LASIX) 40 MG tablet, Take 1 tablet by mouth Daily., Disp: 90 tablet, Rfl: 3  •  lactulose (CHRONULAC) 10 GM/15ML solution, TAKE 45 MLS BY MOUTH 2 TO 3 TIMES A DAY WITH GOAL OF 3 TO 4 BOWEL MOVEMENTS PER DAY, Disp: 4050 mL, Rfl: 1  •  multivitamin (multivitamin) tablet tablet, Take 1 tablet by mouth Daily., Disp: 30 tablet, Rfl: 3  PRN Meds:.  Allergies   Allergen Reactions   • Iron GI Intolerance   • Zinc GI Intolerance     vomiting     Social History     Socioeconomic History   • Marital status:    Tobacco Use   • Smoking status: Never   • Smokeless tobacco: Never   Vaping Use   • Vaping Use: Never used   Substance and Sexual Activity   • Alcohol use: Not Currently     Comment: No ETOH on 2.5 yrs   • Drug use: No   • Sexual activity: Defer     Family History   Problem Relation Age of Onset   • Diabetes Mother    • Hypertension Father      Review of Systems   Constitutional: Negative for appetite change and unexpected weight change.   Gastrointestinal: Negative for abdominal distention and abdominal pain.      Vitals:    04/18/23 1547   BP: 148/73   Pulse: 88   Temp: 98.7 °F (37.1 °C)   SpO2: 96%         04/18/23  1547   Weight: 78.4 kg (172 lb 12.8 oz)       Objective   Physical Exam  Constitutional:       Appearance: Normal appearance. He is well-developed.   HENT:      Head: Normocephalic and atraumatic.   Eyes:      General: No scleral icterus.     Conjunctiva/sclera: Conjunctivae normal.   Pulmonary:      Effort: Pulmonary effort is normal.   Abdominal:      General: There is no distension.      Palpations: Abdomen is soft.   Musculoskeletal:      Cervical back: Normal range of motion and neck supple.   Skin:     General: Skin is warm and dry.   Neurological:      Mental Status: He is alert.   Psychiatric:         Mood and Affect: Mood normal.         Behavior: Behavior normal.       No radiology results for the last 7 days    Assessment & Plan   Diagnoses and all orders for this visit:    S/P TIPS (transjugular intrahepatic portosystemic shunt)  -     Duplex Portal Hepatic Complete CAR; Future    Cirrhosis of liver with ascites, unspecified hepatic cirrhosis type  -     US Liver; Future    Other orders  -     furosemide (LASIX) 40 MG tablet; Take 1 tablet by mouth Daily.      Plan:  · Clinically doing well with significant improvement overall.  Reviewed recent labs from [facility].  Labs stable.  AFP normal.  · Due for repeat ultrasound for HCC surveillance this summer as well as Doppler to assess for TIPS patency.  No complications from TIPS at this time.  · Continue current medications.

## 2023-04-21 RX ORDER — FUROSEMIDE 40 MG/1
40 TABLET ORAL DAILY
Qty: 90 TABLET | Refills: 3 | Status: SHIPPED | OUTPATIENT
Start: 2023-04-21

## 2023-05-31 ENCOUNTER — OFFICE VISIT (OUTPATIENT)
Dept: ONCOLOGY | Facility: CLINIC | Age: 68
End: 2023-05-31

## 2023-05-31 ENCOUNTER — LAB (OUTPATIENT)
Dept: LAB | Facility: HOSPITAL | Age: 68
End: 2023-05-31

## 2023-05-31 VITALS
OXYGEN SATURATION: 98 % | WEIGHT: 171.5 LBS | TEMPERATURE: 98.7 F | BODY MASS INDEX: 24.01 KG/M2 | RESPIRATION RATE: 16 BRPM | SYSTOLIC BLOOD PRESSURE: 162 MMHG | HEIGHT: 71 IN | DIASTOLIC BLOOD PRESSURE: 78 MMHG | HEART RATE: 88 BPM

## 2023-05-31 DIAGNOSIS — I81 PORTAL VEIN THROMBOSIS: ICD-10-CM

## 2023-05-31 DIAGNOSIS — D69.6 THROMBOCYTOPENIA: Chronic | ICD-10-CM

## 2023-05-31 DIAGNOSIS — D50.8 OTHER IRON DEFICIENCY ANEMIA: Primary | ICD-10-CM

## 2023-05-31 DIAGNOSIS — K70.31 ALCOHOLIC CIRRHOSIS OF LIVER WITH ASCITES: ICD-10-CM

## 2023-05-31 DIAGNOSIS — D68.9 COAGULOPATHY: ICD-10-CM

## 2023-05-31 LAB
ALBUMIN SERPL-MCNC: 3 G/DL (ref 3.5–5.2)
ALBUMIN/GLOB SERPL: 0.8 G/DL (ref 1.1–2.4)
ALP SERPL-CCNC: 162 U/L (ref 38–116)
ALT SERPL W P-5'-P-CCNC: 12 U/L (ref 0–41)
AMMONIA BLD-SCNC: 105 UMOL/L (ref 16–60)
ANION GAP SERPL CALCULATED.3IONS-SCNC: 11.7 MMOL/L (ref 5–15)
AST SERPL-CCNC: 38 U/L (ref 0–40)
BASOPHILS # BLD AUTO: 0.03 10*3/MM3 (ref 0–0.2)
BASOPHILS NFR BLD AUTO: 0.8 % (ref 0–1.5)
BILIRUB SERPL-MCNC: 1.9 MG/DL (ref 0.2–1.2)
BUN SERPL-MCNC: 7 MG/DL (ref 6–20)
BUN/CREAT SERPL: 9.6 (ref 7.3–30)
CALCIUM SPEC-SCNC: 8.7 MG/DL (ref 8.5–10.2)
CHLORIDE SERPL-SCNC: 109 MMOL/L (ref 98–107)
CO2 SERPL-SCNC: 19.3 MMOL/L (ref 22–29)
CREAT SERPL-MCNC: 0.73 MG/DL (ref 0.7–1.3)
DEPRECATED RDW RBC AUTO: 47.1 FL (ref 37–54)
EGFRCR SERPLBLD CKD-EPI 2021: 100.3 ML/MIN/1.73
EOSINOPHIL # BLD AUTO: 0.3 10*3/MM3 (ref 0–0.4)
EOSINOPHIL NFR BLD AUTO: 8.2 % (ref 0.3–6.2)
ERYTHROCYTE [DISTWIDTH] IN BLOOD BY AUTOMATED COUNT: 14 % (ref 12.3–15.4)
GLOBULIN UR ELPH-MCNC: 3.9 GM/DL (ref 1.8–3.5)
GLUCOSE SERPL-MCNC: 259 MG/DL (ref 74–124)
HCT VFR BLD AUTO: 34.6 % (ref 37.5–51)
HGB BLD-MCNC: 12.1 G/DL (ref 13–17.7)
IMM GRANULOCYTES # BLD AUTO: 0.01 10*3/MM3 (ref 0–0.05)
IMM GRANULOCYTES NFR BLD AUTO: 0.3 % (ref 0–0.5)
LYMPHOCYTES # BLD AUTO: 0.57 10*3/MM3 (ref 0.7–3.1)
LYMPHOCYTES NFR BLD AUTO: 15.5 % (ref 19.6–45.3)
MCH RBC QN AUTO: 32.3 PG (ref 26.6–33)
MCHC RBC AUTO-ENTMCNC: 35 G/DL (ref 31.5–35.7)
MCV RBC AUTO: 92.3 FL (ref 79–97)
MONOCYTES # BLD AUTO: 0.51 10*3/MM3 (ref 0.1–0.9)
MONOCYTES NFR BLD AUTO: 13.9 % (ref 5–12)
NEUTROPHILS NFR BLD AUTO: 2.25 10*3/MM3 (ref 1.7–7)
NEUTROPHILS NFR BLD AUTO: 61.3 % (ref 42.7–76)
NRBC BLD AUTO-RTO: 0 /100 WBC (ref 0–0.2)
PLATELET # BLD AUTO: 74 10*3/MM3 (ref 140–450)
PMV BLD AUTO: 9.7 FL (ref 6–12)
POTASSIUM SERPL-SCNC: 3.9 MMOL/L (ref 3.5–4.7)
PROT SERPL-MCNC: 6.9 G/DL (ref 6.3–8)
RBC # BLD AUTO: 3.75 10*6/MM3 (ref 4.14–5.8)
SODIUM SERPL-SCNC: 140 MMOL/L (ref 134–145)
WBC NRBC COR # BLD: 3.67 10*3/MM3 (ref 3.4–10.8)

## 2023-05-31 PROCEDURE — 36415 COLL VENOUS BLD VENIPUNCTURE: CPT

## 2023-05-31 PROCEDURE — 80053 COMPREHEN METABOLIC PANEL: CPT

## 2023-05-31 PROCEDURE — 85025 COMPLETE CBC W/AUTO DIFF WBC: CPT

## 2023-05-31 PROCEDURE — 82140 ASSAY OF AMMONIA: CPT | Performed by: INTERNAL MEDICINE

## 2023-05-31 NOTE — PROGRESS NOTES
[facility]    CONSULTING IN BLOOD DISORDERS & CANCER      REASON FOR CONSULTATION/CHIEF COMPLAINT:     Evaluation & management for portal vein thrombosis                             REQUESTING PHYSICIAN: No ref. provider found  RECORDS OBTAINED:  Records of the patients history including those from the electronic medical record were reviewed and summarized in detail.    HISTORY OF PRESENT ILLNESS:    The patient is a 68 y.o. year old male with medical issues comprising alcoholic liver disease, cirrhosis, esophageal varices s/p banding recurrent ascites & HTN who was admitted to the [facility] on 2/4/22 with hepatic encephalopathy.      2/5/22: A US abdomen Cirrhosis with findings of portosystemic shunting including mild to moderate splenomegaly and ascites. There also appears to be hepatofugal in the main portal vein with concern for thrombus formation as well.     2/6/22: A doppler portal vein revealed acute thrombus in the main portal vein, right intrahepatic portal vein, along with abnormal flow in the extrahepatic portal vein and left intrahepatic portal vein.     Of note, a MRI abdomen from 1/7/22 done at [facility] had demonstrated peripheral, chronic nonocclusive thrombus of the main portal vein.     He was seen by inpatient hem/onc team. Given unusual location, hypercoagulable state work up was performed  on 2/7/22. . No prior history of VTEs. No family history of thrombosis.     -  Factor V leiden mutation negative. Factor II & AT-III activity low (likely due to liver dysfunction). Mildly elevated ACL, IgM (unclear significance). LA & b2-GP negative.  JAK2 mutation negative. [facility] flow cytometry negative. Protein C & S levels  not done as they would be altered due to liver disease.     Patient has chronic thrombocytopenia with platelets in 40-50,000s range. The CBC from 2/7/22 showed platelet count of 40,000. No c/o bleed or bruise.   -Given risk for intestinal infarction with PV thrombus and likely prothrombotic state,   started him on low dose lovenox on 2/7/22.   - Tolerating well.   - Platelets dropped from 42,000 on 2/8/22 to 36,000 on 2/9 to 34,000 on 2/10. Lovenox stopped. HIT Ab sent - which later came back negative.   - 2/11: Seen by vascular surgery. No interventions planned.   2/13: HIT ab came back negative. Platelets improved to 48,000. Was resumed on lovenox 40 mg sq daily & discharged home.     Patient first seen in the hematology clinic on 2/15/22. He has been tolerating lovenox 40 mg daily well.  Denies any bleed/bruise. Saw , GI in March 2022. Discussed plan for TIPSS procedure. A CT a/p performed 3/22/22 showed nonocclusive hypodense thrombus within the main portal vein at the nayeli hepatis.     4/14/22: Underwent TIPSS procedure at [facility]. Tolerated well.    8/23/2022: Duplex portable veins negative for any thrombus. TIPS patent.  Anticoagulation with Lovenox discontinued.    INTERIM HISTORY:  Patient returns to the clinic for a f/u visit.  Reports of feeling well. Mentation more clearer.  He has not needed paracentesis since March 2022.  Continues lactulose, rifaximin, Docusate senna.  Has been taking oral iron supplementation daily.  He does report of easy bruises, denies any bleeding.  Denies any abdominal distention, nausea or vomiting.  Mentation has been clear.  He maintains follow-up with gastroenterologists, Dr. Mcgee and Dr. Miramontes.    Past Medical History:   Diagnosis Date   • Alcoholism    • Anemia    • Cirrhosis    • Clot    • COVID-19 03/17/2022   • Encephalopathy    • Hypertension    • Liver disease      Past Surgical History:   Procedure Laterality Date   • COLONOSCOPY     • ENDOSCOPY         MEDICATIONS    Current Outpatient Medications:   •  furosemide (LASIX) 40 MG tablet, TAKE 1 TABLET BY MOUTH DAILY, Disp: 90 tablet, Rfl: 3  •  lactulose (CHRONULAC) 10 GM/15ML solution, TAKE 45 MLS BY MOUTH 2 TO 3 TIMES A DAY WITH GOAL OF 3 TO 4 BOWEL MOVEMENTS PER DAY, Disp: 4050 mL, Rfl: 1  •   "multivitamin (multivitamin) tablet tablet, Take 1 tablet by mouth Daily., Disp: 30 tablet, Rfl: 3    ALLERGIES:     Allergies   Allergen Reactions   • Iron GI Intolerance   • Zinc GI Intolerance     vomiting       SOCIAL HISTORY:       Social History     Socioeconomic History   • Marital status:    Tobacco Use   • Smoking status: Never   • Smokeless tobacco: Never   Vaping Use   • Vaping Use: Never used   Substance and Sexual Activity   • Alcohol use: Not Currently     Comment: No ETOH on 2.5 yrs   • Drug use: No   • Sexual activity: Defer         FAMILY HISTORY:  Family History   Problem Relation Age of Onset   • Diabetes Mother    • Hypertension Father      REVIEW OF SYSTEMS:  As per HPI       Vitals:    05/31/23 1551   BP: 162/78   Pulse: 88   Resp: 16   Temp: 98.7 °F (37.1 °C)   TempSrc: Temporal   SpO2: 98%   Weight: 77.8 kg (171 lb 8 oz)   Height: 180.3 cm (70.98\")   PainSc: 0-No pain         5/31/2023     3:40 PM   Current Status   ECOG score 0      PHYSICAL EXAM:    CONSTITUTIONAL:  Vital signs reviewed.  No distress, looks comfortable.  EYES:  Conjunctiva and lids unremarkable.   EARS,NOSE,MOUTH,THROAT:  Ears and nose appear unremarkable.    RESPIRATORY:  Normal respiratory effort.  Lungs clear to auscultation bilaterally.  CARDIOVASCULAR:  Normal S1, S2.  No murmurs rubs or gallops.  No significant lower extremity edema.  GASTROINTESTINAL: Abdomen appears unremarkable.  Nontender.  Mild distension  LYMPHATIC:  No cervical, supraclavicular lymphadenopathy.  NEURO: AAO x 3, No focal deficits.  Appears to have equal strength all 4   MUSCULOSKELETAL:  Unremarkable digits/nails.  No cyanosis or clubbing.  No apparent joint deformities  SKIN:  Warm.  No rashes. Bruises on arms and abdominal injection sites  PSYCHIATRIC:  Normal judgment and insight.  Normal mood and affect.     RECENT LABS:  Recent labs reviewed    ASSESSMENT:   is a pleasant 67 y/o WM with medical issues comprising alcoholic " liver disease, cirrhosis, esophageal varices s/p banding recurrent ascites & HTN who comes for portal vein thrombosis evaluation & management.      # Portal vein thrombosis:  · Acute on chronic. MRI abdomen from 1/7/22 done at [facility] had demonstrated peripheral, chronic nonocclusive thrombus of the main portal vein. The doppler from 2/6/22 shows acute thrombus in the main and right intrahepatic portal veins.   · This is likely due to altered coagulation due to hepatic dysfunction.  No prior history of VTEs. No family history of thrombosis. However given unusual location, hypercoagulable state work up was sent on 2/7/22 - which was negative.   · Given risk for intestinal infarction with PV thrombus and likely prothrombotic state,  started him on low dose lovenox on 2/7/22.   · Platelets dropped from 42,000 on 2/8/22 to 36,000 on 2/9 to 34,000 on 2/10. Lovenox stopped. HIT Ab sent - which later came back negative. 2/11: Seen by vascular surgery. No interventions recommended.  · 2/15/22: Pt returns to the clinic for a f/u visit. Is tolerating lovenox well. No bleed/bruise. No abd pain or distension. Plan to check CBC weekly. If improving platelets, will consider increasing the dose. F/u in a month.   · 3/22/22: Pt doing well on lovenox 40 mg daily. Platelets remain low but stable in 60-70,000 range. Will continue current dose and consider CBC checks to every other week. Advised to maintain close f/u with GI.   · 4/22/22: S/p TIPSS on 4/14/22 by Dr. Jeronimo with [facility]. Doing well. Plan to continue lovenox for now. Consider stopping after 3-6 months. CBC every other week.   · 8/30/2022: Reports of doing well on Lovenox 40 mg daily.  Has not needed paracentesis since March.  Labs overall stable with persistent anemia and thrombocytopenia in 70-80,000 range.  Saw Dr. Jeronimo in July 2022.  Repeat Doppler of the portal veins negative for any thrombus.  Lovenox discontinued.  · 5/31/2023: Patient continues to do well.  No  abdominal pain or distention.  He has remained off of anticoagulation.  Maintains close follow-up with GI.  Will continue to monitor.      # Thrombocytopenia:  - Acute on chronic. Likely liver disease related.   - No bleed/bruise  -Platelet count 74,000 on 5/31/2023.  Monitor.     # Leukopenia:  · Likely liver disease related. Monitor     # Coagulopathy:  PT/INR elevated.   No bleed/bruise. Monitor     # Anemia:  · Likely anemia of chronic disease. Baseline Hb in 8-9 gm/dl range since 2020.   · Iron panel with no deficiency.   · Patient is taking oral iron supplementation through GI.  · Hemoglobin 12.1 on 5/31/2023     # Hepatic encephalopathy: Per GI    # Decompensated liver cirrhosis: As per GI    # Ascites: Was getting paracentesis every 2-3 months prior to TIPSS in April 2022.  None since then.    #Gynecomastia: Likely cirrhosis related.  Monitor.    PLAN:   - Portal vein thrombosis in the setting of liver-disease related thrombocytopenia and coagulopathy.  The portal veins from August 2022 negative for any thrombus.  Lovenox stopped.  -Hemoglobin and platelets are overall stable.  - Continue po iron through .   - S/p TIPSS procedure on 4/14/22. Advised to maintain close f/u with GI.  -Will follow-up in 6 months time with repeat labs or sooner if needed.    Orders Placed This Encounter   Procedures   • Comprehensive Metabolic Panel     Standing Status:   Future     Standing Expiration Date:   6/1/2024     Order Specific Question:   Release to patient     Answer:   Routine Release   • CBC & Differential     Standing Status:   Future     Standing Expiration Date:   6/1/2024     Order Specific Question:   Manual Differential     Answer:   No   Total time spent during this patient encounter is 45 minutes. The total time spent with the patient includes at least one or more of the following: preparing to see the patient by reviewing of tests, prior notes or other relevant information, performing appropriate  independent examination & evaluation, counseling, ordering of medications, tests or procedures, communicating with other healthcare professionals, when appropriate to coordinate care, documenting clinic information in the electronic medical records or other health records, independently interpreting results of tests and communicating the results to the patient/family or caregiver.

## 2023-08-29 NOTE — NURSING NOTE
Patient arrived id rad triage for Paracentesis with Albumin.    Protective goggles and mask in place with all patient interactions today     English

## 2023-10-06 NOTE — TELEPHONE ENCOUNTER
- Follow up with your doctor within 2-3 days.  - Bring any results with you to the appointment.  - Return to the ED for any new or worsening symptoms.  - Take Tylenol (Acetaminophen) 650mg or Motrin (Ibuprofen/Advil) 600mg every 6 hours as needed for pain.    Vm left for pt to call back

## 2023-10-09 RX ORDER — LACTULOSE 10 G/15ML
SOLUTION ORAL
Qty: 4050 ML | Refills: 1 | Status: SHIPPED | OUTPATIENT
Start: 2023-10-09

## 2023-11-06 RX ORDER — LACTULOSE 10 G/15ML
SOLUTION ORAL
Qty: 12150 ML | Refills: 0 | Status: SHIPPED | OUTPATIENT
Start: 2023-11-06

## 2023-11-27 ENCOUNTER — TELEPHONE (OUTPATIENT)
Dept: GASTROENTEROLOGY | Facility: CLINIC | Age: 68
End: 2023-11-27

## 2023-11-27 NOTE — TELEPHONE ENCOUNTER
HUB STAFF ATTEMPTED TO FOLLOW WARM TRANSFER PROCESS BUT WAS UNSUCCESSFUL.     Caller: Wei Potts    Relationship: Self    Best call back number: [phone]    What is the best time to reach you: ANYTIME     Who are you requesting to speak with (clinical staff, provider,  specific staff member): CLINICAL     Do you know the name of the person who called: MARGARITA CHIANG     What was the call regarding: PT QUESTIONS     Is it okay if the provider responds through MyChart: PREFERS PHONE CALL

## 2023-11-27 NOTE — TELEPHONE ENCOUNTER
Hub staff attempted to follow warm transfer process and was unsuccessful     Caller: Wei Potts    Relationship to patient: Self    Best call back number: [phone] OKAY TO LEAVE VM    Patient is needing: PT HAS A FEW QUESTIONS FOR NURSE. PT HAS BEEN LOSING WEIGHT OF ABOUT 5-6 LBS OVER THE LAST 3-4 WEEKS AND IS WONDERING IF THIS IS ANY CAUSE FOR CONCERN. PT REPORTS NO OTHER ISSUES OTHER THAN WEIGHT LOSS. PT REPORTS NOT REALLY HAVING AN APPETITE IN MORE OF THE EVENING HOURS BUT NO OTHER CHANGES. PLEASE CALL PT BACK AND ADVISE.

## 2023-11-28 NOTE — TELEPHONE ENCOUNTER
Vm left for pt, that concerns of wt loss best discussed during his upcoming apt this week with Dr Mcgee.  Advised if he still needs to talk to a   Nurse to call back  Thanks

## 2023-11-30 ENCOUNTER — OFFICE VISIT (OUTPATIENT)
Dept: GASTROENTEROLOGY | Facility: CLINIC | Age: 68
End: 2023-11-30
Payer: MEDICARE

## 2023-11-30 VITALS
DIASTOLIC BLOOD PRESSURE: 72 MMHG | SYSTOLIC BLOOD PRESSURE: 122 MMHG | WEIGHT: 162.6 LBS | BODY MASS INDEX: 22.76 KG/M2 | HEART RATE: 81 BPM | TEMPERATURE: 97.8 F | HEIGHT: 71 IN

## 2023-11-30 DIAGNOSIS — Z95.828 S/P TIPS (TRANSJUGULAR INTRAHEPATIC PORTOSYSTEMIC SHUNT): ICD-10-CM

## 2023-11-30 DIAGNOSIS — K74.60 CIRRHOSIS OF LIVER WITHOUT ASCITES, UNSPECIFIED HEPATIC CIRRHOSIS TYPE: Primary | ICD-10-CM

## 2023-11-30 DIAGNOSIS — R63.4 WEIGHT LOSS: ICD-10-CM

## 2023-11-30 PROCEDURE — 1159F MED LIST DOCD IN RCRD: CPT | Performed by: INTERNAL MEDICINE

## 2023-11-30 PROCEDURE — 1160F RVW MEDS BY RX/DR IN RCRD: CPT | Performed by: INTERNAL MEDICINE

## 2023-11-30 PROCEDURE — 3078F DIAST BP <80 MM HG: CPT | Performed by: INTERNAL MEDICINE

## 2023-11-30 PROCEDURE — 3074F SYST BP LT 130 MM HG: CPT | Performed by: INTERNAL MEDICINE

## 2023-11-30 PROCEDURE — 99214 OFFICE O/P EST MOD 30 MIN: CPT | Performed by: INTERNAL MEDICINE

## 2023-11-30 RX ORDER — SIMVASTATIN 20 MG
20 TABLET ORAL
COMMUNITY
Start: 2023-09-08 | End: 2024-09-07

## 2023-11-30 NOTE — PROGRESS NOTES
Subjective   Chief Complaint   Patient presents with    Cirrhosis    Heartburn       Wei Potts is a  68 y.o. male here for a follow up visit for heartburn, cirrhosis.  He was last seen in April 2023.    He underwent TIPS placement in April 2022 with embolization of varices at that time.      Takes lactulose 2 times daily - having regular BMs.  Has 1-2 sometimes 3.  No confusion.  No blood in stool or black stool.  He still has some fecal urgency.    He has not been taking the furosemide for several weeks.  He still urinates frequently but not taking water pills.    Weight has drfited down 10 lbs over past 6 months.  Appetite is the same.  No nausea or vomiting.  No heartburn.    Ultrasound Doppler at [facility] reviewed-11/21/2023 shows cirrhotic liver, patent TIPS  HPI      Past Medical History:   Diagnosis Date    Alcoholism     Anemia     Cirrhosis     Clot     COVID-19 03/17/2022    Encephalopathy     Hypertension     Liver disease      Past Surgical History:   Procedure Laterality Date    COLONOSCOPY      ENDOSCOPY         Current Outpatient Medications:     furosemide (LASIX) 40 MG tablet, TAKE 1 TABLET BY MOUTH DAILY, Disp: 90 tablet, Rfl: 3    lactulose (CHRONULAC) 10 GM/15ML solution, TAKE 45MLS BY MOUTH TWICE DAILY TO THREE TIMES DAILY WITH A GOAL OF 3-4 BOWEL MOVEMENTS PER DAY, Disp: 53099 mL, Rfl: 0    multivitamin (multivitamin) tablet tablet, Take 1 tablet by mouth Daily., Disp: 30 tablet, Rfl: 3    simvastatin (ZOCOR) 20 MG tablet, Take 1 tablet by mouth., Disp: , Rfl:   PRN Meds:.  Allergies   Allergen Reactions    Iron GI Intolerance    Zinc GI Intolerance     vomiting     Social History     Socioeconomic History    Marital status:    Tobacco Use    Smoking status: Never    Smokeless tobacco: Never   Vaping Use    Vaping Use: Never used   Substance and Sexual Activity    Alcohol use: Not Currently     Comment: No ETOH on 2.5 yrs    Drug use: No    Sexual activity: Defer     Family  History   Problem Relation Age of Onset    Diabetes Mother     Hypertension Father      Review of Systems   Constitutional:  Positive for unexpected weight change. Negative for appetite change.   Cardiovascular:  Negative for leg swelling.   Gastrointestinal:  Negative for abdominal distention, abdominal pain, blood in stool and constipation.   Genitourinary:  Positive for frequency.     Vitals:    11/30/23 1040   BP: 122/72   Pulse: 81   Temp: 97.8 °F (36.6 °C)         11/30/23  1040   Weight: 73.8 kg (162 lb 9.6 oz)       Objective   Physical Exam  Constitutional:       Appearance: Normal appearance. He is well-developed.   HENT:      Head: Normocephalic and atraumatic.   Eyes:      General: No scleral icterus.     Conjunctiva/sclera: Conjunctivae normal.   Pulmonary:      Effort: Pulmonary effort is normal.      Breath sounds: Normal breath sounds.   Abdominal:      General: There is no distension.      Palpations: Abdomen is soft.   Musculoskeletal:      Cervical back: Normal range of motion and neck supple.      Right lower leg: No edema.      Left lower leg: No edema.   Skin:     General: Skin is warm and dry.   Neurological:      Mental Status: He is alert.   Psychiatric:         Mood and Affect: Mood normal.         Behavior: Behavior normal.       No radiology results for the last 7 days    Assessment & Plan   Diagnoses and all orders for this visit:    Cirrhosis of liver with ascites, unspecified hepatic cirrhosis type    S/P TIPS (transjugular intrahepatic portosystemic shunt)  -     CBC & Differential  -     Comprehensive Metabolic Panel  -     Protime-INR  -     Ammonia  -     TSH  -     AFP Tumor Marker  -     Ferritin    Cirrhosis of liver without ascites, unspecified hepatic cirrhosis type  -     CBC & Differential  -     Comprehensive Metabolic Panel  -     Protime-INR  -     Ammonia  -     TSH  -     AFP Tumor Marker  -     Ferritin    Weight loss  -     TSH    Other orders  -     simvastatin  (ZOCOR) 20 MG tablet; Take 1 tablet by mouth.      Plan:  Recent ultrasound and Doppler reviewed-no concerning findings.  TIPS is patent.  Check AFP  Routine labs today.  Up-to-date on colon cancer screening  Recommend he see his PCP to discuss urinary frequency given ongoing issues despite diuretics and evaluate for prostatic hypertrophy  Continue to hold Lasix-he has no evidence of excess volume at this point.  No ascites on recent ultrasound which was done off diuretics  2 g sodium diet  Continue lactulose

## 2023-12-01 LAB
AFP-TM SERPL-MCNC: 2.3 NG/ML (ref 0–8.4)
ALBUMIN SERPL-MCNC: 3.8 G/DL (ref 3.9–4.9)
ALBUMIN/GLOB SERPL: 0.9 {RATIO} (ref 1.2–2.2)
ALP SERPL-CCNC: 204 IU/L (ref 44–121)
ALT SERPL-CCNC: 29 IU/L (ref 0–44)
AMMONIA PLAS-MCNC: 168 UG/DL (ref 40–200)
AST SERPL-CCNC: 39 IU/L (ref 0–40)
BASOPHILS # BLD AUTO: 0 X10E3/UL (ref 0–0.2)
BASOPHILS NFR BLD AUTO: 2 %
BILIRUB SERPL-MCNC: 2.4 MG/DL (ref 0–1.2)
BUN SERPL-MCNC: 8 MG/DL (ref 8–27)
BUN/CREAT SERPL: 9 (ref 10–24)
CALCIUM SERPL-MCNC: 9.3 MG/DL (ref 8.6–10.2)
CHLORIDE SERPL-SCNC: 101 MMOL/L (ref 96–106)
CO2 SERPL-SCNC: 21 MMOL/L (ref 20–29)
CREAT SERPL-MCNC: 0.87 MG/DL (ref 0.76–1.27)
EGFRCR SERPLBLD CKD-EPI 2021: 95 ML/MIN/1.73
EOSINOPHIL # BLD AUTO: 0.2 X10E3/UL (ref 0–0.4)
EOSINOPHIL NFR BLD AUTO: 8 %
ERYTHROCYTE [DISTWIDTH] IN BLOOD BY AUTOMATED COUNT: 14.8 % (ref 11.6–15.4)
FERRITIN SERPL-MCNC: 47 NG/ML (ref 30–400)
GLOBULIN SER CALC-MCNC: 4.1 G/DL (ref 1.5–4.5)
GLUCOSE SERPL-MCNC: 291 MG/DL (ref 70–99)
HCT VFR BLD AUTO: 41.3 % (ref 37.5–51)
HGB BLD-MCNC: 13.9 G/DL (ref 13–17.7)
IMM GRANULOCYTES # BLD AUTO: 0 X10E3/UL (ref 0–0.1)
IMM GRANULOCYTES NFR BLD AUTO: 0 %
INR PPP: 1.3 (ref 0.9–1.2)
LYMPHOCYTES # BLD AUTO: 0.5 X10E3/UL (ref 0.7–3.1)
LYMPHOCYTES NFR BLD AUTO: 19 %
MCH RBC QN AUTO: 27.9 PG (ref 26.6–33)
MCHC RBC AUTO-ENTMCNC: 33.7 G/DL (ref 31.5–35.7)
MCV RBC AUTO: 83 FL (ref 79–97)
MONOCYTES # BLD AUTO: 0.3 X10E3/UL (ref 0.1–0.9)
MONOCYTES NFR BLD AUTO: 11 %
MORPHOLOGY BLD-IMP: ABNORMAL
NEUTROPHILS # BLD AUTO: 1.6 X10E3/UL (ref 1.4–7)
NEUTROPHILS NFR BLD AUTO: 60 %
PLATELET # BLD AUTO: 71 X10E3/UL (ref 150–450)
POTASSIUM SERPL-SCNC: 3.9 MMOL/L (ref 3.5–5.2)
PROT SERPL-MCNC: 7.9 G/DL (ref 6–8.5)
PROTHROMBIN TIME: 13.6 SEC (ref 9.1–12)
RBC # BLD AUTO: 4.98 X10E6/UL (ref 4.14–5.8)
SODIUM SERPL-SCNC: 136 MMOL/L (ref 134–144)
TSH SERPL DL<=0.005 MIU/L-ACNC: 2.93 UIU/ML (ref 0.45–4.5)
WBC # BLD AUTO: 2.7 X10E3/UL (ref 3.4–10.8)

## 2023-12-11 DIAGNOSIS — K74.60 CIRRHOSIS OF LIVER WITHOUT ASCITES, UNSPECIFIED HEPATIC CIRRHOSIS TYPE: Primary | ICD-10-CM

## 2023-12-12 ENCOUNTER — TELEPHONE (OUTPATIENT)
Dept: GASTROENTEROLOGY | Facility: CLINIC | Age: 68
End: 2023-12-12
Payer: MEDICARE

## 2023-12-12 ENCOUNTER — TELEPHONE (OUTPATIENT)
Dept: ONCOLOGY | Facility: CLINIC | Age: 68
End: 2023-12-12

## 2023-12-12 NOTE — TELEPHONE ENCOUNTER
----- Message from Ruby Mcgee MD sent at 12/11/2023  4:34 PM EST -----  Hemoglobin is normal-no evidence of anemia.  Liver labs are stable from last check 6 months ago.  Ammonia level is in the normal range.  The decreased white blood cell count-this is not uncommon in the setting of cirrhosis but this is new for him.  Will continue to follow.  Thyroid testing was normal.  Repeat labs in 3 months

## 2023-12-13 ENCOUNTER — OFFICE VISIT (OUTPATIENT)
Dept: ONCOLOGY | Facility: CLINIC | Age: 68
End: 2023-12-13
Payer: MEDICARE

## 2023-12-13 ENCOUNTER — LAB (OUTPATIENT)
Dept: LAB | Facility: HOSPITAL | Age: 68
End: 2023-12-13
Payer: MEDICARE

## 2023-12-13 VITALS
TEMPERATURE: 96.2 F | BODY MASS INDEX: 22.62 KG/M2 | HEART RATE: 77 BPM | OXYGEN SATURATION: 98 % | SYSTOLIC BLOOD PRESSURE: 144 MMHG | DIASTOLIC BLOOD PRESSURE: 67 MMHG | HEIGHT: 71 IN | WEIGHT: 161.6 LBS | RESPIRATION RATE: 18 BRPM

## 2023-12-13 DIAGNOSIS — D50.8 OTHER IRON DEFICIENCY ANEMIA: ICD-10-CM

## 2023-12-13 DIAGNOSIS — D69.6 THROMBOCYTOPENIA: Primary | ICD-10-CM

## 2023-12-13 DIAGNOSIS — K70.30 ALCOHOLIC CIRRHOSIS OF LIVER WITHOUT ASCITES: ICD-10-CM

## 2023-12-13 DIAGNOSIS — D68.9 COAGULOPATHY: ICD-10-CM

## 2023-12-13 DIAGNOSIS — R73.9 HYPERGLYCEMIA: ICD-10-CM

## 2023-12-13 DIAGNOSIS — D69.6 THROMBOCYTOPENIA: ICD-10-CM

## 2023-12-13 LAB
ALBUMIN SERPL-MCNC: 3.2 G/DL (ref 3.5–5.2)
ALBUMIN/GLOB SERPL: 0.8 G/DL
ALP SERPL-CCNC: 210 U/L (ref 39–117)
ALT SERPL W P-5'-P-CCNC: 32 U/L (ref 1–41)
ANION GAP SERPL CALCULATED.3IONS-SCNC: 14.3 MMOL/L (ref 5–15)
AST SERPL-CCNC: 51 U/L (ref 1–40)
BASOPHILS # BLD AUTO: 0.03 10*3/MM3 (ref 0–0.2)
BASOPHILS NFR BLD AUTO: 1.5 % (ref 0–1.5)
BILIRUB SERPL-MCNC: 1.8 MG/DL (ref 0–1.2)
BUN SERPL-MCNC: 7 MG/DL (ref 8–23)
BUN/CREAT SERPL: 8.8 (ref 7–25)
CALCIUM SPEC-SCNC: 8.9 MG/DL (ref 8.6–10.5)
CHLORIDE SERPL-SCNC: 99 MMOL/L (ref 98–107)
CO2 SERPL-SCNC: 19.7 MMOL/L (ref 22–29)
CREAT SERPL-MCNC: 0.8 MG/DL (ref 0.76–1.27)
DEPRECATED RDW RBC AUTO: 46.3 FL (ref 37–54)
EGFRCR SERPLBLD CKD-EPI 2021: 97.6 ML/MIN/1.73
EOSINOPHIL # BLD AUTO: 0.12 10*3/MM3 (ref 0–0.4)
EOSINOPHIL NFR BLD AUTO: 6 % (ref 0.3–6.2)
ERYTHROCYTE [DISTWIDTH] IN BLOOD BY AUTOMATED COUNT: 15.7 % (ref 12.3–15.4)
GLOBULIN UR ELPH-MCNC: 4 GM/DL
GLUCOSE SERPL-MCNC: 586 MG/DL (ref 65–99)
HCT VFR BLD AUTO: 37.3 % (ref 37.5–51)
HGB BLD-MCNC: 13.2 G/DL (ref 13–17.7)
IMM GRANULOCYTES # BLD AUTO: 0 10*3/MM3 (ref 0–0.05)
IMM GRANULOCYTES NFR BLD AUTO: 0 % (ref 0–0.5)
LYMPHOCYTES # BLD AUTO: 0.42 10*3/MM3 (ref 0.7–3.1)
LYMPHOCYTES NFR BLD AUTO: 20.9 % (ref 19.6–45.3)
MCH RBC QN AUTO: 28.9 PG (ref 26.6–33)
MCHC RBC AUTO-ENTMCNC: 35.4 G/DL (ref 31.5–35.7)
MCV RBC AUTO: 81.6 FL (ref 79–97)
MONOCYTES # BLD AUTO: 0.21 10*3/MM3 (ref 0.1–0.9)
MONOCYTES NFR BLD AUTO: 10.4 % (ref 5–12)
NEUTROPHILS NFR BLD AUTO: 1.23 10*3/MM3 (ref 1.7–7)
NEUTROPHILS NFR BLD AUTO: 61.2 % (ref 42.7–76)
NRBC BLD AUTO-RTO: 0 /100 WBC (ref 0–0.2)
PLATELET # BLD AUTO: 55 10*3/MM3 (ref 140–450)
PMV BLD AUTO: 10.2 FL (ref 6–12)
POTASSIUM SERPL-SCNC: 4.1 MMOL/L (ref 3.5–5.2)
PROT SERPL-MCNC: 7.2 G/DL (ref 6–8.5)
RBC # BLD AUTO: 4.57 10*6/MM3 (ref 4.14–5.8)
SODIUM SERPL-SCNC: 133 MMOL/L (ref 136–145)
WBC NRBC COR # BLD AUTO: 2.01 10*3/MM3 (ref 3.4–10.8)

## 2023-12-13 PROCEDURE — 36415 COLL VENOUS BLD VENIPUNCTURE: CPT

## 2023-12-13 PROCEDURE — 85025 COMPLETE CBC W/AUTO DIFF WBC: CPT

## 2023-12-13 PROCEDURE — 80053 COMPREHEN METABOLIC PANEL: CPT

## 2023-12-13 NOTE — PROGRESS NOTES
[facility]    CONSULTING IN BLOOD DISORDERS & CANCER      REASON FOR CONSULTATION/CHIEF COMPLAINT:     Evaluation & management for portal vein thrombosis                             REQUESTING PHYSICIAN: No ref. provider found  RECORDS OBTAINED:  Records of the patients history including those from the electronic medical record were reviewed and summarized in detail.    HISTORY OF PRESENT ILLNESS:    The patient is a 68 y.o. year old male with medical issues comprising alcoholic liver disease, cirrhosis, esophageal varices s/p banding recurrent ascites & HTN who was admitted to the [facility] on 2/4/22 with hepatic encephalopathy.      2/5/22: A US abdomen Cirrhosis with findings of portosystemic shunting including mild to moderate splenomegaly and ascites. There also appears to be hepatofugal in the main portal vein with concern for thrombus formation as well.     2/6/22: A doppler portal vein revealed acute thrombus in the main portal vein, right intrahepatic portal vein, along with abnormal flow in the extrahepatic portal vein and left intrahepatic portal vein.     Of note, a MRI abdomen from 1/7/22 done at [facility] had demonstrated peripheral, chronic nonocclusive thrombus of the main portal vein.     He was seen by inpatient hem/onc team. Given unusual location, hypercoagulable state work up was performed  on 2/7/22. . No prior history of VTEs. No family history of thrombosis.     -  Factor V leiden mutation negative. Factor II & AT-III activity low (likely due to liver dysfunction). Mildly elevated ACL, IgM (unclear significance). LA & b2-GP negative.  JAK2 mutation negative. P[facility] flow cytometry negative. Protein C & S levels  not done as they would be altered due to liver disease.     Patient has chronic thrombocytopenia with platelets in 40-50,000s range. The CBC from 2/7/22 showed platelet count of 40,000. No c/o bleed or bruise.   -Given risk for intestinal infarction with PV thrombus and likely prothrombotic state,   started him on low dose lovenox on 2/7/22.   - Tolerating well.   - Platelets dropped from 42,000 on 2/8/22 to 36,000 on 2/9 to 34,000 on 2/10. Lovenox stopped. HIT Ab sent - which later came back negative.   - 2/11: Seen by vascular surgery. No interventions planned.   2/13: HIT ab came back negative. Platelets improved to 48,000. Was resumed on lovenox 40 mg sq daily & discharged home.     Patient first seen in the hematology clinic on 2/15/22. He has been tolerating lovenox 40 mg daily well.  Denies any bleed/bruise. Saw GORAN Da Silva in March 2022. Discussed plan for TIPSS procedure. A CT a/p performed 3/22/22 showed nonocclusive hypodense thrombus within the main portal vein at the nayeli hepatis.     4/14/22: Underwent TIPSS procedure at [facility]. Tolerated well.    8/23/2022: Duplex portable veins negative for any thrombus. TIPS patent.  Anticoagulation with Lovenox discontinued.    INTERIM HISTORY:  Patient seen back today in 6-month follow-up.  He is in good spirits today and notes he has been feeling well.  He has had about 10 pound weight loss unintentionally more recently.  He did see GORAN Oshea, in follow-up 11/30/2023.  Just prior to that he underwent abdominal ultrasound showing stable cirrhotic liver.  Additionally lab work performed including AFP and ammonia levels were normal.  Patient does continue on lactulose twice a day.    We reviewed today platelet count that has dipped down to 55,000 which is unusual for him.  His platelet count typically is in the 70-90,000 range.  He denies any bleeding difficulty.  Patient denies any new medications.  Denies any recent illnesses.  Denies other concerns at this time.    Past Medical History:   Diagnosis Date    Alcoholism     Anemia     Cirrhosis     Clot     COVID-19 03/17/2022    Encephalopathy     Hypertension     Liver disease      Past Surgical History:   Procedure Laterality Date    COLONOSCOPY      ENDOSCOPY         MEDICATIONS    Current  "Outpatient Medications:     lactulose (CHRONULAC) 10 GM/15ML solution, TAKE 45MLS BY MOUTH TWICE DAILY TO THREE TIMES DAILY WITH A GOAL OF 3-4 BOWEL MOVEMENTS PER DAY, Disp: 52473 mL, Rfl: 0    multivitamin (multivitamin) tablet tablet, Take 1 tablet by mouth Daily., Disp: 30 tablet, Rfl: 3    ALLERGIES:     Allergies   Allergen Reactions    Iron GI Intolerance    Zinc GI Intolerance     vomiting       SOCIAL HISTORY:       Social History     Socioeconomic History    Marital status:    Tobacco Use    Smoking status: Never    Smokeless tobacco: Never   Vaping Use    Vaping Use: Never used   Substance and Sexual Activity    Alcohol use: Not Currently     Comment: No ETOH on 2.5 yrs    Drug use: No    Sexual activity: Defer         FAMILY HISTORY:  Family History   Problem Relation Age of Onset    Diabetes Mother     Hypertension Father      REVIEW OF SYSTEMS:  As per HPI       Vitals:    12/13/23 1616   BP: 144/67   Pulse: 77   Resp: 18   Temp: 96.2 °F (35.7 °C)   TempSrc: Temporal   SpO2: 98%   Weight: 73.3 kg (161 lb 9.6 oz)   Height: 180.3 cm (70.98\")   PainSc: 0-No pain         12/13/2023     4:16 PM   Current Status   ECOG score 0      PHYSICAL EXAM:    CONSTITUTIONAL:  Vital signs reviewed.  No distress, looks comfortable.  EYES:  Conjunctiva and lids unremarkable.   EARS,NOSE,MOUTH,THROAT:  Ears and nose appear unremarkable.    RESPIRATORY:  Normal respiratory effort.  Lungs clear to auscultation bilaterally.  CARDIOVASCULAR:  Normal S1, S2.  No murmurs rubs or gallops.  No significant lower extremity edema.  GASTROINTESTINAL: Abdomen appears unremarkable.  Nontender.  Mild distension  LYMPHATIC:  No cervical, supraclavicular lymphadenopathy.  NEURO: AAO x 3, No focal deficits.  Appears to have equal strength all 4   MUSCULOSKELETAL:  Unremarkable digits/nails.  No cyanosis or clubbing.  No apparent joint deformities  SKIN:  Warm.  No rashes. Bruises on arms and abdominal injection sites  PSYCHIATRIC:  " Normal judgment and insight.  Normal mood and affect.     I have reexamined the patient and the results are consistent with the previously documented exam. MILAGROS Greene       RECENT LABS:  Results from last 7 days   Lab Units 12/13/23  1604   WBC 10*3/mm3 2.01*   NEUTROS ABS 10*3/mm3 1.23*   HEMOGLOBIN g/dL 13.2   HEMATOCRIT % 37.3*   PLATELETS 10*3/mm3 55*                 ASSESSMENT:   is a pleasant 69 y/o WM with medical issues comprising alcoholic liver disease, cirrhosis, esophageal varices s/p banding recurrent ascites & HTN who comes for portal vein thrombosis evaluation & management.      # Portal vein thrombosis:  Acute on chronic. MRI abdomen from 1/7/22 done [facility] had demonstrated peripheral, chronic nonocclusive thrombus of the main portal vein. The doppler from 2/6/22 shows acute thrombus in the main and right intrahepatic portal veins.   This is likely due to altered coagulation due to hepatic dysfunction.  No prior history of VTEs. No family history of thrombosis. However given unusual location, hypercoagulable state work up was sent on 2/7/22 - which was negative.   Given risk for intestinal infarction with PV thrombus and likely prothrombotic state,  started him on low dose lovenox on 2/7/22.   Platelets dropped from 42,000 on 2/8/22 to 36,000 on 2/9 to 34,000 on 2/10. Lovenox stopped. HIT Ab sent - which later came back negative. 2/11: Seen by vascular surgery. No interventions recommended.  2/15/22: Pt returns to the clinic for a f/u visit. Is tolerating lovenox well. No bleed/bruise. No abd pain or distension. Plan to check CBC weekly. If improving platelets, will consider increasing the dose. F/u in a month.   3/22/22: Pt doing well on lovenox 40 mg daily. Platelets remain low but stable in 60-70,000 range. Will continue current dose and consider CBC checks to every other week. Advised to maintain close f/u with GI.   4/22/22: S/p TIPSS on 4/14/22 by Dr. Jeronimo with  [facility]. Doing well. Plan to continue lovenox for now. Consider stopping after 3-6 months. CBC every other week.   8/30/2022: Reports of doing well on Lovenox 40 mg daily.  Has not needed paracentesis since March.  Labs overall stable with persistent anemia and thrombocytopenia in 70-80,000 range.  Saw Dr. Jeronimo in July 2022.  Repeat Doppler of the portal veins negative for any thrombus.  Lovenox discontinued.  5/31/2023: Patient continues to do well.  No abdominal pain or distention.  He has remained off of anticoagulation.  Maintains close follow-up with GI.  Will continue to monitor.   12/13/2023: Patient with recent follow-up with GI, stable cirrhosis.  He remains off anticoagulation.     # Thrombocytopenia:  - Acute on chronic. Likely liver disease related.   - No bleed/bruise  - Platelet count 55,000 today, 12/13/2023.  This is lower for him with platelet count normally in the 70-90,000 range.  Patient denies any new medications or recent illnesses.  Has been feeling good overall.  We will recheck in 2 weeks for close monitoring.       # Leukopenia:  Likely liver disease related. Monitor     # Coagulopathy:  PT/INR elevated.   No bleed/bruise. Monitor     # Anemia:  Likely anemia of chronic disease. Baseline Hb in 8-9 gm/dl range since 2020.   Iron panel with no deficiency.   Patient is taking oral iron supplementation through GI.  Hemoglobin 13.2 on 12/13/2023     # Hepatic encephalopathy: Ammonia levels have been normal recently.  Continues lactulose twice a day.  Per GI    # Decompensated liver cirrhosis: As per GI    # Ascites: Was getting paracentesis every 2-3 months prior to TIPSS in April 2022.  None since then.    # Gynecomastia: Likely cirrhosis related.  Monitor.    PLAN:   - Portal vein thrombosis in the setting of liver-disease related thrombocytopenia and coagulopathy.  The portal veins from August 2022 negative for any thrombus.  Lovenox stopped.  - Follow-up abdominal ultrasound 11/21/2023  with stable cirrhosis.  - Hemoglobin remains normal.  - Continue po iron through .   - S/p TIPSS procedure on 4/14/22.  Will continue follow-up with GI, Dr. Mcgee.    - Patient will return in 2 weeks for repeat CBC, IPF and RN review to monitor platelet count that has notably trended down today.  -Tentatively plan otherwise for 6-month follow-up with Dr. Espino with CBC, CMP, IPF.      ADDENDUM: patient's blood sugar resulting at 586 after patient gone. This is concerning for diabetes, especially in light of recent reports of urinary frequency and weight loss. I called the patient and left multiple voicemail messages on his cell phone, home phone and his wife's cell phone.  Per discussion with Dr. Espino, patient is to go to the ED for evaluation. I was not able to reach the patient but again left multiple messages.    Orders Placed This Encounter   Procedures    Comprehensive Metabolic Panel     Standing Status:   Future     Standing Expiration Date:   12/13/2024     Order Specific Question:   Release to patient     Answer:   Routine Release [[id number]]    Immature Platelet Fraction     Standing Status:   Future     Order Specific Question:   Release to patient     Answer:   Routine Release [[id number]]    Immature Platelet Fraction     Standing Status:   Future     Order Specific Question:   Release to patient     Answer:   Routine Release [[id number]]    CBC & Differential     Standing Status:   Future     Standing Expiration Date:   12/12/2024     Order Specific Question:   Manual Differential     Answer:   No     Order Specific Question:   Release to patient     Answer:   Routine Release [[id number]]    CBC & Differential     Standing Status:   Future     Standing Expiration Date:   12/12/2024     Order Specific Question:   Manual Differential     Answer:   No     Order Specific Question:   Release to patient     Answer:   Routine Release [[id number]]     I spent 55 minutes caring for Wei on this  date of service. This time includes time spent by me in the following activities: preparing for the visit, reviewing tests, obtaining and/or reviewing a separately obtained history, performing a medically appropriate examination and/or evaluation, counseling and educating the patient/family/caregiver, ordering medications, tests, or procedures, referring and communicating with other health care professionals, documenting information in the medical record, independently interpreting results and communicating that information with the patient/family/caregiver, and care coordination

## 2023-12-13 NOTE — TELEPHONE ENCOUNTER
Called pt and advised of Dr Mcgee's note. Verb understanding.     Pt states he will call back to make the lab appt.

## 2023-12-14 ENCOUNTER — TELEPHONE (OUTPATIENT)
Dept: ONCOLOGY | Facility: CLINIC | Age: 68
End: 2023-12-14
Payer: MEDICARE

## 2023-12-14 ENCOUNTER — DOCUMENTATION (OUTPATIENT)
Dept: ONCOLOGY | Facility: CLINIC | Age: 68
End: 2023-12-14
Payer: MEDICARE

## 2023-12-14 DIAGNOSIS — R73.9 ELEVATED BLOOD SUGAR: Primary | ICD-10-CM

## 2023-12-14 NOTE — TELEPHONE ENCOUNTER
Celina Vazquez APRN Singh, Vikas Kumar, MD; Melissa Weston MD; Alyssia Arndt RN  As Dr. Espino is aware, I saw this patient late today and his blood sugar resulted after he was gone at 586.  We follow him for thrombocytopenia related to cirrhosis (seen every 6 months).    I have left multiple messages on his cell phone, home phone and his wife's cell phone.  I was not able to reach anyone.  I stressed the importance of going to the ED for evaluation.  I am concerned not only because of his blood sugar but also reports of frequent urination and unintentional weight loss recently.    I am cc-ing Dr. Weston because she is on-call tonight just in case the patient happens to callthe on-call answering service.  Thank you.        1053 LVM re: recommendation of proceeding to ER per Dr Espino and Korin LINARES recommendation. LVM at home no. And RCB to acknowledge [phone]. Will try to contact again.    1618 Several attempts made to reach out to patient. Called all listed phone numbers. All no answers. No call back received. LVM mobile re: recommendation to proceed to ER.    Korin LINARES updated about being unable to contact patient. Patient is scheduled for a lab and RN review on 12/28, received new order to add a stat CMP and an A1c. Also to see who is patient's PCP.    12/15/23 No call back received. Called all listed phone numbers, no answers. LVM to [phone] regarding proceeding to ER and may call direct line for further questions.   1405 Patient has called back and reports to have phone problems. He is feeling fine and hesitant to proceed to ER but patient has been encourage to go and he stated he has an urgent care near him and he will have himself checked on. Dr Espino updated.

## 2023-12-14 NOTE — PROGRESS NOTES
I have tried calling patient's multiple times this morning to inform him about abnormal lab findings and to go to the ER.  No one has answered the phone.  Will continue to try.

## 2023-12-15 ENCOUNTER — LAB (OUTPATIENT)
Dept: LAB | Facility: HOSPITAL | Age: 68
End: 2023-12-15
Payer: MEDICARE

## 2023-12-15 ENCOUNTER — TELEPHONE (OUTPATIENT)
Dept: ONCOLOGY | Facility: CLINIC | Age: 68
End: 2023-12-15

## 2023-12-15 DIAGNOSIS — R73.9 ELEVATED BLOOD SUGAR: ICD-10-CM

## 2023-12-15 LAB — HBA1C MFR BLD: 11.7 % (ref 4.8–5.6)

## 2023-12-15 PROCEDURE — 83036 HEMOGLOBIN GLYCOSYLATED A1C: CPT

## 2023-12-15 PROCEDURE — 36415 COLL VENOUS BLD VENIPUNCTURE: CPT

## 2023-12-18 ENCOUNTER — TELEPHONE (OUTPATIENT)
Dept: ONCOLOGY | Facility: CLINIC | Age: 68
End: 2023-12-18
Payer: MEDICARE

## 2023-12-18 NOTE — TELEPHONE ENCOUNTER
----- Message from Eulalio Espino MD sent at 12/18/2023  2:29 PM EST -----    ----- Message -----  From: Celina Vazquez APRN  Sent: 12/18/2023   8:04 AM EST  To: Eulalio Espino MD    I see where you finally were able to speak with the patient Alyssia. This result definitely confirms he has Diabetes and needs to be seen by PCP urgently (not urgent care, needs to be managed by PCP). Please try to call patient again and let him know this additional lab information. Thank you.    [facility] [phone]    12/19/23 [facility] [phone] Pt has called and LVM. Called back patient who has been made aware of Korin LINARES message. Pt reported to have been to [facility] as previously advised, but no notes/documentation seen. Pt advised to reach out to PCP. He confirmed his PCP as Dr Villalta, will be sending results. Pt will be calling their office to seek their recommendation.     12/22/23 Reached out to PCP office, no update/orders at this time. Tried to contact patient and [facility] [phone]

## 2023-12-28 ENCOUNTER — LAB (OUTPATIENT)
Dept: LAB | Facility: HOSPITAL | Age: 68
End: 2023-12-28
Payer: MEDICARE

## 2023-12-28 ENCOUNTER — CLINICAL SUPPORT (OUTPATIENT)
Dept: ONCOLOGY | Facility: HOSPITAL | Age: 68
End: 2023-12-28
Payer: MEDICARE

## 2023-12-28 DIAGNOSIS — R73.9 ELEVATED BLOOD SUGAR: ICD-10-CM

## 2023-12-28 DIAGNOSIS — D69.6 THROMBOCYTOPENIA: ICD-10-CM

## 2023-12-28 LAB
ALBUMIN SERPL-MCNC: 3.2 G/DL (ref 3.5–5.2)
ALBUMIN/GLOB SERPL: 0.8 G/DL
ALP SERPL-CCNC: 290 U/L (ref 39–117)
ALT SERPL W P-5'-P-CCNC: 30 U/L (ref 1–41)
ANION GAP SERPL CALCULATED.3IONS-SCNC: 11 MMOL/L (ref 5–15)
AST SERPL-CCNC: 45 U/L (ref 1–40)
BASOPHILS # BLD AUTO: 0.04 10*3/MM3 (ref 0–0.2)
BASOPHILS NFR BLD AUTO: 1.6 % (ref 0–1.5)
BILIRUB SERPL-MCNC: 1.6 MG/DL (ref 0–1.2)
BUN SERPL-MCNC: 7 MG/DL (ref 8–23)
BUN/CREAT SERPL: 10.9 (ref 7–25)
CALCIUM SPEC-SCNC: 9 MG/DL (ref 8.6–10.5)
CHLORIDE SERPL-SCNC: 101 MMOL/L (ref 98–107)
CO2 SERPL-SCNC: 22 MMOL/L (ref 22–29)
CREAT SERPL-MCNC: 0.64 MG/DL (ref 0.76–1.27)
DEPRECATED RDW RBC AUTO: 50.3 FL (ref 37–54)
EGFRCR SERPLBLD CKD-EPI 2021: 104.4 ML/MIN/1.73
EOSINOPHIL # BLD AUTO: 0.19 10*3/MM3 (ref 0–0.4)
EOSINOPHIL NFR BLD AUTO: 7.6 % (ref 0.3–6.2)
ERYTHROCYTE [DISTWIDTH] IN BLOOD BY AUTOMATED COUNT: 16.5 % (ref 12.3–15.4)
GLOBULIN UR ELPH-MCNC: 4 GM/DL
GLUCOSE SERPL-MCNC: 461 MG/DL (ref 65–99)
HCT VFR BLD AUTO: 38.5 % (ref 37.5–51)
HGB BLD-MCNC: 13.2 G/DL (ref 13–17.7)
IMM GRANULOCYTES # BLD AUTO: 0 10*3/MM3 (ref 0–0.05)
IMM GRANULOCYTES NFR BLD AUTO: 0 % (ref 0–0.5)
LYMPHOCYTES # BLD AUTO: 0.42 10*3/MM3 (ref 0.7–3.1)
LYMPHOCYTES NFR BLD AUTO: 16.9 % (ref 19.6–45.3)
MCH RBC QN AUTO: 28.6 PG (ref 26.6–33)
MCHC RBC AUTO-ENTMCNC: 34.3 G/DL (ref 31.5–35.7)
MCV RBC AUTO: 83.3 FL (ref 79–97)
MONOCYTES # BLD AUTO: 0.22 10*3/MM3 (ref 0.1–0.9)
MONOCYTES NFR BLD AUTO: 8.8 % (ref 5–12)
NEUTROPHILS NFR BLD AUTO: 1.62 10*3/MM3 (ref 1.7–7)
NEUTROPHILS NFR BLD AUTO: 65.1 % (ref 42.7–76)
NRBC BLD AUTO-RTO: 0 /100 WBC (ref 0–0.2)
PLATELET # BLD AUTO: 61 10*3/MM3 (ref 140–450)
PLATELETS.RETICULATED NFR BLD AUTO: 3.7 % (ref 0.9–6.5)
PMV BLD AUTO: 10.3 FL (ref 6–12)
POTASSIUM SERPL-SCNC: 4.4 MMOL/L (ref 3.5–5.2)
PROT SERPL-MCNC: 7.2 G/DL (ref 6–8.5)
RBC # BLD AUTO: 4.62 10*6/MM3 (ref 4.14–5.8)
SODIUM SERPL-SCNC: 134 MMOL/L (ref 136–145)
WBC NRBC COR # BLD AUTO: 2.49 10*3/MM3 (ref 3.4–10.8)

## 2023-12-28 PROCEDURE — 36415 COLL VENOUS BLD VENIPUNCTURE: CPT

## 2023-12-28 PROCEDURE — 80053 COMPREHEN METABOLIC PANEL: CPT

## 2023-12-28 PROCEDURE — 85025 COMPLETE CBC W/AUTO DIFF WBC: CPT

## 2023-12-28 PROCEDURE — 85055 RETICULATED PLATELET ASSAY: CPT

## 2023-12-28 NOTE — PROGRESS NOTES
Patient is here for lab review with RN.  CBC and CMP reviewed with Celina LINARES, platelet count has increased from 55,000 to 61,000 and WBC has increased from 2.01 to 2.49 g/dL. IPF is within normal range at 3.70%; patient's blood glucose is 461 from 586 mg/dL.  Copy of labs given to patient and follow up appointment reviewed. Patient has stated his frequent urination varies. Patient is scheduled to see PCP on 1/8/24. Patient is instructed to call our office/PCP with any concerns or new symptoms prior to next visit. Patient educated of increased risk of complications of having elevated blood glucose and A1c level of kidney failure, blindness, nerve damage; encouraged low sugar diet and exercise. Advised pt to reached to PCP for any recommendation prior to being seen or any option to move appointment earlier. Patient advised to proceed to ER for any new symptoms or worsening symptoms. Pt v/u.    Lab Results   Component Value Date    WBC 2.49 (L) 12/28/2023    HGB 13.2 12/28/2023    HCT 38.5 12/28/2023    MCV 83.3 12/28/2023    PLT 61 (L) 12/28/2023     Lab Results   Component Value Date    GLUCOSE 461 (C) 12/28/2023    BUN 7 (L) 12/28/2023    CREATININE 0.64 (L) 12/28/2023    EGFRRESULT 95 11/30/2023    EGFR 104.4 12/28/2023    BCR 10.9 12/28/2023    K 4.4 12/28/2023    CO2 22.0 12/28/2023    CALCIUM 9.0 12/28/2023    PROTENTOTREF 7.9 11/30/2023    ALBUMIN 3.2 (L) 12/28/2023    BILITOT 1.6 (H) 12/28/2023    AST 45 (H) 12/28/2023    ALT 30 12/28/2023     Lab Results   Component Value Date    HGBA1C 11.70 (H) 12/15/2023

## 2024-01-02 DIAGNOSIS — E11.43 TYPE 2 DIABETES MELLITUS WITH DIABETIC AUTONOMIC NEUROPATHY, WITHOUT LONG-TERM CURRENT USE OF INSULIN: Primary | ICD-10-CM

## 2024-01-08 ENCOUNTER — OFFICE VISIT (OUTPATIENT)
Dept: FAMILY MEDICINE CLINIC | Facility: CLINIC | Age: 69
End: 2024-01-08
Payer: MEDICARE

## 2024-01-08 VITALS
RESPIRATION RATE: 18 BRPM | HEIGHT: 70 IN | SYSTOLIC BLOOD PRESSURE: 138 MMHG | BODY MASS INDEX: 22.33 KG/M2 | DIASTOLIC BLOOD PRESSURE: 70 MMHG | WEIGHT: 156 LBS | OXYGEN SATURATION: 97 % | HEART RATE: 82 BPM

## 2024-01-08 DIAGNOSIS — E08.649 DIABETES MELLITUS DUE TO UNDERLYING CONDITION, UNCONTROLLED, WITH HYPOGLYCEMIA WITHOUT COMA: ICD-10-CM

## 2024-01-08 DIAGNOSIS — K70.31 ASCITES DUE TO ALCOHOLIC CIRRHOSIS: ICD-10-CM

## 2024-01-08 DIAGNOSIS — E11.65 TYPE 2 DIABETES MELLITUS WITH HYPERGLYCEMIA, WITHOUT LONG-TERM CURRENT USE OF INSULIN: Primary | ICD-10-CM

## 2024-01-08 PROBLEM — K76.82 HEPATIC ENCEPHALOPATHY: Status: RESOLVED | Noted: 2022-03-18 | Resolved: 2024-01-08

## 2024-01-08 PROBLEM — K52.9 COLITIS: Status: RESOLVED | Noted: 2021-01-02 | Resolved: 2024-01-08

## 2024-01-08 PROBLEM — I85.00 ESOPHAGEAL VARIX: Status: RESOLVED | Noted: 2021-04-26 | Resolved: 2024-01-08

## 2024-01-08 PROBLEM — B15.9 ACUTE HEPATITIS A VIRUS INFECTION: Status: RESOLVED | Noted: 2022-02-17 | Resolved: 2024-01-08

## 2024-01-08 PROBLEM — I10 HYPERTENSION: Status: RESOLVED | Noted: 2021-01-02 | Resolved: 2024-01-08

## 2024-01-08 PROBLEM — E72.20 HYPERAMMONEMIA: Status: RESOLVED | Noted: 2022-02-04 | Resolved: 2024-01-08

## 2024-01-08 PROBLEM — K65.2 SBP (SPONTANEOUS BACTERIAL PERITONITIS): Status: RESOLVED | Noted: 2021-01-06 | Resolved: 2024-01-08

## 2024-01-08 PROCEDURE — G2211 COMPLEX E/M VISIT ADD ON: HCPCS | Performed by: FAMILY MEDICINE

## 2024-01-08 PROCEDURE — 99213 OFFICE O/P EST LOW 20 MIN: CPT | Performed by: FAMILY MEDICINE

## 2024-01-08 NOTE — PROGRESS NOTES
Assessment and Plan     Assessment & Plan  Type 2 diabetes mellitus with hyperglycemia, without long-term current use of insulin  Labs are drawn today.  He needs to get into endocrine as soon as possible and we will continue to work on facilitating that.  Ascites due to alcoholic cirrhosis  This is followed at [facility].    Orders Placed This Encounter   Procedures    Basic Metabolic Panel    Hemoglobin A1c    C-Peptide          Return for f/u dependent on test results.  Patient was given instructions and counseling regarding his condition or for health maintenance advice. Please see specific information pulled into the AVS if appropriate.     Patient's last A1c value was 11.70 which is abnormally high.  I recommend a referral to endocrinology..         Wei is a 68 y.o. being seen today for  Diabetes   HISTORY    HPI  The oncology group tried to send him to the ER with his high sugar he was concerned about getting an infectious disease doing so and refused.  He did go to immediate care and his sugar at that time was a little bit lower. Has been called by endo twice but he doesn't answer his phone.  We discussed this issue at length and I gave him the phone number for the endocrine office.  He prefers that I do not talk to his wife unless absolutely necessary but agreed that the fact that he would not answer his telephone did make it necessary for me or my office to talk to his wife.  We did talk about being nonspecific with our phone calls in the future.  He has been limiting his sugar and his urination is much better.  He is now sleeping through the night, only getting up once to urinate when he was getting up 5 and 6 times.  He denies any alcohol intake for the last 4 years.  Social History  He  reports that he has never smoked. He has never used smokeless tobacco. He reports that he does not currently use alcohol. He reports that he does not use drugs.  EXAM DATA    Vital Signs        BP Readings from Last 1  Encounters:   01/08/24 138/70     Wt Readings from Last 3 Encounters:   01/08/24 70.8 kg (156 lb)   12/13/23 73.3 kg (161 lb 9.6 oz)   11/30/23 73.8 kg (162 lb 9.6 oz)   Body mass index is 22.38 kg/m².  Physical Exam  Vitals reviewed.   Constitutional:       Appearance: Normal appearance.   Cardiovascular:      Rate and Rhythm: Normal rate and regular rhythm.      Heart sounds: No murmur heard.  Pulmonary:      Effort: Pulmonary effort is normal.      Breath sounds: Normal breath sounds. No wheezing.   Skin:     Coloration: Skin is not jaundiced.      Findings: No bruising.   Neurological:      Mental Status: He is alert and oriented to person, place, and time.   Psychiatric:         Mood and Affect: Mood normal.         Behavior: Behavior normal.         Thought Content: Thought content normal.         Judgment: Judgment normal.       BMI is within normal parameters. No other follow-up for BMI required.

## 2024-01-09 LAB
BUN SERPL-MCNC: 9 MG/DL (ref 8–27)
BUN/CREAT SERPL: 13 (ref 10–24)
C PEPTIDE SERPL-MCNC: 3.7 NG/ML (ref 1.1–4.4)
CALCIUM SERPL-MCNC: 9 MG/DL (ref 8.6–10.2)
CHLORIDE SERPL-SCNC: 103 MMOL/L (ref 96–106)
CO2 SERPL-SCNC: 18 MMOL/L (ref 20–29)
CREAT SERPL-MCNC: 0.71 MG/DL (ref 0.76–1.27)
EGFRCR SERPLBLD CKD-EPI 2021: 101 ML/MIN/1.73
GLUCOSE SERPL-MCNC: 364 MG/DL (ref 70–99)
HBA1C MFR BLD: 12.4 % (ref 4.8–5.6)
POTASSIUM SERPL-SCNC: 4 MMOL/L (ref 3.5–5.2)
SODIUM SERPL-SCNC: 137 MMOL/L (ref 134–144)

## 2024-01-10 ENCOUNTER — TELEPHONE (OUTPATIENT)
Dept: FAMILY MEDICINE CLINIC | Facility: CLINIC | Age: 69
End: 2024-01-10

## 2024-01-10 NOTE — TELEPHONE ENCOUNTER
Caller: Wei Potts    Relationship: Self    Best call back number: [phone]    What specialty or service is being requested: ENDOCRINOLOGIST     Any additional details: PATIENT HAS CALLED AROUND TO FIND AN ENDOCRINOLOGIST THAT WILL ACCEPT HIS INSURANCE. PATIENT HAS NOT BEEN ABLE TO FIND ANYONE. PATIENT IS REQUESTING TO KNOW IF DR. PERAZA CAN PUT A REFERRAL IN FOR HIM    PLEASE ADVISE

## 2024-01-11 NOTE — TELEPHONE ENCOUNTER
Caller: Wei Potts    Relationship: Self    Best call back number: [phone]    What is the best time to reach you: ANYTIME    Who are you requesting to speak with (clinical staff, provider,  specific staff member):CLINICAL    What was the call regarding:PATIENT STATED SINCE [facility] NO LONGER ACCEPTS HIS INSURANCE HE WOULD LIKE TO GO SOMEWHERE ELSE THAT WOULD ACCEPT IS INSURANCE, PATIENT WOULD LIKE A CALL FROM [facility] (OR ANNA).

## 2024-02-12 ENCOUNTER — TELEPHONE (OUTPATIENT)
Dept: GASTROENTEROLOGY | Facility: CLINIC | Age: 69
End: 2024-02-12
Payer: MEDICARE

## 2024-02-19 ENCOUNTER — TELEPHONE (OUTPATIENT)
Dept: GASTROENTEROLOGY | Facility: CLINIC | Age: 69
End: 2024-02-19
Payer: MEDICARE

## 2024-06-03 ENCOUNTER — TELEPHONE (OUTPATIENT)
Dept: GASTROENTEROLOGY | Facility: CLINIC | Age: 69
End: 2024-06-03
Payer: MEDICARE

## 2024-06-12 ENCOUNTER — LAB (OUTPATIENT)
Dept: LAB | Facility: HOSPITAL | Age: 69
End: 2024-06-12
Payer: MEDICARE

## 2024-06-12 ENCOUNTER — OFFICE VISIT (OUTPATIENT)
Dept: ONCOLOGY | Facility: CLINIC | Age: 69
End: 2024-06-12
Payer: MEDICARE

## 2024-06-12 VITALS
DIASTOLIC BLOOD PRESSURE: 70 MMHG | HEART RATE: 76 BPM | OXYGEN SATURATION: 98 % | SYSTOLIC BLOOD PRESSURE: 150 MMHG | RESPIRATION RATE: 19 BRPM | BODY MASS INDEX: 24.28 KG/M2 | WEIGHT: 169.2 LBS | TEMPERATURE: 98.2 F

## 2024-06-12 DIAGNOSIS — D69.6 THROMBOCYTOPENIA: Primary | ICD-10-CM

## 2024-06-12 DIAGNOSIS — K70.30 ALCOHOLIC CIRRHOSIS OF LIVER WITHOUT ASCITES: ICD-10-CM

## 2024-06-12 DIAGNOSIS — D69.6 THROMBOCYTOPENIA: ICD-10-CM

## 2024-06-12 LAB
ALBUMIN SERPL-MCNC: 3.3 G/DL (ref 3.5–5.2)
ALBUMIN/GLOB SERPL: 0.9 G/DL
ALP SERPL-CCNC: 149 U/L (ref 39–117)
ALT SERPL W P-5'-P-CCNC: 15 U/L (ref 1–41)
ANION GAP SERPL CALCULATED.3IONS-SCNC: 9.7 MMOL/L (ref 5–15)
AST SERPL-CCNC: 39 U/L (ref 1–40)
BASOPHILS # BLD AUTO: 0.04 10*3/MM3 (ref 0–0.2)
BASOPHILS NFR BLD AUTO: 1 % (ref 0–1.5)
BILIRUB SERPL-MCNC: 1.9 MG/DL (ref 0–1.2)
BUN SERPL-MCNC: 9 MG/DL (ref 8–23)
BUN/CREAT SERPL: 13.4 (ref 7–25)
CALCIUM SPEC-SCNC: 8.7 MG/DL (ref 8.6–10.5)
CHLORIDE SERPL-SCNC: 109 MMOL/L (ref 98–107)
CO2 SERPL-SCNC: 20.3 MMOL/L (ref 22–29)
CREAT SERPL-MCNC: 0.67 MG/DL (ref 0.76–1.27)
DEPRECATED RDW RBC AUTO: 43.1 FL (ref 37–54)
EGFRCR SERPLBLD CKD-EPI 2021: 102.3 ML/MIN/1.73
EOSINOPHIL # BLD AUTO: 0.24 10*3/MM3 (ref 0–0.4)
EOSINOPHIL NFR BLD AUTO: 6 % (ref 0.3–6.2)
ERYTHROCYTE [DISTWIDTH] IN BLOOD BY AUTOMATED COUNT: 14 % (ref 12.3–15.4)
GLOBULIN UR ELPH-MCNC: 3.5 GM/DL
GLUCOSE SERPL-MCNC: 154 MG/DL (ref 65–99)
HCT VFR BLD AUTO: 36.6 % (ref 37.5–51)
HGB BLD-MCNC: 12.5 G/DL (ref 13–17.7)
IMM GRANULOCYTES # BLD AUTO: 0.02 10*3/MM3 (ref 0–0.05)
IMM GRANULOCYTES NFR BLD AUTO: 0.5 % (ref 0–0.5)
LYMPHOCYTES # BLD AUTO: 0.59 10*3/MM3 (ref 0.7–3.1)
LYMPHOCYTES NFR BLD AUTO: 14.8 % (ref 19.6–45.3)
MCH RBC QN AUTO: 28.9 PG (ref 26.6–33)
MCHC RBC AUTO-ENTMCNC: 34.2 G/DL (ref 31.5–35.7)
MCV RBC AUTO: 84.5 FL (ref 79–97)
MONOCYTES # BLD AUTO: 0.54 10*3/MM3 (ref 0.1–0.9)
MONOCYTES NFR BLD AUTO: 13.5 % (ref 5–12)
NEUTROPHILS NFR BLD AUTO: 2.56 10*3/MM3 (ref 1.7–7)
NEUTROPHILS NFR BLD AUTO: 64.2 % (ref 42.7–76)
NRBC BLD AUTO-RTO: 0 /100 WBC (ref 0–0.2)
PLATELET # BLD AUTO: 93 10*3/MM3 (ref 140–450)
PLATELETS.RETICULATED NFR BLD AUTO: 2.4 % (ref 0.9–6.5)
PMV BLD AUTO: 9.4 FL (ref 6–12)
POTASSIUM SERPL-SCNC: 4 MMOL/L (ref 3.5–5.2)
PROT SERPL-MCNC: 6.8 G/DL (ref 6–8.5)
RBC # BLD AUTO: 4.33 10*6/MM3 (ref 4.14–5.8)
SODIUM SERPL-SCNC: 139 MMOL/L (ref 136–145)
WBC NRBC COR # BLD AUTO: 3.99 10*3/MM3 (ref 3.4–10.8)

## 2024-06-12 PROCEDURE — 80053 COMPREHEN METABOLIC PANEL: CPT

## 2024-06-12 PROCEDURE — 85055 RETICULATED PLATELET ASSAY: CPT

## 2024-06-12 PROCEDURE — 85025 COMPLETE CBC W/AUTO DIFF WBC: CPT

## 2024-06-12 RX ORDER — INSULIN ASPART 100 [IU]/ML
INJECTION, SOLUTION INTRAVENOUS; SUBCUTANEOUS
COMMUNITY
Start: 2024-04-17

## 2024-06-12 RX ORDER — LANCETS
EACH MISCELLANEOUS
COMMUNITY
Start: 2024-04-18

## 2024-06-12 RX ORDER — PEN NEEDLE, DIABETIC 32GX 5/32"
NEEDLE, DISPOSABLE MISCELLANEOUS
COMMUNITY
Start: 2024-03-20

## 2024-06-12 RX ORDER — BLOOD-GLUCOSE METER
EACH MISCELLANEOUS
COMMUNITY
Start: 2024-02-21

## 2024-06-12 NOTE — PROGRESS NOTES
[facility]    CONSULTING IN BLOOD DISORDERS & CANCER      REASON FOR CONSULTATION/CHIEF COMPLAINT:     Evaluation & management for portal vein thrombosis                             REQUESTING PHYSICIAN: No ref. provider found  RECORDS OBTAINED:  Records of the patients history including those from the electronic medical record were reviewed and summarized in detail.    HISTORY OF PRESENT ILLNESS:    The patient is a 69 y.o. year old male with medical issues comprising alcoholic liver disease, cirrhosis, esophageal varices s/p banding recurrent ascites & HTN who was admitted to the [facility] on 2/4/22 with hepatic encephalopathy.      2/5/22: A US abdomen Cirrhosis with findings of portosystemic shunting including mild to moderate splenomegaly and ascites. There also appears to be hepatofugal in the main portal vein with concern for thrombus formation as well.     2/6/22: A doppler portal vein revealed acute thrombus in the main portal vein, right intrahepatic portal vein, along with abnormal flow in the extrahepatic portal vein and left intrahepatic portal vein.     Of note, a MRI abdomen from 1/7/22 done at [facility] had demonstrated peripheral, chronic nonocclusive thrombus of the main portal vein.     He was seen by inpatient hem/onc team. Given unusual location, hypercoagulable state work up was performed  on 2/7/22. . No prior history of VTEs. No family history of thrombosis.     -  Factor V leiden mutation negative. Factor II & AT-III activity low (likely due to liver dysfunction). Mildly elevated ACL, IgM (unclear significance). LA & b2-GP negative.  JAK2 mutation negative. [facility] flow cytometry negative. Protein C & S levels  not done as they would be altered due to liver disease.     Patient has chronic thrombocytopenia with platelets in 40-50,000s range. The CBC from 2/7/22 showed platelet count of 40,000. No c/o bleed or bruise.   -Given risk for intestinal infarction with PV thrombus and likely prothrombotic state,   started him on low dose lovenox on 2/7/22.   - Tolerating well.   - Platelets dropped from 42,000 on 2/8/22 to 36,000 on 2/9 to 34,000 on 2/10. Lovenox stopped. HIT Ab sent - which later came back negative.   - 2/11: Seen by vascular surgery. No interventions planned.   2/13: HIT ab came back negative. Platelets improved to 48,000. Was resumed on lovenox 40 mg sq daily & discharged home.     Patient first seen in the hematology clinic on 2/15/22. He has been tolerating lovenox 40 mg daily well.  Denies any bleed/bruise. Saw , GI in March 2022. Discussed plan for TIPSS procedure. A CT a/p performed 3/22/22 showed nonocclusive hypodense thrombus within the main portal vein at the nayeli hepatis.     4/14/22: Underwent TIPSS procedure at [email or web address] Tolerated well.    8/23/2022: Duplex portable veins negative for any thrombus. TIPS patent.  Anticoagulation with Lovenox discontinued.    INTERIM HISTORY:  Patient seen back today in 6-month follow-up.  He is in good spirits today.  Patient states he underwent repeat revision TIPS procedure last week.  Tolerated well.  He denies any bleeding difficulty.  Patient denies any new medications.  Denies any recent illnesses.  Denies other concerns at this time.    Past Medical History:   Diagnosis Date    Alcoholism     Anemia     Cirrhosis     Clot     COVID-19 03/17/2022    Encephalopathy     Hypertension     Liver disease     SBP (spontaneous bacterial peritonitis)      Past Surgical History:   Procedure Laterality Date    COLONOSCOPY      ENDOSCOPY         MEDICATIONS    Current Outpatient Medications:     Accu-Chek Softclix Lancets lancets, , Disp: , Rfl:     BD Pen Needle Isi 2nd Gen 32G X 4 MM misc, , Disp: , Rfl:     Blood Glucose Monitoring Suppl (Accu-Chek Guide Me) w/Device kit, , Disp: , Rfl:     glucose blood test strip, 1 each by Other route As Needed., Disp: , Rfl:     Insulin Degludec (TRESIBA FLEXTOUCH) 200 UNIT/ML solution pen-injector pen injection, Inject  20-40 Units under the skin into the appropriate area as directed., Disp: , Rfl:     lactulose (CHRONULAC) 10 GM/15ML solution, TAKE 45MLS BY MOUTH TWICE DAILY TO THREE TIMES DAILY WITH A GOAL OF 3-4 BOWEL MOVEMENTS PER DAY, Disp: 85401 mL, Rfl: 0    multivitamin (multivitamin) tablet tablet, Take 1 tablet by mouth Daily., Disp: 30 tablet, Rfl: 3    NovoLOG FlexPen 100 UNIT/ML solution pen-injector sc pen, Inject 8-15 Units under the skin 3 (three) times a day with meals., Disp: , Rfl:     ALLERGIES:     Allergies   Allergen Reactions    Iron GI Intolerance    Zinc GI Intolerance     vomiting       SOCIAL HISTORY:       Social History     Socioeconomic History    Marital status:    Tobacco Use    Smoking status: Never    Smokeless tobacco: Never   Vaping Use    Vaping status: Never Used   Substance and Sexual Activity    Alcohol use: Not Currently     Comment: No ETOH on 2.5 yrs    Drug use: No    Sexual activity: Defer         FAMILY HISTORY:  Family History   Problem Relation Age of Onset    Diabetes Mother     Hypertension Father     Diabetes Maternal Grandfather      REVIEW OF SYSTEMS:  As per HPI       Vitals:    06/12/24 1351   BP: 150/70   Pulse: 76   Resp: 19   Temp: 98.2 °F (36.8 °C)   SpO2: 98%   Weight: 76.7 kg (169 lb 3.2 oz)         12/13/2023     4:16 PM   Current Status   ECOG score 0      PHYSICAL EXAM:    CONSTITUTIONAL:  Vital signs reviewed.  No distress, looks comfortable.  EYES:  Conjunctiva and lids unremarkable.   EARS,NOSE,MOUTH,THROAT:  Ears and nose appear unremarkable.    RESPIRATORY:  Normal respiratory effort.  Lungs clear to auscultation bilaterally.  CARDIOVASCULAR:  Normal S1, S2.  No murmurs rubs or gallops.  No significant lower extremity edema.  GASTROINTESTINAL: Abdomen appears unremarkable.  Nontender.  Mild distension  LYMPHATIC:  No cervical, supraclavicular lymphadenopathy.  NEURO: AAO x 3, No focal deficits.  Appears to have equal strength all 4   MUSCULOSKELETAL:   Unremarkable digits/nails.  No cyanosis or clubbing.  No apparent joint deformities  SKIN:  Warm.  No rashes. Bruises on arms and abdominal injection sites  PSYCHIATRIC:  Normal judgment and insight.  Normal mood and affect.    RECENT LABS:  Results from last 7 days   Lab Units 06/12/24  1339 06/06/24  1108   WBC 10*3/mm3 3.99  --    NEUTROS ABS 10*3/mm3 2.56 2.1   LYMPHS ABS x10(3)/ul  --  0.5*   HEMOGLOBIN g/dL 12.5*  --    HEMATOCRIT % 36.6*  --    PLATELETS 10*3/mm3 93*  --      Results from last 7 days   Lab Units 06/12/24  1339   SODIUM mmol/L 139   POTASSIUM mmol/L 4.0   CHLORIDE mmol/L 109*   CO2 mmol/L 20.3*   BUN mg/dL 9   CREATININE mg/dL 0.67*   CALCIUM mg/dL 8.7   ALBUMIN g/dL 3.3*   BILIRUBIN mg/dL 1.9*   ALK PHOS U/L 149*   ALT (SGPT) U/L 15   AST (SGOT) U/L 39   GLUCOSE mg/dL 154*     Results from last 7 days   Lab Units 06/06/24  1108   INR  1.55   APTT Second 30.5         ASSESSMENT:   is a pleasant 69 y/o WM with medical issues comprising alcoholic liver disease, cirrhosis, esophageal varices s/p banding recurrent ascites & HTN who comes for portal vein thrombosis evaluation & management.      # Portal vein thrombosis:  Acute on chronic. MRI abdomen from 1/7/22 done at [facility] had demonstrated peripheral, chronic nonocclusive thrombus of the main portal vein. The doppler from 2/6/22 shows acute thrombus in the main and right intrahepatic portal veins.   This is likely due to altered coagulation due to hepatic dysfunction.  No prior history of VTEs. No family history of thrombosis. However given unusual location, hypercoagulable state work up was sent on 2/7/22 - which was negative.   Given risk for intestinal infarction with PV thrombus and likely prothrombotic state,  started him on low dose lovenox on 2/7/22.   Platelets dropped from 42,000 on 2/8/22 to 36,000 on 2/9 to 34,000 on 2/10. Lovenox stopped. HIT Ab sent - which later came back negative. 2/11: Seen by vascular surgery. No  interventions recommended.  3/22/22: Pt doing well on lovenox 40 mg daily. Platelets remain low but stable in 60-70,000 range. Will continue current dose and consider CBC checks to every other week. Advised to maintain close f/u with GI.   4/22/22: S/p TIPSS on 4/14/22 by Dr. Jeronimo with [facility]. continue lovenox for now.   8/30/2022: Reports of doing well on Lovenox 40 mg daily.  Has not needed paracentesis since March.  Labs overall stable with persistent anemia and thrombocytopenia in 70-80,000 range.  Saw Dr. Jeronimo in July 2022.  Repeat Doppler of the portal veins negative for any thrombus.  Lovenox discontinued.  5/31/2023: Patient continues to do well.  No abdominal pain or distention.  He has remained off of anticoagulation.  Maintains close follow-up with GI.  Will continue to monitor.   6/12/2024: Underwent TIPS revision procedure last week by Dr. Jeronimo, [facility].  Tolerated well.  Labs today stable with platelet count of 93,000 and hemoglobin of 12.5.  Continue to monitor off of anticoagulation.  Will see him back in 6 months     # Thrombocytopenia:  - Acute on chronic. Likely liver disease related.   - No bleed/bruise  - Platelet count 55,000 today, 12/13/2023.  This is lower for him with platelet count normally in the 70-90,000 range.  Patient denies any new medications or recent illnesses.  Has been feeling good overall.  We will recheck in 2 weeks for close monitoring.    -6/12/2024: Platelets improved to 93,000.  No bleeding/excessive bruises.  He had undergone repeat revision TIPS procedure last week.    # Diabetes mellitus:   6/12/2024: On insulin per endocrinology    # Leukopenia:  Likely liver disease related. Monitor    # Anemia:  Likely anemia of chronic disease. Baseline Hb in 8-9 gm/dl range since 2020.   Iron panel with no deficiency.   Patient is taking oral iron supplementation through GI.  When 12.5 on 6/12/2024.  Monitor     # Hepatic encephalopathy: Ammonia levels have been normal  recently.  Continues lactulose per GI    # Decompensated liver cirrhosis: As per GI    # Ascites: Was getting paracentesis every 2-3 months prior to TIPSS in April 2022.  None since then.    # Gynecomastia: Likely cirrhosis related.  Monitor.    PLAN:   - Portal vein thrombosis in the setting of liver-disease related thrombocytopenia and coagulopathy.  The portal veins from August 2022 negative for any thrombus.  Lovenox stopped.  - Follow-up abdominal ultrasound 11/21/2023 with stable cirrhosis.  - Hemoglobin remains normal.  - Continue po iron through .   - S/p revision TIPSS procedure on 6/6/24.  Will continue follow-up with GI, Dr. Mcgee.    -Advised close follow-up with endocrinology  -Follow-up in 6 months with repeat labs or sooner if needed    Orders Placed This Encounter   Procedures    Comprehensive Metabolic Panel     Standing Status:   Future     Standing Expiration Date:   6/12/2025     Order Specific Question:   Release to patient     Answer:   Routine Release [[id number]]    Immature Platelet Fraction     Standing Status:   Future     Standing Expiration Date:   6/12/2025     Order Specific Question:   Release to patient     Answer:   Routine Release [[id number]]    CBC & Differential     Standing Status:   Future     Standing Expiration Date:   6/12/2025     Order Specific Question:   Manual Differential     Answer:   No     Order Specific Question:   Release to patient     Answer:   Routine Release [[id number]]   Total time spent during this patient encounter is 45 minutes. The total time spent with the patient includes: preparing to see the patient by reviewing of tests, prior notes or other relevant information, performing appropriate independent examination & evaluation, counseling, ordering of medications, tests or procedures, documenting clinic information in the electronic medical records or other health records, independently interpreting results of tests and communicating the  results to the patient/family or caregiver.

## 2025-01-13 ENCOUNTER — CLINICAL SUPPORT (OUTPATIENT)
Dept: FAMILY MEDICINE CLINIC | Facility: CLINIC | Age: 70
End: 2025-01-13
Payer: MEDICARE

## 2025-01-13 DIAGNOSIS — Z23 NEED FOR VACCINATION: Primary | ICD-10-CM

## 2025-01-13 PROCEDURE — 90662 IIV NO PRSV INCREASED AG IM: CPT | Performed by: FAMILY MEDICINE

## 2025-01-13 PROCEDURE — G0008 ADMIN INFLUENZA VIRUS VAC: HCPCS | Performed by: FAMILY MEDICINE

## 2025-01-21 ENCOUNTER — OFFICE VISIT (OUTPATIENT)
Dept: ONCOLOGY | Facility: CLINIC | Age: 70
End: 2025-01-21
Payer: MEDICARE

## 2025-01-21 ENCOUNTER — LAB (OUTPATIENT)
Dept: LAB | Facility: HOSPITAL | Age: 70
End: 2025-01-21
Payer: MEDICARE

## 2025-01-21 VITALS
SYSTOLIC BLOOD PRESSURE: 155 MMHG | BODY MASS INDEX: 26.43 KG/M2 | TEMPERATURE: 97.5 F | HEART RATE: 78 BPM | HEIGHT: 71 IN | RESPIRATION RATE: 17 BRPM | WEIGHT: 188.8 LBS | DIASTOLIC BLOOD PRESSURE: 88 MMHG | OXYGEN SATURATION: 96 %

## 2025-01-21 DIAGNOSIS — D69.6 THROMBOCYTOPENIA: ICD-10-CM

## 2025-01-21 DIAGNOSIS — K70.30 ALCOHOLIC CIRRHOSIS OF LIVER WITHOUT ASCITES: ICD-10-CM

## 2025-01-21 LAB
ALBUMIN SERPL-MCNC: 3.4 G/DL (ref 3.5–5.2)
ALBUMIN/GLOB SERPL: 0.9 G/DL
ALP SERPL-CCNC: 138 U/L (ref 39–117)
ALT SERPL W P-5'-P-CCNC: 22 U/L (ref 1–41)
ANION GAP SERPL CALCULATED.3IONS-SCNC: 8 MMOL/L (ref 5–15)
AST SERPL-CCNC: 38 U/L (ref 1–40)
BASOPHILS # BLD AUTO: 0.06 10*3/MM3 (ref 0–0.2)
BASOPHILS NFR BLD AUTO: 1.4 % (ref 0–1.5)
BILIRUB SERPL-MCNC: 1 MG/DL (ref 0–1.2)
BUN SERPL-MCNC: 8 MG/DL (ref 8–23)
BUN/CREAT SERPL: 10.5 (ref 7–25)
CALCIUM SPEC-SCNC: 8.8 MG/DL (ref 8.6–10.5)
CHLORIDE SERPL-SCNC: 110 MMOL/L (ref 98–107)
CO2 SERPL-SCNC: 24 MMOL/L (ref 22–29)
CREAT SERPL-MCNC: 0.76 MG/DL (ref 0.76–1.27)
DEPRECATED RDW RBC AUTO: 41.5 FL (ref 37–54)
EGFRCR SERPLBLD CKD-EPI 2021: 98.5 ML/MIN/1.73
EOSINOPHIL # BLD AUTO: 0.61 10*3/MM3 (ref 0–0.4)
EOSINOPHIL NFR BLD AUTO: 14.2 % (ref 0.3–6.2)
ERYTHROCYTE [DISTWIDTH] IN BLOOD BY AUTOMATED COUNT: 14.2 % (ref 12.3–15.4)
GLOBULIN UR ELPH-MCNC: 3.8 GM/DL
GLUCOSE SERPL-MCNC: 144 MG/DL (ref 65–99)
HCT VFR BLD AUTO: 37.9 % (ref 37.5–51)
HGB BLD-MCNC: 12.8 G/DL (ref 13–17.7)
IMM GRANULOCYTES # BLD AUTO: 0.02 10*3/MM3 (ref 0–0.05)
IMM GRANULOCYTES NFR BLD AUTO: 0.5 % (ref 0–0.5)
LYMPHOCYTES # BLD AUTO: 0.81 10*3/MM3 (ref 0.7–3.1)
LYMPHOCYTES NFR BLD AUTO: 18.9 % (ref 19.6–45.3)
MCH RBC QN AUTO: 27.6 PG (ref 26.6–33)
MCHC RBC AUTO-ENTMCNC: 33.8 G/DL (ref 31.5–35.7)
MCV RBC AUTO: 81.9 FL (ref 79–97)
MONOCYTES # BLD AUTO: 0.48 10*3/MM3 (ref 0.1–0.9)
MONOCYTES NFR BLD AUTO: 11.2 % (ref 5–12)
NEUTROPHILS NFR BLD AUTO: 2.31 10*3/MM3 (ref 1.7–7)
NEUTROPHILS NFR BLD AUTO: 53.8 % (ref 42.7–76)
NRBC BLD AUTO-RTO: 0 /100 WBC (ref 0–0.2)
PLATELET # BLD AUTO: 113 10*3/MM3 (ref 140–450)
PLATELETS.RETICULATED NFR BLD AUTO: 2.9 % (ref 0.9–6.5)
PMV BLD AUTO: 9.6 FL (ref 6–12)
POTASSIUM SERPL-SCNC: 4.5 MMOL/L (ref 3.5–5.2)
PROT SERPL-MCNC: 7.2 G/DL (ref 6–8.5)
RBC # BLD AUTO: 4.63 10*6/MM3 (ref 4.14–5.8)
SODIUM SERPL-SCNC: 142 MMOL/L (ref 136–145)
WBC NRBC COR # BLD AUTO: 4.29 10*3/MM3 (ref 3.4–10.8)

## 2025-01-21 PROCEDURE — 85055 RETICULATED PLATELET ASSAY: CPT

## 2025-01-21 PROCEDURE — 36415 COLL VENOUS BLD VENIPUNCTURE: CPT

## 2025-01-21 PROCEDURE — 85025 COMPLETE CBC W/AUTO DIFF WBC: CPT

## 2025-01-21 PROCEDURE — 80053 COMPREHEN METABOLIC PANEL: CPT

## 2025-01-21 NOTE — PROGRESS NOTES
[facility]    CONSULTING IN BLOOD DISORDERS & CANCER      REASON FOR CONSULTATION/CHIEF COMPLAINT:     Evaluation & management for portal vein thrombosis                             REQUESTING PHYSICIAN: No ref. provider found  RECORDS OBTAINED:  Records of the patients history including those from the electronic medical record were reviewed and summarized in detail.    HISTORY OF PRESENT ILLNESS:    The patient is a 69 y.o. year old male with medical issues comprising alcoholic liver disease, cirrhosis, esophageal varices s/p banding recurrent ascites & HTN who was admitted to the [facility] on 2/4/22 with hepatic encephalopathy.      2/5/22: A US abdomen Cirrhosis with findings of portosystemic shunting including mild to moderate splenomegaly and ascites. There also appears to be hepatofugal in the main portal vein with concern for thrombus formation as well.     2/6/22: A doppler portal vein revealed acute thrombus in the main portal vein, right intrahepatic portal vein, along with abnormal flow in the extrahepatic portal vein and left intrahepatic portal vein.     Of note, a MRI abdomen from 1/7/22 done at [facility] had demonstrated peripheral, chronic nonocclusive thrombus of the main portal vein.     He was seen by inpatient hem/onc team. Given unusual location, hypercoagulable state work up was performed  on 2/7/22. . No prior history of VTEs. No family history of thrombosis.     -  Factor V leiden mutation negative. Factor II & AT-III activity low (likely due to liver dysfunction). Mildly elevated ACL, IgM (unclear significance). LA & b2-GP negative.  JAK2 mutation negative. [facility] flow cytometry negative. Protein C & S levels  not done as they would be altered due to liver disease.     Patient has chronic thrombocytopenia with platelets in 40-50,000s range. The CBC from 2/7/22 showed platelet count of 40,000. No c/o bleed or bruise.   -Given risk for intestinal infarction with PV thrombus and likely prothrombotic state,   started him on low dose lovenox on 2/7/22.   - Tolerating well.   - Platelets dropped from 42,000 on 2/8/22 to 36,000 on 2/9 to 34,000 on 2/10. Lovenox stopped. HIT Ab sent - which later came back negative.   - 2/11: Seen by vascular surgery. No interventions planned.   2/13: HIT ab came back negative. Platelets improved to 48,000. Was resumed on lovenox 40 mg sq daily & discharged home.     Patient first seen in the hematology clinic on 2/15/22. He has been tolerating lovenox 40 mg daily well.  Denies any bleed/bruise. Saw , GI in March 2022. Discussed plan for TIPSS procedure. A CT a/p performed 3/22/22 showed nonocclusive hypodense thrombus within the main portal vein at the nayeli hepatis.     4/14/22: Underwent TIPSS procedure at [facility]. Tolerated well.    8/23/2022: Duplex portable veins negative for any thrombus. TIPS patent.  Anticoagulation with Lovenox discontinued.    INTERIM HISTORY:  Patient seen back today in 6-month follow-up.  He is in good spirits today.  Patient states he underwent repeat US abdomen through Dr. Jeronimo which showed patent TIPS and no evidence of thrombosis.    He denies any bleeding difficulty.  Patient denies any new medications.  Denies any recent illnesses.  Denies other concerns at this time.    Past Medical History:   Diagnosis Date    Alcoholism     Anemia     Cirrhosis     Clot     COVID-19 03/17/2022    Encephalopathy     Hypertension     Liver disease     SBP (spontaneous bacterial peritonitis)      Past Surgical History:   Procedure Laterality Date    COLONOSCOPY      ENDOSCOPY         MEDICATIONS    Current Outpatient Medications:     Accu-Chek Softclix Lancets lancets, , Disp: , Rfl:     BD Pen Needle Isi 2nd Gen 32G X 4 MM misc, , Disp: , Rfl:     Blood Glucose Monitoring Suppl (Accu-Chek Guide Me) w/Device kit, , Disp: , Rfl:     glucose blood test strip, 1 each by Other route As Needed., Disp: , Rfl:     Insulin Degludec (TRESIBA FLEXTOUCH) 200 UNIT/ML  "solution pen-injector pen injection, Inject 20-40 Units under the skin into the appropriate area as directed., Disp: , Rfl:     lactulose (CHRONULAC) 10 GM/15ML solution, TAKE 45MLS BY MOUTH TWICE DAILY TO THREE TIMES DAILY WITH A GOAL OF 3-4 BOWEL MOVEMENTS PER DAY, Disp: 41227 mL, Rfl: 0    multivitamin (multivitamin) tablet tablet, Take 1 tablet by mouth Daily., Disp: 30 tablet, Rfl: 3    NovoLOG FlexPen 100 UNIT/ML solution pen-injector sc pen, Inject 8-15 Units under the skin 3 (three) times a day with meals., Disp: , Rfl:     ALLERGIES:     Allergies   Allergen Reactions    Iron GI Intolerance    Zinc GI Intolerance     vomiting       SOCIAL HISTORY:       Social History     Socioeconomic History    Marital status:    Tobacco Use    Smoking status: Never    Smokeless tobacco: Never   Vaping Use    Vaping status: Never Used   Substance and Sexual Activity    Alcohol use: Not Currently     Comment: No ETOH on 2.5 yrs    Drug use: No    Sexual activity: Defer         FAMILY HISTORY:  Family History   Problem Relation Age of Onset    Diabetes Mother     Hypertension Father     Diabetes Maternal Grandfather      REVIEW OF SYSTEMS:  As per HPI       Vitals:    01/21/25 1223   BP: 155/88   Pulse: 78   Resp: 17   Temp: 97.5 °F (36.4 °C)   TempSrc: Oral   SpO2: 96%   Weight: 85.6 kg (188 lb 12.8 oz)   Height: 180.3 cm (71\")   PainSc: 0-No pain         1/21/2025    12:25 PM   Current Status   ECOG score 0      PHYSICAL EXAM:    CONSTITUTIONAL:  Vital signs reviewed.  No distress, looks comfortable.  EYES:  Conjunctiva and lids unremarkable.   EARS,NOSE,MOUTH,THROAT:  Ears and nose appear unremarkable.    RESPIRATORY:  Normal respiratory effort.  Lungs clear to auscultation bilaterally.  CARDIOVASCULAR:  Normal S1, S2.  No murmurs rubs or gallops.  No significant lower extremity edema.  GASTROINTESTINAL: Abdomen appears unremarkable.  Nontender.  Mild distension  LYMPHATIC:  No cervical, supraclavicular " lymphadenopathy.  NEURO: AAO x 3, No focal deficits.  Appears to have equal strength all 4   MUSCULOSKELETAL:  Unremarkable digits/nails.  No cyanosis or clubbing.  No apparent joint deformities  SKIN:  Warm.  No rashes. Bruises on arms and abdominal injection sites  PSYCHIATRIC:  Normal judgment and insight.  Normal mood and affect.    RECENT LABS:  Results from last 7 days   Lab Units 01/21/25  1150   WBC 10*3/mm3 4.29   NEUTROS ABS 10*3/mm3 2.31   HEMOGLOBIN g/dL 12.8*   HEMATOCRIT % 37.9   PLATELETS 10*3/mm3 113*     Results from last 7 days   Lab Units 01/21/25  1150   SODIUM mmol/L 142   POTASSIUM mmol/L 4.5   CHLORIDE mmol/L 110*   CO2 mmol/L 24.0   BUN mg/dL 8   CREATININE mg/dL 0.76   CALCIUM mg/dL 8.8   ALBUMIN g/dL 3.4*   BILIRUBIN mg/dL 1.0   ALK PHOS U/L 138*   ALT (SGPT) U/L 22   AST (SGOT) U/L 38   GLUCOSE mg/dL 144*               ASSESSMENT:   is a pleasant 69 y/o WM with medical issues comprising alcoholic liver disease, cirrhosis, esophageal varices s/p banding recurrent ascites & HTN who comes for portal vein thrombosis evaluation & management.      # Portal vein thrombosis:  Acute on chronic. MRI abdomen from 1/7/22 done at [facility] had demonstrated peripheral, chronic nonocclusive thrombus of the main portal vein. The doppler from 2/6/22 shows acute thrombus in the main and right intrahepatic portal veins.   This is likely due to altered coagulation due to hepatic dysfunction.  No prior history of VTEs. No family history of thrombosis. However given unusual location, hypercoagulable state work up was sent on 2/7/22 - which was negative.   Given risk for intestinal infarction with PV thrombus and likely prothrombotic state,  started him on low dose lovenox on 2/7/22.   Platelets dropped from 42,000 on 2/8/22 to 36,000 on 2/9 to 34,000 on 2/10. Lovenox stopped. HIT Ab sent - which later came back negative. 2/11: Seen by vascular surgery. No interventions recommended.  3/22/22: Pt doing  well on lovenox 40 mg daily. Platelets remain low but stable in 60-70,000 range. Will continue current dose and consider CBC checks to every other week. Advised to maintain close f/u with GI.   4/22/22: S/p TIPSS on 4/14/22 by Dr. Jeronimo with [facility]. continue lovenox for now.   8/30/2022: Reports of doing well on Lovenox 40 mg daily.  Has not needed paracentesis since March.  Labs overall stable with persistent anemia and thrombocytopenia in 70-80,000 range.  Saw Dr. Jeronimo in July 2022.  Repeat Doppler of the portal veins negative for any thrombus.  Lovenox discontinued.  5/31/2023: Patient continues to do well.  No abdominal pain or distention.  He has remained off of anticoagulation.  Maintains close follow-up with GI.  Will continue to monitor.   6/12/2024: Underwent TIPS revision procedure last week by Dr. Jeronimo, [facility].  Tolerated well.  Labs today stable with platelet count of 93,000 and hemoglobin of 12.5.  Continue to monitor off of anticoagulation.  Will see him back in 6 months  1/21/2025: Patient is clinically stable.  Has good appetite and has been able to gain some weight.   US Doppler of the abdomen performed this week does not show thrombosis.  Advised to maintain follow-up with GI,      # Thrombocytopenia:  - Acute on chronic. Likely liver disease related.   - No bleed/bruise  - Platelet count 55,000 today, 12/13/2023.  This is lower for him with platelet count normally in the 70-90,000 range.  Patient denies any new medications or recent illnesses.  Has been feeling good overall.  We will recheck in 2 weeks for close monitoring.    -6/12/2024: Platelets improved to 93,000.  No bleeding/excessive bruises.  He had undergone repeat revision TIPS procedure last week.  -1/21/2025: Platelets slightly improved to 113,000.  Monitor    # Diabetes mellitus:   6/12/2024: On insulin per endocrinology    # Leukopenia:  Likely liver disease related. Monitor    # Anemia:  Likely anemia of chronic  disease. Baseline Hb in 8-9 gm/dl range since 2020.   Iron panel with no deficiency.   Patient is taking oral iron supplementation through GI.  When 12.5 on 6/12/2024.  Monitor     # Hepatic encephalopathy: Ammonia levels have been normal recently.  Continues lactulose per GI    # Decompensated liver cirrhosis: As per GI    # Ascites: Was getting paracentesis every 2-3 months prior to TIPSS in April 2022.  None since then.    # Gynecomastia: Likely cirrhosis related.  Monitor.    PLAN:   - Portal vein thrombosis in the setting of liver-disease related thrombocytopenia and coagulopathy.  The portal veins from August 2022 negative for any thrombus.  Lovenox stopped.  - Follow-up abdominal ultrasound 1/8/25 with stable cirrhosis and no evidence of thrombosis.  Patent TIPS.  - Hemoglobin and platelets remains normal.  - Continue po iron through .   - S/p revision TIPSS procedure on 6/6/24.  Will continue follow-up with GI, Dr. Mcgee.    -Advised close follow-up with endocrinology  -Follow-up in 12 months with repeat labs or sooner if needed    Orders Placed This Encounter   Procedures    Immature Platelet Fraction     Standing Status:   Future     Standing Expiration Date:   1/21/2026     Order Specific Question:   Release to patient     Answer:   Routine Release [[id number]]    Comprehensive Metabolic Panel     Standing Status:   Future     Standing Expiration Date:   1/21/2026     Order Specific Question:   Release to patient     Answer:   Routine Release [[id number]]    CBC & Differential     Standing Status:   Future     Standing Expiration Date:   1/21/2026     Order Specific Question:   Manual Differential     Answer:   No     Order Specific Question:   Release to patient     Answer:   Routine Release [[id number]]

## 2025-04-17 LAB — HBA1C MFR BLD: 6.9 % (ref 4.8–5.6)

## 2025-05-06 NOTE — TELEPHONE ENCOUNTER
Requested medication(s) are due for refill today: Yes  Patient has already received a courtesy refill: Yes  Other reason request has been forwarded to provider: [facility] msg sent to pt to schedule follow up. Please review RX if appropriate.    VM to pt with request to contact office.

## 2025-07-18 ENCOUNTER — TELEPHONE (OUTPATIENT)
Dept: FAMILY MEDICINE CLINIC | Facility: CLINIC | Age: 70
End: 2025-07-18
Payer: MEDICARE

## 2025-07-18 NOTE — TELEPHONE ENCOUNTER
Caller: Wei Potts    Relationship to patient: Self    Best call back number:  LEAVE A DETAILED MESSAGE ON VOICEMAIL     Patient is needing: PATIENT STATES THAT HE WENT TO URGENT CARE THIS WEEK AND WAS TOLD HE HAD A HEAT STROKE.  HE STATES HE HAD NAUSEA AND DIZZINESS.     HE STATES HE ALSO WAS HAVING ABDOMINAL PAIN AND URGENT CARE TOLD HIM TO GET CT SCANS OR ULTRASOUNDS AND THAT THEY COULD DO THOSE SCANS ON MONDAY AT [place] ONCE THEY GET THE ORDER.     PLEASE CALL THE PATIENT TO ADVISE IF THIS WILL BE ORDERED FOR HIM.

## 2025-07-21 ENCOUNTER — HOSPITAL ENCOUNTER (EMERGENCY)
Facility: HOSPITAL | Age: 70
Discharge: HOME | End: 2025-07-21
Attending: EMERGENCY MEDICINE | Admitting: EMERGENCY MEDICINE
Payer: MEDICARE

## 2025-07-21 ENCOUNTER — APPOINTMENT (OUTPATIENT)
Dept: CT IMAGING | Facility: HOSPITAL | Age: 70
End: 2025-07-21
Payer: MEDICARE

## 2025-07-21 VITALS
RESPIRATION RATE: 18 BRPM | HEIGHT: 71 IN | TEMPERATURE: 98.4 F | WEIGHT: 178 LBS | HEART RATE: 72 BPM | BODY MASS INDEX: 24.92 KG/M2 | DIASTOLIC BLOOD PRESSURE: 91 MMHG | SYSTOLIC BLOOD PRESSURE: 180 MMHG | OXYGEN SATURATION: 99 %

## 2025-07-21 DIAGNOSIS — R18.8 CIRRHOSIS OF LIVER WITH ASCITES, UNSPECIFIED HEPATIC CIRRHOSIS TYPE: Primary | ICD-10-CM

## 2025-07-21 DIAGNOSIS — K74.60 CIRRHOSIS OF LIVER WITH ASCITES, UNSPECIFIED HEPATIC CIRRHOSIS TYPE: Primary | ICD-10-CM

## 2025-07-21 LAB
ALBUMIN SERPL-MCNC: 3.3 G/DL (ref 3.5–5.2)
ALBUMIN/GLOB SERPL: 0.7 G/DL
ALP SERPL-CCNC: 123 U/L (ref 39–117)
ALT SERPL W P-5'-P-CCNC: 25 U/L (ref 1–41)
ANION GAP SERPL CALCULATED.3IONS-SCNC: 8.4 MMOL/L (ref 5–15)
AST SERPL-CCNC: 42 U/L (ref 1–40)
BASOPHILS # BLD AUTO: 0.02 10*3/MM3 (ref 0–0.2)
BASOPHILS NFR BLD AUTO: 0.5 % (ref 0–1.5)
BILIRUB SERPL-MCNC: 1.8 MG/DL (ref 0–1.2)
BILIRUB UR QL STRIP: NEGATIVE
BUN SERPL-MCNC: 8 MG/DL (ref 8–23)
BUN/CREAT SERPL: 8.2 (ref 7–25)
CALCIUM SPEC-SCNC: 9.3 MG/DL (ref 8.6–10.5)
CHLORIDE SERPL-SCNC: 107 MMOL/L (ref 98–107)
CLARITY UR: CLEAR
CO2 SERPL-SCNC: 23.6 MMOL/L (ref 22–29)
COLOR UR: ABNORMAL
CREAT SERPL-MCNC: 0.97 MG/DL (ref 0.76–1.27)
D-LACTATE SERPL-SCNC: 1.6 MMOL/L (ref 0.5–2)
DEPRECATED RDW RBC AUTO: 44.3 FL (ref 37–54)
EGFRCR SERPLBLD CKD-EPI 2021: 85 ML/MIN/1.73
EOSINOPHIL # BLD AUTO: 0.28 10*3/MM3 (ref 0–0.4)
EOSINOPHIL NFR BLD AUTO: 7.1 % (ref 0.3–6.2)
ERYTHROCYTE [DISTWIDTH] IN BLOOD BY AUTOMATED COUNT: 14.7 % (ref 12.3–15.4)
GLOBULIN UR ELPH-MCNC: 5 GM/DL
GLUCOSE SERPL-MCNC: 117 MG/DL (ref 65–99)
GLUCOSE UR STRIP-MCNC: NEGATIVE MG/DL
HCT VFR BLD AUTO: 40 % (ref 37.5–51)
HGB BLD-MCNC: 12.9 G/DL (ref 13–17.7)
HGB UR QL STRIP.AUTO: NEGATIVE
HOLD SPECIMEN: NORMAL
HOLD SPECIMEN: NORMAL
IMM GRANULOCYTES # BLD AUTO: 0.01 10*3/MM3 (ref 0–0.05)
IMM GRANULOCYTES NFR BLD AUTO: 0.3 % (ref 0–0.5)
KETONES UR QL STRIP: ABNORMAL
LEUKOCYTE ESTERASE UR QL STRIP.AUTO: NEGATIVE
LIPASE SERPL-CCNC: 45 U/L (ref 13–60)
LYMPHOCYTES # BLD AUTO: 0.54 10*3/MM3 (ref 0.7–3.1)
LYMPHOCYTES NFR BLD AUTO: 13.6 % (ref 19.6–45.3)
MCH RBC QN AUTO: 26.8 PG (ref 26.6–33)
MCHC RBC AUTO-ENTMCNC: 32.3 G/DL (ref 31.5–35.7)
MCV RBC AUTO: 83.2 FL (ref 79–97)
MONOCYTES # BLD AUTO: 0.37 10*3/MM3 (ref 0.1–0.9)
MONOCYTES NFR BLD AUTO: 9.3 % (ref 5–12)
NEUTROPHILS NFR BLD AUTO: 2.75 10*3/MM3 (ref 1.7–7)
NEUTROPHILS NFR BLD AUTO: 69.2 % (ref 42.7–76)
NITRITE UR QL STRIP: NEGATIVE
NRBC BLD AUTO-RTO: 0 /100 WBC (ref 0–0.2)
PH UR STRIP.AUTO: 6 [PH] (ref 5–8)
PLATELET # BLD AUTO: 91 10*3/MM3 (ref 140–450)
PMV BLD AUTO: 9.2 FL (ref 6–12)
POTASSIUM SERPL-SCNC: 4.2 MMOL/L (ref 3.5–5.2)
PROT SERPL-MCNC: 8.3 G/DL (ref 6–8.5)
PROT UR QL STRIP: NEGATIVE
RBC # BLD AUTO: 4.81 10*6/MM3 (ref 4.14–5.8)
SODIUM SERPL-SCNC: 139 MMOL/L (ref 136–145)
SP GR UR STRIP: 1.02 (ref 1–1.03)
UROBILINOGEN UR QL STRIP: ABNORMAL
WBC NRBC COR # BLD AUTO: 3.97 10*3/MM3 (ref 3.4–10.8)
WHOLE BLOOD HOLD COAG: NORMAL
WHOLE BLOOD HOLD SPECIMEN: NORMAL

## 2025-07-21 PROCEDURE — 81003 URINALYSIS AUTO W/O SCOPE: CPT

## 2025-07-21 PROCEDURE — 25510000001 IOPAMIDOL 61 % SOLUTION: Performed by: EMERGENCY MEDICINE

## 2025-07-21 PROCEDURE — 83605 ASSAY OF LACTIC ACID: CPT

## 2025-07-21 PROCEDURE — 25810000003 SODIUM CHLORIDE 0.9 % SOLUTION: Performed by: EMERGENCY MEDICINE

## 2025-07-21 PROCEDURE — 85025 COMPLETE CBC W/AUTO DIFF WBC: CPT

## 2025-07-21 PROCEDURE — 74177 CT ABD & PELVIS W/CONTRAST: CPT

## 2025-07-21 PROCEDURE — 36415 COLL VENOUS BLD VENIPUNCTURE: CPT

## 2025-07-21 PROCEDURE — 99285 EMERGENCY DEPT VISIT HI MDM: CPT

## 2025-07-21 PROCEDURE — 96360 HYDRATION IV INFUSION INIT: CPT

## 2025-07-21 PROCEDURE — 80053 COMPREHEN METABOLIC PANEL: CPT

## 2025-07-21 PROCEDURE — 83690 ASSAY OF LIPASE: CPT

## 2025-07-21 RX ORDER — IOPAMIDOL 612 MG/ML
100 INJECTION, SOLUTION INTRAVASCULAR
Status: COMPLETED | OUTPATIENT
Start: 2025-07-21 | End: 2025-07-21

## 2025-07-21 RX ORDER — SODIUM CHLORIDE 0.9 % (FLUSH) 0.9 %
10 SYRINGE (ML) INJECTION AS NEEDED
Status: DISCONTINUED | OUTPATIENT
Start: 2025-07-21 | End: 2025-07-21 | Stop reason: HOSPADM

## 2025-07-21 RX ADMIN — IOPAMIDOL 85 ML: 612 INJECTION, SOLUTION INTRAVENOUS at 17:15

## 2025-07-21 RX ADMIN — SODIUM CHLORIDE 1000 ML: 9 INJECTION, SOLUTION INTRAVENOUS at 16:37

## 2025-07-21 NOTE — ED PROVIDER NOTES
EMERGENCY DEPARTMENT ENCOUNTER  Room Number:  31/31  PCP: Bella Villalta MD  Independent Historians: Historian: Patient  Date of encounter:  7/21/2025  Patient Care Team:  Bella Villalta MD as PCP - General (Family Medicine)  Austin Barkley MD (Gastroenterology)  Eulalio Espino MD as Consulting Physician (Hematology and Oncology)  Jer Serrano MD as Referring Physician (Hospitalist)  Ruby Mcgee MD as Consulting Physician (Gastroenterology)     HPI:  Chief Complaint: had concerns including Abdominal Pain.     A complete HPI/ROS/PMH/PSH/SH/FH are unobtainable due to: EM_Unobtainable History : None    Context: Wei Potts is a 70 y.o. male with a medical history of cirrhosis with TIPS procedure who presents to the ED c/o acute abdominal pain.  Patient has had 2 to 3 weeks of generalized abdominal pain.  Pain has been intermittent though occurs every day.  It has worsened it initially started.  He had 1 day of chills, nausea and vomiting last week, but he believes this was secondary to being out in the heat for a long time.  The symptoms resolved after he was able to go home and call off.  No diarrhea or constipation.  No urinary change.  Patient denies any abdominal distention or fluid accumulation.  He has had paracentesis in the past but has not required them since the TIPS procedure was performed.    PAST MEDICAL HISTORY  Active Ambulatory Problems     Diagnosis Date Noted    ED (erectile dysfunction) of organic origin 10/29/2015    Coagulopathy 07/08/2020    Secondary thrombocytopenia 07/08/2020    Lung abnormality- left apex 07/09/2020    GERD (gastroesophageal reflux disease) 01/02/2021    Portal vein thrombosis 02/07/2022    Ascites due to alcoholic cirrhosis 07/06/2020    Cirrhosis 02/24/2021    Iron deficiency anemia 03/02/2021     Resolved Ambulatory Problems     Diagnosis Date Noted    Benign essential hypertension 10/29/2015    Hyponatremia 07/06/2020    Hepatic  encephalopathy 12/25/2020    Colitis 01/02/2021    Hypertension 01/02/2021    SBP (spontaneous bacterial peritonitis) 01/06/2021    Hyperammonemia 02/04/2022    Acute hepatitis A virus infection 02/17/2022    Esophageal varix 04/26/2021    Hepatic encephalopathy 03/18/2022     Past Medical History:   Diagnosis Date    Alcoholism     Clot     COVID-19 03/17/2022    Encephalopathy     Liver disease        PAST SURGICAL HISTORY  Past Surgical History:   Procedure Laterality Date    COLONOSCOPY      ENDOSCOPY         FAMILY HISTORY  Family History   Problem Relation Age of Onset    Diabetes Mother     Hypertension Father     Diabetes Maternal Grandfather        SOCIAL HISTORY  Social History     Socioeconomic History    Marital status:    Tobacco Use    Smoking status: Never    Smokeless tobacco: Never   Vaping Use    Vaping status: Never Used   Substance and Sexual Activity    Alcohol use: Not Currently     Comment: No ETOH on 2.5 yrs    Drug use: No    Sexual activity: Defer       Chronic or social conditions impacting patient care (Social Determinants of Health):  Social Drivers of Health     Tobacco Use: Low Risk  (7/21/2025)    Patient History     Smoking Tobacco Use: Never     Smokeless Tobacco Use: Never     Passive Exposure: Not on file   Alcohol Use: Not At Risk (3/18/2022)    AUDIT-C     Frequency of Alcohol Consumption: Never     Average Number of Drinks: Patient does not drink     Frequency of Binge Drinking: Never   Financial Resource Strain: Not on file   Food Insecurity: Not on file   Transportation Needs: Not on file   Physical Activity: Not on file   Stress: Not on file   Social Connections: Not on file   Interpersonal Safety: Not At Risk (7/21/2025)    Abuse Screen     Unsafe at Home or Work/School: no     Feels Threatened by Someone?: no     Does Anyone Keep You from Contacting Others or Doint Things Outside the Home?: no     Physical Sign of Abuse Present: no   Depression: Not at risk  (1/21/2025)    PHQ-2     PHQ-2 Score: 0   Housing Stability: Not on file   Utilities: Not on file   Health Literacy: Not on file   Employment: Not on file   Disabilities: Not on file       ALLERGIES  Iron and Zinc    REVIEW OF SYSTEMS  Review of Systems  Included in HPI  All systems reviewed and negative except for those discussed in HPI.    PHYSICAL EXAM    I have reviewed the triage vital signs and nursing notes.    ED Triage Vitals [07/21/25 1300]   Temp Heart Rate Resp BP SpO2   98.4 °F (36.9 °C) 91 16 162/88 98 %      Temp src Heart Rate Source Patient Position BP Location FiO2 (%)   Oral -- Sitting Right arm --       Physical Exam  Vitals and nursing note reviewed.   Constitutional:       General: He is not in acute distress.     Appearance: Normal appearance. He is not ill-appearing, toxic-appearing or diaphoretic.   HENT:      Head: Normocephalic and atraumatic.      Nose: Nose normal.      Mouth/Throat:      Mouth: Mucous membranes are moist.   Eyes:      Extraocular Movements: Extraocular movements intact.      Conjunctiva/sclera: Conjunctivae normal.      Pupils: Pupils are equal, round, and reactive to light.   Cardiovascular:      Rate and Rhythm: Normal rate and regular rhythm.      Pulses: Normal pulses.      Heart sounds: Normal heart sounds. No murmur heard.     No friction rub. No gallop.   Pulmonary:      Effort: Pulmonary effort is normal. No respiratory distress.      Breath sounds: Normal breath sounds. No stridor. No wheezing, rhonchi or rales.   Abdominal:      General: Abdomen is flat. There is no distension.      Palpations: Abdomen is soft.      Tenderness: There is no abdominal tenderness. There is no guarding or rebound.   Musculoskeletal:      Cervical back: Normal range of motion and neck supple.   Skin:     General: Skin is warm and dry.      Capillary Refill: Capillary refill takes less than 2 seconds.   Neurological:      General: No focal deficit present.      Mental Status: He is  alert and oriented to person, place, and time. Mental status is at baseline.   Psychiatric:         Mood and Affect: Mood normal.         Behavior: Behavior normal.         Thought Content: Thought content normal.         Judgment: Judgment normal.         LAB RESULTS  Recent Results (from the past 24 hours)   Comprehensive Metabolic Panel    Collection Time: 07/21/25  1:10 PM    Specimen: Arm, Left; Blood   Result Value Ref Range    Glucose 117 (H) 65 - 99 mg/dL    BUN 8.0 8.0 - 23.0 mg/dL    Creatinine 0.97 0.76 - 1.27 mg/dL    Sodium 139 136 - 145 mmol/L    Potassium 4.2 3.5 - 5.2 mmol/L    Chloride 107 98 - 107 mmol/L    CO2 23.6 22.0 - 29.0 mmol/L    Calcium 9.3 8.6 - 10.5 mg/dL    Total Protein 8.3 6.0 - 8.5 g/dL    Albumin 3.3 (L) 3.5 - 5.2 g/dL    ALT (SGPT) 25 1 - 41 U/L    AST (SGOT) 42 (H) 1 - 40 U/L    Alkaline Phosphatase 123 (H) 39 - 117 U/L    Total Bilirubin 1.8 (H) 0.0 - 1.2 mg/dL    Globulin 5.0 gm/dL    A/G Ratio 0.7 g/dL    BUN/Creatinine Ratio 8.2 7.0 - 25.0    Anion Gap 8.4 5.0 - 15.0 mmol/L    eGFR 85.0 >60.0 mL/min/1.73   Lipase    Collection Time: 07/21/25  1:10 PM    Specimen: Arm, Left; Blood   Result Value Ref Range    Lipase 45 13 - 60 U/L   Lactic Acid, Plasma    Collection Time: 07/21/25  1:10 PM    Specimen: Arm, Left; Blood   Result Value Ref Range    Lactate 1.6 0.5 - 2.0 mmol/L   Green Top (Gel)    Collection Time: 07/21/25  1:10 PM   Result Value Ref Range    Extra Tube Hold for add-ons.    Lavender Top    Collection Time: 07/21/25  1:10 PM   Result Value Ref Range    Extra Tube hold for add-on    Gold Top - SST    Collection Time: 07/21/25  1:10 PM   Result Value Ref Range    Extra Tube Hold for add-ons.    Light Blue Top    Collection Time: 07/21/25  1:10 PM   Result Value Ref Range    Extra Tube Hold for add-ons.    CBC Auto Differential    Collection Time: 07/21/25  1:10 PM    Specimen: Arm, Left; Blood   Result Value Ref Range    WBC 3.97 3.40 - 10.80 10*3/mm3    RBC 4.81  4.14 - 5.80 10*6/mm3    Hemoglobin 12.9 (L) 13.0 - 17.7 g/dL    Hematocrit 40.0 37.5 - 51.0 %    MCV 83.2 79.0 - 97.0 fL    MCH 26.8 26.6 - 33.0 pg    MCHC 32.3 31.5 - 35.7 g/dL    RDW 14.7 12.3 - 15.4 %    RDW-SD 44.3 37.0 - 54.0 fl    MPV 9.2 6.0 - 12.0 fL    Platelets 91 (L) 140 - 450 10*3/mm3    Neutrophil % 69.2 42.7 - 76.0 %    Lymphocyte % 13.6 (L) 19.6 - 45.3 %    Monocyte % 9.3 5.0 - 12.0 %    Eosinophil % 7.1 (H) 0.3 - 6.2 %    Basophil % 0.5 0.0 - 1.5 %    Immature Grans % 0.3 0.0 - 0.5 %    Neutrophils, Absolute 2.75 1.70 - 7.00 10*3/mm3    Lymphocytes, Absolute 0.54 (L) 0.70 - 3.10 10*3/mm3    Monocytes, Absolute 0.37 0.10 - 0.90 10*3/mm3    Eosinophils, Absolute 0.28 0.00 - 0.40 10*3/mm3    Basophils, Absolute 0.02 0.00 - 0.20 10*3/mm3    Immature Grans, Absolute 0.01 0.00 - 0.05 10*3/mm3    nRBC 0.0 0.0 - 0.2 /100 WBC   Urinalysis With Microscopic If Indicated (No Culture) - Urine, Clean Catch    Collection Time: 07/21/25  1:13 PM    Specimen: Urine, Clean Catch   Result Value Ref Range    Color, UA Dark Yellow (A) Yellow, Straw    Appearance, UA Clear Clear    pH, UA 6.0 5.0 - 8.0    Specific Gravity, UA 1.018 1.005 - 1.030    Glucose, UA Negative Negative    Ketones, UA Trace (A) Negative    Bilirubin, UA Negative Negative    Blood, UA Negative Negative    Protein, UA Negative Negative    Leuk Esterase, UA Negative Negative    Nitrite, UA Negative Negative    Urobilinogen, UA 2.0 E.U./dL (A) 0.2 - 1.0 E.U./dL       RADIOLOGY  CT Abdomen Pelvis With Contrast  Result Date: 7/21/2025  CT ABDOMEN PELVIS W CONTRAST-  DATE OF EXAM: 7/21/2025 5:03 PM  INDICATION: Generalized abdominal pain, chills, nausea, history of cirrhosis with TIPS procedure.  COMPARISON: CT 3/22/2022. Ultrasound 10/4/2022.  TECHNIQUE: Multiple contiguous axial images were acquired through the abdomen and pelvis following the intravenous administration of 85 mL of Isovue-300. Reformatted coronal and sagittal sequences were also  reviewed. Radiation dose reduction techniques were utilized, including automated exposure control and exposure modulation based on body size.  FINDINGS: The included lung bases are clear. Partially imaged calcified coronary artery disease.  Diffusely heterogeneous hepatic attenuation with nodular liver contours, consistent with cirrhosis diffusely nodular enhancement pattern of the liver, which could reflect regenerative nodules but liver masses are not excluded, with an index mass in the right lobe measuring 1.5 cm in diameter (axial series 3 image 23). Incompletely evaluated similar-appearing low-density lesions in the liver, probable benign cysts. A thrombosed TIPS is in place with thrombus in the portal vein. Nonocclusive thrombus extends into the dilated splenic vein. Thrombosis of the SMV just distal to its first branch. Nonspecific diffuse gallbladder wall thickening. Splenomegaly with the spleen measuring up to 18.5 cm in diameter. The pancreas, adrenal glands, and kidneys are unremarkable. Enlarged prostate gland, measuring 5.5 cm in transverse diameter, with mass effect on the floor the urinary bladder. Urinary bladder is otherwise unremarkable.  Mild colorectal stool. Colonic diverticula, without CT evidence of diverticulitis. No bowel obstruction or significant bowel wall thickening. The appendix is normal.  Trace ascites. No free intraperitoneal air. No pathologically enlarged lymph nodes in the abdomen or pelvis. Multiple venous varicosities in the abdomen and pelvis with numerous embolization coils in the right lower quadrant.  Diffuse osteopenia. Mild to moderate multilevel lumbar spondylosis. No acute osseous abnormality or concerning osseous lesion.       1. Cirrhotic liver morphology with nonspecific nodular enhancement pattern. Recommend correlating with AFP. Could consider further evaluation with routine outpatient MRI if clinically indicated. 2. Thrombosed TIPS and portal vein, thrombosis of  the SMV, and nonocclusive thrombus in the dilated splenic vein. 3. Trace ascites.  This report was finalized on 7/21/2025 5:56 PM by Pablo Archer MD on Workstation: [id number]        MEDICATIONS GIVEN IN ER  Medications   sodium chloride 0.9 % flush 10 mL (has no administration in time range)   sodium chloride 0.9 % bolus 1,000 mL (1,000 mL Intravenous New Bag 7/21/25 1637)   iopamidol (ISOVUE-300) 61 % injection 100 mL (85 mL Intravenous Given 7/21/25 1715)       ORDERS PLACED DURING THIS VISIT:  Orders Placed This Encounter   Procedures    CT Abdomen Pelvis With Contrast    [place] Draw    Comprehensive Metabolic Panel    Lipase    Urinalysis With Microscopic If Indicated (No Culture) - Urine, Clean Catch    Lactic Acid, Plasma    CBC Auto Differential    NPO Diet NPO Type: Strict NPO    Undress & Gown    Insert Peripheral IV    CBC & Differential    Green Top (Gel)    Lavender Top    Gold Top - SST    Light Blue Top       OUTPATIENT MEDICATION MANAGEMENT:  Current Facility-Administered Medications Ordered in Epic   Medication Dose Route Frequency Provider Last Rate Last Admin    sodium chloride 0.9 % flush 10 mL  10 mL Intravenous PRN Gerber Toribio MD         Current Outpatient Medications Ordered in Epic   Medication Sig Dispense Refill    Accu-Chek Softclix Lancets lancets       BD Pen Needle Isi 2nd Gen 32G X 4 MM misc       Blood Glucose Monitoring Suppl (Accu-Chek Guide Me) w/Device kit       glucose blood (Accu-Chek Guide Test) test strip 1 each by Other route 3 (Three) Times a Day. test blood glucose 3 times daily      lactulose (CHRONULAC) 10 GM/15ML solution TAKE 45MLS BY MOUTH TWICE DAILY TO THREE TIMES DAILY WITH A GOAL OF 3-4 BOWEL MOVEMENTS PER DAY 26371 mL 0    multivitamin (multivitamin) tablet tablet Take 1 tablet by mouth Daily. 30 tablet 3    NovoLOG FlexPen 100 UNIT/ML solution pen-injector sc pen Inject 8-15 Units under the skin 3 (three) times a day with meals.      Tresiba FlexTouch  200 UNIT/ML solution pen-injector pen injection INJECT 20-40 UNITS UNDER THE SKIN 1 (ONE) TIME EACH DAY IN THE MORNING.         PROCEDURES  Procedures    PROGRESS, DATA ANALYSIS, CONSULTS, AND MEDICAL DECISION MAKING  All labs have been independently interpreted by me.  All radiology studies have been reviewed by me. All EKG's have been independently viewed and interpreted by me.  Discussion below represents my analysis of pertinent findings related to patient's condition, differential diagnosis, treatment plan and final disposition.    Differential diagnosis includes but is not limited to ascites, colitis, diverticulitis, UTI.    Clinical Scores:                   ED Course as of 07/21/25 1813 Mon Jul 21, 2025   1721 CT Abdomen Pelvis With Contrast  My independent interpretation of the imaging study is no SBO or free air [AB]   1812 Updated patient on results.  Patient has evidence of a thrombosed TIPS though only minimal ascites.  Do not see need for emergent paracentesis.  Workup at this otherwise unremarkable.  Encouraged him to call tomorrow to set up an appointment with his liver specialist to discuss these findings.  Patient agreeable to plan at discharge. [AB]      ED Course User Index  [AB] Ben Lewis MD             AS OF 18:13 EDT VITALS:    BP - 160/79  HR - 77  TEMP - 98.4 °F (36.9 °C) (Oral)  O2 SATS - 100%    COMPLEXITY OF CARE  Admission was considered but after careful review of the patient's presentation, physical examination, diagnostic results, and response to treatment the patient may be safely discharged with outpatient follow-up.      DIAGNOSIS  Final diagnoses:   Cirrhosis of liver with ascites, unspecified hepatic cirrhosis type         DISPOSITION  ED Disposition       ED Disposition   Discharge    Condition   Stable    Comment   --                Please note that portions of this document were completed with a voice recognition program.    Note Disclaimer: At [facility], we  believe that sharing information builds trust and better relationships. You are receiving this note because you recently visited [facility]. It is possible you will see health information before a provider has talked with you about it. This kind of information can be easy to misunderstand. To help you fully understand what it means for your health, we urge you to discuss this note with your provider.         Ben Lewis MD  07/21/25 2363

## 2025-07-24 PROBLEM — Z95.828 ACQUIRED PORTAL-SYSTEMIC SHUNT: Status: ACTIVE | Noted: 2022-06-21

## 2025-07-24 PROBLEM — K70.31 ASCITES DUE TO ALCOHOLIC CIRRHOSIS: Status: RESOLVED | Noted: 2020-07-06 | Resolved: 2025-07-24

## 2025-07-24 NOTE — PROGRESS NOTES
Assessment & Plan  Portal VV Thrombosis.  Multiple visits to the doctor's office and ER for thrombosis. A CT scan was performed, and the results showed cirrhotic liver; due to nodularity an MRI is scheduled for further evaluation. The patient reported discomfort with the contrast solution used during the CT scan. Thrombosed TIPS and portal vein, thrombosis of the SMV, and  nonocclusive thrombus in the dilated splenic vein.  docs following his condition may recommend further surgical intervention  Follow-up: MRI scheduled for further evaluation on 07/28/2025 at 0900 hours.    Abdomen pain resolved.        Acquired portal-systemic shunt            Patient was given instructions and counseling regarding his condition or for health maintenance advice. Please see specific information pulled into the AVS if appropriate.            Wei is a 70 y.o. being seen today for  Er follow up   HISTORY    HPI      The patient presents for thrombosis of the portal vein.    During a recent visit to Dr. Baptiste's office at , he was informed that medication might be necessary to manage his condition. He spent three to four days in the hospital and doctor's office this week. On Monday, he visited the ER as suggested, where lab tests, including blood and urine analysis, were performed, and a CT scan was conducted. He reported that the CT scan results were normal, but an MRI was recommended for further investigation. He had a negative experience with the contrast solution used during the CT scan, describing it as painful and unpleasant. He is scheduled for an MRI on Monday at 0900 hours.       Social History  He  reports that he has never smoked. He has never used smokeless tobacco. He reports that he does not currently use alcohol. He reports that he does not use drugs.  EXAM DATA    Vital Signs        BP Readings from Last 1 Encounters:   07/25/25 150/80     Wt Readings from Last 3 Encounters:   07/25/25 81.6 kg (180 lb)    07/21/25 80.7 kg (178 lb)   07/18/25 81.6 kg (180 lb)   Body mass index is 25.1 kg/m².  Physical Exam      General Appearance: Normal.  HEENT:   Respiratory: Within normal limits.  Cardiovascular: Within normal limits.  Skin: Warm and dry, no rash.  Neurological:   Psychiatric: Normal judgement and affect.  Other observations: Extremities: Bruises noted on extremities.            Patient or patient representative verbalized consent for the use of Ambient Listening during the visit with  Bella Villalta MD for chart documentation. 7/25/2025  10:18 EDT

## 2025-07-25 ENCOUNTER — OFFICE VISIT (OUTPATIENT)
Dept: FAMILY MEDICINE CLINIC | Facility: CLINIC | Age: 70
End: 2025-07-25
Payer: MEDICARE

## 2025-07-25 VITALS
WEIGHT: 180 LBS | BODY MASS INDEX: 25.2 KG/M2 | DIASTOLIC BLOOD PRESSURE: 80 MMHG | OXYGEN SATURATION: 98 % | SYSTOLIC BLOOD PRESSURE: 150 MMHG | RESPIRATION RATE: 16 BRPM | HEART RATE: 71 BPM | HEIGHT: 71 IN

## 2025-07-25 DIAGNOSIS — Z95.828 ACQUIRED PORTAL-SYSTEMIC SHUNT: Primary | ICD-10-CM
